# Patient Record
Sex: FEMALE | Race: WHITE | NOT HISPANIC OR LATINO | Employment: FULL TIME | URBAN - METROPOLITAN AREA
[De-identification: names, ages, dates, MRNs, and addresses within clinical notes are randomized per-mention and may not be internally consistent; named-entity substitution may affect disease eponyms.]

---

## 2017-01-10 ENCOUNTER — OFFICE VISIT (OUTPATIENT)
Dept: OPTOMETRY | Facility: CLINIC | Age: 52
End: 2017-01-10
Payer: COMMERCIAL

## 2017-01-10 ENCOUNTER — OFFICE VISIT (OUTPATIENT)
Dept: FAMILY MEDICINE | Facility: CLINIC | Age: 52
End: 2017-01-10
Payer: COMMERCIAL

## 2017-01-10 VITALS
HEART RATE: 83 BPM | DIASTOLIC BLOOD PRESSURE: 82 MMHG | HEIGHT: 63 IN | WEIGHT: 229.5 LBS | OXYGEN SATURATION: 98 % | BODY MASS INDEX: 40.66 KG/M2 | SYSTOLIC BLOOD PRESSURE: 122 MMHG

## 2017-01-10 DIAGNOSIS — I10 BENIGN ESSENTIAL HTN: ICD-10-CM

## 2017-01-10 DIAGNOSIS — M50.30 DEGENERATION OF CERVICAL INTERVERTEBRAL DISC: ICD-10-CM

## 2017-01-10 DIAGNOSIS — H52.209 MYOPIA WITH ASTIGMATISM AND PRESBYOPIA, UNSPECIFIED LATERALITY: Primary | ICD-10-CM

## 2017-01-10 DIAGNOSIS — H52.4 MYOPIA WITH ASTIGMATISM AND PRESBYOPIA, UNSPECIFIED LATERALITY: Primary | ICD-10-CM

## 2017-01-10 DIAGNOSIS — F41.9 ANXIETY: ICD-10-CM

## 2017-01-10 DIAGNOSIS — H52.10 MYOPIA WITH ASTIGMATISM AND PRESBYOPIA, UNSPECIFIED LATERALITY: Primary | ICD-10-CM

## 2017-01-10 DIAGNOSIS — R13.10 DYSPHAGIA, UNSPECIFIED TYPE: Primary | ICD-10-CM

## 2017-01-10 DIAGNOSIS — I25.10 CORONARY ARTERY DISEASE INVOLVING NATIVE CORONARY ARTERY OF NATIVE HEART WITHOUT ANGINA PECTORIS: ICD-10-CM

## 2017-01-10 PROCEDURE — 99214 OFFICE O/P EST MOD 30 MIN: CPT | Mod: S$GLB,,, | Performed by: FAMILY MEDICINE

## 2017-01-10 PROCEDURE — 99999 PR PBB SHADOW E&M-EST. PATIENT-LVL III: CPT | Mod: PBBFAC,,, | Performed by: OPTOMETRIST

## 2017-01-10 PROCEDURE — 1159F MED LIST DOCD IN RCRD: CPT | Mod: S$GLB,,, | Performed by: FAMILY MEDICINE

## 2017-01-10 PROCEDURE — 92015 DETERMINE REFRACTIVE STATE: CPT | Mod: S$GLB,,, | Performed by: OPTOMETRIST

## 2017-01-10 PROCEDURE — 99999 PR PBB SHADOW E&M-EST. PATIENT-LVL III: CPT | Mod: PBBFAC,,, | Performed by: FAMILY MEDICINE

## 2017-01-10 PROCEDURE — 3079F DIAST BP 80-89 MM HG: CPT | Mod: S$GLB,,, | Performed by: FAMILY MEDICINE

## 2017-01-10 PROCEDURE — 3074F SYST BP LT 130 MM HG: CPT | Mod: S$GLB,,, | Performed by: FAMILY MEDICINE

## 2017-01-10 RX ORDER — OMEPRAZOLE 20 MG/1
20 CAPSULE, DELAYED RELEASE ORAL DAILY
Qty: 30 CAPSULE | Refills: 11 | Status: SHIPPED | OUTPATIENT
Start: 2017-01-10 | End: 2018-05-28

## 2017-01-10 RX ORDER — VENLAFAXINE HYDROCHLORIDE 75 MG/1
75 CAPSULE, EXTENDED RELEASE ORAL DAILY
Qty: 30 CAPSULE | Refills: 11 | Status: SHIPPED | OUTPATIENT
Start: 2017-01-10 | End: 2017-12-13 | Stop reason: SDUPTHER

## 2017-01-10 NOTE — PROGRESS NOTES
HPI     Annual Exam    Additional comments: DLE 4-14 (felecia)    refraction only today ---   will schedule back for IOP / DFE           Blurred Vision    Additional comments: at both near & distance            Comments   Refraction only today due to 2nd appt   Will return for full exam         Last edited by LEENA Bass, OD on 1/10/2017  4:17 PM. (History)            Assessment /Plan     For exam results, see Encounter Report.    Myopia with astigmatism and presbyopia, unspecified laterality      Refraction only today per pt  RTC for IOP/ DFE, prn

## 2017-01-10 NOTE — PROGRESS NOTES
Subjective:       Patient ID: Josephine Bolton is a 51 y.o. female.    Chief Complaint: Hypertension    HPI     Since c spine surgery on 11/8/16, c/o dysphagia below retrosternal notch. Compliant with omeprazole    Cad stable.      htn stable.     Reports worsening anxiety.  Reports that parents are not well.  Reports she is helping to take care of them.       Review of Systems      Review of Systems   Constitutional: Negative for fever and chills.   HENT: Negative for hearing loss and neck stiffness.    Eyes: Negative for redness and itching.   Respiratory: Negative for cough and choking.    Cardiovascular: Negative for chest pain and leg swelling.  Abdomen: Negative for abdominal pain and blood in stool.   Genitourinary: Negative for dysuria and flank pain.   Musculoskeletal: Negative for back pain and gait problem.   Neurological: Negative for light-headedness and headaches.   Hematological: Negative for adenopathy.           Objective:      Physical Exam   Constitutional: She appears well-developed.   HENT:   Head: Normocephalic and atraumatic.   Eyes: Conjunctivae are normal. Pupils are equal, round, and reactive to light.   Neck: Normal range of motion.   Cardiovascular: Normal rate and regular rhythm.    No murmur heard.  Pulmonary/Chest: Effort normal and breath sounds normal. She has no wheezes.   Lymphadenopathy:     She has no cervical adenopathy.       Assessment:       1. Dysphagia, unspecified type    2. Degeneration of cervical intervertebral disc    3. Anxiety    4. Coronary artery disease involving native coronary artery of native heart without angina pectoris    5. Benign essential HTN        Plan:       Dysphagia, unspecified type  -     Case request GI: ESOPHAGOGASTRODUODENOSCOPY (EGD)    Degeneration of cervical intervertebral disc    Anxiety    Coronary artery disease involving native coronary artery of native heart without angina pectoris    Benign essential HTN    Other orders  -      omeprazole (PRILOSEC) 20 MG capsule; Take 1 capsule (20 mg total) by mouth once daily.  Dispense: 30 capsule; Refill: 11  -     venlafaxine (EFFEXOR-XR) 75 MG 24 hr capsule; Take 1 capsule (75 mg total) by mouth once daily.  Dispense: 30 capsule; Refill: 11            Plan:  Inc effexor xr to 75 mg daily to help her manage her anxiety  rf prilosec  Order egd  Cont all other meds

## 2017-01-10 NOTE — MR AVS SNAPSHOT
[facility]  [address]  [facility] [address]  Phone: [phone]  Fax: [phone]                  Josephine Bolton   1/10/2017 3:40 PM   Office Visit    Description:  Female : 1965   Provider:  Froylan Smart MD   Department:  [facility]           Reason for Visit     Hypertension           Diagnoses this Visit        Comments    Dysphagia, unspecified type    -  Primary            To Do List           Future Appointments        Provider Department Dept Phone    2017 10:35 AM LABORATORY, [place] [facility] [phone]    2017 1:30 PM LEENA Bass [facility] - Optometry [phone]    2017 9:20 AM Froylan Smart MD [facility] [phone]    2017 8:00 AM LAB, [place] [facility] [phone]    2017 2:30 PM Wilmer Aguilera DO [facility] Endocrinology [phone]      Goals (5 Years of Data)     None      Follow-Up and Disposition     Return in about 3 months (around 4/10/2017).       These Medications        Disp Refills Start End    omeprazole (PRILOSEC) 20 MG capsule 30 capsule 11 1/10/2017     Take 1 capsule (20 mg total) by mouth once daily. - Oral    Pharmacy: [address] Ph #: [phone]       venlafaxine (EFFEXOR-XR) 75 MG 24 hr capsule 30 capsule 11 1/10/2017 1/10/2018    Take 1 capsule (75 mg total) by mouth once daily. - Oral    Pharmacy: [facility] [address] Ph #: [phone]         [facility]     [facility] Nurse Care [facility] -  Assistance  Registered nurses in the [facility] provide clinical advisement, health education, appointment booking, and other advisory services.  Call for this free service at [phone].             Medications           Message regarding Medications     Verify the changes and/or additions to  your medication regime listed below are the same as discussed with your clinician today.  If any of these changes or additions are incorrect, please notify your healthcare provider.        START taking these NEW medications        Refills    venlafaxine (EFFEXOR-XR) 75 MG 24 hr capsule 11    Sig: Take 1 capsule (75 mg total) by mouth once daily.    Class: Normal    Route: Oral      CHANGE how you are taking these medications     Start Taking Instead of    omeprazole (PRILOSEC) 20 MG capsule omeprazole (PRILOSEC) 20 MG capsule    Dosage:  Take 1 capsule (20 mg total) by mouth once daily. Dosage:  Take 20 mg by mouth once daily.    Reason for Change:  Reorder       STOP taking these medications     levalbuterol (XOPENEX HFA) 45 mcg/actuation inhaler Inhale 1-2 puffs into the lungs every 4 (four) hours as needed for Wheezing.           Verify that the below list of medications is an accurate representation of the medications you are currently taking.  If none reported, the list may be blank. If incorrect, please contact your healthcare provider. Carry this list with you in case of emergency.           Current Medications     allopurinol (ZYLOPRIM) 100 MG tablet Take 1 tablet (100 mg total) by mouth once daily.    amlodipine (NORVASC) 5 MG tablet Take 5 mg by mouth once daily.    aspirin (ECOTRIN) 81 MG EC tablet Take 81 mg by mouth once daily.     atorvastatin (LIPITOR) 20 MG tablet TAKE ONE TABLET BY MOUTH ONE TIME DAILY     blood-glucose meter (GLUCOSE MONITORING KIT) kit Use as instructed    carvedilol (COREG) 25 MG tablet TAKE ONE TABLET BY MOUTH TWICE DAILY     EFFIENT 10 mg Tab TAKE ONE TABLET BY MOUTH EVERY MORNING     furosemide (LASIX) 20 MG tablet TAKE 1 TABLET BY MOUTH ONCE DAILY    gabapentin (NEURONTIN) 100 MG capsule Take 1 capsule (100 mg total) by mouth 2 (two) times daily.    lancets Misc 1 Units by Misc.(Non-Drug; Combo Route) route 2 (two) times daily as needed.    levothyroxine (SYNTHROID) 150 MCG  "tablet Take 1 tablet (150 mcg total) by mouth once daily.    metformin (GLUCOPHAGE) 1000 MG tablet TAKE ONE TABLET BY MOUTH TWICE DAILY WITH MEALS    nitroGLYCERIN (NITROSTAT) 0.4 MG SL tablet Place 1 tablet (0.4 mg total) under the tongue every 5 (five) minutes as needed for Chest pain.    omeprazole (PRILOSEC) 20 MG capsule Take 1 capsule (20 mg total) by mouth once daily.    ramipril (ALTACE) 10 MG capsule Take 1 capsule (10 mg total) by mouth once daily.    alprazolam (XANAX) 0.5 MG tablet Take 1 tablet (0.5 mg total) by mouth daily as needed for Anxiety.    venlafaxine (EFFEXOR-XR) 75 MG 24 hr capsule Take 1 capsule (75 mg total) by mouth once daily.           Clinical Reference Information           Vital Signs - Last Recorded  Most recent update: 1/10/2017  4:29 PM by Savannah Sadler LPN    BP Pulse Ht Wt SpO2 BMI    122/82 83 5' 3" (1.6 m) 104.1 kg (229 lb 8 oz) 98% 40.65 kg/m2      Blood Pressure          Most Recent Value    BP  122/82      Allergies as of 1/10/2017     Cephalexin    Codeine    Penicillins    Celexa [Citalopram]    Zoloft [Sertraline]      Immunizations Administered on Date of Encounter - 1/10/2017     None      Orders Placed During Today's Visit      Normal Orders This Visit    Case request GI: ESOPHAGOGASTRODUODENOSCOPY (EGD)       "

## 2017-01-13 RX ORDER — ATORVASTATIN CALCIUM 20 MG/1
TABLET, FILM COATED ORAL
Qty: 30 TABLET | Refills: 9 | Status: SHIPPED | OUTPATIENT
Start: 2017-01-13 | End: 2017-10-23 | Stop reason: SDUPTHER

## 2017-01-13 RX ORDER — VENLAFAXINE HYDROCHLORIDE 37.5 MG/1
CAPSULE, EXTENDED RELEASE ORAL
Qty: 30 CAPSULE | Refills: 3 | Status: SHIPPED | OUTPATIENT
Start: 2017-01-13 | End: 2017-04-13 | Stop reason: DRUGHIGH

## 2017-01-16 RX ORDER — CARVEDILOL 25 MG/1
TABLET ORAL
Qty: 60 TABLET | Refills: 7 | Status: SHIPPED | OUTPATIENT
Start: 2017-01-16 | End: 2017-08-23 | Stop reason: SDUPTHER

## 2017-01-23 ENCOUNTER — OFFICE VISIT (OUTPATIENT)
Dept: OPTOMETRY | Facility: CLINIC | Age: 52
End: 2017-01-23
Payer: COMMERCIAL

## 2017-01-23 DIAGNOSIS — H43.393 VITREOUS FLOATERS, BILATERAL: Primary | ICD-10-CM

## 2017-01-23 DIAGNOSIS — H35.033 HYPERTENSIVE RETINOPATHY OF BOTH EYES: ICD-10-CM

## 2017-01-23 PROCEDURE — 99499 UNLISTED E&M SERVICE: CPT | Mod: S$GLB,,, | Performed by: OPTOMETRIST

## 2017-01-23 PROCEDURE — 99999 PR PBB SHADOW E&M-EST. PATIENT-LVL III: CPT | Mod: PBBFAC,,, | Performed by: OPTOMETRIST

## 2017-01-23 NOTE — MR AVS SNAPSHOT
[facility]  [address]  [facility] [address]  Phone: [phone]  Fax: [phone]                  Josephine Bolton   2017 1:30 PM   Office Visit    Description:  Female : 1965   Provider:  LEENA Bass OD   Department:  [facility]           Reason for Visit     Concerns About Ocular Health           Diagnoses this Visit        Comments    Vitreous floaters, bilateral    -  Primary            To Do List           Future Appointments        Provider Department Dept Phone    2017 9:20 AM Froylan Smart MD [facility] Family Medicine [phone]    2017 8:00 AM LAB, [place] [facility] [phone]    2017 2:30 PM Wilmer Aguilera DO [facility] Endocrinology [phone]      Goals (5 Years of Data)     None      Follow-Up and Disposition     Return in about 1 year (around 2018) for Annual Eye Exam.      [facility]     [facility] Nurse Care Line - 24/7 Assistance  Registered nurses in the [facility] provide clinical advisement, health education, appointment booking, and other advisory services.  Call for this free service at [phone].             Medications           Message regarding Medications     Verify the changes and/or additions to your medication regime listed below are the same as discussed with your clinician today.  If any of these changes or additions are incorrect, please notify your healthcare provider.             Verify that the below list of medications is an accurate representation of the medications you are currently taking.  If none reported, the list may be blank. If incorrect, please contact your healthcare provider. Carry this list with you in case of emergency.           Current Medications     allopurinol (ZYLOPRIM) 100 MG tablet Take 1 tablet (100 mg total) by mouth once daily.    alprazolam (XANAX) 0.5 MG tablet Take 1 tablet (0.5 mg total) by mouth daily as  needed for Anxiety.    amlodipine (NORVASC) 5 MG tablet Take 5 mg by mouth once daily.    aspirin (ECOTRIN) 81 MG EC tablet Take 81 mg by mouth once daily.     atorvastatin (LIPITOR) 20 MG tablet TAKE 1 TABLET BY MOUTH EVERY DAY    blood-glucose meter (GLUCOSE MONITORING KIT) kit Use as instructed    carvedilol (COREG) 25 MG tablet TAKE 1 TABLET BY MOUTH TWICE DAILY    EFFIENT 10 mg Tab TAKE ONE TABLET BY MOUTH EVERY MORNING     furosemide (LASIX) 20 MG tablet TAKE 1 TABLET BY MOUTH ONCE DAILY    gabapentin (NEURONTIN) 100 MG capsule Take 1 capsule (100 mg total) by mouth 2 (two) times daily.    lancets Misc 1 Units by Misc.(Non-Drug; Combo Route) route 2 (two) times daily as needed.    levothyroxine (SYNTHROID) 150 MCG tablet Take 1 tablet (150 mcg total) by mouth once daily.    metformin (GLUCOPHAGE) 1000 MG tablet TAKE ONE TABLET BY MOUTH TWICE DAILY WITH MEALS    nitroGLYCERIN (NITROSTAT) 0.4 MG SL tablet Place 1 tablet (0.4 mg total) under the tongue every 5 (five) minutes as needed for Chest pain.    omeprazole (PRILOSEC) 20 MG capsule Take 1 capsule (20 mg total) by mouth once daily.    ramipril (ALTACE) 10 MG capsule Take 1 capsule (10 mg total) by mouth once daily.    venlafaxine (EFFEXOR-XR) 37.5 MG 24 hr capsule TAKE 1 CAPSULE BY MOUTH ONCE DAILY    venlafaxine (EFFEXOR-XR) 75 MG 24 hr capsule Take 1 capsule (75 mg total) by mouth once daily.           Clinical Reference Information           Allergies as of 1/23/2017     Cephalexin    Codeine    Penicillins    Celexa [Citalopram]    Zoloft [Sertraline]      Immunizations Administered on Date of Encounter - 1/23/2017     None      Instructions    FLASHES / FLOATERS / POSTERIOR VITREOUS DETACHMENT    Call the clinic if you have any further changes in symptoms.  Including:  Increased numbers of floaters or flashing lights, dimness or darkness that moves through or stays constant in your vision, or any pain in the eye (s).            DRY EYES:  Use Over The  "Counter artificial tears as needed for dry eye symptoms.  Some common brands include:  Systane, Optive, and Refresh.  These drops can be used as frequently as desired, but may be most helpful use during long periods of concentrated work.  For example, reading / working at the computer.  Avoid drops that "get redness out", as these contain medication that may further irritate the eyes.    ALLERGY EYES / SYMPTOMS:    Over the counter medications include--Zaditor and Alaway  Use as directed 1-2 drops daily for symptoms of itching / watering eyes.  These drops will not help for dry eye or exposure symptoms.           "

## 2017-01-23 NOTE — PROGRESS NOTES
HPI     Concerns About Ocular Health    Additional comments: pt here for IOP/DFE to finish annual exam       Last edited by Lauren Shultz on 1/23/2017  1:33 PM. (History)            Assessment /Plan     For exam results, see Encounter Report.    Vitreous floaters, bilateral      Finish internal exam from 1/10/17---NC visit

## 2017-01-23 NOTE — PROGRESS NOTES
HPI     Concerns About Ocular Health    Additional comments: pt here for IOP/DFE to finish annual exam       Last edited by Lauren Shultz on 1/23/2017  1:33 PM. (History)            Assessment /Plan     For exam results, see Encounter Report.    Vitreous floaters, bilateral    Hypertensive retinopathy of both eyes                 1. RD preautions given  2. No heme/ no ischemia---vasc changes--control BP    Discussed and educated patient on current findings /plan.  RTC 1 year, prn if any changes / issues

## 2017-01-23 NOTE — TELEPHONE ENCOUNTER
----- Message from Lucas Garcia sent at 1/23/2017  1:11 PM CST -----  Contact: [facility]  [facility] called in regarding the attached patient and her Rx for Amlodipine 5 mgs.  Patient is requesting refill.      Also, Allopurinol 100 mgs.  Patient is also requesting a refill on that.      [facility] - [facility] [address]  [address]  [address] [address]  Phone: [phone] Fax: [phone]

## 2017-01-25 ENCOUNTER — TELEPHONE (OUTPATIENT)
Dept: FAMILY MEDICINE | Facility: CLINIC | Age: 52
End: 2017-01-25

## 2017-01-25 RX ORDER — ALLOPURINOL 100 MG/1
100 TABLET ORAL DAILY
Qty: 90 TABLET | Refills: 2 | Status: SHIPPED | OUTPATIENT
Start: 2017-01-25 | End: 2017-09-21 | Stop reason: SDUPTHER

## 2017-01-25 RX ORDER — AMLODIPINE BESYLATE 5 MG/1
5 TABLET ORAL DAILY
Qty: 90 TABLET | Refills: 2 | Status: SHIPPED | OUTPATIENT
Start: 2017-01-25 | End: 2017-09-21 | Stop reason: SDUPTHER

## 2017-01-25 NOTE — TELEPHONE ENCOUNTER
----- Message from Elizabeth Mcmahon sent at 1/25/2017  8:48 AM CST -----  Contact: Josefa with [facility]  Josefa with [facility] - [phone] is calling to leave a second message/calling concerning the two refills that she has faxed over and has called about

## 2017-02-12 RX ORDER — FUROSEMIDE 20 MG/1
TABLET ORAL
Qty: 30 TABLET | Refills: 1 | Status: SHIPPED | OUTPATIENT
Start: 2017-02-12 | End: 2017-04-07 | Stop reason: SDUPTHER

## 2017-02-13 RX ORDER — LEVOTHYROXINE SODIUM 150 UG/1
TABLET ORAL
Qty: 30 TABLET | Refills: 0 | Status: SHIPPED | OUTPATIENT
Start: 2017-02-13 | End: 2017-03-10 | Stop reason: SDUPTHER

## 2017-03-14 ENCOUNTER — TELEPHONE (OUTPATIENT)
Dept: CARDIOLOGY | Facility: CLINIC | Age: 52
End: 2017-03-14

## 2017-03-14 RX ORDER — LEVOTHYROXINE SODIUM 150 UG/1
TABLET ORAL
Qty: 30 TABLET | Refills: 3 | Status: SHIPPED | OUTPATIENT
Start: 2017-03-14 | End: 2017-05-29 | Stop reason: SDUPTHER

## 2017-03-14 NOTE — TELEPHONE ENCOUNTER
Pt had LHC 3/2016  Pt saw you in clinic 12/2016    You cleared BUT......    HOW MANY DAYS TO HOLD ASA 7 EFFIENT prior to procedure? Please advise

## 2017-04-10 RX ORDER — GABAPENTIN 100 MG/1
CAPSULE ORAL
Qty: 60 CAPSULE | Refills: 5 | Status: SHIPPED | OUTPATIENT
Start: 2017-04-10 | End: 2017-09-21 | Stop reason: SDUPTHER

## 2017-04-10 RX ORDER — RAMIPRIL 10 MG/1
CAPSULE ORAL
Qty: 30 CAPSULE | Refills: 6 | Status: SHIPPED | OUTPATIENT
Start: 2017-04-10 | End: 2017-10-23 | Stop reason: SDUPTHER

## 2017-04-10 RX ORDER — PRASUGREL HYDROCHLORIDE 10 MG/1
TABLET, COATED ORAL
Qty: 30 TABLET | Refills: 5 | Status: SHIPPED | OUTPATIENT
Start: 2017-04-10 | End: 2017-09-21 | Stop reason: SDUPTHER

## 2017-04-10 RX ORDER — FUROSEMIDE 20 MG/1
TABLET ORAL
Qty: 30 TABLET | Refills: 1 | Status: SHIPPED | OUTPATIENT
Start: 2017-04-10 | End: 2017-06-09 | Stop reason: SDUPTHER

## 2017-04-17 ENCOUNTER — OFFICE VISIT (OUTPATIENT)
Dept: FAMILY MEDICINE | Facility: CLINIC | Age: 52
End: 2017-04-17
Payer: COMMERCIAL

## 2017-04-17 VITALS
HEART RATE: 80 BPM | WEIGHT: 230.38 LBS | HEIGHT: 63 IN | OXYGEN SATURATION: 98 % | BODY MASS INDEX: 40.82 KG/M2 | SYSTOLIC BLOOD PRESSURE: 122 MMHG | DIASTOLIC BLOOD PRESSURE: 72 MMHG

## 2017-04-17 DIAGNOSIS — I10 ESSENTIAL HYPERTENSION: ICD-10-CM

## 2017-04-17 DIAGNOSIS — N64.52 DISCHARGE FROM NIPPLE: ICD-10-CM

## 2017-04-17 DIAGNOSIS — I25.10 CORONARY ARTERY DISEASE INVOLVING NATIVE CORONARY ARTERY OF NATIVE HEART WITHOUT ANGINA PECTORIS: ICD-10-CM

## 2017-04-17 DIAGNOSIS — Z01.810 PREOP CARDIOVASCULAR EXAM: Primary | ICD-10-CM

## 2017-04-17 PROCEDURE — 99244 OFF/OP CNSLTJ NEW/EST MOD 40: CPT | Mod: S$GLB,,, | Performed by: FAMILY MEDICINE

## 2017-04-17 PROCEDURE — 99999 PR PBB SHADOW E&M-EST. PATIENT-LVL III: CPT | Mod: PBBFAC,,, | Performed by: FAMILY MEDICINE

## 2017-04-17 NOTE — Clinical Note
This patient is medically cleared for surgery.    Thank you for referring this patient to me and allowing me to share his care.

## 2017-04-17 NOTE — PROGRESS NOTES
Subjective:       Patient ID: Josephine Bolton is a 51 y.o. female.    Chief Complaint: Back Pain and Pre-op Exam (bleeding from nipple )    HPI     Referred by Dr. Shira Jimenez, breast surgeon, for a preop exam prior to left breast duct excision on 4/19/17    Cad stable. Had an angiogram on 3/2017 that showed patent grafts to the LAD and OM and a patent right coronary artery stent.     htn stable    Anxiety stable while on effexor    Review of Systems      Review of Systems   Constitutional: Negative for fever and chills.   HENT: Negative for hearing loss and neck stiffness.    Eyes: Negative for redness and itching.   Respiratory: Negative for cough and choking.    Cardiovascular: Negative for chest pain and leg swelling.  Abdomen: Negative for abdominal pain and blood in stool.   Genitourinary: Negative for dysuria and flank pain.   Musculoskeletal: Negative for gait problem.   Neurological: Negative for light-headedness and headaches.   Hematological: Negative for adenopathy.   Psychiatric/Behavioral: Negative for behavioral problems.     Objective:      Physical Exam   Constitutional: She appears well-developed.   HENT:   Head: Normocephalic and atraumatic.   Eyes: Conjunctivae are normal. Pupils are equal, round, and reactive to light.   Neck: Normal range of motion.   Cardiovascular: Normal rate and regular rhythm.    No murmur heard.  Pulmonary/Chest: Effort normal and breath sounds normal. She has no wheezes.   Lymphadenopathy:     She has no cervical adenopathy.       Assessment:       1. Preop cardiovascular exam    2. Discharge from nipple    3. Essential hypertension    4. Coronary artery disease involving native coronary artery of native heart without angina pectoris        Plan:       Preop cardiovascular exam    Discharge from nipple    Essential hypertension    Coronary artery disease involving native coronary artery of native heart without angina pectoris            Plan:    This patient is  medically cleared for surgery.      Thank you for referring this patient to me and allowing me to share his care.

## 2017-04-19 PROBLEM — N64.52 DISCHARGE FROM NIPPLE: Status: ACTIVE | Noted: 2017-04-19

## 2017-04-24 RX ORDER — METFORMIN HYDROCHLORIDE 1000 MG/1
TABLET ORAL
Qty: 60 TABLET | Refills: 6 | Status: ON HOLD | OUTPATIENT
Start: 2017-04-24 | End: 2017-10-16

## 2017-05-23 ENCOUNTER — LAB VISIT (OUTPATIENT)
Dept: LAB | Facility: HOSPITAL | Age: 52
End: 2017-05-23
Attending: INTERNAL MEDICINE
Payer: COMMERCIAL

## 2017-05-23 DIAGNOSIS — E03.9 HYPOTHYROIDISM, UNSPECIFIED TYPE: ICD-10-CM

## 2017-05-23 DIAGNOSIS — E88.819 INSULIN RESISTANCE: ICD-10-CM

## 2017-05-23 LAB
ALBUMIN SERPL BCP-MCNC: 3.4 G/DL
ALP SERPL-CCNC: 82 U/L
ALT SERPL W/O P-5'-P-CCNC: 26 U/L
ANION GAP SERPL CALC-SCNC: 8 MMOL/L
AST SERPL-CCNC: 44 U/L
BILIRUB SERPL-MCNC: 0.4 MG/DL
BUN SERPL-MCNC: 14 MG/DL
CALCIUM SERPL-MCNC: 9.2 MG/DL
CHLORIDE SERPL-SCNC: 108 MMOL/L
CO2 SERPL-SCNC: 28 MMOL/L
CREAT SERPL-MCNC: 0.8 MG/DL
EST. GFR  (AFRICAN AMERICAN): >60 ML/MIN/1.73 M^2
EST. GFR  (NON AFRICAN AMERICAN): >60 ML/MIN/1.73 M^2
GLUCOSE SERPL-MCNC: 89 MG/DL
INSULIN COLLECTION INTERVAL: ABNORMAL
INSULIN SERPL-ACNC: 51.6 UU/ML
POTASSIUM SERPL-SCNC: 3.9 MMOL/L
PROT SERPL-MCNC: 7 G/DL
SODIUM SERPL-SCNC: 144 MMOL/L
T4 FREE SERPL-MCNC: 0.92 NG/DL
TSH SERPL DL<=0.005 MIU/L-ACNC: 7.16 UIU/ML

## 2017-05-23 PROCEDURE — 36415 COLL VENOUS BLD VENIPUNCTURE: CPT | Mod: PO

## 2017-05-23 PROCEDURE — 84439 ASSAY OF FREE THYROXINE: CPT

## 2017-05-23 PROCEDURE — 83525 ASSAY OF INSULIN: CPT

## 2017-05-23 PROCEDURE — 84443 ASSAY THYROID STIM HORMONE: CPT

## 2017-05-23 PROCEDURE — 80053 COMPREHEN METABOLIC PANEL: CPT

## 2017-05-25 ENCOUNTER — TELEPHONE (OUTPATIENT)
Dept: ENDOCRINOLOGY | Facility: CLINIC | Age: 52
End: 2017-05-25

## 2017-05-25 NOTE — TELEPHONE ENCOUNTER
Pt showed up to  of lobby this PM to check in for an appt with Dr Aguilera for today @ 3:30pm; this is an appt that was originally sched on 12/9/16 & resched on 12/21/16 to 5/29/17 @ 2:30pm w/ Dr Aguilera b/c he had to book out of clinic this PM due to hospital call; I cannot tell if anyone officially s/w pt on 12/21 when they rescheduled her appt from today to 5/29 or if they mailed her new appt slip to her but there is nothing that we can do today as Dr Aguilera is on the [facility] hospital call; apologized to pt & she will RTC on Mon 5/29 @ 2:30pm to be seen by Dr Aguilera; pt verbalizes understanding.

## 2017-05-29 ENCOUNTER — LAB VISIT (OUTPATIENT)
Dept: LAB | Facility: HOSPITAL | Age: 52
End: 2017-05-29
Attending: INTERNAL MEDICINE
Payer: COMMERCIAL

## 2017-05-29 ENCOUNTER — OFFICE VISIT (OUTPATIENT)
Dept: ENDOCRINOLOGY | Facility: CLINIC | Age: 52
End: 2017-05-29
Payer: COMMERCIAL

## 2017-05-29 VITALS
HEIGHT: 63 IN | HEART RATE: 95 BPM | DIASTOLIC BLOOD PRESSURE: 78 MMHG | WEIGHT: 232.38 LBS | BODY MASS INDEX: 41.18 KG/M2 | SYSTOLIC BLOOD PRESSURE: 152 MMHG

## 2017-05-29 DIAGNOSIS — E88.819 INSULIN RESISTANCE: ICD-10-CM

## 2017-05-29 DIAGNOSIS — R20.2 PARESTHESIAS: ICD-10-CM

## 2017-05-29 DIAGNOSIS — E03.9 HYPOTHYROIDISM, UNSPECIFIED TYPE: Primary | ICD-10-CM

## 2017-05-29 DIAGNOSIS — E66.01 MORBID OBESITY, UNSPECIFIED OBESITY TYPE: ICD-10-CM

## 2017-05-29 DIAGNOSIS — E03.9 HYPOTHYROIDISM, UNSPECIFIED TYPE: ICD-10-CM

## 2017-05-29 LAB — VIT B12 SERPL-MCNC: 278 PG/ML

## 2017-05-29 PROCEDURE — 99999 PR PBB SHADOW E&M-EST. PATIENT-LVL III: CPT | Mod: PBBFAC,,, | Performed by: INTERNAL MEDICINE

## 2017-05-29 PROCEDURE — 36415 COLL VENOUS BLD VENIPUNCTURE: CPT | Mod: PO

## 2017-05-29 PROCEDURE — 99214 OFFICE O/P EST MOD 30 MIN: CPT | Mod: S$GLB,,, | Performed by: INTERNAL MEDICINE

## 2017-05-29 PROCEDURE — 82607 VITAMIN B-12: CPT

## 2017-05-29 RX ORDER — LEVOTHYROXINE SODIUM 175 UG/1
175 TABLET ORAL DAILY
Qty: 30 TABLET | Refills: 3 | Status: SHIPPED | OUTPATIENT
Start: 2017-05-29 | End: 2017-09-21 | Stop reason: SDUPTHER

## 2017-05-29 NOTE — PROGRESS NOTES
CHIEF COMPLAINT: Hypothyroidism   51 year old. She originally had a thyroidectomy 15 years ago. Appears she still had thyroid tissue and had a completion thyroidectomy 14 years ago. On synthroid 150 mcg daily. On generic. On metformin 1000 BID. Has not missed synthroid. Takes synthroid by itself. She is fatigued. Exercise limited. She does tend to stress eat. She tried to watch diet but then reverts back when stress increases.         PAST MEDICAL HISTORY: Hypothyroidism, anxiety, CAD with stents, degenerative disc disease. HTN     PAST SURGICAL HISTORY: Tonsillectomy, LILLIAN, laminectomy, carpal tunnel release, . Thyroidectomy with a completion thyroidectomy 14 years ago. CABG     SOCIAL HISTORY: No T/A     FAMILY HISTORY: No thyroid disease or thyroid cancer. + Heart disease.     MEDICATIONS/ALLERGIES: The patient's MedCard has been updated and reviewed.     ROS:   Constitutional: weight stable.  Eyes: No recent visual changes   ENT: No dysphagia   Cardiovascular: No Cp   Respiratory: No SOB  Gastrointestinal: No N/V   GenitoUrinary - No dysuria   Skin: No new skin rash   Neurologic: No focal neurologic complaints   Remainder ROS negative         PE:   GENERAL: Well developed, well nourished.   PSYCH: appropriate mood and affect   EYES: PERRL, EOM intact.   ENT: Nares patent, oropharynx clear, mucosa pink,   NECK: Supple, trachea midline. Neck brace in place  CHEST: Resp even and unlabored, CTA bilateral.   CARDIAC: RRR, S1, S2 heard, no murmurs, rubs, S3, or S4       Results for VLADIMIR FAUSTIN (MRN [id number]) as of 2017 14:35   Ref. Range 2017 07:15 2017 07:18   Sodium Latest Ref Range: 136 - 145 mmol/L 144    Potassium Latest Ref Range: 3.5 - 5.1 mmol/L 3.9    Chloride Latest Ref Range: 95 - 110 mmol/L 108    CO2 Latest Ref Range: 23 - 29 mmol/L 28    Anion Gap Latest Ref Range: 8 - 16 mmol/L 8    BUN, Bld Latest Ref Range: 6 - 20 mg/dL 14    Creatinine Latest Ref Range: 0.5 - 1.4 mg/dL  0.8    eGFR if non African American Latest Ref Range: >60 mL/min/1.73 m^2 >60.0    eGFR if African American Latest Ref Range: >60 mL/min/1.73 m^2 >60.0    Glucose Latest Ref Range: 70 - 110 mg/dL 89    Calcium Latest Ref Range: 8.7 - 10.5 mg/dL 9.2    Alkaline Phosphatase Latest Ref Range: 55 - 135 U/L 82    Total Protein Latest Ref Range: 6.0 - 8.4 g/dL 7.0    Albumin Latest Ref Range: 3.5 - 5.2 g/dL 3.4 (L)    Total Bilirubin Latest Ref Range: 0.1 - 1.0 mg/dL 0.4    AST Latest Ref Range: 10 - 40 U/L 44 (H)    ALT Latest Ref Range: 10 - 44 U/L 26    Insulin Latest Ref Range: <25.0 uU/mL  51.6 (H)   TSH Latest Ref Range: 0.400 - 4.000 uIU/mL 7.164 (H)    Free T4 Latest Ref Range: 0.71 - 1.51 ng/dL 0.92          ASSESSMENT/PLAN:   1. Hypothyroidism- Hx thyroidectomy. Increase synthroid to 175 mcg    2. HTN- controlled.     3. Insulin resistance- (NO DIABETES) Continue metformin 1000 BID. Reviewed diet. Exercise limited due to back.     4. Morbid obesity- see #3    5. Paresthesias- check B 12 since on metformin    FOLLOWUP  Today B 12  6 weeks TSH  F/U 1 year with CMP, Fasting insulin, TSH

## 2017-06-10 RX ORDER — FUROSEMIDE 20 MG/1
TABLET ORAL
Qty: 30 TABLET | Refills: 1 | Status: SHIPPED | OUTPATIENT
Start: 2017-06-10 | End: 2017-08-02 | Stop reason: SDUPTHER

## 2017-06-20 ENCOUNTER — TELEPHONE (OUTPATIENT)
Dept: FAMILY MEDICINE | Facility: CLINIC | Age: 52
End: 2017-06-20

## 2017-06-20 ENCOUNTER — LAB VISIT (OUTPATIENT)
Dept: LAB | Facility: HOSPITAL | Age: 52
End: 2017-06-20
Attending: FAMILY MEDICINE
Payer: COMMERCIAL

## 2017-06-20 ENCOUNTER — OFFICE VISIT (OUTPATIENT)
Dept: FAMILY MEDICINE | Facility: CLINIC | Age: 52
End: 2017-06-20
Payer: COMMERCIAL

## 2017-06-20 VITALS
TEMPERATURE: 98 F | OXYGEN SATURATION: 96 % | HEART RATE: 59 BPM | BODY MASS INDEX: 40.82 KG/M2 | HEIGHT: 63 IN | DIASTOLIC BLOOD PRESSURE: 88 MMHG | SYSTOLIC BLOOD PRESSURE: 136 MMHG | WEIGHT: 230.38 LBS

## 2017-06-20 DIAGNOSIS — K11.7 XEROSTOMIA: Primary | ICD-10-CM

## 2017-06-20 DIAGNOSIS — K11.7 XEROSTOMIA: ICD-10-CM

## 2017-06-20 LAB
ALBUMIN SERPL BCP-MCNC: 3.7 G/DL
ALP SERPL-CCNC: 91 U/L
ALT SERPL W/O P-5'-P-CCNC: 39 U/L
ANION GAP SERPL CALC-SCNC: 9 MMOL/L
AST SERPL-CCNC: 77 U/L
BASOPHILS # BLD AUTO: 0.11 K/UL
BASOPHILS NFR BLD: 1.4 %
BILIRUB SERPL-MCNC: 0.6 MG/DL
BUN SERPL-MCNC: 11 MG/DL
CALCIUM SERPL-MCNC: 9.3 MG/DL
CHLORIDE SERPL-SCNC: 103 MMOL/L
CO2 SERPL-SCNC: 30 MMOL/L
CREAT SERPL-MCNC: 0.8 MG/DL
DIFFERENTIAL METHOD: NORMAL
EOSINOPHIL # BLD AUTO: 0.2 K/UL
EOSINOPHIL NFR BLD: 2.8 %
ERYTHROCYTE [DISTWIDTH] IN BLOOD BY AUTOMATED COUNT: 13.7 %
ERYTHROCYTE [SEDIMENTATION RATE] IN BLOOD BY WESTERGREN METHOD: 26 MM/HR
EST. GFR  (AFRICAN AMERICAN): >60 ML/MIN/1.73 M^2
EST. GFR  (NON AFRICAN AMERICAN): >60 ML/MIN/1.73 M^2
FERRITIN SERPL-MCNC: 117 NG/ML
GLUCOSE SERPL-MCNC: 86 MG/DL
HCT VFR BLD AUTO: 39.5 %
HGB BLD-MCNC: 12.9 G/DL
IRON SERPL-MCNC: 56 UG/DL
LYMPHOCYTES # BLD AUTO: 2.8 K/UL
LYMPHOCYTES NFR BLD: 34.2 %
MCH RBC QN AUTO: 28.6 PG
MCHC RBC AUTO-ENTMCNC: 32.7 %
MCV RBC AUTO: 88 FL
MONOCYTES # BLD AUTO: 0.7 K/UL
MONOCYTES NFR BLD: 8.3 %
NEUTROPHILS # BLD AUTO: 4.3 K/UL
NEUTROPHILS NFR BLD: 53.3 %
PLATELET # BLD AUTO: 320 K/UL
PMV BLD AUTO: 9.3 FL
POTASSIUM SERPL-SCNC: 3.5 MMOL/L
PROT SERPL-MCNC: 7.8 G/DL
RBC # BLD AUTO: 4.51 M/UL
RHEUMATOID FACT SERPL-ACNC: <10 IU/ML
SATURATED IRON: 14 %
SODIUM SERPL-SCNC: 142 MMOL/L
TOTAL IRON BINDING CAPACITY: 404 UG/DL
TRANSFERRIN SERPL-MCNC: 273 MG/DL
WBC # BLD AUTO: 8.1 K/UL

## 2017-06-20 PROCEDURE — 80053 COMPREHEN METABOLIC PANEL: CPT

## 2017-06-20 PROCEDURE — 86235 NUCLEAR ANTIGEN ANTIBODY: CPT

## 2017-06-20 PROCEDURE — 82728 ASSAY OF FERRITIN: CPT

## 2017-06-20 PROCEDURE — 83540 ASSAY OF IRON: CPT

## 2017-06-20 PROCEDURE — 99999 PR PBB SHADOW E&M-EST. PATIENT-LVL III: CPT | Mod: PBBFAC,,, | Performed by: NURSE PRACTITIONER

## 2017-06-20 PROCEDURE — 86038 ANTINUCLEAR ANTIBODIES: CPT

## 2017-06-20 PROCEDURE — 86431 RHEUMATOID FACTOR QUANT: CPT

## 2017-06-20 PROCEDURE — 86235 NUCLEAR ANTIGEN ANTIBODY: CPT | Mod: 59

## 2017-06-20 PROCEDURE — 99214 OFFICE O/P EST MOD 30 MIN: CPT | Mod: S$GLB,,, | Performed by: NURSE PRACTITIONER

## 2017-06-20 PROCEDURE — 85025 COMPLETE CBC W/AUTO DIFF WBC: CPT

## 2017-06-20 PROCEDURE — 84466 ASSAY OF TRANSFERRIN: CPT

## 2017-06-20 PROCEDURE — 36415 COLL VENOUS BLD VENIPUNCTURE: CPT | Mod: PO

## 2017-06-20 PROCEDURE — 85651 RBC SED RATE NONAUTOMATED: CPT | Mod: PO

## 2017-06-20 NOTE — TELEPHONE ENCOUNTER
----- Message from Elizabeth Ayers sent at 6/20/2017  2:06 PM CDT -----  Contact: Josephine Valencia is returning call. Please call [phone]. Thanks!

## 2017-06-20 NOTE — TELEPHONE ENCOUNTER
Returned pt call. Instructed that she can come to today's appointment earlier if she would prefer. Pt states that she is on the way.

## 2017-06-21 LAB — ANA SER QL IF: NORMAL

## 2017-06-22 LAB
ANTI-SSA ANTIBODY: 1.5 EU
ANTI-SSA INTERPRETATION: NEGATIVE
ANTI-SSB ANTIBODY: 0.29 EU
ANTI-SSB INTERPRETATION: NEGATIVE

## 2017-06-23 ENCOUNTER — TELEPHONE (OUTPATIENT)
Dept: FAMILY MEDICINE | Facility: CLINIC | Age: 52
End: 2017-06-23

## 2017-06-23 NOTE — TELEPHONE ENCOUNTER
Call and tell her all lab work was normal---specifically no iron deficiency, no autoimmune conditions

## 2017-06-23 NOTE — TELEPHONE ENCOUNTER
Informed pt of result. Understanding verbalized. Pt would like to know what's her next step for dry mouth. Please advise.

## 2017-07-05 ENCOUNTER — PATIENT MESSAGE (OUTPATIENT)
Dept: CARDIOLOGY | Facility: CLINIC | Age: 52
End: 2017-07-05

## 2017-07-05 NOTE — TELEPHONE ENCOUNTER
Spoke with the patient she is having some chest pain she took 1 nitro and is now waiting the 15mins for thenext tablet. She will continue her dosage and if it doesn't go away she will proceed to the ER. She verbalized understanding of the instructions given and will follow up with us tomorrow.

## 2017-07-12 ENCOUNTER — OFFICE VISIT (OUTPATIENT)
Dept: CARDIOLOGY | Facility: CLINIC | Age: 52
End: 2017-07-12
Payer: COMMERCIAL

## 2017-07-12 VITALS
WEIGHT: 229.25 LBS | BODY MASS INDEX: 40.62 KG/M2 | DIASTOLIC BLOOD PRESSURE: 64 MMHG | SYSTOLIC BLOOD PRESSURE: 117 MMHG | HEART RATE: 62 BPM | HEIGHT: 63 IN

## 2017-07-12 DIAGNOSIS — Z95.1 S/P CABG X 3: Primary | ICD-10-CM

## 2017-07-12 DIAGNOSIS — E78.5 HYPERLIPIDEMIA, UNSPECIFIED HYPERLIPIDEMIA TYPE: ICD-10-CM

## 2017-07-12 DIAGNOSIS — I15.0 RENAL ARTERIAL HYPERTENSION: ICD-10-CM

## 2017-07-12 DIAGNOSIS — I25.10 CORONARY ARTERY DISEASE INVOLVING NATIVE CORONARY ARTERY OF NATIVE HEART WITHOUT ANGINA PECTORIS: Chronic | ICD-10-CM

## 2017-07-12 DIAGNOSIS — I15.0 RENOVASCULAR HYPERTENSION: ICD-10-CM

## 2017-07-12 PROCEDURE — 99999 PR PBB SHADOW E&M-EST. PATIENT-LVL II: CPT | Mod: PBBFAC,,, | Performed by: INTERNAL MEDICINE

## 2017-07-12 PROCEDURE — 99214 OFFICE O/P EST MOD 30 MIN: CPT | Mod: S$GLB,,, | Performed by: INTERNAL MEDICINE

## 2017-07-17 NOTE — PROGRESS NOTES
Subjective:    Patient ID:  Josephine Bolton is a 51 y.o. female who presents for follow-up of CAD    HPI  She comes with occasional chest pain, not on a regular basis, not always related to exercise.     Review of Systems   Constitution: Negative for decreased appetite, weakness, malaise/fatigue, weight gain and weight loss.   Cardiovascular: Negative for chest pain, dyspnea on exertion, leg swelling, palpitations and syncope.   Respiratory: Negative for cough and shortness of breath.    Gastrointestinal: Negative.    All other systems reviewed and are negative.       Objective:    Physical Exam   Constitutional: She is oriented to person, place, and time. She appears well-developed and well-nourished.   HENT:   Head: Normocephalic.   Eyes: Pupils are equal, round, and reactive to light.   Neck: Normal range of motion. Neck supple. No JVD present. Carotid bruit is not present. No thyromegaly present.   Cardiovascular: Normal rate, regular rhythm, normal heart sounds, intact distal pulses and normal pulses.  PMI is not displaced.  Exam reveals no gallop.    No murmur heard.  Pulmonary/Chest: Effort normal and breath sounds normal.   Abdominal: Soft. Normal appearance. She exhibits no mass. There is no hepatosplenomegaly. There is no tenderness.   Musculoskeletal: Normal range of motion. She exhibits no edema.   Neurological: She is alert and oriented to person, place, and time. She has normal strength and normal reflexes. No sensory deficit.   Skin: Skin is warm and intact.   Psychiatric: She has a normal mood and affect.   Nursing note and vitals reviewed.        Assessment:       1. S/P CABG x 3    2. Coronary artery disease involving native coronary artery of native heart without angina pectoris    3. Renovascular hypertension    4. Renal arterial hypertension    5. Hyperlipidemia, unspecified hyperlipidemia type         Plan:     Continue all cardiac medications  Regular exercise program  Weight loss  6 m  f/u or sooner if necessary

## 2017-08-04 RX ORDER — FUROSEMIDE 20 MG/1
TABLET ORAL
Qty: 30 TABLET | Refills: 1 | Status: SHIPPED | OUTPATIENT
Start: 2017-08-04 | End: 2017-09-21 | Stop reason: SDUPTHER

## 2017-08-23 RX ORDER — CARVEDILOL 25 MG/1
TABLET ORAL
Qty: 60 TABLET | Refills: 7 | Status: SHIPPED | OUTPATIENT
Start: 2017-08-23 | End: 2018-04-11 | Stop reason: SDUPTHER

## 2017-09-21 RX ORDER — LEVOTHYROXINE SODIUM 175 UG/1
TABLET ORAL
Qty: 30 TABLET | Refills: 3 | Status: SHIPPED | OUTPATIENT
Start: 2017-09-21 | End: 2018-01-16 | Stop reason: SDUPTHER

## 2017-09-21 RX ORDER — FUROSEMIDE 20 MG/1
TABLET ORAL
Qty: 30 TABLET | Refills: 1 | Status: SHIPPED | OUTPATIENT
Start: 2017-09-21 | End: 2017-11-06 | Stop reason: SDUPTHER

## 2017-09-22 RX ORDER — ALLOPURINOL 100 MG/1
TABLET ORAL
Qty: 90 TABLET | Refills: 0 | Status: SHIPPED | OUTPATIENT
Start: 2017-09-22 | End: 2017-12-13 | Stop reason: SDUPTHER

## 2017-09-22 RX ORDER — GABAPENTIN 100 MG/1
CAPSULE ORAL
Qty: 60 CAPSULE | Refills: 0 | Status: SHIPPED | OUTPATIENT
Start: 2017-09-22 | End: 2017-10-23 | Stop reason: SDUPTHER

## 2017-09-22 RX ORDER — AMLODIPINE BESYLATE 5 MG/1
TABLET ORAL
Qty: 90 TABLET | Refills: 0 | Status: SHIPPED | OUTPATIENT
Start: 2017-09-22 | End: 2017-12-13 | Stop reason: SDUPTHER

## 2017-09-22 RX ORDER — PRASUGREL HYDROCHLORIDE 10 MG/1
TABLET, COATED ORAL
Qty: 30 TABLET | Refills: 0 | Status: SHIPPED | OUTPATIENT
Start: 2017-09-22 | End: 2017-10-23 | Stop reason: SDUPTHER

## 2017-09-29 ENCOUNTER — OFFICE VISIT (OUTPATIENT)
Dept: FAMILY MEDICINE | Facility: CLINIC | Age: 52
End: 2017-09-29
Payer: COMMERCIAL

## 2017-09-29 VITALS
BODY MASS INDEX: 40.86 KG/M2 | HEIGHT: 63 IN | WEIGHT: 230.63 LBS | RESPIRATION RATE: 18 BRPM | OXYGEN SATURATION: 96 % | SYSTOLIC BLOOD PRESSURE: 134 MMHG | HEART RATE: 93 BPM | TEMPERATURE: 99 F | DIASTOLIC BLOOD PRESSURE: 70 MMHG

## 2017-09-29 DIAGNOSIS — R50.9 FEVER, UNSPECIFIED FEVER CAUSE: ICD-10-CM

## 2017-09-29 DIAGNOSIS — J01.01 ACUTE RECURRENT MAXILLARY SINUSITIS: Primary | ICD-10-CM

## 2017-09-29 DIAGNOSIS — I15.0 RENOVASCULAR HYPERTENSION: ICD-10-CM

## 2017-09-29 DIAGNOSIS — J02.9 SORE THROAT: ICD-10-CM

## 2017-09-29 DIAGNOSIS — N30.01 ACUTE CYSTITIS WITH HEMATURIA: ICD-10-CM

## 2017-09-29 DIAGNOSIS — R82.90 FOUL SMELLING URINE: ICD-10-CM

## 2017-09-29 DIAGNOSIS — E66.9 NON MORBID OBESITY, UNSPECIFIED OBESITY TYPE: ICD-10-CM

## 2017-09-29 DIAGNOSIS — Z91.09 ENVIRONMENTAL ALLERGIES: ICD-10-CM

## 2017-09-29 DIAGNOSIS — E03.9 HYPOTHYROIDISM, UNSPECIFIED TYPE: Chronic | ICD-10-CM

## 2017-09-29 LAB
BILIRUB SERPL-MCNC: ABNORMAL MG/DL
BLOOD, POC UA: 50
GLUCOSE UR QL STRIP: ABNORMAL
KETONES UR QL STRIP: ABNORMAL
LEUKOCYTE ESTERASE URINE, POC: ABNORMAL
NITRITE, POC UA: ABNORMAL
PH, POC UA: 6
PROTEIN, POC: ABNORMAL
SPECIFIC GRAVITY, POC UA: 1.02
UROBILINOGEN, POC UA: ABNORMAL

## 2017-09-29 PROCEDURE — 99999 PR PBB SHADOW E&M-EST. PATIENT-LVL IV: CPT | Mod: PBBFAC,,, | Performed by: NURSE PRACTITIONER

## 2017-09-29 PROCEDURE — 87088 URINE BACTERIA CULTURE: CPT

## 2017-09-29 PROCEDURE — 81000 URINALYSIS NONAUTO W/SCOPE: CPT | Mod: S$GLB,,, | Performed by: NURSE PRACTITIONER

## 2017-09-29 PROCEDURE — 87186 SC STD MICRODIL/AGAR DIL: CPT

## 2017-09-29 PROCEDURE — 99214 OFFICE O/P EST MOD 30 MIN: CPT | Mod: 25,S$GLB,, | Performed by: NURSE PRACTITIONER

## 2017-09-29 PROCEDURE — 87077 CULTURE AEROBIC IDENTIFY: CPT

## 2017-09-29 PROCEDURE — 3075F SYST BP GE 130 - 139MM HG: CPT | Mod: S$GLB,,, | Performed by: NURSE PRACTITIONER

## 2017-09-29 PROCEDURE — 87086 URINE CULTURE/COLONY COUNT: CPT

## 2017-09-29 PROCEDURE — 3078F DIAST BP <80 MM HG: CPT | Mod: S$GLB,,, | Performed by: NURSE PRACTITIONER

## 2017-09-29 PROCEDURE — 3008F BODY MASS INDEX DOCD: CPT | Mod: S$GLB,,, | Performed by: NURSE PRACTITIONER

## 2017-09-29 RX ORDER — CIPROFLOXACIN 500 MG/1
500 TABLET ORAL 2 TIMES DAILY
Qty: 14 TABLET | Refills: 0 | Status: SHIPPED | OUTPATIENT
Start: 2017-09-29 | End: 2017-10-09

## 2017-09-29 RX ORDER — DOXYCYCLINE 100 MG/1
100 CAPSULE ORAL 2 TIMES DAILY
Qty: 20 CAPSULE | Refills: 0 | Status: SHIPPED | OUTPATIENT
Start: 2017-09-29 | End: 2017-09-29 | Stop reason: CLARIF

## 2017-09-29 NOTE — PROGRESS NOTES
Subjective:       Patient ID: Josephine Bolton is a 52 y.o. female.    Chief Complaint: Sinus congestion and pressure (Symptoms started three days); Otalgia (Both ears); and Fever (Low fever)    Otalgia    There is pain in both ears. This is a new problem. The current episode started in the past 7 days. The problem has been gradually worsening. There has been no fever. Associated symptoms include headaches, rhinorrhea and a sore throat. Pertinent negatives include no abdominal pain, coughing, diarrhea, ear discharge, hearing loss, neck pain, rash or vomiting. The treatment provided no relief. There is no history of a chronic ear infection, hearing loss or a tympanostomy tube.   Fever    This is a new problem. The current episode started yesterday. The problem occurs constantly. The maximum temperature noted was 99 to 99.9 F. The temperature was taken using an oral thermometer. Associated symptoms include congestion, ear pain, headaches, a sore throat and urinary pain. Pertinent negatives include no abdominal pain, chest pain, coughing, diarrhea, muscle aches, nausea, rash, sleepiness, vomiting or wheezing.   Sinusitis   This is a new problem. The current episode started in the past 7 days. The problem has been gradually worsening since onset. Associated symptoms include congestion, ear pain, headaches, sinus pressure and a sore throat. Pertinent negatives include no chills, coughing, diaphoresis, hoarse voice, neck pain, shortness of breath, sneezing or swollen glands. Treatments tried: zycam.     Vitals:    09/29/17 0836   BP: 134/70   Pulse: 93   Resp: 18   Temp: 98.7 °F (37.1 °C)     Review of Systems   Constitutional: Negative for chills, diaphoresis, fatigue and fever.   HENT: Positive for congestion, ear pain, rhinorrhea, sinus pressure and sore throat. Negative for ear discharge, hearing loss, hoarse voice and sneezing.    Eyes: Negative.    Respiratory: Negative.  Negative for cough, shortness of breath  "and wheezing.    Cardiovascular: Negative.  Negative for chest pain.   Gastrointestinal: Negative.  Negative for abdominal pain, diarrhea, nausea and vomiting.   Endocrine: Negative.    Genitourinary: Positive for dysuria, hematuria and urgency. Negative for dyspareunia and flank pain.   Musculoskeletal: Negative.  Negative for neck pain.   Skin: Negative.  Negative for color change and rash.   Allergic/Immunologic: Negative.    Neurological: Positive for headaches. Negative for speech difficulty and numbness.   Hematological: Negative.    Psychiatric/Behavioral: Negative.        Past Medical History:   Diagnosis Date    Allergy     Anticoagulant long-term use     ASA 81mg, Effient    Anxiety     Arthritis     Asthma     CAD (coronary artery disease) 01/27/2012    s/p stents x4 , CABG, 3 vessel, (5/2013) newest stent prior to CABG    Chronic constipation     DDD (degenerative disc disease), cervical     DDD (degenerative disc disease), lumbar     DDD (degenerative disc disease), thoracic     Depression     Edema     Headache(784.0)     History of nephrolithiasis     Hyperlipidemia     Hypertension     Hypothyroid 7/5/2012    Insomnia     Insulin resistance     patient stated not diebetic    Joint stiffness     Joint swelling     Low back pain     Neck pain     PONV (postoperative nausea and vomiting)     Sleep apnea 01/27/12    Patient reports "severe" sleep apnea, uses C-pap    Thoracic back pain      Objective:      Physical Exam   Constitutional: She is oriented to person, place, and time. She appears well-developed and well-nourished.   HENT:   Head: Normocephalic and atraumatic.   Right Ear: Hearing, tympanic membrane and ear canal normal.   Left Ear: Hearing, tympanic membrane and ear canal normal.   Nose: Mucosal edema and rhinorrhea present. Right sinus exhibits maxillary sinus tenderness. Left sinus exhibits maxillary sinus tenderness.   Mouth/Throat: Uvula is midline, oropharynx " is clear and moist and mucous membranes are normal.   Eyes: Conjunctivae and EOM are normal. Pupils are equal, round, and reactive to light.   Neck: Neck supple.   Cardiovascular: Normal rate, regular rhythm, normal heart sounds and intact distal pulses.    Pulmonary/Chest: Effort normal and breath sounds normal.   Abdominal: Soft. Bowel sounds are normal. There is no rigidity, no rebound, no guarding and no CVA tenderness.   Musculoskeletal: Normal range of motion.   Lymphadenopathy:        Head (right side): Submandibular and tonsillar adenopathy present. No submental, no preauricular and no posterior auricular adenopathy present.        Head (left side): Submandibular and tonsillar adenopathy present. No submental, no preauricular and no posterior auricular adenopathy present.   Neurological: She is alert and oriented to person, place, and time.   Skin: Skin is warm and dry.   Psychiatric: She has a normal mood and affect. Her behavior is normal.   Nursing note and vitals reviewed.      Assessment:       1. Acute recurrent maxillary sinusitis    2. Sore throat    3. Environmental allergies    4. Foul smelling urine    5. Renovascular hypertension    6. Hypothyroidism, unspecified type    7. Non morbid obesity, unspecified obesity type    8. Fever, unspecified fever cause    9. Acute cystitis with hematuria        Plan:       Acute recurrent maxillary sinusitis  Sore throat  Environmental allergies-         ciprofloxacin HCl (CIPRO) 500 MG tablet; Take 1 tablet (500 mg total) by mouth 2 (two) times daily.  Dispense: 14 tablet; Refill: 0  Take mucinex OTC     -      Foul smelling urine  -     POCT Urinalysis  -     ciprofloxacin HCl (CIPRO) 500 MG tablet; Take 1 tablet (500 mg total) by mouth 2 (two) times daily.  Dispense: 14 tablet; Refill: 0  -     Urine culture    Renovascular hypertension  Stable - monitoring     Hypothyroidism, unspecified type  Stable on meds     Non morbid obesity, unspecified obesity  type    Fever, unspecified fever cause    Acute cystitis with hematuria  -     ciprofloxacin HCl (CIPRO) 500 MG tablet; Take 1 tablet (500 mg total) by mouth 2 (two) times daily.  Dispense: 14 tablet; Refill: 0  -     Urine culture            Discussed will treat - call if not better

## 2017-10-02 LAB — BACTERIA UR CULT: NORMAL

## 2017-10-14 PROBLEM — R07.9 CHEST PAIN WITH MODERATE RISK FOR CARDIAC ETIOLOGY: Status: ACTIVE | Noted: 2017-10-14

## 2017-10-14 PROBLEM — I20.0 UNSTABLE ANGINA: Status: ACTIVE | Noted: 2017-10-14

## 2017-10-15 PROBLEM — R07.9 CHEST PAIN WITH MODERATE RISK FOR CARDIAC ETIOLOGY: Status: ACTIVE | Noted: 2017-10-15

## 2017-10-18 ENCOUNTER — TELEPHONE (OUTPATIENT)
Dept: CARDIOLOGY | Facility: CLINIC | Age: 52
End: 2017-10-18

## 2017-10-18 NOTE — TELEPHONE ENCOUNTER
Spoke back with Rosanna [facility] utilization informed her that the cath lab is not open on sundays and is used for emergencies only. She verbalized understanding

## 2017-10-18 NOTE — TELEPHONE ENCOUNTER
Spoke with Rosanna she was informed that Dr Guevara is not in but we will call her back once she is here

## 2017-10-18 NOTE — TELEPHONE ENCOUNTER
----- Message from Leigh Walter sent at 10/17/2017  3:15 PM CDT -----  Contact: Rosanna with [facility] Utilization review  Rosanna with [facility] Utilization review regarding patient's denial of benefits for inpatient hospital stay on 10/14/17 til 10/16/17. Please call Rosanna at [phone]. Thanks!

## 2017-10-23 DIAGNOSIS — M51.36 DDD (DEGENERATIVE DISC DISEASE), LUMBAR: ICD-10-CM

## 2017-10-23 DIAGNOSIS — I25.10 CORONARY ARTERY DISEASE, ANGINA PRESENCE UNSPECIFIED, UNSPECIFIED VESSEL OR LESION TYPE, UNSPECIFIED WHETHER NATIVE OR TRANSPLANTED HEART: Primary | ICD-10-CM

## 2017-10-24 RX ORDER — ATORVASTATIN CALCIUM 20 MG/1
TABLET, FILM COATED ORAL
Qty: 30 TABLET | Refills: 9 | Status: SHIPPED | OUTPATIENT
Start: 2017-10-24 | End: 2018-07-30 | Stop reason: SDUPTHER

## 2017-10-24 RX ORDER — RAMIPRIL 10 MG/1
CAPSULE ORAL
Qty: 30 CAPSULE | Refills: 6 | Status: SHIPPED | OUTPATIENT
Start: 2017-10-24 | End: 2018-05-07 | Stop reason: SDUPTHER

## 2017-10-25 RX ORDER — PRASUGREL HYDROCHLORIDE 10 MG/1
TABLET, COATED ORAL
Qty: 90 TABLET | Refills: 0 | Status: SHIPPED | OUTPATIENT
Start: 2017-10-25 | End: 2018-01-16 | Stop reason: SDUPTHER

## 2017-10-25 RX ORDER — GABAPENTIN 100 MG/1
CAPSULE ORAL
Qty: 180 CAPSULE | Refills: 0 | Status: SHIPPED | OUTPATIENT
Start: 2017-10-25 | End: 2018-01-16 | Stop reason: SDUPTHER

## 2017-11-06 RX ORDER — METFORMIN HYDROCHLORIDE 1000 MG/1
TABLET ORAL
Qty: 60 TABLET | Refills: 6 | Status: SHIPPED | OUTPATIENT
Start: 2017-11-06 | End: 2018-06-06 | Stop reason: SDUPTHER

## 2017-11-07 ENCOUNTER — OFFICE VISIT (OUTPATIENT)
Dept: CARDIOLOGY | Facility: CLINIC | Age: 52
End: 2017-11-07
Payer: COMMERCIAL

## 2017-11-07 VITALS
HEIGHT: 63 IN | BODY MASS INDEX: 40.39 KG/M2 | HEART RATE: 62 BPM | DIASTOLIC BLOOD PRESSURE: 75 MMHG | WEIGHT: 227.94 LBS | SYSTOLIC BLOOD PRESSURE: 134 MMHG

## 2017-11-07 DIAGNOSIS — I15.0 RENOVASCULAR HYPERTENSION: ICD-10-CM

## 2017-11-07 DIAGNOSIS — E78.5 HYPERLIPIDEMIA, UNSPECIFIED HYPERLIPIDEMIA TYPE: ICD-10-CM

## 2017-11-07 DIAGNOSIS — E66.9 OBESITY, UNSPECIFIED CLASSIFICATION, UNSPECIFIED OBESITY TYPE, UNSPECIFIED WHETHER SERIOUS COMORBIDITY PRESENT: ICD-10-CM

## 2017-11-07 DIAGNOSIS — Z95.1 S/P CABG X 3: Primary | ICD-10-CM

## 2017-11-07 DIAGNOSIS — I25.10 CORONARY ARTERY DISEASE INVOLVING NATIVE CORONARY ARTERY OF NATIVE HEART WITHOUT ANGINA PECTORIS: Chronic | ICD-10-CM

## 2017-11-07 PROCEDURE — 99999 PR PBB SHADOW E&M-EST. PATIENT-LVL II: CPT | Mod: PBBFAC,,, | Performed by: INTERNAL MEDICINE

## 2017-11-07 PROCEDURE — 99214 OFFICE O/P EST MOD 30 MIN: CPT | Mod: S$GLB,,, | Performed by: INTERNAL MEDICINE

## 2017-11-07 RX ORDER — LORAZEPAM 2 MG/1
2 TABLET ORAL
Status: ON HOLD | COMMUNITY
Start: 2017-11-06 | End: 2019-06-18

## 2017-11-07 RX ORDER — FUROSEMIDE 20 MG/1
TABLET ORAL
Qty: 30 TABLET | Refills: 1 | Status: SHIPPED | OUTPATIENT
Start: 2017-11-07 | End: 2018-01-16 | Stop reason: SDUPTHER

## 2017-11-07 NOTE — PROGRESS NOTES
Subjective:    Patient ID:  Josephine Bolton is a 52 y.o. female who presents for follow-up of cad    HPI  Had angiogram last month showing patent grafts  She comes with no complaints, no chest pain, no shortness of breath  Main complaints appear to be from back issues    Review of Systems   Constitution: Negative for decreased appetite, weakness, malaise/fatigue, weight gain and weight loss.   Cardiovascular: Negative for chest pain, dyspnea on exertion, leg swelling, palpitations and syncope.   Respiratory: Negative for cough and shortness of breath.    Musculoskeletal: Positive for back pain and neck pain.   Gastrointestinal: Negative.    All other systems reviewed and are negative.       Objective:    Physical Exam   Constitutional: She is oriented to person, place, and time. She appears well-developed and well-nourished.   HENT:   Head: Normocephalic.   Eyes: Pupils are equal, round, and reactive to light.   Neck: Normal range of motion. Neck supple. No JVD present. Carotid bruit is not present. No thyromegaly present.   Cardiovascular: Normal rate, regular rhythm, normal heart sounds, intact distal pulses and normal pulses.  PMI is not displaced.  Exam reveals no gallop.    No murmur heard.  Pulmonary/Chest: Effort normal and breath sounds normal.   Abdominal: Soft. Normal appearance. She exhibits no mass. There is no hepatosplenomegaly. There is no tenderness.   Musculoskeletal: Normal range of motion. She exhibits no edema.   Neurological: She is alert and oriented to person, place, and time. She has normal strength and normal reflexes. No sensory deficit.   Skin: Skin is warm and intact.   Psychiatric: She has a normal mood and affect.   Nursing note and vitals reviewed.        Assessment:       1. S/P CABG x 3    2. Renovascular hypertension    3. Hyperlipidemia, unspecified hyperlipidemia type    4. Coronary artery disease involving native coronary artery of native heart without angina pectoris    5.  Obesity, unspecified classification, unspecified obesity type, unspecified whether serious comorbidity present         Plan:     Continue all cardiac medications  Regular exercise program  Weight loss  9 m f/u

## 2017-11-08 ENCOUNTER — TELEPHONE (OUTPATIENT)
Dept: CARDIOLOGY | Facility: CLINIC | Age: 52
End: 2017-11-08

## 2017-11-08 NOTE — TELEPHONE ENCOUNTER
----- Message from Lisa Ortega sent at 11/8/2017  3:22 PM CST -----  Contact: Sia with [facility]  Contact Sia to advise status of surgical clearance at [phone] she faxed on Monday 11/06/17.    Thank you

## 2017-11-09 ENCOUNTER — TELEPHONE (OUTPATIENT)
Dept: CARDIOLOGY | Facility: CLINIC | Age: 52
End: 2017-11-09

## 2017-12-13 DIAGNOSIS — N20.0 KIDNEY STONES: Primary | ICD-10-CM

## 2017-12-14 RX ORDER — ALLOPURINOL 100 MG/1
TABLET ORAL
Qty: 90 TABLET | Refills: 0 | Status: SHIPPED | OUTPATIENT
Start: 2017-12-14 | End: 2018-03-13 | Stop reason: SDUPTHER

## 2017-12-14 RX ORDER — AMLODIPINE BESYLATE 5 MG/1
TABLET ORAL
Qty: 90 TABLET | Refills: 0 | Status: SHIPPED | OUTPATIENT
Start: 2017-12-14 | End: 2018-03-13 | Stop reason: SDUPTHER

## 2017-12-14 RX ORDER — VENLAFAXINE HYDROCHLORIDE 75 MG/1
CAPSULE, EXTENDED RELEASE ORAL
Qty: 30 CAPSULE | Refills: 11 | Status: SHIPPED | OUTPATIENT
Start: 2017-12-14 | End: 2018-11-21 | Stop reason: SDUPTHER

## 2017-12-14 NOTE — PROGRESS NOTES
Refill Authorization Note     is requesting a refill authorization.    Brief assessment and rationale for refill: APPROVE: pt needs labs  Amount/Quantity of medication ordered: 90d         Refills Authorized: Yes  If authorized number of refills: 0        Medication-related problems identified: Requires labs  Medication Therapy Plan: BP controlled; due for updated uric acid.  Approve 3 more mo.  Will order uric; NTBS   Name and strength of medication: AMLODIPINE 5 MG@ / allopurinol 100 mg  How patient will take medication: t1t po daily   Medication reconciliation completed: No  Comments:   BP Readings from Last 3 Encounters:   11/07/17 134/75   10/16/17 (!) 141/75   09/29/17 134/70      Lab Results   Component Value Date    URICACID 4.2 04/04/2016      Lab Results   Component Value Date    CREATININE 0.83 10/16/2017    BUN 22 (H) 10/16/2017     10/16/2017    K 3.6 10/16/2017     10/16/2017    CO2 30 10/16/2017

## 2018-01-16 DIAGNOSIS — I25.10 CORONARY ARTERY DISEASE, ANGINA PRESENCE UNSPECIFIED, UNSPECIFIED VESSEL OR LESION TYPE, UNSPECIFIED WHETHER NATIVE OR TRANSPLANTED HEART: ICD-10-CM

## 2018-01-16 DIAGNOSIS — M51.36 DDD (DEGENERATIVE DISC DISEASE), LUMBAR: ICD-10-CM

## 2018-01-16 RX ORDER — LEVOTHYROXINE SODIUM 175 UG/1
TABLET ORAL
Qty: 30 TABLET | Refills: 3 | Status: SHIPPED | OUTPATIENT
Start: 2018-01-16 | End: 2018-04-30 | Stop reason: SDUPTHER

## 2018-01-18 RX ORDER — GABAPENTIN 100 MG/1
CAPSULE ORAL
Qty: 180 CAPSULE | Refills: 0 | Status: SHIPPED | OUTPATIENT
Start: 2018-01-18 | End: 2018-04-11 | Stop reason: SDUPTHER

## 2018-01-18 RX ORDER — PRASUGREL 10 MG/1
TABLET, FILM COATED ORAL
Qty: 90 TABLET | Refills: 0 | Status: SHIPPED | OUTPATIENT
Start: 2018-01-18 | End: 2018-04-11 | Stop reason: SDUPTHER

## 2018-01-18 RX ORDER — FUROSEMIDE 20 MG/1
TABLET ORAL
Qty: 30 TABLET | Refills: 1 | Status: SHIPPED | OUTPATIENT
Start: 2018-01-18 | End: 2018-03-13 | Stop reason: SDUPTHER

## 2018-03-13 DIAGNOSIS — N20.0 KIDNEY STONES: ICD-10-CM

## 2018-03-13 RX ORDER — FUROSEMIDE 20 MG/1
TABLET ORAL
Qty: 30 TABLET | Refills: 1 | Status: SHIPPED | OUTPATIENT
Start: 2018-03-13 | End: 2018-05-07 | Stop reason: SDUPTHER

## 2018-03-15 RX ORDER — AMLODIPINE BESYLATE 5 MG/1
TABLET ORAL
Qty: 90 TABLET | Refills: 0 | Status: SHIPPED | OUTPATIENT
Start: 2018-03-15 | End: 2018-06-06 | Stop reason: SDUPTHER

## 2018-03-15 RX ORDER — ALLOPURINOL 100 MG/1
TABLET ORAL
Qty: 90 TABLET | Refills: 0 | Status: SHIPPED | OUTPATIENT
Start: 2018-03-15 | End: 2018-06-06 | Stop reason: SDUPTHER

## 2018-03-26 ENCOUNTER — OFFICE VISIT (OUTPATIENT)
Dept: OPTOMETRY | Facility: CLINIC | Age: 53
End: 2018-03-26
Payer: COMMERCIAL

## 2018-03-26 DIAGNOSIS — H52.4 MYOPIA WITH ASTIGMATISM AND PRESBYOPIA, UNSPECIFIED LATERALITY: ICD-10-CM

## 2018-03-26 DIAGNOSIS — Z13.5 GLAUCOMA SCREENING: ICD-10-CM

## 2018-03-26 DIAGNOSIS — H43.393 VITREOUS FLOATERS, BILATERAL: Primary | ICD-10-CM

## 2018-03-26 DIAGNOSIS — H02.63 XANTHELASMA OF EYELID, BILATERAL: ICD-10-CM

## 2018-03-26 DIAGNOSIS — H52.10 MYOPIA WITH ASTIGMATISM AND PRESBYOPIA, UNSPECIFIED LATERALITY: ICD-10-CM

## 2018-03-26 DIAGNOSIS — H02.66 XANTHELASMA OF EYELID, BILATERAL: ICD-10-CM

## 2018-03-26 DIAGNOSIS — H35.033 HYPERTENSIVE RETINOPATHY OF BOTH EYES: ICD-10-CM

## 2018-03-26 DIAGNOSIS — H52.209 MYOPIA WITH ASTIGMATISM AND PRESBYOPIA, UNSPECIFIED LATERALITY: ICD-10-CM

## 2018-03-26 PROCEDURE — 92015 DETERMINE REFRACTIVE STATE: CPT | Mod: S$GLB,,, | Performed by: OPTOMETRIST

## 2018-03-26 PROCEDURE — 92014 COMPRE OPH EXAM EST PT 1/>: CPT | Mod: S$GLB,,, | Performed by: OPTOMETRIST

## 2018-03-26 PROCEDURE — 99999 PR PBB SHADOW E&M-EST. PATIENT-LVL III: CPT | Mod: PBBFAC,,, | Performed by: OPTOMETRIST

## 2018-03-26 NOTE — PROGRESS NOTES
"HPI     Agree above  Hemoglobin A1C       Date                     Value               Ref Range             Status                04/13/2017               5.1                 0.0 - 5.6 %           Final              Comment:    Reference Interval:  5.0 - 5.6 Normal   5.7 - 6.4 High Risk   > 6.5   Diabetic    Hgb A1c results are standardized based on the (NGSP) National     Glycohemoglobin Standardization Program.    Hemoglobin A1C levels are   related to mean serum/plasma glucose   during the preceding 2-3 months.              10/25/2016               5.4                 4.5 - 6.2 %           Final              Comment:    According to ADA guidelines, hemoglobin A1C <7.0% represents  optimal   control in non-pregnant diabetic patients.  Different  metrics may apply   to specific populations.   Standards of Medical Care in Diabetes -   2016.  For the purpose of screening for the presence of diabetes:  <5.7%       Consistent with the absence of diabetes  5.7-6.4%  Consistent with   increasing risk for diabetes   (prediabetes)  >or=6.5%  Consistent with   diabetes  Currently no consensus exists for use of hemoglobin A1C  for   diagnosis of diabetes for children.         07/17/2013               5.1                 4.0 - 6.2 %           Final            ----------    Notes VA reduced w/ glare, azucena at night  Last specs made "incorrect" with PAL in different positions on OU  Feels VA otherwise stable  Photophobia worsening w/ age        Last edited by LEENA Bass, OD on 3/26/2018  4:15 PM. (History)        ROS     Positive for: Eyes    Negative for: Constitutional, Gastrointestinal, Neurological, Skin,   Genitourinary, Musculoskeletal, HENT, Endocrine, Cardiovascular,   Respiratory, Psychiatric, Allergic/Imm, Heme/Lymph    Last edited by LEENA Bass, OD on 3/26/2018  4:05 PM. (History)        Assessment /Plan     For exam results, see Encounter Report.    Vitreous floaters, bilateral    Hypertensive retinopathy " of both eyes    Glaucoma screening    Xanthelasma of eyelid, bilateral    Myopia with astigmatism and presbyopia, unspecified laterality      1. RD precautions given, reviewed  2. No heme/ no ischemia, vasc caliber changes / moderate tortuosity OU  Continue good BP control  3. Not suspect, monitor angles, 2+ on VH  fhx of HALF sisters w/ glaucoma (patient and siblings to not share the same mother, who also has glaucoma)  4. Longstanding / stable, continue chol control  5. Updated specs rx, gave copy  Had issues with last pair of progressives, advised to call right away if any issues with new pair    Discussed and educated patient on current findings /plan.  RTC 1 year, prn if any changes / issues

## 2018-04-11 DIAGNOSIS — M51.36 DDD (DEGENERATIVE DISC DISEASE), LUMBAR: ICD-10-CM

## 2018-04-11 DIAGNOSIS — K21.9 GASTROESOPHAGEAL REFLUX DISEASE, ESOPHAGITIS PRESENCE NOT SPECIFIED: ICD-10-CM

## 2018-04-11 DIAGNOSIS — I10 ESSENTIAL HYPERTENSION: Primary | ICD-10-CM

## 2018-04-11 DIAGNOSIS — I25.10 CORONARY ARTERY DISEASE, ANGINA PRESENCE UNSPECIFIED, UNSPECIFIED VESSEL OR LESION TYPE, UNSPECIFIED WHETHER NATIVE OR TRANSPLANTED HEART: ICD-10-CM

## 2018-04-11 RX ORDER — CARVEDILOL 25 MG/1
TABLET ORAL
Qty: 60 TABLET | Refills: 7 | Status: SHIPPED | OUTPATIENT
Start: 2018-04-11 | End: 2018-11-21 | Stop reason: SDUPTHER

## 2018-04-16 RX ORDER — GABAPENTIN 100 MG/1
CAPSULE ORAL
Qty: 180 CAPSULE | Refills: 0 | Status: SHIPPED | OUTPATIENT
Start: 2018-04-16 | End: 2018-07-02 | Stop reason: SDUPTHER

## 2018-04-16 RX ORDER — PRASUGREL 10 MG/1
TABLET, FILM COATED ORAL
Qty: 90 TABLET | Refills: 0 | Status: SHIPPED | OUTPATIENT
Start: 2018-04-16 | End: 2018-07-02 | Stop reason: SDUPTHER

## 2018-04-16 NOTE — PROGRESS NOTES
Refill Authorization Note     is requesting a refill authorization.    Brief assessment and rationale for refill: APPROVE: needs annual and labs  Amount/Quantity of medication ordered: 90d         Refills Authorized: Yes  If authorized number of refills: 0        Medication-related problems identified: Requires appointment  Medication Therapy Plan: Approve 3 more months; needs annual; will order SCr/K/Na/Magnes/uric acid  Name and strength of medication: gabapentin / prasugrel  How patient will take medication: UTD   Medication reconciliation completed: No  Comments:   Lab Results   Component Value Date    CREATININE 0.83 10/16/2017    BUN 22 (H) 10/16/2017     10/16/2017    K 3.6 10/16/2017     10/16/2017    CO2 30 10/16/2017      Lab Results   Component Value Date    WBC 6.83 10/16/2017    HGB 12.1 10/16/2017    HCT 37.4 10/16/2017    MCV 89 10/16/2017     10/16/2017

## 2018-04-17 ENCOUNTER — TELEPHONE (OUTPATIENT)
Dept: ENDOCRINOLOGY | Facility: CLINIC | Age: 53
End: 2018-04-17

## 2018-04-17 NOTE — TELEPHONE ENCOUNTER
----- Message from Bhavna Thomason sent at 4/17/2018  3:16 PM CDT -----  Contact: Josephine  Calling to schedule for her recall. I was not able to schedule until July, but she will not be in town. Please call her back to schedule the end of May. [phone] (home)   Thanks!

## 2018-04-28 ENCOUNTER — LAB VISIT (OUTPATIENT)
Dept: LAB | Facility: HOSPITAL | Age: 53
End: 2018-04-28
Attending: FAMILY MEDICINE
Payer: COMMERCIAL

## 2018-04-28 DIAGNOSIS — K21.9 GASTROESOPHAGEAL REFLUX DISEASE, ESOPHAGITIS PRESENCE NOT SPECIFIED: ICD-10-CM

## 2018-04-28 DIAGNOSIS — I10 ESSENTIAL HYPERTENSION: ICD-10-CM

## 2018-04-28 DIAGNOSIS — R20.2 PARESTHESIAS: ICD-10-CM

## 2018-04-28 DIAGNOSIS — E03.9 HYPOTHYROIDISM, UNSPECIFIED TYPE: ICD-10-CM

## 2018-04-28 DIAGNOSIS — N20.0 KIDNEY STONES: ICD-10-CM

## 2018-04-28 DIAGNOSIS — E88.819 INSULIN RESISTANCE: ICD-10-CM

## 2018-04-28 LAB
ALBUMIN SERPL BCP-MCNC: 3.6 G/DL
ALP SERPL-CCNC: 84 U/L
ALT SERPL W/O P-5'-P-CCNC: 22 U/L
ANION GAP SERPL CALC-SCNC: 10 MMOL/L
AST SERPL-CCNC: 34 U/L
BILIRUB SERPL-MCNC: 0.5 MG/DL
BUN SERPL-MCNC: 12 MG/DL
CALCIUM SERPL-MCNC: 9.3 MG/DL
CHLORIDE SERPL-SCNC: 107 MMOL/L
CO2 SERPL-SCNC: 27 MMOL/L
CREAT SERPL-MCNC: 0.8 MG/DL
CREAT SERPL-MCNC: 0.8 MG/DL
EST. GFR  (AFRICAN AMERICAN): >60 ML/MIN/1.73 M^2
EST. GFR  (AFRICAN AMERICAN): >60 ML/MIN/1.73 M^2
EST. GFR  (NON AFRICAN AMERICAN): >60 ML/MIN/1.73 M^2
EST. GFR  (NON AFRICAN AMERICAN): >60 ML/MIN/1.73 M^2
GLUCOSE SERPL-MCNC: 116 MG/DL
MAGNESIUM SERPL-MCNC: 2.3 MG/DL
POTASSIUM SERPL-SCNC: 4.1 MMOL/L
POTASSIUM SERPL-SCNC: 4.1 MMOL/L
PROT SERPL-MCNC: 7.4 G/DL
SODIUM SERPL-SCNC: 144 MMOL/L
SODIUM SERPL-SCNC: 144 MMOL/L
TSH SERPL DL<=0.005 MIU/L-ACNC: 2.14 UIU/ML
URATE SERPL-MCNC: 3.6 MG/DL

## 2018-04-28 PROCEDURE — 84443 ASSAY THYROID STIM HORMONE: CPT

## 2018-04-28 PROCEDURE — 83735 ASSAY OF MAGNESIUM: CPT

## 2018-04-28 PROCEDURE — 80053 COMPREHEN METABOLIC PANEL: CPT

## 2018-04-28 PROCEDURE — 84550 ASSAY OF BLOOD/URIC ACID: CPT

## 2018-04-28 PROCEDURE — 83525 ASSAY OF INSULIN: CPT

## 2018-04-30 ENCOUNTER — OFFICE VISIT (OUTPATIENT)
Dept: ENDOCRINOLOGY | Facility: CLINIC | Age: 53
End: 2018-04-30
Payer: COMMERCIAL

## 2018-04-30 VITALS
WEIGHT: 230.69 LBS | HEART RATE: 63 BPM | SYSTOLIC BLOOD PRESSURE: 142 MMHG | HEIGHT: 63 IN | BODY MASS INDEX: 40.88 KG/M2 | DIASTOLIC BLOOD PRESSURE: 68 MMHG

## 2018-04-30 DIAGNOSIS — I10 BENIGN ESSENTIAL HTN: ICD-10-CM

## 2018-04-30 DIAGNOSIS — E88.819 INSULIN RESISTANCE: ICD-10-CM

## 2018-04-30 DIAGNOSIS — E03.9 HYPOTHYROIDISM, UNSPECIFIED TYPE: Primary | ICD-10-CM

## 2018-04-30 LAB
INSULIN COLLECTION INTERVAL: ABNORMAL
INSULIN SERPL-ACNC: 45.9 UU/ML

## 2018-04-30 PROCEDURE — 3077F SYST BP >= 140 MM HG: CPT | Mod: CPTII,S$GLB,, | Performed by: INTERNAL MEDICINE

## 2018-04-30 PROCEDURE — 3078F DIAST BP <80 MM HG: CPT | Mod: CPTII,S$GLB,, | Performed by: INTERNAL MEDICINE

## 2018-04-30 PROCEDURE — 99999 PR PBB SHADOW E&M-EST. PATIENT-LVL IV: CPT | Mod: PBBFAC,,, | Performed by: INTERNAL MEDICINE

## 2018-04-30 PROCEDURE — 99214 OFFICE O/P EST MOD 30 MIN: CPT | Mod: S$GLB,,, | Performed by: INTERNAL MEDICINE

## 2018-04-30 RX ORDER — DILTIAZEM HYDROCHLORIDE 120 MG/1
TABLET, FILM COATED ORAL
COMMUNITY
End: 2018-05-01

## 2018-04-30 RX ORDER — CYCLOBENZAPRINE HCL 10 MG
TABLET ORAL
COMMUNITY
End: 2018-05-01

## 2018-04-30 RX ORDER — LEVOTHYROXINE SODIUM 175 UG/1
175 TABLET ORAL DAILY
Qty: 30 TABLET | Refills: 11 | Status: SHIPPED | OUTPATIENT
Start: 2018-04-30 | End: 2019-04-05 | Stop reason: SDUPTHER

## 2018-04-30 NOTE — PROGRESS NOTES
CHIEF COMPLAINT: Hypothyroidism   52 year old. She originally had a thyroidectomy 15 years ago. Appears she still had thyroid tissue and had a completion thyroidectomy 14 years ago. On synthroid 175 mcg daily. On generic. On metformin 1000 BID.  NO palpitations. No tremors. States that BP low in AM.     PAST MEDICAL HISTORY: Hypothyroidism, anxiety, CAD with stents, degenerative disc disease. HTN     PAST SURGICAL HISTORY: Tonsillectomy, LILLIAN, laminectomy, carpal tunnel release, . Thyroidectomy with a completion thyroidectomy 14 years ago. CABG     SOCIAL HISTORY: No T/A     FAMILY HISTORY: No thyroid disease or thyroid cancer. + Heart disease.     MEDICATIONS/ALLERGIES: The patient's MedCard has been updated and reviewed.     ROS:   Constitutional: weight stable.  Eyes: No recent visual changes   ENT: No dysphagia   Cardiovascular: No Cp   Respiratory: No SOB  Gastrointestinal: No N/V   GenitoUrinary - No dysuria   Skin: No new skin rash   Neurologic: No focal neurologic complaints   Remainder ROS negative         PE:   GENERAL: Well developed, well nourished.   PSYCH: appropriate mood and affect   EYES: PERRL, EOM intact.   ENT: Nares patent, oropharynx clear, mucosa pink,   NECK: Supple, trachea midline. Neck brace in place  CHEST: Resp even and unlabored, CTA bilateral.   CARDIAC: RRR, S1, S2 heard, no murmurs, rubs, S3, or S4       Results for VLADIMIR FAUSTIN (MRN [id number]) as of 2018 14:33   Ref. Range 2018 09:06   Sodium Latest Ref Range: 136 - 145 mmol/L 144   Potassium Latest Ref Range: 3.5 - 5.1 mmol/L 4.1   Chloride Latest Ref Range: 95 - 110 mmol/L 107   CO2 Latest Ref Range: 23 - 29 mmol/L 27   Anion Gap Latest Ref Range: 8 - 16 mmol/L 10   BUN, Bld Latest Ref Range: 6 - 20 mg/dL 12   Creatinine Latest Ref Range: 0.5 - 1.4 mg/dL 0.8   eGFR if non African American Latest Ref Range: >60 mL/min/1.73 m^2 >60.0   eGFR if African American Latest Ref Range: >60 mL/min/1.73 m^2 >60.0    Glucose Latest Ref Range: 70 - 110 mg/dL 116 (H)   Calcium Latest Ref Range: 8.7 - 10.5 mg/dL 9.3   Magnesium Latest Ref Range: 1.6 - 2.6 mg/dL 2.3   Alkaline Phosphatase Latest Ref Range: 55 - 135 U/L 84   Total Protein Latest Ref Range: 6.0 - 8.4 g/dL 7.4   Albumin Latest Ref Range: 3.5 - 5.2 g/dL 3.6   Uric Acid Latest Ref Range: 2.4 - 5.7 mg/dL 3.6   Total Bilirubin Latest Ref Range: 0.1 - 1.0 mg/dL 0.5   AST Latest Ref Range: 10 - 40 U/L 34   ALT Latest Ref Range: 10 - 44 U/L 22   TSH Latest Ref Range: 0.400 - 4.000 uIU/mL 2.136         ASSESSMENT/PLAN:   1. Hypothyroidism- Hx thyroidectomy.continue current tx.     2. HTN- scheduled to see PCP tomorrow. States BP often low in AM.     3. Insulin resistance- (NO DIABETES) Continue metformin 1000 BID. Reviewed diet. Exercise limited due to back.     4. Morbid obesity- see #3    FOLLOWUP  F/U 1 year with CMP, Fasting insulin, TSH

## 2018-05-01 ENCOUNTER — OFFICE VISIT (OUTPATIENT)
Dept: FAMILY MEDICINE | Facility: CLINIC | Age: 53
End: 2018-05-01
Payer: COMMERCIAL

## 2018-05-01 VITALS
HEART RATE: 69 BPM | BODY MASS INDEX: 41.02 KG/M2 | HEIGHT: 63 IN | WEIGHT: 231.5 LBS | SYSTOLIC BLOOD PRESSURE: 111 MMHG | DIASTOLIC BLOOD PRESSURE: 82 MMHG | OXYGEN SATURATION: 98 %

## 2018-05-01 DIAGNOSIS — E03.9 HYPOTHYROIDISM, UNSPECIFIED TYPE: ICD-10-CM

## 2018-05-01 DIAGNOSIS — E66.01 MORBID OBESITY: ICD-10-CM

## 2018-05-01 DIAGNOSIS — I95.9 HYPOTENSION, UNSPECIFIED HYPOTENSION TYPE: Primary | ICD-10-CM

## 2018-05-01 DIAGNOSIS — F41.9 ANXIETY: ICD-10-CM

## 2018-05-01 DIAGNOSIS — I25.10 CORONARY ARTERY DISEASE INVOLVING NATIVE CORONARY ARTERY OF NATIVE HEART WITHOUT ANGINA PECTORIS: ICD-10-CM

## 2018-05-01 PROCEDURE — 99999 PR PBB SHADOW E&M-EST. PATIENT-LVL III: CPT | Mod: PBBFAC,,, | Performed by: FAMILY MEDICINE

## 2018-05-01 PROCEDURE — 3074F SYST BP LT 130 MM HG: CPT | Mod: CPTII,S$GLB,, | Performed by: FAMILY MEDICINE

## 2018-05-01 PROCEDURE — 99214 OFFICE O/P EST MOD 30 MIN: CPT | Mod: S$GLB,,, | Performed by: FAMILY MEDICINE

## 2018-05-01 PROCEDURE — 3079F DIAST BP 80-89 MM HG: CPT | Mod: CPTII,S$GLB,, | Performed by: FAMILY MEDICINE

## 2018-05-01 PROCEDURE — 3008F BODY MASS INDEX DOCD: CPT | Mod: CPTII,S$GLB,, | Performed by: FAMILY MEDICINE

## 2018-05-01 NOTE — PROGRESS NOTES
Subjective:       Patient ID: Josephine Bolton is a 52 y.o. female.    Chief Complaint: Insulin Resistance and Hypotension (98/49 )    HPI     bp low in am for the past 6 weeks.     Cad stable. Had an angiogram on 10/2017 that showed patent grafts to the LAD and OM and a patent right coronary artery stent.     Anxiety stable while on effexor    Review of Systems      Review of Systems   Constitutional: Negative for fever and chills.   HENT: Negative for hearing loss and neck stiffness.    Eyes: Negative for redness and itching.   Respiratory: Negative for cough and choking.    Cardiovascular: Negative for chest pain and leg swelling.  Abdomen: Negative for abdominal pain and blood in stool.   Genitourinary: Negative for dysuria and flank pain.   Musculoskeletal: Negative for back pain and gait problem.   Neurological: Negative for light-headedness and headaches.   Hematological: Negative for adenopathy.   Psychiatric/Behavioral: Negative for behavioral problems.     Objective:      Physical Exam   Constitutional: She appears well-developed.   HENT:   Head: Normocephalic and atraumatic.   Eyes: Conjunctivae are normal. Pupils are equal, round, and reactive to light.   Neck: Normal range of motion.   Cardiovascular: Normal rate and regular rhythm.    No murmur heard.  Pulmonary/Chest: Effort normal and breath sounds normal.   Lymphadenopathy:     She has no cervical adenopathy.       Assessment:       1. Hypotension, unspecified hypotension type    2. Hypothyroidism, unspecified type    3. Morbid obesity    4. Coronary artery disease involving native coronary artery of native heart without angina pectoris    5. Anxiety        Plan:       Hypotension, unspecified hypotension type    Hypothyroidism, unspecified type    Morbid obesity    Coronary artery disease involving native coronary artery of native heart without angina pectoris    Anxiety              Plan:  Hold norvasc for hypotension.  Serial bp checks  Cont  all other meds          Medication List with Changes/Refills   Current Medications    ALLOPURINOL (ZYLOPRIM) 100 MG TABLET    TAKE 1 TABLET BY MOUTH EVERY DAY    ALPRAZOLAM (XANAX) 0.5 MG TABLET    Take 1 tablet (0.5 mg total) by mouth daily as needed for Anxiety.    AMLODIPINE (NORVASC) 5 MG TABLET    TAKE 1 TABLET BY MOUTH EVERY DAY    ASPIRIN (ECOTRIN) 81 MG EC TABLET    Take 1 tablet (81 mg total) by mouth once daily.    ATORVASTATIN (LIPITOR) 20 MG TABLET    TAKE 1 TABLET BY MOUTH EVERY DAY    BLOOD-GLUCOSE METER (GLUCOSE MONITORING KIT) KIT    Use as instructed    CARVEDILOL (COREG) 25 MG TABLET    TAKE 1 TABLET BY MOUTH TWICE DAILY    FUROSEMIDE (LASIX) 20 MG TABLET    TAKE 1 TABLET BY MOUTH ONCE DAILY    GABAPENTIN (NEURONTIN) 100 MG CAPSULE    TAKE 1 CAPSULE BY MOUTH TWICE DAILY    LANCETS MISC    1 Units by Misc.(Non-Drug; Combo Route) route 2 (two) times daily as needed.    LEVOTHYROXINE (SYNTHROID, LEVOTHROID) 175 MCG TABLET    Take 1 tablet (175 mcg total) by mouth once daily.    LORAZEPAM (ATIVAN) 2 MG TAB    Take 2 mg by mouth.    METFORMIN (GLUCOPHAGE) 1000 MG TABLET    TAKE 1 TABLET BY MOUTH TWICE DAILY WITH MEALS    NITROGLYCERIN (NITROSTAT) 0.4 MG SL TABLET    Place 1 tablet (0.4 mg total) under the tongue every 5 (five) minutes as needed for Chest pain.    OMEPRAZOLE (PRILOSEC) 20 MG CAPSULE    Take 1 capsule (20 mg total) by mouth once daily.    PRASUGREL (EFFIENT) 10 MG TAB    TAKE 1 TABLET BY MOUTH EVERY MORNING    RAMIPRIL (ALTACE) 10 MG CAPSULE    TAKE 1 CAPSULE BY MOUTH EVERY DAY    VENLAFAXINE (EFFEXOR-XR) 75 MG 24 HR CAPSULE    TAKE 1 CAPSULE BY MOUTH EVERY DAY   Discontinued Medications    CYCLOBENZAPRINE (FLEXERIL) 10 MG TABLET    cyclobenzaprine 10 mg tablet   Take 1 tablet(s) every 8 hours by oral route as needed.    DILTIAZEM (CARDIZEM) 120 MG TABLET    diltiazem 120 mg tablet   Take 1 tablet twice a day by oral route.

## 2018-05-08 RX ORDER — FUROSEMIDE 20 MG/1
TABLET ORAL
Qty: 30 TABLET | Refills: 1 | Status: SHIPPED | OUTPATIENT
Start: 2018-05-08 | End: 2018-07-02 | Stop reason: SDUPTHER

## 2018-05-08 RX ORDER — RAMIPRIL 10 MG/1
CAPSULE ORAL
Qty: 30 CAPSULE | Refills: 6 | Status: SHIPPED | OUTPATIENT
Start: 2018-05-08 | End: 2018-11-21 | Stop reason: SDUPTHER

## 2018-05-28 ENCOUNTER — OFFICE VISIT (OUTPATIENT)
Dept: FAMILY MEDICINE | Facility: CLINIC | Age: 53
End: 2018-05-28
Payer: COMMERCIAL

## 2018-05-28 VITALS
OXYGEN SATURATION: 96 % | HEIGHT: 63 IN | SYSTOLIC BLOOD PRESSURE: 122 MMHG | HEART RATE: 74 BPM | DIASTOLIC BLOOD PRESSURE: 68 MMHG | WEIGHT: 228.63 LBS | BODY MASS INDEX: 40.51 KG/M2

## 2018-05-28 DIAGNOSIS — F41.9 ANXIETY: ICD-10-CM

## 2018-05-28 DIAGNOSIS — J32.9 SINUSITIS, UNSPECIFIED CHRONICITY, UNSPECIFIED LOCATION: ICD-10-CM

## 2018-05-28 DIAGNOSIS — I25.10 CORONARY ARTERY DISEASE, ANGINA PRESENCE UNSPECIFIED, UNSPECIFIED VESSEL OR LESION TYPE, UNSPECIFIED WHETHER NATIVE OR TRANSPLANTED HEART: ICD-10-CM

## 2018-05-28 DIAGNOSIS — I95.9 HYPOTENSION, UNSPECIFIED HYPOTENSION TYPE: Primary | ICD-10-CM

## 2018-05-28 PROCEDURE — 99999 PR PBB SHADOW E&M-EST. PATIENT-LVL III: CPT | Mod: PBBFAC,,, | Performed by: FAMILY MEDICINE

## 2018-05-28 PROCEDURE — 3008F BODY MASS INDEX DOCD: CPT | Mod: CPTII,S$GLB,, | Performed by: FAMILY MEDICINE

## 2018-05-28 PROCEDURE — 3078F DIAST BP <80 MM HG: CPT | Mod: CPTII,S$GLB,, | Performed by: FAMILY MEDICINE

## 2018-05-28 PROCEDURE — 99214 OFFICE O/P EST MOD 30 MIN: CPT | Mod: S$GLB,,, | Performed by: FAMILY MEDICINE

## 2018-05-28 PROCEDURE — 3074F SYST BP LT 130 MM HG: CPT | Mod: CPTII,S$GLB,, | Performed by: FAMILY MEDICINE

## 2018-05-28 RX ORDER — METHYLPREDNISOLONE 4 MG/1
TABLET ORAL
Qty: 1 PACKAGE | Refills: 0 | Status: SHIPPED | OUTPATIENT
Start: 2018-05-28 | End: 2018-07-03

## 2018-05-28 NOTE — PROGRESS NOTES
After Visit Summary   4/4/2018    Fawad Garcia    MRN: [id number]           Patient Information     Date Of Birth          1934        Visit Information        Provider Department      4/4/2018 11:20 AM Dave Jefferson MD [facility]        Today's Diagnoses     Type 2 diabetes mellitus without complication, with long-term current use of insulin (H)    -  1    Hypertension, goal below 140/90        BENIGN HYPERTENSION        Gastroesophageal reflux disease without esophagitis           Follow-ups after your visit        Your next 10 appointments already scheduled     Mar 18, 2019 11:00 AM CDT   Return Visit with [facility] Provider   [facility])    [address], [address]  [facility] [place] [address]   [phone]              Who to contact     If you have questions or need follow up information about today's clinic visit or your schedule please contact [facility] directly at [phone].  Normal or non-critical lab and imaging results will be communicated to you by MyChart, letter or phone within 4 business days after the clinic has received the results. If you do not hear from us within 7 days, please contact the clinic through [employer] or phone. If you have a critical or abnormal lab result, we will notify you by phone as soon as possible.  Submit refill requests through [employer] or call your pharmacy and they will forward the refill request to us. Please allow 3 business days for your refill to be completed.          Additional Information About Your Visit        MyChart Information     [employer] gives you secure access to your electronic health record. If you see a primary care provider, you can also send messages to your care team and make appointments. If you have questions, please call your primary care clinic.  If you do not have a primary care provider, please call [phone] and they will assist you.    Subjective:       Patient ID: Josephine Bolton is a 52 y.o. female.    Chief Complaint: Hypotension    HPI     Recall that bp was low 1 month ago. Was instructed by me to hold the norvasc. By doing so, syst bp in 160-180's/.  Then, restarted the amlodipine and now bp rangin-127/50-69.    Went to an urgent care approx 5 days ago.  Given zpak and tessalon perles. Reports sinus pressure in cheeks and forehead.      Cad stable. No c/o cp or sob at rest or exertion.    Anxiety stable while on effexor xr.        Review of Systems      Review of Systems   Constitutional: Negative for fever and chills.   HENT: Negative for hearing loss and neck stiffness.    Eyes: Negative for redness and itching.   Respiratory: Negative for cough and choking.    Cardiovascular: Negative for chest pain and leg swelling.  Abdomen: Negative for abdominal pain and blood in stool.   Genitourinary: Negative for dysuria and flank pain.   Musculoskeletal: Negative for back pain and gait problem.   Neurological: Negative for light-headedness and headaches.   Hematological: Negative for adenopathy.       Objective:      Physical Exam   Constitutional: She appears well-developed.   HENT:   Head: Normocephalic and atraumatic.   Eyes: Conjunctivae are normal. Pupils are equal, round, and reactive to light.   Neck: Normal range of motion.   Cardiovascular: Normal rate and regular rhythm.    No murmur heard.  Pulmonary/Chest: Effort normal and breath sounds normal.   Lymphadenopathy:     She has no cervical adenopathy.       Assessment:       1. Hypotension, unspecified hypotension type    2. Sinusitis, unspecified chronicity, unspecified location    3. Anxiety    4. Coronary artery disease, angina presence unspecified, unspecified vessel or lesion type, unspecified whether native or transplanted heart        Plan:       Hypotension, unspecified hypotension type    Sinusitis, unspecified chronicity, unspecified  "     Care EveryWhere ID     This is your Care EveryWhere ID. This could be used by other organizations to access your [facility] medical records  [id number]        Your Vitals Were     Pulse Temperature Respirations Height Pulse Oximetry BMI (Body Mass Index)    68 97.4  F (36.3  C) (Tympanic) 16 5' 10.75\" (1.797 m) 97% 33.15 kg/m2       Blood Pressure from Last 3 Encounters:   04/04/18 137/72   03/12/18 119/77   03/14/18 119/77    Weight from Last 3 Encounters:   04/04/18 236 lb (107 kg)   03/12/18 234 lb 6.4 oz (106.3 kg)   10/18/17 231 lb (104.8 kg)              We Performed the Following     FOOT EXAM          Today's Medication Changes          These changes are accurate as of 4/4/18 12:43 PM.  If you have any questions, ask your nurse or doctor.               These medicines have changed or have updated prescriptions.        Dose/Directions    metFORMIN 500 MG tablet   Commonly known as:  GLUCOPHAGE   This may have changed:  additional instructions   Used for:  Type 2 diabetes mellitus without complication, with long-term current use of insulin (H)   Changed by:  Dave Jefferson MD        2 tabs with lunch and 2 tabs with dinner.   Quantity:  360 tablet   Refills:  3            Where to get your medicines      These medications were sent to [address] [address]     Phone:  [phone]     amLODIPine 5 MG tablet    hydrochlorothiazide 25 MG tablet    insulin glargine 100 UNIT/ML injection    lisinopril 40 MG tablet    metFORMIN 500 MG tablet    omeprazole 20 MG CR capsule                Primary Care Provider Office Phone # Fax #    Dave Jefferson [phone] [address] [facility] [address]        Equal Access to Services     JACKY DELACRUZ AH: Pedrito Dye, [place], [place], bradford whipple. So [facility] [phone].    ATENCIÓN: Si habla " location    Anxiety    Coronary artery disease, angina presence unspecified, unspecified vessel or lesion type, unspecified whether native or transplanted heart    Other orders  -     methylPREDNISolone (MEDROL DOSEPACK) 4 mg tablet; use as directed  Dispense: 1 Package; Refill: 0              Plan:  Take 1/2 tab of norvasc in am.  Serial bp checks  Medrol dosepack for sinusitis  Cont all other meds          Medication List with Changes/Refills   New Medications    METHYLPREDNISOLONE (MEDROL DOSEPACK) 4 MG TABLET    use as directed   Current Medications    ALLOPURINOL (ZYLOPRIM) 100 MG TABLET    TAKE 1 TABLET BY MOUTH EVERY DAY    AMLODIPINE (NORVASC) 5 MG TABLET    TAKE 1 TABLET BY MOUTH EVERY DAY    ASPIRIN (ECOTRIN) 81 MG EC TABLET    Take 1 tablet (81 mg total) by mouth once daily.    ATORVASTATIN (LIPITOR) 20 MG TABLET    TAKE 1 TABLET BY MOUTH EVERY DAY    BLOOD-GLUCOSE METER (GLUCOSE MONITORING KIT) KIT    Use as instructed    CARVEDILOL (COREG) 25 MG TABLET    TAKE 1 TABLET BY MOUTH TWICE DAILY    FUROSEMIDE (LASIX) 20 MG TABLET    TAKE 1 TABLET BY MOUTH ONCE DAILY    GABAPENTIN (NEURONTIN) 100 MG CAPSULE    TAKE 1 CAPSULE BY MOUTH TWICE DAILY    LANCETS MISC    1 Units by Misc.(Non-Drug; Combo Route) route 2 (two) times daily as needed.    LEVOTHYROXINE (SYNTHROID, LEVOTHROID) 175 MCG TABLET    Take 1 tablet (175 mcg total) by mouth once daily.    LORAZEPAM (ATIVAN) 2 MG TAB    Take 2 mg by mouth.    METFORMIN (GLUCOPHAGE) 1000 MG TABLET    TAKE 1 TABLET BY MOUTH TWICE DAILY WITH MEALS    NITROGLYCERIN (NITROSTAT) 0.4 MG SL TABLET    Place 1 tablet (0.4 mg total) under the tongue every 5 (five) minutes as needed for Chest pain.    PRASUGREL (EFFIENT) 10 MG TAB    TAKE 1 TABLET BY MOUTH EVERY MORNING    RAMIPRIL (ALTACE) 10 MG CAPSULE    TAKE 1 CAPSULE BY MOUTH EVERY DAY    VENLAFAXINE (EFFEXOR-XR) 75 MG 24 HR CAPSULE    TAKE 1 CAPSULE BY MOUTH EVERY DAY   Discontinued Medications    ALPRAZOLAM (XANAX) 0.5  MG TABLET    Take 1 tablet (0.5 mg total) by mouth daily as needed for Anxiety.    OMEPRAZOLE (PRILOSEC) 20 MG CAPSULE    Take 1 capsule (20 mg total) by mouth once daily.        español, tiene a aaron disposición servicios gratuitos de asistencia lingüística. Bailey pisano [phone].    We comply with applicable federal civil rights laws and [place] laws. We do not discriminate on the basis of race, color, national origin, age, disability, sex, sexual orientation, or gender identity.            Thank you!     Thank you for choosing [facility]  for your care. Our goal is always to provide you with excellent care. Hearing back from our patients is one way we can continue to improve our services. Please take a few minutes to complete the written survey that you may receive in the mail after your visit with us. Thank you!             Your Updated Medication List - Protect others around you: Learn how to safely use, store and throw away your medicines at www.disposemymeds.org.          This list is accurate as of 4/4/18 12:43 PM.  Always use your most recent med list.                   Brand Name Dispense Instructions for use Diagnosis    amLODIPine 5 MG tablet    NORVASC    90 tablet    Take 1 tablet (5 mg) by mouth daily Takes prn for high blood pressure    Hypertension, goal below 140/90       * ASPIRIN NOT PRESCRIBED    INTENTIONAL     by Other route continuous prn Reported on 4/19/2017        blood glucose monitoring test strip    no brand specified    3 Box    1 strip by In Vitro route daily One touch Ultra.    Type 2 diabetes mellitus without complication, with long-term current use of insulin (H)       dorzolamide-timolol 2-0.5 % ophthalmic solution    COSOPT          hydrochlorothiazide 25 MG tablet    HYDRODIURIL    90 tablet    Take 1 tablet (25 mg) by mouth daily    Hypertension, goal below 140/90       insulin pen needle 31G X 8 MM    B-D U/F    90 each    1 Device daily Use once daily or as directed.    Type 2 diabetes, HbA1c goal < 7% (H)       * LANTUS SOLOSTAR 100 UNIT/ML injection   Generic drug:  insulin glargine           * insulin glargine 100 UNIT/ML  injection    LANTUS VIAL    3 Month    30 units at bedtime    Type 2 diabetes mellitus without complication, with long-term current use of insulin (H)       * insulin glargine 100 UNIT/ML injection    LANTUS SOLOSTAR    9 mL    Inject 30 Units Subcutaneous At Bedtime    Type 2 diabetes mellitus without complication, with long-term current use of insulin (H)       lisinopril 40 MG tablet    PRINIVIL/ZESTRIL    90 tablet    Take 1 tablet (40 mg) by mouth daily    Essential hypertension, benign       magic mouthwash suspension (diphenhydrAMINE, lidocaine, aluminum-magnesium & simethicone) Susp compounding kit     178 mL    Swish and swallow 5-10 mLs in mouth every 6 hours as needed        metFORMIN 500 MG tablet    GLUCOPHAGE    360 tablet    2 tabs with lunch and 2 tabs with dinner.    Type 2 diabetes mellitus without complication, with long-term current use of insulin (H)       omeprazole 20 MG CR capsule    priLOSEC    90 capsule    Take 1 capsule (20 mg) by mouth daily    Gastroesophageal reflux disease without esophagitis       ONETOUCH LANCETS Misc     100 each    1 Device daily One touch delica lancets, what ever is covered by insurance.    Type 2 diabetes mellitus with diabetic peripheral angiopathy without gangrene (H)       ONETOUCH ULTRA SYSTEM KIT W/DEVICE Kit           order for DME     1 Device    Equipment being ordered:  Glucometer    Type 2 diabetes, HbA1c goal < 7% (H)       * STATIN NOT PRESCRIBED (INTENTIONAL)      by Other route continuous prn Reported on 4/19/2017        triamcinolone 0.1 % cream    KENALOG          VITAMIN D PO      Take 4,000 Int'l Units/day by mouth daily        * Notice:  This list has 5 medication(s) that are the same as other medications prescribed for you. Read the directions carefully, and ask your doctor or other care provider to review them with you.

## 2018-06-06 DIAGNOSIS — N20.0 KIDNEY STONES: ICD-10-CM

## 2018-06-06 RX ORDER — METFORMIN HYDROCHLORIDE 1000 MG/1
TABLET ORAL
Qty: 60 TABLET | Refills: 6 | Status: SHIPPED | OUTPATIENT
Start: 2018-06-06 | End: 2018-12-17 | Stop reason: SDUPTHER

## 2018-06-07 RX ORDER — ALLOPURINOL 100 MG/1
TABLET ORAL
Qty: 90 TABLET | Refills: 0 | Status: SHIPPED | OUTPATIENT
Start: 2018-06-07 | End: 2018-08-22 | Stop reason: SDUPTHER

## 2018-06-07 RX ORDER — AMLODIPINE BESYLATE 5 MG/1
TABLET ORAL
Qty: 90 TABLET | Refills: 0 | Status: SHIPPED | OUTPATIENT
Start: 2018-06-07 | End: 2018-11-21 | Stop reason: SDUPTHER

## 2018-07-02 DIAGNOSIS — M51.36 DDD (DEGENERATIVE DISC DISEASE), LUMBAR: ICD-10-CM

## 2018-07-02 DIAGNOSIS — I25.10 CORONARY ARTERY DISEASE, ANGINA PRESENCE UNSPECIFIED, UNSPECIFIED VESSEL OR LESION TYPE, UNSPECIFIED WHETHER NATIVE OR TRANSPLANTED HEART: ICD-10-CM

## 2018-07-02 RX ORDER — PRASUGREL 10 MG/1
TABLET, FILM COATED ORAL
Qty: 90 TABLET | Refills: 3 | Status: SHIPPED | OUTPATIENT
Start: 2018-07-02 | End: 2019-06-04 | Stop reason: SDUPTHER

## 2018-07-02 RX ORDER — FUROSEMIDE 20 MG/1
TABLET ORAL
Qty: 30 TABLET | Refills: 1 | Status: SHIPPED | OUTPATIENT
Start: 2018-07-02 | End: 2018-08-22 | Stop reason: SDUPTHER

## 2018-07-02 RX ORDER — GABAPENTIN 100 MG/1
CAPSULE ORAL
Qty: 180 CAPSULE | Refills: 3 | Status: SHIPPED | OUTPATIENT
Start: 2018-07-02 | End: 2019-07-08 | Stop reason: SDUPTHER

## 2018-07-03 ENCOUNTER — OFFICE VISIT (OUTPATIENT)
Dept: PRIMARY CARE CLINIC | Facility: CLINIC | Age: 53
End: 2018-07-03
Payer: COMMERCIAL

## 2018-07-03 VITALS
TEMPERATURE: 99 F | HEART RATE: 72 BPM | OXYGEN SATURATION: 97 % | BODY MASS INDEX: 40.2 KG/M2 | HEIGHT: 63 IN | WEIGHT: 226.88 LBS | DIASTOLIC BLOOD PRESSURE: 64 MMHG | SYSTOLIC BLOOD PRESSURE: 120 MMHG

## 2018-07-03 DIAGNOSIS — J01.00 ACUTE NON-RECURRENT MAXILLARY SINUSITIS: Primary | ICD-10-CM

## 2018-07-03 DIAGNOSIS — Z87.09 H/O SINUSITIS: ICD-10-CM

## 2018-07-03 DIAGNOSIS — Z88.9 H/O SEASONAL ALLERGIES: ICD-10-CM

## 2018-07-03 PROCEDURE — 3008F BODY MASS INDEX DOCD: CPT | Mod: CPTII,S$GLB,, | Performed by: NURSE PRACTITIONER

## 2018-07-03 PROCEDURE — 99999 PR PBB SHADOW E&M-EST. PATIENT-LVL V: CPT | Mod: PBBFAC,,, | Performed by: NURSE PRACTITIONER

## 2018-07-03 PROCEDURE — 99214 OFFICE O/P EST MOD 30 MIN: CPT | Mod: S$GLB,,, | Performed by: NURSE PRACTITIONER

## 2018-07-03 PROCEDURE — 3078F DIAST BP <80 MM HG: CPT | Mod: CPTII,S$GLB,, | Performed by: NURSE PRACTITIONER

## 2018-07-03 PROCEDURE — 3074F SYST BP LT 130 MM HG: CPT | Mod: CPTII,S$GLB,, | Performed by: NURSE PRACTITIONER

## 2018-07-03 RX ORDER — AZITHROMYCIN 250 MG/1
TABLET, FILM COATED ORAL
Qty: 6 TABLET | Refills: 0 | Status: SHIPPED | OUTPATIENT
Start: 2018-07-03 | End: 2018-07-08

## 2018-07-03 RX ORDER — FLUTICASONE PROPIONATE 50 MCG
2 SPRAY, SUSPENSION (ML) NASAL DAILY
Qty: 16 G | Refills: 0 | Status: ON HOLD | OUTPATIENT
Start: 2018-07-03 | End: 2019-12-25 | Stop reason: HOSPADM

## 2018-07-03 RX ORDER — AZITHROMYCIN 250 MG/1
TABLET, FILM COATED ORAL
Qty: 6 TABLET | Refills: 0 | Status: SHIPPED | OUTPATIENT
Start: 2018-07-03 | End: 2018-07-03 | Stop reason: SDUPTHER

## 2018-07-03 NOTE — PROGRESS NOTES
"Josephine Bolton is a 52 y.o. female patient of Froylan Smart MD who presents to the clinic today for   Chief Complaint   Patient presents with    Otalgia     left ear mainly- sx for 4 days     Pressure Behind the Eyes     "feel dry"     Sore Throat   .    HPI    Pt, who is known to me, reports a new problem to me: runny nose, drip, ST, sneezing and then the right ear started to hurt.  Now both ears hurt.   Started coughing this morning.    These symptoms began 4 days ago and status is worsening.     Symptoms are + acute.      Pt denies the following symptoms:  Allergies to dust and pollen, high fever    Aggravating factors include lying down .    Relieving factors include sitting up helps .    OTC Medications tried are Tylenol for headache    Prescription medications taken for symptoms are none.    Pertinent medical history:  Has these same sxs a few months ago.  Had an AB from walk in clinic.  Followed up with PCP.  Got more AB and steroid pack..    Smoking status:  former    ROS    Constitutional:   Low grade fever, no fatigue, no change in appetite.    Head:   + headache  Ears:   bilat pain.  Eyes:  watery  Nose:   + sinus pain, + congestion, + runny nose, + sneezing, + post nasal drip.  Throat:  + ST pain.    Heart:  No palpitations, chest pain.    Lungs:  No difficulty breathing, + coughing, no sputum production, no wheezing.    GI/:  No sxs    MS:  No new bone, joint or muscle problems.    Skin:  No rashes, itching.      PAST MEDICAL HISTORY:  Past Medical History:   Diagnosis Date    Allergy     Anticoagulant long-term use     ASA 81mg, Effient    Anxiety     Arthritis     Asthma     CAD (coronary artery disease) 01/27/2012    s/p stents x4 , CABG, 3 vessel, (5/2013) newest stent prior to CABG    Chronic constipation     DDD (degenerative disc disease), cervical     DDD (degenerative disc disease), lumbar     DDD (degenerative disc disease), thoracic     Depression     Edema     " "Headache(784.0)     History of nephrolithiasis     Hyperlipidemia     Hypertension     Hypothyroid 2012    Insomnia     Insulin resistance     patient stated not diebetic    Joint stiffness     Joint swelling     Low back pain     Neck pain     PONV (postoperative nausea and vomiting)     Sleep apnea 12    Patient reports "severe" sleep apnea, uses C-pap    Thoracic back pain        PAST SURGICAL HISTORY:  Past Surgical History:   Procedure Laterality Date    ADENOIDECTOMY      BACK SURGERY      BREAST BIOPSY Left 2017    duct excision- Dr. Jimenez    CARPAL TUNNEL RELEASE      bilateral     SECTION, CLASSIC      x 2    COLONOSCOPY      CORONARY ANGIOPLASTY WITH STENT PLACEMENT      x 4    CORONARY ARTERY BYPASS GRAFT  2013    3 vessel    HYSTERECTOMY      KNEE ARTHROSCOPY W/ MENISCAL REPAIR Left     twice, last one 2014    LAMINECTOMY      L4/L5    OOPHORECTOMY      SINUS SURGERY      x3    TENDON REPAIR Left     ankle surgery    THYROIDECTOMY      2 separate surgeries    TONSILLECTOMY         SOCIAL HISTORY:  Social History     Social History    Marital status:      Spouse name: N/A    Number of children: N/A    Years of education: N/A     Occupational History    Not on file.     Social History Main Topics    Smoking status: Former Smoker     Packs/day: 0.50     Years: 14.00     Types: Cigarettes     Start date: 1984     Quit date: 1998    Smokeless tobacco: Never Used    Alcohol use No    Drug use: Yes     Types: Benzodiazepines    Sexual activity: Yes     Partners: Male     Other Topics Concern    Not on file     Social History Narrative    No narrative on file       FAMILY HISTORY:  Family History   Problem Relation Age of Onset    Heart disease Father     Diabetes Father     Hypertension Father     Stroke Father     Cataracts Father     Cancer Maternal Grandmother         Breast with Mets    Breast cancer Maternal " "Grandmother     Cancer Paternal Grandmother         Colon    Heart failure Mother     Asthma Mother     Macular degeneration Mother     Cancer Mother         Breast    Cataracts Mother     Heart disease Mother     COPD Mother     Breast cancer Mother     Glaucoma Sister     Thyroid disease Sister     Glaucoma Sister     Strabismus Other     Other Brother         stiff man syndrome    Amblyopia Neg Hx     Blindness Neg Hx     Retinal detachment Neg Hx        ALLERGIES AND MEDICATIONS: updated and reviewed.  Review of patient's allergies indicates:   Allergen Reactions    Celexa [citalopram] Other (See Comments)     GI upset  Stated "knocked me out"    Cephalexin Anaphylaxis    Zoloft [sertraline] Other (See Comments)     Stated "knocked me out"    Codeine Other (See Comments)     hallucinations  hallucinations    Penicillins Other (See Comments)     Was told per mother that as child was allergic to penicillin.  Childhood allergy/ unknown reaction     Current Outpatient Prescriptions   Medication Sig Dispense Refill    allopurinol (ZYLOPRIM) 100 MG tablet TAKE 1 TABLET BY MOUTH EVERY DAY 90 tablet 0    amLODIPine (NORVASC) 5 MG tablet TAKE 1 TABLET BY MOUTH EVERY DAY 90 tablet 0    aspirin (ECOTRIN) 81 MG EC tablet Take 1 tablet (81 mg total) by mouth once daily.  0    atorvastatin (LIPITOR) 20 MG tablet TAKE 1 TABLET BY MOUTH EVERY DAY 30 tablet 9    blood-glucose meter (GLUCOSE MONITORING KIT) kit Use as instructed 1 each 0    carvedilol (COREG) 25 MG tablet TAKE 1 TABLET BY MOUTH TWICE DAILY 60 tablet 7    furosemide (LASIX) 20 MG tablet TAKE 1 TABLET BY MOUTH ONCE DAILY 30 tablet 1    gabapentin (NEURONTIN) 100 MG capsule TAKE 1 CAPSULE BY MOUTH TWICE DAILY 180 capsule 3    lancets Misc 1 Units by Misc.(Non-Drug; Combo Route) route 2 (two) times daily as needed. 100 each 3    levothyroxine (SYNTHROID, LEVOTHROID) 175 MCG tablet Take 1 tablet (175 mcg total) by mouth once daily. 30 " tablet 11    metFORMIN (GLUCOPHAGE) 1000 MG tablet TAKE 1 TABLET BY MOUTH TWICE DAILY WITH MEALS 60 tablet 6    nitroGLYCERIN (NITROSTAT) 0.4 MG SL tablet Place 1 tablet (0.4 mg total) under the tongue every 5 (five) minutes as needed for Chest pain. 100 tablet 0    prasugrel (EFFIENT) 10 mg Tab TAKE 1 TABLET BY MOUTH EVERY MORNING 90 tablet 3    ramipril (ALTACE) 10 MG capsule TAKE 1 CAPSULE BY MOUTH EVERY DAY 30 capsule 6    venlafaxine (EFFEXOR-XR) 75 MG 24 hr capsule TAKE 1 CAPSULE BY MOUTH EVERY DAY 30 capsule 11    LORazepam (ATIVAN) 2 MG Tab Take 2 mg by mouth.       No current facility-administered medications for this visit.              PHYSICAL EXAM    Alert, coop 52 y.o. female patient in no acute distress.    Vitals:    07/03/18 1104   BP: 120/64   Pulse: 72   Temp: 99.3 °F (37.4 °C)     VS reviewed.  VS slight elevation of temp.  CC, nursing note, medications & PMH all reviewed today.    Head:  Normocephalic, atraumatic.    EENT:  EACs patent.  TMs right with mild erythema superiorly, bilat dull LR, no effusions, no TM perforation.                              Eye lids normal, no discharge present.       Sinus tenderness to palp--max and frontal.               Nares--+ edema, + d/c present.    Pharynx not injected.                Tonsils not erythematous , not enlarged, no exudate present.    Small bilat anterior, no posterior cervical lymph nodes palpable.    No submental, submandibular or supraclavicular lymph nodes palp.             Resp:  Respirations even, unlabored   Lungs CTA bilat.  No wheezing.  No crackles.  Moves air to bases bilat.    Heart:  RRR.    MS:  Ambulates normally.             NEURO:  Alert and oriented x 4.  Responds appropriately during interaction.    Skin:  Warm, dry, color good.    Acute non-recurrent maxillary sinusitis  -     Discontinue: azithromycin (Z-BRI) 250 MG tablet; Take 2 tablets by mouth on day 1; Take 1 tablet by mouth on days 2-5  Dispense: 6 tablet;  Refill: 0  -     fluticasone (FLONASE) 50 mcg/actuation nasal spray; 2 sprays (100 mcg total) by Each Nare route once daily.  Dispense: 16 g; Refill: 0  -     azithromycin (Z-BRI) 250 MG tablet; Take 2 tablets by mouth on day 1; Take 1 tablet by mouth on days 2-5  Dispense: 6 tablet; Refill: 0    H/O sinusitis  Comments:  with 3 surgeries  Orders:  -     Ambulatory referral to Allergy    H/O seasonal allergies  Comments:  as a child--possibly.  never had testing.  Orders:  -     Ambulatory referral to Allergy      Pt today presents with 4 days of worsening sinus sxs, ear pain and coughing.  Denies h/o allergies but states when she went to [place] her sinuses really cleared up and has a h/o asthma as a child.  On exam she has nasal congestion, sinus tenderness, small palp cervical lymph nodes and clear lungs.    This is a new problem to me.  No work up is planned.          Pt advised to perform comfort measures recommended on patient instruction sheet .    If not better in 5-7 days, the patient is advised to call us.  If worse or concerns, the patient is advised to call us.  Explained exam findings, diagnosis and treatment plan to patient.  Questions answered and patient states understanding.

## 2018-07-03 NOTE — PATIENT INSTRUCTIONS
Acute Sinusitis    Acute sinusitis is irritation and swelling of the sinuses. It is usually caused by a viral infection after a common cold. Your doctor can help you find relief.  What is acute sinusitis?  Sinuses are air-filled spaces in the skull behind the face. They are kept moist and clean by a lining of mucosa. Things such as pollen, smoke, and chemical fumes can irritate the mucosa. It can then swell up. As a response to irritation, the mucosa makes more mucus and other fluids. Tiny hairlike cilia cover the mucosa. Cilia help carry mucus toward the opening of the sinus. Too much mucus may cause the cilia to stop working. This blocks the sinus opening. A buildup of fluid in the sinuses then causes pain and pressure. It can also encourage bacteria to grow in the sinuses.  Common symptoms of acute sinusitis  You may have:  · Facial soreness pain  · Headache  · Fever  · Fluid draining in the back of the throat (postnasal drip)  · Congestion  · Drainage that is thick and colored, instead of clear  · Cough  Diagnosing acute sinusitis  Your doctor will ask about your symptoms and health history. He or she will look at your ear, nose, and throat. You usually won't need to have X-rays taken.    The doctor may take a sample of mucus to check for bacteria. If you have sinusitis that keeps coming back, you may need imaging tests such as X-rays or CAT scans. This will help your doctor check for a structural problem that may be causing the infection.  Treating acute sinusitis  Treatment is aimed at unblocking the sinus opening and helping the cilia work again. You may need to take antihistamine and decongestant medicine. These can reduce inflammation and decrease the amount of fluid your sinuses make. If you have a bacterial infection, you will need to take antibiotic medicine for 10 to 14 days. Take this medicine until it is gone, even if you feel better.  Date Last Reviewed: 10/1/2016  © 2795-3914 [employer] [address] [place] [address]. All rights reserved. This information is not intended as a substitute for professional medical care. Always follow your healthcare professional's instructions.    If you continue to worsen, start the antibiotic.    Use the flonase nasal spray daily.  Try a sinus saline rinses.  Zyrtec or Claritin can help.    If you are not better in 5-7 days, if worse or you have concerns or questions, please do not hesitate to call.  You can reach us at [phone] Monday through Thursday (except holidays) 10 a.m. to 6 p.m. or call Dr. Smart/NP Smitha Kate    Thank you for using the [facility]!    JIAN Flores, CNP, LISETHP-BC [facility]      To rate your experience with OLIVER Flores, click on the link below:      [email or web address]

## 2018-07-31 RX ORDER — ATORVASTATIN CALCIUM 20 MG/1
TABLET, FILM COATED ORAL
Qty: 30 TABLET | Refills: 9 | Status: SHIPPED | OUTPATIENT
Start: 2018-07-31 | End: 2019-04-29 | Stop reason: SDUPTHER

## 2018-08-02 ENCOUNTER — OFFICE VISIT (OUTPATIENT)
Dept: FAMILY MEDICINE | Facility: CLINIC | Age: 53
End: 2018-08-02
Payer: COMMERCIAL

## 2018-08-02 VITALS
WEIGHT: 226.19 LBS | SYSTOLIC BLOOD PRESSURE: 138 MMHG | OXYGEN SATURATION: 97 % | BODY MASS INDEX: 40.08 KG/M2 | HEIGHT: 63 IN | HEART RATE: 67 BPM | DIASTOLIC BLOOD PRESSURE: 78 MMHG

## 2018-08-02 DIAGNOSIS — E66.01 MORBID OBESITY: ICD-10-CM

## 2018-08-02 DIAGNOSIS — F41.9 ANXIETY: ICD-10-CM

## 2018-08-02 DIAGNOSIS — I25.10 CORONARY ARTERY DISEASE, ANGINA PRESENCE UNSPECIFIED, UNSPECIFIED VESSEL OR LESION TYPE, UNSPECIFIED WHETHER NATIVE OR TRANSPLANTED HEART: ICD-10-CM

## 2018-08-02 DIAGNOSIS — I95.9 HYPOTENSION, UNSPECIFIED HYPOTENSION TYPE: Primary | ICD-10-CM

## 2018-08-02 DIAGNOSIS — M17.12 ARTHRITIS OF LEFT KNEE: ICD-10-CM

## 2018-08-02 PROCEDURE — 99214 OFFICE O/P EST MOD 30 MIN: CPT | Mod: S$GLB,,, | Performed by: FAMILY MEDICINE

## 2018-08-02 PROCEDURE — 3078F DIAST BP <80 MM HG: CPT | Mod: CPTII,S$GLB,, | Performed by: FAMILY MEDICINE

## 2018-08-02 PROCEDURE — 99999 PR PBB SHADOW E&M-EST. PATIENT-LVL III: CPT | Mod: PBBFAC,,, | Performed by: FAMILY MEDICINE

## 2018-08-02 PROCEDURE — 3008F BODY MASS INDEX DOCD: CPT | Mod: CPTII,S$GLB,, | Performed by: FAMILY MEDICINE

## 2018-08-02 PROCEDURE — 3075F SYST BP GE 130 - 139MM HG: CPT | Mod: CPTII,S$GLB,, | Performed by: FAMILY MEDICINE

## 2018-08-02 NOTE — PROGRESS NOTES
Subjective:       Patient ID: Josephine Bolton is a 53 y.o. female.    Chief Complaint: Hypotension    HPI     Here for f/u.    Over the past 2 months, her bp is now stable. No more lows.  Syst bp in 130-140 range on average.      C/o left medial knee and popliteal pain x 6-8 weeks. Pain with walking and squatting.      Cad stable. No c/o cp or sob at rest or exertion.     Anxiety stable while on effexor xr.      Review of Systems      Review of Systems   Constitutional: Negative for fever and chills.   HENT: Negative for hearing loss and neck stiffness.    Eyes: Negative for redness and itching.   Respiratory: Negative for cough and choking.    Cardiovascular: Negative for chest pain and leg swelling.  Abdomen: Negative for abdominal pain and blood in stool.   Genitourinary: Negative for dysuria and flank pain.   Musculoskeletal: Negative for back pain and gait problem.   Neurological: Negative for light-headedness and headaches.   Hematological: Negative for adenopathy.         Objective:      Physical Exam   Constitutional: She appears well-developed.   HENT:   Head: Normocephalic and atraumatic.   Eyes: Conjunctivae are normal. Pupils are equal, round, and reactive to light.   Neck: Normal range of motion.   Cardiovascular: Normal rate and regular rhythm.    No murmur heard.  Pulmonary/Chest: Effort normal and breath sounds normal. She has no wheezes.   Musculoskeletal:   Right knee: no tenderness of joint lines. Good rom without pain    Left knee: tend of med joint line. Pain with flex > 90 deg.  Positive orestes along medial joint line   Lymphadenopathy:     She has no cervical adenopathy.       Assessment:       1. Hypotension, unspecified hypotension type    2. Arthritis of left knee    3. Anxiety    4. Coronary artery disease, angina presence unspecified, unspecified vessel or lesion type, unspecified whether native or transplanted heart    5. Morbid obesity        Plan:       Hypotension, unspecified  hypotension type    Arthritis of left knee    Anxiety    Coronary artery disease, angina presence unspecified, unspecified vessel or lesion type, unspecified whether native or transplanted heart    Morbid obesity      Plan:  Cont current meds  Knee brace.  ? Degenerative tear of medial meniscus vs arthritis.

## 2018-08-22 DIAGNOSIS — N20.0 KIDNEY STONES: ICD-10-CM

## 2018-08-22 RX ORDER — FUROSEMIDE 20 MG/1
TABLET ORAL
Qty: 30 TABLET | Refills: 1 | Status: SHIPPED | OUTPATIENT
Start: 2018-08-22 | End: 2018-10-26 | Stop reason: SDUPTHER

## 2018-08-22 RX ORDER — ALLOPURINOL 100 MG/1
TABLET ORAL
Qty: 90 TABLET | Refills: 0 | Status: SHIPPED | OUTPATIENT
Start: 2018-08-22 | End: 2018-11-21 | Stop reason: SDUPTHER

## 2018-10-15 ENCOUNTER — OFFICE VISIT (OUTPATIENT)
Dept: CARDIOLOGY | Facility: CLINIC | Age: 53
End: 2018-10-15
Payer: COMMERCIAL

## 2018-10-15 ENCOUNTER — TELEPHONE (OUTPATIENT)
Dept: CARDIOLOGY | Facility: CLINIC | Age: 53
End: 2018-10-15

## 2018-10-15 VITALS
DIASTOLIC BLOOD PRESSURE: 77 MMHG | BODY MASS INDEX: 37.74 KG/M2 | SYSTOLIC BLOOD PRESSURE: 135 MMHG | HEIGHT: 63 IN | WEIGHT: 213 LBS | HEART RATE: 65 BPM

## 2018-10-15 DIAGNOSIS — I15.0 RENOVASCULAR HYPERTENSION: ICD-10-CM

## 2018-10-15 DIAGNOSIS — E78.5 HYPERLIPIDEMIA, UNSPECIFIED HYPERLIPIDEMIA TYPE: ICD-10-CM

## 2018-10-15 DIAGNOSIS — I25.10 CORONARY ARTERY DISEASE INVOLVING NATIVE CORONARY ARTERY OF NATIVE HEART WITHOUT ANGINA PECTORIS: Chronic | ICD-10-CM

## 2018-10-15 DIAGNOSIS — Z95.1 S/P CABG X 3: Primary | ICD-10-CM

## 2018-10-15 PROCEDURE — 99999 PR PBB SHADOW E&M-EST. PATIENT-LVL III: CPT | Mod: PBBFAC,,, | Performed by: INTERNAL MEDICINE

## 2018-10-15 PROCEDURE — 99214 OFFICE O/P EST MOD 30 MIN: CPT | Mod: S$GLB,,, | Performed by: INTERNAL MEDICINE

## 2018-10-15 PROCEDURE — 3075F SYST BP GE 130 - 139MM HG: CPT | Mod: CPTII,S$GLB,, | Performed by: INTERNAL MEDICINE

## 2018-10-15 PROCEDURE — 3078F DIAST BP <80 MM HG: CPT | Mod: CPTII,S$GLB,, | Performed by: INTERNAL MEDICINE

## 2018-10-15 PROCEDURE — 3008F BODY MASS INDEX DOCD: CPT | Mod: CPTII,S$GLB,, | Performed by: INTERNAL MEDICINE

## 2018-10-15 NOTE — TELEPHONE ENCOUNTER
----- Message from Sharad Mayorga sent at 10/15/2018  8:51 AM CDT -----  Contact: pt  Type:  Patient Returning Call    Who Called:  pt  Who Left Message for Patient:  Janie  Does the patient know what this is regarding?:  Not sure / vm stated the call was for meri Bartlett Call Back Number:    Additional Information:

## 2018-10-15 NOTE — PROGRESS NOTES
Subjective:    Patient ID:  Josephine Bolton is a 53 y.o. female who presents for follow-up of CAD    HPI  She comes with no complaints, no chest pain, no shortness of breath  FC II    Review of Systems   Constitution: Positive for weight loss. Negative for decreased appetite, weakness, malaise/fatigue and weight gain.   Cardiovascular: Negative for chest pain, dyspnea on exertion, leg swelling, palpitations and syncope.   Respiratory: Negative for cough and shortness of breath.    Gastrointestinal: Negative.    All other systems reviewed and are negative.       Objective:      Physical Exam   Constitutional: She is oriented to person, place, and time. She appears well-developed and well-nourished.   HENT:   Head: Normocephalic.   Eyes: Pupils are equal, round, and reactive to light.   Neck: Normal range of motion. Neck supple. No JVD present. Carotid bruit is not present. No thyromegaly present.   Cardiovascular: Normal rate, regular rhythm, normal heart sounds, intact distal pulses and normal pulses. PMI is not displaced. Exam reveals no gallop.   No murmur heard.  Pulmonary/Chest: Effort normal and breath sounds normal.   Abdominal: Soft. Normal appearance. She exhibits no mass. There is no hepatosplenomegaly. There is no tenderness.   Musculoskeletal: Normal range of motion. She exhibits no edema.   Neurological: She is alert and oriented to person, place, and time. She has normal strength and normal reflexes. No sensory deficit.   Skin: Skin is warm and intact.   Psychiatric: She has a normal mood and affect.   Nursing note and vitals reviewed.        Assessment:       1. S/P CABG x 3    2. Renovascular hypertension    3. Coronary artery disease involving native coronary artery of native heart without angina pectoris    4. Hyperlipidemia, unspecified hyperlipidemia type         Plan:     Continue all cardiac medications  Regular exercise program  Weight loss  1 yr f/u

## 2018-10-27 RX ORDER — FUROSEMIDE 20 MG/1
TABLET ORAL
Qty: 30 TABLET | Refills: 1 | Status: SHIPPED | OUTPATIENT
Start: 2018-10-27 | End: 2018-12-04 | Stop reason: SDUPTHER

## 2018-10-31 ENCOUNTER — OFFICE VISIT (OUTPATIENT)
Dept: FAMILY MEDICINE | Facility: CLINIC | Age: 53
End: 2018-10-31
Payer: COMMERCIAL

## 2018-10-31 VITALS
OXYGEN SATURATION: 97 % | TEMPERATURE: 99 F | HEIGHT: 63 IN | HEART RATE: 82 BPM | WEIGHT: 214.31 LBS | DIASTOLIC BLOOD PRESSURE: 68 MMHG | BODY MASS INDEX: 37.97 KG/M2 | SYSTOLIC BLOOD PRESSURE: 130 MMHG

## 2018-10-31 DIAGNOSIS — M79.604 RIGHT LEG PAIN: Primary | ICD-10-CM

## 2018-10-31 PROCEDURE — 3008F BODY MASS INDEX DOCD: CPT | Mod: CPTII,S$GLB,, | Performed by: NURSE PRACTITIONER

## 2018-10-31 PROCEDURE — 3075F SYST BP GE 130 - 139MM HG: CPT | Mod: CPTII,S$GLB,, | Performed by: NURSE PRACTITIONER

## 2018-10-31 PROCEDURE — 99214 OFFICE O/P EST MOD 30 MIN: CPT | Mod: S$GLB,,, | Performed by: NURSE PRACTITIONER

## 2018-10-31 PROCEDURE — 3078F DIAST BP <80 MM HG: CPT | Mod: CPTII,S$GLB,, | Performed by: NURSE PRACTITIONER

## 2018-10-31 PROCEDURE — 99999 PR PBB SHADOW E&M-EST. PATIENT-LVL III: CPT | Mod: PBBFAC,,, | Performed by: NURSE PRACTITIONER

## 2018-10-31 RX ORDER — METHYLPREDNISOLONE 4 MG/1
TABLET ORAL
Qty: 21 TABLET | Refills: 0 | Status: SHIPPED | OUTPATIENT
Start: 2018-11-01 | End: 2019-04-22

## 2018-10-31 NOTE — PROGRESS NOTES
Subjective:       Patient ID: Josephine Bolton is a 53 y.o. female.    Chief Complaint: Leg Pain and Flu Vaccine    Ms. Bolton is a known patient to me. She presents today for leg pain    HPI  Right leg pain, behind right knee shooting down to toes. Pain gradually worsening over the last few weeks. Denies injury/trauma. Saw orthopedist who offered steroid injection in her knee--did not mention Baker's cyst. Mild LE edema--chronic.   Vitals:    10/31/18 1514   BP: 130/68   Pulse: 82   Temp: 98.6 °F (37 °C)     Review of Systems   Constitutional: Negative for diaphoresis and fever.   HENT: Negative for facial swelling and trouble swallowing.    Eyes: Negative for discharge and redness.   Respiratory: Negative for cough and shortness of breath.    Cardiovascular: Negative for chest pain and palpitations.   Gastrointestinal: Negative for vomiting.   Genitourinary: Negative for difficulty urinating.   Musculoskeletal: Negative for gait problem.        Right leg pain   Skin: Negative for color change, rash and wound.   Neurological: Negative for facial asymmetry and speech difficulty.   Psychiatric/Behavioral: Negative for confusion. The patient is not nervous/anxious.        Past Medical History:   Diagnosis Date    Allergy     Anticoagulant long-term use     ASA 81mg, Effient    Anxiety     Arthritis     Asthma     CAD (coronary artery disease) 01/27/2012    s/p stents x4 , CABG, 3 vessel, (5/2013) newest stent prior to CABG    Chronic constipation     DDD (degenerative disc disease), cervical     DDD (degenerative disc disease), lumbar     DDD (degenerative disc disease), thoracic     Depression     Edema     Headache(784.0)     History of nephrolithiasis     Hyperlipidemia     Hypertension     Hypothyroid 7/5/2012    Insomnia     Insulin resistance     patient stated not diebetic    Joint stiffness     Joint swelling     Low back pain     Neck pain     PONV (postoperative nausea and  "vomiting)     Sleep apnea 01/27/12    Patient reports "severe" sleep apnea, uses C-pap    Thoracic back pain      Objective:      Physical Exam   Constitutional: She is oriented to person, place, and time. She does not have a sickly appearance. No distress.   HENT:   Head: Normocephalic.   Right Ear: Hearing normal.   Left Ear: Hearing normal.   Nose: Nose normal.   Eyes: Conjunctivae and lids are normal.   Neck: No JVD present. No tracheal deviation present.   Cardiovascular: Normal rate.   Pulmonary/Chest: Effort normal.   Musculoskeletal: She exhibits no deformity.        Left lower leg: She exhibits tenderness. She exhibits no deformity and no laceration.        Legs:  Neurological: She is alert and oriented to person, place, and time.   Skin: She is not diaphoretic. No pallor.   Psychiatric: She has a normal mood and affect. Her speech is normal and behavior is normal. Judgment and thought content normal. Cognition and memory are normal.   Nursing note and vitals reviewed.      Assessment:       1. Right leg pain        Plan:       Right leg pain  -     US Lower Extremity Veins Right; Future; Expected date: 10/31/2018  -     methylPREDNISolone (MEDROL DOSEPACK) 4 mg tablet; use as directed  Dispense: 21 tablet; Refill: 0    Rule out dvt  Bakers cyst?        Follow-up for further evaluation if s/s worsen, fail to improve, or new symptoms arise.       Medication List           Accurate as of 10/31/18  3:39 PM. If you have any questions, ask your nurse or doctor.               START taking these medications    methylPREDNISolone 4 mg tablet  Commonly known as:  MEDROL DOSEPACK  use as directed  Start taking on:  11/1/2018  Started by:  Smitha Kate NP        CONTINUE taking these medications    allopurinol 100 MG tablet  Commonly known as:  ZYLOPRIM  TAKE 1 TABLET BY MOUTH EVERY DAY     amLODIPine 5 MG tablet  Commonly known as:  NORVASC  TAKE 1 TABLET BY MOUTH EVERY DAY     aspirin 81 MG EC " tablet  Commonly known as:  ECOTRIN  Take 1 tablet (81 mg total) by mouth once daily.     atorvastatin 20 MG tablet  Commonly known as:  LIPITOR  TAKE 1 TABLET BY MOUTH EVERY DAY     blood-glucose meter kit  Commonly known as:  FREESTYLE SYSTEM KIT  Use as instructed     carvedilol 25 MG tablet  Commonly known as:  COREG  TAKE 1 TABLET BY MOUTH TWICE DAILY     fluticasone 50 mcg/actuation nasal spray  Commonly known as:  FLONASE  Use 2 sprays in each nostril once daily.     furosemide 20 MG tablet  Commonly known as:  LASIX  TAKE 1 TABLET BY MOUTH ONCE DAILY     gabapentin 100 MG capsule  Commonly known as:  NEURONTIN  TAKE 1 CAPSULE BY MOUTH TWICE DAILY     lancets Misc  1 Units by Misc.(Non-Drug; Combo Route) route 2 (two) times daily as needed.     levothyroxine 175 MCG tablet  Commonly known as:  SYNTHROID, LEVOTHROID  Take 1 tablet (175 mcg total) by mouth once daily.     LORazepam 2 MG Tab  Commonly known as:  ATIVAN     metFORMIN 1000 MG tablet  Commonly known as:  GLUCOPHAGE  TAKE 1 TABLET BY MOUTH TWICE DAILY WITH MEALS     nitroGLYCERIN 0.4 MG SL tablet  Commonly known as:  NITROSTAT  Place 1 tablet (0.4 mg total) under the tongue every 5 (five) minutes as needed for Chest pain.     prasugrel 10 mg Tab  Commonly known as:  EFFIENT  TAKE 1 TABLET BY MOUTH EVERY MORNING     ramipril 10 MG capsule  Commonly known as:  ALTACE  TAKE 1 CAPSULE BY MOUTH EVERY DAY     venlafaxine 75 MG 24 hr capsule  Commonly known as:  EFFEXOR-XR  TAKE 1 CAPSULE BY MOUTH EVERY DAY           Where to Get Your Medications      These medications were sent to [facility] - [facility] [address]  [address] [address]    Phone:  [phone]   · methylPREDNISolone 4 mg tablet

## 2018-11-01 ENCOUNTER — HOSPITAL ENCOUNTER (OUTPATIENT)
Dept: RADIOLOGY | Facility: HOSPITAL | Age: 53
Discharge: HOME | End: 2018-11-01
Attending: NURSE PRACTITIONER
Payer: COMMERCIAL

## 2018-11-01 DIAGNOSIS — M79.604 RIGHT LEG PAIN: ICD-10-CM

## 2018-11-01 PROCEDURE — 93971 EXTREMITY STUDY: CPT | Mod: 26,,, | Performed by: RADIOLOGY

## 2018-11-01 PROCEDURE — 93971 EXTREMITY STUDY: CPT | Mod: TC,PO

## 2018-11-02 ENCOUNTER — TELEPHONE (OUTPATIENT)
Dept: FAMILY MEDICINE | Facility: CLINIC | Age: 53
End: 2018-11-02

## 2018-11-02 DIAGNOSIS — M79.604 RIGHT LEG PAIN: Primary | ICD-10-CM

## 2018-11-21 DIAGNOSIS — F41.9 ANXIETY: Primary | ICD-10-CM

## 2018-11-21 DIAGNOSIS — N20.0 KIDNEY STONES: ICD-10-CM

## 2018-11-22 RX ORDER — RAMIPRIL 10 MG/1
CAPSULE ORAL
Qty: 30 CAPSULE | Refills: 6 | Status: SHIPPED | OUTPATIENT
Start: 2018-11-22 | End: 2019-06-12 | Stop reason: SDUPTHER

## 2018-11-22 RX ORDER — AMLODIPINE BESYLATE 5 MG/1
TABLET ORAL
Qty: 90 TABLET | Refills: 0 | Status: SHIPPED | OUTPATIENT
Start: 2018-11-22 | End: 2019-02-08 | Stop reason: SDUPTHER

## 2018-11-22 RX ORDER — CARVEDILOL 25 MG/1
TABLET ORAL
Qty: 60 TABLET | Refills: 7 | Status: SHIPPED | OUTPATIENT
Start: 2018-11-22 | End: 2019-07-11 | Stop reason: SDUPTHER

## 2018-11-22 RX ORDER — ALLOPURINOL 100 MG/1
TABLET ORAL
Qty: 90 TABLET | Refills: 0 | Status: SHIPPED | OUTPATIENT
Start: 2018-11-22 | End: 2019-02-08 | Stop reason: SDUPTHER

## 2018-11-23 NOTE — PROGRESS NOTES
Refill Authorization Note     is requesting a refill authorization.    Brief assessment and rationale for refill: APPROVE: prr  Amount/Quantity of medication ordered: 90d        Refills Authorized: Yes  If authorized number of refills: 1           Medication Therapy Plan: Anxiety RCO stable; Labs WNL; approve 6 more  Name and strength of medication: VENLAFAXINE 75MG ER   How patient will take medication: t1t qd  Medication reconciliation completed: No        Comments:   BP Readings from Last 3 Encounters:   10/31/18 130/68   10/15/18 135/77   08/02/18 138/78     Lab Results   Component Value Date    CREATININE 0.8 04/28/2018    CREATININE 0.8 04/28/2018    BUN 12 04/28/2018     04/28/2018     04/28/2018    K 4.1 04/28/2018    K 4.1 04/28/2018     04/28/2018    CO2 27 04/28/2018

## 2018-11-26 RX ORDER — VENLAFAXINE HYDROCHLORIDE 75 MG/1
CAPSULE, EXTENDED RELEASE ORAL
Qty: 90 CAPSULE | Refills: 1 | Status: SHIPPED | OUTPATIENT
Start: 2018-11-26 | End: 2019-04-29 | Stop reason: SDUPTHER

## 2018-12-03 NOTE — PROGRESS NOTES
December 23, 2018      Rock Etienne MD  [address]  [facility] [address]           [facility]  [address] [place] [id number]  Phone: [phone]  Fax: [phone]          Patient: Aayush Calhoun   MR Number: [id number]   YOB: 1958   Date of Visit: 12/3/2018       Dear Dr. Rock Etienne:    Thank you for referring Aayush Calhoun to me for evaluation. Attached you will find relevant portions of my assessment and plan of care.    If you have questions, please do not hesitate to call me. I look forward to following Aayush Calhoun along with you.    Sincerely,    Rj Cifuentes III, MD    Enclosure  CC:  No Recipients    If you would like to receive this communication electronically, please contact [email or web address] or [phone] to request more information on [employer] access.    For providers and/or their staff who would like to refer a patient to [facility], please contact us through our one-stop-shop provider referral line, [facility], at [phone].    If you feel you have received this communication in error or would no longer like to receive these types of communications, please e-mail [email or web address]          Subjective:       Patient ID: Josephine Bolton is a 51 y.o. female.    Chief Complaint: Medication Problem (dry mouth)    HPI   This patient is new to me. She presents to clinic with complaints of dry mouth. She has a history of hypothyroidism and synthroid recently increased to 175mcg. She has been on the gabapentin for numerous years without dry mouth symptoms. She uses cpap at night--has been using for years without dry mouth symptoms. She does report sometimes having a metallic taste in her mouth and chewing ice a lot. She reports that she has found when she does have the dry mouth she has constipation. Otherwise no associated symptoms or triggers.   Vitals:    06/20/17 1449   BP: 136/88   Pulse: (!) 59   Temp: 98.2 °F (36.8 °C)     Review of Systems   Constitutional: Negative.  Negative for appetite change, fatigue and fever.   HENT: Negative for congestion, facial swelling and trouble swallowing.         +dry mouth   Eyes: Negative.  Negative for pain, discharge, redness and visual disturbance.   Respiratory: Negative.  Negative for cough, chest tightness, shortness of breath and wheezing.    Cardiovascular: Negative.  Negative for chest pain and palpitations.   Gastrointestinal: Negative.  Negative for abdominal pain, constipation and diarrhea.   Genitourinary: Negative.  Negative for difficulty urinating and flank pain.   Musculoskeletal: Negative.  Negative for back pain and neck pain.   Skin: Negative.  Negative for rash and wound.   Neurological: Negative.  Negative for dizziness, tremors, weakness and light-headedness.   Hematological: Negative.    Psychiatric/Behavioral: Negative.  Negative for confusion. The patient is not nervous/anxious.        Past Medical History:   Diagnosis Date    Allergy     Anticoagulant long-term use     ASA 81mg, Effient    Anxiety     Arthritis     Asthma     CAD (coronary artery disease) 01/27/2012    s/p stents x4 , CABG, 3 vessel, (5/2013) newest stent prior to  "CABG    Chronic constipation     DDD (degenerative disc disease), cervical     DDD (degenerative disc disease), lumbar     DDD (degenerative disc disease), thoracic     Depression     Edema     Headache     History of nephrolithiasis     Hyperlipidemia     Hypertension     Hypothyroid 7/5/2012    Insomnia     Insulin resistance     patient stated not diebetic    Joint stiffness     Joint swelling     Low back pain     Neck pain     PONV (postoperative nausea and vomiting)     Sleep apnea 01/27/12    Patient reports "severe" sleep apnea, uses C-pap    Thoracic back pain      Objective:      Physical Exam   Constitutional: She is oriented to person, place, and time. Vital signs are normal. She appears well-developed and well-nourished.  Non-toxic appearance. She does not have a sickly appearance. She does not appear ill. No distress.   HENT:   Head: Normocephalic and atraumatic.   Right Ear: Hearing and external ear normal.   Left Ear: Hearing and external ear normal.   Nose: Nose normal.   Mouth/Throat: Uvula is midline and oropharynx is clear and moist. Mucous membranes are dry.   Eyes: Conjunctivae, EOM and lids are normal. Pupils are equal, round, and reactive to light. Right eye exhibits no discharge. Left eye exhibits no discharge.   Neck: Trachea normal and normal range of motion. Neck supple. No JVD present. No tracheal deviation present.   Pulmonary/Chest: Effort normal. No accessory muscle usage. No respiratory distress. She exhibits no tenderness.   Abdominal: Normal appearance. She exhibits no distension.   Musculoskeletal: Normal range of motion. She exhibits no edema or deformity.   Neurological: She is alert and oriented to person, place, and time. She has normal strength. No sensory deficit. Coordination and gait normal.   Skin: Skin is warm, dry and intact. No lesion and no rash noted. She is not diaphoretic. No erythema. No pallor.   Psychiatric: She has a normal mood and affect. " Her speech is normal and behavior is normal. Judgment and thought content normal. Cognition and memory are normal.   Nursing note and vitals reviewed.      Assessment:       1. Xerostomia        Plan:       Xerostomia  -     ANTI -SSA ANTIBODY; Future; Expected date: 06/20/2017  -     RICARDO; Future; Expected date: 06/20/2017  -     ANTI-SSB ANTIBODY; Future; Expected date: 06/20/2017  -     Rheumatoid factor; Future; Expected date: 06/20/2017  -     CBC auto differential; Future; Expected date: 06/20/2017  -     Sedimentation rate, manual; Future; Expected date: 06/20/2017  -     Comprehensive metabolic panel; Future; Expected date: 06/20/2017  -     Iron and TIBC; Future; Expected date: 06/20/2017  -     Ferritin; Future; Expected date: 06/20/2017    discussed possible side effect of gabapentin but will rule out autoimmune    will call with lab results     Return if symptoms worsen or fail to improve.

## 2018-12-07 RX ORDER — FUROSEMIDE 20 MG/1
TABLET ORAL
Qty: 30 TABLET | Refills: 1 | Status: SHIPPED | OUTPATIENT
Start: 2018-12-07 | End: 2019-02-08 | Stop reason: SDUPTHER

## 2018-12-17 RX ORDER — METFORMIN HYDROCHLORIDE 1000 MG/1
TABLET ORAL
Qty: 60 TABLET | Refills: 6 | Status: SHIPPED | OUTPATIENT
Start: 2018-12-17 | End: 2019-07-08 | Stop reason: SDUPTHER

## 2019-02-08 DIAGNOSIS — I15.0 RENOVASCULAR HYPERTENSION: Primary | ICD-10-CM

## 2019-02-08 DIAGNOSIS — N20.0 KIDNEY STONES: ICD-10-CM

## 2019-02-08 RX ORDER — ALLOPURINOL 100 MG/1
100 TABLET ORAL DAILY
Qty: 90 TABLET | Refills: 1 | Status: SHIPPED | OUTPATIENT
Start: 2019-02-08 | End: 2019-07-30 | Stop reason: SDUPTHER

## 2019-02-08 RX ORDER — FUROSEMIDE 20 MG/1
20 TABLET ORAL DAILY
Qty: 90 TABLET | Refills: 1 | Status: SHIPPED | OUTPATIENT
Start: 2019-02-08 | End: 2019-07-30 | Stop reason: SDUPTHER

## 2019-02-08 RX ORDER — AMLODIPINE BESYLATE 5 MG/1
5 TABLET ORAL DAILY
Qty: 90 TABLET | Refills: 1 | Status: SHIPPED | OUTPATIENT
Start: 2019-02-08 | End: 2019-07-30 | Stop reason: SDUPTHER

## 2019-04-01 ENCOUNTER — TELEPHONE (OUTPATIENT)
Dept: CARDIOLOGY | Facility: CLINIC | Age: 54
End: 2019-04-01

## 2019-04-01 NOTE — TELEPHONE ENCOUNTER
----- Message from Brit Watt sent at 4/1/2019  4:39 PM CDT -----  Contact: Pt  Type: Needs Medical Advice    Who Called: Patient  Best Call Back Number: [phone] (home)   Additional Information: pt said she needs to come in earlier she is having some new issues would like to come in asap please

## 2019-04-02 ENCOUNTER — TELEPHONE (OUTPATIENT)
Dept: CARDIOLOGY | Facility: CLINIC | Age: 54
End: 2019-04-02

## 2019-04-02 DIAGNOSIS — R00.2 PALPITATION: Primary | ICD-10-CM

## 2019-04-04 ENCOUNTER — PATIENT OUTREACH (OUTPATIENT)
Dept: ADMINISTRATIVE | Facility: HOSPITAL | Age: 54
End: 2019-04-04

## 2019-04-05 RX ORDER — LEVOTHYROXINE SODIUM 175 UG/1
175 TABLET ORAL DAILY
Qty: 30 TABLET | Refills: 11 | Status: SHIPPED | OUTPATIENT
Start: 2019-04-05 | End: 2019-04-25

## 2019-04-22 ENCOUNTER — OFFICE VISIT (OUTPATIENT)
Dept: NEPHROLOGY | Facility: CLINIC | Age: 54
End: 2019-04-22
Payer: COMMERCIAL

## 2019-04-22 ENCOUNTER — LAB VISIT (OUTPATIENT)
Dept: LAB | Facility: HOSPITAL | Age: 54
End: 2019-04-22
Attending: INTERNAL MEDICINE
Payer: COMMERCIAL

## 2019-04-22 VITALS
DIASTOLIC BLOOD PRESSURE: 76 MMHG | SYSTOLIC BLOOD PRESSURE: 154 MMHG | HEIGHT: 63 IN | HEART RATE: 65 BPM | BODY MASS INDEX: 38.01 KG/M2 | OXYGEN SATURATION: 99 % | WEIGHT: 214.5 LBS

## 2019-04-22 DIAGNOSIS — E03.9 HYPOTHYROIDISM, UNSPECIFIED TYPE: ICD-10-CM

## 2019-04-22 DIAGNOSIS — G47.33 OBSTRUCTIVE SLEEP APNEA ON CPAP: Chronic | ICD-10-CM

## 2019-04-22 DIAGNOSIS — Z95.1 S/P CABG X 3: ICD-10-CM

## 2019-04-22 DIAGNOSIS — N20.0 KIDNEY STONES: Primary | ICD-10-CM

## 2019-04-22 DIAGNOSIS — E88.819 INSULIN RESISTANCE: ICD-10-CM

## 2019-04-22 DIAGNOSIS — I15.0 RENOVASCULAR HYPERTENSION: ICD-10-CM

## 2019-04-22 DIAGNOSIS — E03.9 HYPOTHYROIDISM, UNSPECIFIED TYPE: Chronic | ICD-10-CM

## 2019-04-22 DIAGNOSIS — E78.2 MIXED HYPERLIPIDEMIA: ICD-10-CM

## 2019-04-22 DIAGNOSIS — E66.9 OBESITY, UNSPECIFIED CLASSIFICATION, UNSPECIFIED OBESITY TYPE, UNSPECIFIED WHETHER SERIOUS COMORBIDITY PRESENT: ICD-10-CM

## 2019-04-22 DIAGNOSIS — H35.033 HYPERTENSIVE RETINOPATHY OF BOTH EYES: ICD-10-CM

## 2019-04-22 LAB
ALBUMIN SERPL BCP-MCNC: 3.9 G/DL (ref 3.5–5.2)
ALP SERPL-CCNC: 81 U/L (ref 55–135)
ALT SERPL W/O P-5'-P-CCNC: 16 U/L (ref 10–44)
ANION GAP SERPL CALC-SCNC: 11 MMOL/L (ref 8–16)
AST SERPL-CCNC: 25 U/L (ref 10–40)
BILIRUB SERPL-MCNC: 0.7 MG/DL (ref 0.1–1)
BUN SERPL-MCNC: 13 MG/DL (ref 6–20)
CALCIUM SERPL-MCNC: 10.1 MG/DL (ref 8.7–10.5)
CHLORIDE SERPL-SCNC: 103 MMOL/L (ref 95–110)
CO2 SERPL-SCNC: 29 MMOL/L (ref 23–29)
CREAT SERPL-MCNC: 0.7 MG/DL (ref 0.5–1.4)
EST. GFR  (AFRICAN AMERICAN): >60 ML/MIN/1.73 M^2
EST. GFR  (NON AFRICAN AMERICAN): >60 ML/MIN/1.73 M^2
GLUCOSE SERPL-MCNC: 100 MG/DL (ref 70–110)
INSULIN COLLECTION INTERVAL: ABNORMAL
INSULIN SERPL-ACNC: 29.8 UU/ML
POTASSIUM SERPL-SCNC: 3.7 MMOL/L (ref 3.5–5.1)
PROT SERPL-MCNC: 7.7 G/DL (ref 6–8.4)
SODIUM SERPL-SCNC: 143 MMOL/L (ref 136–145)
T4 FREE SERPL-MCNC: 1.6 NG/DL (ref 0.71–1.51)
TSH SERPL DL<=0.005 MIU/L-ACNC: 0.03 UIU/ML (ref 0.4–4)

## 2019-04-22 PROCEDURE — 99203 OFFICE O/P NEW LOW 30 MIN: CPT | Mod: S$GLB,,, | Performed by: INTERNAL MEDICINE

## 2019-04-22 PROCEDURE — 99999 PR PBB SHADOW E&M-EST. PATIENT-LVL IV: ICD-10-PCS | Mod: PBBFAC,,, | Performed by: INTERNAL MEDICINE

## 2019-04-22 PROCEDURE — 99999 PR PBB SHADOW E&M-EST. PATIENT-LVL IV: CPT | Mod: PBBFAC,,, | Performed by: INTERNAL MEDICINE

## 2019-04-22 PROCEDURE — 80053 COMPREHEN METABOLIC PANEL: CPT

## 2019-04-22 PROCEDURE — 99203 PR OFFICE/OUTPT VISIT, NEW, LEVL III, 30-44 MIN: ICD-10-PCS | Mod: S$GLB,,, | Performed by: INTERNAL MEDICINE

## 2019-04-22 PROCEDURE — 84439 ASSAY OF FREE THYROXINE: CPT

## 2019-04-22 PROCEDURE — 83525 ASSAY OF INSULIN: CPT

## 2019-04-22 PROCEDURE — 84443 ASSAY THYROID STIM HORMONE: CPT

## 2019-04-22 RX ORDER — HYDROCHLOROTHIAZIDE 12.5 MG/1
CAPSULE ORAL
COMMUNITY
End: 2019-06-11

## 2019-04-22 RX ORDER — LEVOCETIRIZINE DIHYDROCHLORIDE 5 MG/1
5 TABLET, FILM COATED ORAL
COMMUNITY
Start: 2018-11-27 | End: 2019-04-22

## 2019-04-22 RX ORDER — AZELASTINE 1 MG/ML
1 SPRAY, METERED NASAL DAILY PRN
COMMUNITY
Start: 2018-11-27 | End: 2019-04-22

## 2019-04-22 NOTE — PATIENT INSTRUCTIONS
"Kidney Stone (Urine)  Does this test have other names?  Urine stone risk profile, 24-hour collection or URO-RISK  What is this test?  This test checks your urine for chemicals that might cause your body to form kidney stones. The test also looks for blood in your urine, which can be a symptom of kidney stones.  Kidney stones are hard masses of minerals and salts that can form in your kidneys. They can be as small as a grain of sand or more than an inch in diameter. Usually theses stones or crystals pass through your body when you urinate. But sometimes they can get stuck in your urinary tract and cause a lot of pain.    *  The collection kit is in a white box with green writing and will say  "for kidney stone analysis".   It must be collected in the red bottle from this special kit.     *  At the start of the test, void and discard the urine.  Document the time, as this is the start of the test.  The bottle will need be kept refrigerated or in an ice bucket near the toilet for the duration of the test and until it is turned in.       *  Save all urine for the next 24 hours.  You can urinate directly into the bottle or we can provide a pan to place on the toilet  you can then pour immediately into the large red bottle.     *  At the end of the 24 hour collection, be sure the lid is on tight, your name and date of birth  are legible on the container,  and return the entire kit to the lab, with the large red bottle and all the contents of the box.       *The kit may have collection and transport instructions in the box.  Please DISREGARD these instructions.    The lab will perform the necessary tasks and prepare the test for transport.  You will need to return the bottle with your collected urine, and the entire contents of the box to the lab.                                  "

## 2019-04-25 ENCOUNTER — TELEPHONE (OUTPATIENT)
Dept: ENDOCRINOLOGY | Facility: CLINIC | Age: 54
End: 2019-04-25

## 2019-04-25 DIAGNOSIS — E03.9 HYPOTHYROIDISM, UNSPECIFIED TYPE: Primary | ICD-10-CM

## 2019-04-25 RX ORDER — LEVOTHYROXINE SODIUM 175 UG/1
TABLET ORAL
Qty: 30 TABLET | Refills: 11 | Status: ON HOLD | OUTPATIENT
Start: 2019-04-25 | End: 2019-06-18 | Stop reason: SDUPTHER

## 2019-04-25 NOTE — TELEPHONE ENCOUNTER
Spoke to pt and adv of Dr Aguilera's previous message, voiced understanding. Pt req to make annual appt, adv Dr Aguilera is booked through Oct 2019 and to call back May 1 to schedule appt for Nov 2019. Also added pt to wait list.

## 2019-04-25 NOTE — TELEPHONE ENCOUNTER
Let her know to decrease synthroid from 175 mcg once daily to synthroid 175 mcg 6 days a week and 1/2 tablet on day 7.   Check TSH in 6 weeks

## 2019-04-29 DIAGNOSIS — F41.9 ANXIETY: ICD-10-CM

## 2019-04-29 RX ORDER — VENLAFAXINE HYDROCHLORIDE 75 MG/1
CAPSULE, EXTENDED RELEASE ORAL
Qty: 90 CAPSULE | Refills: 1 | Status: SHIPPED | OUTPATIENT
Start: 2019-04-29 | End: 2019-07-30

## 2019-04-29 NOTE — PROGRESS NOTES
Subjective:       Patient ID: Josephine Bolton is a 53 y.o. White female who presents for new evaluation of Nephrolithiasis    HPI     March 2016: Initial Consult: She is referred for kidney stones. She was followed by Urology in [place] but has not had an appt in a few years.  She passes stones almost weekly, occasionally requiring ER visit but for the past 3 months she has passed zero. She complains of intermittent flank pain, no gross hematuria, for about a week. Stone analysis shows mostly uric acid (80%) and calcium oxalate. She follows a low sodium diet and drinks 64 oz of water per day with fresh lemon. No red meat. No vitamin C or calcium supplements. 24 hour urin shows 1.3L of urine    April 2019: Unfortunately she needs to return to clinic since she's passed some stones, one she needed to present to the ER. Additionally she has had a few UTIs in the last year. She takes showers, uses liquid soap, fresh washcloths and cotton underwear. 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 No cranberry capsules. Her stone analysis was predominantly uric acid       Review of Systems   Constitutional: Negative for unexpected weight change.   Cardiovascular: Negative for leg swelling.   Gastrointestinal: Negative for constipation.   Genitourinary: Positive for flank pain. Negative for decreased urine volume, dysuria and hematuria.   Neurological: Negative for light-headedness.   Hematological: Does not bruise/bleed easily.       Objective:      Physical Exam   Constitutional: She is oriented to person, place, and time. She is cooperative. No distress.   HENT:   Mouth/Throat: Mucous membranes are dry.   Eyes: Conjunctivae are normal.   Neck: No JVD present.   Cardiovascular: S1 normal, S2 normal and intact distal pulses.   No murmur heard.  Pulmonary/Chest: She has no wheezes. She has no rales.   Abdominal: She exhibits no distension.   Musculoskeletal: She exhibits no edema.   Neurological:  She is alert and oriented to person, place, and time.   Skin: Skin is warm and dry.   Psychiatric: She has a normal mood and affect.   Nursing note and vitals reviewed.      Assessment:       1. Kidney stones    2. Renovascular hypertension    3. Obesity, unspecified classification, unspecified obesity type, unspecified whether serious comorbidity present    4. Hypothyroidism, unspecified type    5. Obstructive sleep apnea on CPAP    6. Mixed hyperlipidemia    7. Hypertensive retinopathy of both eyes    8. 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 Advised to push fluids as much as possible to obtain UOP of at least 2L    UTIs  - start cranberry capsules    HTN  - discussed BP log for her PCP    RTC pending the above

## 2019-04-30 RX ORDER — ATORVASTATIN CALCIUM 20 MG/1
TABLET, FILM COATED ORAL
Qty: 30 TABLET | Refills: 9 | Status: SHIPPED | OUTPATIENT
Start: 2019-04-30 | End: 2019-07-30 | Stop reason: SDUPTHER

## 2019-05-01 DIAGNOSIS — M25.561 PAIN IN BOTH KNEES, UNSPECIFIED CHRONICITY: Primary | ICD-10-CM

## 2019-05-01 DIAGNOSIS — M25.562 PAIN IN BOTH KNEES, UNSPECIFIED CHRONICITY: Primary | ICD-10-CM

## 2019-05-02 ENCOUNTER — HOSPITAL ENCOUNTER (OUTPATIENT)
Dept: RADIOLOGY | Facility: HOSPITAL | Age: 54
Discharge: HOME | End: 2019-05-02
Attending: PODIATRIST
Payer: COMMERCIAL

## 2019-05-02 ENCOUNTER — OFFICE VISIT (OUTPATIENT)
Dept: PODIATRY | Facility: CLINIC | Age: 54
End: 2019-05-02
Payer: COMMERCIAL

## 2019-05-02 ENCOUNTER — OFFICE VISIT (OUTPATIENT)
Dept: ORTHOPEDICS | Facility: CLINIC | Age: 54
End: 2019-05-02
Payer: COMMERCIAL

## 2019-05-02 ENCOUNTER — HOSPITAL ENCOUNTER (OUTPATIENT)
Dept: RADIOLOGY | Facility: HOSPITAL | Age: 54
Discharge: HOME | End: 2019-05-02
Attending: ORTHOPAEDIC SURGERY
Payer: COMMERCIAL

## 2019-05-02 VITALS
BODY MASS INDEX: 37.39 KG/M2 | HEART RATE: 74 BPM | DIASTOLIC BLOOD PRESSURE: 73 MMHG | WEIGHT: 211 LBS | HEIGHT: 63 IN | SYSTOLIC BLOOD PRESSURE: 125 MMHG

## 2019-05-02 VITALS
WEIGHT: 211 LBS | SYSTOLIC BLOOD PRESSURE: 125 MMHG | HEART RATE: 74 BPM | HEIGHT: 62 IN | BODY MASS INDEX: 38.83 KG/M2 | DIASTOLIC BLOOD PRESSURE: 73 MMHG

## 2019-05-02 DIAGNOSIS — M79.671 PAIN IN BOTH FEET: ICD-10-CM

## 2019-05-02 DIAGNOSIS — M79.672 PAIN IN BOTH FEET: ICD-10-CM

## 2019-05-02 DIAGNOSIS — M17.11 PRIMARY OSTEOARTHRITIS OF RIGHT KNEE: ICD-10-CM

## 2019-05-02 DIAGNOSIS — M21.6X2 EQUINUS DEFORMITY OF BOTH FEET: ICD-10-CM

## 2019-05-02 DIAGNOSIS — M25.562 PAIN IN BOTH KNEES, UNSPECIFIED CHRONICITY: ICD-10-CM

## 2019-05-02 DIAGNOSIS — E66.9 OBESITY, UNSPECIFIED CLASSIFICATION, UNSPECIFIED OBESITY TYPE, UNSPECIFIED WHETHER SERIOUS COMORBIDITY PRESENT: ICD-10-CM

## 2019-05-02 DIAGNOSIS — M25.561 PAIN IN BOTH KNEES, UNSPECIFIED CHRONICITY: ICD-10-CM

## 2019-05-02 DIAGNOSIS — D36.10 NEUROMA: Primary | ICD-10-CM

## 2019-05-02 DIAGNOSIS — M51.36 DDD (DEGENERATIVE DISC DISEASE), LUMBAR: ICD-10-CM

## 2019-05-02 DIAGNOSIS — M21.6X1 EQUINUS DEFORMITY OF BOTH FEET: ICD-10-CM

## 2019-05-02 DIAGNOSIS — M17.12 PRIMARY OSTEOARTHRITIS OF LEFT KNEE: Primary | ICD-10-CM

## 2019-05-02 PROCEDURE — 99204 OFFICE O/P NEW MOD 45 MIN: CPT | Mod: 25,S$GLB,, | Performed by: PODIATRIST

## 2019-05-02 PROCEDURE — 3008F BODY MASS INDEX DOCD: CPT | Mod: CPTII,S$GLB,, | Performed by: ORTHOPAEDIC SURGERY

## 2019-05-02 PROCEDURE — 3008F BODY MASS INDEX DOCD: CPT | Mod: CPTII,S$GLB,, | Performed by: PODIATRIST

## 2019-05-02 PROCEDURE — 20610 DRAIN/INJ JOINT/BURSA W/O US: CPT | Mod: 50,S$GLB,, | Performed by: ORTHOPAEDIC SURGERY

## 2019-05-02 PROCEDURE — 73630 X-RAY EXAM OF FOOT: CPT | Mod: 26,,, | Performed by: RADIOLOGY

## 2019-05-02 PROCEDURE — 3008F PR BODY MASS INDEX (BMI) DOCUMENTED: ICD-10-PCS | Mod: CPTII,S$GLB,, | Performed by: PODIATRIST

## 2019-05-02 PROCEDURE — 99999 PR PBB SHADOW E&M-EST. PATIENT-LVL III: ICD-10-PCS | Mod: PBBFAC,,, | Performed by: PODIATRIST

## 2019-05-02 PROCEDURE — 3074F SYST BP LT 130 MM HG: CPT | Mod: CPTII,S$GLB,, | Performed by: PODIATRIST

## 2019-05-02 PROCEDURE — 73562 X-RAY EXAM OF KNEE 3: CPT | Mod: 50,TC,PO

## 2019-05-02 PROCEDURE — 3074F PR MOST RECENT SYSTOLIC BLOOD PRESSURE < 130 MM HG: ICD-10-PCS | Mod: CPTII,S$GLB,, | Performed by: PODIATRIST

## 2019-05-02 PROCEDURE — 3074F SYST BP LT 130 MM HG: CPT | Mod: CPTII,S$GLB,, | Performed by: ORTHOPAEDIC SURGERY

## 2019-05-02 PROCEDURE — 73630 XR FOOT COMPLETE 3 VIEW BILATERAL: ICD-10-PCS | Mod: 26,,, | Performed by: RADIOLOGY

## 2019-05-02 PROCEDURE — 99214 OFFICE O/P EST MOD 30 MIN: CPT | Mod: 25,S$GLB,, | Performed by: ORTHOPAEDIC SURGERY

## 2019-05-02 PROCEDURE — 99214 PR OFFICE/OUTPT VISIT, EST, LEVL IV, 30-39 MIN: ICD-10-PCS | Mod: 25,S$GLB,, | Performed by: ORTHOPAEDIC SURGERY

## 2019-05-02 PROCEDURE — 3078F DIAST BP <80 MM HG: CPT | Mod: CPTII,S$GLB,, | Performed by: ORTHOPAEDIC SURGERY

## 2019-05-02 PROCEDURE — 73562 X-RAY EXAM OF KNEE 3: CPT | Mod: 26,,, | Performed by: RADIOLOGY

## 2019-05-02 PROCEDURE — 64455 NJX AA&/STRD PLTR COM DG NRV: CPT | Mod: 50,S$GLB,, | Performed by: PODIATRIST

## 2019-05-02 PROCEDURE — 20610 LARGE JOINT ASPIRATION/INJECTION: R KNEE, L KNEE: ICD-10-PCS | Mod: 50,S$GLB,, | Performed by: ORTHOPAEDIC SURGERY

## 2019-05-02 PROCEDURE — 73562 XR KNEE 3 VIEW BILATERAL: ICD-10-PCS | Mod: 26,,, | Performed by: RADIOLOGY

## 2019-05-02 PROCEDURE — 3078F PR MOST RECENT DIASTOLIC BLOOD PRESSURE < 80 MM HG: ICD-10-PCS | Mod: CPTII,S$GLB,, | Performed by: PODIATRIST

## 2019-05-02 PROCEDURE — 99204 PR OFFICE/OUTPT VISIT, NEW, LEVL IV, 45-59 MIN: ICD-10-PCS | Mod: 25,S$GLB,, | Performed by: PODIATRIST

## 2019-05-02 PROCEDURE — 3074F PR MOST RECENT SYSTOLIC BLOOD PRESSURE < 130 MM HG: ICD-10-PCS | Mod: CPTII,S$GLB,, | Performed by: ORTHOPAEDIC SURGERY

## 2019-05-02 PROCEDURE — 99999 PR PBB SHADOW E&M-EST. PATIENT-LVL III: CPT | Mod: PBBFAC,,, | Performed by: PODIATRIST

## 2019-05-02 PROCEDURE — 64455 PR INJECT ANES/STEROID PLANTAR COMMON DIGITAL NERVE: ICD-10-PCS | Mod: 50,S$GLB,, | Performed by: PODIATRIST

## 2019-05-02 PROCEDURE — 73630 X-RAY EXAM OF FOOT: CPT | Mod: 50,TC,PO

## 2019-05-02 PROCEDURE — 3078F PR MOST RECENT DIASTOLIC BLOOD PRESSURE < 80 MM HG: ICD-10-PCS | Mod: CPTII,S$GLB,, | Performed by: ORTHOPAEDIC SURGERY

## 2019-05-02 PROCEDURE — 3078F DIAST BP <80 MM HG: CPT | Mod: CPTII,S$GLB,, | Performed by: PODIATRIST

## 2019-05-02 PROCEDURE — 3008F PR BODY MASS INDEX (BMI) DOCUMENTED: ICD-10-PCS | Mod: CPTII,S$GLB,, | Performed by: ORTHOPAEDIC SURGERY

## 2019-05-02 PROCEDURE — 99999 PR PBB SHADOW E&M-EST. PATIENT-LVL III: CPT | Mod: PBBFAC,,, | Performed by: ORTHOPAEDIC SURGERY

## 2019-05-02 PROCEDURE — 99999 PR PBB SHADOW E&M-EST. PATIENT-LVL III: ICD-10-PCS | Mod: PBBFAC,,, | Performed by: ORTHOPAEDIC SURGERY

## 2019-05-02 RX ORDER — TRIAMCINOLONE ACETONIDE 40 MG/ML
40 INJECTION, SUSPENSION INTRA-ARTICULAR; INTRAMUSCULAR
Status: COMPLETED | OUTPATIENT
Start: 2019-05-02 | End: 2019-05-02

## 2019-05-02 RX ADMIN — TRIAMCINOLONE ACETONIDE 40 MG: 40 INJECTION, SUSPENSION INTRA-ARTICULAR; INTRAMUSCULAR at 04:05

## 2019-05-02 RX ADMIN — TRIAMCINOLONE ACETONIDE 40 MG: 40 INJECTION, SUSPENSION INTRA-ARTICULAR; INTRAMUSCULAR at 05:05

## 2019-05-02 NOTE — PROCEDURES
Large Joint Aspiration/Injection: R knee, L knee  Date/Time: 5/2/2019 4:21 PM  Performed by: Juan Wilson MD  Authorized by: Juan Wilson MD     Consent Done?:  Yes (Verbal)  Indications:  Pain  Procedure site marked: Yes    Timeout: Prior to procedure the correct patient, procedure, and site was verified      Location:  Knee  Site:  R knee and L knee  Prep: Patient was prepped and draped in usual sterile fashion    Ultrasonic Guidance for needle placement: No  Needle size:  21 G  Approach:  Anterolateral  Medications:  40 mg triamcinolone acetonide 40 mg/mL; 40 mg triamcinolone acetonide 40 mg/mL  Patient tolerance:  Patient tolerated the procedure well with no immediate complications

## 2019-05-02 NOTE — PATIENT INSTRUCTIONS
- Wear supportive shoes such as sneakers with arch supports.    - Look for Superfeet or Powerstep arch supports.    - Rest/reduce ambulation to necessary activities of daily living.    - Ice foot for no more than 20 minutes/per hour.  Use warm compress for first 48 hours after injection.    - Elevate foot as much as possible throughout the day.    - Perform Achilles/plantar fascia stretches as much as possible throughout the day.    - Apply OTC topical arnica to affected area for pain relief and to reduce bruising/swelling.    - Notify clinic if pain worsens or fails to improve.    - Follow up in 2 weeks for neuroma.

## 2019-05-02 NOTE — PROGRESS NOTES
"HPI:   This is a 53 y.o. who presents to clinic today complaining of bilateral knee pain for 1 years after no known trauma. Pain is progressively worsening. No numbness or tingling. No associated signs or symptoms.    Past Medical History:   Diagnosis Date    Allergy     Anticoagulant long-term use     ASA 81mg, Effient    Anxiety     Arthritis     Asthma     CAD (coronary artery disease) 2012    s/p stents x4 , CABG, 3 vessel, (2013) newest stent prior to CABG    Chronic constipation     DDD (degenerative disc disease), cervical     DDD (degenerative disc disease), lumbar     DDD (degenerative disc disease), thoracic     Depression     Edema     Headache(784.0)     History of nephrolithiasis     Hyperlipidemia     Hypertension     Hypothyroid 2012    Insomnia     Insulin resistance     patient stated not diebetic    Joint stiffness     Joint swelling     Kidney disease         Low back pain     Neck pain     PONV (postoperative nausea and vomiting)     Sleep apnea 12    Patient reports "severe" sleep apnea, uses C-pap    Thoracic back pain      Past Surgical History:   Procedure Laterality Date    ADENOIDECTOMY      ANGIOGRAM-RENAL N/A 3/24/2016    [facility] C[facility]    BACK SURGERY      BREAST BIOPSY Left 2017    duct excision- Dr. Jimenez    Breast Ductal Excision with Ultrasound Guidance Left 2017    [facility]    CARPAL TUNNEL RELEASE      bilateral     SECTION, CLASSIC      x 2    COLONOSCOPY      COLONOSCOPY N/A 2012    [facility]    CORONARY ANGIOPLASTY WITH STENT PLACEMENT      x 4    CORONARY ARTERY BYPASS GRAFT  2013    3 vessel    EGD (ESOPHAGOGASTRODUODENOSCOPY) N/A 2012    [facility] END[facility]    HEART CATH-LEFT N/A 10/16/2017    Performed by [facility]io [facility]asi MD at " [facility] CATH    HYSTERECTOMY      INJECTION-STEROID-EPIDURAL-CERVICAL N/A 1/19/2015    [facility] OR    KNEE ARTHROSCOPY W/ MENISCAL REPAIR Left     twice, last one 5/2014    LAMINECTOMY      L4/L5    OOPHORECTOMY      SINUS SURGERY      x3    TENDON REPAIR Left     ankle surgery    THYROIDECTOMY      2 separate surgeries    TONSILLECTOMY       Current Outpatient Medications on File Prior to Visit   Medication Sig Dispense Refill    allopurinol (ZYLOPRIM) 100 MG tablet Take 1 tablet (100 mg total) by mouth once daily. 90 tablet 1    amLODIPine (NORVASC) 5 MG tablet Take 1 tablet (5 mg total) by mouth once daily. 90 tablet 1    aspirin (ECOTRIN) 81 MG EC tablet Take 1 tablet (81 mg total) by mouth once daily.  0    atorvastatin (LIPITOR) 20 MG tablet TAKE 1 TABLET BY MOUTH EVERY DAY 30 tablet 9    carvedilol (COREG) 25 MG tablet TAKE 1 TABLET BY MOUTH TWICE DAILY 60 tablet 7    fluticasone (FLONASE) 50 mcg/actuation nasal spray Use 2 sprays in each nostril once daily. (Patient taking differently: 2 sprays by Each Nare route daily as needed. ) 16 g 0    furosemide (LASIX) 20 MG tablet Take 1 tablet (20 mg total) by mouth once daily. 90 tablet 1    gabapentin (NEURONTIN) 100 MG capsule TAKE 1 CAPSULE BY MOUTH TWICE DAILY 180 capsule 3    hydroCHLOROthiazide (MICROZIDE) 12.5 mg capsule hydrochlorothiazide   12.5 mg      lancets Misc 1 Units by Misc.(Non-Drug; Combo Route) route 2 (two) times daily as needed. 100 each 3    levothyroxine (SYNTHROID, LEVOTHROID) 175 MCG tablet 1 tablet 6 days a week and 1/2 tablet on day 7 30 tablet 11    metFORMIN (GLUCOPHAGE) 1000 MG tablet TAKE 1 TABLET BY MOUTH TWICE DAILY WITH MEALS 60 tablet 6    prasugrel (EFFIENT) 10 mg Tab TAKE 1 TABLET BY MOUTH EVERY MORNING 90 tablet 3    ramipril (ALTACE) 10 MG capsule TAKE 1 CAPSULE BY MOUTH EVERY DAY 30 capsule 6    venlafaxine (EFFEXOR-XR) 75 MG 24 hr capsule TAKE 1 CAPSULE BY MOUTH EVERY  "DAY 90 capsule 1    blood-glucose meter (GLUCOSE MONITORING KIT) kit Use as instructed 1 each 0    LORazepam (ATIVAN) 2 MG Tab Take 2 mg by mouth.      nitroGLYCERIN (NITROSTAT) 0.4 MG SL tablet Place 1 tablet (0.4 mg total) under the tongue every 5 (five) minutes as needed for Chest pain. 100 tablet 0     No current facility-administered medications on file prior to visit.      Review of patient's allergies indicates:   Allergen Reactions    Celexa [citalopram] Other (See Comments)     GI upset  Stated "knocked me out"    Cephalexin Anaphylaxis    Zoloft [sertraline] Other (See Comments)     Stated "knocked me out"    Codeine Other (See Comments)     hallucinations  hallucinations    Penicillins Other (See Comments)     Was told per mother that as child was allergic to penicillin.  Childhood allergy/ unknown reaction     Family History   Problem Relation Age of Onset    Heart disease Father     Diabetes Father     Hypertension Father     Stroke Father     Cataracts Father     Cancer Maternal Grandmother         Breast with Mets    Breast cancer Maternal Grandmother     Cancer Paternal Grandmother         Colon    Heart failure Mother     Asthma Mother     Macular degeneration Mother     Cancer Mother         Breast    Cataracts Mother     Heart disease Mother     COPD Mother     Breast cancer Mother     Glaucoma Sister     Thyroid disease Sister     Glaucoma Sister     Strabismus Other     Other Brother         stiff man syndrome    Amblyopia Neg Hx     Blindness Neg Hx     Retinal detachment Neg Hx      Social History     Socioeconomic History    Marital status:      Spouse name: Not on file    Number of children: Not on file    Years of education: Not on file    Highest education level: Not on file   Occupational History    Occupation: teacher   Social Needs    Financial resource strain: Not on file    Food insecurity:     Worry: Not on file     Inability: Not on file "    Transportation needs:     Medical: Not on file     Non-medical: Not on file   Tobacco Use    Smoking status: Former Smoker     Packs/day: 0.50     Years: 14.00     Pack years: 7.00     Types: Cigarettes     Start date: 1984     Last attempt to quit: 1998     Years since quittin.4    Smokeless tobacco: Never Used   Substance and Sexual Activity    Alcohol use: No    Drug use: Yes     Types: Benzodiazepines    Sexual activity: Yes     Partners: Male   Lifestyle    Physical activity:     Days per week: Not on file     Minutes per session: Not on file    Stress: Not on file   Relationships    Social connections:     Talks on phone: Not on file     Gets together: Not on file     Attends Synagogue service: Not on file     Active member of club or organization: Not on file     Attends meetings of clubs or organizations: Not on file     Relationship status: Not on file   Other Topics Concern    Not on file   Social History Narrative    Not on file       Review of Systems:  Constitutional:  Denies fever or chills   Eyes:  Denies change in visual acuity   HENT:  Denies nasal congestion or sore throat   Respiratory:  Denies cough or shortness of breath   Cardiovascular:  Denies chest pain or edema   GI:  Denies abdominal pain, nausea, vomiting, bloody stools or diarrhea   :  Denies dysuria   Integument:  Denies rash   Neurologic:  Denies headache, focal weakness or sensory changes   Endocrine:  Denies polyuria or polydipsia   Lymphatic:  Denies swollen glands   Psychiatric:  Denies depression or anxiety     Physical Exam:   Constitutional:  Well developed, well nourished, no acute distress, non-toxic appearance   Integument:  Well hydrated, no rash   Lymphatic:  No lymphadenopathy noted   Neurologic:  Alert & oriented x 3, CN 2-12 normal, normal motor function, normal sensory function, no focal deficits noted   Psychiatric:  Speech and behavior appropriate     Bilateral Knee Exam    bilateral  Knee Exam     Tenderness   The patient is experiencing tenderness in the medial joint line.    Range of Motion   Extension: abnormal   Flexion: abnormal     Muscle Strength     The patient has normal knee strength.    Tests   Livia:  Medial - positive   Lachman:  Anterior - negative      Varus: negative  Valgus: negative  Patellar Apprehension: negative    Other   Erythema: absent  Sensation: normal  Pulse: present  Swelling: mild        X-rays were performed, personally reviewed by me and findings discussed with the patient.  3 views of the bilateral knee show tricompartmental degenerative change most pronounced in the medial compartment    Primary osteoarthritis of left knee  -     Large Joint Aspiration/Injection: R knee, L knee    Primary osteoarthritis of right knee  -     Large Joint Aspiration/Injection: R knee, L knee            Using an aseptic technique, I injected 5 cc of lidocaine 1% without and 1 cc of kenalog 40mg into the bilateral knee. The patient tolerated this well. I will have them return to clinic as needed.

## 2019-05-12 PROBLEM — M79.672 PAIN IN BOTH FEET: Status: ACTIVE | Noted: 2019-05-12

## 2019-05-12 PROBLEM — M21.6X1 EQUINUS DEFORMITY OF BOTH FEET: Status: ACTIVE | Noted: 2019-05-12

## 2019-05-12 PROBLEM — M79.671 PAIN IN BOTH FEET: Status: ACTIVE | Noted: 2019-05-12

## 2019-05-12 PROBLEM — D36.10 NEUROMA: Status: ACTIVE | Noted: 2019-05-12

## 2019-05-12 PROBLEM — M21.6X2 EQUINUS DEFORMITY OF BOTH FEET: Status: ACTIVE | Noted: 2019-05-12

## 2019-05-13 NOTE — PROGRESS NOTES
Subjective:      Patient ID: Josephine Bolton is a 53 y.o. female.    Chief Complaint: Foot Pain (Pain in middle left toe, on top of left foot)      HPI:  Josephine Bolton is a 53 y.o. female who presents to clinic with a chief complaint of burning pain in toes in both feet.  Patient reports burning, searing pain has been ongoing off and on for a couple of weeks and worsening in severity, which she now rates as 8/10 on the pain scale.  She states she wishes she could cut off her feet sometimes.  She has tried to rest and change shoes without much change in pain.  She denies any known history of trauma.  She asks if she could get an injection today for the pain.  Patient denies any other pedal complaints at this time.    PCP:  Elizabeth Delong MD  Date last seen:  11/18/18    Review of Systems   Constitutional: Negative for appetite change, fever, chills, fatigue and unexpected weight change.   Respiratory: Negative for cough, wheezing, and shortness of breath.   Cardiovascular: Negative for chest pain, claudication, cyanosis, and leg swelling.  Endocrine:  Negative for intolerance to cold, intolerance to heat, polydipsia, polyphagia, and polyuria.    Gastrointestinal: Negative for nausea, vomiting, diarrhea, and constipation.   Musculoskeletal: Negative for back pain, arthritis, joint pain, joint swelling, myalgias, and stiffness.   Skin: Negative for nail bed changes, discoloration, rash, itching, poor wound healing, suspicious lesion, and unusual hair distribution.   Neurological: Negative for loss of balance, sensory change, paresthesias, and numbness.   Hematological: Negative for adenopathy, bleeding, and bruising easily.   Psychiatric/Behavioral: The patient is not nervous/anxious.  Negative for altered mental status.    Hemoglobin A1C   Date Value Ref Range Status   04/13/2017 5.1 0.0 - 5.6 % Final     Comment:     Reference Interval:  5.0 - 5.6 Normal   5.7 - 6.4 High Risk   > 6.5 Diabetic    Hgb  "A1c results are standardized based on the (NGSP) National   Glycohemoglobin Standardization Program.    Hemoglobin A1C levels are related to mean serum/plasma glucose   during the preceding 2-3 months.        10/25/2016 5.4 4.5 - 6.2 % Final     Comment:     According to ADA guidelines, hemoglobin A1C <7.0% represents  optimal control in non-pregnant diabetic patients.  Different  metrics may apply to specific populations.   Standards of Medical Care in Diabetes - 2016.  For the purpose of screening for the presence of diabetes:  <5.7%     Consistent with the absence of diabetes  5.7-6.4%  Consistent with increasing risk for diabetes   (prediabetes)  >or=6.5%  Consistent with diabetes  Currently no consensus exists for use of hemoglobin A1C  for diagnosis of diabetes for children.     07/17/2013 5.1 4.0 - 6.2 % Final       Past Medical History:   Diagnosis Date    Allergy     Anticoagulant long-term use     ASA 81mg, Effient    Anxiety     Arthritis     Asthma     CAD (coronary artery disease) 01/27/2012    s/p stents x4 , CABG, 3 vessel, (5/2013) newest stent prior to CABG    Chronic constipation     DDD (degenerative disc disease), cervical     DDD (degenerative disc disease), lumbar     DDD (degenerative disc disease), thoracic     Depression     Edema     Headache(784.0)     History of nephrolithiasis     Hyperlipidemia     Hypertension     Hypothyroid 7/5/2012    Insomnia     Insulin resistance     patient stated not diebetic    Joint stiffness     Joint swelling     Kidney disease         Low back pain     Neck pain     PONV (postoperative nausea and vomiting)     Sleep apnea 01/27/12    Patient reports "severe" sleep apnea, uses C-pap    Thoracic back pain      Past Surgical History:   Procedure Laterality Date    ADENOIDECTOMY      ANGIOGRAM-RENAL N/A 3/24/2016    [facility] CATH    BACK SURGERY      BREAST BIOPSY Left 02/2017    " duct excision- Dr. Jimenez    Breast Ductal Excision with Ultrasound Guidance Left 2017    [facility] C[facility]    CARPAL TUNNEL RELEASE      bilateral     SECTION, CLASSIC      x 2    COLONOSCOPY      COLONOSCOPY N/A 2012    [facility] ENDO    CORONARY ANGIOPLASTY WITH STENT PLACEMENT      x 4    CORONARY ARTERY BYPASS GRAFT  2013    3 vessel    EGD (ESOPHAGOGASTRODUODENOSCOPY) N/A 2012    [facility]k [facility] at [facility] ENDO    HEART CATH-LEFT N/A 10/16/2017    [facility] CATH    HYSTERECTOMY      INJECTION-STEROID-EPIDURAL-CERVICAL N/A 2015    [facility] OR    KNEE ARTHROSCOPY W/ MENISCAL REPAIR Left     twice, last one 2014    LAMINECTOMY      L4/L5    OOPHORECTOMY      SINUS SURGERY      x3    TENDON REPAIR Left     ankle surgery    THYROIDECTOMY      2 separate surgeries    TONSILLECTOMY       Family History   Problem Relation Age of Onset    Heart disease Father     Diabetes Father     Hypertension Father     Stroke Father     Cataracts Father     Cancer Maternal Grandmother         Breast with Mets    Breast cancer Maternal Grandmother     Cancer Paternal Grandmother         Colon    Heart failure Mother     Asthma Mother     Macular degeneration Mother     Cancer Mother         Breast    Cataracts Mother     Heart disease Mother     COPD Mother     Breast cancer Mother     Glaucoma Sister     Thyroid disease Sister     Glaucoma Sister     Strabismus Other     Other Brother         stiff man syndrome    Amblyopia Neg Hx     Blindness Neg Hx     Retinal detachment Neg Hx      Social History     Socioeconomic History    Marital status:      Spouse name: Not on file    Number of children: Not on file    Years of education: Not on file    Highest education level: Not on file  "  Occupational History    Occupation: teacher   Social Needs    Financial resource strain: Not on file    Food insecurity:     Worry: Not on file     Inability: Not on file    Transportation needs:     Medical: Not on file     Non-medical: Not on file   Tobacco Use    Smoking status: Former Smoker     Packs/day: 0.50     Years: 14.00     Pack years: 7.00     Types: Cigarettes     Start date: 1984     Last attempt to quit: 1998     Years since quittin.4    Smokeless tobacco: Never Used   Substance and Sexual Activity    Alcohol use: No    Drug use: Yes     Types: Benzodiazepines    Sexual activity: Yes     Partners: Male   Lifestyle    Physical activity:     Days per week: Not on file     Minutes per session: Not on file    Stress: Not on file   Relationships    Social connections:     Talks on phone: Not on file     Gets together: Not on file     Attends Religion service: Not on file     Active member of club or organization: Not on file     Attends meetings of clubs or organizations: Not on file     Relationship status: Not on file   Other Topics Concern    Not on file   Social History Narrative    Not on file           Objective:        /73   Pulse 74   Ht 5' 2.5" (1.588 m)   Wt 95.7 kg (210 lb 15.7 oz)   LMP  (LMP Unknown)   BMI 37.97 kg/m²     Physical Exam   Constitutional: Patient is oriented to person, place, and time. Patient appears well-developed and well-nourished. No acute distress.     Psychiatric: Patient has a normal mood and affect. Patient's speech is normal and behavior is normal. Judgment is normal. Cognition and memory are normal.     Bilateral pedal exam was performed today.  Vascular: Pedal pulses palpable 2/4 DP & PT.  CFT is = 3 seconds to the hallux.  Skin temperature is cool to cool proximal tibia to distal toes without localized increase in calor noted.  No erythema, edema, or ecchymosis noted to the foot or ankle.  Hair growth present distally to " the LE.     Musculoskeletal: Ankle joint ROM is decreased. Subtalar joint ROM is decreased.  Midtarsal joint ROM is decreased.  1st ray ROM is decreased.  1st  MTPJ ROM is decreased.  Ankle joint dorsiflexion is restricted with the knee extended and flexed per Silfverskiold exam.    Muscle strength is 5/5 for all LE muscle groups tested.    Neurological: Protective sensation is intact to 10/10 sites on the bilateral foot via Vincent-Sheila monofilament.    Proprioception is Intact to the foot.  Vibratory sensation is Intact to the foot.   Light touch sensation is Intact to the foot.   Achilles DTR and Chaddock STR are Intact to bilateral lower extremities.   Negative Babinski sign bilateral lower extremities.  Negative clonus sign bilateral lower extremities.    Dermatological: Toenails 1-5 bilateral are WNL in length and thickness.  Webspaces 1-4 bilateral are clean, dry, and intact.  Skin turgor is supple.  No dry, flaky skin noted to the LE.  No open wounds or suspicious pigmented lesions appreciable to the foot or ankle.    Nursing note and vitals reviewed.        Assessment:       Encounter Diagnoses   Name Primary?    Neuroma Yes    Equinus deformity of both feet     Pain in both feet     DDD (degenerative disc disease), lumbar     Obesity, unspecified classification, unspecified obesity type, unspecified whether serious comorbidity present          Plan:       Josephine Sen was seen today for foot pain.    Diagnoses and all orders for this visit:    Neuroma  -     triamcinolone acetonide injection 40 mg    Equinus deformity of both feet    Pain in both feet  -     X-Ray Foot Complete Bilateral; Future  -     triamcinolone acetonide injection 40 mg    DDD (degenerative disc disease), lumbar    Obesity, unspecified classification, unspecified obesity type, unspecified whether serious comorbidity present    Other orders  -     Cancel: X-Ray Foot Complete Left; Future      I counseled the patient on her  conditions, their implications and medical management.    - Ordered and reviewed xrays with patient.    - Educated patient on proper diabetic foot care and supportive, accommodative shoe gear.    - Advised patient that excessive pressure on feet worsen forefoot conditions and encouraged her to diet and exercise to reduce complications from obesity.    - Educated patient on PRICE therapy and Achilles/plantar fascial stretches with demonstration of stretches and stretching technique handout given to patient.    - With the patient's permission, cleansed B/L 2nd IM space with alcohol swab, applied topical referred treatment, administered mix of 1 cc of Kenalog 40, 0.5 cc of 0.5% Marcaine plain, 1 cc of 2% lidocaine plain into each IM space, and covered with Band-Aid.  Patient tolerated injections well and was given instructions to apply warm compress to area if post-steroid injection flare occurs within the first 48 hours after injection.    Patient was given the following recommendations and instructions:  Patient Instructions   - Wear supportive shoes such as sneakers with arch supports.    - Look for Superfeet or Powerstep arch supports.    - Rest/reduce ambulation to necessary activities of daily living.    - Ice foot for no more than 20 minutes/per hour.  Use warm compress for first 48 hours after injection.    - Elevate foot as much as possible throughout the day.    - Perform Achilles/plantar fascia stretches as much as possible throughout the day.    - Apply OTC topical arnica to affected area for pain relief and to reduce bruising/swelling.    - Notify clinic if pain worsens or fails to improve.    - Follow up in 2 weeks for neuroma.          Shital Leal DPM        Dictation was performed using M*Modal Fluency.  Transcription errors may be present.

## 2019-05-14 RX ORDER — TRIAMCINOLONE ACETONIDE 40 MG/ML
40 INJECTION, SUSPENSION INTRA-ARTICULAR; INTRAMUSCULAR
Status: DISCONTINUED | OUTPATIENT
Start: 2019-05-02 | End: 2019-05-14 | Stop reason: HOSPADM

## 2019-05-16 ENCOUNTER — OFFICE VISIT (OUTPATIENT)
Dept: PODIATRY | Facility: CLINIC | Age: 54
End: 2019-05-16
Payer: COMMERCIAL

## 2019-05-16 VITALS
HEIGHT: 63 IN | HEART RATE: 80 BPM | DIASTOLIC BLOOD PRESSURE: 73 MMHG | WEIGHT: 210.75 LBS | SYSTOLIC BLOOD PRESSURE: 150 MMHG | BODY MASS INDEX: 37.34 KG/M2

## 2019-05-16 DIAGNOSIS — D36.10 NEUROMA: Primary | ICD-10-CM

## 2019-05-16 DIAGNOSIS — M51.36 DDD (DEGENERATIVE DISC DISEASE), LUMBAR: ICD-10-CM

## 2019-05-16 DIAGNOSIS — E66.9 OBESITY, UNSPECIFIED CLASSIFICATION, UNSPECIFIED OBESITY TYPE, UNSPECIFIED WHETHER SERIOUS COMORBIDITY PRESENT: ICD-10-CM

## 2019-05-16 DIAGNOSIS — M79.672 PAIN IN BOTH FEET: ICD-10-CM

## 2019-05-16 DIAGNOSIS — M21.6X1 EQUINUS DEFORMITY OF BOTH FEET: ICD-10-CM

## 2019-05-16 DIAGNOSIS — M79.671 PAIN IN BOTH FEET: ICD-10-CM

## 2019-05-16 DIAGNOSIS — M21.6X2 EQUINUS DEFORMITY OF BOTH FEET: ICD-10-CM

## 2019-05-16 PROCEDURE — 3078F PR MOST RECENT DIASTOLIC BLOOD PRESSURE < 80 MM HG: ICD-10-PCS | Mod: CPTII,S$GLB,, | Performed by: PODIATRIST

## 2019-05-16 PROCEDURE — 3008F BODY MASS INDEX DOCD: CPT | Mod: CPTII,S$GLB,, | Performed by: PODIATRIST

## 2019-05-16 PROCEDURE — 3077F SYST BP >= 140 MM HG: CPT | Mod: CPTII,S$GLB,, | Performed by: PODIATRIST

## 2019-05-16 PROCEDURE — 99999 PR PBB SHADOW E&M-EST. PATIENT-LVL III: CPT | Mod: PBBFAC,,, | Performed by: PODIATRIST

## 2019-05-16 PROCEDURE — 3077F PR MOST RECENT SYSTOLIC BLOOD PRESSURE >= 140 MM HG: ICD-10-PCS | Mod: CPTII,S$GLB,, | Performed by: PODIATRIST

## 2019-05-16 PROCEDURE — 3008F PR BODY MASS INDEX (BMI) DOCUMENTED: ICD-10-PCS | Mod: CPTII,S$GLB,, | Performed by: PODIATRIST

## 2019-05-16 PROCEDURE — 3078F DIAST BP <80 MM HG: CPT | Mod: CPTII,S$GLB,, | Performed by: PODIATRIST

## 2019-05-16 PROCEDURE — 99212 OFFICE O/P EST SF 10 MIN: CPT | Mod: S$GLB,,, | Performed by: PODIATRIST

## 2019-05-16 PROCEDURE — 99212 PR OFFICE/OUTPT VISIT, EST, LEVL II, 10-19 MIN: ICD-10-PCS | Mod: S$GLB,,, | Performed by: PODIATRIST

## 2019-05-16 PROCEDURE — 99999 PR PBB SHADOW E&M-EST. PATIENT-LVL III: ICD-10-PCS | Mod: PBBFAC,,, | Performed by: PODIATRIST

## 2019-05-16 NOTE — PATIENT INSTRUCTIONS
- Bring cushioned insoles or arch supports to next visit.    - Wear supportive shoes such as sneakers with arch supports.    - Look for Superfeet or Powerstep arch supports.    - Rest/reduce ambulation to necessary activities of daily living.    - Ice foot for no more than 20 minutes/per hour.     - Elevate foot as much as possible throughout the day.    - Perform Achilles/plantar fascia stretches as much as possible throughout the day.    - Apply OTC topical arnica to affected area for pain relief and to reduce bruising/swelling.    - Notify clinic if pain worsens or fails to improve.    - Follow up in 2 weeks for neuroma or prn.

## 2019-05-21 LAB
CHOLEST SERPL-MSCNC: 189 MG/DL (ref 0–200)
HDL/CHOLESTEROL RATIO: 2.7
HDLC SERPL-MCNC: 70 MG/DL
LDLC SERPL CALC-MCNC: 101 MG/DL
TRIGL SERPL-MCNC: 95 MG/DL
VLDLC SERPL-MCNC: 19 MG/DL

## 2019-05-27 ENCOUNTER — TELEPHONE (OUTPATIENT)
Dept: ADMINISTRATIVE | Facility: HOSPITAL | Age: 54
End: 2019-05-27

## 2019-05-27 NOTE — PROGRESS NOTES
Subjective:      Patient ID: Josephine Bolton is a 53 y.o. female.    Chief Complaint: Foot Pain (2wk re-check) and Other Misc (Bing 10/31/18)      HPI:  Josephine Bolton is a 53 y.o. female who presents to clinic with a chief complaint of burning pain in toes in both feet.  Patient reports burning, searing pain has been ongoing off and on for a couple of weeks and worsening in severity, which she now rates as 8/10 on the pain scale.  She states she wishes she could cut off her feet sometimes.  She has tried to rest and change shoes without much change in pain.  She denies any known history of trauma.  She asks if she could get an injection today for the pain.  Patient denies any other pedal complaints at this time.    PCP:  Elizabeth Delong MD  Date last seen:  11/18/18    Review of Systems   Constitutional: Negative for appetite change, fever, chills, fatigue and unexpected weight change.   Respiratory: Negative for cough, wheezing, and shortness of breath.   Cardiovascular: Negative for chest pain, claudication, cyanosis, and leg swelling.  Endocrine:  Negative for intolerance to cold, intolerance to heat, polydipsia, polyphagia, and polyuria.    Gastrointestinal: Negative for nausea, vomiting, diarrhea, and constipation.   Musculoskeletal: Negative for back pain, arthritis, joint pain, joint swelling, myalgias, and stiffness.   Skin: Negative for nail bed changes, discoloration, rash, itching, poor wound healing, suspicious lesion, and unusual hair distribution.   Neurological: Negative for loss of balance, sensory change, paresthesias, and numbness.   Hematological: Negative for adenopathy, bleeding, and bruising easily.   Psychiatric/Behavioral: The patient is not nervous/anxious.  Negative for altered mental status.    Hemoglobin A1C   Date Value Ref Range Status   04/13/2017 5.1 0.0 - 5.6 % Final     Comment:     Reference Interval:  5.0 - 5.6 Normal   5.7 - 6.4 High Risk   > 6.5 Diabetic   "  Hgb A1c results are standardized based on the (NGSP) National   Glycohemoglobin Standardization Program.    Hemoglobin A1C levels are related to mean serum/plasma glucose   during the preceding 2-3 months.        10/25/2016 5.4 4.5 - 6.2 % Final     Comment:     According to ADA guidelines, hemoglobin A1C <7.0% represents  optimal control in non-pregnant diabetic patients.  Different  metrics may apply to specific populations.   Standards of Medical Care in Diabetes - 2016.  For the purpose of screening for the presence of diabetes:  <5.7%     Consistent with the absence of diabetes  5.7-6.4%  Consistent with increasing risk for diabetes   (prediabetes)  >or=6.5%  Consistent with diabetes  Currently no consensus exists for use of hemoglobin A1C  for diagnosis of diabetes for children.     07/17/2013 5.1 4.0 - 6.2 % Final       Past Medical History:   Diagnosis Date    Allergy     Anticoagulant long-term use     ASA 81mg, Effient    Anxiety     Arthritis     Asthma     CAD (coronary artery disease) 01/27/2012    s/p stents x4 , CABG, 3 vessel, (5/2013) newest stent prior to CABG    Chronic constipation     DDD (degenerative disc disease), cervical     DDD (degenerative disc disease), lumbar     DDD (degenerative disc disease), thoracic     Depression     Edema     Headache(784.0)     History of nephrolithiasis     Hyperlipidemia     Hypertension     Hypothyroid 7/5/2012    Insomnia     Insulin resistance     patient stated not diebetic    Joint stiffness     Joint swelling     Kidney disease         Low back pain     Neck pain     PONV (postoperative nausea and vomiting)     Sleep apnea 01/27/12    Patient reports "severe" sleep apnea, uses C-pap    Thoracic back pain      Past Surgical History:   Procedure Laterality Date    ADENOIDECTOMY      ANGIOGRAM-RENAL N/A 3/24/2016    [facility] CATH    BACK SURGERY      BREAST BIOPSY Left " 2017    duct excision- Dr. Jimenez    Breast Ductal Excision with Ultrasound Guidance Left 2017    [facility] CSC    CARPAL TUNNEL RELEASE      bilateral     SECTION, CLASSIC      x 2    COLONOSCOPY      COLONOSCOPY N/A 2012    [facility]k [facility]O    CORONARY ANGIOPLASTY WITH STENT PLACEMENT      x 4    CORONARY ARTERY BYPASS GRAFT  2013    3 vessel    EGD (ESOPHAGOGASTRODUODENOSCOPY) N/A 2012    [facility] ENDO    HEART CATH-LEFT N/A 10/16/2017    [facility] CATH    HYSTERECTOMY      INJECTION-STEROID-EPIDURAL-CERVICAL N/A 2015    [facility] OR    KNEE ARTHROSCOPY W/ MENISCAL REPAIR Left     twice, last one 2014    LAMINECTOMY      L4/L5    OOPHORECTOMY      SINUS SURGERY      x3    TENDON REPAIR Left     ankle surgery    THYROIDECTOMY      2 separate surgeries    TONSILLECTOMY       Family History   Problem Relation Age of Onset    Heart disease Father     Diabetes Father     Hypertension Father     Stroke Father     Cataracts Father     Cancer Maternal Grandmother         Breast with Mets    Breast cancer Maternal Grandmother     Cancer Paternal Grandmother         Colon    Heart failure Mother     Asthma Mother     Macular degeneration Mother     Cancer Mother         Breast    Cataracts Mother     Heart disease Mother     COPD Mother     Breast cancer Mother     Glaucoma Sister     Thyroid disease Sister     Glaucoma Sister     Strabismus Other     Other Brother         stiff man syndrome    Amblyopia Neg Hx     Blindness Neg Hx     Retinal detachment Neg Hx      Social History     Socioeconomic History    Marital status:      Spouse name: Not on file    Number of children: Not on file    Years of education: Not on file    Highest education level: Not on file  "  Occupational History    Occupation: teacher   Social Needs    Financial resource strain: Not on file    Food insecurity:     Worry: Not on file     Inability: Not on file    Transportation needs:     Medical: Not on file     Non-medical: Not on file   Tobacco Use    Smoking status: Former Smoker     Packs/day: 0.50     Years: 14.00     Pack years: 7.00     Types: Cigarettes     Start date: 1984     Last attempt to quit: 1998     Years since quittin.4    Smokeless tobacco: Never Used   Substance and Sexual Activity    Alcohol use: No    Drug use: Yes     Types: Benzodiazepines    Sexual activity: Yes     Partners: Male   Lifestyle    Physical activity:     Days per week: Not on file     Minutes per session: Not on file    Stress: Not on file   Relationships    Social connections:     Talks on phone: Not on file     Gets together: Not on file     Attends Rastafari service: Not on file     Active member of club or organization: Not on file     Attends meetings of clubs or organizations: Not on file     Relationship status: Not on file   Other Topics Concern    Not on file   Social History Narrative    Not on file           Objective:        BP (!) 150/73   Pulse 80   Ht 5' 3" (1.6 m)   Wt 95.6 kg (210 lb 12.2 oz)   LMP  (LMP Unknown)   BMI 37.33 kg/m²     Physical Exam   Constitutional: Patient is oriented to person, place, and time. Patient appears well-developed and well-nourished. No acute distress.     Psychiatric: Patient has a normal mood and affect. Patient's speech is normal and behavior is normal. Judgment is normal. Cognition and memory are normal.     Bilateral pedal exam was performed today.  Vascular: Pedal pulses palpable 2/4 DP & PT.  CFT is = 3 seconds to the hallux.  Skin temperature is cool to cool proximal tibia to distal toes without localized increase in calor noted.  No erythema, edema, or ecchymosis noted to the foot or ankle.  Hair growth present distally to " the LE.     Musculoskeletal: Ankle joint ROM is decreased. Subtalar joint ROM is decreased.  Midtarsal joint ROM is decreased.  1st ray ROM is decreased.  1st  MTPJ ROM is decreased.  Ankle joint dorsiflexion is restricted with the knee extended and flexed per Silfverskiold exam.    Muscle strength is 5/5 for all LE muscle groups tested.    Neurological: Protective sensation is intact to 10/10 sites on the bilateral foot via Nye-Sheila monofilament.    Proprioception is Intact to the foot.  Vibratory sensation is Intact to the foot.   Light touch sensation is Intact to the foot.   Achilles DTR and Chaddock STR are Intact to bilateral lower extremities.   Negative Babinski sign bilateral lower extremities.  Negative clonus sign bilateral lower extremities.    Dermatological: Toenails 1-5 bilateral are WNL in length and thickness.  Webspaces 1-4 bilateral are clean, dry, and intact.  Skin turgor is supple.  No dry, flaky skin noted to the LE.  No open wounds or suspicious pigmented lesions appreciable to the foot or ankle.    Nursing note and vitals reviewed.        Assessment:       Encounter Diagnoses   Name Primary?    Neuroma Yes    Equinus deformity of both feet     Pain in both feet     DDD (degenerative disc disease), lumbar     Obesity, unspecified classification, unspecified obesity type, unspecified whether serious comorbidity present          Plan:       Josephine Sen was seen today for foot pain and other misc.    Diagnoses and all orders for this visit:    Neuroma    Equinus deformity of both feet    Pain in both feet    DDD (degenerative disc disease), lumbar    Obesity, unspecified classification, unspecified obesity type, unspecified whether serious comorbidity present      I counseled the patient on her conditions, their implications and medical management.    - Ordered and reviewed xrays with patient.    - Educated patient on proper diabetic foot care and supportive, accommodative shoe  gear.    - Advised patient that excessive pressure on feet worsen forefoot conditions and encouraged her to diet and exercise to reduce complications from obesity.    - Educated patient on PRICE therapy and Achilles/plantar fascial stretches with demonstration of stretches and stretching technique handout given to patient.    - With the patient's permission, cleansed B/L 2nd IM space with alcohol swab, applied topical referred treatment, administered mix of 1 cc of Kenalog 40, 0.5 cc of 0.5% Marcaine plain, 1 cc of 2% lidocaine plain into each IM space, and covered with Band-Aid.  Patient tolerated injections well and was given instructions to apply warm compress to area if post-steroid injection flare occurs within the first 48 hours after injection.    Patient was given the following recommendations and instructions:  Patient Instructions   - Bring cushioned insoles or arch supports to next visit.    - Wear supportive shoes such as sneakers with arch supports.    - Look for Superfeet or Powerstep arch supports.    - Rest/reduce ambulation to necessary activities of daily living.    - Ice foot for no more than 20 minutes/per hour.     - Elevate foot as much as possible throughout the day.    - Perform Achilles/plantar fascia stretches as much as possible throughout the day.    - Apply OTC topical arnica to affected area for pain relief and to reduce bruising/swelling.    - Notify clinic if pain worsens or fails to improve.    - Follow up in 2 weeks for neuroma or prn.            Shital Leal DPM        Dictation was performed using M*Modal Fluency.  Transcription errors may be present.

## 2019-06-04 DIAGNOSIS — I25.10 CORONARY ARTERY DISEASE, ANGINA PRESENCE UNSPECIFIED, UNSPECIFIED VESSEL OR LESION TYPE, UNSPECIFIED WHETHER NATIVE OR TRANSPLANTED HEART: ICD-10-CM

## 2019-06-05 RX ORDER — PRASUGREL 10 MG/1
TABLET, FILM COATED ORAL
Qty: 90 TABLET | Refills: 3 | Status: SHIPPED | OUTPATIENT
Start: 2019-06-05 | End: 2019-12-23

## 2019-06-06 ENCOUNTER — OFFICE VISIT (OUTPATIENT)
Dept: CARDIOLOGY | Facility: CLINIC | Age: 54
End: 2019-06-06
Payer: COMMERCIAL

## 2019-06-06 ENCOUNTER — OFFICE VISIT (OUTPATIENT)
Dept: PODIATRY | Facility: CLINIC | Age: 54
End: 2019-06-06
Payer: COMMERCIAL

## 2019-06-06 ENCOUNTER — OFFICE VISIT (OUTPATIENT)
Dept: ORTHOPEDICS | Facility: CLINIC | Age: 54
End: 2019-06-06
Payer: COMMERCIAL

## 2019-06-06 VITALS
BODY MASS INDEX: 37.93 KG/M2 | DIASTOLIC BLOOD PRESSURE: 84 MMHG | HEART RATE: 81 BPM | WEIGHT: 214.06 LBS | SYSTOLIC BLOOD PRESSURE: 153 MMHG | HEIGHT: 63 IN

## 2019-06-06 VITALS
SYSTOLIC BLOOD PRESSURE: 141 MMHG | DIASTOLIC BLOOD PRESSURE: 79 MMHG | BODY MASS INDEX: 37.34 KG/M2 | WEIGHT: 210.75 LBS | HEIGHT: 63 IN | HEART RATE: 67 BPM

## 2019-06-06 VITALS
DIASTOLIC BLOOD PRESSURE: 80 MMHG | HEIGHT: 66 IN | WEIGHT: 212 LBS | BODY MASS INDEX: 34.07 KG/M2 | SYSTOLIC BLOOD PRESSURE: 138 MMHG | HEART RATE: 77 BPM

## 2019-06-06 DIAGNOSIS — Z95.1 S/P CABG X 3: ICD-10-CM

## 2019-06-06 DIAGNOSIS — D36.10 NEUROMA: Primary | ICD-10-CM

## 2019-06-06 DIAGNOSIS — E78.2 MIXED HYPERLIPIDEMIA: ICD-10-CM

## 2019-06-06 DIAGNOSIS — I15.0 RENOVASCULAR HYPERTENSION: ICD-10-CM

## 2019-06-06 DIAGNOSIS — R07.9 CHEST PAIN, UNSPECIFIED TYPE: Primary | ICD-10-CM

## 2019-06-06 DIAGNOSIS — M17.12 PRIMARY OSTEOARTHRITIS OF LEFT KNEE: Primary | ICD-10-CM

## 2019-06-06 DIAGNOSIS — I25.10 CORONARY ARTERY DISEASE INVOLVING NATIVE CORONARY ARTERY OF NATIVE HEART WITHOUT ANGINA PECTORIS: Chronic | ICD-10-CM

## 2019-06-06 PROCEDURE — 3008F BODY MASS INDEX DOCD: CPT | Mod: CPTII,S$GLB,, | Performed by: ORTHOPAEDIC SURGERY

## 2019-06-06 PROCEDURE — 99999 PR PBB SHADOW E&M-EST. PATIENT-LVL III: CPT | Mod: PBBFAC,,, | Performed by: PODIATRIST

## 2019-06-06 PROCEDURE — 3074F PR MOST RECENT SYSTOLIC BLOOD PRESSURE < 130 MM HG: ICD-10-PCS | Mod: CPTII,S$GLB,, | Performed by: PODIATRIST

## 2019-06-06 PROCEDURE — 93000 ELECTROCARDIOGRAM COMPLETE: CPT | Mod: S$GLB,,, | Performed by: INTERNAL MEDICINE

## 2019-06-06 PROCEDURE — 99999 PR PBB SHADOW E&M-EST. PATIENT-LVL III: CPT | Mod: PBBFAC,,, | Performed by: INTERNAL MEDICINE

## 2019-06-06 PROCEDURE — 99214 OFFICE O/P EST MOD 30 MIN: CPT | Mod: S$GLB,,, | Performed by: ORTHOPAEDIC SURGERY

## 2019-06-06 PROCEDURE — 99214 PR OFFICE/OUTPT VISIT, EST, LEVL IV, 30-39 MIN: ICD-10-PCS | Mod: S$GLB,,, | Performed by: INTERNAL MEDICINE

## 2019-06-06 PROCEDURE — 3074F SYST BP LT 130 MM HG: CPT | Mod: CPTII,S$GLB,, | Performed by: PODIATRIST

## 2019-06-06 PROCEDURE — 3079F DIAST BP 80-89 MM HG: CPT | Mod: CPTII,S$GLB,, | Performed by: INTERNAL MEDICINE

## 2019-06-06 PROCEDURE — 3008F BODY MASS INDEX DOCD: CPT | Mod: CPTII,S$GLB,, | Performed by: INTERNAL MEDICINE

## 2019-06-06 PROCEDURE — 99214 PR OFFICE/OUTPT VISIT, EST, LEVL IV, 30-39 MIN: ICD-10-PCS | Mod: S$GLB,,, | Performed by: ORTHOPAEDIC SURGERY

## 2019-06-06 PROCEDURE — 3078F PR MOST RECENT DIASTOLIC BLOOD PRESSURE < 80 MM HG: ICD-10-PCS | Mod: CPTII,S$GLB,, | Performed by: PODIATRIST

## 2019-06-06 PROCEDURE — 99212 PR OFFICE/OUTPT VISIT, EST, LEVL II, 10-19 MIN: ICD-10-PCS | Mod: S$GLB,,, | Performed by: PODIATRIST

## 2019-06-06 PROCEDURE — 3078F DIAST BP <80 MM HG: CPT | Mod: CPTII,S$GLB,, | Performed by: PODIATRIST

## 2019-06-06 PROCEDURE — 3008F PR BODY MASS INDEX (BMI) DOCUMENTED: ICD-10-PCS | Mod: CPTII,S$GLB,, | Performed by: PODIATRIST

## 2019-06-06 PROCEDURE — 99999 PR PBB SHADOW E&M-EST. PATIENT-LVL IV: CPT | Mod: PBBFAC,,, | Performed by: ORTHOPAEDIC SURGERY

## 2019-06-06 PROCEDURE — 3008F PR BODY MASS INDEX (BMI) DOCUMENTED: ICD-10-PCS | Mod: CPTII,S$GLB,, | Performed by: ORTHOPAEDIC SURGERY

## 2019-06-06 PROCEDURE — 93000 EKG 12-LEAD: ICD-10-PCS | Mod: S$GLB,,, | Performed by: INTERNAL MEDICINE

## 2019-06-06 PROCEDURE — 99999 PR PBB SHADOW E&M-EST. PATIENT-LVL III: ICD-10-PCS | Mod: PBBFAC,,, | Performed by: INTERNAL MEDICINE

## 2019-06-06 PROCEDURE — 3079F DIAST BP 80-89 MM HG: CPT | Mod: CPTII,S$GLB,, | Performed by: ORTHOPAEDIC SURGERY

## 2019-06-06 PROCEDURE — 99212 OFFICE O/P EST SF 10 MIN: CPT | Mod: S$GLB,,, | Performed by: PODIATRIST

## 2019-06-06 PROCEDURE — 3075F PR MOST RECENT SYSTOLIC BLOOD PRESS GE 130-139MM HG: ICD-10-PCS | Mod: CPTII,S$GLB,, | Performed by: ORTHOPAEDIC SURGERY

## 2019-06-06 PROCEDURE — 3079F PR MOST RECENT DIASTOLIC BLOOD PRESSURE 80-89 MM HG: ICD-10-PCS | Mod: CPTII,S$GLB,, | Performed by: INTERNAL MEDICINE

## 2019-06-06 PROCEDURE — 3079F PR MOST RECENT DIASTOLIC BLOOD PRESSURE 80-89 MM HG: ICD-10-PCS | Mod: CPTII,S$GLB,, | Performed by: ORTHOPAEDIC SURGERY

## 2019-06-06 PROCEDURE — 99999 PR PBB SHADOW E&M-EST. PATIENT-LVL III: ICD-10-PCS | Mod: PBBFAC,,, | Performed by: PODIATRIST

## 2019-06-06 PROCEDURE — 3074F PR MOST RECENT SYSTOLIC BLOOD PRESSURE < 130 MM HG: ICD-10-PCS | Mod: CPTII,S$GLB,, | Performed by: INTERNAL MEDICINE

## 2019-06-06 PROCEDURE — 3008F PR BODY MASS INDEX (BMI) DOCUMENTED: ICD-10-PCS | Mod: CPTII,S$GLB,, | Performed by: INTERNAL MEDICINE

## 2019-06-06 PROCEDURE — 3074F SYST BP LT 130 MM HG: CPT | Mod: CPTII,S$GLB,, | Performed by: INTERNAL MEDICINE

## 2019-06-06 PROCEDURE — 3075F SYST BP GE 130 - 139MM HG: CPT | Mod: CPTII,S$GLB,, | Performed by: ORTHOPAEDIC SURGERY

## 2019-06-06 PROCEDURE — 99214 OFFICE O/P EST MOD 30 MIN: CPT | Mod: S$GLB,,, | Performed by: INTERNAL MEDICINE

## 2019-06-06 PROCEDURE — 99999 PR PBB SHADOW E&M-EST. PATIENT-LVL IV: ICD-10-PCS | Mod: PBBFAC,,, | Performed by: ORTHOPAEDIC SURGERY

## 2019-06-06 PROCEDURE — 3008F BODY MASS INDEX DOCD: CPT | Mod: CPTII,S$GLB,, | Performed by: PODIATRIST

## 2019-06-06 RX ORDER — SODIUM CHLORIDE 0.9 % (FLUSH) 0.9 %
10 SYRINGE (ML) INJECTION EVERY 6 HOURS
Status: DISCONTINUED | OUTPATIENT
Start: 2019-06-06 | End: 2019-06-18

## 2019-06-06 RX ORDER — NITROGLYCERIN 0.4 MG/1
0.4 TABLET SUBLINGUAL EVERY 5 MIN PRN
Qty: 25 TABLET | Refills: 3 | Status: SHIPPED | OUTPATIENT
Start: 2019-06-06 | End: 2021-10-11

## 2019-06-06 NOTE — PROGRESS NOTES
Subjective:    Patient ID:  Josephine Bolton is a 53 y.o. female who presents for follow-up of cad  HPI  She comes with occasional chest pain, on/off, sometimes requiring NTG  In the process of having knee surgery      Review of Systems   Constitution: Negative for decreased appetite, malaise/fatigue, weight gain and weight loss.   Cardiovascular: Negative for chest pain, dyspnea on exertion, leg swelling, palpitations and syncope.   Respiratory: Negative for cough and shortness of breath.    Gastrointestinal: Negative.    Neurological: Negative for weakness.   All other systems reviewed and are negative.       Objective:      Physical Exam   Constitutional: She is oriented to person, place, and time. She appears well-developed and well-nourished.   HENT:   Head: Normocephalic.   Eyes: Pupils are equal, round, and reactive to light.   Neck: Normal range of motion. Neck supple. No JVD present. Carotid bruit is not present. No thyromegaly present.   Cardiovascular: Normal rate, regular rhythm, normal heart sounds, intact distal pulses and normal pulses. PMI is not displaced. Exam reveals no gallop.   No murmur heard.  Pulmonary/Chest: Effort normal and breath sounds normal.   Abdominal: Soft. Normal appearance. She exhibits no mass. There is no hepatosplenomegaly. There is no tenderness.   Musculoskeletal: Normal range of motion. She exhibits no edema.   Neurological: She is alert and oriented to person, place, and time. She has normal strength and normal reflexes. No sensory deficit.   Skin: Skin is warm and intact.   Psychiatric: She has a normal mood and affect.   Nursing note and vitals reviewed.        Assessment:       1. Chest pain, unspecified type    2. Coronary artery disease involving native coronary artery of native heart without angina pectoris    3. Renovascular hypertension    4. Mixed hyperlipidemia    5. S/P CABG x 3         Plan:   Cody, holter  Call with results  1 yr f/u if tests ok

## 2019-06-06 NOTE — H&P
"Chief Complaint   Patient presents with    Knee Pain     bilateral knee pain         HPI:   This is a 53 y.o. who presents to clinic today complaining of left knee pain for 1 years after no known trauma. Pain is progressively worsening and limits ADLs.. No longer gaining relief from injections.  No numbness or tingling. No associated signs or symptoms.    Past Medical History:   Diagnosis Date    Allergy     Anticoagulant long-term use     ASA 81mg, Effient    Anxiety     Arthritis     Asthma     CAD (coronary artery disease) 2012    s/p stents x4 , CABG, 3 vessel, (2013) newest stent prior to CABG    Chronic constipation     DDD (degenerative disc disease), cervical     DDD (degenerative disc disease), lumbar     DDD (degenerative disc disease), thoracic     Depression     Edema     Headache(784.0)     History of nephrolithiasis     Hyperlipidemia     Hypertension     Hypothyroid 2012    Insomnia     Insulin resistance     patient stated not diebetic    Joint stiffness     Joint swelling     Kidney disease         Low back pain     Neck pain     PONV (postoperative nausea and vomiting)     Sleep apnea 12    Patient reports "severe" sleep apnea, uses C-pap    Thoracic back pain      Past Surgical History:   Procedure Laterality Date    ADENOIDECTOMY      ANGIOGRAM-RENAL N/A 3/24/2016    [facility] C[facility]    BACK SURGERY      BREAST BIOPSY Left 2017    duct excision- Dr. Jimenez    Breast Ductal Excision with Ultrasound Guidance Left 2017    [facility] CSC    CARPAL TUNNEL RELEASE      bilateral     SECTION, CLASSIC      x 2    COLONOSCOPY      COLONOSCOPY N/A 2012    [facility]    CORONARY ANGIOPLASTY WITH STENT PLACEMENT      x 4    CORONARY ARTERY BYPASS GRAFT  2013    3 vessel    EGD (ESOPHAGOGASTRODUODENOSCOPY) N/A " 4/18/2012    [facility] ENDO    HEART CATH-LEFT N/A 10/16/2017    [facility]    HYSTERECTOMY      INJECTION-STEROID-EPIDURAL-CERVICAL N/A 1/19/2015    [facility] OR    KNEE ARTHROSCOPY W/ MENISCAL REPAIR Left     twice, last one 5/2014    LAMINECTOMY      L4/L5    OOPHORECTOMY      SINUS SURGERY      x3    TENDON REPAIR Left     ankle surgery    THYROIDECTOMY      2 separate surgeries    TONSILLECTOMY       Current Outpatient Medications on File Prior to Visit   Medication Sig Dispense Refill    allopurinol (ZYLOPRIM) 100 MG tablet Take 1 tablet (100 mg total) by mouth once daily. 90 tablet 1    amLODIPine (NORVASC) 5 MG tablet Take 1 tablet (5 mg total) by mouth once daily. 90 tablet 1    aspirin (ECOTRIN) 81 MG EC tablet Take 1 tablet (81 mg total) by mouth once daily.  0    atorvastatin (LIPITOR) 20 MG tablet TAKE 1 TABLET BY MOUTH EVERY DAY 30 tablet 9    carvedilol (COREG) 25 MG tablet TAKE 1 TABLET BY MOUTH TWICE DAILY 60 tablet 7    fluticasone (FLONASE) 50 mcg/actuation nasal spray Use 2 sprays in each nostril once daily. (Patient taking differently: 2 sprays by Each Nare route daily as needed. ) 16 g 0    furosemide (LASIX) 20 MG tablet Take 1 tablet (20 mg total) by mouth once daily. 90 tablet 1    gabapentin (NEURONTIN) 100 MG capsule TAKE 1 CAPSULE BY MOUTH TWICE DAILY 180 capsule 3    hydroCHLOROthiazide (MICROZIDE) 12.5 mg capsule hydrochlorothiazide   12.5 mg      lancets Misc 1 Units by Misc.(Non-Drug; Combo Route) route 2 (two) times daily as needed. 100 each 3    levothyroxine (SYNTHROID, LEVOTHROID) 175 MCG tablet 1 tablet 6 days a week and 1/2 tablet on day 7 30 tablet 11    metFORMIN (GLUCOPHAGE) 1000 MG tablet TAKE 1 TABLET BY MOUTH TWICE DAILY WITH MEALS 60 tablet 6    prasugrel (EFFIENT) 10 mg Tab TAKE 1 TABLET BY MOUTH EVERY MORNING 90 tablet 3    ramipril  "(ALTACE) 10 MG capsule TAKE 1 CAPSULE BY MOUTH EVERY DAY 30 capsule 6    venlafaxine (EFFEXOR-XR) 75 MG 24 hr capsule TAKE 1 CAPSULE BY MOUTH EVERY DAY 90 capsule 1    blood-glucose meter (GLUCOSE MONITORING KIT) kit Use as instructed 1 each 0    LORazepam (ATIVAN) 2 MG Tab Take 2 mg by mouth.      nitroGLYCERIN (NITROSTAT) 0.4 MG SL tablet Place 1 tablet (0.4 mg total) under the tongue every 5 (five) minutes as needed for Chest pain. 100 tablet 0     No current facility-administered medications on file prior to visit.      Review of patient's allergies indicates:   Allergen Reactions    Celexa [citalopram] Other (See Comments)     GI upset  Stated "knocked me out"    Cephalexin Anaphylaxis    Zoloft [sertraline] Other (See Comments)     Stated "knocked me out"    Codeine Other (See Comments)     hallucinations  hallucinations    Penicillins Other (See Comments)     Was told per mother that as child was allergic to penicillin.  Childhood allergy/ unknown reaction     Family History   Problem Relation Age of Onset    Heart disease Father     Diabetes Father     Hypertension Father     Stroke Father     Cataracts Father     Cancer Maternal Grandmother         Breast with Mets    Breast cancer Maternal Grandmother     Cancer Paternal Grandmother         Colon    Heart failure Mother     Asthma Mother     Macular degeneration Mother     Cancer Mother         Breast    Cataracts Mother     Heart disease Mother     COPD Mother     Breast cancer Mother     Glaucoma Sister     Thyroid disease Sister     Glaucoma Sister     Strabismus Other     Other Brother         stiff man syndrome    Amblyopia Neg Hx     Blindness Neg Hx     Retinal detachment Neg Hx      Social History     Socioeconomic History    Marital status:      Spouse name: Not on file    Number of children: Not on file    Years of education: Not on file    Highest education level: Not on file   Occupational History    " Occupation: teacher   Social Needs    Financial resource strain: Not on file    Food insecurity:     Worry: Not on file     Inability: Not on file    Transportation needs:     Medical: Not on file     Non-medical: Not on file   Tobacco Use    Smoking status: Former Smoker     Packs/day: 0.50     Years: 14.00     Pack years: 7.00     Types: Cigarettes     Start date: 1984     Last attempt to quit: 1998     Years since quittin.4    Smokeless tobacco: Never Used   Substance and Sexual Activity    Alcohol use: No    Drug use: Yes     Types: Benzodiazepines    Sexual activity: Yes     Partners: Male   Lifestyle    Physical activity:     Days per week: Not on file     Minutes per session: Not on file    Stress: Not on file   Relationships    Social connections:     Talks on phone: Not on file     Gets together: Not on file     Attends Judaism service: Not on file     Active member of club or organization: Not on file     Attends meetings of clubs or organizations: Not on file     Relationship status: Not on file   Other Topics Concern    Not on file   Social History Narrative    Not on file       Review of Systems:  Constitutional:  Denies fever or chills   Eyes:  Denies change in visual acuity   HENT:  Denies nasal congestion or sore throat   Respiratory:  Denies cough or shortness of breath   Cardiovascular:  Denies chest pain or edema   GI:  Denies abdominal pain, nausea, vomiting, bloody stools or diarrhea   :  Denies dysuria   Integument:  Denies rash   Neurologic:  Denies headache, focal weakness or sensory changes   Endocrine:  Denies polyuria or polydipsia   Lymphatic:  Denies swollen glands   Psychiatric:  Denies depression or anxiety     Physical Exam:   Constitutional:  Well developed, well nourished, no acute distress, non-toxic appearance   Integument:  Well hydrated, no rash   Lymphatic:  No lymphadenopathy noted   Neurologic:  Alert & oriented x 3, CN 2-12 normal, normal  motor function, normal sensory function, no focal deficits noted   Psychiatric:  Speech and behavior appropriate     Bilateral Knee Exam    left Knee Exam     Tenderness   The patient is experiencing tenderness in the medial joint line.    Range of Motion   Extension: abnormal   Flexion: abnormal     Muscle Strength     The patient has normal knee strength.    Tests   Livia:  Medial - positive   Lachman:  Anterior - negative      Varus: negative  Valgus: negative  Patellar Apprehension: negative    Other   Erythema: absent  Sensation: normal  Pulse: present  Swelling: mild      right Knee Exam   right knee exam performed same as contralateral side and is normal.          Primary osteoarthritis of left knee  -     CBC auto differential; Future; Expected date: 06/06/2019  -     Comprehensive metabolic panel; Future; Expected date: 06/06/2019  -     X-Ray Chest 1 View; Future; Expected date: 06/06/2019  -     EKG 12-lead; Future  -     Case Request Operating Room: ARTHROPLASTY, KNEE    Other orders  -     sodium chloride 0.9% flush 10 mL            I had a long discussion with the patient regarding the benefits and risks of left total knee arthoplasty. They voiced understanding and wish to proceed with surgery June 17 at [facility]. She will require cardiac clearance. Consents signed in clinic.

## 2019-06-06 NOTE — PATIENT INSTRUCTIONS
- Bring cushioned insoles or arch supports to next visit.    - Wear supportive shoes such as sneakers with arch supports.    - Look for Superfeet or Powerstep arch supports and Jobst Sport compression socks 15-20 mmHg.    - Rest/reduce ambulation to necessary activities of daily living.    - Ice foot for no more than 20 minutes/per hour.     - Elevate foot as much as possible throughout the day.    - Perform Achilles/plantar fascia stretches as much as possible throughout the day.    - Apply OTC topical arnica to affected area for pain relief and to reduce bruising/swelling.    - Notify clinic if pain worsens or fails to improve.    - Follow up as needed.

## 2019-06-12 ENCOUNTER — HOSPITAL ENCOUNTER (OUTPATIENT)
Dept: RADIOLOGY | Facility: HOSPITAL | Age: 54
Discharge: HOME | End: 2019-06-12
Attending: INTERNAL MEDICINE
Payer: COMMERCIAL

## 2019-06-12 ENCOUNTER — CLINICAL SUPPORT (OUTPATIENT)
Dept: CARDIOLOGY | Facility: CLINIC | Age: 54
End: 2019-06-12
Attending: INTERNAL MEDICINE
Payer: COMMERCIAL

## 2019-06-12 VITALS — WEIGHT: 216 LBS | HEIGHT: 62 IN | BODY MASS INDEX: 39.75 KG/M2

## 2019-06-12 DIAGNOSIS — R07.9 CHEST PAIN, UNSPECIFIED TYPE: ICD-10-CM

## 2019-06-12 DIAGNOSIS — I15.0 RENOVASCULAR HYPERTENSION: ICD-10-CM

## 2019-06-12 DIAGNOSIS — I25.10 CORONARY ARTERY DISEASE INVOLVING NATIVE CORONARY ARTERY OF NATIVE HEART WITHOUT ANGINA PECTORIS: Chronic | ICD-10-CM

## 2019-06-12 DIAGNOSIS — I15.0 RENOVASCULAR HYPERTENSION: Primary | ICD-10-CM

## 2019-06-12 DIAGNOSIS — E78.2 MIXED HYPERLIPIDEMIA: ICD-10-CM

## 2019-06-12 DIAGNOSIS — Z95.1 S/P CABG X 3: ICD-10-CM

## 2019-06-12 PROCEDURE — 78452 STRESS TEST WITH MYOCARDIAL PERFUSION (CUPID ONLY): ICD-10-PCS | Mod: 26,,, | Performed by: INTERNAL MEDICINE

## 2019-06-12 PROCEDURE — 93016 CV STRESS TEST SUPVJ ONLY: CPT | Mod: ,,, | Performed by: INTERNAL MEDICINE

## 2019-06-12 PROCEDURE — 78452 HT MUSCLE IMAGE SPECT MULT: CPT | Mod: 26,,, | Performed by: INTERNAL MEDICINE

## 2019-06-12 PROCEDURE — 93018 CV STRESS TEST I&R ONLY: CPT | Mod: ,,, | Performed by: INTERNAL MEDICINE

## 2019-06-12 PROCEDURE — 93018 PR CARDIAC STRESS TST,INTERP/REPT ONLY: ICD-10-PCS | Mod: ,,, | Performed by: INTERNAL MEDICINE

## 2019-06-12 PROCEDURE — 93016 STRESS TEST WITH MYOCARDIAL PERFUSION (CUPID ONLY): ICD-10-PCS | Mod: ,,, | Performed by: INTERNAL MEDICINE

## 2019-06-12 PROCEDURE — 99999 PR PBB SHADOW E&M-EST. PATIENT-LVL I: ICD-10-PCS | Mod: PBBFAC,,,

## 2019-06-12 PROCEDURE — 99999 PR PBB SHADOW E&M-EST. PATIENT-LVL I: CPT | Mod: PBBFAC,,,

## 2019-06-13 ENCOUNTER — TELEPHONE (OUTPATIENT)
Dept: CARDIOLOGY | Facility: CLINIC | Age: 54
End: 2019-06-13

## 2019-06-13 RX ORDER — RAMIPRIL 10 MG/1
10 CAPSULE ORAL DAILY
Qty: 30 CAPSULE | Refills: 4 | Status: SHIPPED | OUTPATIENT
Start: 2019-06-13 | End: 2019-11-21 | Stop reason: SDUPTHER

## 2019-06-13 NOTE — TELEPHONE ENCOUNTER
----- Message from Ines Scott LPN sent at 6/13/2019  8:46 AM CDT -----  Patient is scheduled for surgery on Monday, 6/17/19. We are awaiting cardiac clearance from Dr. Abbasi. Please advise if patient is cleared for surgery, or if we need to reschedule. I can be reached at ext. [phone] if you need to speak to me over the phone. (We are not in the office tomorrow, Friday, 6/14/19). Thanks, Ines

## 2019-06-14 ENCOUNTER — TELEPHONE (OUTPATIENT)
Dept: CARDIOLOGY | Facility: CLINIC | Age: 54
End: 2019-06-14

## 2019-06-14 ENCOUNTER — TELEPHONE (OUTPATIENT)
Dept: ORTHOPEDICS | Facility: CLINIC | Age: 54
End: 2019-06-14

## 2019-06-14 LAB
CV STRESS BASE HR: 62 BPM
DIASTOLIC BLOOD PRESSURE: 70 MMHG
NUC REST DIASTOLIC VOLUME INDEX: 77
NUC REST EJECTION FRACTION: 74
NUC REST SYSTOLIC VOLUME INDEX: 23
OHS CV CPX 1 MINUTE RECOVERY HEART RATE: 94 BPM
OHS CV CPX 85 PERCENT MAX PREDICTED HEART RATE MALE: 137
OHS CV CPX MAX PREDICTED HEART RATE: 161
OHS CV CPX PATIENT IS FEMALE: 1
OHS CV CPX PATIENT IS MALE: 0
OHS CV CPX PEAK DIASTOLIC BLOOD PRESSURE: 70 MMHG
OHS CV CPX PEAK HEAR RATE: 112 BPM
OHS CV CPX PEAK RATE PRESSURE PRODUCT: NORMAL
OHS CV CPX PEAK SYSTOLIC BLOOD PRESSURE: 175 MMHG
OHS CV CPX PERCENT MAX PREDICTED HEART RATE ACHIEVED: 70
OHS CV CPX RATE PRESSURE PRODUCT PRESENTING: NORMAL
STRESS ECHO TARGET HR: 143.65 BPM
SYSTOLIC BLOOD PRESSURE: 175 MMHG

## 2019-06-14 NOTE — TELEPHONE ENCOUNTER
I called and spoke to pt.  I informed pt that after reviewing her chart that clearance is pending from Dr Guevara's office.  Pt asked what will happen if clearance is not received.  I informed her that someone will be in touch with her to let her know that the surgery cannot be performed on Monday, if that is the case then next week either Iens or Carolyn from Dr Wilson's clinic will be in touch with her to plan a new date for surgery.  Pt verbalized understanding and had no other questions.

## 2019-06-14 NOTE — TELEPHONE ENCOUNTER
----- Message from Jacinta Leone sent at 6/14/2019 10:01 AM CDT -----  Contact: pt  Pt states that she si needing to get a message over to nurse Janie concerning had chemical stress done on Wed for release of knee replacement.One to get the results of the test and Two if the Dr is going to okay her for the knee replacement and then if she has to get off the blood thinner and how many days...[phone]

## 2019-06-14 NOTE — TELEPHONE ENCOUNTER
Messages Carolyn De Anda. She has been informed that Dr Guevara is out of the office and the cardiac clearance has been forwarded to Dr Guevara. We are waiting on his response.

## 2019-06-14 NOTE — TELEPHONE ENCOUNTER
----- Message from Amina Wright sent at 6/14/2019  1:20 PM CDT -----  Contact: [phone]  Patient is requesting a call back from the nurse concerning up coming surgery, want to know was clearance received.    Please call the patient upon request at phone number [phone].

## 2019-06-14 NOTE — TELEPHONE ENCOUNTER
Left message for patient to return call pertaining to her surgery clearance. The clearance was sent to Dr. Guevara but he is out of town. Janie spoke with Radha at [facility] earlier today pertaining to this matter. Also the patient stress test is still being process    ----- Message from Brit Watt sent at 6/14/2019  4:13 PM CDT -----  Contact: Patient  Type: Needs Medical Advice    Who Called:  patient  Best Call Back Number: [phone] (home)   Additional Information: Patient needs a call back about getting a clearance she said she has to have surgery on Monday and she has been requesting a response at no avail and now her surgery might be canceled and she is not happy would like a call back because Dr Wilson needs to hear today

## 2019-06-17 ENCOUNTER — TELEPHONE (OUTPATIENT)
Dept: ORTHOPEDICS | Facility: CLINIC | Age: 54
End: 2019-06-17

## 2019-06-17 ENCOUNTER — TELEPHONE (OUTPATIENT)
Dept: CARDIOLOGY | Facility: CLINIC | Age: 54
End: 2019-06-17

## 2019-06-17 NOTE — TELEPHONE ENCOUNTER
----- Message from Jason Zamorano MD sent at 6/15/2019  7:27 AM CDT -----  This patient called upset over the weekend and said she was never called with her pre surgery clearance and that her surgeon was never called. She has surgery on Monday 6/17/19. Can we please call her and her surgeon first thing Monday morning to hopefully make sure her surgery does not get postponed. Looks like her stress is negative per the chart.    CC'd Ismael on this    -Jason

## 2019-06-17 NOTE — PROGRESS NOTES
Subjective:      Patient ID: Josephine Bolton is a 53 y.o. female.    Chief Complaint: Follow-up (bilateral foot pain)      HPI:  Josephine Bolton is a 53 y.o. female who presents to clinic with a chief complaint of neuromas in both feet.  Patient reports no pain since last injection and denies steroid flare.  She relates no pain today.  She informs of continually performing stretches and following other previous treatment recommendations.  Patient denies any other pedal complaints at this time.    PCP:  Elizabeth Delong MD  Date last seen:  11/18/18    Review of Systems   Constitutional: Negative for appetite change, fever, chills, fatigue and unexpected weight change.   Cardiovascular: Negative for chest pain, claudication, cyanosis.  Positive for leg swelling.  Musculoskeletal:  Positive for back pain, arthritis, joint pain, joint swelling, myalgias, and stiffness.   Skin: Negative for nail bed changes, discoloration, rash, itching, poor wound healing, suspicious lesion.  Positive for unusual hair distribution.   Neurological: Negative for loss of balance, sensory change, paresthesias, and numbness.   Hematological: Negative for adenopathy, bleeding, and bruising easily.   Psychiatric/Behavioral: The patient is not nervous/anxious.  Negative for altered mental status.    Hemoglobin A1C   Date Value Ref Range Status   04/13/2017 5.1 0.0 - 5.6 % Final     Comment:     Reference Interval:  5.0 - 5.6 Normal   5.7 - 6.4 High Risk   > 6.5 Diabetic    Hgb A1c results are standardized based on the (NGSP) National   Glycohemoglobin Standardization Program.    Hemoglobin A1C levels are related to mean serum/plasma glucose   during the preceding 2-3 months.        10/25/2016 5.4 4.5 - 6.2 % Final     Comment:     According to ADA guidelines, hemoglobin A1C <7.0% represents  optimal control in non-pregnant diabetic patients.  Different  metrics may apply to specific populations.   Standards of Medical Care in  "Diabetes - 2016.  For the purpose of screening for the presence of diabetes:  <5.7%     Consistent with the absence of diabetes  5.7-6.4%  Consistent with increasing risk for diabetes   (prediabetes)  >or=6.5%  Consistent with diabetes  Currently no consensus exists for use of hemoglobin A1C  for diagnosis of diabetes for children.     2013 5.1 4.0 - 6.2 % Final       Past Medical History:   Diagnosis Date    Allergy     Anticoagulant long-term use     ASA 81mg, Effient    Anxiety     Arthritis     Asthma     As child    CAD (coronary artery disease) 2012    s/p stents x4 , CABG, 3 vessel, (2013) newest stent prior to CABG    Chronic constipation     DDD (degenerative disc disease), cervical     DDD (degenerative disc disease), lumbar     DDD (degenerative disc disease), thoracic     Depression     Edema     Encounter for blood transfusion     Headache(784.0)     History of nephrolithiasis     Hyperlipidemia     Hypertension     Hypothyroid 2012    Insomnia     Insulin resistance     patient stated not diebetic    Joint stiffness     Joint swelling     Kidney disease     ; Frequent UTIs     Low back pain     Neck pain     PONV (postoperative nausea and vomiting)     Sleep apnea 12    Patient reports "severe" sleep apnea, uses C-pap    Thoracic back pain      Past Surgical History:   Procedure Laterality Date    ADENOIDECTOMY      ANGIOGRAM-RENAL N/A 3/24/2016    [facility] C[facility]    BACK SURGERY      BREAST BIOPSY Left 2017    duct excision- Dr. Jimenez    Breast Ductal Excision with Ultrasound Guidance Left 2017    [facility] CSC    CARPAL TUNNEL RELEASE      bilateral     SECTION, CLASSIC      x 2    COLONOSCOPY      COLONOSCOPY N/A 2012    [facility]    CORONARY ANGIOPLASTY WITH STENT PLACEMENT      x 4    " CORONARY ARTERY BYPASS GRAFT  5/2013    3 vessel    EGD (ESOPHAGOGASTRODUODENOSCOPY) N/A 4/18/2012    [facility]k [facility]O    HEART CATH-LEFT N/A 10/16/2017    Performed [facility]    HYSTERECTOMY      INJECTION-STEROID-EPIDURAL-CERVICAL N/A 1/19/2015    [facility] OR    KNEE ARTHROSCOPY W/ MENISCAL REPAIR Left     twice, last one 5/2014    LAMINECTOMY      L4/L5    OOPHORECTOMY      SINUS SURGERY      x3    TENDON REPAIR Left     ankle surgery    THYROIDECTOMY      2 separate surgeries    TONSILLECTOMY       Family History   Problem Relation Age of Onset    Heart disease Father     Diabetes Father     Hypertension Father     Stroke Father     Cataracts Father     Cancer Maternal Grandmother         Breast with Mets    Breast cancer Maternal Grandmother     Cancer Paternal Grandmother         Colon    Heart failure Mother     Asthma Mother     Macular degeneration Mother     Cancer Mother         Breast    Cataracts Mother     Heart disease Mother     COPD Mother     Breast cancer Mother     Glaucoma Sister     Thyroid disease Sister     Glaucoma Sister     Strabismus Other     Other Brother         stiff man syndrome    Amblyopia Neg Hx     Blindness Neg Hx     Retinal detachment Neg Hx      Social History     Socioeconomic History    Marital status:      Spouse name: Not on file    Number of children: Not on file    Years of education: Not on file    Highest education level: Not on file   Occupational History    Occupation: teacher   Social Needs    Financial resource strain: Not on file    Food insecurity:     Worry: Not on file     Inability: Not on file    Transportation needs:     Medical: Not on file     Non-medical: Not on file   Tobacco Use    Smoking status: Former Smoker     Packs/day: 0.50     Years: 14.00     Pack years: 7.00     Types: Cigarettes     Start date:  "1984     Last attempt to quit: 1998     Years since quittin.5    Smokeless tobacco: Never Used   Substance and Sexual Activity    Alcohol use: No    Drug use: Yes     Types: Benzodiazepines    Sexual activity: Yes     Partners: Male   Lifestyle    Physical activity:     Days per week: Not on file     Minutes per session: Not on file    Stress: Not on file   Relationships    Social connections:     Talks on phone: Not on file     Gets together: Not on file     Attends Religion service: Not on file     Active member of club or organization: Not on file     Attends meetings of clubs or organizations: Not on file     Relationship status: Not on file   Other Topics Concern    Not on file   Social History Narrative    Not on file           Objective:        BP (!) 141/79 (BP Location: Left arm, Patient Position: Sitting, BP Method: Medium (Automatic))   Pulse 67   Ht 5' 3" (1.6 m)   Wt 95.6 kg (210 lb 12.2 oz)   LMP  (LMP Unknown)   BMI 37.33 kg/m²     Physical Exam   Constitutional: Patient is oriented to person, place, and time. Patient appears well-developed and well-nourished. No acute distress.     Psychiatric: Patient has a normal mood and affect. Patient's speech is normal and behavior is normal. Judgment is normal. Cognition and memory are normal.     Bilateral pedal exam was performed today.  Vascular: Pedal pulses palpable 2/4 DP & PT.  CFT is = 3 seconds to the hallux.  Skin temperature is cool to cool proximal tibia to distal toes without localized increase in calor noted.  No erythema, edema, or ecchymosis noted to the foot or ankle.  Hair growth present distally to the LE.     Musculoskeletal: Ankle joint ROM is decreased. Subtalar joint ROM is decreased.  Midtarsal joint ROM is decreased.  1st ray ROM is decreased.  1st  MTPJ ROM is decreased.  Ankle joint dorsiflexion is restricted with the knee extended and flexed per Silfverskiold exam.    Muscle strength is 5/5 for all LE " muscle groups tested.    Neurological: Protective sensation is intact to 10/10 sites on the bilateral foot via [place]-Sheila monofilament.    Proprioception is Intact to the foot.  Vibratory sensation is Intact to the foot.   Light touch sensation is Intact to the foot.   Achilles DTR and Chaddock STR are Intact to bilateral lower extremities.   No tenderness to palpation of B/L foot or ankle.    Dermatological: Toenails 1-5 bilateral are WNL in length and thickness.  Webspaces 1-4 bilateral are clean, dry, and intact.  Skin turgor is supple.  No dry, flaky skin noted to the LE.  No open wounds or suspicious pigmented lesions appreciable to the foot or ankle.    Nursing note and vitals reviewed.        Assessment:       Encounter Diagnosis   Name Primary?    Neuroma Yes         Plan:       Josephine Sen was seen today for follow-up.    Diagnoses and all orders for this visit:    Neuroma      I counseled the patient on her conditions, their implications and medical management.    - Reviewed with patient proper diabetic foot care, supportive/accommodative shoe gear,  PRICE therapy, and Achilles/plantar fascial stretches.    - Advised patient that excessive pressure on feet worsen forefoot conditions and encouraged her to diet and exercise to reduce complications from obesity.    Patient was given the following recommendations and instructions:  Patient Instructions   - Bring cushioned insoles or arch supports to next visit.    - Wear supportive shoes such as sneakers with arch supports.    - Look for Superfeet or Powerstep arch supports and Jobst Sport compression socks 15-20 mmHg.    - Rest/reduce ambulation to necessary activities of daily living.    - Ice foot for no more than 20 minutes/per hour.     - Elevate foot as much as possible throughout the day.    - Perform Achilles/plantar fascia stretches as much as possible throughout the day.    - Apply OTC topical arnica to affected area for pain relief and to  reduce bruising/swelling.    - Notify clinic if pain worsens or fails to improve.    - Follow up as needed.            Shital Leal DPM        Dictation was performed using M*Modal Fluency.  Transcription errors may be present.

## 2019-06-17 NOTE — TELEPHONE ENCOUNTER
Called pt--I was checking for cardiac clearance this morning. Saw this message from Dr. Guevara:  June 14, 2019   Obdulio Abbasi MD   to Ayleen AGUILAR. Staff          6:23 PM    Stress test negative   No contraindication for surgery/anesthesia from cardiac standpoint     Advised pt that she is cleared. Also let Carolyn know so that she can relay message to Dr. Wilson.  Thanks, Ines

## 2019-06-24 ENCOUNTER — TELEPHONE (OUTPATIENT)
Dept: ORTHOPEDICS | Facility: CLINIC | Age: 54
End: 2019-06-24

## 2019-06-24 DIAGNOSIS — Z96.652 HISTORY OF TOTAL LEFT KNEE REPLACEMENT: ICD-10-CM

## 2019-06-24 DIAGNOSIS — M17.12 PRIMARY OSTEOARTHRITIS OF LEFT KNEE: Primary | ICD-10-CM

## 2019-06-24 NOTE — TELEPHONE ENCOUNTER
----- Message from Sergey Jin sent at 6/24/2019  9:29 AM CDT -----  Contact: pt  Patient wants to speak to staff regarding knee popping, [phone]

## 2019-06-24 NOTE — TELEPHONE ENCOUNTER
"Called pt. She had a L TKA on 6/17/19. She states that she has been noticing kind of a "catching" in her knee joint when she starts moving it from resting position. She states that it feels like it glides fine, and then has a "glitch" at about 45 degrees. Pt states that when she is resting her knee, it is extended. She states that she notices the "catch" only when starting to move from rest, it seems to go away once she is moving around. States that it does not hurt exactly, but is uncomfortable. Pt states that she is tender on the inner aspect of the knee. She states that the inner spot has some warmth and swelling to it. She is not running any fever and there is no drainage coming from the incision. She was told by Home Health to let Dr. Wilson know about the "catching" to see if there was anything she needed to do about it? Please advise. Thanks, Ines  "

## 2019-06-25 NOTE — TELEPHONE ENCOUNTER
Ice should help with the warmth and swelling.  Catching may be due to some quad weakness and that should improve with PT.

## 2019-07-02 ENCOUNTER — OFFICE VISIT (OUTPATIENT)
Dept: ORTHOPEDICS | Facility: CLINIC | Age: 54
End: 2019-07-02
Payer: COMMERCIAL

## 2019-07-02 ENCOUNTER — HOSPITAL ENCOUNTER (OUTPATIENT)
Dept: RADIOLOGY | Facility: HOSPITAL | Age: 54
Discharge: HOME | End: 2019-07-02
Attending: ORTHOPAEDIC SURGERY
Payer: COMMERCIAL

## 2019-07-02 VITALS — WEIGHT: 216 LBS | BODY MASS INDEX: 38.27 KG/M2 | HEIGHT: 63 IN

## 2019-07-02 DIAGNOSIS — M17.12 PRIMARY OSTEOARTHRITIS OF LEFT KNEE: Primary | ICD-10-CM

## 2019-07-02 DIAGNOSIS — M17.12 PRIMARY OSTEOARTHRITIS OF LEFT KNEE: ICD-10-CM

## 2019-07-02 DIAGNOSIS — Z96.652 HISTORY OF TOTAL LEFT KNEE REPLACEMENT: ICD-10-CM

## 2019-07-02 PROCEDURE — 73560 XR KNEE ORTHO LEFT: ICD-10-PCS | Mod: 26,59,RT, | Performed by: RADIOLOGY

## 2019-07-02 PROCEDURE — 73560 X-RAY EXAM OF KNEE 1 OR 2: CPT | Mod: 26,59,RT, | Performed by: RADIOLOGY

## 2019-07-02 PROCEDURE — 73562 X-RAY EXAM OF KNEE 3: CPT | Mod: 26,LT,, | Performed by: RADIOLOGY

## 2019-07-02 PROCEDURE — 99024 PR POST-OP FOLLOW-UP VISIT: ICD-10-PCS | Mod: S$GLB,,, | Performed by: ORTHOPAEDIC SURGERY

## 2019-07-02 PROCEDURE — 73562 X-RAY EXAM OF KNEE 3: CPT | Mod: TC,PO,LT

## 2019-07-02 PROCEDURE — 73562 XR KNEE ORTHO LEFT: ICD-10-PCS | Mod: 26,LT,, | Performed by: RADIOLOGY

## 2019-07-02 PROCEDURE — 99999 PR PBB SHADOW E&M-EST. PATIENT-LVL III: ICD-10-PCS | Mod: PBBFAC,,, | Performed by: ORTHOPAEDIC SURGERY

## 2019-07-02 PROCEDURE — 99024 POSTOP FOLLOW-UP VISIT: CPT | Mod: S$GLB,,, | Performed by: ORTHOPAEDIC SURGERY

## 2019-07-02 PROCEDURE — 99999 PR PBB SHADOW E&M-EST. PATIENT-LVL III: CPT | Mod: PBBFAC,,, | Performed by: ORTHOPAEDIC SURGERY

## 2019-07-02 PROCEDURE — 73560 X-RAY EXAM OF KNEE 1 OR 2: CPT | Mod: TC,PO,LT

## 2019-07-02 RX ORDER — HYDROMORPHONE HYDROCHLORIDE 4 MG/1
4 TABLET ORAL EVERY 4 HOURS PRN
Qty: 44 TABLET | Refills: 0 | Status: SHIPPED | OUTPATIENT
Start: 2019-07-02 | End: 2019-08-08 | Stop reason: SDUPTHER

## 2019-07-02 NOTE — PROGRESS NOTES
Chief Complaint   Patient presents with    Post-op Evaluation     left TKS 6/17/19       HPI:  53 y.o. female returns to clinic today status post left total knee arthroplasty 2 weeks ago. Pain is moderate. Patient is compliant most of the time with restrictions.     left knee: ROM 0-100. Overall normal alignment. Incision healed. No erythema or fluctuance. Stable to stress. Skin intact. Compartments soft. NVI distally.     X-rays were performed today, personally reviewed by me and findings discussed with the patient.  3 views of the left knee show implants intact in good position    Primary osteoarthritis of left knee  -     Ambulatory referral to Physical Therapy    History of total left knee replacement  -     Ambulatory referral to Physical Therapy    Other orders  -     HYDROmorphone (DILAUDID) 4 MG tablet; Take 1 tablet (4 mg total) by mouth every 4 (four) hours as needed for Pain.  Dispense: 44 tablet; Refill: 0              D/c staples,  Apply steri strips, Referral to [facility], Encouraged ROM.  RTC in 6 weeks with repeat xrays

## 2019-07-08 DIAGNOSIS — M51.36 DDD (DEGENERATIVE DISC DISEASE), LUMBAR: ICD-10-CM

## 2019-07-08 NOTE — TELEPHONE ENCOUNTER
Phoned pt's pharmacy in regards to RX amlodipine and allopurinol. Instructed Josefa that they had been sent this AM. Josefa voiced understanding. CLC   no

## 2019-07-08 NOTE — PROGRESS NOTES
Refill Authorization Note     is requesting a refill authorization.    Brief assessment and rationale for refill: ROUTE: OP-Needs annual  Name and strength of medication: GABAPENTIN 100MG  Medication-related problems identified: Requires appointment         Medication reconciliation completed: No              How patient will take medication: t1t bid          Comments: APPOINTMENTS (past 12m or future 3m authorizing provider)  LAST VISIT DATE  Froylan Smart MD 8/2/2018         NEXT VISIT DATE  Froylan Smart MD Visit date not found

## 2019-07-09 RX ORDER — METFORMIN HYDROCHLORIDE 1000 MG/1
TABLET ORAL
Qty: 60 TABLET | Refills: 6 | Status: ON HOLD | OUTPATIENT
Start: 2019-07-09 | End: 2019-12-14 | Stop reason: SDUPTHER

## 2019-07-10 RX ORDER — GABAPENTIN 100 MG/1
CAPSULE ORAL
Qty: 180 CAPSULE | Refills: 0 | Status: SHIPPED | OUTPATIENT
Start: 2019-07-10 | End: 2019-07-30 | Stop reason: SDUPTHER

## 2019-07-11 DIAGNOSIS — I25.10 CORONARY ARTERY DISEASE INVOLVING NATIVE CORONARY ARTERY OF NATIVE HEART WITHOUT ANGINA PECTORIS: ICD-10-CM

## 2019-07-11 DIAGNOSIS — R07.9 CHEST PAIN, UNSPECIFIED TYPE: ICD-10-CM

## 2019-07-11 DIAGNOSIS — I15.0 RENAL ARTERIAL HYPERTENSION: Primary | ICD-10-CM

## 2019-07-11 RX ORDER — CARVEDILOL 25 MG/1
25 TABLET ORAL 2 TIMES DAILY
Qty: 60 TABLET | Refills: 5 | Status: SHIPPED | OUTPATIENT
Start: 2019-07-11 | End: 2019-07-30 | Stop reason: SDUPTHER

## 2019-07-26 ENCOUNTER — PATIENT MESSAGE (OUTPATIENT)
Dept: CARDIOLOGY | Facility: CLINIC | Age: 54
End: 2019-07-26

## 2019-07-29 ENCOUNTER — PATIENT MESSAGE (OUTPATIENT)
Dept: FAMILY MEDICINE | Facility: CLINIC | Age: 54
End: 2019-07-29

## 2019-07-30 ENCOUNTER — TELEPHONE (OUTPATIENT)
Dept: FAMILY MEDICINE | Facility: CLINIC | Age: 54
End: 2019-07-30

## 2019-07-30 ENCOUNTER — OFFICE VISIT (OUTPATIENT)
Dept: FAMILY MEDICINE | Facility: CLINIC | Age: 54
End: 2019-07-30
Payer: COMMERCIAL

## 2019-07-30 VITALS
SYSTOLIC BLOOD PRESSURE: 132 MMHG | WEIGHT: 212.94 LBS | HEART RATE: 77 BPM | OXYGEN SATURATION: 97 % | DIASTOLIC BLOOD PRESSURE: 76 MMHG | BODY MASS INDEX: 39.19 KG/M2 | HEIGHT: 62 IN

## 2019-07-30 DIAGNOSIS — I25.10 CORONARY ARTERY DISEASE INVOLVING NATIVE CORONARY ARTERY OF NATIVE HEART WITHOUT ANGINA PECTORIS: ICD-10-CM

## 2019-07-30 DIAGNOSIS — M51.36 DDD (DEGENERATIVE DISC DISEASE), LUMBAR: ICD-10-CM

## 2019-07-30 DIAGNOSIS — I15.0 RENAL ARTERIAL HYPERTENSION: ICD-10-CM

## 2019-07-30 DIAGNOSIS — I10 HYPERTENSION, UNSPECIFIED TYPE: Primary | ICD-10-CM

## 2019-07-30 DIAGNOSIS — I15.0 RENOVASCULAR HYPERTENSION: ICD-10-CM

## 2019-07-30 DIAGNOSIS — N20.0 KIDNEY STONES: ICD-10-CM

## 2019-07-30 PROCEDURE — 99999 PR PBB SHADOW E&M-EST. PATIENT-LVL III: ICD-10-PCS | Mod: PBBFAC,,, | Performed by: FAMILY MEDICINE

## 2019-07-30 PROCEDURE — 3075F SYST BP GE 130 - 139MM HG: CPT | Mod: CPTII,S$GLB,, | Performed by: FAMILY MEDICINE

## 2019-07-30 PROCEDURE — 3078F DIAST BP <80 MM HG: CPT | Mod: CPTII,S$GLB,, | Performed by: FAMILY MEDICINE

## 2019-07-30 PROCEDURE — 99999 PR PBB SHADOW E&M-EST. PATIENT-LVL III: CPT | Mod: PBBFAC,,, | Performed by: FAMILY MEDICINE

## 2019-07-30 PROCEDURE — 3008F BODY MASS INDEX DOCD: CPT | Mod: CPTII,S$GLB,, | Performed by: FAMILY MEDICINE

## 2019-07-30 PROCEDURE — 3075F PR MOST RECENT SYSTOLIC BLOOD PRESS GE 130-139MM HG: ICD-10-PCS | Mod: CPTII,S$GLB,, | Performed by: FAMILY MEDICINE

## 2019-07-30 PROCEDURE — 3008F PR BODY MASS INDEX (BMI) DOCUMENTED: ICD-10-PCS | Mod: CPTII,S$GLB,, | Performed by: FAMILY MEDICINE

## 2019-07-30 PROCEDURE — 99214 PR OFFICE/OUTPT VISIT, EST, LEVL IV, 30-39 MIN: ICD-10-PCS | Mod: S$GLB,,, | Performed by: FAMILY MEDICINE

## 2019-07-30 PROCEDURE — 3078F PR MOST RECENT DIASTOLIC BLOOD PRESSURE < 80 MM HG: ICD-10-PCS | Mod: CPTII,S$GLB,, | Performed by: FAMILY MEDICINE

## 2019-07-30 PROCEDURE — 99214 OFFICE O/P EST MOD 30 MIN: CPT | Mod: S$GLB,,, | Performed by: FAMILY MEDICINE

## 2019-07-30 RX ORDER — VENLAFAXINE HYDROCHLORIDE 150 MG/1
150 CAPSULE, EXTENDED RELEASE ORAL DAILY
Qty: 90 CAPSULE | Refills: 3 | Status: SHIPPED | OUTPATIENT
Start: 2019-07-30 | End: 2020-06-24

## 2019-07-30 RX ORDER — ATORVASTATIN CALCIUM 20 MG/1
20 TABLET, FILM COATED ORAL DAILY
Qty: 90 TABLET | Refills: 3 | Status: ON HOLD | OUTPATIENT
Start: 2019-07-30 | End: 2019-12-25 | Stop reason: HOSPADM

## 2019-07-30 RX ORDER — AMLODIPINE BESYLATE 5 MG/1
5 TABLET ORAL DAILY
Qty: 90 TABLET | Refills: 1 | Status: SHIPPED | OUTPATIENT
Start: 2019-07-30 | End: 2020-01-20

## 2019-07-30 RX ORDER — FUROSEMIDE 20 MG/1
20 TABLET ORAL DAILY
Qty: 90 TABLET | Refills: 1 | Status: SHIPPED | OUTPATIENT
Start: 2019-07-30 | End: 2019-07-31 | Stop reason: SDUPTHER

## 2019-07-30 RX ORDER — GABAPENTIN 100 MG/1
100 CAPSULE ORAL 2 TIMES DAILY
Qty: 180 CAPSULE | Refills: 3 | Status: SHIPPED | OUTPATIENT
Start: 2019-07-30 | End: 2020-08-23

## 2019-07-30 RX ORDER — CARVEDILOL 25 MG/1
25 TABLET ORAL 2 TIMES DAILY
Qty: 180 TABLET | Refills: 3 | Status: SHIPPED | OUTPATIENT
Start: 2019-07-30 | End: 2020-01-20 | Stop reason: SDUPTHER

## 2019-07-30 RX ORDER — ALLOPURINOL 100 MG/1
100 TABLET ORAL DAILY
Qty: 90 TABLET | Refills: 3 | Status: SHIPPED | OUTPATIENT
Start: 2019-07-30 | End: 2020-06-24

## 2019-07-30 NOTE — PROGRESS NOTES
Subjective:       Patient ID: Josephine Bolton is a 54 y.o. female.    Chief Complaint: Hypertension    HPI     Here for f/u.     htn controlled.      Cad stable. No c/o cp or sob at rest or exertion.     Reports worsening anxiety and depressed mood. Taking effexor xr.      Hx of kidney stones. On allopurinol.     Recent left total knee arthroplasty. Followed by ortho      Review of Systems      Review of Systems   Constitutional: Negative for fever and chills.   HENT: Negative for hearing loss and neck stiffness.    Eyes: Negative for redness and itching.   Respiratory: Negative for cough and choking.    Cardiovascular: Negative for chest pain and leg swelling.  Abdomen: Negative for abdominal pain and blood in stool.   Genitourinary: Negative for dysuria and flank pain.   Musculoskeletal: Negative for back pain and gait problem.   Neurological: Negative for light-headedness and headaches.   Hematological: Negative for adenopathy.         Objective:      Physical Exam   Constitutional: She appears well-developed.   HENT:   Head: Normocephalic and atraumatic.   Eyes: Pupils are equal, round, and reactive to light. Conjunctivae are normal.   Neck: Normal range of motion.   Cardiovascular: Normal rate and regular rhythm.   No murmur heard.  Pulmonary/Chest: Effort normal and breath sounds normal.   Lymphadenopathy:     She has no cervical adenopathy.       Assessment:       1. Hypertension, unspecified type    2. DDD (degenerative disc disease), lumbar    3. Kidney stones    4. Renovascular hypertension    5. Coronary artery disease involving native coronary artery of native heart without angina pectoris    6. Renal arterial hypertension        Plan:       Hypertension, unspecified type  -     amLODIPine (NORVASC) 5 MG tablet; Take 1 tablet (5 mg total) by mouth once daily.  Dispense: 90 tablet; Refill: 1  -     furosemide (LASIX) 20 MG tablet; Take 1 tablet (20 mg total) by mouth once daily.  Dispense: 90 tablet;  Refill: 1    DDD (degenerative disc disease), lumbar  -     gabapentin (NEURONTIN) 100 MG capsule; Take 1 capsule (100 mg total) by mouth 2 (two) times daily.  Dispense: 180 capsule; Refill: 3    Kidney stones  -     allopurinol (ZYLOPRIM) 100 MG tablet; Take 1 tablet (100 mg total) by mouth once daily.  Dispense: 90 tablet; Refill: 3    Renovascular hypertension    Coronary artery disease involving native coronary artery of native heart without angina pectoris  -     carvedilol (COREG) 25 MG tablet; Take 1 tablet (25 mg total) by mouth 2 (two) times daily.  Dispense: 180 tablet; Refill: 3    Renal arterial hypertension  -     carvedilol (COREG) 25 MG tablet; Take 1 tablet (25 mg total) by mouth 2 (two) times daily.  Dispense: 180 tablet; Refill: 3    Other orders  -     venlafaxine (EFFEXOR-XR) 150 MG Cp24; Take 1 capsule (150 mg total) by mouth once daily.  Dispense: 90 capsule; Refill: 3  -     atorvastatin (LIPITOR) 20 MG tablet; Take 1 tablet (20 mg total) by mouth once daily.  Dispense: 90 tablet; Refill: 3              Plan:  Increase effexor xr to 150 mg daily  Cont all other meds    Medication List with Changes/Refills   New Medications    VENLAFAXINE (EFFEXOR-XR) 150 MG CP24    Take 1 capsule (150 mg total) by mouth once daily.   Current Medications    ACETAMINOPHEN (TYLENOL) 500 MG TABLET    Take 2 tablets (1,000 mg total) by mouth every 8 (eight) hours.    ASPIRIN (ECOTRIN) 81 MG EC TABLET    Take 1 tablet (81 mg total) by mouth once daily.    BLOOD-GLUCOSE METER (GLUCOSE MONITORING KIT) KIT    Use as instructed    CETIRIZINE (ZYRTEC) 10 MG TABLET    Take 10 mg by mouth daily as needed for Allergies.    CRANBERRY FRUIT EXTRACT (CRANBERRY EXTRACT ORAL)    Take 1 capsule by mouth once daily.    FLUTICASONE (FLONASE) 50 MCG/ACTUATION NASAL SPRAY    Use 2 sprays in each nostril once daily.    HYDROMORPHONE (DILAUDID) 4 MG TABLET    Take 1 tablet (4 mg total) by mouth every 4 (four) hours as needed for Pain.     IBUPROFEN (ADVIL,MOTRIN) 600 MG TABLET    Take 1 tablet (600 mg total) by mouth 3 (three) times daily.    LANCETS MISC    1 Units by Misc.(Non-Drug; Combo Route) route 2 (two) times daily as needed.    LEVOTHYROXINE (SYNTHROID, LEVOTHROID) 175 MCG TABLET    Take 175 mcg by mouth every Mon, Tues, Wed, Thurs, Fri.    LEVOTHYROXINE (SYNTHROID, LEVOTHROID) 175 MCG TABLET    Take 175 mcg by mouth every Saturday.    LEVOTHYROXINE (SYNTHROID, LEVOTHROID) 175 MCG TABLET    Take 87.5 mcg by mouth every Sunday.    METFORMIN (GLUCOPHAGE) 1000 MG TABLET    TAKE 1 TABLET BY MOUTH TWICE DAILY WITH MEALS    NITROGLYCERIN (NITROSTAT) 0.4 MG SL TABLET    Place 1 tablet (0.4 mg total) under the tongue every 5 (five) minutes as needed for Chest pain.    PRASUGREL (EFFIENT) 10 MG TAB    TAKE 1 TABLET BY MOUTH EVERY MORNING    RAMIPRIL (ALTACE) 10 MG CAPSULE    Take 1 capsule (10 mg total) by mouth once daily.   Changed and/or Refilled Medications    Modified Medication Previous Medication    ALLOPURINOL (ZYLOPRIM) 100 MG TABLET allopurinol (ZYLOPRIM) 100 MG tablet       Take 1 tablet (100 mg total) by mouth once daily.    Take 1 tablet (100 mg total) by mouth once daily.    AMLODIPINE (NORVASC) 5 MG TABLET amLODIPine (NORVASC) 5 MG tablet       Take 1 tablet (5 mg total) by mouth once daily.    Take 1 tablet (5 mg total) by mouth once daily.    ATORVASTATIN (LIPITOR) 20 MG TABLET atorvastatin (LIPITOR) 20 MG tablet       Take 1 tablet (20 mg total) by mouth once daily.    TAKE 1 TABLET BY MOUTH EVERY DAY    CARVEDILOL (COREG) 25 MG TABLET carvedilol (COREG) 25 MG tablet       Take 1 tablet (25 mg total) by mouth 2 (two) times daily.    Take 1 tablet (25 mg total) by mouth 2 (two) times daily.    FUROSEMIDE (LASIX) 20 MG TABLET furosemide (LASIX) 20 MG tablet       Take 1 tablet (20 mg total) by mouth once daily.    Take 1 tablet (20 mg total) by mouth once daily.    GABAPENTIN (NEURONTIN) 100 MG CAPSULE gabapentin (NEURONTIN) 100 MG  capsule       Take 1 capsule (100 mg total) by mouth 2 (two) times daily.    TAKE 1 CAPSULE BY MOUTH TWO TIMES A DAY   Discontinued Medications    VENLAFAXINE (EFFEXOR-XR) 75 MG 24 HR CAPSULE    TAKE 1 CAPSULE BY MOUTH EVERY DAY

## 2019-07-30 NOTE — TELEPHONE ENCOUNTER
Spoke with pt, states she is past due for her appt, advised 4/2019 she was asked to call back on may 1st for an appt in November, pt states she forgot, advised Dr Aguilera is booked through jan. Advised to call back August 1st to schedule for Feb with lab work prior, voiced understanding

## 2019-07-31 DIAGNOSIS — I10 HYPERTENSION, UNSPECIFIED TYPE: ICD-10-CM

## 2019-07-31 RX ORDER — FUROSEMIDE 20 MG/1
20 TABLET ORAL DAILY
Qty: 90 TABLET | Refills: 2 | Status: SHIPPED | OUTPATIENT
Start: 2019-07-31 | End: 2019-12-23 | Stop reason: CLARIF

## 2019-08-01 ENCOUNTER — TELEPHONE (OUTPATIENT)
Dept: CARDIOLOGY | Facility: CLINIC | Age: 54
End: 2019-08-01

## 2019-08-01 NOTE — TELEPHONE ENCOUNTER
----- Message from Lisa Guerrero sent at 8/1/2019  7:41 AM CDT -----  Type: Needs Medical Advice    Who Called:  Patient  Best Call Back Number: [phone]  Additional Information: Patient would like to speak with nurse concerning Holter Monitor/has question/please call patient back to advise.

## 2019-08-07 DIAGNOSIS — Z96.652 HISTORY OF TOTAL LEFT KNEE REPLACEMENT: Primary | ICD-10-CM

## 2019-08-07 DIAGNOSIS — Z96.652 S/P TOTAL KNEE ARTHROPLASTY, LEFT: ICD-10-CM

## 2019-08-08 ENCOUNTER — HOSPITAL ENCOUNTER (OUTPATIENT)
Dept: RADIOLOGY | Facility: HOSPITAL | Age: 54
Discharge: HOME | End: 2019-08-08
Attending: ORTHOPAEDIC SURGERY
Payer: COMMERCIAL

## 2019-08-08 ENCOUNTER — OFFICE VISIT (OUTPATIENT)
Dept: ORTHOPEDICS | Facility: CLINIC | Age: 54
End: 2019-08-08
Payer: COMMERCIAL

## 2019-08-08 VITALS
HEART RATE: 80 BPM | DIASTOLIC BLOOD PRESSURE: 86 MMHG | SYSTOLIC BLOOD PRESSURE: 169 MMHG | HEIGHT: 62 IN | WEIGHT: 212 LBS | BODY MASS INDEX: 39.01 KG/M2

## 2019-08-08 DIAGNOSIS — Z96.652 S/P TOTAL KNEE ARTHROPLASTY, LEFT: ICD-10-CM

## 2019-08-08 DIAGNOSIS — M17.12 PRIMARY OSTEOARTHRITIS OF LEFT KNEE: Primary | ICD-10-CM

## 2019-08-08 PROCEDURE — 73562 XR KNEE ORTHO LEFT: ICD-10-PCS | Mod: 26,LT,, | Performed by: RADIOLOGY

## 2019-08-08 PROCEDURE — 99999 PR PBB SHADOW E&M-EST. PATIENT-LVL III: ICD-10-PCS | Mod: PBBFAC,,, | Performed by: ORTHOPAEDIC SURGERY

## 2019-08-08 PROCEDURE — 73560 X-RAY EXAM OF KNEE 1 OR 2: CPT | Mod: 26,59,RT, | Performed by: RADIOLOGY

## 2019-08-08 PROCEDURE — 73562 X-RAY EXAM OF KNEE 3: CPT | Mod: TC,PO,LT

## 2019-08-08 PROCEDURE — 73560 XR KNEE ORTHO LEFT: ICD-10-PCS | Mod: 26,59,RT, | Performed by: RADIOLOGY

## 2019-08-08 PROCEDURE — 73562 X-RAY EXAM OF KNEE 3: CPT | Mod: 26,LT,, | Performed by: RADIOLOGY

## 2019-08-08 PROCEDURE — 99024 POSTOP FOLLOW-UP VISIT: CPT | Mod: S$GLB,,, | Performed by: ORTHOPAEDIC SURGERY

## 2019-08-08 PROCEDURE — 99999 PR PBB SHADOW E&M-EST. PATIENT-LVL III: CPT | Mod: PBBFAC,,, | Performed by: ORTHOPAEDIC SURGERY

## 2019-08-08 PROCEDURE — 99024 PR POST-OP FOLLOW-UP VISIT: ICD-10-PCS | Mod: S$GLB,,, | Performed by: ORTHOPAEDIC SURGERY

## 2019-08-08 PROCEDURE — 73560 X-RAY EXAM OF KNEE 1 OR 2: CPT | Mod: TC,PO,RT

## 2019-08-08 RX ORDER — HYDROMORPHONE HYDROCHLORIDE 4 MG/1
4 TABLET ORAL EVERY 4 HOURS PRN
Qty: 44 TABLET | Refills: 0 | Status: ON HOLD | OUTPATIENT
Start: 2019-08-08 | End: 2019-12-14 | Stop reason: HOSPADM

## 2019-08-08 NOTE — PROGRESS NOTES
Chief Complaint   Patient presents with    Post-op Evaluation     left TKA 6/17/19       HPI:  54 y.o. female returns to clinic today status post left total knee arthroplasty 8 weeks ago. Pain is moderate to severe. Patient is compliant most of the time with restrictions.     left knee: ROM 0-125. Overall normal alignment. Incision healed. No erythema or fluctuance. Stable to stress. Skin intact. Compartments soft. NVI distally.     X-rays were performed today, personally reviewed by me and findings discussed with the patient.  3 views of the left knee show implants intact in good position    There are no diagnoses linked to this encounter.     Continue with no restrictions. RTC yearly with repeat Xrays.

## 2019-08-20 ENCOUNTER — TELEPHONE (OUTPATIENT)
Dept: CARDIOLOGY | Facility: CLINIC | Age: 54
End: 2019-08-20

## 2019-08-20 NOTE — TELEPHONE ENCOUNTER
----- Message from Jacinta Leone sent at 8/20/2019  1:50 PM CDT -----  Contact: pt  Pt calling states that she had to reschedule her appointment cause she is at the hospital with her brother who is have emergency heart procedure..[phone] (home)

## 2019-09-05 ENCOUNTER — TELEPHONE (OUTPATIENT)
Dept: ORTHOPEDICS | Facility: CLINIC | Age: 54
End: 2019-09-05

## 2019-09-05 ENCOUNTER — TELEPHONE (OUTPATIENT)
Dept: FAMILY MEDICINE | Facility: CLINIC | Age: 54
End: 2019-09-05

## 2019-09-05 RX ORDER — PREDNISONE 10 MG/1
TABLET ORAL
Qty: 20 TABLET | Refills: 0 | Status: SHIPPED | OUTPATIENT
Start: 2019-09-05 | End: 2019-12-12 | Stop reason: CLARIF

## 2019-09-05 RX ORDER — PREDNISONE 10 MG/1
TABLET ORAL
Qty: 20 TABLET | Refills: 0 | Status: SHIPPED | OUTPATIENT
Start: 2019-09-05 | End: 2019-09-05 | Stop reason: SDUPTHER

## 2019-09-05 NOTE — TELEPHONE ENCOUNTER
Called pt back. Pt states that she went to an Urgent care last week and was given Levaquin for 7 days for possible pneumonia. Pt states that she was fine with it for the first 2 days. Then she started having swelling both legs and knees, states that her legs hurt a lot. Advised pt to contact her PCP to let them know she is having a possible reaction to the Levaquin. She also wanted an appt to see Dr. Wilson for her right knee pain. Appt made. Thanks, Ines

## 2019-09-05 NOTE — TELEPHONE ENCOUNTER
Spoke to pt. Advised to stop the Levaquin and  Prednisone taper at the pharmacy and take one tablet tonight and one tablet tomorrow morning. Per  . Appt. Scheduled for tomorrow to see Dr. Smart Pt. Verbalized understanding. Phone call ended.     Pt. Asking to have medication sent to [facility] in [place].

## 2019-09-05 NOTE — TELEPHONE ENCOUNTER
----- Message from Kieran Meléndez sent at 9/5/2019  3:54 PM CDT -----  Contact: Patient  Type:  Patient Returning Call    Who Called:  Patient  Who Left Message for Patient:  Krissy Sandifer  Does the patient know what this is regarding?:  See previous message  Best Call Back Number:  [phone]  Additional Information:  NA

## 2019-09-05 NOTE — TELEPHONE ENCOUNTER
----- Message from Mo Wilson sent at 9/5/2019 11:31 AM CDT -----  Contact: self  Patient want to speak with a nurse regarding both of her knees still giving her problems need some medical advice please call back at [phone]

## 2019-09-05 NOTE — TELEPHONE ENCOUNTER
"Spoke to pt. Stated "I went to a walk in clinic they put me on levaquin and I am having a reaction in my tendons and ligaments, Dr. Wilson's office said I am having a reaction and need something to counteract it to call . My whole leg hurts, on both sides, I have severe pain, and both legs are swollen." pt. Stated she recently had a L knee replacement. Pt. Denies redness, numbness, or tingling. Pt. Reports she stopped taking the medication yesterday. Advised pt. I would send the doctor a message and call her back. Pt. Verbalized understanding. Phone call ended.       "

## 2019-09-05 NOTE — TELEPHONE ENCOUNTER
----- Message from Kieran Meléndez sent at 9/5/2019  3:26 PM CDT -----  Contact: Patient  Type: Needs Medical Advice    Who Called:  Patient  Best Call Back Number: [phone]  Additional Information: Patient would like an alternative to Levaquin due to side effect. Please call to advise. Thanks!

## 2019-09-06 ENCOUNTER — OFFICE VISIT (OUTPATIENT)
Dept: FAMILY MEDICINE | Facility: CLINIC | Age: 54
End: 2019-09-06
Payer: COMMERCIAL

## 2019-09-06 VITALS
HEART RATE: 93 BPM | HEIGHT: 62 IN | DIASTOLIC BLOOD PRESSURE: 70 MMHG | OXYGEN SATURATION: 97 % | BODY MASS INDEX: 39.68 KG/M2 | WEIGHT: 215.63 LBS | SYSTOLIC BLOOD PRESSURE: 142 MMHG

## 2019-09-06 DIAGNOSIS — M76.899 QUADRICEPS TENDINITIS: ICD-10-CM

## 2019-09-06 DIAGNOSIS — F41.9 ANXIETY: ICD-10-CM

## 2019-09-06 DIAGNOSIS — I25.10 CORONARY ARTERY DISEASE, ANGINA PRESENCE UNSPECIFIED, UNSPECIFIED VESSEL OR LESION TYPE, UNSPECIFIED WHETHER NATIVE OR TRANSPLANTED HEART: ICD-10-CM

## 2019-09-06 DIAGNOSIS — F32.A DEPRESSION, UNSPECIFIED DEPRESSION TYPE: ICD-10-CM

## 2019-09-06 DIAGNOSIS — I10 UNCONTROLLED HYPERTENSION: Primary | ICD-10-CM

## 2019-09-06 PROCEDURE — 99999 PR PBB SHADOW E&M-EST. PATIENT-LVL III: CPT | Mod: PBBFAC,,, | Performed by: FAMILY MEDICINE

## 2019-09-06 PROCEDURE — 3008F PR BODY MASS INDEX (BMI) DOCUMENTED: ICD-10-PCS | Mod: CPTII,S$GLB,, | Performed by: FAMILY MEDICINE

## 2019-09-06 PROCEDURE — 3077F SYST BP >= 140 MM HG: CPT | Mod: CPTII,S$GLB,, | Performed by: FAMILY MEDICINE

## 2019-09-06 PROCEDURE — 99214 OFFICE O/P EST MOD 30 MIN: CPT | Mod: S$GLB,,, | Performed by: FAMILY MEDICINE

## 2019-09-06 PROCEDURE — 3008F BODY MASS INDEX DOCD: CPT | Mod: CPTII,S$GLB,, | Performed by: FAMILY MEDICINE

## 2019-09-06 PROCEDURE — 3078F PR MOST RECENT DIASTOLIC BLOOD PRESSURE < 80 MM HG: ICD-10-PCS | Mod: CPTII,S$GLB,, | Performed by: FAMILY MEDICINE

## 2019-09-06 PROCEDURE — 99999 PR PBB SHADOW E&M-EST. PATIENT-LVL III: ICD-10-PCS | Mod: PBBFAC,,, | Performed by: FAMILY MEDICINE

## 2019-09-06 PROCEDURE — 99214 PR OFFICE/OUTPT VISIT, EST, LEVL IV, 30-39 MIN: ICD-10-PCS | Mod: S$GLB,,, | Performed by: FAMILY MEDICINE

## 2019-09-06 PROCEDURE — 3078F DIAST BP <80 MM HG: CPT | Mod: CPTII,S$GLB,, | Performed by: FAMILY MEDICINE

## 2019-09-06 PROCEDURE — 3077F PR MOST RECENT SYSTOLIC BLOOD PRESSURE >= 140 MM HG: ICD-10-PCS | Mod: CPTII,S$GLB,, | Performed by: FAMILY MEDICINE

## 2019-09-06 RX ORDER — ALBUTEROL SULFATE 90 UG/1
2 AEROSOL, METERED RESPIRATORY (INHALATION) EVERY 6 HOURS PRN
COMMUNITY
Start: 2019-08-30

## 2019-09-06 RX ORDER — TRIAMCINOLONE ACETONIDE 55 UG/1
2 SPRAY, METERED NASAL
COMMUNITY
Start: 2019-08-30 | End: 2019-12-12 | Stop reason: CLARIF

## 2019-09-06 NOTE — MEDICAL/APP STUDENT
Subjective:       Patient ID: Josephine Bolton is a 54 y.o. female.    Chief Complaint: Knee Pain (bilateral )       Here for f/u.    Went to urgent care for URTI-  Was recently placed Levoquin. Finished 7/10 course. Some coughing remains.    Recent left total knee arthroplasty. Followed by ortho.  R. Knee is very painful, has fallen 3 times.  Has been going to work, is a teacher.  Does the PT exercises.  Has taken 1 dilaudid pill each evening. (had 3 left from surgery), sleeps with ice packs on both legs.  Quad is killing her two days after starting levoquin. Has appt with ortho on Tuesday. During the day uses ibuprofen q8 600mg, 2 500 mg tylenol. 4/10 while stationary, with weight or walking is 7/10 Ibuprofen and tylenol do not help.  Prednisone has been helping with the arthritis.    htn controlled.  BP has been really high 150s/80s, has had 190/100s.     Cad stable. Has had some SOB, no chest pain. She contributes it  due to anxiety.     Reports worsening anxiety and depressed mood. Taking effexor xr. Under a lot of pain and stress. Mother had a C-spine fracture.           Review of Systems   Constitutional: Negative.    HENT: Positive for congestion.    Respiratory: Positive for cough.    All other systems reviewed and are negative.      Objective:      Physical Exam   Constitutional: She appears well-developed and well-nourished.   Cardiovascular: Normal rate and regular rhythm.   Pulmonary/Chest: Effort normal and breath sounds normal.       Assessment:       No diagnosis found.    Plan:       1. HTN  -Increase to norvasc 10 mg    2. Knee Pain  - Pain Control with norco  -Seeing Orthopedic surgeon    3. CAD  -Stable    4. Anxiety/Depression  - Psychologist referall

## 2019-09-06 NOTE — PROGRESS NOTES
Subjective:       Patient ID: Josephine Bolton is a 54 y.o. female.    Chief Complaint: Knee Pain (bilateral )    HPI     Here for a f/u    Reports bilat distal quadricep tendinitis 2 days after taking levaquin about 1 week ago.  Stopped her levaquin about 2 days ago. I gave pt rx prednisone taper yesterday and reports less pain.      Planning to see ortho next week for worsening right knee pain.    bp slightly elevated.  bp checks at work are elevated syst in 180-200 range.      Reports stress in personal life. Looking after her sick parents. Mother had recent cervical spine fracture.     Cad controlled. No c/o cp or sob at rest or exertion.      Review of Systems      Review of Systems   Constitutional: Negative for fever and chills.   HENT: Negative for hearing loss and neck stiffness.    Eyes: Negative for redness and itching.   Respiratory: Negative for cough and choking.    Cardiovascular: Negative for chest pain and leg swelling.  Abdomen: Negative for abdominal pain and blood in stool.   Genitourinary: Negative for dysuria and flank pain.   Neurological: Negative for light-headedness and headaches.   Hematological: Negative for adenopathy.   Psychiatric/Behavioral: Negative for behavioral problems.     Objective:      Physical Exam   Constitutional: She appears well-developed.   HENT:   Head: Normocephalic and atraumatic.   Eyes: Pupils are equal, round, and reactive to light. Conjunctivae are normal.   Neck: Normal range of motion.   Cardiovascular: Normal rate and regular rhythm.   No murmur heard.  Pulmonary/Chest: Effort normal and breath sounds normal.   Musculoskeletal:   bilat dist quad tendons: tender. rom of bilat knees from 10-90 deg with pain   Lymphadenopathy:     She has no cervical adenopathy.       Assessment:       1. Uncontrolled hypertension    2. Quadriceps tendinitis    3. Depression, unspecified depression type    4. Anxiety    5. Coronary artery disease, angina presence unspecified,  unspecified vessel or lesion type, unspecified whether native or transplanted heart        Plan:       Uncontrolled hypertension    Quadriceps tendinitis    Depression, unspecified depression type    Anxiety    Coronary artery disease, angina presence unspecified, unspecified vessel or lesion type, unspecified whether native or transplanted heart              Plan:  Complete course of prednisone taper  F/u with ortho next week  Recommend counseling. Pt will talk to someone at her work place  Serial bp checks.     F/u with me in 4 weeks      Medication List with Changes/Refills   Current Medications    ACETAMINOPHEN (TYLENOL) 500 MG TABLET    Take 2 tablets (1,000 mg total) by mouth every 8 (eight) hours.    ALBUTEROL (PROVENTIL HFA) 90 MCG/ACTUATION INHALER    Inhale 2 puffs into the lungs.    ALLOPURINOL (ZYLOPRIM) 100 MG TABLET    Take 1 tablet (100 mg total) by mouth once daily.    AMLODIPINE (NORVASC) 5 MG TABLET    Take 1 tablet (5 mg total) by mouth once daily.    ASPIRIN (ECOTRIN) 81 MG EC TABLET    Take 1 tablet (81 mg total) by mouth once daily.    ATORVASTATIN (LIPITOR) 20 MG TABLET    Take 1 tablet (20 mg total) by mouth once daily.    BLOOD-GLUCOSE METER (GLUCOSE MONITORING KIT) KIT    Use as instructed    CARVEDILOL (COREG) 25 MG TABLET    Take 1 tablet (25 mg total) by mouth 2 (two) times daily.    CETIRIZINE (ZYRTEC) 10 MG TABLET    Take 10 mg by mouth daily as needed for Allergies.    CRANBERRY FRUIT EXTRACT (CRANBERRY EXTRACT ORAL)    Take 1 capsule by mouth once daily.    FLUTICASONE (FLONASE) 50 MCG/ACTUATION NASAL SPRAY    Use 2 sprays in each nostril once daily.    FUROSEMIDE (LASIX) 20 MG TABLET    Take 1 tablet (20 mg total) by mouth once daily.    GABAPENTIN (NEURONTIN) 100 MG CAPSULE    Take 1 capsule (100 mg total) by mouth 2 (two) times daily.    HYDROMORPHONE (DILAUDID) 4 MG TABLET    Take 1 tablet (4 mg total) by mouth every 4 (four) hours as needed for Pain.    IBUPROFEN (ADVIL,MOTRIN)  600 MG TABLET    Take 1 tablet (600 mg total) by mouth 3 (three) times daily.    LANCETS MISC    1 Units by Misc.(Non-Drug; Combo Route) route 2 (two) times daily as needed.    LEVOTHYROXINE (SYNTHROID, LEVOTHROID) 175 MCG TABLET    Take 175 mcg by mouth every Mon, Tues, Wed, Thurs, Fri.    LEVOTHYROXINE (SYNTHROID, LEVOTHROID) 175 MCG TABLET    Take 175 mcg by mouth every Saturday.    LEVOTHYROXINE (SYNTHROID, LEVOTHROID) 175 MCG TABLET    Take 87.5 mcg by mouth every Sunday.    METFORMIN (GLUCOPHAGE) 1000 MG TABLET    TAKE 1 TABLET BY MOUTH TWICE DAILY WITH MEALS    NITROGLYCERIN (NITROSTAT) 0.4 MG SL TABLET    Place 1 tablet (0.4 mg total) under the tongue every 5 (five) minutes as needed for Chest pain.    PRASUGREL (EFFIENT) 10 MG TAB    TAKE 1 TABLET BY MOUTH EVERY MORNING    PREDNISONE (DELTASONE) 10 MG TABLET    Take 4 tabs daily for 2 days then Take 3 tabs daily for 2 days thenTake 2 tabs daily for 2 days then Take 1 tab daily for 2 days then stop    RAMIPRIL (ALTACE) 10 MG CAPSULE    Take 1 capsule (10 mg total) by mouth once daily.    TRIAMCINOLONE (NASACORT) 55 MCG NASAL INHALER    2 sprays by Nasal route.    VENLAFAXINE (EFFEXOR-XR) 150 MG CP24    Take 1 capsule (150 mg total) by mouth once daily.

## 2019-09-10 ENCOUNTER — OFFICE VISIT (OUTPATIENT)
Dept: ORTHOPEDICS | Facility: CLINIC | Age: 54
End: 2019-09-10
Payer: COMMERCIAL

## 2019-09-10 VITALS
SYSTOLIC BLOOD PRESSURE: 113 MMHG | HEART RATE: 67 BPM | DIASTOLIC BLOOD PRESSURE: 57 MMHG | BODY MASS INDEX: 39.56 KG/M2 | HEIGHT: 62 IN | WEIGHT: 215 LBS

## 2019-09-10 DIAGNOSIS — Z96.652 STATUS POST TOTAL LEFT KNEE REPLACEMENT: ICD-10-CM

## 2019-09-10 DIAGNOSIS — M17.11 PRIMARY OSTEOARTHRITIS OF RIGHT KNEE: Primary | ICD-10-CM

## 2019-09-10 PROCEDURE — 3074F PR MOST RECENT SYSTOLIC BLOOD PRESSURE < 130 MM HG: ICD-10-PCS | Mod: CPTII,S$GLB,, | Performed by: ORTHOPAEDIC SURGERY

## 2019-09-10 PROCEDURE — 3078F DIAST BP <80 MM HG: CPT | Mod: CPTII,S$GLB,, | Performed by: ORTHOPAEDIC SURGERY

## 2019-09-10 PROCEDURE — 20610 LARGE JOINT ASPIRATION/INJECTION: R KNEE: ICD-10-PCS | Mod: 79,RT,S$GLB, | Performed by: ORTHOPAEDIC SURGERY

## 2019-09-10 PROCEDURE — 99999 PR PBB SHADOW E&M-EST. PATIENT-LVL III: ICD-10-PCS | Mod: PBBFAC,,, | Performed by: ORTHOPAEDIC SURGERY

## 2019-09-10 PROCEDURE — 99024 PR POST-OP FOLLOW-UP VISIT: ICD-10-PCS | Mod: S$GLB,,, | Performed by: ORTHOPAEDIC SURGERY

## 2019-09-10 PROCEDURE — 3008F BODY MASS INDEX DOCD: CPT | Mod: CPTII,S$GLB,, | Performed by: ORTHOPAEDIC SURGERY

## 2019-09-10 PROCEDURE — 20610 DRAIN/INJ JOINT/BURSA W/O US: CPT | Mod: 79,RT,S$GLB, | Performed by: ORTHOPAEDIC SURGERY

## 2019-09-10 PROCEDURE — 99214 OFFICE O/P EST MOD 30 MIN: CPT | Mod: 24,25,S$GLB, | Performed by: ORTHOPAEDIC SURGERY

## 2019-09-10 PROCEDURE — 99024 POSTOP FOLLOW-UP VISIT: CPT | Mod: S$GLB,,, | Performed by: ORTHOPAEDIC SURGERY

## 2019-09-10 PROCEDURE — 3008F PR BODY MASS INDEX (BMI) DOCUMENTED: ICD-10-PCS | Mod: CPTII,S$GLB,, | Performed by: ORTHOPAEDIC SURGERY

## 2019-09-10 PROCEDURE — 3078F PR MOST RECENT DIASTOLIC BLOOD PRESSURE < 80 MM HG: ICD-10-PCS | Mod: CPTII,S$GLB,, | Performed by: ORTHOPAEDIC SURGERY

## 2019-09-10 PROCEDURE — 3074F SYST BP LT 130 MM HG: CPT | Mod: CPTII,S$GLB,, | Performed by: ORTHOPAEDIC SURGERY

## 2019-09-10 PROCEDURE — 99214 PR OFFICE/OUTPT VISIT, EST, LEVL IV, 30-39 MIN: ICD-10-PCS | Mod: 24,25,S$GLB, | Performed by: ORTHOPAEDIC SURGERY

## 2019-09-10 PROCEDURE — 99999 PR PBB SHADOW E&M-EST. PATIENT-LVL III: CPT | Mod: PBBFAC,,, | Performed by: ORTHOPAEDIC SURGERY

## 2019-09-10 RX ORDER — OXYCODONE AND ACETAMINOPHEN 10; 325 MG/1; MG/1
1 TABLET ORAL EVERY 6 HOURS PRN
Qty: 44 TABLET | Refills: 0 | Status: ON HOLD | OUTPATIENT
Start: 2019-09-10 | End: 2019-12-14 | Stop reason: HOSPADM

## 2019-09-10 RX ORDER — METHOCARBAMOL 750 MG/1
750 TABLET, FILM COATED ORAL 4 TIMES DAILY PRN
Qty: 44 TABLET | Refills: 0 | Status: SHIPPED | OUTPATIENT
Start: 2019-09-10 | End: 2019-09-20

## 2019-09-10 RX ADMIN — TRIAMCINOLONE ACETONIDE 40 MG: 40 INJECTION, SUSPENSION INTRA-ARTICULAR; INTRAMUSCULAR at 02:09

## 2019-09-10 NOTE — PROGRESS NOTES
"Chief Complaint   Patient presents with    Knee Pain     bilateral knee pain         HPI:   This is a 54 y.o. who presents to clinic today complaining of right knee pain for years after no known trauma. Pain is progressively worsening. No numbness or tingling. No associated signs or symptoms. She is status post left total knee arthroplasty 10 weeks ago.  Also complained of generalize muscle aches after taking 2 doses of Levaquin prescribed by walk in clinic, states improvment after steroid pack.     Past Medical History:   Diagnosis Date    Allergy     Anticoagulant long-term use     ASA 81mg, Effient    Anxiety     Arthritis     Asthma     As child    CAD (coronary artery disease) 01/27/2012    s/p stents x4 , CABG, 3 vessel, (5/2013) newest stent prior to CABG    Chronic constipation     DDD (degenerative disc disease), cervical     DDD (degenerative disc disease), lumbar     DDD (degenerative disc disease), thoracic     Depression     Edema     Encounter for blood transfusion     Headache(784.0)     History of nephrolithiasis     Hyperlipidemia     Hypertension     Hypothyroid 7/5/2012    Insomnia     Insulin resistance     patient stated not diebetic    Joint stiffness     Joint swelling     Kidney disease     ; Frequent UTIs     Kidney stones     Low back pain     Neck pain     PONV (postoperative nausea and vomiting)     Sleep apnea 01/27/12    Patient reports "severe" sleep apnea, uses C-pap    Thoracic back pain      Past Surgical History:   Procedure Laterality Date    ADENOIDECTOMY      ANGIOGRAM-RENAL N/A 3/24/2016    Performed by [facility] at [facility] CATH    ARTHROPLASTY, KNEE Left 6/17/2019    Performed by Juan [facility] at [facility] OR    BACK SURGERY      BREAST BIOPSY Left 02/2017    duct excision- Dr. Jimenez    Breast Ductal Excision with Ultrasound Guidance Left 4/19/2017    [facility]    CARPAL " TUNNEL RELEASE      bilateral     SECTION, CLASSIC      x 2    COLONOSCOPY      COLONOSCOPY N/A 2012    [facility]k [facility] ENDO    CORONARY ANGIOPLASTY WITH STENT PLACEMENT      x 4    CORONARY ARTERY BYPASS GRAFT  2013    3 vessel    EGD (ESOPHAGOGASTRODUODENOSCOPY) N/A 2012    [facility]formed [facility]k [facility] at [facility] ENDO    HEART CATH-LEFT N/A 10/16/2017    Performed by Obdulio [facility] at [facility] CATH    HYSTERECTOMY      INJECTION-STEROID-EPIDURAL-CERVICAL N/A 2015    [facility] at [facility] OR    KNEE ARTHROSCOPY W/ MENISCAL REPAIR Left     twice, last one 2014    LAMINECTOMY      L4/L5    OOPHORECTOMY      SINUS SURGERY      x3    TENDON REPAIR Left     ankle surgery    THYROIDECTOMY      2 separate surgeries    TONSILLECTOMY      TOTAL KNEE ARTHROPLASTY Left 2019    Surgeon: Juan Wilson MD     Current Outpatient Medications on File Prior to Visit   Medication Sig Dispense Refill    acetaminophen (TYLENOL) 500 MG tablet Take 2 tablets (1,000 mg total) by mouth every 8 (eight) hours.  0    albuterol (PROVENTIL HFA) 90 mcg/actuation inhaler Inhale 2 puffs into the lungs.      allopurinol (ZYLOPRIM) 100 MG tablet Take 1 tablet (100 mg total) by mouth once daily. 90 tablet 3    amLODIPine (NORVASC) 5 MG tablet Take 1 tablet (5 mg total) by mouth once daily. 90 tablet 1    aspirin (ECOTRIN) 81 MG EC tablet Take 1 tablet (81 mg total) by mouth once daily.  0    atorvastatin (LIPITOR) 20 MG tablet Take 1 tablet (20 mg total) by mouth once daily. 90 tablet 3    carvedilol (COREG) 25 MG tablet Take 1 tablet (25 mg total) by mouth 2 (two) times daily. 180 tablet 3    cetirizine (ZYRTEC) 10 MG tablet Take 10 mg by mouth daily as needed for Allergies.      cranberry fruit extract (CRANBERRY EXTRACT ORAL) Take 1 capsule by mouth once daily.      fluticasone (FLONASE) 50 mcg/actuation nasal spray Use 2 sprays  in each nostril once daily. (Patient taking differently: 2 sprays by Each Nare route daily as needed. ) 16 g 0    furosemide (LASIX) 20 MG tablet Take 1 tablet (20 mg total) by mouth once daily. 90 tablet 2    gabapentin (NEURONTIN) 100 MG capsule Take 1 capsule (100 mg total) by mouth 2 (two) times daily. 180 capsule 3    HYDROmorphone (DILAUDID) 4 MG tablet Take 1 tablet (4 mg total) by mouth every 4 (four) hours as needed for Pain. 44 tablet 0    ibuprofen (ADVIL,MOTRIN) 600 MG tablet Take 1 tablet (600 mg total) by mouth 3 (three) times daily. 90 tablet 0    lancets Misc 1 Units by Misc.(Non-Drug; Combo Route) route 2 (two) times daily as needed. 100 each 3    levothyroxine (SYNTHROID, LEVOTHROID) 175 MCG tablet Take 175 mcg by mouth every Mon, Tues, Wed, Thurs, Fri.      levothyroxine (SYNTHROID, LEVOTHROID) 175 MCG tablet Take 175 mcg by mouth every Saturday.      levothyroxine (SYNTHROID, LEVOTHROID) 175 MCG tablet Take 87.5 mcg by mouth every Sunday.      metFORMIN (GLUCOPHAGE) 1000 MG tablet TAKE 1 TABLET BY MOUTH TWICE DAILY WITH MEALS 60 tablet 6    nitroGLYCERIN (NITROSTAT) 0.4 MG SL tablet Place 1 tablet (0.4 mg total) under the tongue every 5 (five) minutes as needed for Chest pain. 25 tablet 3    prasugrel (EFFIENT) 10 mg Tab TAKE 1 TABLET BY MOUTH EVERY MORNING 90 tablet 3    predniSONE (DELTASONE) 10 MG tablet Take 4 tabs daily for 2 days then Take 3 tabs daily for 2 days thenTake 2 tabs daily for 2 days then Take 1 tab daily for 2 days then stop 20 tablet 0    ramipril (ALTACE) 10 MG capsule Take 1 capsule (10 mg total) by mouth once daily. 30 capsule 4    triamcinolone (NASACORT) 55 mcg nasal inhaler 2 sprays by Nasal route.      venlafaxine (EFFEXOR-XR) 150 MG Cp24 Take 1 capsule (150 mg total) by mouth once daily. 90 capsule 3    blood-glucose meter (GLUCOSE MONITORING KIT) kit Use as instructed 1 each 0     No current facility-administered medications on file prior to visit.   "    Review of patient's allergies indicates:   Allergen Reactions    Celexa [citalopram] Other (See Comments)     GI upset  Stated "knocked me out"    Cephalexin Anaphylaxis    Zoloft [sertraline] Other (See Comments)     Stated "knocked me out"    Codeine Other (See Comments)     hallucinations  hallucinations    Levaquin [levofloxacin] Other (See Comments)     tendinitis    Penicillins Other (See Comments)     Was told per mother that as child was allergic to penicillin.  Childhood allergy/ unknown reaction     Family History   Problem Relation Age of Onset    Heart disease Father     Diabetes Father     Hypertension Father     Stroke Father     Cataracts Father     Cancer Maternal Grandmother         Breast with Mets    Breast cancer Maternal Grandmother     Cancer Paternal Grandmother         Colon    Heart failure Mother     Asthma Mother     Macular degeneration Mother     Cancer Mother         Breast    Cataracts Mother     Heart disease Mother     COPD Mother     Breast cancer Mother     Glaucoma Sister     Thyroid disease Sister     Glaucoma Sister     Strabismus Other     Other Brother         stiff man syndrome    Amblyopia Neg Hx     Blindness Neg Hx     Retinal detachment Neg Hx      Social History     Socioeconomic History    Marital status:      Spouse name: Not on file    Number of children: Not on file    Years of education: Not on file    Highest education level: Not on file   Occupational History    Occupation: teacher   Social Needs    Financial resource strain: Somewhat hard    Food insecurity:     Worry: Never true     Inability: Never true    Transportation needs:     Medical: No     Non-medical: No   Tobacco Use    Smoking status: Former Smoker     Packs/day: 0.50     Years: 14.00     Pack years: 7.00     Types: Cigarettes     Start date: 1984     Last attempt to quit: 1998     Years since quittin.7    Smokeless tobacco: Never " Used   Substance and Sexual Activity    Alcohol use: No     Frequency: Never     Drinks per session: Patient refused     Binge frequency: Patient refused    Drug use: Yes     Types: Benzodiazepines    Sexual activity: Yes     Partners: Male   Lifestyle    Physical activity:     Days per week: 3 days     Minutes per session: 30 min    Stress: Rather much   Relationships    Social connections:     Talks on phone: More than three times a week     Gets together: More than three times a week     Attends Congregation service: Not on file     Active member of club or organization: Yes     Attends meetings of clubs or organizations: More than 4 times per year     Relationship status:    Other Topics Concern    Not on file   Social History Narrative    Not on file       Review of Systems:  Constitutional:  Denies fever or chills   Eyes:  Denies change in visual acuity   HENT:  Denies nasal congestion or sore throat   Respiratory:  Denies cough or shortness of breath   Cardiovascular:  Denies chest pain or edema   GI:  Denies abdominal pain, nausea, vomiting, bloody stools or diarrhea   :  Denies dysuria   Integument:  Denies rash   Neurologic:  Denies headache, focal weakness or sensory changes   Endocrine:  Denies polyuria or polydipsia   Lymphatic:  Denies swollen glands   Psychiatric:  Denies depression or anxiety     Physical Exam:   Constitutional:  Well developed, well nourished, no acute distress, non-toxic appearance   Integument:  Well hydrated, no rash   Lymphatic:  No lymphadenopathy noted   Neurologic:  Alert & oriented x 3  Psychiatric:  Speech and behavior appropriate   Eyes: EOMI  Gi: abdomen soft    Bilateral Knee Exam    Right Knee Exam     Tenderness   The patient is experiencing tenderness in the medial joint line.    Range of Motion   Extension: abnormal   Flexion: abnormal     Muscle Strength     The patient has normal knee strength.    Tests   Livia:  Medial - positive   Lachman:   Anterior - negative      Varus: negative  Valgus: negative  Patellar Apprehension: negative    Other   Erythema: absent  Sensation: normal  Pulse: present  Swelling: mild    Left knee: ROM 0-125. Overall normal alignment. Incision healed. No erythema or fluctuance. Stable to stress. Skin intact. Compartments soft. NVI distally.          Primary osteoarthritis of right knee    Status post total left knee replacement  -     methocarbamol (ROBAXIN) 750 MG Tab; Take 1 tablet (750 mg total) by mouth 4 (four) times daily as needed.  Dispense: 44 tablet; Refill: 0  -     oxyCODONE-acetaminophen (PERCOCET)  mg per tablet; Take 1 tablet by mouth every 6 (six) hours as needed (severe post operative pain).  Dispense: 44 tablet; Refill: 0          Using an aseptic technique, I injected 5 cc of lidocaine 1% without and 1 cc of kenalog 40mg into the right knee. The patient tolerated this well. I will have them return to clinic in 2 weeks.

## 2019-09-16 ENCOUNTER — PATIENT OUTREACH (OUTPATIENT)
Dept: ADMINISTRATIVE | Facility: HOSPITAL | Age: 54
End: 2019-09-16

## 2019-09-16 RX ORDER — TRIAMCINOLONE ACETONIDE 40 MG/ML
40 INJECTION, SUSPENSION INTRA-ARTICULAR; INTRAMUSCULAR
Status: DISCONTINUED | OUTPATIENT
Start: 2019-09-10 | End: 2019-09-16 | Stop reason: HOSPADM

## 2019-09-16 NOTE — PROCEDURES
Large Joint Aspiration/Injection: R knee  Date/Time: 9/10/2019 2:56 PM  Performed by: Juan Wilson MD  Authorized by: Juan Wilson MD     Consent Done?:  Yes (Verbal)  Indications:  Pain  Procedure site marked: Yes    Timeout: Prior to procedure the correct patient, procedure, and site was verified      Location:  Knee  Site:  R knee  Prep: Patient was prepped and draped in usual sterile fashion    Needle size:  21 G  Approach:  Anterolateral  Medications:  40 mg triamcinolone acetonide 40 mg/mL; 40 mg triamcinolone acetonide 40 mg/mL  Patient tolerance:  Patient tolerated the procedure well with no immediate complications

## 2019-09-24 ENCOUNTER — TELEPHONE (OUTPATIENT)
Dept: ADMINISTRATIVE | Facility: HOSPITAL | Age: 54
End: 2019-09-24

## 2019-10-03 ENCOUNTER — OFFICE VISIT (OUTPATIENT)
Dept: ORTHOPEDICS | Facility: CLINIC | Age: 54
End: 2019-10-03
Payer: COMMERCIAL

## 2019-10-03 VITALS
SYSTOLIC BLOOD PRESSURE: 132 MMHG | BODY MASS INDEX: 39.56 KG/M2 | HEIGHT: 62 IN | DIASTOLIC BLOOD PRESSURE: 69 MMHG | HEART RATE: 67 BPM | WEIGHT: 215 LBS

## 2019-10-03 DIAGNOSIS — M54.16 LUMBAR RADICULOPATHY: Primary | ICD-10-CM

## 2019-10-03 PROCEDURE — 3078F PR MOST RECENT DIASTOLIC BLOOD PRESSURE < 80 MM HG: ICD-10-PCS | Mod: CPTII,S$GLB,, | Performed by: ORTHOPAEDIC SURGERY

## 2019-10-03 PROCEDURE — 3008F BODY MASS INDEX DOCD: CPT | Mod: CPTII,S$GLB,, | Performed by: ORTHOPAEDIC SURGERY

## 2019-10-03 PROCEDURE — 99214 OFFICE O/P EST MOD 30 MIN: CPT | Mod: S$GLB,,, | Performed by: ORTHOPAEDIC SURGERY

## 2019-10-03 PROCEDURE — 3075F PR MOST RECENT SYSTOLIC BLOOD PRESS GE 130-139MM HG: ICD-10-PCS | Mod: CPTII,S$GLB,, | Performed by: ORTHOPAEDIC SURGERY

## 2019-10-03 PROCEDURE — 99999 PR PBB SHADOW E&M-EST. PATIENT-LVL III: ICD-10-PCS | Mod: PBBFAC,,, | Performed by: ORTHOPAEDIC SURGERY

## 2019-10-03 PROCEDURE — 99214 PR OFFICE/OUTPT VISIT, EST, LEVL IV, 30-39 MIN: ICD-10-PCS | Mod: S$GLB,,, | Performed by: ORTHOPAEDIC SURGERY

## 2019-10-03 PROCEDURE — 99999 PR PBB SHADOW E&M-EST. PATIENT-LVL III: CPT | Mod: PBBFAC,,, | Performed by: ORTHOPAEDIC SURGERY

## 2019-10-03 PROCEDURE — 3075F SYST BP GE 130 - 139MM HG: CPT | Mod: CPTII,S$GLB,, | Performed by: ORTHOPAEDIC SURGERY

## 2019-10-03 PROCEDURE — 3008F PR BODY MASS INDEX (BMI) DOCUMENTED: ICD-10-PCS | Mod: CPTII,S$GLB,, | Performed by: ORTHOPAEDIC SURGERY

## 2019-10-03 PROCEDURE — 3078F DIAST BP <80 MM HG: CPT | Mod: CPTII,S$GLB,, | Performed by: ORTHOPAEDIC SURGERY

## 2019-10-03 NOTE — PROGRESS NOTES
"  Chief Complaint   Patient presents with    Left Knee - Pain    Knee Pain     S/P TKA 19       HPI:   This is a 54 y.o. who returns today status post left total knee arthroplasty  4 months ago. Patient has been WBAT. Reports radiating pain down left leg. No numbness or tingling.    Past Medical History:   Diagnosis Date    Allergy     Anticoagulant long-term use     ASA 81mg, Effient    Anxiety     Arthritis     Asthma     As child    CAD (coronary artery disease) 2012    s/p stents x4 , CABG, 3 vessel, (2013) newest stent prior to CABG    Chronic constipation     DDD (degenerative disc disease), cervical     DDD (degenerative disc disease), lumbar     DDD (degenerative disc disease), thoracic     Depression     Edema     Encounter for blood transfusion     Headache(784.0)     History of nephrolithiasis     Hyperlipidemia     Hypertension     Hypothyroid 2012    Insomnia     Insulin resistance     patient stated not diebetic    Joint stiffness     Joint swelling     Kidney disease     ; Frequent UTIs     Kidney stones     Low back pain     Neck pain     PONV (postoperative nausea and vomiting)     Sleep apnea 12    Patient reports "severe" sleep apnea, uses C-pap    Thoracic back pain      Past Surgical History:   Procedure Laterality Date    ADENOIDECTOMY      BACK SURGERY      BREAST BIOPSY Left 2017    duct excision- Dr. Jimenez    CARPAL TUNNEL RELEASE      bilateral     SECTION, CLASSIC      x 2    COLONOSCOPY      CORONARY ANGIOPLASTY WITH STENT PLACEMENT      x 4    CORONARY ARTERY BYPASS GRAFT  2013    3 vessel    HYSTERECTOMY      KNEE ARTHROPLASTY Left 2019    Procedure: ARTHROPLASTY, KNEE;  Surgeon: Juan Wilson MD;  Location: [facility];  Service: Orthopedics;  Laterality: Left;    KNEE ARTHROSCOPY W/ MENISCAL REPAIR Left     twice, last one 2014    LAMINECTOMY      L4/L5    OOPHORECTOMY      SINUS SURGERY "      x3    TENDON REPAIR Left     ankle surgery    THYROIDECTOMY      2 separate surgeries    TONSILLECTOMY      TOTAL KNEE ARTHROPLASTY Left 06/17/2019    Surgeon: Juan Wilson MD     Current Outpatient Medications on File Prior to Visit   Medication Sig Dispense Refill    acetaminophen (TYLENOL) 500 MG tablet Take 2 tablets (1,000 mg total) by mouth every 8 (eight) hours.  0    albuterol (PROVENTIL HFA) 90 mcg/actuation inhaler Inhale 2 puffs into the lungs.      allopurinol (ZYLOPRIM) 100 MG tablet Take 1 tablet (100 mg total) by mouth once daily. 90 tablet 3    amLODIPine (NORVASC) 5 MG tablet Take 1 tablet (5 mg total) by mouth once daily. 90 tablet 1    aspirin (ECOTRIN) 81 MG EC tablet Take 1 tablet (81 mg total) by mouth once daily.  0    atorvastatin (LIPITOR) 20 MG tablet Take 1 tablet (20 mg total) by mouth once daily. 90 tablet 3    carvedilol (COREG) 25 MG tablet Take 1 tablet (25 mg total) by mouth 2 (two) times daily. 180 tablet 3    cetirizine (ZYRTEC) 10 MG tablet Take 10 mg by mouth daily as needed for Allergies.      cranberry fruit extract (CRANBERRY EXTRACT ORAL) Take 1 capsule by mouth once daily.      fluticasone (FLONASE) 50 mcg/actuation nasal spray Use 2 sprays in each nostril once daily. (Patient taking differently: 2 sprays by Each Nare route daily as needed. ) 16 g 0    furosemide (LASIX) 20 MG tablet Take 1 tablet (20 mg total) by mouth once daily. 90 tablet 2    gabapentin (NEURONTIN) 100 MG capsule Take 1 capsule (100 mg total) by mouth 2 (two) times daily. 180 capsule 3    HYDROmorphone (DILAUDID) 4 MG tablet Take 1 tablet (4 mg total) by mouth every 4 (four) hours as needed for Pain. 44 tablet 0    ibuprofen (ADVIL,MOTRIN) 600 MG tablet Take 1 tablet (600 mg total) by mouth 3 (three) times daily. 90 tablet 0    lancets Misc 1 Units by Misc.(Non-Drug; Combo Route) route 2 (two) times daily as needed. 100 each 3    levothyroxine (SYNTHROID, LEVOTHROID) 175 MCG  "tablet Take 175 mcg by mouth every Mon, Tues, Wed, Thurs, Fri.      levothyroxine (SYNTHROID, LEVOTHROID) 175 MCG tablet Take 175 mcg by mouth every Saturday.      levothyroxine (SYNTHROID, LEVOTHROID) 175 MCG tablet Take 87.5 mcg by mouth every Sunday.      metFORMIN (GLUCOPHAGE) 1000 MG tablet TAKE 1 TABLET BY MOUTH TWICE DAILY WITH MEALS 60 tablet 6    nitroGLYCERIN (NITROSTAT) 0.4 MG SL tablet Place 1 tablet (0.4 mg total) under the tongue every 5 (five) minutes as needed for Chest pain. 25 tablet 3    oxyCODONE-acetaminophen (PERCOCET)  mg per tablet Take 1 tablet by mouth every 6 (six) hours as needed (severe post operative pain). 44 tablet 0    prasugrel (EFFIENT) 10 mg Tab TAKE 1 TABLET BY MOUTH EVERY MORNING 90 tablet 3    predniSONE (DELTASONE) 10 MG tablet Take 4 tabs daily for 2 days then Take 3 tabs daily for 2 days thenTake 2 tabs daily for 2 days then Take 1 tab daily for 2 days then stop 20 tablet 0    ramipril (ALTACE) 10 MG capsule Take 1 capsule (10 mg total) by mouth once daily. 30 capsule 4    triamcinolone (NASACORT) 55 mcg nasal inhaler 2 sprays by Nasal route.      venlafaxine (EFFEXOR-XR) 150 MG Cp24 Take 1 capsule (150 mg total) by mouth once daily. 90 capsule 3    blood-glucose meter (GLUCOSE MONITORING KIT) kit Use as instructed 1 each 0     No current facility-administered medications on file prior to visit.      Review of patient's allergies indicates:   Allergen Reactions    Celexa [citalopram] Other (See Comments)     GI upset  Stated "knocked me out"    Cephalexin Anaphylaxis    Zoloft [sertraline] Other (See Comments)     Stated "knocked me out"    Codeine Other (See Comments)     hallucinations  hallucinations    Levaquin [levofloxacin] Other (See Comments)     tendinitis    Penicillins Other (See Comments)     Was told per mother that as child was allergic to penicillin.  Childhood allergy/ unknown reaction     Family History   Problem Relation Age of Onset "    Heart disease Father     Diabetes Father     Hypertension Father     Stroke Father     Cataracts Father     Cancer Maternal Grandmother         Breast with Mets    Breast cancer Maternal Grandmother     Cancer Paternal Grandmother         Colon    Heart failure Mother     Asthma Mother     Macular degeneration Mother     Cancer Mother         Breast    Cataracts Mother     Heart disease Mother     COPD Mother     Breast cancer Mother     Glaucoma Sister     Thyroid disease Sister     Glaucoma Sister     Strabismus Other     Other Brother         stiff man syndrome    Amblyopia Neg Hx     Blindness Neg Hx     Retinal detachment Neg Hx      Social History     Socioeconomic History    Marital status:      Spouse name: Not on file    Number of children: Not on file    Years of education: Not on file    Highest education level: Not on file   Occupational History    Occupation: teacher   Social Needs    Financial resource strain: Somewhat hard    Food insecurity:     Worry: Never true     Inability: Never true    Transportation needs:     Medical: No     Non-medical: No   Tobacco Use    Smoking status: Former Smoker     Packs/day: 0.50     Years: 14.00     Pack years: 7.00     Types: Cigarettes     Start date: 1984     Last attempt to quit: 1998     Years since quittin.8    Smokeless tobacco: Never Used   Substance and Sexual Activity    Alcohol use: No     Frequency: Never     Drinks per session: Patient refused     Binge frequency: Patient refused    Drug use: Yes     Types: Benzodiazepines    Sexual activity: Yes     Partners: Male   Lifestyle    Physical activity:     Days per week: 3 days     Minutes per session: 30 min    Stress: Rather much   Relationships    Social connections:     Talks on phone: More than three times a week     Gets together: More than three times a week     Attends Anabaptism service: Not on file     Active member of club or  organization: Yes     Attends meetings of clubs or organizations: More than 4 times per year     Relationship status:    Other Topics Concern    Not on file   Social History Narrative    Not on file       Review of Systems:  Constitutional:  Denies fever or chills   Eyes:  Denies change in visual acuity   HENT:  Denies nasal congestion or sore throat   Respiratory:  Denies cough or shortness of breath   Cardiovascular:  Denies chest pain or edema   GI:  Denies abdominal pain, nausea, vomiting, bloody stools or diarrhea   :  Denies dysuria   Integument:  Denies rash   Neurologic:  Denies headache, focal weakness or sensory changes   Endocrine:  Denies polyuria or polydipsia   Lymphatic:  Denies swollen glands   Psychiatric:  Denies depression or anxiety     Physical Exam:   Constitutional:  Well developed, well nourished, no acute distress, non-toxic appearance   Integument:  Well hydrated, no rash   Lymphatic:  No lymphadenopathy noted   Neurologic:  Alert & oriented x 3  Psychiatric:  Speech and behavior appropriate   Gi: abdomen soft  Eyes: EOMI    Right  Exam performed same as contralateral side and is normal      Left knee: ROM 0-130. Overall normal alignment. Incision healed. No erythema or fluctuance. Stable to stress. Skin intact. Compartments soft. NVI distally.        Lumbar radiculopathy        Will follow up with Dr. Dennison for radicular complaints as scheduled. RTC as needed

## 2019-10-28 ENCOUNTER — OFFICE VISIT (OUTPATIENT)
Dept: FAMILY MEDICINE | Facility: CLINIC | Age: 54
End: 2019-10-28
Payer: COMMERCIAL

## 2019-10-28 VITALS
BODY MASS INDEX: 39.07 KG/M2 | OXYGEN SATURATION: 98 % | HEIGHT: 62 IN | SYSTOLIC BLOOD PRESSURE: 132 MMHG | HEART RATE: 71 BPM | WEIGHT: 212.31 LBS | DIASTOLIC BLOOD PRESSURE: 88 MMHG

## 2019-10-28 DIAGNOSIS — I10 HYPERTENSION, UNSPECIFIED TYPE: ICD-10-CM

## 2019-10-28 DIAGNOSIS — I25.10 CORONARY ARTERY DISEASE INVOLVING NATIVE CORONARY ARTERY OF NATIVE HEART WITHOUT ANGINA PECTORIS: ICD-10-CM

## 2019-10-28 DIAGNOSIS — F41.9 ANXIETY: ICD-10-CM

## 2019-10-28 DIAGNOSIS — M54.12 CERVICAL RADICULOPATHY: ICD-10-CM

## 2019-10-28 DIAGNOSIS — S20.00XA BRUISE OF BREAST: Primary | ICD-10-CM

## 2019-10-28 DIAGNOSIS — F32.A DEPRESSION, UNSPECIFIED DEPRESSION TYPE: ICD-10-CM

## 2019-10-28 PROCEDURE — 99214 OFFICE O/P EST MOD 30 MIN: CPT | Mod: S$GLB,,, | Performed by: FAMILY MEDICINE

## 2019-10-28 PROCEDURE — 99214 PR OFFICE/OUTPT VISIT, EST, LEVL IV, 30-39 MIN: ICD-10-PCS | Mod: S$GLB,,, | Performed by: FAMILY MEDICINE

## 2019-10-28 PROCEDURE — 3075F SYST BP GE 130 - 139MM HG: CPT | Mod: CPTII,S$GLB,, | Performed by: FAMILY MEDICINE

## 2019-10-28 PROCEDURE — 99999 PR PBB SHADOW E&M-EST. PATIENT-LVL III: CPT | Mod: PBBFAC,,, | Performed by: FAMILY MEDICINE

## 2019-10-28 PROCEDURE — 3079F PR MOST RECENT DIASTOLIC BLOOD PRESSURE 80-89 MM HG: ICD-10-PCS | Mod: CPTII,S$GLB,, | Performed by: FAMILY MEDICINE

## 2019-10-28 PROCEDURE — 3075F PR MOST RECENT SYSTOLIC BLOOD PRESS GE 130-139MM HG: ICD-10-PCS | Mod: CPTII,S$GLB,, | Performed by: FAMILY MEDICINE

## 2019-10-28 PROCEDURE — 99999 PR PBB SHADOW E&M-EST. PATIENT-LVL III: ICD-10-PCS | Mod: PBBFAC,,, | Performed by: FAMILY MEDICINE

## 2019-10-28 PROCEDURE — 3008F PR BODY MASS INDEX (BMI) DOCUMENTED: ICD-10-PCS | Mod: CPTII,S$GLB,, | Performed by: FAMILY MEDICINE

## 2019-10-28 PROCEDURE — 3079F DIAST BP 80-89 MM HG: CPT | Mod: CPTII,S$GLB,, | Performed by: FAMILY MEDICINE

## 2019-10-28 PROCEDURE — 3008F BODY MASS INDEX DOCD: CPT | Mod: CPTII,S$GLB,, | Performed by: FAMILY MEDICINE

## 2019-10-28 NOTE — PROGRESS NOTES
Subjective:       Patient ID: Josephine Bolton is a 54 y.o. female.    Chief Complaint: Hypertension    HPI     Here for a f/u     htn controlled.      Reports stress in personal life. Looking after her sick mum. Reports that her  lost his job last month.  On effexor xr for anxiety and depression.      Cad controlled. No c/o cp or sob at rest or exertion.    Reports intermittent right cervical radicular pain x 1 month. Achy right sided neck pain with some tingling in right hand.  Taking otc tylenol and motrin for pain control. Also, taking neurontin.     Seeing orthopedics for management of left knee pain. Needs dm handicap form.     Reports bruise on left lateral breast 3 weeks ago. No hx of trauma. On effient, blood thinner. Reports less bruising but some mild pain with touch.     Review of Systems      Review of Systems   Constitutional: Negative for fever and chills.   HENT: Negative for hearing loss and neck stiffness.    Eyes: Negative for redness and itching.   Respiratory: Negative for cough and choking.    Cardiovascular: Negative for chest pain and leg swelling.  Abdomen: Negative for abdominal pain and blood in stool.   Genitourinary: Negative for dysuria and flank pain.   Neurological: Negative for light-headedness and headaches.   Hematological: Negative for adenopathy.   Psychiatric/Behavioral: Negative for behavioral problems.     Objective:      Physical Exam   Constitutional: She appears well-developed.   HENT:   Head: Normocephalic and atraumatic.   Eyes: Pupils are equal, round, and reactive to light. Conjunctivae are normal.   Neck: Normal range of motion.   Cardiovascular: Normal rate and regular rhythm.   No murmur heard.  Pulmonary/Chest: Effort normal and breath sounds normal.   Nurse present during breast exam:    Left lateral breast at 9 pm posn: 2 x 2 inch bruising noted. Mildly tender. No masses noted.    Musculoskeletal:   Cervical spine: tend of right paravert area.  Dec rom  with flex/ext.     Lymphadenopathy:     She has no cervical adenopathy.       Assessment:       1. Bruise of breast    2. Hypertension, unspecified type    3. Coronary artery disease involving native coronary artery of native heart without angina pectoris    4. Depression, unspecified depression type    5. Anxiety    6. Cervical radiculopathy        Plan:       Bruise of breast  -     US Breast Left Complete; Future; Expected date: 10/28/2019    Hypertension, unspecified type    Coronary artery disease involving native coronary artery of native heart without angina pectoris    Depression, unspecified depression type    Anxiety    Cervical radiculopathy              Plan:  See order  Cont current meds      Medication List with Changes/Refills   Current Medications    ACETAMINOPHEN (TYLENOL) 500 MG TABLET    Take 2 tablets (1,000 mg total) by mouth every 8 (eight) hours.    ALBUTEROL (PROVENTIL HFA) 90 MCG/ACTUATION INHALER    Inhale 2 puffs into the lungs.    ALLOPURINOL (ZYLOPRIM) 100 MG TABLET    Take 1 tablet (100 mg total) by mouth once daily.    AMLODIPINE (NORVASC) 5 MG TABLET    Take 1 tablet (5 mg total) by mouth once daily.    ASPIRIN (ECOTRIN) 81 MG EC TABLET    Take 1 tablet (81 mg total) by mouth once daily.    ATORVASTATIN (LIPITOR) 20 MG TABLET    Take 1 tablet (20 mg total) by mouth once daily.    BLOOD-GLUCOSE METER (GLUCOSE MONITORING KIT) KIT    Use as instructed    CARVEDILOL (COREG) 25 MG TABLET    Take 1 tablet (25 mg total) by mouth 2 (two) times daily.    CETIRIZINE (ZYRTEC) 10 MG TABLET    Take 10 mg by mouth daily as needed for Allergies.    CRANBERRY FRUIT EXTRACT (CRANBERRY EXTRACT ORAL)    Take 1 capsule by mouth once daily.    FLUTICASONE (FLONASE) 50 MCG/ACTUATION NASAL SPRAY    Use 2 sprays in each nostril once daily.    FLUZONE QUAD 0040-1642, PF, 60 MCG (15 MCG X 4)/0.5 ML SYRG    Inject 0.5 ml's intramuscularly once as directed    FUROSEMIDE (LASIX) 20 MG TABLET    Take 1 tablet (20  mg total) by mouth once daily.    GABAPENTIN (NEURONTIN) 100 MG CAPSULE    Take 1 capsule (100 mg total) by mouth 2 (two) times daily.    HYDROMORPHONE (DILAUDID) 4 MG TABLET    Take 1 tablet (4 mg total) by mouth every 4 (four) hours as needed for Pain.    IBUPROFEN (ADVIL,MOTRIN) 600 MG TABLET    Take 1 tablet (600 mg total) by mouth 3 (three) times daily.    LANCETS MISC    1 Units by Misc.(Non-Drug; Combo Route) route 2 (two) times daily as needed.    LEVOTHYROXINE (SYNTHROID, LEVOTHROID) 175 MCG TABLET    Take 175 mcg by mouth every Mon, Tues, Wed, Thurs, Fri.    LEVOTHYROXINE (SYNTHROID, LEVOTHROID) 175 MCG TABLET    Take 175 mcg by mouth every Saturday.    LEVOTHYROXINE (SYNTHROID, LEVOTHROID) 175 MCG TABLET    Take 87.5 mcg by mouth every Sunday.    METFORMIN (GLUCOPHAGE) 1000 MG TABLET    TAKE 1 TABLET BY MOUTH TWICE DAILY WITH MEALS    NITROGLYCERIN (NITROSTAT) 0.4 MG SL TABLET    Place 1 tablet (0.4 mg total) under the tongue every 5 (five) minutes as needed for Chest pain.    OXYCODONE-ACETAMINOPHEN (PERCOCET)  MG PER TABLET    Take 1 tablet by mouth every 6 (six) hours as needed (severe post operative pain).    PRASUGREL (EFFIENT) 10 MG TAB    TAKE 1 TABLET BY MOUTH EVERY MORNING    PREDNISONE (DELTASONE) 10 MG TABLET    Take 4 tabs daily for 2 days then Take 3 tabs daily for 2 days thenTake 2 tabs daily for 2 days then Take 1 tab daily for 2 days then stop    RAMIPRIL (ALTACE) 10 MG CAPSULE    Take 1 capsule (10 mg total) by mouth once daily.    TRIAMCINOLONE (NASACORT) 55 MCG NASAL INHALER    2 sprays by Nasal route.    VENLAFAXINE (EFFEXOR-XR) 150 MG CP24    Take 1 capsule (150 mg total) by mouth once daily.

## 2019-11-21 DIAGNOSIS — I15.0 RENOVASCULAR HYPERTENSION: ICD-10-CM

## 2019-11-21 RX ORDER — RAMIPRIL 10 MG/1
10 CAPSULE ORAL DAILY
Qty: 30 CAPSULE | Refills: 4 | Status: SHIPPED | OUTPATIENT
Start: 2019-11-21 | End: 2020-04-01

## 2019-12-12 PROBLEM — R07.9 CHEST PAIN: Status: ACTIVE | Noted: 2019-12-12

## 2019-12-12 PROBLEM — I20.89 ANGINA AT REST: Status: ACTIVE | Noted: 2019-12-12

## 2019-12-19 ENCOUNTER — PATIENT MESSAGE (OUTPATIENT)
Dept: CARDIOLOGY | Facility: CLINIC | Age: 54
End: 2019-12-19

## 2019-12-19 ENCOUNTER — NURSE TRIAGE (OUTPATIENT)
Dept: ADMINISTRATIVE | Facility: CLINIC | Age: 54
End: 2019-12-19

## 2019-12-19 ENCOUNTER — TELEPHONE (OUTPATIENT)
Dept: CARDIOLOGY | Facility: CLINIC | Age: 54
End: 2019-12-19

## 2019-12-19 NOTE — TELEPHONE ENCOUNTER
Patient came to clinic today wanting to make sure she needs to go to the ER. Patient with dull ache in her chest. Advised per Triage RN protocol to go to the ER.  She was instructed and will proceed now to ER.

## 2019-12-19 NOTE — TELEPHONE ENCOUNTER
----- Message from Danny Schneider sent at 12/19/2019  1:37 PM CST -----  Contact: pt  Type: Needs Medical Advice    Who Called:  pt    Best Call Back Number: [phone]  Additional Information: pt states she sent messages to dr escobedo and dr ramirez yesterday and has not heard back yet and would like to speak with someone. Please call.    Sent to on call per proceedure

## 2019-12-19 NOTE — TELEPHONE ENCOUNTER
Had a stent placed last Friday. Started having sharp pain on yesterday from chest to back and ache stayed harsh for a while. Has had the same dull ache since yesterday. Since yesterday SOB with ambulation.  Advised per protocol call EMS now. Refusing states her  will pick her up and take her in to ED  Reason for Disposition   [1] Chest pain lasts > 5 minutes AND [2] history of heart disease  (i.e., heart attack, bypass surgery, angina, angioplasty, CHF; not just a heart murmur)    Protocols used: CHEST PAIN-A-AH

## 2019-12-20 ENCOUNTER — OFFICE VISIT (OUTPATIENT)
Dept: CARDIOLOGY | Facility: CLINIC | Age: 54
End: 2019-12-20
Payer: COMMERCIAL

## 2019-12-20 VITALS
WEIGHT: 207 LBS | HEART RATE: 77 BPM | HEIGHT: 62 IN | DIASTOLIC BLOOD PRESSURE: 69 MMHG | BODY MASS INDEX: 38.09 KG/M2 | SYSTOLIC BLOOD PRESSURE: 138 MMHG

## 2019-12-20 DIAGNOSIS — E78.2 MIXED HYPERLIPIDEMIA: ICD-10-CM

## 2019-12-20 DIAGNOSIS — Z95.1 S/P CABG (CORONARY ARTERY BYPASS GRAFT): ICD-10-CM

## 2019-12-20 DIAGNOSIS — I10 ESSENTIAL HYPERTENSION: ICD-10-CM

## 2019-12-20 DIAGNOSIS — I25.10 CORONARY ARTERY DISEASE INVOLVING NATIVE CORONARY ARTERY OF NATIVE HEART WITHOUT ANGINA PECTORIS: Primary | Chronic | ICD-10-CM

## 2019-12-20 DIAGNOSIS — Z95.5 STENTED CORONARY ARTERY: ICD-10-CM

## 2019-12-20 DIAGNOSIS — I15.0 RENOVASCULAR HYPERTENSION: ICD-10-CM

## 2019-12-20 PROCEDURE — 99213 OFFICE O/P EST LOW 20 MIN: CPT | Mod: S$GLB,,, | Performed by: INTERNAL MEDICINE

## 2019-12-20 PROCEDURE — 3008F PR BODY MASS INDEX (BMI) DOCUMENTED: ICD-10-PCS | Mod: CPTII,S$GLB,, | Performed by: INTERNAL MEDICINE

## 2019-12-20 PROCEDURE — 3075F PR MOST RECENT SYSTOLIC BLOOD PRESS GE 130-139MM HG: ICD-10-PCS | Mod: CPTII,S$GLB,, | Performed by: INTERNAL MEDICINE

## 2019-12-20 PROCEDURE — 99999 PR PBB SHADOW E&M-EST. PATIENT-LVL III: ICD-10-PCS | Mod: PBBFAC,,, | Performed by: INTERNAL MEDICINE

## 2019-12-20 PROCEDURE — 3008F BODY MASS INDEX DOCD: CPT | Mod: CPTII,S$GLB,, | Performed by: INTERNAL MEDICINE

## 2019-12-20 PROCEDURE — 99999 PR PBB SHADOW E&M-EST. PATIENT-LVL III: CPT | Mod: PBBFAC,,, | Performed by: INTERNAL MEDICINE

## 2019-12-20 PROCEDURE — 3075F SYST BP GE 130 - 139MM HG: CPT | Mod: CPTII,S$GLB,, | Performed by: INTERNAL MEDICINE

## 2019-12-20 PROCEDURE — 3078F PR MOST RECENT DIASTOLIC BLOOD PRESSURE < 80 MM HG: ICD-10-PCS | Mod: CPTII,S$GLB,, | Performed by: INTERNAL MEDICINE

## 2019-12-20 PROCEDURE — 99213 PR OFFICE/OUTPT VISIT, EST, LEVL III, 20-29 MIN: ICD-10-PCS | Mod: S$GLB,,, | Performed by: INTERNAL MEDICINE

## 2019-12-20 PROCEDURE — 3078F DIAST BP <80 MM HG: CPT | Mod: CPTII,S$GLB,, | Performed by: INTERNAL MEDICINE

## 2019-12-20 RX ORDER — PANTOPRAZOLE SODIUM 40 MG/1
40 TABLET, DELAYED RELEASE ORAL DAILY
Qty: 30 TABLET | Refills: 11 | Status: SHIPPED | OUTPATIENT
Start: 2019-12-20 | End: 2020-06-04 | Stop reason: SDUPTHER

## 2019-12-20 NOTE — PROGRESS NOTES
Subjective:    Patient ID:  Josephine Bolton is a 54 y.o. female who presents for follow-up of CABG (coronary artery bypass graft) f/u; Shortness of Breath; and Chest Pain      HPI  Here for follow up of CAD-PCI ( 12/19).  States was doing very well when she had abrupt onset of chest pressure pain somewhat similar to her previous angina waxing waning for several hr thus went to the emergency room yesterday had 2-troponin negative ECG discharge home.  Pain is better today but still occurs while walking only mild in severity.  No relieved with nitro paste while in emergency department.  Does admit to heartburn and coughing for the past 36 hr.    Review of Systems   Constitution: Negative for malaise/fatigue.   Eyes: Negative for blurred vision.   Cardiovascular: Positive for chest pain and dyspnea on exertion. Negative for claudication, cyanosis, irregular heartbeat, leg swelling, near-syncope, orthopnea, palpitations, paroxysmal nocturnal dyspnea and syncope.   Respiratory: Positive for cough. Negative for shortness of breath.    Hematologic/Lymphatic: Does not bruise/bleed easily.   Musculoskeletal: Negative for back pain, falls, joint pain, muscle cramps, muscle weakness and myalgias.   Gastrointestinal: Negative for abdominal pain, change in bowel habit, nausea and vomiting.   Genitourinary: Negative for urgency.   Neurological: Negative for dizziness, focal weakness and light-headedness.       Past Medical History:   Diagnosis Date    Allergy     Anticoagulant long-term use     ASA 81mg, Effient    Anxiety     Arthritis     Asthma     As child    CAD (coronary artery disease) 01/27/2012    s/p stents x4 , CABG, 3 vessel, (5/2013) newest stent prior to CABG    Chronic constipation     Coronary artery disease involving native coronary artery of native heart without angina pectoris 1/27/2012 12/19 Significant ostial RCA lesion as above treated with drug-eluting stenting as above. Occluded proximal LAD  "with patent lima lad Patent vein graft to 2nd obtuse marginal Known occluded vein graft to unknown vessel.  s/p stents x 3 (2010) s/p LAD stent (DSE) 3/2012    DDD (degenerative disc disease), cervical     DDD (degenerative disc disease), lumbar     DDD (degenerative disc disease), thoracic     Depression     Edema     Encounter for blood transfusion     Headache(784.0)     History of nephrolithiasis     Hyperlipidemia     Hypertension     Hypothyroid 7/5/2012    Insomnia     Insulin resistance     patient stated not diebetic    Joint stiffness     Joint swelling     Kidney disease     ; Frequent UTIs     Kidney stones     Low back pain     Neck pain     Obstructive sleep apnea on CPAP 7/17/2012    PONV (postoperative nausea and vomiting)     S/P CABG (coronary artery bypass graft) 6/25/2013 6/23/2013     Sleep apnea 01/27/12    Patient reports "severe" sleep apnea, uses C-pap    Stented coronary artery 12/20/2019 12/19  ostial RCA -Osirio2.5 x 18 post dilated 2.75     Thoracic back pain           Objective:     Vitals:    12/20/19 1518   BP: 138/69   Pulse: 77        Physical Exam   Constitutional: She is oriented to person, place, and time. She appears well-developed and well-nourished.   Neck: Normal range of motion. No JVD present.   Cardiovascular: Normal rate, regular rhythm, normal heart sounds and intact distal pulses.   Well healed midline sternal incision.     Pulmonary/Chest: Effort normal and breath sounds normal.   Neurological: She is alert and oriented to person, place, and time.   Skin: Skin is warm and dry.   Psychiatric: She has a normal mood and affect.   Nursing note and vitals reviewed.            ..    Chemistry        Component Value Date/Time     12/19/2019 1553    K 3.3 (L) 12/19/2019 1553     12/19/2019 1553    CO2 30 12/19/2019 1553    BUN 15 12/19/2019 1553    CREATININE 0.76 12/19/2019 1553     12/19/2019 1553        Component " Value Date/Time    CALCIUM 9.6 12/19/2019 1553    ALKPHOS 89 12/19/2019 1553    AST 41 (H) 12/19/2019 1553    AST 25 03/24/2016 1316    ALT 19 12/19/2019 1553    BILITOT 0.6 12/19/2019 1553    ESTGFRAFRICA >60 12/19/2019 1553    EGFRNONAA >60 12/19/2019 1553            ..  Lab Results   Component Value Date    CHOL 147 12/13/2019    CHOL 189 05/21/2019    CHOL 193 10/15/2017     Lab Results   Component Value Date    HDL 35 (L) 12/13/2019    HDL 70 05/21/2019    HDL 43 10/15/2017     Lab Results   Component Value Date    LDLCALC 87.2 12/13/2019    LDLCALC 101 05/21/2019    LDLCALC 124.4 10/15/2017     Lab Results   Component Value Date    TRIG 124 12/13/2019    TRIG 95 05/21/2019    TRIG 128 10/15/2017     Lab Results   Component Value Date    CHOLHDL 23.8 12/13/2019    CHOLHDL 2.7 05/21/2019    CHOLHDL 22.3 10/15/2017     ..  Lab Results   Component Value Date    WBC 7.15 12/19/2019    HGB 12.0 12/19/2019    HCT 36.5 (L) 12/19/2019    MCV 86 12/19/2019     12/19/2019       Test(s) Reviewed  I have reviewed the following in detail:  [] Stress test   [x] Angiography   [x] Echocardiogram   [x] Labs   [x] Other:       Assessment:         ICD-10-CM ICD-9-CM   1. Coronary artery disease involving native coronary artery of native heart without angina pectoris I25.10 414.01   2. Stented coronary artery Z95.5 V45.82   3. Essential hypertension I10 401.9   4. Mixed hyperlipidemia E78.2 272.2   5. Renovascular hypertension I15.0 405.91   6. S/P CABG (coronary artery bypass graft) Z95.1 V45.81     Problem List Items Addressed This Visit     Stented coronary artery    Overview     12/19   ostial RCA -Osirio2.5 x 18 post dilated 2.75           S/P CABG (coronary artery bypass graft)    Overview     6/23/2013         Renovascular hypertension    Hypertension    Hyperlipidemia    Coronary artery disease involving native coronary artery of native heart without angina pectoris - Primary (Chronic)    Overview      12/19  Significant ostial RCA lesion as above treated with drug-eluting stenting as above.  Occluded proximal LAD with patent lima lad  Patent vein graft to 2nd obtuse marginal  Known occluded vein graft to unknown vessel.    s/p stents x 3 (2010)  s/p LAD stent (DSE) 3/2012                Plan:           Return to clinic 2 months with Dr. ALFARO  Low level/low impact aerobic exercise 5x's/wk. Heart healthy diet and risk factor modification.    See labs and med orders.  Will change from Effient to Brilinta for the next 2 months then revert back to Effient due to cost considerations.  Will start Protonix and Mylanta for the next month.  If symptoms persist she is to call back and may redo angiography concern is for InStent thrombosis although negative troponin negative ECG.  Patient does admit to increasing heartburn, cough will treat with above.    Portions of this note may have been created with voice recognition software.  Grammatical, syntax and spelling errors may be inevitable.

## 2019-12-23 ENCOUNTER — OFFICE VISIT (OUTPATIENT)
Dept: FAMILY MEDICINE | Facility: CLINIC | Age: 54
End: 2019-12-23
Payer: COMMERCIAL

## 2019-12-23 VITALS
TEMPERATURE: 98 F | DIASTOLIC BLOOD PRESSURE: 78 MMHG | BODY MASS INDEX: 37.85 KG/M2 | WEIGHT: 205.69 LBS | HEART RATE: 72 BPM | OXYGEN SATURATION: 98 % | HEIGHT: 62 IN | SYSTOLIC BLOOD PRESSURE: 138 MMHG

## 2019-12-23 DIAGNOSIS — F41.9 ANXIETY: ICD-10-CM

## 2019-12-23 DIAGNOSIS — I10 HYPERTENSION, UNSPECIFIED TYPE: ICD-10-CM

## 2019-12-23 DIAGNOSIS — R07.9 CHEST PAIN, UNSPECIFIED TYPE: Primary | ICD-10-CM

## 2019-12-23 DIAGNOSIS — F32.A DEPRESSION, UNSPECIFIED DEPRESSION TYPE: ICD-10-CM

## 2019-12-23 DIAGNOSIS — I25.10 CORONARY ARTERY DISEASE INVOLVING NATIVE CORONARY ARTERY OF NATIVE HEART WITHOUT ANGINA PECTORIS: ICD-10-CM

## 2019-12-23 PROBLEM — R06.09 DYSPNEA ON EXERTION: Status: ACTIVE | Noted: 2019-12-23

## 2019-12-23 PROBLEM — R06.02 SHORTNESS OF BREATH: Status: ACTIVE | Noted: 2019-12-23

## 2019-12-23 PROCEDURE — 99214 PR OFFICE/OUTPT VISIT, EST, LEVL IV, 30-39 MIN: ICD-10-PCS | Mod: S$GLB,,, | Performed by: FAMILY MEDICINE

## 2019-12-23 PROCEDURE — 99999 PR PBB SHADOW E&M-EST. PATIENT-LVL III: CPT | Mod: PBBFAC,,, | Performed by: FAMILY MEDICINE

## 2019-12-23 PROCEDURE — 3075F PR MOST RECENT SYSTOLIC BLOOD PRESS GE 130-139MM HG: ICD-10-PCS | Mod: CPTII,S$GLB,, | Performed by: FAMILY MEDICINE

## 2019-12-23 PROCEDURE — 3008F BODY MASS INDEX DOCD: CPT | Mod: CPTII,S$GLB,, | Performed by: FAMILY MEDICINE

## 2019-12-23 PROCEDURE — 3078F DIAST BP <80 MM HG: CPT | Mod: CPTII,S$GLB,, | Performed by: FAMILY MEDICINE

## 2019-12-23 PROCEDURE — 99999 PR PBB SHADOW E&M-EST. PATIENT-LVL III: ICD-10-PCS | Mod: PBBFAC,,, | Performed by: FAMILY MEDICINE

## 2019-12-23 PROCEDURE — 3078F PR MOST RECENT DIASTOLIC BLOOD PRESSURE < 80 MM HG: ICD-10-PCS | Mod: CPTII,S$GLB,, | Performed by: FAMILY MEDICINE

## 2019-12-23 PROCEDURE — 3075F SYST BP GE 130 - 139MM HG: CPT | Mod: CPTII,S$GLB,, | Performed by: FAMILY MEDICINE

## 2019-12-23 PROCEDURE — 3008F PR BODY MASS INDEX (BMI) DOCUMENTED: ICD-10-PCS | Mod: CPTII,S$GLB,, | Performed by: FAMILY MEDICINE

## 2019-12-23 PROCEDURE — 99214 OFFICE O/P EST MOD 30 MIN: CPT | Mod: S$GLB,,, | Performed by: FAMILY MEDICINE

## 2019-12-23 NOTE — PROGRESS NOTES
Subjective:       Patient ID: Josephine Bolton is a 54 y.o. female.    Chief Complaint: Follow-up    HPI     Here for a f/u.     S/p drug eluting stent in ostial rca via heart cath on 12/13/19. Developed left sided chest pain on 12/18/19. Went to [facility] ER and given imdur. At ER, no ekg changes and neg troponins. F/ued with her cardiologist on 12/20/19. Was told to stop the effient. Started on bricinta.    Reports less severe chest pain and less frequent. Reports pain with inspiration.     Depression and anxiety stable while on effexor xr.       Review of Systems      Review of Systems   Constitutional: Negative for fever and chills.   HENT: Negative for hearing loss and neck stiffness.    Eyes: Negative for redness and itching.   Respiratory: Negative for cough and choking.    Cardiovascular: Negative for leg swelling.  Abdomen: Negative for abdominal pain and blood in stool.   Genitourinary: Negative for dysuria and flank pain.   Musculoskeletal: Negative for back pain and gait problem.   Neurological: Negative for light-headedness and headaches.   Hematological: Negative for adenopathy.   Psychiatric/Behavioral: Negative for behavioral problems.     Objective:      Physical Exam   Constitutional: She appears well-developed.   HENT:   Head: Normocephalic and atraumatic.   Eyes: Pupils are equal, round, and reactive to light. Conjunctivae are normal.   Neck: Normal range of motion.   Cardiovascular: Normal rate and regular rhythm.   No murmur heard.  Pulmonary/Chest: Effort normal and breath sounds normal.   Lymphadenopathy:     She has no cervical adenopathy.       Assessment:       1. Chest pain, unspecified type    2. Hypertension, unspecified type    3. Coronary artery disease involving native coronary artery of native heart without angina pectoris    4. Depression, unspecified depression type    5. Anxiety        Plan:       Chest pain, unspecified type    Hypertension, unspecified type    Coronary artery  disease involving native coronary artery of native heart without angina pectoris    Depression, unspecified depression type    Anxiety                Plan:  Chest pain slowly resolving  Cont current meds        Medication List with Changes/Refills   Current Medications    ACETAMINOPHEN (TYLENOL) 500 MG TABLET    Take 2 tablets (1,000 mg total) by mouth every 8 (eight) hours.    ALBUTEROL (PROVENTIL HFA) 90 MCG/ACTUATION INHALER    Inhale 2 puffs into the lungs.    ALLOPURINOL (ZYLOPRIM) 100 MG TABLET    Take 1 tablet (100 mg total) by mouth once daily.    AMLODIPINE (NORVASC) 5 MG TABLET    Take 1 tablet (5 mg total) by mouth once daily.    ASPIRIN (ECOTRIN) 81 MG EC TABLET    Take 1 tablet (81 mg total) by mouth once daily.    ATORVASTATIN (LIPITOR) 20 MG TABLET    Take 1 tablet (20 mg total) by mouth once daily.    BLOOD-GLUCOSE METER (GLUCOSE MONITORING KIT) KIT    Use as instructed    CARVEDILOL (COREG) 25 MG TABLET    Take 1 tablet (25 mg total) by mouth 2 (two) times daily.    CETIRIZINE (ZYRTEC) 10 MG TABLET    Take 10 mg by mouth daily as needed for Allergies.    FLUTICASONE (FLONASE) 50 MCG/ACTUATION NASAL SPRAY    Use 2 sprays in each nostril once daily.    FUROSEMIDE (LASIX) 20 MG TABLET    Take 1 tablet (20 mg total) by mouth once daily.    GABAPENTIN (NEURONTIN) 100 MG CAPSULE    Take 1 capsule (100 mg total) by mouth 2 (two) times daily.    ISOSORBIDE MONONITRATE (IMDUR) 30 MG 24 HR TABLET    Take 1 tablet (30 mg total) by mouth once daily. for 15 days    LANCETS MISC    1 Units by Misc.(Non-Drug; Combo Route) route 2 (two) times daily as needed.    LEVOTHYROXINE (SYNTHROID, LEVOTHROID) 175 MCG TABLET    Take 175 mcg by mouth every Mon, Tues, Wed, Thurs, Fri.    LEVOTHYROXINE (SYNTHROID, LEVOTHROID) 175 MCG TABLET    Take 175 mcg by mouth every Saturday.    LEVOTHYROXINE (SYNTHROID, LEVOTHROID) 175 MCG TABLET    Take 87.5 mcg by mouth every Sunday.    METFORMIN (GLUCOPHAGE) 1000 MG TABLET    Take 1  tablet (1,000 mg total) by mouth 2 (two) times daily with meals. Resume 12/15    NITROGLYCERIN (NITROSTAT) 0.4 MG SL TABLET    Place 1 tablet (0.4 mg total) under the tongue every 5 (five) minutes as needed for Chest pain.    PANTOPRAZOLE (PROTONIX) 40 MG TABLET    Take 1 tablet (40 mg total) by mouth once daily.    RAMIPRIL (ALTACE) 10 MG CAPSULE    Take 1 capsule (10 mg total) by mouth once daily.    TICAGRELOR (BRILINTA) 90 MG TABLET    Take 1 tablet (90 mg total) by mouth 2 (two) times daily.    VENLAFAXINE (EFFEXOR-XR) 150 MG CP24    Take 1 capsule (150 mg total) by mouth once daily.   Discontinued Medications    PRASUGREL (EFFIENT) 10 MG TAB    TAKE 1 TABLET BY MOUTH EVERY MORNING

## 2019-12-26 ENCOUNTER — TELEPHONE (OUTPATIENT)
Dept: ENDOCRINOLOGY | Facility: CLINIC | Age: 54
End: 2019-12-26

## 2019-12-26 DIAGNOSIS — E03.9 HYPOTHYROIDISM, UNSPECIFIED TYPE: Primary | ICD-10-CM

## 2019-12-26 NOTE — TELEPHONE ENCOUNTER
S/w pt. States she was admitted to the hospital about 2-3 days ago due to complications after stent placement on the 13th. Dr. Remy who discharged her at [facility] advised her to start taking 1/2 of dose of Synthroid and she was not comfortable w/ this, wanted to consult you before making any changes. She has orders from Dr. Remy to check TSH, T4 free and renal function panel tomorrow at the outpatient pavilion. Please advise.

## 2019-12-26 NOTE — TELEPHONE ENCOUNTER
----- Message from Ksenia Freeman sent at 12/26/2019 11:01 AM CST -----  Type: Needs Medical Advice    Who Called:  Self   Symptoms (please be specific):  How long has patient had these symptoms:  [facility] phone [facility]:  [facility]   Best Call Back Number: [phone]  Additional Information: Patient was discharged from [facility]. Pt was advised to take half of the prescribed thyroid medication. Patient asking for advise from the doctor. Patient have ordered labs from the hospital ist  in the system for tsh, t4 and renal function test. Patient asking if she should get labs done.

## 2019-12-27 ENCOUNTER — TELEPHONE (OUTPATIENT)
Dept: ENDOCRINOLOGY | Facility: CLINIC | Age: 54
End: 2019-12-27

## 2019-12-27 DIAGNOSIS — E03.9 HYPOTHYROIDISM, UNSPECIFIED TYPE: Primary | ICD-10-CM

## 2019-12-27 RX ORDER — LEVOTHYROXINE SODIUM 137 UG/1
137 TABLET ORAL
Qty: 30 TABLET | Refills: 11 | Status: SHIPPED | OUTPATIENT
Start: 2019-12-27 | End: 2020-02-09

## 2019-12-27 NOTE — TELEPHONE ENCOUNTER
Let her know to reduce synthroid dose from 175 mcg 6 days a week to 137 mcg once daily.     Check TSH, Ft4 in 4 weeks

## 2019-12-27 NOTE — TELEPHONE ENCOUNTER
Spoke to pt and adv of Dr Aguilera's previous message. Scheduled labs, adv date and location, voiced understanding.

## 2020-01-01 NOTE — TELEPHONE ENCOUNTER
----- Message from Lucas Garcia sent at 11/9/2017 11:20 AM CST -----  Contact: [facility]  Sia called and wanted to forward her fax number to office.  Fax number is [phone] and call back number is [phone].   Statement Selected

## 2020-01-02 ENCOUNTER — OFFICE VISIT (OUTPATIENT)
Dept: CARDIOLOGY | Facility: CLINIC | Age: 55
End: 2020-01-02
Payer: COMMERCIAL

## 2020-01-02 VITALS
HEIGHT: 62 IN | DIASTOLIC BLOOD PRESSURE: 70 MMHG | HEART RATE: 83 BPM | BODY MASS INDEX: 37.73 KG/M2 | SYSTOLIC BLOOD PRESSURE: 145 MMHG | WEIGHT: 205 LBS

## 2020-01-02 DIAGNOSIS — I25.10 CORONARY ARTERY DISEASE INVOLVING NATIVE CORONARY ARTERY OF NATIVE HEART WITHOUT ANGINA PECTORIS: Primary | Chronic | ICD-10-CM

## 2020-01-02 DIAGNOSIS — I10 ESSENTIAL HYPERTENSION: ICD-10-CM

## 2020-01-02 DIAGNOSIS — Z95.1 S/P CABG (CORONARY ARTERY BYPASS GRAFT): ICD-10-CM

## 2020-01-02 DIAGNOSIS — E78.2 MIXED HYPERLIPIDEMIA: ICD-10-CM

## 2020-01-02 PROCEDURE — 99999 PR PBB SHADOW E&M-EST. PATIENT-LVL III: CPT | Mod: PBBFAC,,, | Performed by: INTERNAL MEDICINE

## 2020-01-02 PROCEDURE — 99999 PR PBB SHADOW E&M-EST. PATIENT-LVL III: ICD-10-PCS | Mod: PBBFAC,,, | Performed by: INTERNAL MEDICINE

## 2020-01-02 PROCEDURE — 3078F DIAST BP <80 MM HG: CPT | Mod: CPTII,S$GLB,, | Performed by: INTERNAL MEDICINE

## 2020-01-02 PROCEDURE — 3008F PR BODY MASS INDEX (BMI) DOCUMENTED: ICD-10-PCS | Mod: CPTII,S$GLB,, | Performed by: INTERNAL MEDICINE

## 2020-01-02 PROCEDURE — 99214 PR OFFICE/OUTPT VISIT, EST, LEVL IV, 30-39 MIN: ICD-10-PCS | Mod: S$GLB,,, | Performed by: INTERNAL MEDICINE

## 2020-01-02 PROCEDURE — 3008F BODY MASS INDEX DOCD: CPT | Mod: CPTII,S$GLB,, | Performed by: INTERNAL MEDICINE

## 2020-01-02 PROCEDURE — 3077F SYST BP >= 140 MM HG: CPT | Mod: CPTII,S$GLB,, | Performed by: INTERNAL MEDICINE

## 2020-01-02 PROCEDURE — 99214 OFFICE O/P EST MOD 30 MIN: CPT | Mod: S$GLB,,, | Performed by: INTERNAL MEDICINE

## 2020-01-02 PROCEDURE — 3078F PR MOST RECENT DIASTOLIC BLOOD PRESSURE < 80 MM HG: ICD-10-PCS | Mod: CPTII,S$GLB,, | Performed by: INTERNAL MEDICINE

## 2020-01-02 PROCEDURE — 3077F PR MOST RECENT SYSTOLIC BLOOD PRESSURE >= 140 MM HG: ICD-10-PCS | Mod: CPTII,S$GLB,, | Performed by: INTERNAL MEDICINE

## 2020-01-03 NOTE — PROGRESS NOTES
Subjective:    Patient ID:  Josephine Bolton is a 54 y.o. female who presents for follow-up of cad    HPI  Admitted to [facility] last month with chest pain had pci of RCA.  Returned a week later with chest pain, medical rx  She comes with no complaints, no chest pain, no shortness of breath  Significant bruises from brilinta    Review of Systems   Constitution: Negative for decreased appetite, malaise/fatigue, weight gain and weight loss.   Cardiovascular: Negative for chest pain, dyspnea on exertion, leg swelling, palpitations and syncope.   Respiratory: Negative for cough and shortness of breath.    Gastrointestinal: Negative.    Neurological: Negative for weakness.   All other systems reviewed and are negative.       Objective:      Physical Exam   Constitutional: She is oriented to person, place, and time. She appears well-developed and well-nourished.   HENT:   Head: Normocephalic.   Eyes: Pupils are equal, round, and reactive to light.   Neck: Normal range of motion. Neck supple. No JVD present. Carotid bruit is not present. No thyromegaly present.   Cardiovascular: Normal rate, regular rhythm, normal heart sounds, intact distal pulses and normal pulses. PMI is not displaced. Exam reveals no gallop.   No murmur heard.  Pulmonary/Chest: Effort normal and breath sounds normal.   Abdominal: Soft. Normal appearance. She exhibits no mass. There is no hepatosplenomegaly. There is no tenderness.   Musculoskeletal: Normal range of motion. She exhibits no edema.   Neurological: She is alert and oriented to person, place, and time. She has normal strength and normal reflexes. No sensory deficit.   Skin: Skin is warm and intact.   Psychiatric: She has a normal mood and affect.   Nursing note and vitals reviewed.        Assessment:       1. Coronary artery disease involving native coronary artery of native heart without angina pectoris    2. S/P CABG (coronary artery bypass graft)    3. Essential hypertension    4. Mixed  hyperlipidemia         Plan:   ASA m-w-f  Continue all cardiac medications  Regular exercise program  Weight loss  3 m f/u

## 2020-01-10 ENCOUNTER — OFFICE VISIT (OUTPATIENT)
Dept: FAMILY MEDICINE | Facility: CLINIC | Age: 55
End: 2020-01-10
Payer: COMMERCIAL

## 2020-01-10 VITALS
TEMPERATURE: 98 F | SYSTOLIC BLOOD PRESSURE: 132 MMHG | OXYGEN SATURATION: 97 % | BODY MASS INDEX: 37.04 KG/M2 | WEIGHT: 201.25 LBS | HEART RATE: 88 BPM | DIASTOLIC BLOOD PRESSURE: 70 MMHG | HEIGHT: 62 IN

## 2020-01-10 DIAGNOSIS — R52 BODY ACHES: ICD-10-CM

## 2020-01-10 DIAGNOSIS — I10 ESSENTIAL HYPERTENSION: ICD-10-CM

## 2020-01-10 DIAGNOSIS — B96.89 BACTERIAL SINUSITIS: Primary | ICD-10-CM

## 2020-01-10 DIAGNOSIS — E66.01 MORBID OBESITY: ICD-10-CM

## 2020-01-10 DIAGNOSIS — J32.9 BACTERIAL SINUSITIS: Primary | ICD-10-CM

## 2020-01-10 LAB
INFLUENZA A, MOLECULAR: NEGATIVE
INFLUENZA B, MOLECULAR: NEGATIVE
SPECIMEN SOURCE: NORMAL

## 2020-01-10 PROCEDURE — 3078F PR MOST RECENT DIASTOLIC BLOOD PRESSURE < 80 MM HG: ICD-10-PCS | Mod: CPTII,S$GLB,, | Performed by: NURSE PRACTITIONER

## 2020-01-10 PROCEDURE — 87502 INFLUENZA DNA AMP PROBE: CPT | Mod: PO

## 2020-01-10 PROCEDURE — 3075F PR MOST RECENT SYSTOLIC BLOOD PRESS GE 130-139MM HG: ICD-10-PCS | Mod: CPTII,S$GLB,, | Performed by: NURSE PRACTITIONER

## 2020-01-10 PROCEDURE — 99214 OFFICE O/P EST MOD 30 MIN: CPT | Mod: 25,S$GLB,, | Performed by: NURSE PRACTITIONER

## 2020-01-10 PROCEDURE — 96372 PR INJECTION,THERAP/PROPH/DIAG2ST, IM OR SUBCUT: ICD-10-PCS | Mod: S$GLB,,, | Performed by: NURSE PRACTITIONER

## 2020-01-10 PROCEDURE — 3008F PR BODY MASS INDEX (BMI) DOCUMENTED: ICD-10-PCS | Mod: CPTII,S$GLB,, | Performed by: NURSE PRACTITIONER

## 2020-01-10 PROCEDURE — 3075F SYST BP GE 130 - 139MM HG: CPT | Mod: CPTII,S$GLB,, | Performed by: NURSE PRACTITIONER

## 2020-01-10 PROCEDURE — 96372 THER/PROPH/DIAG INJ SC/IM: CPT | Mod: S$GLB,,, | Performed by: NURSE PRACTITIONER

## 2020-01-10 PROCEDURE — 99999 PR PBB SHADOW E&M-EST. PATIENT-LVL III: CPT | Mod: PBBFAC,,, | Performed by: NURSE PRACTITIONER

## 2020-01-10 PROCEDURE — 3008F BODY MASS INDEX DOCD: CPT | Mod: CPTII,S$GLB,, | Performed by: NURSE PRACTITIONER

## 2020-01-10 PROCEDURE — 99999 PR PBB SHADOW E&M-EST. PATIENT-LVL III: ICD-10-PCS | Mod: PBBFAC,,, | Performed by: NURSE PRACTITIONER

## 2020-01-10 PROCEDURE — 99214 PR OFFICE/OUTPT VISIT, EST, LEVL IV, 30-39 MIN: ICD-10-PCS | Mod: 25,S$GLB,, | Performed by: NURSE PRACTITIONER

## 2020-01-10 PROCEDURE — 3078F DIAST BP <80 MM HG: CPT | Mod: CPTII,S$GLB,, | Performed by: NURSE PRACTITIONER

## 2020-01-10 RX ORDER — DOXYCYCLINE HYCLATE 100 MG
100 TABLET ORAL EVERY 12 HOURS
Qty: 14 TABLET | Refills: 0 | Status: SHIPPED | OUTPATIENT
Start: 2020-01-10 | End: 2020-01-17

## 2020-01-10 RX ORDER — BETAMETHASONE SODIUM PHOSPHATE AND BETAMETHASONE ACETATE 3; 3 MG/ML; MG/ML
6 INJECTION, SUSPENSION INTRA-ARTICULAR; INTRALESIONAL; INTRAMUSCULAR; SOFT TISSUE
Status: COMPLETED | OUTPATIENT
Start: 2020-01-10 | End: 2020-01-10

## 2020-01-10 RX ORDER — PROMETHAZINE HYDROCHLORIDE AND DEXTROMETHORPHAN HYDROBROMIDE 6.25; 15 MG/5ML; MG/5ML
5 SYRUP ORAL NIGHTLY PRN
Qty: 118 ML | Refills: 0 | Status: SHIPPED | OUTPATIENT
Start: 2020-01-10 | End: 2020-01-20

## 2020-01-10 RX ADMIN — BETAMETHASONE SODIUM PHOSPHATE AND BETAMETHASONE ACETATE 6 MG: 3; 3 INJECTION, SUSPENSION INTRA-ARTICULAR; INTRALESIONAL; INTRAMUSCULAR; SOFT TISSUE at 02:01

## 2020-01-10 NOTE — PROGRESS NOTES
Subjective:       Patient ID: Josephine Bolton is a 54 y.o. female.    Chief Complaint: Shortness of Breath; Cough; and Generalized Body Aches    Sinus Problem   This is a new problem. The current episode started 1 to 4 weeks ago. The problem has been gradually worsening since onset. Associated symptoms include chills, congestion, coughing, diaphoresis, headaches and sinus pressure. Pertinent negatives include no shortness of breath. (Green/yellow mucus) Treatments tried: mucinex. The treatment provided no relief.     Vitals:    01/10/20 1400   BP: 132/70   Pulse: 88   Temp: 98.1 °F (36.7 °C)     Review of Systems   Constitutional: Positive for chills, diaphoresis and fatigue. Negative for fever.   HENT: Positive for congestion, postnasal drip, sinus pressure and sinus pain. Negative for facial swelling and trouble swallowing.    Eyes: Negative for discharge and redness.   Respiratory: Positive for cough. Negative for shortness of breath.    Cardiovascular: Negative for chest pain and palpitations.   Gastrointestinal: Negative for abdominal pain and diarrhea.   Genitourinary: Negative for difficulty urinating.   Musculoskeletal: Positive for myalgias. Negative for gait problem.   Skin: Negative for pallor and rash.   Neurological: Positive for headaches. Negative for facial asymmetry and speech difficulty.   Psychiatric/Behavioral: Negative for confusion. The patient is not nervous/anxious.        Past Medical History:   Diagnosis Date    Allergy     Anticoagulant long-term use     ASA 81mg, Effient    Anxiety     Arthritis     Asthma     As child    CAD (coronary artery disease) 01/27/2012    s/p stents x4 , CABG, 3 vessel, (5/2013) newest stent prior to CABG    Chronic constipation     Coronary artery disease involving native coronary artery of native heart without angina pectoris 1/27/2012 12/19 Significant ostial RCA lesion as above treated with drug-eluting stenting as above. Occluded proximal  "LAD with patent lima lad Patent vein graft to 2nd obtuse marginal Known occluded vein graft to unknown vessel.  s/p stents x 3 (2010) s/p LAD stent (DSE) 3/2012    DDD (degenerative disc disease), cervical     DDD (degenerative disc disease), lumbar     DDD (degenerative disc disease), thoracic     Depression     Edema     Encounter for blood transfusion     Headache(784.0)     History of nephrolithiasis     Hyperlipidemia     Hypertension     Hypothyroid 7/5/2012    Insomnia     Insulin resistance     patient stated not diebetic    Joint stiffness     Joint swelling     Kidney disease     ; Frequent UTIs     Kidney stones     Low back pain     Neck pain     Obstructive sleep apnea on CPAP 7/17/2012    PONV (postoperative nausea and vomiting)     S/P CABG (coronary artery bypass graft) 6/25/2013 6/23/2013     Sleep apnea 01/27/12    Patient reports "severe" sleep apnea, uses C-pap    Stented coronary artery 12/20/2019 12/19  ostial RCA -Osirio2.5 x 18 post dilated 2.75     Thoracic back pain      Objective:      Physical Exam   Constitutional: She is oriented to person, place, and time. She does not have a sickly appearance. No distress.   HENT:   Head: Normocephalic.   Right Ear: Hearing normal. A middle ear effusion is present.   Left Ear: Hearing normal. A middle ear effusion is present.   Nose: Mucosal edema present. Right sinus exhibits maxillary sinus tenderness. Left sinus exhibits maxillary sinus tenderness.   Mouth/Throat: Uvula is midline, oropharynx is clear and moist and mucous membranes are normal.   Eyes: Conjunctivae and lids are normal.   Neck: No JVD present. No tracheal deviation present.   Cardiovascular: Normal rate and normal heart sounds.   Pulmonary/Chest: Effort normal and breath sounds normal.   Musculoskeletal: She exhibits no deformity.   Neurological: She is alert and oriented to person, place, and time.   Skin: She is not diaphoretic. No pallor. "   Psychiatric: She has a normal mood and affect. Her speech is normal and behavior is normal. Judgment and thought content normal. Cognition and memory are normal.   Nursing note and vitals reviewed.      Assessment:       1. Bacterial sinusitis    2. Body aches    3. Essential hypertension    4. Morbid obesity        Plan:       Bacterial sinusitis  -     doxycycline (VIBRA-TABS) 100 MG tablet; Take 1 tablet (100 mg total) by mouth every 12 (twelve) hours. for 7 days  Dispense: 14 tablet; Refill: 0  -     promethazine-dextromethorphan (PROMETHAZINE-DM) 6.25-15 mg/5 mL Syrp; Take 5 mLs by mouth nightly as needed.  Dispense: 118 mL; Refill: 0  -     betamethasone acetate-betamethasone sodium phosphate injection 6 mg    Body aches  -     Influenza A & B by Molecular    Essential hypertension    Morbid obesity    educated on supportive care, return precautions         Follow up for further evaluation if s/s worsen, fail to improve, or new symptoms arise.    Medication List with Changes/Refills   New Medications    DOXYCYCLINE (VIBRA-TABS) 100 MG TABLET    Take 1 tablet (100 mg total) by mouth every 12 (twelve) hours. for 7 days    PROMETHAZINE-DEXTROMETHORPHAN (PROMETHAZINE-DM) 6.25-15 MG/5 ML SYRP    Take 5 mLs by mouth nightly as needed.   Current Medications    ACETAMINOPHEN (TYLENOL) 500 MG TABLET    Take 2 tablets (1,000 mg total) by mouth every 8 (eight) hours.    ALBUTEROL (PROVENTIL HFA) 90 MCG/ACTUATION INHALER    Inhale 2 puffs into the lungs.    ALLOPURINOL (ZYLOPRIM) 100 MG TABLET    Take 1 tablet (100 mg total) by mouth once daily.    AMLODIPINE (NORVASC) 5 MG TABLET    Take 1 tablet (5 mg total) by mouth once daily.    ASPIRIN (ECOTRIN) 81 MG EC TABLET    Take 1 tablet (81 mg total) by mouth once daily.    ATORVASTATIN (LIPITOR) 40 MG TABLET    Take 1 tablet (40 mg total) by mouth every evening.    BLOOD-GLUCOSE METER (GLUCOSE MONITORING KIT) KIT    Use as instructed    CARVEDILOL (COREG) 25 MG TABLET     Take 1 tablet (25 mg total) by mouth 2 (two) times daily.    CETIRIZINE (ZYRTEC) 10 MG TABLET    Take 10 mg by mouth daily as needed for Allergies.    GABAPENTIN (NEURONTIN) 100 MG CAPSULE    Take 1 capsule (100 mg total) by mouth 2 (two) times daily.    ISOSORBIDE MONONITRATE (IMDUR) 30 MG 24 HR TABLET    Take 1 tablet (30 mg total) by mouth once daily. for 15 days    LANCETS MISC    1 Units by Misc.(Non-Drug; Combo Route) route 2 (two) times daily as needed.    LEVOTHYROXINE (SYNTHROID) 137 MCG TAB TABLET    Take 1 tablet (137 mcg total) by mouth before breakfast.    METFORMIN (GLUCOPHAGE) 1000 MG TABLET    Take 1 tablet (1,000 mg total) by mouth 2 (two) times daily with meals. Resume 12/15    NITROGLYCERIN (NITROSTAT) 0.4 MG SL TABLET    Place 1 tablet (0.4 mg total) under the tongue every 5 (five) minutes as needed for Chest pain.    PANTOPRAZOLE (PROTONIX) 40 MG TABLET    Take 1 tablet (40 mg total) by mouth once daily.    POLYETHYLENE GLYCOL (GLYCOLAX) 17 GRAM PWPK    Take 17 g by mouth once daily.    RAMIPRIL (ALTACE) 10 MG CAPSULE    Take 1 capsule (10 mg total) by mouth once daily.    RANOLAZINE (RANEXA) 500 MG TB12    Take 1 tablet (500 mg total) by mouth 2 (two) times daily.    TICAGRELOR (BRILINTA) 90 MG TABLET    Take 1 tablet (90 mg total) by mouth 2 (two) times daily.    VENLAFAXINE (EFFEXOR-XR) 150 MG CP24    Take 1 capsule (150 mg total) by mouth once daily.

## 2020-01-17 DIAGNOSIS — I10 HYPERTENSION, UNSPECIFIED TYPE: ICD-10-CM

## 2020-01-17 RX ORDER — METFORMIN HYDROCHLORIDE 1000 MG/1
TABLET ORAL
Qty: 60 TABLET | Refills: 6 | Status: SHIPPED | OUTPATIENT
Start: 2020-01-17 | End: 2020-07-28

## 2020-01-20 DIAGNOSIS — I15.0 RENAL ARTERIAL HYPERTENSION: ICD-10-CM

## 2020-01-20 DIAGNOSIS — I25.10 CORONARY ARTERY DISEASE INVOLVING NATIVE CORONARY ARTERY OF NATIVE HEART WITHOUT ANGINA PECTORIS: ICD-10-CM

## 2020-01-20 RX ORDER — AMLODIPINE BESYLATE 5 MG/1
TABLET ORAL
Qty: 90 TABLET | Refills: 3 | Status: SHIPPED | OUTPATIENT
Start: 2020-01-20 | End: 2020-06-11

## 2020-01-20 RX ORDER — CARVEDILOL 25 MG/1
25 TABLET ORAL 2 TIMES DAILY
Qty: 180 TABLET | Refills: 3 | Status: SHIPPED | OUTPATIENT
Start: 2020-01-20 | End: 2020-08-22

## 2020-01-20 NOTE — PROGRESS NOTES
Refill Authorization Note     is requesting a refill authorization.    Brief assessment and rationale for refill: APPROVE: prr                                         Comments:   Requested Prescriptions   Pending Prescriptions Disp Refills    amLODIPine (NORVASC) 5 MG tablet [Pharmacy Med Name: AMLODIPINE 5MG@@] 90 tablet 3     Sig: TAKE 1 TABLET BY MOUTH ONCE DAILY       Cardiovascular:  Calcium Channel Blockers Passed - 1/17/2020  1:56 PM        Passed - Patient is at least 18 years old        Passed - Last BP in normal range within 360 days     BP Readings from Last 3 Encounters:   01/10/20 132/70   01/02/20 (!) 145/70   12/25/19 (!) 151/60              Passed - Office visit in past 12 months or future 90 days     Recent Outpatient Visits            1 week ago Bacterial sinusitis    [facility] Smitha Kate NP    2 weeks ago Coronary artery disease involving native coronary artery of native heart without angina pectoris    [facility] Obdulio Abbasi MD    4 weeks ago Chest pain, unspecified type    [facility] Froylan Smart MD    1 month ago Coronary artery disease involving native coronary artery of native heart without angina pectoris    [facility] Timi Millan MD    2 months ago Bruise of breast    [facility] Froylan Smart MD          Future Appointments              In 1 week LABORATORY, [place] [facility]    In 2 weeks [facility]    In 1 month Froylan Smart MD [facility]    In 2 months [facility]dul[facility]asi [facility]

## 2020-02-05 ENCOUNTER — OFFICE VISIT (OUTPATIENT)
Dept: ENDOCRINOLOGY | Facility: CLINIC | Age: 55
End: 2020-02-05
Payer: COMMERCIAL

## 2020-02-05 ENCOUNTER — LAB VISIT (OUTPATIENT)
Dept: LAB | Facility: HOSPITAL | Age: 55
End: 2020-02-05
Attending: INTERNAL MEDICINE
Payer: COMMERCIAL

## 2020-02-05 VITALS
WEIGHT: 202 LBS | HEART RATE: 79 BPM | HEIGHT: 62 IN | BODY MASS INDEX: 37.17 KG/M2 | OXYGEN SATURATION: 96 % | DIASTOLIC BLOOD PRESSURE: 60 MMHG | SYSTOLIC BLOOD PRESSURE: 110 MMHG

## 2020-02-05 DIAGNOSIS — E66.01 MORBID OBESITY: ICD-10-CM

## 2020-02-05 DIAGNOSIS — E03.9 HYPOTHYROIDISM, UNSPECIFIED TYPE: Primary | ICD-10-CM

## 2020-02-05 DIAGNOSIS — E88.819 INSULIN RESISTANCE: ICD-10-CM

## 2020-02-05 DIAGNOSIS — I10 BENIGN ESSENTIAL HTN: ICD-10-CM

## 2020-02-05 DIAGNOSIS — E03.9 HYPOTHYROIDISM, UNSPECIFIED TYPE: ICD-10-CM

## 2020-02-05 PROCEDURE — 3074F SYST BP LT 130 MM HG: CPT | Mod: CPTII,S$GLB,, | Performed by: INTERNAL MEDICINE

## 2020-02-05 PROCEDURE — 84443 ASSAY THYROID STIM HORMONE: CPT

## 2020-02-05 PROCEDURE — 36415 COLL VENOUS BLD VENIPUNCTURE: CPT | Mod: PO

## 2020-02-05 PROCEDURE — 3008F PR BODY MASS INDEX (BMI) DOCUMENTED: ICD-10-PCS | Mod: CPTII,S$GLB,, | Performed by: INTERNAL MEDICINE

## 2020-02-05 PROCEDURE — 3078F PR MOST RECENT DIASTOLIC BLOOD PRESSURE < 80 MM HG: ICD-10-PCS | Mod: CPTII,S$GLB,, | Performed by: INTERNAL MEDICINE

## 2020-02-05 PROCEDURE — 84439 ASSAY OF FREE THYROXINE: CPT

## 2020-02-05 PROCEDURE — 3008F BODY MASS INDEX DOCD: CPT | Mod: CPTII,S$GLB,, | Performed by: INTERNAL MEDICINE

## 2020-02-05 PROCEDURE — 3074F PR MOST RECENT SYSTOLIC BLOOD PRESSURE < 130 MM HG: ICD-10-PCS | Mod: CPTII,S$GLB,, | Performed by: INTERNAL MEDICINE

## 2020-02-05 PROCEDURE — 99999 PR PBB SHADOW E&M-EST. PATIENT-LVL III: ICD-10-PCS | Mod: PBBFAC,,, | Performed by: INTERNAL MEDICINE

## 2020-02-05 PROCEDURE — 99214 PR OFFICE/OUTPT VISIT, EST, LEVL IV, 30-39 MIN: ICD-10-PCS | Mod: S$GLB,,, | Performed by: INTERNAL MEDICINE

## 2020-02-05 PROCEDURE — 99999 PR PBB SHADOW E&M-EST. PATIENT-LVL III: CPT | Mod: PBBFAC,,, | Performed by: INTERNAL MEDICINE

## 2020-02-05 PROCEDURE — 99214 OFFICE O/P EST MOD 30 MIN: CPT | Mod: S$GLB,,, | Performed by: INTERNAL MEDICINE

## 2020-02-05 PROCEDURE — 3078F DIAST BP <80 MM HG: CPT | Mod: CPTII,S$GLB,, | Performed by: INTERNAL MEDICINE

## 2020-02-05 NOTE — PROGRESS NOTES
CHIEF COMPLAINT: Hypothyroidism   54 year old. She originally had a thyroidectomy 15 years ago. Appears she still had thyroid tissue and had a completion thyroidectomy 14 years ago. On synthroid 137 mcg daily. On generic. On metformin 1000 BID.  Has been off LT4 x 4  Days. Had a cardiac cath 19. No palpitations. No tremors.         PAST MEDICAL HISTORY: Hypothyroidism, anxiety, CAD with stents, degenerative disc disease. HTN     PAST SURGICAL HISTORY: Tonsillectomy, LILLIAN, laminectomy, carpal tunnel release, . Thyroidectomy with a completion thyroidectomy 14 years ago. CABG     SOCIAL HISTORY: No T/A     FAMILY HISTORY: No thyroid disease or thyroid cancer. + Heart disease.     MEDICATIONS/ALLERGIES: The patient's MedCard has been updated and reviewed.     ROS:   Constitutional: weight decreased  Eyes: No recent visual changes   ENT: No dysphagia   Cardiovascular: In Dec had another cardiac stent placed. Occasional Angina. Seeing cardiology  Respiratory: No SOB  Gastrointestinal: No N/V   GenitoUrinary - No dysuria   Skin: No new skin rash   Neurologic: No focal neurologic complaints   Remainder ROS negative         PE:   GENERAL: Well developed, well nourished.   NECK: Supple, trachea midline. Neck brace in place  CHEST: Resp even and unlabored, CTA bilateral.   CARDIAC: RRR, S1, S2 heard, no murmurs, rubs, S3, or S4     Results for VLADIMIR FAUSTIN (MRN [id number]) as of 2020 14:04   Ref. Range 2019 11:52 2019 11:52   Sodium Latest Ref Range: 136 - 145 mmol/L 143    Potassium Latest Ref Range: 3.5 - 5.1 mmol/L 3.8    Chloride Latest Ref Range: 95 - 110 mmol/L 104    CO2 Latest Ref Range: 22 - 31 mmol/L 28    Anion Gap Latest Ref Range: 8 - 16 mmol/L 11    BUN, Bld Latest Ref Range: 7 - 18 mg/dL 17    Creatinine Latest Ref Range: 0.50 - 1.40 mg/dL 0.66    eGFR if non African American Latest Ref Range: >60 mL/min/1.73 m^2 >60    eGFR if  Latest Ref Range: >60 mL/min/1.73 m^2  >60    Glucose Latest Ref Range: 70 - 110 mg/dL 91    Calcium Latest Ref Range: 8.4 - 10.2 mg/dL 9.7    Phosphorus Latest Ref Range: 2.7 - 4.5 mg/dL 3.1    Albumin Latest Ref Range: 3.5 - 5.2 g/dL 4.2    TSH Latest Ref Range: 0.400 - 4.000 uIU/mL <0.015 (L) <0.015 (L)   Free T4 Latest Ref Range: 0.78 - 2.19 ng/dL 1.97            ASSESSMENT/PLAN:   1. Hypothyroidism- Hx thyroidectomy.continue current tx. Although has not taken it for the last 4 days, we can check today. If TSH suppressed, we can reduce dose. Otherwise can repeat in 4 weeks. She did get IV dye prior to checking levels in Dec.     2. HTN- BP controlled.     3. Insulin resistance- (NO DIABETES) Continue metformin 1000 BID. Last A1c normal.     4. Morbid obesity- see #3    FOLLOWUP  TSH- today  TSH- 4 weeks.   F/U 6 months with CMP, Fasting insulin, TSH

## 2020-02-06 LAB
T4 FREE SERPL-MCNC: 1.05 NG/DL (ref 0.71–1.51)
TSH SERPL DL<=0.005 MIU/L-ACNC: 0.38 UIU/ML (ref 0.4–4)

## 2020-02-09 ENCOUNTER — TELEPHONE (OUTPATIENT)
Dept: ENDOCRINOLOGY | Facility: CLINIC | Age: 55
End: 2020-02-09

## 2020-02-09 DIAGNOSIS — E03.9 HYPOTHYROIDISM, UNSPECIFIED TYPE: Primary | ICD-10-CM

## 2020-02-09 RX ORDER — LEVOTHYROXINE SODIUM 125 UG/1
125 TABLET ORAL
Qty: 30 TABLET | Refills: 11 | Status: ON HOLD | OUTPATIENT
Start: 2020-02-09 | End: 2021-01-28

## 2020-02-09 NOTE — TELEPHONE ENCOUNTER
Let her know that despite missing 4 days of synthroid, it appears she is on too much    Decrease to synthroid 125 mcg    Check TSH 4 weeks

## 2020-02-10 NOTE — TELEPHONE ENCOUNTER
Spoke to pt and adv of Dr Aguilera's previous message, voiced understanding. Pt already has labs scheduled.

## 2020-02-24 ENCOUNTER — OFFICE VISIT (OUTPATIENT)
Dept: FAMILY MEDICINE | Facility: CLINIC | Age: 55
End: 2020-02-24
Payer: COMMERCIAL

## 2020-02-24 VITALS
WEIGHT: 200.19 LBS | HEIGHT: 62 IN | SYSTOLIC BLOOD PRESSURE: 138 MMHG | HEART RATE: 70 BPM | BODY MASS INDEX: 36.84 KG/M2 | OXYGEN SATURATION: 98 % | DIASTOLIC BLOOD PRESSURE: 80 MMHG

## 2020-02-24 DIAGNOSIS — I10 ESSENTIAL HYPERTENSION: ICD-10-CM

## 2020-02-24 DIAGNOSIS — F32.A DEPRESSION, UNSPECIFIED DEPRESSION TYPE: ICD-10-CM

## 2020-02-24 DIAGNOSIS — R13.10 DYSPHAGIA, UNSPECIFIED TYPE: Primary | ICD-10-CM

## 2020-02-24 DIAGNOSIS — I25.10 CORONARY ARTERY DISEASE INVOLVING NATIVE CORONARY ARTERY OF NATIVE HEART WITHOUT ANGINA PECTORIS: ICD-10-CM

## 2020-02-24 DIAGNOSIS — F41.9 ANXIETY: ICD-10-CM

## 2020-02-24 DIAGNOSIS — M62.81 MUSCLE LEFT ARM WEAKNESS: ICD-10-CM

## 2020-02-24 DIAGNOSIS — E66.01 MORBID OBESITY: ICD-10-CM

## 2020-02-24 PROCEDURE — 99999 PR PBB SHADOW E&M-EST. PATIENT-LVL III: ICD-10-PCS | Mod: PBBFAC,,, | Performed by: FAMILY MEDICINE

## 2020-02-24 PROCEDURE — 3075F PR MOST RECENT SYSTOLIC BLOOD PRESS GE 130-139MM HG: ICD-10-PCS | Mod: CPTII,S$GLB,, | Performed by: FAMILY MEDICINE

## 2020-02-24 PROCEDURE — 3008F PR BODY MASS INDEX (BMI) DOCUMENTED: ICD-10-PCS | Mod: CPTII,S$GLB,, | Performed by: FAMILY MEDICINE

## 2020-02-24 PROCEDURE — 3075F SYST BP GE 130 - 139MM HG: CPT | Mod: CPTII,S$GLB,, | Performed by: FAMILY MEDICINE

## 2020-02-24 PROCEDURE — 99214 OFFICE O/P EST MOD 30 MIN: CPT | Mod: S$GLB,,, | Performed by: FAMILY MEDICINE

## 2020-02-24 PROCEDURE — 99999 PR PBB SHADOW E&M-EST. PATIENT-LVL III: CPT | Mod: PBBFAC,,, | Performed by: FAMILY MEDICINE

## 2020-02-24 PROCEDURE — 3079F DIAST BP 80-89 MM HG: CPT | Mod: CPTII,S$GLB,, | Performed by: FAMILY MEDICINE

## 2020-02-24 PROCEDURE — 3079F PR MOST RECENT DIASTOLIC BLOOD PRESSURE 80-89 MM HG: ICD-10-PCS | Mod: CPTII,S$GLB,, | Performed by: FAMILY MEDICINE

## 2020-02-24 PROCEDURE — 3008F BODY MASS INDEX DOCD: CPT | Mod: CPTII,S$GLB,, | Performed by: FAMILY MEDICINE

## 2020-02-24 PROCEDURE — 99214 PR OFFICE/OUTPT VISIT, EST, LEVL IV, 30-39 MIN: ICD-10-PCS | Mod: S$GLB,,, | Performed by: FAMILY MEDICINE

## 2020-02-26 ENCOUNTER — TELEPHONE (OUTPATIENT)
Dept: GASTROENTEROLOGY | Facility: CLINIC | Age: 55
End: 2020-02-26

## 2020-02-26 NOTE — TELEPHONE ENCOUNTER
Spoke with pt to schedule EGD and pt states that she had stents placed last month and was told that she will not be able to hold Brilinta or Aspirin for 6mos. She declined scheduling EGD at this time and states if she is still symptomatic she will call us back at the end of June. Order canceled, PCP notified.

## 2020-03-16 RX ORDER — TICAGRELOR 90 MG/1
TABLET ORAL
Qty: 60 TABLET | Refills: 4 | Status: SHIPPED | OUTPATIENT
Start: 2020-03-16 | End: 2020-07-28

## 2020-03-18 NOTE — TELEPHONE ENCOUNTER
----- Message from Princess MARTA Bolton sent at 3/18/2020  1:55 PM CDT -----  Contact: pt  Type: Needs Medical Advice    Who Called: patient    [facility] [id number] [facility]  [address] [facility] [address]  Phone: [phone] Fax: [phone]  Best Call Back Number:   Additional Information: Requesting a call in regards to refill request that was sent over by the pharmacy regarding medication (ranolazine (RANEXA) 500 MG Tb12).

## 2020-03-19 RX ORDER — RANOLAZINE 500 MG/1
500 TABLET, EXTENDED RELEASE ORAL 2 TIMES DAILY
Qty: 60 TABLET | Refills: 0 | Status: SHIPPED | OUTPATIENT
Start: 2020-03-19 | End: 2020-09-30

## 2020-03-31 DIAGNOSIS — I15.0 RENOVASCULAR HYPERTENSION: ICD-10-CM

## 2020-04-01 RX ORDER — RAMIPRIL 10 MG/1
10 CAPSULE ORAL NIGHTLY
Qty: 90 CAPSULE | Refills: 3 | Status: SHIPPED | OUTPATIENT
Start: 2020-04-01 | End: 2020-07-28

## 2020-04-07 ENCOUNTER — OFFICE VISIT (OUTPATIENT)
Dept: CARDIOLOGY | Facility: CLINIC | Age: 55
End: 2020-04-07
Payer: COMMERCIAL

## 2020-04-07 ENCOUNTER — TELEPHONE (OUTPATIENT)
Dept: CARDIOLOGY | Facility: CLINIC | Age: 55
End: 2020-04-07

## 2020-04-07 ENCOUNTER — PATIENT MESSAGE (OUTPATIENT)
Dept: CARDIOLOGY | Facility: CLINIC | Age: 55
End: 2020-04-07

## 2020-04-07 VITALS — DIASTOLIC BLOOD PRESSURE: 72 MMHG | SYSTOLIC BLOOD PRESSURE: 112 MMHG | HEART RATE: 70 BPM

## 2020-04-07 DIAGNOSIS — I10 ESSENTIAL HYPERTENSION: ICD-10-CM

## 2020-04-07 DIAGNOSIS — E78.2 MIXED HYPERLIPIDEMIA: ICD-10-CM

## 2020-04-07 DIAGNOSIS — I25.10 CORONARY ARTERY DISEASE INVOLVING NATIVE CORONARY ARTERY OF NATIVE HEART WITHOUT ANGINA PECTORIS: Primary | Chronic | ICD-10-CM

## 2020-04-07 PROCEDURE — 3078F PR MOST RECENT DIASTOLIC BLOOD PRESSURE < 80 MM HG: ICD-10-PCS | Mod: CPTII,,, | Performed by: INTERNAL MEDICINE

## 2020-04-07 PROCEDURE — 3074F PR MOST RECENT SYSTOLIC BLOOD PRESSURE < 130 MM HG: ICD-10-PCS | Mod: CPTII,,, | Performed by: INTERNAL MEDICINE

## 2020-04-07 PROCEDURE — 99213 OFFICE O/P EST LOW 20 MIN: CPT | Mod: 95,,, | Performed by: INTERNAL MEDICINE

## 2020-04-07 PROCEDURE — 3078F DIAST BP <80 MM HG: CPT | Mod: CPTII,,, | Performed by: INTERNAL MEDICINE

## 2020-04-07 PROCEDURE — 3074F SYST BP LT 130 MM HG: CPT | Mod: CPTII,,, | Performed by: INTERNAL MEDICINE

## 2020-04-07 PROCEDURE — 99213 PR OFFICE/OUTPT VISIT, EST, LEVL III, 20-29 MIN: ICD-10-PCS | Mod: 95,,, | Performed by: INTERNAL MEDICINE

## 2020-04-07 NOTE — PROGRESS NOTES
Subjective:    Patient ID:  Josephine Bolton is a 54 y.o. female who presents for follow-up of cad    The patient location is: home  The chief complaint leading to consultation is: cad  Visit type: Virtual visit with synchronous audio and video  Total time spent with patient: 15  min  Each patient to whom he or she provides medical services by telemedicine is:  (1) informed of the relationship between the physician and patient and the respective role of any other health care provider with respect to management of the patient; and (2) notified that he or she may decline to receive medical services by telemedicine and may withdraw from such care at any time.      HPI  She comes with no complaints, no chest pain, no shortness of breath  Lots of bruising  She reports chest pain when stopping asa or brillinta more than 3 days    Review of Systems   Constitution: Negative for decreased appetite, malaise/fatigue, weight gain and weight loss.   Cardiovascular: Negative for chest pain, dyspnea on exertion, leg swelling, palpitations and syncope.   Respiratory: Negative for cough and shortness of breath.    Gastrointestinal: Negative.    Neurological: Negative for weakness.   All other systems reviewed and are negative.         Assessment:       1. Coronary artery disease involving native coronary artery of native heart without angina pectoris    2. Essential hypertension    3. Mixed hyperlipidemia         Plan:   Try ASA qod  Continue all cardiac medications  Regular exercise program  Weight loss  3 m f/u

## 2020-04-07 NOTE — TELEPHONE ENCOUNTER
----- Message from Lisa Ortega sent at 4/7/2020 10:09 AM CDT -----  Type:  Patient Returning Call    Who Called:  Patient   Who Left Message for Patient:  Nellie  Does the patient know what this is regarding?:  appointment  Best Call Back Number:  [phone]  Additional Information:

## 2020-04-22 ENCOUNTER — TELEPHONE (OUTPATIENT)
Dept: ORTHOPEDICS | Facility: CLINIC | Age: 55
End: 2020-04-22

## 2020-04-22 NOTE — TELEPHONE ENCOUNTER
Left message on voicemail for pt to call back. Cannot schedule pt appt until May. Next available is 5/7/2020 in the morning. Thanks, Ines

## 2020-04-22 NOTE — TELEPHONE ENCOUNTER
Appointment Request From: Josephine Bolton      With Provider: Juan Wilson MD [[facility]]      Preferred Date Range: 4/22/2020 - 4/30/2020      Preferred Times: Any time      Reason for visit: Office Visit      Comments:   Right knee pain. Need an injection. Affecting ability to walk and causing pain in left knee which has been replaced.

## 2020-04-22 NOTE — TELEPHONE ENCOUNTER
----- Message from Ankita Haro sent at 4/22/2020 12:38 PM CDT -----  Contact: patient  Type:  Patient Returning Call    Who Called:  patient  Who Left Message for Patient:  Ines  Does the patient know what this is regarding?: yes  Best Call Back Number:    Additional Information:  Requesting a call back

## 2020-04-29 RX ORDER — FUROSEMIDE 20 MG/1
TABLET ORAL
Qty: 90 TABLET | Refills: 2 | Status: SHIPPED | OUTPATIENT
Start: 2020-04-29 | End: 2020-12-31

## 2020-05-01 ENCOUNTER — PATIENT MESSAGE (OUTPATIENT)
Dept: PODIATRY | Facility: CLINIC | Age: 55
End: 2020-05-01

## 2020-05-04 ENCOUNTER — TELEPHONE (OUTPATIENT)
Dept: ORTHOPEDICS | Facility: CLINIC | Age: 55
End: 2020-05-04

## 2020-05-04 ENCOUNTER — TELEPHONE (OUTPATIENT)
Dept: PODIATRY | Facility: CLINIC | Age: 55
End: 2020-05-04

## 2020-05-04 NOTE — TELEPHONE ENCOUNTER
Called pt to screen prior to upcoming appointment. Screening questions answered. Advised pt on building entrance procedure. Thanks, Ines

## 2020-05-04 NOTE — TELEPHONE ENCOUNTER
----- Message from Shital Leal DPM sent at 5/1/2020  8:04 PM CDT -----  Please schedule patient for morning appointment on 5/7/20.  Thank you!

## 2020-05-05 ENCOUNTER — PATIENT MESSAGE (OUTPATIENT)
Dept: ADMINISTRATIVE | Facility: HOSPITAL | Age: 55
End: 2020-05-05

## 2020-05-07 ENCOUNTER — OFFICE VISIT (OUTPATIENT)
Dept: ORTHOPEDICS | Facility: CLINIC | Age: 55
End: 2020-05-07
Payer: COMMERCIAL

## 2020-05-07 ENCOUNTER — TELEPHONE (OUTPATIENT)
Dept: NEUROLOGY | Facility: CLINIC | Age: 55
End: 2020-05-07

## 2020-05-07 ENCOUNTER — OFFICE VISIT (OUTPATIENT)
Dept: PODIATRY | Facility: CLINIC | Age: 55
End: 2020-05-07
Payer: COMMERCIAL

## 2020-05-07 VITALS — WEIGHT: 200 LBS | TEMPERATURE: 98 F | HEIGHT: 62 IN | BODY MASS INDEX: 36.8 KG/M2

## 2020-05-07 VITALS
WEIGHT: 199.94 LBS | DIASTOLIC BLOOD PRESSURE: 75 MMHG | HEART RATE: 69 BPM | SYSTOLIC BLOOD PRESSURE: 115 MMHG | BODY MASS INDEX: 36.57 KG/M2 | TEMPERATURE: 98 F

## 2020-05-07 DIAGNOSIS — M17.11 PRIMARY OSTEOARTHRITIS OF RIGHT KNEE: ICD-10-CM

## 2020-05-07 DIAGNOSIS — M79.672 PAIN IN BOTH FEET: ICD-10-CM

## 2020-05-07 DIAGNOSIS — M79.671 PAIN IN BOTH FEET: ICD-10-CM

## 2020-05-07 DIAGNOSIS — Z96.652 STATUS POST TOTAL LEFT KNEE REPLACEMENT: ICD-10-CM

## 2020-05-07 DIAGNOSIS — M17.12 PRIMARY OSTEOARTHRITIS OF LEFT KNEE: Primary | ICD-10-CM

## 2020-05-07 DIAGNOSIS — D36.10 NEUROMA: Primary | ICD-10-CM

## 2020-05-07 PROCEDURE — 20610 DRAIN/INJ JOINT/BURSA W/O US: CPT | Mod: RT,S$GLB,, | Performed by: ORTHOPAEDIC SURGERY

## 2020-05-07 PROCEDURE — 20610 LARGE JOINT ASPIRATION/INJECTION: R KNEE: ICD-10-PCS | Mod: RT,S$GLB,, | Performed by: ORTHOPAEDIC SURGERY

## 2020-05-07 PROCEDURE — 99214 OFFICE O/P EST MOD 30 MIN: CPT | Mod: 25,S$GLB,, | Performed by: PODIATRIST

## 2020-05-07 PROCEDURE — 3074F PR MOST RECENT SYSTOLIC BLOOD PRESSURE < 130 MM HG: ICD-10-PCS | Mod: CPTII,S$GLB,, | Performed by: ORTHOPAEDIC SURGERY

## 2020-05-07 PROCEDURE — 99214 PR OFFICE/OUTPT VISIT, EST, LEVL IV, 30-39 MIN: ICD-10-PCS | Mod: 25,S$GLB,, | Performed by: ORTHOPAEDIC SURGERY

## 2020-05-07 PROCEDURE — 3074F SYST BP LT 130 MM HG: CPT | Mod: CPTII,S$GLB,, | Performed by: PODIATRIST

## 2020-05-07 PROCEDURE — 99999 PR PBB SHADOW E&M-EST. PATIENT-LVL III: CPT | Mod: PBBFAC,,, | Performed by: PODIATRIST

## 2020-05-07 PROCEDURE — 3078F DIAST BP <80 MM HG: CPT | Mod: CPTII,S$GLB,, | Performed by: ORTHOPAEDIC SURGERY

## 2020-05-07 PROCEDURE — 3078F PR MOST RECENT DIASTOLIC BLOOD PRESSURE < 80 MM HG: ICD-10-PCS | Mod: CPTII,S$GLB,, | Performed by: PODIATRIST

## 2020-05-07 PROCEDURE — 64455 PR INJECT ANES/STEROID PLANTAR COMMON DIGITAL NERVE: ICD-10-PCS | Mod: 50,S$GLB,, | Performed by: PODIATRIST

## 2020-05-07 PROCEDURE — 99214 OFFICE O/P EST MOD 30 MIN: CPT | Mod: 25,S$GLB,, | Performed by: ORTHOPAEDIC SURGERY

## 2020-05-07 PROCEDURE — 3008F BODY MASS INDEX DOCD: CPT | Mod: CPTII,S$GLB,, | Performed by: PODIATRIST

## 2020-05-07 PROCEDURE — 3074F PR MOST RECENT SYSTOLIC BLOOD PRESSURE < 130 MM HG: ICD-10-PCS | Mod: CPTII,S$GLB,, | Performed by: PODIATRIST

## 2020-05-07 PROCEDURE — 3078F PR MOST RECENT DIASTOLIC BLOOD PRESSURE < 80 MM HG: ICD-10-PCS | Mod: CPTII,S$GLB,, | Performed by: ORTHOPAEDIC SURGERY

## 2020-05-07 PROCEDURE — 99999 PR PBB SHADOW E&M-EST. PATIENT-LVL III: CPT | Mod: PBBFAC,,, | Performed by: ORTHOPAEDIC SURGERY

## 2020-05-07 PROCEDURE — 99999 PR PBB SHADOW E&M-EST. PATIENT-LVL III: ICD-10-PCS | Mod: PBBFAC,,, | Performed by: PODIATRIST

## 2020-05-07 PROCEDURE — 3008F PR BODY MASS INDEX (BMI) DOCUMENTED: ICD-10-PCS | Mod: CPTII,S$GLB,, | Performed by: ORTHOPAEDIC SURGERY

## 2020-05-07 PROCEDURE — 3008F BODY MASS INDEX DOCD: CPT | Mod: CPTII,S$GLB,, | Performed by: ORTHOPAEDIC SURGERY

## 2020-05-07 PROCEDURE — 64455 NJX AA&/STRD PLTR COM DG NRV: CPT | Mod: 50,S$GLB,, | Performed by: PODIATRIST

## 2020-05-07 PROCEDURE — 3074F SYST BP LT 130 MM HG: CPT | Mod: CPTII,S$GLB,, | Performed by: ORTHOPAEDIC SURGERY

## 2020-05-07 PROCEDURE — 99214 PR OFFICE/OUTPT VISIT, EST, LEVL IV, 30-39 MIN: ICD-10-PCS | Mod: 25,S$GLB,, | Performed by: PODIATRIST

## 2020-05-07 PROCEDURE — 3008F PR BODY MASS INDEX (BMI) DOCUMENTED: ICD-10-PCS | Mod: CPTII,S$GLB,, | Performed by: PODIATRIST

## 2020-05-07 PROCEDURE — 99999 PR PBB SHADOW E&M-EST. PATIENT-LVL III: ICD-10-PCS | Mod: PBBFAC,,, | Performed by: ORTHOPAEDIC SURGERY

## 2020-05-07 PROCEDURE — 3078F DIAST BP <80 MM HG: CPT | Mod: CPTII,S$GLB,, | Performed by: PODIATRIST

## 2020-05-07 RX ORDER — TRIAMCINOLONE ACETONIDE 40 MG/ML
80 INJECTION, SUSPENSION INTRA-ARTICULAR; INTRAMUSCULAR
Status: COMPLETED | OUTPATIENT
Start: 2020-05-07 | End: 2020-05-07

## 2020-05-07 RX ORDER — LIDOCAINE HYDROCHLORIDE 20 MG/ML
2 INJECTION, SOLUTION EPIDURAL; INFILTRATION; INTRACAUDAL; PERINEURAL
Status: COMPLETED | OUTPATIENT
Start: 2020-05-07 | End: 2020-05-07

## 2020-05-07 RX ORDER — BUPIVACAINE HYDROCHLORIDE 5 MG/ML
2 INJECTION, SOLUTION EPIDURAL; INTRACAUDAL
Status: COMPLETED | OUTPATIENT
Start: 2020-05-07 | End: 2020-05-07

## 2020-05-07 RX ADMIN — LIDOCAINE HYDROCHLORIDE 40 MG: 20 INJECTION, SOLUTION EPIDURAL; INFILTRATION; INTRACAUDAL; PERINEURAL at 08:05

## 2020-05-07 RX ADMIN — TRIAMCINOLONE ACETONIDE 80 MG: 40 INJECTION, SUSPENSION INTRA-ARTICULAR; INTRAMUSCULAR at 08:05

## 2020-05-07 RX ADMIN — TRIAMCINOLONE ACETONIDE 40 MG: 40 INJECTION, SUSPENSION INTRA-ARTICULAR; INTRAMUSCULAR at 09:05

## 2020-05-07 RX ADMIN — BUPIVACAINE HYDROCHLORIDE 10 MG: 5 INJECTION, SOLUTION EPIDURAL; INTRACAUDAL at 08:05

## 2020-05-07 NOTE — PATIENT INSTRUCTIONS
- Bring cushioned insoles or arch supports to next visit.    - Wear supportive shoes such as sneakers with arch supports.    - Look for Superfeet or Powerstep arch supports.    - Wear over-the-counter compression socks at all times when ambulating.  Do not wear them while resting for prolonged periods of time such as when sleeping.  Look for [employer] brand compression socks 15-20 mmHg.  Prefer Sport style but travel and regular are okay.    - Rest/reduce ambulation to necessary activities of daily living.    - Do not apply ice within 1st 48 hours after injection.  Apply warm compress until steroid flare resolves, then resume applying ice for no more than 20 minutes/hour.    - Elevate foot with heel above the level of the knee as much as possible throughout the day.    - Perform Achilles/plantar fascia stretches as much as possible throughout the day.    - Apply lidocaine cream to affected area as needed for pain relief.    - Notify clinic if pain worsens or fails to improve.

## 2020-05-07 NOTE — PROCEDURES
Large Joint Aspiration/Injection: R knee  Date/Time: 5/7/2020 9:15 AM  Performed by: Juan Wilson MD  Authorized by: Juan Wilson MD     Consent Done?:  Yes (Verbal)  Indications:  Pain  Timeout: prior to procedure the correct patient, procedure, and site was verified    Prep: patient was prepped and draped in usual sterile fashion    Local anesthetic:  Lidocaine 1% without epinephrine  Anesthetic total (ml):  5      Details:  Needle Size:  21 G  Approach:  Anterolateral  Location:  Knee  Site:  R knee  Medications:  40 mg triamcinolone acetonide 40 mg/mL  Patient tolerance:  Patient tolerated the procedure well with no immediate complications

## 2020-05-07 NOTE — TELEPHONE ENCOUNTER
Please  contact pt to schedule new patient appt. Pt was incorrectly scheduled on Dr. Piña's scheduled. Pt notified dept to call to schedule.

## 2020-05-07 NOTE — TELEPHONE ENCOUNTER
Returned call to pt and notified that we had no referral on file. Instructed pt to obtain referral from Dr. Smart and we would be happy to see pt. States that she has other pressing health concerns at this time and will wait to contact us for an appt.

## 2020-05-07 NOTE — PROGRESS NOTES
"Chief Complaint   Patient presents with    Right Knee - Pain         HPI:   This is a 54 y.o. who presents to clinic today complaining of right knee pain for years after no known trauma. Pain is progressively worsening. No numbness or tingling. No associated signs or symptoms. She is status post left TKA 11 months ago.     Past Medical History:   Diagnosis Date    Allergy     Anticoagulant long-term use     ASA 81mg, Effient    Anxiety     Arthritis     Asthma     As child    CAD (coronary artery disease) 01/27/2012    s/p stents x4 , CABG, 3 vessel, (5/2013) newest stent prior to CABG    Chronic constipation     Coronary artery disease involving native coronary artery of native heart without angina pectoris 1/27/2012 12/19 Significant ostial RCA lesion as above treated with drug-eluting stenting as above. Occluded proximal LAD with patent lima lad Patent vein graft to 2nd obtuse marginal Known occluded vein graft to unknown vessel.  s/p stents x 3 (2010) s/p LAD stent (DSE) 3/2012    DDD (degenerative disc disease), cervical     DDD (degenerative disc disease), lumbar     DDD (degenerative disc disease), thoracic     Depression     Edema     Encounter for blood transfusion     Headache(784.0)     History of nephrolithiasis     Hyperlipidemia     Hypertension     Hypothyroid 7/5/2012    Insomnia     Insulin resistance     patient stated not diebetic    Joint stiffness     Joint swelling     Kidney disease     ; Frequent UTIs     Kidney stones     Low back pain     Neck pain     Obstructive sleep apnea on CPAP 7/17/2012    PONV (postoperative nausea and vomiting)     S/P CABG (coronary artery bypass graft) 6/25/2013 6/23/2013     Sleep apnea 01/27/12    Patient reports "severe" sleep apnea, uses C-pap    Stented coronary artery 12/20/2019 12/19  ostial RCA -Osirio2.5 x 18 post dilated 2.75     Thoracic back pain      Past Surgical History:   Procedure Laterality " Date    ADENOIDECTOMY      BACK SURGERY      BREAST BIOPSY Left 2017    duct excision- Dr. Jimenez    CARPAL TUNNEL RELEASE      bilateral    CERVICAL LAMINECTOMY WITH SPINAL FUSION      2016     SECTION, CLASSIC      x 2    COLONOSCOPY      CORONARY ANGIOGRAPHY  2019    Procedure: ANGIOGRAM, CORONARY ARTERY;  Surgeon: Timi Millan MD;  Location: [facility] CATH;  Service: Cardiology;;    CORONARY ANGIOPLASTY WITH STENT PLACEMENT      x 4    CORONARY ARTERY BYPASS GRAFT  2013    3 vessel    HYSTERECTOMY      KNEE ARTHROPLASTY Left 2019    Procedure: ARTHROPLASTY, KNEE;  Surgeon: Juan Wilson MD;  Location: [facility] OR;  Service: Orthopedics;  Laterality: Left;    KNEE ARTHROSCOPY W/ MENISCAL REPAIR Left     twice, last one 2014    LAMINECTOMY      L4/L5    LEFT HEART CATHETERIZATION  2019    Procedure: Left heart cath-  # 219 A;  Surgeon: Timi Millan MD;  Location: [facility] CATH;  Service: Cardiology;;    OOPHORECTOMY      SINUS SURGERY      x3    TENDON REPAIR Left     ankle surgery    THYROIDECTOMY      2 separate surgeries    TONSILLECTOMY      TOTAL KNEE ARTHROPLASTY Left 2019    Surgeon: Juan Wilson MD     Current Outpatient Medications on File Prior to Visit   Medication Sig Dispense Refill    acetaminophen (TYLENOL) 500 MG tablet Take 2 tablets (1,000 mg total) by mouth every 8 (eight) hours.  0    albuterol (PROVENTIL HFA) 90 mcg/actuation inhaler Inhale 2 puffs into the lungs.      allopurinol (ZYLOPRIM) 100 MG tablet Take 1 tablet (100 mg total) by mouth once daily. 90 tablet 3    amLODIPine (NORVASC) 5 MG tablet TAKE 1 TABLET BY MOUTH ONCE DAILY 90 tablet 3    aspirin (ECOTRIN) 81 MG EC tablet Take 1 tablet (81 mg total) by mouth once daily.  0    atorvastatin (LIPITOR) 40 MG tablet Take 1 tablet (40 mg total) by mouth every evening. 90 tablet 1    BRILINTA 90 mg tablet TAKE 1 TABLET (90 MG TOTAL) BY MOUTH 2 (TWO) TIMES DAILY. 60 tablet 4     carvedilol (COREG) 25 MG tablet Take 1 tablet (25 mg total) by mouth 2 (two) times daily. 180 tablet 3    cetirizine (ZYRTEC) 10 MG tablet Take 10 mg by mouth daily as needed for Allergies.      furosemide (LASIX) 20 MG tablet TAKE 1 TABLET (20 MG TOTAL) BY MOUTH ONCE DAILY. 90 tablet 2    gabapentin (NEURONTIN) 100 MG capsule Take 1 capsule (100 mg total) by mouth 2 (two) times daily. 180 capsule 3    lancets Misc 1 Units by Misc.(Non-Drug; Combo Route) route 2 (two) times daily as needed. 100 each 3    levothyroxine (SYNTHROID) 125 MCG tablet Take 1 tablet (125 mcg total) by mouth before breakfast. 30 tablet 11    metFORMIN (GLUCOPHAGE) 1000 MG tablet TAKE 1 TABLET BY MOUTH TWICE DAILY WITH MEALS 60 tablet 6    nitroGLYCERIN (NITROSTAT) 0.4 MG SL tablet Place 1 tablet (0.4 mg total) under the tongue every 5 (five) minutes as needed for Chest pain. 25 tablet 3    pantoprazole (PROTONIX) 40 MG tablet Take 1 tablet (40 mg total) by mouth once daily. 30 tablet 11    polyethylene glycol (GLYCOLAX) 17 gram PwPk Take 17 g by mouth once daily.  0    ramipriL (ALTACE) 10 MG capsule Take 1 capsule (10 mg total) by mouth every evening. 90 capsule 3    ranolazine (RANEXA) 500 MG Tb12 TAKE 1 TABLET BY MOUTH 2 TIMES A DAY 60 tablet 5    ranolazine (RANEXA) 500 MG Tb12 Take 1 tablet (500 mg total) by mouth 2 (two) times daily. 60 tablet 0    venlafaxine (EFFEXOR-XR) 150 MG Cp24 Take 1 capsule (150 mg total) by mouth once daily. (Patient taking differently: Take 150 mg by mouth every morning. ) 90 capsule 3    blood-glucose meter (GLUCOSE MONITORING KIT) kit Use as instructed 1 each 0    isosorbide mononitrate (IMDUR) 30 MG 24 hr tablet Take 1 tablet (30 mg total) by mouth once daily. for 15 days 15 tablet 0     No current facility-administered medications on file prior to visit.      Review of patient's allergies indicates:   Allergen Reactions    Celexa [citalopram] Other (See Comments)     GI upset  Stated  ""knocked me out"    Cephalexin Anaphylaxis    Zoloft [sertraline] Other (See Comments)     Stated "knocked me out"    Codeine Other (See Comments)     hallucinations  hallucinations    Ciprofloxacin Itching    Levaquin [levofloxacin] Other (See Comments)     tendinitis    Penicillins Other (See Comments)     Was told per mother that as child was allergic to penicillin.  Childhood allergy/ unknown reaction    Sulfa (sulfonamide antibiotics)      Family History   Problem Relation Age of Onset    Heart disease Father     Diabetes Father     Hypertension Father     Stroke Father     Cataracts Father     Cancer Maternal Grandmother         Breast with Mets    Breast cancer Maternal Grandmother     Cancer Paternal Grandmother         Colon    Heart failure Mother     Asthma Mother     Macular degeneration Mother     Cancer Mother         Breast    Cataracts Mother     Heart disease Mother     COPD Mother     Breast cancer Mother     Glaucoma Sister     Thyroid disease Sister     Glaucoma Sister     Strabismus Other     Other Brother         stiff man syndrome    Amblyopia Neg Hx     Blindness Neg Hx     Retinal detachment Neg Hx      Social History     Socioeconomic History    Marital status:      Spouse name: Not on file    Number of children: Not on file    Years of education: Not on file    Highest education level: Not on file   Occupational History    Occupation: teacher   Social Needs    Financial resource strain: Somewhat hard    Food insecurity:     Worry: Never true     Inability: Never true    Transportation needs:     Medical: No     Non-medical: No   Tobacco Use    Smoking status: Former Smoker     Packs/day: 0.50     Years: 14.00     Pack years: 7.00     Types: Cigarettes     Start date: 1984     Last attempt to quit: 1998     Years since quittin.3    Smokeless tobacco: Never Used   Substance and Sexual Activity    Alcohol use: No     Frequency: " Never     Drinks per session: Patient refused     Binge frequency: Patient refused    Drug use: Yes     Types: Benzodiazepines    Sexual activity: Yes     Partners: Male   Lifestyle    Physical activity:     Days per week: 3 days     Minutes per session: 30 min    Stress: Rather much   Relationships    Social connections:     Talks on phone: More than three times a week     Gets together: More than three times a week     Attends Yarsanism service: Not on file     Active member of club or organization: Yes     Attends meetings of clubs or organizations: More than 4 times per year     Relationship status:    Other Topics Concern    Not on file   Social History Narrative    Not on file       Review of Systems:  Constitutional:  Denies fever or chills   Eyes:  Denies change in visual acuity   HENT:  Denies nasal congestion or sore throat   Respiratory:  Denies cough or shortness of breath   Cardiovascular:  Denies chest pain or edema   GI:  Denies abdominal pain, nausea, vomiting, bloody stools or diarrhea   :  Denies dysuria   Integument:  Denies rash   Neurologic:  Denies headache, focal weakness or sensory changes   Endocrine:  Denies polyuria or polydipsia   Lymphatic:  Denies swollen glands   Psychiatric:  Denies depression or anxiety     Physical Exam:   Constitutional:  Well developed, well nourished, no acute distress, non-toxic appearance   Integument:  Well hydrated, no rash   Lymphatic:  No lymphadenopathy noted   Neurologic:  Alert & oriented x 3  Psychiatric:  Speech and behavior appropriate   Eyes: EOMI  Gi: abdomen soft    Bilateral Knee Exam    right Knee Exam     Tenderness   The patient is experiencing tenderness in the medial joint line.    Range of Motion   Extension: abnormal   Flexion: abnormal     Muscle Strength     The patient has normal knee strength.    Tests   Livia:  Medial - positive   Lachman:  Anterior - negative      Varus: negative  Valgus: negative  Patellar  Apprehension: negative    Other   Erythema: absent  Sensation: normal  Pulse: present  Swelling: mild      Left knee: ROM 0-130. Overall normal alignment. Incision healed. No erythema or fluctuance. Stable to stress. Skin intact. Compartments soft. NVI distally.          Primary osteoarthritis of left knee    Primary osteoarthritis of right knee  -     Large Joint Aspiration/Injection: R knee    Status post total left knee replacement            Using an aseptic technique, I injected 5 cc of lidocaine 1% without and 1 cc of kenalog 40mg into the right knee. The patient tolerated this well. I will have them return to clinic in 6 months.

## 2020-05-11 NOTE — PROGRESS NOTES
Subjective:      Patient ID: Josephine Bolton is a 54 y.o. female.    Chief Complaint: Foot Problem (wanting injections between her toes) and Foot Pain      HPI:  Josephine Bolton is a 54 y.o. female who presents to clinic with a chief complaint of burning pain in toes in both feet.  Patient reports burning, searing pain has been ongoing off and on for a couple of weeks and worsening in severity, which she now rates as 8/10 on the pain scale.  She states she wishes she could cut off her feet sometimes.  She has tried to rest and change shoes without much change in pain.  She denies any known history of trauma.  She asks if she could get an injection today for the pain.  Patient denies any other pedal complaints at this time.    PCP:  Froylan Smart MD  Date last seen:  2/24/20    Review of Systems   Constitutional: Negative for appetite change, fever, chills, fatigue and unexpected weight change.   Respiratory: Negative for cough, wheezing, and shortness of breath.   Cardiovascular: Negative for chest pain, claudication, cyanosis, and leg swelling.  Endocrine:  Negative for intolerance to cold, intolerance to heat, polydipsia, polyphagia, and polyuria.    Gastrointestinal: Negative for nausea, vomiting, diarrhea, and constipation.   Musculoskeletal: Negative for back pain, arthritis, joint pain, joint swelling, myalgias, and stiffness.   Skin: Negative for nail bed changes, discoloration, rash, itching, poor wound healing, suspicious lesion, and unusual hair distribution.   Neurological: Negative for loss of balance, sensory change, paresthesias, and numbness.  Positive for pain.  Hematological: Negative for adenopathy, bleeding, and bruising easily.   Psychiatric/Behavioral: The patient is not nervous/anxious.  Negative for altered mental status.    Hemoglobin A1C   Date Value Ref Range Status   12/24/2019 5.3 0.0 - 5.6 % Final     Comment:     Reference Interval:  5.0 - 5.6 Normal   5.7 - 6.4 High Risk    > 6.5 Diabetic    Hgb A1c results are standardized based on the (NGSP) National   Glycohemoglobin Standardization Program.    Hemoglobin A1C levels are related to mean serum/plasma glucose   during the preceding 2-3 months.        06/17/2019 5.3 0.0 - 5.6 % Final     Comment:     Reference Interval:  5.0 - 5.6 Normal   5.7 - 6.4 High Risk   > 6.5 Diabetic    Hgb A1c results are standardized based on the (NGSP) National   Glycohemoglobin Standardization Program.    Hemoglobin A1C levels are related to mean serum/plasma glucose   during the preceding 2-3 months.        04/13/2017 5.1 0.0 - 5.6 % Final     Comment:     Reference Interval:  5.0 - 5.6 Normal   5.7 - 6.4 High Risk   > 6.5 Diabetic    Hgb A1c results are standardized based on the (NGSP) National   Glycohemoglobin Standardization Program.    Hemoglobin A1C levels are related to mean serum/plasma glucose   during the preceding 2-3 months.            Past Medical History:   Diagnosis Date    Allergy     Anticoagulant long-term use     ASA 81mg, Effient    Anxiety     Arthritis     Asthma     As child    CAD (coronary artery disease) 01/27/2012    s/p stents x4 , CABG, 3 vessel, (5/2013) newest stent prior to CABG    Chronic constipation     Coronary artery disease involving native coronary artery of native heart without angina pectoris 1/27/2012 12/19 Significant ostial RCA lesion as above treated with drug-eluting stenting as above. Occluded proximal LAD with patent lima lad Patent vein graft to 2nd obtuse marginal Known occluded vein graft to unknown vessel.  s/p stents x 3 (2010) s/p LAD stent (DSE) 3/2012    DDD (degenerative disc disease), cervical     DDD (degenerative disc disease), lumbar     DDD (degenerative disc disease), thoracic     Depression     Edema     Encounter for blood transfusion     Headache(784.0)     History of nephrolithiasis     Hyperlipidemia     Hypertension     Hypothyroid 7/5/2012    Insomnia      "Insulin resistance     patient stated not diebetic    Joint stiffness     Joint swelling     Kidney disease     ; Frequent UTIs     Kidney stones     Low back pain     Neck pain     Obstructive sleep apnea on CPAP 2012    PONV (postoperative nausea and vomiting)     S/P CABG (coronary artery bypass graft) 2013     Sleep apnea 12    Patient reports "severe" sleep apnea, uses C-pap    Stented coronary artery 2019  ostial RCA -Osirio2.5 x 18 post dilated 2.75     Thoracic back pain      Past Surgical History:   Procedure Laterality Date    ADENOIDECTOMY      BACK SURGERY      BREAST BIOPSY Left 2017    duct excision- Dr. Jimenez    CARPAL TUNNEL RELEASE      bilateral    CERVICAL LAMINECTOMY WITH SPINAL FUSION      2016     SECTION, CLASSIC      x 2    COLONOSCOPY      CORONARY ANGIOGRAPHY  2019    Procedure: ANGIOGRAM, CORONARY ARTERY;  Surgeon: Timi Millan MD;  Location: [facility] CATH;  Service: Cardiology;;    CORONARY ANGIOPLASTY WITH STENT PLACEMENT      x 4    CORONARY ARTERY BYPASS GRAFT  2013    3 vessel    HYSTERECTOMY      KNEE ARTHROPLASTY Left 2019    Procedure: ARTHROPLASTY, KNEE;  Surgeon: Juan Wilsno MD;  Location: [facility] OR;  Service: Orthopedics;  Laterality: Left;    KNEE ARTHROSCOPY W/ MENISCAL REPAIR Left     twice, last one 2014    LAMINECTOMY      L4/L5    LEFT HEART CATHETERIZATION  2019    Procedure: Left heart cath-  # 219 A;  Surgeon: Timi Millan MD;  Location: [facility] CATH;  Service: Cardiology;;    OOPHORECTOMY      SINUS SURGERY      x3    TENDON REPAIR Left     ankle surgery    THYROIDECTOMY      2 separate surgeries    TONSILLECTOMY      TOTAL KNEE ARTHROPLASTY Left 2019    Surgeon: Juan Wilson MD     Family History   Problem Relation Age of Onset    Heart disease Father     Diabetes Father     Hypertension Father     Stroke Father     Cataracts Father "     Cancer Maternal Grandmother         Breast with Mets    Breast cancer Maternal Grandmother     Cancer Paternal Grandmother         Colon    Heart failure Mother     Asthma Mother     Macular degeneration Mother     Cancer Mother         Breast    Cataracts Mother     Heart disease Mother     COPD Mother     Breast cancer Mother     Glaucoma Sister     Thyroid disease Sister     Glaucoma Sister     Strabismus Other     Other Brother         stiff man syndrome    Amblyopia Neg Hx     Blindness Neg Hx     Retinal detachment Neg Hx      Social History     Socioeconomic History    Marital status:      Spouse name: Not on file    Number of children: Not on file    Years of education: Not on file    Highest education level: Not on file   Occupational History    Occupation: teacher   Social Needs    Financial resource strain: Somewhat hard    Food insecurity:     Worry: Never true     Inability: Never true    Transportation needs:     Medical: No     Non-medical: No   Tobacco Use    Smoking status: Former Smoker     Packs/day: 0.50     Years: 14.00     Pack years: 7.00     Types: Cigarettes     Start date: 1984     Last attempt to quit: 1998     Years since quittin.4    Smokeless tobacco: Never Used   Substance and Sexual Activity    Alcohol use: No     Frequency: Never     Drinks per session: Patient refused     Binge frequency: Patient refused    Drug use: Yes     Types: Benzodiazepines    Sexual activity: Yes     Partners: Male   Lifestyle    Physical activity:     Days per week: 3 days     Minutes per session: 30 min    Stress: Rather much   Relationships    Social connections:     Talks on phone: More than three times a week     Gets together: More than three times a week     Attends Islam service: Not on file     Active member of club or organization: Yes     Attends meetings of clubs or organizations: More than 4 times per year     Relationship status:  "   Other Topics Concern    Not on file   Social History Narrative    Not on file           Objective:        Temp 97.8 °F (36.6 °C)   Ht 5' 2" (1.575 m)   Wt 90.7 kg (200 lb)   LMP  (LMP Unknown)   BMI 36.58 kg/m²     Physical Exam   Constitutional: Patient is oriented to person, place, and time. Patient appears well-developed and well-nourished. No acute distress.     Psychiatric: Patient has a normal mood and affect. Patient's speech is normal and behavior is normal. Judgment is normal. Cognition and memory are normal.     Bilateral pedal exam was performed today.  Vascular: Pedal pulses palpable 2/4 DP & PT.  CFT is = 3 seconds to the hallux.  Skin temperature is cool to cool proximal tibia to distal toes without localized increase in calor noted.  No erythema, edema, or ecchymosis noted to the foot or ankle.  Hair growth present distally to the LE.     Musculoskeletal: Ankle joint ROM is decreased. Subtalar joint ROM is decreased.  Midtarsal joint ROM is decreased.  1st ray ROM is decreased.  1st  MTPJ ROM is decreased.  Ankle joint dorsiflexion is restricted with the knee extended and flexed per Silfverskiold exam.    Muscle strength is 5/5 for all LE muscle groups tested.    Neurological:  Epicritic touch sensation is Intact to the foot.   Chaddock STR is Intact to the LE.  Tenderness to palpation noted to the distal B/L 2nd IM space.    Dermatological: Toenails 1-5 bilateral are WNL in length and thickness.  Webspaces 1-4 bilateral are clean, dry, and intact.  Skin turgor is supple.  No dry, flaky skin noted to the LE.  No open wounds or suspicious pigmented lesions appreciable to the foot or ankle.    Nursing note and vitals reviewed.        Assessment:       Encounter Diagnoses   Name Primary?    Neuroma Yes    Pain in both feet          Plan:       Josephine Sen was seen today for foot problem and foot pain.    Diagnoses and all orders for this visit:    Neuroma  -     triamcinolone " acetonide injection 80 mg  -     lidocaine (PF) 20 mg/ml (2%) injection 40 mg  -     bupivacaine (PF) 0.5% (5 mg/mL) injection 10 mg    Pain in both feet  -     triamcinolone acetonide injection 80 mg  -     lidocaine (PF) 20 mg/ml (2%) injection 40 mg  -     bupivacaine (PF) 0.5% (5 mg/mL) injection 10 mg      I counseled the patient on her conditions, their implications and medical management.    - Reviewed with patient proper foot care and supportive, accommodative shoe gear.    - Advised patient that excessive pressure on feet worsen forefoot conditions and encouraged her to diet and exercise to reduce complications from obesity.    - Educated patient again on PRICE therapy and Achilles/plantar fascial stretches with demonstration of stretches and stretching technique handout given to patient.    - With the patient's permission, cleansed B/L 2nd IM space with alcohol swab, applied topical referred treatment, administered mix of 1 cc of Kenalog 40, 0.5 cc of 0.5% Marcaine plain, 1 cc of 2% lidocaine plain into each IM space, and covered with Band-Aid.  Patient tolerated injections well and was given instructions to apply warm compress to area if post-steroid injection flare occurs within the first 48 hours after injection.    Patient was given the following recommendations and instructions:  Patient Instructions   - Bring cushioned insoles or arch supports to next visit.    - Wear supportive shoes such as sneakers with arch supports.    - Look for Superfeet or Powerstep arch supports.    - Wear over-the-counter compression socks at all times when ambulating.  Do not wear them while resting for prolonged periods of time such as when sleeping.  Look for [employer]st brand compression socks 15-20 mmHg.  Prefer Sport style but travel and regular are okay.    - Rest/reduce ambulation to necessary activities of daily living.    - Do not apply ice within 1st 48 hours after injection.  Apply warm compress until steroid flare  resolves, then resume applying ice for no more than 20 minutes/hour.    - Elevate foot with heel above the level of the knee as much as possible throughout the day.    - Perform Achilles/plantar fascia stretches as much as possible throughout the day.    - Apply lidocaine cream to affected area as needed for pain relief.    - Notify clinic if pain worsens or fails to improve.        Shital Leal DPM        Dictation was performed using M*Modal Fluency.  Transcription errors may be present.

## 2020-05-21 RX ORDER — TRIAMCINOLONE ACETONIDE 40 MG/ML
40 INJECTION, SUSPENSION INTRA-ARTICULAR; INTRAMUSCULAR
Status: DISCONTINUED | OUTPATIENT
Start: 2020-05-07 | End: 2020-05-21 | Stop reason: HOSPADM

## 2020-06-04 ENCOUNTER — OFFICE VISIT (OUTPATIENT)
Dept: FAMILY MEDICINE | Facility: CLINIC | Age: 55
End: 2020-06-04
Payer: COMMERCIAL

## 2020-06-04 ENCOUNTER — LAB VISIT (OUTPATIENT)
Dept: LAB | Facility: HOSPITAL | Age: 55
End: 2020-06-04
Attending: FAMILY MEDICINE
Payer: COMMERCIAL

## 2020-06-04 VITALS
OXYGEN SATURATION: 97 % | WEIGHT: 181 LBS | DIASTOLIC BLOOD PRESSURE: 66 MMHG | SYSTOLIC BLOOD PRESSURE: 118 MMHG | HEIGHT: 62 IN | BODY MASS INDEX: 33.31 KG/M2 | HEART RATE: 80 BPM | TEMPERATURE: 98 F

## 2020-06-04 DIAGNOSIS — R10.13 EPIGASTRIC PAIN: ICD-10-CM

## 2020-06-04 DIAGNOSIS — R07.9 CHEST PAIN, UNSPECIFIED TYPE: Primary | ICD-10-CM

## 2020-06-04 LAB
ALBUMIN SERPL BCP-MCNC: 3.9 G/DL (ref 3.5–5.2)
ALP SERPL-CCNC: 67 U/L (ref 55–135)
ALT SERPL W/O P-5'-P-CCNC: 15 U/L (ref 10–44)
AMYLASE SERPL-CCNC: 36 U/L (ref 20–110)
ANION GAP SERPL CALC-SCNC: 11 MMOL/L (ref 8–16)
AST SERPL-CCNC: 18 U/L (ref 10–40)
BASOPHILS # BLD AUTO: 0.04 K/UL (ref 0–0.2)
BASOPHILS NFR BLD: 0.5 % (ref 0–1.9)
BILIRUB SERPL-MCNC: 0.5 MG/DL (ref 0.1–1)
BUN SERPL-MCNC: 13 MG/DL (ref 6–20)
CALCIUM SERPL-MCNC: 9.6 MG/DL (ref 8.7–10.5)
CHLORIDE SERPL-SCNC: 105 MMOL/L (ref 95–110)
CO2 SERPL-SCNC: 28 MMOL/L (ref 23–29)
CREAT SERPL-MCNC: 0.8 MG/DL (ref 0.5–1.4)
DIFFERENTIAL METHOD: ABNORMAL
EOSINOPHIL # BLD AUTO: 0.1 K/UL (ref 0–0.5)
EOSINOPHIL NFR BLD: 1.3 % (ref 0–8)
ERYTHROCYTE [DISTWIDTH] IN BLOOD BY AUTOMATED COUNT: 14.8 % (ref 11.5–14.5)
EST. GFR  (AFRICAN AMERICAN): >60 ML/MIN/1.73 M^2
EST. GFR  (NON AFRICAN AMERICAN): >60 ML/MIN/1.73 M^2
GLUCOSE SERPL-MCNC: 100 MG/DL (ref 70–110)
HCT VFR BLD AUTO: 39.7 % (ref 37–48.5)
HGB BLD-MCNC: 13 G/DL (ref 12–16)
LIPASE SERPL-CCNC: 19 U/L (ref 4–60)
LYMPHOCYTES # BLD AUTO: 2.5 K/UL (ref 1–4.8)
LYMPHOCYTES NFR BLD: 30.4 % (ref 18–48)
MCH RBC QN AUTO: 28.7 PG (ref 27–31)
MCHC RBC AUTO-ENTMCNC: 32.7 G/DL (ref 32–36)
MCV RBC AUTO: 88 FL (ref 82–98)
MONOCYTES # BLD AUTO: 0.7 K/UL (ref 0.3–1)
MONOCYTES NFR BLD: 8.7 % (ref 4–15)
NEUTROPHILS # BLD AUTO: 4.8 K/UL (ref 1.8–7.7)
NEUTROPHILS NFR BLD: 59.1 % (ref 38–73)
PLATELET # BLD AUTO: 350 K/UL (ref 150–350)
PMV BLD AUTO: 8.9 FL (ref 9.2–12.9)
POTASSIUM SERPL-SCNC: 4.4 MMOL/L (ref 3.5–5.1)
PROT SERPL-MCNC: 7.4 G/DL (ref 6–8.4)
RBC # BLD AUTO: 4.53 M/UL (ref 4–5.4)
SODIUM SERPL-SCNC: 144 MMOL/L (ref 136–145)
WBC # BLD AUTO: 8.16 K/UL (ref 3.9–12.7)

## 2020-06-04 PROCEDURE — 99214 PR OFFICE/OUTPT VISIT, EST, LEVL IV, 30-39 MIN: ICD-10-PCS | Mod: S$GLB,,, | Performed by: FAMILY MEDICINE

## 2020-06-04 PROCEDURE — 36415 COLL VENOUS BLD VENIPUNCTURE: CPT | Mod: PO

## 2020-06-04 PROCEDURE — 93010 EKG 12-LEAD: ICD-10-PCS | Mod: S$GLB,,, | Performed by: INTERNAL MEDICINE

## 2020-06-04 PROCEDURE — 3078F DIAST BP <80 MM HG: CPT | Mod: CPTII,S$GLB,, | Performed by: FAMILY MEDICINE

## 2020-06-04 PROCEDURE — 85025 COMPLETE CBC W/AUTO DIFF WBC: CPT | Mod: PO

## 2020-06-04 PROCEDURE — 82150 ASSAY OF AMYLASE: CPT | Mod: PO

## 2020-06-04 PROCEDURE — 3008F PR BODY MASS INDEX (BMI) DOCUMENTED: ICD-10-PCS | Mod: CPTII,S$GLB,, | Performed by: FAMILY MEDICINE

## 2020-06-04 PROCEDURE — 99999 PR PBB SHADOW E&M-EST. PATIENT-LVL III: ICD-10-PCS | Mod: PBBFAC,,, | Performed by: FAMILY MEDICINE

## 2020-06-04 PROCEDURE — 93010 ELECTROCARDIOGRAM REPORT: CPT | Mod: S$GLB,,, | Performed by: INTERNAL MEDICINE

## 2020-06-04 PROCEDURE — 99999 PR PBB SHADOW E&M-EST. PATIENT-LVL III: CPT | Mod: PBBFAC,,, | Performed by: FAMILY MEDICINE

## 2020-06-04 PROCEDURE — 93005 EKG 12-LEAD: ICD-10-PCS | Mod: S$GLB,,, | Performed by: FAMILY MEDICINE

## 2020-06-04 PROCEDURE — 99214 OFFICE O/P EST MOD 30 MIN: CPT | Mod: S$GLB,,, | Performed by: FAMILY MEDICINE

## 2020-06-04 PROCEDURE — 93005 ELECTROCARDIOGRAM TRACING: CPT | Mod: S$GLB,,, | Performed by: FAMILY MEDICINE

## 2020-06-04 PROCEDURE — 3008F BODY MASS INDEX DOCD: CPT | Mod: CPTII,S$GLB,, | Performed by: FAMILY MEDICINE

## 2020-06-04 PROCEDURE — 3074F PR MOST RECENT SYSTOLIC BLOOD PRESSURE < 130 MM HG: ICD-10-PCS | Mod: CPTII,S$GLB,, | Performed by: FAMILY MEDICINE

## 2020-06-04 PROCEDURE — 83690 ASSAY OF LIPASE: CPT

## 2020-06-04 PROCEDURE — 80053 COMPREHEN METABOLIC PANEL: CPT | Mod: PO

## 2020-06-04 PROCEDURE — 3078F PR MOST RECENT DIASTOLIC BLOOD PRESSURE < 80 MM HG: ICD-10-PCS | Mod: CPTII,S$GLB,, | Performed by: FAMILY MEDICINE

## 2020-06-04 PROCEDURE — 3074F SYST BP LT 130 MM HG: CPT | Mod: CPTII,S$GLB,, | Performed by: FAMILY MEDICINE

## 2020-06-04 RX ORDER — PANTOPRAZOLE SODIUM 40 MG/1
40 TABLET, DELAYED RELEASE ORAL DAILY
Qty: 30 TABLET | Refills: 11 | Status: SHIPPED | OUTPATIENT
Start: 2020-06-04 | End: 2021-05-05

## 2020-06-04 RX ORDER — SUCRALFATE 1 G/1
1 TABLET ORAL
Qty: 120 TABLET | Refills: 1 | Status: SHIPPED | OUTPATIENT
Start: 2020-06-04 | End: 2020-07-04

## 2020-06-04 RX ORDER — FAMOTIDINE 40 MG/1
40 TABLET, FILM COATED ORAL NIGHTLY
Qty: 30 TABLET | Refills: 11 | Status: SHIPPED | OUTPATIENT
Start: 2020-06-04 | End: 2020-07-28 | Stop reason: SDUPTHER

## 2020-06-04 NOTE — PROGRESS NOTES
Subjective:       Patient ID: Josephine Bolton is a 54 y.o. female.    Chief Complaint: No chief complaint on file.    HPI     S/p drug eluting stent in ostial rca via heart cath on 12/13/19. Developed left sided chest pain on 12/18/19. Went to [facility] ER and given imdur. At ER, no ekg changes and neg troponins. F/ued with her cardiologist on 12/20/19. Subsequently, was hospitalized on 12/23/19 for chf exacerbation. Nuclear stress test at that time showed no ischemia.     Reports left sided chest pressure squeezing sensation yesterday. Lasted for 4-5 hours. No relief with mylanta.  Reports that 2 tabs of ntg within 10 minutes apart relieved the pain.  Not interested in going to ER    Reports epigastric abdominal pain with nausea and bloating after eating x 2-3 months.  No melena or hematochezia. Has lost 20 lbs over the past 4-5 weeks due to eating less.       Review of Systems      Review of Systems   Constitutional: Negative for fever and chills.   HENT: Negative for hearing loss and neck stiffness.    Eyes: Negative for redness and itching.   Respiratory: Negative for cough and choking.    Cardiovascular: Negative for leg swelling.  Abdomen: Negative for blood in stool.   Genitourinary: Negative for dysuria and flank pain.   Musculoskeletal: Negative for back pain and gait problem.   Neurological: Negative for light-headedness and headaches.   Hematological: Negative for adenopathy.   Psychiatric/Behavioral: Negative for behavioral problems.     Objective:      Physical Exam   Constitutional: She appears well-developed.   HENT:   Head: Normocephalic and atraumatic.   Eyes: Pupils are equal, round, and reactive to light. Conjunctivae are normal.   Neck: Normal range of motion.   Cardiovascular: Normal rate and regular rhythm.   No murmur heard.  Pulmonary/Chest: Effort normal and breath sounds normal.   Abdominal: Soft. Bowel sounds are normal.   Epigastric tenderness   Lymphadenopathy:     She has no cervical  adenopathy.       Assessment:       1. Chest pain, unspecified type    2. Epigastric pain        Plan:       Chest pain, unspecified type  -     EKG 12-lead; Future; Expected date: 06/04/2020    Epigastric pain  -     Comprehensive metabolic panel; Future  -     CBC auto differential; Future  -     Amylase; Future; Expected date: 06/04/2020  -     Lipase; Future; Expected date: 06/04/2020    Other orders  -     pantoprazole (PROTONIX) 40 MG tablet; Take 1 tablet (40 mg total) by mouth once daily.  Dispense: 30 tablet; Refill: 11  -     sucralfate (CARAFATE) 1 gram tablet; Take 1 tablet (1 g total) by mouth 4 (four) times daily before meals and nightly.  Dispense: 120 tablet; Refill: 1  -     famotidine (PEPCID) 40 MG tablet; Take 1 tablet (40 mg total) by mouth every evening.  Dispense: 30 tablet; Refill: 11                Plan:  ekg reviewed: no change from previous ekg  Start pepcid and carafate. Cont taking protonix  Cont all other meds    Total time spent with patient was 25 minutes with more than half the time spent in direct consultation with the patient in regards to our treatment/plan.        Medication List with Changes/Refills   New Medications    FAMOTIDINE (PEPCID) 40 MG TABLET    Take 1 tablet (40 mg total) by mouth every evening.    SUCRALFATE (CARAFATE) 1 GRAM TABLET    Take 1 tablet (1 g total) by mouth 4 (four) times daily before meals and nightly.   Current Medications    ACETAMINOPHEN (TYLENOL) 500 MG TABLET    Take 2 tablets (1,000 mg total) by mouth every 8 (eight) hours.    ALBUTEROL (PROVENTIL HFA) 90 MCG/ACTUATION INHALER    Inhale 2 puffs into the lungs.    ALLOPURINOL (ZYLOPRIM) 100 MG TABLET    Take 1 tablet (100 mg total) by mouth once daily.    AMLODIPINE (NORVASC) 5 MG TABLET    TAKE 1 TABLET BY MOUTH ONCE DAILY    ASPIRIN (ECOTRIN) 81 MG EC TABLET    Take 1 tablet (81 mg total) by mouth once daily.    ATORVASTATIN (LIPITOR) 40 MG TABLET    Take 1 tablet (40 mg total) by mouth every  evening.    BLOOD-GLUCOSE METER (GLUCOSE MONITORING KIT) KIT    Use as instructed    BRILINTA 90 MG TABLET    TAKE 1 TABLET (90 MG TOTAL) BY MOUTH 2 (TWO) TIMES DAILY.    CARVEDILOL (COREG) 25 MG TABLET    Take 1 tablet (25 mg total) by mouth 2 (two) times daily.    CETIRIZINE (ZYRTEC) 10 MG TABLET    Take 10 mg by mouth daily as needed for Allergies.    FUROSEMIDE (LASIX) 20 MG TABLET    TAKE 1 TABLET (20 MG TOTAL) BY MOUTH ONCE DAILY.    GABAPENTIN (NEURONTIN) 100 MG CAPSULE    Take 1 capsule (100 mg total) by mouth 2 (two) times daily.    ISOSORBIDE MONONITRATE (IMDUR) 30 MG 24 HR TABLET    Take 1 tablet (30 mg total) by mouth once daily. for 15 days    LANCETS MISC    1 Units by Misc.(Non-Drug; Combo Route) route 2 (two) times daily as needed.    LEVOTHYROXINE (SYNTHROID) 125 MCG TABLET    Take 1 tablet (125 mcg total) by mouth before breakfast.    METFORMIN (GLUCOPHAGE) 1000 MG TABLET    TAKE 1 TABLET BY MOUTH TWICE DAILY WITH MEALS    NITROGLYCERIN (NITROSTAT) 0.4 MG SL TABLET    Place 1 tablet (0.4 mg total) under the tongue every 5 (five) minutes as needed for Chest pain.    POLYETHYLENE GLYCOL (GLYCOLAX) 17 GRAM PWPK    Take 17 g by mouth once daily.    RAMIPRIL (ALTACE) 10 MG CAPSULE    Take 1 capsule (10 mg total) by mouth every evening.    RANOLAZINE (RANEXA) 500 MG TB12    TAKE 1 TABLET BY MOUTH 2 TIMES A DAY    RANOLAZINE (RANEXA) 500 MG TB12    Take 1 tablet (500 mg total) by mouth 2 (two) times daily.    VENLAFAXINE (EFFEXOR-XR) 150 MG CP24    Take 1 capsule (150 mg total) by mouth once daily.   Changed and/or Refilled Medications    Modified Medication Previous Medication    PANTOPRAZOLE (PROTONIX) 40 MG TABLET pantoprazole (PROTONIX) 40 MG tablet       Take 1 tablet (40 mg total) by mouth once daily.    Take 1 tablet (40 mg total) by mouth once daily.

## 2020-06-11 ENCOUNTER — OFFICE VISIT (OUTPATIENT)
Dept: CARDIOLOGY | Facility: CLINIC | Age: 55
End: 2020-06-11
Payer: COMMERCIAL

## 2020-06-11 VITALS
WEIGHT: 178.81 LBS | DIASTOLIC BLOOD PRESSURE: 65 MMHG | SYSTOLIC BLOOD PRESSURE: 98 MMHG | BODY MASS INDEX: 32.91 KG/M2 | HEIGHT: 62 IN | HEART RATE: 72 BPM

## 2020-06-11 DIAGNOSIS — R10.84 GENERALIZED ABDOMINAL PAIN: ICD-10-CM

## 2020-06-11 DIAGNOSIS — E66.9 OBESITY, UNSPECIFIED CLASSIFICATION, UNSPECIFIED OBESITY TYPE, UNSPECIFIED WHETHER SERIOUS COMORBIDITY PRESENT: ICD-10-CM

## 2020-06-11 DIAGNOSIS — K27.9 PUD (PEPTIC ULCER DISEASE): Primary | ICD-10-CM

## 2020-06-11 DIAGNOSIS — I10 ESSENTIAL HYPERTENSION: ICD-10-CM

## 2020-06-11 DIAGNOSIS — I25.10 CORONARY ARTERY DISEASE INVOLVING NATIVE CORONARY ARTERY OF NATIVE HEART WITHOUT ANGINA PECTORIS: Chronic | ICD-10-CM

## 2020-06-11 DIAGNOSIS — G47.33 OBSTRUCTIVE SLEEP APNEA ON CPAP: Chronic | ICD-10-CM

## 2020-06-11 DIAGNOSIS — K55.1 MESENTERIC ISCHEMIA, CHRONIC: ICD-10-CM

## 2020-06-11 DIAGNOSIS — E78.2 MIXED HYPERLIPIDEMIA: ICD-10-CM

## 2020-06-11 PROCEDURE — 3078F PR MOST RECENT DIASTOLIC BLOOD PRESSURE < 80 MM HG: ICD-10-PCS | Mod: CPTII,S$GLB,, | Performed by: PHYSICIAN ASSISTANT

## 2020-06-11 PROCEDURE — 3008F PR BODY MASS INDEX (BMI) DOCUMENTED: ICD-10-PCS | Mod: CPTII,S$GLB,, | Performed by: PHYSICIAN ASSISTANT

## 2020-06-11 PROCEDURE — 99214 OFFICE O/P EST MOD 30 MIN: CPT | Mod: S$GLB,,, | Performed by: PHYSICIAN ASSISTANT

## 2020-06-11 PROCEDURE — 3074F SYST BP LT 130 MM HG: CPT | Mod: CPTII,S$GLB,, | Performed by: PHYSICIAN ASSISTANT

## 2020-06-11 PROCEDURE — 99214 PR OFFICE/OUTPT VISIT, EST, LEVL IV, 30-39 MIN: ICD-10-PCS | Mod: S$GLB,,, | Performed by: PHYSICIAN ASSISTANT

## 2020-06-11 PROCEDURE — 99999 PR PBB SHADOW E&M-EST. PATIENT-LVL IV: ICD-10-PCS | Mod: PBBFAC,,, | Performed by: PHYSICIAN ASSISTANT

## 2020-06-11 PROCEDURE — 3074F PR MOST RECENT SYSTOLIC BLOOD PRESSURE < 130 MM HG: ICD-10-PCS | Mod: CPTII,S$GLB,, | Performed by: PHYSICIAN ASSISTANT

## 2020-06-11 PROCEDURE — 99999 PR PBB SHADOW E&M-EST. PATIENT-LVL IV: CPT | Mod: PBBFAC,,, | Performed by: PHYSICIAN ASSISTANT

## 2020-06-11 PROCEDURE — 3078F DIAST BP <80 MM HG: CPT | Mod: CPTII,S$GLB,, | Performed by: PHYSICIAN ASSISTANT

## 2020-06-11 PROCEDURE — 3008F BODY MASS INDEX DOCD: CPT | Mod: CPTII,S$GLB,, | Performed by: PHYSICIAN ASSISTANT

## 2020-06-11 RX ORDER — ISOSORBIDE MONONITRATE 30 MG/1
60 TABLET, EXTENDED RELEASE ORAL DAILY
Qty: 60 TABLET | Refills: 11 | Status: SHIPPED | OUTPATIENT
Start: 2020-06-11 | End: 2020-07-28

## 2020-06-11 NOTE — ASSESSMENT & PLAN NOTE
Coronary angiographic findings (12/13/19):  Left main coronary-medium size vessel mild calcific disease less than 15%.  Left anterior descending-normal size vessel completely occluded in its proximal portion diffuse stenting seen is midportion.  Circumflex-medium size codominant vessel diffuse moderate calcific disease all less than 50%.  First obtuse marginal trivial sized vessel 2nd obtuse margin normal size vessel with diffuse moderate disease.  Third 4th obtuse margin small vessel diffuse moderate disease less than 40%.  Right coronary-small long codominant vessel diffuse calcifications throughout entire course patent stents from its proximal to midportion.  Ostial appears have a 50% stenosis.  Vein graft to the 2nd obtuse marginal is patent with mild disease in native of obtuse marginal.  Lima lad-patent small LIMA to a small LAD with less than 30% stenosis.  Other vein graft is occluded.     Left ventricular end-diastolic pressure is 14 mmHg.  Fractional flow reserve of the RCA was 0.78 with a 35 mm gradient on pullback from the ostium     A new intervention:  Successful angioplasty stenting the ostial RCA using Osirio2.5 x 18 post dilated proximally 2.750% residual stenosis and MARCO 3 flow.     Impression:  Significant ostial RCA lesion as above treated with drug-eluting stenting as above.  Occluded proximal LAD with patent lima lad  Patent vein graft to 2nd obtuse marginal  Known occluded vein graft to unknown vessel.     Plan:  Dual antiplatelet therapy minimum 6 months aggressive risk factor modification  If hemodynamically stable can be discharged home later tonight or early tomorrow morning follow-up as scheduled

## 2020-06-11 NOTE — ASSESSMENT & PLAN NOTE
Need to r/o mesenteric ischemia given post-prandial pain and weight loss.   Patient also has hx of PUD.   Needs GI follow up.

## 2020-06-11 NOTE — PROGRESS NOTES
Subjective:    Patient ID:  Josephine Bolton is a 54 y.o. female who presents for follow-up of CAD.     HPI  Ms. Bolton is a pleasant lady who follows with Dr. Abbasi. She has a hx of CAD and is s/p ostial RCA PCI in December 2019. She subsequently had a nuclear stress test on 12/24 that was negative for ischemia. She continues to have intermittent episodes of her typical angina, though it usually occurs at rest.     She complains of an almost 30 lb unintentional weight loss since January (was 205 lb at her visit on 1/2, now 178 lbs). She has diffuse abdominal pain after eating and thus has had a decreased appetite. She gets epigastric pain almost immediately after meals which then develops into diffusion abdominal pain.     Review of Systems   Constitution: Positive for decreased appetite and weight loss (Unintentional). Negative for chills, diaphoresis, fever and weight gain.   HENT: Negative for sore throat.    Eyes: Negative for blurred vision, vision loss in left eye, vision loss in right eye and visual disturbance.   Cardiovascular: Positive for chest pain. Negative for claudication, dyspnea on exertion, leg swelling, near-syncope, orthopnea, palpitations, paroxysmal nocturnal dyspnea and syncope.   Respiratory: Negative for cough, hemoptysis, shortness of breath, sputum production and wheezing.    Endocrine: Negative for cold intolerance and heat intolerance.   Hematologic/Lymphatic: Negative for adenopathy. Does not bruise/bleed easily.   Skin: Negative for rash.   Musculoskeletal: Negative for falls, muscle weakness and myalgias.   Gastrointestinal: Positive for abdominal pain (Epigastric and diffuse abdominal pain after eating). Negative for change in bowel habit, constipation, diarrhea, melena and nausea.   Genitourinary: Negative for bladder incontinence.   Neurological: Negative for dizziness, focal weakness, headaches, light-headedness, numbness and weakness.   Psychiatric/Behavioral: Negative  "for altered mental status.         Vitals:    06/11/20 0759   BP: 98/65   BP Location: Left arm   Patient Position: Sitting   BP Method: Large (Automatic)   Pulse: 72   Weight: 81.1 kg (178 lb 12.7 oz)   Height: 5' 2" (1.575 m)   Body mass index is 32.7 kg/m².      Objective:    Physical Exam   Constitutional: She is oriented to person, place, and time. She appears well-developed and well-nourished. No distress.   HENT:   Head: Normocephalic and atraumatic.   Mouth/Throat: Oropharynx is clear and moist.   Eyes: Pupils are equal, round, and reactive to light. Conjunctivae and EOM are normal. No scleral icterus.   Neck: Neck supple. No JVD present. No tracheal deviation present.   Cardiovascular: Normal rate and regular rhythm. Exam reveals no gallop and no friction rub.   No murmur heard.  Pulmonary/Chest: Effort normal and breath sounds normal. No respiratory distress. She has no wheezes. She has no rales. She exhibits no tenderness.   Abdominal: Soft. Bowel sounds are normal. She exhibits no distension. There is no hepatosplenomegaly. There is no tenderness.   Musculoskeletal: She exhibits no edema or tenderness.   Neurological: She is alert and oriented to person, place, and time.   Skin: Skin is warm and dry. No rash noted. No erythema.   Psychiatric: She has a normal mood and affect. Her behavior is normal.         Assessment:       Coronary artery disease involving native coronary artery of native heart without angina pectoris  Coronary angiographic findings (12/13/19):  Left main coronary-medium size vessel mild calcific disease less than 15%.  Left anterior descending-normal size vessel completely occluded in its proximal portion diffuse stenting seen is midportion.  Circumflex-medium size codominant vessel diffuse moderate calcific disease all less than 50%.  First obtuse marginal trivial sized vessel 2nd obtuse margin normal size vessel with diffuse moderate disease.  Third 4th obtuse margin small vessel " diffuse moderate disease less than 40%.  Right coronary-small long codominant vessel diffuse calcifications throughout entire course patent stents from its proximal to midportion.  Ostial appears have a 50% stenosis.  Vein graft to the 2nd obtuse marginal is patent with mild disease in native of obtuse marginal.  Lima lad-patent small LIMA to a small LAD with less than 30% stenosis.  Other vein graft is occluded.     Left ventricular end-diastolic pressure is 14 mmHg.  Fractional flow reserve of the RCA was 0.78 with a 35 mm gradient on pullback from the ostium     A new intervention:  Successful angioplasty stenting the ostial RCA using Osirio2.5 x 18 post dilated proximally 2.750% residual stenosis and MARCO 3 flow.     Impression:  Significant ostial RCA lesion as above treated with drug-eluting stenting as above.  Occluded proximal LAD with patent lima lad  Patent vein graft to 2nd obtuse marginal  Known occluded vein graft to unknown vessel.     Plan:  Dual antiplatelet therapy minimum 6 months aggressive risk factor modification  If hemodynamically stable can be discharged home later tonight or early tomorrow morning follow-up as scheduled    Hypertension  BP low today.   See plan below.     Hyperlipidemia  Goal LDL < 70.   Continue statin.     Obesity  Body mass index is 32.7 kg/m².  Almost 30 lb unintentional weight loss since January 2020.     Abdominal pain, generalized  Need to r/o mesenteric ischemia given post-prandial pain and weight loss.   Patient also has hx of PUD.   Needs GI follow up.     Obstructive sleep apnea on CPAP  Compliant with CPAP.        Plan:       D/C Norvasc.   Increase Imdur to 60mg daily.   CTA abdomen to evaluate for mesenteric ischemia.   She needs f/u with GI as she has a hx of PUD. Discussed with Dr. Abbasi -- OK to hold Brilinta for EGD if absolutely necessary, but would continue ASA.   F/U with Dr. Abbasi as scheduled.

## 2020-06-12 ENCOUNTER — TELEPHONE (OUTPATIENT)
Dept: CARDIOLOGY | Facility: CLINIC | Age: 55
End: 2020-06-12

## 2020-06-12 NOTE — TELEPHONE ENCOUNTER
Please advise: pt took increase dose of Imdur last night and this morning; she has a tremendous headache and has been vomiting since this morning

## 2020-06-12 NOTE — TELEPHONE ENCOUNTER
----- Message from Christi Rios sent at 6/12/2020 12:51 PM CDT -----  Type: Needs Medical Advice  Who Called: patient  Symptoms (please be specific):  vomiting  How long has patient had these symptoms: started today  Best Call Back Number: [phone]  Additional Information: pt had OV yesterday. Pt states that her medication isosorbide mononitrate (IMDUR) 30 MG 24 hr tablet was increased to 60 mg. States that she has been vomiting and unable to keep anything down. Please give call back

## 2020-06-17 ENCOUNTER — HOSPITAL ENCOUNTER (OUTPATIENT)
Dept: RADIOLOGY | Facility: HOSPITAL | Age: 55
Discharge: HOME | End: 2020-06-17
Attending: PHYSICIAN ASSISTANT
Payer: COMMERCIAL

## 2020-06-17 DIAGNOSIS — K55.1 MESENTERIC ISCHEMIA, CHRONIC: ICD-10-CM

## 2020-06-17 PROCEDURE — 25500020 PHARM REV CODE 255: Mod: PO | Performed by: PHYSICIAN ASSISTANT

## 2020-06-17 PROCEDURE — 74174 CTA ABDOMEN AND PELVIS: ICD-10-PCS | Mod: 26,,, | Performed by: RADIOLOGY

## 2020-06-17 PROCEDURE — 74174 CTA ABD&PLVS W/CONTRAST: CPT | Mod: 26,,, | Performed by: RADIOLOGY

## 2020-06-17 PROCEDURE — 74174 CTA ABD&PLVS W/CONTRAST: CPT | Mod: TC,PO

## 2020-06-17 RX ADMIN — IOHEXOL 100 ML: 350 INJECTION, SOLUTION INTRAVENOUS at 08:06

## 2020-06-19 ENCOUNTER — PATIENT MESSAGE (OUTPATIENT)
Dept: FAMILY MEDICINE | Facility: CLINIC | Age: 55
End: 2020-06-19

## 2020-06-19 ENCOUNTER — PATIENT MESSAGE (OUTPATIENT)
Dept: CARDIOLOGY | Facility: CLINIC | Age: 55
End: 2020-06-19

## 2020-06-19 DIAGNOSIS — R16.0 LIVER MASS: Primary | ICD-10-CM

## 2020-06-24 DIAGNOSIS — N20.0 KIDNEY STONES: ICD-10-CM

## 2020-06-24 DIAGNOSIS — Z79.899 ENCOUNTER FOR LONG-TERM (CURRENT) USE OF OTHER MEDICATIONS: Primary | ICD-10-CM

## 2020-06-24 RX ORDER — VENLAFAXINE HYDROCHLORIDE 150 MG/1
150 CAPSULE, EXTENDED RELEASE ORAL DAILY
Qty: 90 CAPSULE | Refills: 3 | Status: SHIPPED | OUTPATIENT
Start: 2020-06-24 | End: 2021-05-07

## 2020-06-24 RX ORDER — ALLOPURINOL 100 MG/1
100 TABLET ORAL DAILY
Qty: 90 TABLET | Refills: 0 | Status: SHIPPED | OUTPATIENT
Start: 2020-06-24 | End: 2020-09-14

## 2020-06-24 NOTE — TELEPHONE ENCOUNTER
Refill Authorization Note    is requesting a refill authorization.    Brief assessment and rationale for refill: APPROVE: needs labs     Medication-related problems identified: Requires labs    Medication Therapy Plan: NTBL(Uric acid)    Medication reconciliation completed: No                         Comments:      Requested Prescriptions   Signed Prescriptions Disp Refills    allopurinoL (ZYLOPRIM) 100 MG tablet 90 tablet 0     Sig: Take 1 tablet (100 mg total) by mouth once daily.       Endocrinology:  Gout Agents - allopurinol Failed - 6/24/2020 11:47 AM        Failed - Uric Acid is 5.9 or below and within 360 days     Uric Acid   Date Value Ref Range Status   04/28/2018 3.6 2.4 - 5.7 mg/dL Final   04/04/2016 4.2 2.4 - 5.7 mg/dL Final              Passed - Patient is at least 18 years old        Passed - Office visit in past 12 months or future 90 days.     Recent Outpatient Visits            1 week ago PUD (peptic ulcer disease)    [place] - Cardiology Tena Ruiz PA-C    2 weeks ago Chest pain, unspecified type    [place] - Family Medicine Froylan Smart MD    1 month ago Neuroma    [place] - Podiatry Shital Leal DPM    1 month ago Primary osteoarthritis of left knee    [facility]    2 months ago Coronary artery disease involving native coronary artery of native heart without angina pectoris    [place] - Cardiology Obdulio Abbasi MD          Future Appointments              In 2 weeks [facility] [place] [facility]    In 1 month [facility] [place] [facility]    [facility] 1 month [facility]    In 4 months Juan Wilson MD [facility] [place]                Passed - Cr is 1.3 or below and within 360 days     Creatinine   Date Value Ref Range Status   06/04/2020 0.8 0.5 - 1.4 mg/dL Final   12/27/2019 0.66 0.50 - 1.40  mg/dL Final   12/25/2019 0.64 0.50 - 1.40 mg/dL Final              Passed - WBC in normal range and within 360 days     WBC   Date Value Ref Range Status   06/04/2020 8.16 3.90 - 12.70 K/uL Final   12/25/2019 6.38 3.90 - 12.70 K/uL Final   12/24/2019 9.11 3.90 - 12.70 K/uL Final              Passed - RBC in normal range and within 360 days     RBC   Date Value Ref Range Status   06/04/2020 4.53 4.00 - 5.40 M/uL Final   12/25/2019 3.95 (L) 4.00 - 5.40 M/uL Final   12/24/2019 4.31 4.00 - 5.40 M/uL Final              Passed - HGB in normal range and within 360 days     Hemoglobin   Date Value Ref Range Status   06/04/2020 13.0 12.0 - 16.0 g/dL Final   12/25/2019 11.0 (L) 12.0 - 16.0 g/dL Final   12/24/2019 11.9 (L) 12.0 - 16.0 g/dL Final              Passed - HCT in normal range and within 360 days     Hematocrit   Date Value Ref Range Status   06/04/2020 39.7 37.0 - 48.5 % Final   12/25/2019 34.9 (L) 37.0 - 48.5 % Final   12/24/2019 36.8 (L) 37.0 - 48.5 % Final              Passed - PLT in normal range and within 360 days     Platelets   Date Value Ref Range Status   06/04/2020 350 150 - 350 K/uL Final   12/25/2019 254 150 - 350 K/uL Final   12/24/2019 340 150 - 350 K/uL Final              Passed - ALT is 94 or below and within 360 days     ALT   Date Value Ref Range Status   06/04/2020 15 10 - 44 U/L Final   12/25/2019 27 10 - 44 U/L Final   12/24/2019 15 10 - 44 U/L Final              Passed - AST is 54 or below and within 360 days     AST ([place])   Date Value Ref Range Status   03/24/2016 25 14 - 36 U/L Final   03/12/2016 30 14 - 36 U/L Final     AST   Date Value Ref Range Status   06/04/2020 18 10 - 40 U/L Final   12/25/2019 36 14 - 36 U/L Final   12/24/2019 40 (H) 14 - 36 U/L Final              Passed - eGFR is 60 or above and within 360 days     eGFR if non    Date Value Ref Range Status   06/04/2020 >60 >60 mL/min/1.73 m^2 Final     Comment:     Calculation used to obtain the estimated  glomerular filtration  rate (eGFR) is the CKD-EPI equation.      12/27/2019 >60 >60 mL/min/1.73 m^2 Final     Comment:     Calculation used to obtain the estimated glomerular filtration  rate (eGFR) is the CKD-EPI equation.      12/25/2019 >60 >60 mL/min/1.73 m^2 Final     Comment:     Calculation used to obtain the estimated glomerular filtration  rate (eGFR) is the CKD-EPI equation.        eGFR if    Date Value Ref Range Status   06/04/2020 >60 >60 mL/min/1.73 m^2 Final   12/27/2019 >60 >60 mL/min/1.73 m^2 Final   12/25/2019 >60 >60 mL/min/1.73 m^2 Final                venlafaxine (EFFEXOR-XR) 150 MG Cp24 90 capsule 3     Sig: Take 1 capsule (150 mg total) by mouth once daily.       Psychiatry: Antidepressants - SNRI - desvenlafaxine & venlafaxine Passed - 6/24/2020 11:47 AM        Passed - Patient is at least 18 years old        Passed - Last BP in normal range within 360 days.     BP Readings from Last 3 Encounters:   06/11/20 98/65   06/04/20 118/66   05/07/20 115/75              Passed - Office visit in past 6 months or future 90 days.     Recent Outpatient Visits            1 week ago PUD (peptic ulcer disease)    [place] - Cardiology Tena Ruiz PA-C    2 weeks ago Chest pain, unspecified type    [place] [facility] Froylan Smart MD    1 month ago Neuroma    [place] [facility] Shital Leal DPM    1 month ago Primary osteoarthritis of left knee    [facility]    2 months ago Coronary artery disease involving native coronary artery of native heart without angina pectoris    [place] - Cardiology Obdulio Abbasi MD          Future Appointments              In 2 weeks MD [facility] [address]  [facility]    In 1 month [facility], [place] [facility]    In 1 month DO [facility] Conde - Endocrinology, [facility]    In 4 months Juan Wilson MD [facility]                Passed - Cr is 1.3 or below and within 360 days     Creatinine   Date Value Ref Range Status   06/04/2020 0.8 0.5 - 1.4 mg/dL Final   12/27/2019 0.66 0.50 - 1.40 mg/dL Final   12/25/2019 0.64 0.50 - 1.40 mg/dL Final              Passed - eGFR within 360 days     eGFR if non    Date Value Ref Range Status   06/04/2020 >60 >60 mL/min/1.73 m^2 Final     Comment:     Calculation used to obtain the estimated glomerular filtration  rate (eGFR) is the CKD-EPI equation.      12/27/2019 >60 >60 mL/min/1.73 m^2 Final     Comment:     Calculation used to obtain the estimated glomerular filtration  rate (eGFR) is the CKD-EPI equation.      12/25/2019 >60 >60 mL/min/1.73 m^2 Final     Comment:     Calculation used to obtain the estimated glomerular filtration  rate (eGFR) is the CKD-EPI equation.        eGFR if    Date Value Ref Range Status   06/04/2020 >60 >60 mL/min/1.73 m^2 Final   12/27/2019 >60 >60 mL/min/1.73 m^2 Final   12/25/2019 >60 >60 mL/min/1.73 m^2 Final                  Appointments  past 12m or future 3m with PCP    Date Provider   Last Visit   6/4/2020 Froylan Smart MD   Next Visit   Visit date not found Froylan Smart MD   ED visits in past 90 days: [unfilled]     Note composed:1:18 PM 06/24/2020

## 2020-06-24 NOTE — TELEPHONE ENCOUNTER
Provider Staff:     Please link Labs (Uric Acid) with future lab appointment on 7/29/2020    Please also check with your provider if any further labs need to be added and scheduled together.    Thanks!  [facility]     Appointments  past 12m or future 3m with PCP    Date Provider   Last Visit   6/4/2020 Froylan Smart MD   Next Visit   Visit date not found Froylan Smart MD     Note composed:1:18 PM 06/24/2020

## 2020-07-09 ENCOUNTER — OFFICE VISIT (OUTPATIENT)
Dept: CARDIOLOGY | Facility: CLINIC | Age: 55
End: 2020-07-09
Payer: COMMERCIAL

## 2020-07-09 VITALS
HEIGHT: 62 IN | WEIGHT: 179.44 LBS | BODY MASS INDEX: 33.02 KG/M2 | HEART RATE: 86 BPM | DIASTOLIC BLOOD PRESSURE: 62 MMHG | SYSTOLIC BLOOD PRESSURE: 134 MMHG

## 2020-07-09 DIAGNOSIS — I25.10 CORONARY ARTERY DISEASE INVOLVING NATIVE CORONARY ARTERY OF NATIVE HEART WITHOUT ANGINA PECTORIS: Primary | Chronic | ICD-10-CM

## 2020-07-09 DIAGNOSIS — I15.0 RENOVASCULAR HYPERTENSION: ICD-10-CM

## 2020-07-09 DIAGNOSIS — E78.2 MIXED HYPERLIPIDEMIA: ICD-10-CM

## 2020-07-09 DIAGNOSIS — I10 ESSENTIAL HYPERTENSION: ICD-10-CM

## 2020-07-09 DIAGNOSIS — I70.1 RENAL ARTERY STENOSIS: ICD-10-CM

## 2020-07-09 DIAGNOSIS — Z95.1 S/P CABG (CORONARY ARTERY BYPASS GRAFT): ICD-10-CM

## 2020-07-09 DIAGNOSIS — I73.9 PAD (PERIPHERAL ARTERY DISEASE): ICD-10-CM

## 2020-07-09 PROCEDURE — 99999 PR PBB SHADOW E&M-EST. PATIENT-LVL III: CPT | Mod: PBBFAC,,, | Performed by: INTERNAL MEDICINE

## 2020-07-09 PROCEDURE — 3078F PR MOST RECENT DIASTOLIC BLOOD PRESSURE < 80 MM HG: ICD-10-PCS | Mod: CPTII,S$GLB,, | Performed by: INTERNAL MEDICINE

## 2020-07-09 PROCEDURE — 99214 PR OFFICE/OUTPT VISIT, EST, LEVL IV, 30-39 MIN: ICD-10-PCS | Mod: S$GLB,,, | Performed by: INTERNAL MEDICINE

## 2020-07-09 PROCEDURE — 99999 PR PBB SHADOW E&M-EST. PATIENT-LVL III: ICD-10-PCS | Mod: PBBFAC,,, | Performed by: INTERNAL MEDICINE

## 2020-07-09 PROCEDURE — 99214 OFFICE O/P EST MOD 30 MIN: CPT | Mod: S$GLB,,, | Performed by: INTERNAL MEDICINE

## 2020-07-09 PROCEDURE — 3075F PR MOST RECENT SYSTOLIC BLOOD PRESS GE 130-139MM HG: ICD-10-PCS | Mod: CPTII,S$GLB,, | Performed by: INTERNAL MEDICINE

## 2020-07-09 PROCEDURE — 3075F SYST BP GE 130 - 139MM HG: CPT | Mod: CPTII,S$GLB,, | Performed by: INTERNAL MEDICINE

## 2020-07-09 PROCEDURE — 3078F DIAST BP <80 MM HG: CPT | Mod: CPTII,S$GLB,, | Performed by: INTERNAL MEDICINE

## 2020-07-09 PROCEDURE — 3008F BODY MASS INDEX DOCD: CPT | Mod: CPTII,S$GLB,, | Performed by: INTERNAL MEDICINE

## 2020-07-09 PROCEDURE — 3008F PR BODY MASS INDEX (BMI) DOCUMENTED: ICD-10-PCS | Mod: CPTII,S$GLB,, | Performed by: INTERNAL MEDICINE

## 2020-07-09 RX ORDER — ATORVASTATIN CALCIUM 40 MG/1
40 TABLET, FILM COATED ORAL NIGHTLY
Qty: 90 TABLET | Refills: 3 | Status: SHIPPED | OUTPATIENT
Start: 2020-07-09 | End: 2021-05-03

## 2020-07-09 NOTE — PROGRESS NOTES
Subjective:    Patient ID:  Josephine Bolton is a 54 y.o. female who presents for follow-up of Coronary Artery Disease (follow up), Hyperlipidemia, and Hypertension      HPI  She comes with no complaints, no chest pain, no shortness of breath  Abdominal pain remains main problem  BP well controlled      Review of Systems   Constitution: Positive for weight loss. Negative for decreased appetite, malaise/fatigue and weight gain.   Cardiovascular: Negative for chest pain, dyspnea on exertion, leg swelling, palpitations and syncope.   Respiratory: Negative for cough and shortness of breath.    Gastrointestinal: Negative.    Neurological: Negative for weakness.   All other systems reviewed and are negative.       Objective:      Physical Exam   Constitutional: She is oriented to person, place, and time. She appears well-developed and well-nourished.   HENT:   Head: Normocephalic.   Eyes: Pupils are equal, round, and reactive to light.   Neck: Normal range of motion. Neck supple. No JVD present. Carotid bruit is not present. No thyromegaly present.   Cardiovascular: Normal rate, regular rhythm, normal heart sounds, intact distal pulses and normal pulses. PMI is not displaced. Exam reveals no gallop.   No murmur heard.  Pulmonary/Chest: Effort normal and breath sounds normal.   Abdominal: Soft. Normal appearance. She exhibits no mass. There is no hepatosplenomegaly. There is no abdominal tenderness.   Musculoskeletal: Normal range of motion.         General: No edema.   Neurological: She is alert and oriented to person, place, and time. She has normal strength and normal reflexes. No sensory deficit.   Skin: Skin is warm and intact.   Psychiatric: She has a normal mood and affect.   Nursing note and vitals reviewed.    CT abdomen reviewed      Assessment:       1. Coronary artery disease involving native coronary artery of native heart without angina pectoris    2. Essential hypertension    3. Mixed hyperlipidemia     4. S/P CABG (coronary artery bypass graft)         Plan:     Continue all cardiac medications  Regular exercise program  Weight loss  6 m f/u with renal doppler

## 2020-07-22 ENCOUNTER — HOSPITAL ENCOUNTER (OUTPATIENT)
Dept: RADIOLOGY | Facility: HOSPITAL | Age: 55
Discharge: HOME | End: 2020-07-22
Attending: FAMILY MEDICINE
Payer: COMMERCIAL

## 2020-07-22 DIAGNOSIS — R16.0 LIVER MASS: ICD-10-CM

## 2020-07-22 PROCEDURE — 74183 MRI ABD W/O CNTR FLWD CNTR: CPT | Mod: 26,,, | Performed by: RADIOLOGY

## 2020-07-22 PROCEDURE — 25500020 PHARM REV CODE 255: Mod: PO | Performed by: FAMILY MEDICINE

## 2020-07-22 PROCEDURE — A9585 GADOBUTROL INJECTION: HCPCS | Mod: PO | Performed by: FAMILY MEDICINE

## 2020-07-22 PROCEDURE — 74183 MRI ABDOMEN W WO CONTRAST: ICD-10-PCS | Mod: 26,,, | Performed by: RADIOLOGY

## 2020-07-22 PROCEDURE — 74183 MRI ABD W/O CNTR FLWD CNTR: CPT | Mod: TC,PO

## 2020-07-22 RX ORDER — GADOBUTROL 604.72 MG/ML
7 INJECTION INTRAVENOUS
Status: COMPLETED | OUTPATIENT
Start: 2020-07-22 | End: 2020-07-22

## 2020-07-22 RX ADMIN — GADOBUTROL 7 ML: 604.72 INJECTION INTRAVENOUS at 02:07

## 2020-07-28 ENCOUNTER — LAB VISIT (OUTPATIENT)
Dept: LAB | Facility: HOSPITAL | Age: 55
End: 2020-07-28
Attending: INTERNAL MEDICINE
Payer: COMMERCIAL

## 2020-07-28 ENCOUNTER — OFFICE VISIT (OUTPATIENT)
Dept: FAMILY MEDICINE | Facility: CLINIC | Age: 55
End: 2020-07-28
Payer: COMMERCIAL

## 2020-07-28 ENCOUNTER — TELEPHONE (OUTPATIENT)
Dept: ORTHOPEDICS | Facility: CLINIC | Age: 55
End: 2020-07-28

## 2020-07-28 VITALS
WEIGHT: 179.88 LBS | TEMPERATURE: 98 F | HEART RATE: 86 BPM | BODY MASS INDEX: 33.1 KG/M2 | DIASTOLIC BLOOD PRESSURE: 74 MMHG | OXYGEN SATURATION: 98 % | SYSTOLIC BLOOD PRESSURE: 116 MMHG | HEIGHT: 62 IN

## 2020-07-28 DIAGNOSIS — Z11.59 NEED FOR HEPATITIS C SCREENING TEST: ICD-10-CM

## 2020-07-28 DIAGNOSIS — R10.13 EPIGASTRIC ABDOMINAL PAIN: Primary | ICD-10-CM

## 2020-07-28 DIAGNOSIS — Z11.4 ENCOUNTER FOR SCREENING FOR HIV: ICD-10-CM

## 2020-07-28 DIAGNOSIS — R16.0 LIVER MASS: ICD-10-CM

## 2020-07-28 DIAGNOSIS — F41.8 DEPRESSION WITH ANXIETY: ICD-10-CM

## 2020-07-28 DIAGNOSIS — Z12.11 ENCOUNTER FOR SCREENING FOR COLORECTAL MALIGNANT NEOPLASM: ICD-10-CM

## 2020-07-28 DIAGNOSIS — E03.9 HYPOTHYROIDISM, UNSPECIFIED TYPE: ICD-10-CM

## 2020-07-28 DIAGNOSIS — K21.9 GASTROESOPHAGEAL REFLUX DISEASE, ESOPHAGITIS PRESENCE NOT SPECIFIED: ICD-10-CM

## 2020-07-28 DIAGNOSIS — R63.4 WEIGHT LOSS: ICD-10-CM

## 2020-07-28 DIAGNOSIS — R11.2 NAUSEA AND VOMITING, INTRACTABILITY OF VOMITING NOT SPECIFIED, UNSPECIFIED VOMITING TYPE: ICD-10-CM

## 2020-07-28 DIAGNOSIS — Z12.12 ENCOUNTER FOR SCREENING FOR COLORECTAL MALIGNANT NEOPLASM: ICD-10-CM

## 2020-07-28 DIAGNOSIS — Z86.010 HISTORY OF COLON POLYPS: ICD-10-CM

## 2020-07-28 DIAGNOSIS — Z79.899 ENCOUNTER FOR LONG-TERM (CURRENT) USE OF OTHER MEDICATIONS: ICD-10-CM

## 2020-07-28 DIAGNOSIS — I10 ESSENTIAL HYPERTENSION: ICD-10-CM

## 2020-07-28 LAB
ALBUMIN SERPL BCP-MCNC: 4.1 G/DL (ref 3.5–5.2)
ALP SERPL-CCNC: 74 U/L (ref 55–135)
ALT SERPL W/O P-5'-P-CCNC: 12 U/L (ref 10–44)
ANION GAP SERPL CALC-SCNC: 14 MMOL/L (ref 8–16)
AST SERPL-CCNC: 25 U/L (ref 10–40)
BASOPHILS # BLD AUTO: 0.1 K/UL (ref 0–0.2)
BASOPHILS NFR BLD: 1.1 % (ref 0–1.9)
BILIRUB SERPL-MCNC: 0.5 MG/DL (ref 0.1–1)
BUN SERPL-MCNC: 11 MG/DL (ref 6–20)
CALCIUM SERPL-MCNC: 9.3 MG/DL (ref 8.7–10.5)
CHLORIDE SERPL-SCNC: 106 MMOL/L (ref 95–110)
CO2 SERPL-SCNC: 24 MMOL/L (ref 23–29)
CREAT SERPL-MCNC: 0.8 MG/DL (ref 0.5–1.4)
DIFFERENTIAL METHOD: ABNORMAL
EOSINOPHIL # BLD AUTO: 0.1 K/UL (ref 0–0.5)
EOSINOPHIL NFR BLD: 1.5 % (ref 0–8)
ERYTHROCYTE [DISTWIDTH] IN BLOOD BY AUTOMATED COUNT: 14.6 % (ref 11.5–14.5)
EST. GFR  (AFRICAN AMERICAN): >60 ML/MIN/1.73 M^2
EST. GFR  (NON AFRICAN AMERICAN): >60 ML/MIN/1.73 M^2
GLUCOSE SERPL-MCNC: 95 MG/DL (ref 70–110)
HCT VFR BLD AUTO: 41.1 % (ref 37–48.5)
HGB BLD-MCNC: 12.7 G/DL (ref 12–16)
IMM GRANULOCYTES # BLD AUTO: 0.02 K/UL (ref 0–0.04)
IMM GRANULOCYTES NFR BLD AUTO: 0.2 % (ref 0–0.5)
INSULIN COLLECTION INTERVAL: ABNORMAL
INSULIN SERPL-ACNC: 27.3 UU/ML
LYMPHOCYTES # BLD AUTO: 2.7 K/UL (ref 1–4.8)
LYMPHOCYTES NFR BLD: 29.8 % (ref 18–48)
MCH RBC QN AUTO: 29 PG (ref 27–31)
MCHC RBC AUTO-ENTMCNC: 30.9 G/DL (ref 32–36)
MCV RBC AUTO: 94 FL (ref 82–98)
MONOCYTES # BLD AUTO: 0.7 K/UL (ref 0.3–1)
MONOCYTES NFR BLD: 8.1 % (ref 4–15)
NEUTROPHILS # BLD AUTO: 5.4 K/UL (ref 1.8–7.7)
NEUTROPHILS NFR BLD: 59.3 % (ref 38–73)
NRBC BLD-RTO: 0 /100 WBC
PLATELET # BLD AUTO: 411 K/UL (ref 150–350)
PMV BLD AUTO: 9 FL (ref 9.2–12.9)
POTASSIUM SERPL-SCNC: 3.7 MMOL/L (ref 3.5–5.1)
PROT SERPL-MCNC: 8.1 G/DL (ref 6–8.4)
RBC # BLD AUTO: 4.38 M/UL (ref 4–5.4)
SODIUM SERPL-SCNC: 144 MMOL/L (ref 136–145)
TSH SERPL DL<=0.005 MIU/L-ACNC: 1.48 UIU/ML (ref 0.4–4)
TSH SERPL DL<=0.005 MIU/L-ACNC: 1.48 UIU/ML (ref 0.4–4)
URATE SERPL-MCNC: 3.9 MG/DL (ref 2.4–5.7)
WBC # BLD AUTO: 9.16 K/UL (ref 3.9–12.7)

## 2020-07-28 PROCEDURE — S0119 ONDANSETRON 4 MG: HCPCS | Mod: S$GLB,,, | Performed by: NURSE PRACTITIONER

## 2020-07-28 PROCEDURE — 86038 ANTINUCLEAR ANTIBODIES: CPT

## 2020-07-28 PROCEDURE — 3074F SYST BP LT 130 MM HG: CPT | Mod: CPTII,S$GLB,, | Performed by: NURSE PRACTITIONER

## 2020-07-28 PROCEDURE — 84443 ASSAY THYROID STIM HORMONE: CPT

## 2020-07-28 PROCEDURE — S0119 PR ONDANSETRON, ORAL, 4MG: ICD-10-PCS | Mod: S$GLB,,, | Performed by: NURSE PRACTITIONER

## 2020-07-28 PROCEDURE — 3008F PR BODY MASS INDEX (BMI) DOCUMENTED: ICD-10-PCS | Mod: CPTII,S$GLB,, | Performed by: NURSE PRACTITIONER

## 2020-07-28 PROCEDURE — 3078F PR MOST RECENT DIASTOLIC BLOOD PRESSURE < 80 MM HG: ICD-10-PCS | Mod: CPTII,S$GLB,, | Performed by: NURSE PRACTITIONER

## 2020-07-28 PROCEDURE — 85025 COMPLETE CBC W/AUTO DIFF WBC: CPT

## 2020-07-28 PROCEDURE — 99215 PR OFFICE/OUTPT VISIT, EST, LEVL V, 40-54 MIN: ICD-10-PCS | Mod: S$GLB,,, | Performed by: NURSE PRACTITIONER

## 2020-07-28 PROCEDURE — 99999 PR PBB SHADOW E&M-EST. PATIENT-LVL V: CPT | Mod: PBBFAC,,, | Performed by: NURSE PRACTITIONER

## 2020-07-28 PROCEDURE — 86803 HEPATITIS C AB TEST: CPT

## 2020-07-28 PROCEDURE — 86703 HIV-1/HIV-2 1 RESULT ANTBDY: CPT

## 2020-07-28 PROCEDURE — 84550 ASSAY OF BLOOD/URIC ACID: CPT

## 2020-07-28 PROCEDURE — 99215 OFFICE O/P EST HI 40 MIN: CPT | Mod: S$GLB,,, | Performed by: NURSE PRACTITIONER

## 2020-07-28 PROCEDURE — 3008F BODY MASS INDEX DOCD: CPT | Mod: CPTII,S$GLB,, | Performed by: NURSE PRACTITIONER

## 2020-07-28 PROCEDURE — 83525 ASSAY OF INSULIN: CPT

## 2020-07-28 PROCEDURE — 3078F DIAST BP <80 MM HG: CPT | Mod: CPTII,S$GLB,, | Performed by: NURSE PRACTITIONER

## 2020-07-28 PROCEDURE — 80053 COMPREHEN METABOLIC PANEL: CPT

## 2020-07-28 PROCEDURE — 99999 PR PBB SHADOW E&M-EST. PATIENT-LVL V: ICD-10-PCS | Mod: PBBFAC,,, | Performed by: NURSE PRACTITIONER

## 2020-07-28 PROCEDURE — 3074F PR MOST RECENT SYSTOLIC BLOOD PRESSURE < 130 MM HG: ICD-10-PCS | Mod: CPTII,S$GLB,, | Performed by: NURSE PRACTITIONER

## 2020-07-28 RX ORDER — RAMIPRIL 5 MG/1
5 CAPSULE ORAL NIGHTLY
Qty: 30 CAPSULE | Refills: 1 | Status: SHIPPED | OUTPATIENT
Start: 2020-07-28 | End: 2020-09-11

## 2020-07-28 RX ORDER — FAMOTIDINE 40 MG/1
40 TABLET, FILM COATED ORAL NIGHTLY
Qty: 30 TABLET | Refills: 1 | Status: SHIPPED | OUTPATIENT
Start: 2020-07-28 | End: 2023-01-18

## 2020-07-28 RX ORDER — ONDANSETRON 4 MG/1
4 TABLET, ORALLY DISINTEGRATING ORAL
Status: COMPLETED | OUTPATIENT
Start: 2020-07-28 | End: 2020-07-28

## 2020-07-28 RX ORDER — TICAGRELOR 90 MG/1
TABLET ORAL
Qty: 60 TABLET | Refills: 4 | Status: SHIPPED | OUTPATIENT
Start: 2020-07-28 | End: 2020-12-15

## 2020-07-28 RX ORDER — ONDANSETRON 4 MG/1
4 TABLET, FILM COATED ORAL EVERY 6 HOURS PRN
Qty: 20 TABLET | Refills: 1 | Status: SHIPPED | OUTPATIENT
Start: 2020-07-28 | End: 2022-12-14

## 2020-07-28 RX ADMIN — ONDANSETRON 4 MG: 4 TABLET, ORALLY DISINTEGRATING ORAL at 10:07

## 2020-07-28 NOTE — TELEPHONE ENCOUNTER
Called pt back. Advised not due for another knee injection until 8/7/2020. Scheduled appt for 8/11/2020 at 330 pm. Thanks, Ines

## 2020-07-28 NOTE — PATIENT INSTRUCTIONS
RAMIPRIL: new dose: decreased to 5mg nightly    Start taking effexor at night    Take zofran after eating breakfast       LEVOTHYROXINE should be taken FIRST in the morning, alone without other medications, on an empty stomach, with WATER ONLY and waiting at least 30 minutes before eating or drinking

## 2020-07-28 NOTE — TELEPHONE ENCOUNTER
----- Message from July Adams sent at 7/28/2020 11:16 AM CDT -----  Contact: patient  Knee pain. Would like to get in soon for an injection. Please call patient at [phone]

## 2020-07-28 NOTE — PROGRESS NOTES
"Subjective:       Patient ID: Josephine Bolton is a 55 y.o. female.    Chief Complaint: Weight Loss and cant keep medicine down    HPI   Patient presents for weight loss and "trouble keeping medication down"  According to chart patient has lost 20lbs in the last 5 months. She feels she has lost at least 30lbs    Abdominal pain/nausea/vomoiting onset 10 days: Wakes up feeling fine. Takes 1/2 of her medications first upon waking. She then showers and eats breakfast. She then takes second half of meds (including levothyroxine--doesn't take alone). She is able to swallow without problems. Around 30-45 minutes later she becomes severely nauseous and vomits. She is able to take medication at night without problem.   Her mood is bad--depressed/anxious, she isnt getting benefits of effexor and coming off of it since she isnt keeping meds down     Colonoscopy 2012 revealed two polyps--recommended repeat in 5 years--has not completed  Has had EGD in the past--bleeding ulcer in college   On pantoprazole 40mg daily. Feels epigastric burning sensation. Denies dark/bloody stools or hematemesis     HTN: feels dizzy/lightheaded overmedicated since losing weight     Hypothyroidism: followed by Dr Aguilera. TSH suppressed in February. Synthroid decreased to 125mcg--never completed repeat labs recommended 4 weeks post adjustment. She takes levothyroxine with other medications, juice and after eating     Liver mass noted incidentally on CTA abd pelvis ordered by cardiology  MRI abdomen completed 7/22:   Small enhancing lesion of the right hepatic lobe which is of uncertain etiology but appears to have been present dating back to 2011.  This may represent a small atypical hemangioma.  This does not have characteristics consistent with focal nodular hyperplasia or adenoma.  Differential diagnosis includes hypervascular metastatic disease as well as hepatocellular carcinoma, however, this is considered far less likely as the lesion " appears to have been present since 2011.  Follow-up evaluation is recommended to establish stability.  Liver enzymes last month WNL     Mammogram: followed by Dr. Jimenez. Diagnostic mammo and US completed yesterday: Right breast 6 mm calcifications at the lower inner middle position. Assessment: 3 - Probably benign. Short Interval Follow-Up in 6 Months is recommended.    There is a 5 mm x 4 mm x 4 mm oval complicated cyst with circumscribed margins seen in the right breast at 2 o'clock, 6 cm from the nipple. The cyst correlates with the prior mammogram finding. This appearance is most consistent with fat necrosis/oil cyst and is benign. There is a similar appearing cyst at 2:00 4 cm from the nipple measuring 3 x 3 x 4 mm correspoding to the more anterior mammogram finding. Upon clinical examination, the patient was found to have bruising on the right breast at 1:00 11 cm from the nipple which corresponds to several foci of hyperechoic parenchyma consistent with fat necrosis and is documented on cine images. She does note that she bruises frequently due to current anticoagulant regimen.     Stent placement 12/2019 on Brilinta and ASA 81  Vitals:    07/28/20 1001   BP: 116/74   Pulse: 86   Temp: 97.8 °F (36.6 °C)     Review of Systems   Constitutional: Negative for diaphoresis and fever.   HENT: Negative for facial swelling and trouble swallowing.    Eyes: Negative for discharge and redness.   Respiratory: Negative for cough and shortness of breath.    Cardiovascular: Negative for chest pain and palpitations.   Gastrointestinal: Positive for abdominal pain, nausea and vomiting. Negative for anal bleeding and blood in stool.   Genitourinary: Negative for difficulty urinating.   Musculoskeletal: Negative for gait problem and joint swelling.   Skin: Negative for pallor and rash.   Neurological: Positive for headaches. Negative for facial asymmetry and speech difficulty.   Psychiatric/Behavioral: Positive for dysphoric  "mood. Negative for confusion. The patient is nervous/anxious.        Past Medical History:   Diagnosis Date    Allergy     Anticoagulant long-term use     ASA 81mg, Effient    Anxiety     Arthritis     Asthma     As child    CAD (coronary artery disease) 01/27/2012    s/p stents x4 , CABG, 3 vessel, (5/2013) newest stent prior to CABG    Chronic constipation     Coronary artery disease involving native coronary artery of native heart without angina pectoris 1/27/2012 12/19 Significant ostial RCA lesion as above treated with drug-eluting stenting as above. Occluded proximal LAD with patent lima lad Patent vein graft to 2nd obtuse marginal Known occluded vein graft to unknown vessel.  s/p stents x 3 (2010) s/p LAD stent (DSE) 3/2012    DDD (degenerative disc disease), cervical     DDD (degenerative disc disease), lumbar     DDD (degenerative disc disease), thoracic     Depression     Edema     Encounter for blood transfusion     Headache(784.0)     History of nephrolithiasis     Hyperlipidemia     Hypertension     Hypothyroid 7/5/2012    Insomnia     Insulin resistance     patient stated not diebetic    Joint stiffness     Joint swelling     Kidney disease     ; Frequent UTIs     Kidney stones     Low back pain     Neck pain     Obstructive sleep apnea on CPAP 7/17/2012    PONV (postoperative nausea and vomiting)     S/P CABG (coronary artery bypass graft) 6/25/2013 6/23/2013     Sleep apnea 01/27/12    Patient reports "severe" sleep apnea, uses C-pap    Stented coronary artery 12/20/2019 12/19  ostial RCA -Osirio2.5 x 18 post dilated 2.75     Thoracic back pain      Objective:      Physical Exam  Vitals signs and nursing note reviewed.   Constitutional:       General: She is not in acute distress.     Appearance: She is not diaphoretic.   HENT:      Head: Normocephalic.      Right Ear: Hearing normal.      Left Ear: Hearing normal.      Nose: Nose normal.   Eyes: "      General: Lids are normal.      Conjunctiva/sclera: Conjunctivae normal.   Neck:      Vascular: No JVD.      Trachea: No tracheal deviation.   Cardiovascular:      Rate and Rhythm: Normal rate.      Heart sounds: Normal heart sounds.   Pulmonary:      Effort: Pulmonary effort is normal.      Breath sounds: Normal breath sounds.   Abdominal:      General: There is no distension.      Comments: Deferred abdominal assessment due to severe nausea in office   Musculoskeletal:         General: No deformity.   Skin:     Coloration: Skin is not pale.   Neurological:      Mental Status: She is alert and oriented to person, place, and time.   Psychiatric:         Mood and Affect: Mood is anxious and depressed.         Speech: Speech normal.         Behavior: Behavior normal.         Thought Content: Thought content normal.         Judgment: Judgment normal.         Assessment:       1. Epigastric abdominal pain    2. Nausea and vomiting, intractability of vomiting not specified, unspecified vomiting type    3. Gastroesophageal reflux disease, esophagitis presence not specified    4. Weight loss    5. History of colon polyps    6. Depression with anxiety    7. Essential hypertension    8. Encounter for screening for colorectal malignant neoplasm    9. Hypothyroidism, unspecified type    10. Liver mass    11. Encounter for screening for HIV    12. Need for hepatitis C screening test        Plan:       Epigastric abdominal pain  -     Case request GI: COLONOSCOPY, EGD (ESOPHAGOGASTRODUODENOSCOPY)  -     famotidine (PEPCID) 40 MG tablet; Take 1 tablet (40 mg total) by mouth every evening.  Dispense: 30 tablet; Refill: 1  -     ondansetron disintegrating tablet 4 mg    Nausea and vomiting, intractability of vomiting not specified, unspecified vomiting type  -     Case request GI: COLONOSCOPY, EGD (ESOPHAGOGASTRODUODENOSCOPY)  -     ondansetron (ZOFRAN) 4 MG tablet; Take 1 tablet (4 mg total) by mouth every 6 (six) hours as  needed for Nausea.  Dispense: 20 tablet; Refill: 1  -     ondansetron disintegrating tablet 4 mg    Gastroesophageal reflux disease, esophagitis presence not specified  -     Case request GI: COLONOSCOPY, EGD (ESOPHAGOGASTRODUODENOSCOPY)  -     famotidine (PEPCID) 40 MG tablet; Take 1 tablet (40 mg total) by mouth every evening.  Dispense: 30 tablet; Refill: 1    Weight loss  -     CBC auto differential; Future; Expected date: 07/28/2020  -     RICARDO Screen w/Reflex; Future; Expected date: 07/28/2020  -     Case request GI: COLONOSCOPY, EGD (ESOPHAGOGASTRODUODENOSCOPY)    Depression with anxiety   Start taking effexor at night    Essential hypertension  DECREASE-     ramipriL (ALTACE) 5 MG capsule; Take 1 capsule (5 mg total) by mouth every evening.  Dispense: 30 capsule; Refill: 1    Encounter for screening for colorectal malignant neoplasm  History of colon polyps  -     Case request GI: COLONOSCOPY, EGD (ESOPHAGOGASTRODUODENOSCOPY)    Hypothyroidism, unspecified type  -     TSH; Future; Expected date: 07/28/2020    Liver mass   Discuss surveillance imaging with PCP next week     Encounter for screening for HIV  -     HIV 1/2 Ag/Ab (4th Gen); Future; Expected date: 07/28/2020    Need for hepatitis C screening test  -     Hepatitis C Antibody; Future; Expected date: 07/28/2020      Decrease ramipril from 10 to 5mg  Start taking effexor at night  Take zofran after breakfast      to ER for worsening abdominal pain/symptoms  Keep FU c PCP next week     Greater than 40 minutes spent with patient      Medication List with Changes/Refills   New Medications    ONDANSETRON (ZOFRAN) 4 MG TABLET    Take 1 tablet (4 mg total) by mouth every 6 (six) hours as needed for Nausea.   Current Medications    ACETAMINOPHEN (TYLENOL) 500 MG TABLET    Take 2 tablets (1,000 mg total) by mouth every 8 (eight) hours.    ALBUTEROL (PROVENTIL HFA) 90 MCG/ACTUATION INHALER    Inhale 2 puffs into the lungs.    ALLOPURINOL (ZYLOPRIM) 100 MG  TABLET    Take 1 tablet (100 mg total) by mouth once daily.    ASPIRIN (ECOTRIN) 81 MG EC TABLET    Take 1 tablet (81 mg total) by mouth once daily.    ATORVASTATIN (LIPITOR) 40 MG TABLET    Take 1 tablet (40 mg total) by mouth every evening.    BLOOD-GLUCOSE METER (GLUCOSE MONITORING KIT) KIT    Use as instructed    BRILINTA 90 MG TABLET    TAKE 1 TABLET (90 MG TOTAL) BY MOUTH 2 (TWO) TIMES DAILY.    CARVEDILOL (COREG) 25 MG TABLET    Take 1 tablet (25 mg total) by mouth 2 (two) times daily.    CETIRIZINE (ZYRTEC) 10 MG TABLET    Take 10 mg by mouth daily as needed for Allergies.    FUROSEMIDE (LASIX) 20 MG TABLET    TAKE 1 TABLET (20 MG TOTAL) BY MOUTH ONCE DAILY.    GABAPENTIN (NEURONTIN) 100 MG CAPSULE    Take 1 capsule (100 mg total) by mouth 2 (two) times daily.    LANCETS MISC    1 Units by Misc.(Non-Drug; Combo Route) route 2 (two) times daily as needed.    LEVOTHYROXINE (SYNTHROID) 125 MCG TABLET    Take 1 tablet (125 mcg total) by mouth before breakfast.    METFORMIN (GLUCOPHAGE) 1000 MG TABLET    TAKE 1 TABLET BY MOUTH TWICE DAILY WITH MEALS    NITROGLYCERIN (NITROSTAT) 0.4 MG SL TABLET    Place 1 tablet (0.4 mg total) under the tongue every 5 (five) minutes as needed for Chest pain.    PANTOPRAZOLE (PROTONIX) 40 MG TABLET    Take 1 tablet (40 mg total) by mouth once daily.    POLYETHYLENE GLYCOL (GLYCOLAX) 17 GRAM PWPK    Take 17 g by mouth once daily.    RANOLAZINE (RANEXA) 500 MG TB12    Take 1 tablet (500 mg total) by mouth 2 (two) times daily.    VENLAFAXINE (EFFEXOR-XR) 150 MG CP24    Take 1 capsule (150 mg total) by mouth once daily.   Changed and/or Refilled Medications    Modified Medication Previous Medication    FAMOTIDINE (PEPCID) 40 MG TABLET famotidine (PEPCID) 40 MG tablet       Take 1 tablet (40 mg total) by mouth every evening.    Take 1 tablet (40 mg total) by mouth every evening.    RAMIPRIL (ALTACE) 5 MG CAPSULE ramipriL (ALTACE) 10 MG capsule       Take 1 capsule (5 mg total) by mouth  every evening.    Take 1 capsule (10 mg total) by mouth every evening.   Discontinued Medications    ISOSORBIDE MONONITRATE (IMDUR) 30 MG 24 HR TABLET    Take 2 tablets (60 mg total) by mouth once daily.    RANOLAZINE (RANEXA) 500 MG TB12    TAKE 1 TABLET BY MOUTH 2 TIMES A DAY

## 2020-07-29 LAB
ANA SER QL IF: NORMAL
HCV AB SERPL QL IA: NEGATIVE
HIV 1+2 AB+HIV1 P24 AG SERPL QL IA: NEGATIVE

## 2020-07-30 ENCOUNTER — TELEPHONE (OUTPATIENT)
Dept: GASTROENTEROLOGY | Facility: CLINIC | Age: 55
End: 2020-07-30

## 2020-08-03 ENCOUNTER — OFFICE VISIT (OUTPATIENT)
Dept: FAMILY MEDICINE | Facility: CLINIC | Age: 55
End: 2020-08-03
Payer: COMMERCIAL

## 2020-08-03 ENCOUNTER — OFFICE VISIT (OUTPATIENT)
Dept: ENDOCRINOLOGY | Facility: CLINIC | Age: 55
End: 2020-08-03
Payer: COMMERCIAL

## 2020-08-03 VITALS
SYSTOLIC BLOOD PRESSURE: 102 MMHG | BODY MASS INDEX: 32.99 KG/M2 | TEMPERATURE: 99 F | HEIGHT: 62 IN | OXYGEN SATURATION: 98 % | WEIGHT: 179.25 LBS | HEART RATE: 71 BPM | DIASTOLIC BLOOD PRESSURE: 62 MMHG

## 2020-08-03 VITALS
HEART RATE: 72 BPM | SYSTOLIC BLOOD PRESSURE: 104 MMHG | RESPIRATION RATE: 18 BRPM | DIASTOLIC BLOOD PRESSURE: 84 MMHG | BODY MASS INDEX: 32.96 KG/M2 | WEIGHT: 179.13 LBS | HEIGHT: 62 IN

## 2020-08-03 DIAGNOSIS — I10 ESSENTIAL HYPERTENSION: ICD-10-CM

## 2020-08-03 DIAGNOSIS — F41.8 DEPRESSION WITH ANXIETY: ICD-10-CM

## 2020-08-03 DIAGNOSIS — I10 BENIGN ESSENTIAL HTN: ICD-10-CM

## 2020-08-03 DIAGNOSIS — I25.10 CORONARY ARTERY DISEASE, ANGINA PRESENCE UNSPECIFIED, UNSPECIFIED VESSEL OR LESION TYPE, UNSPECIFIED WHETHER NATIVE OR TRANSPLANTED HEART: ICD-10-CM

## 2020-08-03 DIAGNOSIS — R10.13 EPIGASTRIC ABDOMINAL PAIN: Primary | ICD-10-CM

## 2020-08-03 DIAGNOSIS — E88.819 INSULIN RESISTANCE: ICD-10-CM

## 2020-08-03 DIAGNOSIS — E66.01 MORBID OBESITY: ICD-10-CM

## 2020-08-03 DIAGNOSIS — E03.9 HYPOTHYROIDISM, UNSPECIFIED TYPE: Primary | ICD-10-CM

## 2020-08-03 PROCEDURE — 99999 PR PBB SHADOW E&M-EST. PATIENT-LVL V: CPT | Mod: PBBFAC,,, | Performed by: FAMILY MEDICINE

## 2020-08-03 PROCEDURE — 3074F PR MOST RECENT SYSTOLIC BLOOD PRESSURE < 130 MM HG: ICD-10-PCS | Mod: CPTII,S$GLB,, | Performed by: INTERNAL MEDICINE

## 2020-08-03 PROCEDURE — 99214 OFFICE O/P EST MOD 30 MIN: CPT | Mod: S$GLB,,, | Performed by: INTERNAL MEDICINE

## 2020-08-03 PROCEDURE — 99999 PR PBB SHADOW E&M-EST. PATIENT-LVL IV: CPT | Mod: PBBFAC,,, | Performed by: INTERNAL MEDICINE

## 2020-08-03 PROCEDURE — 3008F PR BODY MASS INDEX (BMI) DOCUMENTED: ICD-10-PCS | Mod: CPTII,S$GLB,, | Performed by: INTERNAL MEDICINE

## 2020-08-03 PROCEDURE — 99999 PR PBB SHADOW E&M-EST. PATIENT-LVL IV: ICD-10-PCS | Mod: PBBFAC,,, | Performed by: INTERNAL MEDICINE

## 2020-08-03 PROCEDURE — 3078F DIAST BP <80 MM HG: CPT | Mod: CPTII,S$GLB,, | Performed by: FAMILY MEDICINE

## 2020-08-03 PROCEDURE — 3074F SYST BP LT 130 MM HG: CPT | Mod: CPTII,S$GLB,, | Performed by: INTERNAL MEDICINE

## 2020-08-03 PROCEDURE — 3008F PR BODY MASS INDEX (BMI) DOCUMENTED: ICD-10-PCS | Mod: CPTII,S$GLB,, | Performed by: FAMILY MEDICINE

## 2020-08-03 PROCEDURE — 3079F DIAST BP 80-89 MM HG: CPT | Mod: CPTII,S$GLB,, | Performed by: INTERNAL MEDICINE

## 2020-08-03 PROCEDURE — 99214 OFFICE O/P EST MOD 30 MIN: CPT | Mod: S$GLB,,, | Performed by: FAMILY MEDICINE

## 2020-08-03 PROCEDURE — 99999 PR PBB SHADOW E&M-EST. PATIENT-LVL V: ICD-10-PCS | Mod: PBBFAC,,, | Performed by: FAMILY MEDICINE

## 2020-08-03 PROCEDURE — 3078F PR MOST RECENT DIASTOLIC BLOOD PRESSURE < 80 MM HG: ICD-10-PCS | Mod: CPTII,S$GLB,, | Performed by: FAMILY MEDICINE

## 2020-08-03 PROCEDURE — 3008F BODY MASS INDEX DOCD: CPT | Mod: CPTII,S$GLB,, | Performed by: INTERNAL MEDICINE

## 2020-08-03 PROCEDURE — 99214 PR OFFICE/OUTPT VISIT, EST, LEVL IV, 30-39 MIN: ICD-10-PCS | Mod: S$GLB,,, | Performed by: INTERNAL MEDICINE

## 2020-08-03 PROCEDURE — 3079F PR MOST RECENT DIASTOLIC BLOOD PRESSURE 80-89 MM HG: ICD-10-PCS | Mod: CPTII,S$GLB,, | Performed by: INTERNAL MEDICINE

## 2020-08-03 PROCEDURE — 3008F BODY MASS INDEX DOCD: CPT | Mod: CPTII,S$GLB,, | Performed by: FAMILY MEDICINE

## 2020-08-03 PROCEDURE — 99214 PR OFFICE/OUTPT VISIT, EST, LEVL IV, 30-39 MIN: ICD-10-PCS | Mod: S$GLB,,, | Performed by: FAMILY MEDICINE

## 2020-08-03 PROCEDURE — 3074F PR MOST RECENT SYSTOLIC BLOOD PRESSURE < 130 MM HG: ICD-10-PCS | Mod: CPTII,S$GLB,, | Performed by: FAMILY MEDICINE

## 2020-08-03 PROCEDURE — 3074F SYST BP LT 130 MM HG: CPT | Mod: CPTII,S$GLB,, | Performed by: FAMILY MEDICINE

## 2020-08-03 NOTE — PROGRESS NOTES
Subjective:       Patient ID: Josephine Bolton is a 55 y.o. female.    Chief Complaint: mri results    HPI     Here for a f/u    Worsening nausea and epigastric pain since I last saw this patient. Planning to get egd.    Cad stable.     Anxiety/depression stable.    Hypothyroidism stable.       Review of Systems      Review of Systems   Constitutional: Negative for fever and chills.   HENT: Negative for hearing loss and neck stiffness.    Eyes: Negative for redness and itching.   Respiratory: Negative for cough and choking.    Cardiovascular: Negative for chest pain and leg swelling.  Abdomen: Negative for abdominal pain and blood in stool.   Genitourinary: Negative for dysuria and flank pain.   Musculoskeletal: Negative for back pain and gait problem.   Neurological: Negative for light-headedness and headaches.   Hematological: Negative for adenopathy.   Psychiatric/Behavioral: Negative for behavioral problems.     Objective:      Physical Exam  Constitutional:       Appearance: She is well-developed.   HENT:      Head: Normocephalic and atraumatic.   Eyes:      Conjunctiva/sclera: Conjunctivae normal.      Pupils: Pupils are equal, round, and reactive to light.   Neck:      Musculoskeletal: Normal range of motion.   Cardiovascular:      Rate and Rhythm: Normal rate and regular rhythm.      Heart sounds: No murmur.   Pulmonary:      Effort: Pulmonary effort is normal.      Breath sounds: Normal breath sounds.   Abdominal:      General: Bowel sounds are normal.      Palpations: Abdomen is soft.      Tenderness: There is abdominal tenderness.      Comments: Epigastric tenderness noted   Lymphadenopathy:      Cervical: No cervical adenopathy.         Assessment:       1. Epigastric abdominal pain    2. Depression with anxiety    3. Essential hypertension    4. Coronary artery disease, angina presence unspecified, unspecified vessel or lesion type, unspecified whether native or transplanted heart        Plan:        Epigastric abdominal pain    Depression with anxiety    Essential hypertension    Coronary artery disease, angina presence unspecified, unspecified vessel or lesion type, unspecified whether native or transplanted heart                Plan:  Await egd  Cont current meds      Medication List with Changes/Refills   Current Medications    ACETAMINOPHEN (TYLENOL) 500 MG TABLET    Take 2 tablets (1,000 mg total) by mouth every 8 (eight) hours.    ALBUTEROL (PROVENTIL HFA) 90 MCG/ACTUATION INHALER    Inhale 2 puffs into the lungs.    ALLOPURINOL (ZYLOPRIM) 100 MG TABLET    Take 1 tablet (100 mg total) by mouth once daily.    ASPIRIN (ECOTRIN) 81 MG EC TABLET    Take 1 tablet (81 mg total) by mouth once daily.    ATORVASTATIN (LIPITOR) 40 MG TABLET    Take 1 tablet (40 mg total) by mouth every evening.    BLOOD-GLUCOSE METER (GLUCOSE MONITORING KIT) KIT    Use as instructed    BRILINTA 90 MG TABLET    TAKE 1 TABLET (90 MG TOTAL) BY MOUTH 2 (TWO) TIMES DAILY.     CARVEDILOL (COREG) 25 MG TABLET    Take 1 tablet (25 mg total) by mouth 2 (two) times daily.    CETIRIZINE (ZYRTEC) 10 MG TABLET    Take 10 mg by mouth daily as needed for Allergies.    FAMOTIDINE (PEPCID) 40 MG TABLET    Take 1 tablet (40 mg total) by mouth every evening.    FUROSEMIDE (LASIX) 20 MG TABLET    TAKE 1 TABLET (20 MG TOTAL) BY MOUTH ONCE DAILY.    GABAPENTIN (NEURONTIN) 100 MG CAPSULE    Take 1 capsule (100 mg total) by mouth 2 (two) times daily.    LANCETS MISC    1 Units by Misc.(Non-Drug; Combo Route) route 2 (two) times daily as needed.    LEVOTHYROXINE (SYNTHROID) 125 MCG TABLET    Take 1 tablet (125 mcg total) by mouth before breakfast.    NITROGLYCERIN (NITROSTAT) 0.4 MG SL TABLET    Place 1 tablet (0.4 mg total) under the tongue every 5 (five) minutes as needed for Chest pain.    ONDANSETRON (ZOFRAN) 4 MG TABLET    Take 1 tablet (4 mg total) by mouth every 6 (six) hours as needed for Nausea.    PANTOPRAZOLE (PROTONIX) 40 MG TABLET    Take 1  tablet (40 mg total) by mouth once daily.    POLYETHYLENE GLYCOL (GLYCOLAX) 17 GRAM PWPK    Take 17 g by mouth once daily.    RAMIPRIL (ALTACE) 5 MG CAPSULE    Take 1 capsule (5 mg total) by mouth every evening.    RANOLAZINE (RANEXA) 500 MG TB12    Take 1 tablet (500 mg total) by mouth 2 (two) times daily.    VENLAFAXINE (EFFEXOR-XR) 150 MG CP24    Take 1 capsule (150 mg total) by mouth once daily.

## 2020-08-03 NOTE — PROGRESS NOTES
CHIEF COMPLAINT: Hypothyroidism   55 year old. She originally had a thyroidectomy 15 years ago. Appears she still had thyroid tissue and had a completion thyroidectomy 14 years ago. On synthroid 125 mcg daily. On generic. On metformin 1000 BID.  Scheduled for EGD due to nausea and GERD.           PAST MEDICAL HISTORY: Hypothyroidism, anxiety, CAD with stents, degenerative disc disease. HTN     PAST SURGICAL HISTORY: Tonsillectomy, LILLIAN, laminectomy, carpal tunnel release, . Thyroidectomy with a completion thyroidectomy 14 years ago. CABG     SOCIAL HISTORY: No T/A     FAMILY HISTORY: No thyroid disease or thyroid cancer. + Heart disease.     MEDICATIONS/ALLERGIES: The patient's MedCard has been updated and reviewed.     ROS:   Constitutional: weight decreased  Eyes: No recent visual changes   ENT: No dysphagia   Cardiovascular: In Dec had another cardiac stent placed. Occasional Angina. Seeing cardiology  Respiratory: No SOB  Gastrointestinal: has had some nausea. Has decreased appetite. Started last 2-3 weeks.   GenitoUrinary - No dysuria   Skin: No new skin rash   Neurologic: No focal neurologic complaints   Remainder ROS negative         PE:   GENERAL: Well developed, well nourished.   NECK: Supple, trachea midline. Neck brace in place  CHEST: Resp even and unlabored, CTA bilateral.   CARDIAC: RRR, S1, S2 heard, no murmurs, rubs, S3, or S4         Results for VLADIMIR FAUSTIN (MRN [id number]) as of 8/3/2020 14:36   Ref. Range 2020 11:07   Sodium Latest Ref Range: 136 - 145 mmol/L 144   Potassium Latest Ref Range: 3.5 - 5.1 mmol/L 3.7   Chloride Latest Ref Range: 95 - 110 mmol/L 106   CO2 Latest Ref Range: 23 - 29 mmol/L 24   Anion Gap Latest Ref Range: 8 - 16 mmol/L 14   BUN, Bld Latest Ref Range: 6 - 20 mg/dL 11   Creatinine Latest Ref Range: 0.5 - 1.4 mg/dL 0.8   eGFR if non African American Latest Ref Range: >60 mL/min/1.73 m^2 >60.0   eGFR if African American Latest Ref Range: >60 mL/min/1.73 m^2 >60.0    Glucose Latest Ref Range: 70 - 110 mg/dL 95   Calcium Latest Ref Range: 8.7 - 10.5 mg/dL 9.3   Alkaline Phosphatase Latest Ref Range: 55 - 135 U/L 74   PROTEIN TOTAL Latest Ref Range: 6.0 - 8.4 g/dL 8.1   Albumin Latest Ref Range: 3.5 - 5.2 g/dL 4.1   Uric Acid Latest Ref Range: 2.4 - 5.7 mg/dL 3.9   BILIRUBIN TOTAL Latest Ref Range: 0.1 - 1.0 mg/dL 0.5   AST Latest Ref Range: 10 - 40 U/L 25   ALT Latest Ref Range: 10 - 44 U/L 12   Insulin Latest Ref Range: <25.0 uU/mL 27.3 (H)   Insulin Collection Interval Unknown random   TSH Latest Ref Range: 0.400 - 4.000 uIU/mL 1.480           ASSESSMENT/PLAN:   1. Hypothyroidism- Hx thyroidectomy. TSH WNL. Discussed unlikely source of weight loss since TSH normal.     2. HTN- BP controlled.     3. Insulin resistance- (NO DIABETES) . Having decreased appetite and nausea. Will hold metformin for now. Will let us know how she is doing after EGD.     4. Morbid obesity- see #3    FOLLOWUP  F/U 6 months with CMP, Fasting insulin, TSH

## 2020-08-04 ENCOUNTER — TELEPHONE (OUTPATIENT)
Dept: GASTROENTEROLOGY | Facility: CLINIC | Age: 55
End: 2020-08-04

## 2020-08-04 DIAGNOSIS — Z01.812 PRE-PROCEDURE LAB EXAM: ICD-10-CM

## 2020-08-04 NOTE — TELEPHONE ENCOUNTER
Spoke with pt. Scheduled EGD & covid test. Pt informed c-scope & EGD could not be done together. Prep instructions sent to pt's mychart. Pt verbalized understanding to all.         Pt having EGD with Dr. Warren on 8/25.  Can pt safely hold brilinta 3 days prior?  Please advise

## 2020-08-04 NOTE — TELEPHONE ENCOUNTER
----- Message from Rosenda Hurley sent at 8/4/2020  2:47 PM CDT -----  Regarding: pt needs to be schedule for EGD and Colonoscopy  Contact: Josephine pt  Type: Needs Medical Advice  Who Called:  Josephine pt  Best Call Back Number: [phone]  Additional Information: Pls call pt regarding getting an appt set up for colonoscopy and EGD. Orders are supposed to be in the system from Dr Rodriguez

## 2020-08-21 DIAGNOSIS — I25.10 CORONARY ARTERY DISEASE INVOLVING NATIVE CORONARY ARTERY OF NATIVE HEART WITHOUT ANGINA PECTORIS: ICD-10-CM

## 2020-08-21 DIAGNOSIS — M51.36 DDD (DEGENERATIVE DISC DISEASE), LUMBAR: ICD-10-CM

## 2020-08-21 DIAGNOSIS — I15.0 RENAL ARTERIAL HYPERTENSION: ICD-10-CM

## 2020-08-21 NOTE — TELEPHONE ENCOUNTER
No new care gaps identified.  Powered by [employer]. Reference number: [id number]. 8/21/2020 3:07:42 PM CDT

## 2020-08-22 ENCOUNTER — LAB VISIT (OUTPATIENT)
Dept: FAMILY MEDICINE | Facility: CLINIC | Age: 55
End: 2020-08-22
Payer: COMMERCIAL

## 2020-08-22 DIAGNOSIS — Z01.812 PRE-PROCEDURE LAB EXAM: ICD-10-CM

## 2020-08-22 PROCEDURE — U0003 INFECTIOUS AGENT DETECTION BY NUCLEIC ACID (DNA OR RNA); SEVERE ACUTE RESPIRATORY SYNDROME CORONAVIRUS 2 (SARS-COV-2) (CORONAVIRUS DISEASE [COVID-19]), AMPLIFIED PROBE TECHNIQUE, MAKING USE OF HIGH THROUGHPUT TECHNOLOGIES AS DESCRIBED BY CMS-2020-01-R: HCPCS

## 2020-08-22 NOTE — PROGRESS NOTES
Refill Routing Note   Medication(s) are not appropriate for processing by [facility]:       - Outside of protocol  - Patient displays non-adherence to medications (has run out of medication supply over 6 months ago)  - Drug-Disease Interaction (LIVER MASS)     Medication-related problems identified: Non-adherence - intentional  Medication Therapy Plan: CDMR; FOVS; OUTSIDE OF PROTOCOL(GABAPENTIN); DDzi(LIVER MASS); PER [facility] DATA 0% ADHERENCE; PT. HAS RUN OUT MEDICATION SUPPLY OVER 6 MONTHS AGO, DEFER TO YOU  Medication reconciliation completed: No      Automatic Epic Generated Protocol Data:        Requested Prescriptions   Pending Prescriptions Disp Refills    gabapentin (NEURONTIN) 100 MG capsule [Pharmacy Med Name: GABAPENTIN 100MG CAPSULE] 180 capsule 3     Sig: TAKE 1 CAPSULE BY MOUTH TWICE DAILY       Anticonvulsants Protocol Passed - 8/21/2020  3:06 PM        Passed - Visit with Authorizing provider in past 9 months or upcoming 90 days        Passed - No active pregnancy on record          carvediloL (COREG) 25 MG tablet [Pharmacy Med Name: CARVEDILOL 25MG TABLET] 180 tablet 0     Sig: TAKE 1 TABLET BY MOUTH TWICE DAILY       Cardiovascular:  Beta Blockers Passed - 8/21/2020  3:06 PM        Passed - Patient is at least 18 years old        Passed - Last BP in normal range within 360 days.     BP Readings from Last 3 Encounters:   08/03/20 102/62   08/03/20 104/84   07/28/20 116/74              Passed - Last Heart Rate in normal range within 360 days.     Pulse Readings from Last 3 Encounters:   08/03/20 71   08/03/20 72   07/28/20 86             Passed - Office visit in past 12 months or future 90 days.     Recent Outpatient Visits            2 weeks ago Epigastric abdominal pain    [facility] Family Medicine Froylan Smart MD    2 weeks ago Hypothyroidism, unspecified type    [facility] Endocrinology Wilmer Aguilera DO    3 weeks ago Epigastric abdominal pain    [facility]  [place] Kate, ZAYRA    1 month ago Coronary artery disease involving native coronary artery of native heart without angina pectoris    [facility] Obdulio Abbasi MD    2 months ago PUD (peptic ulcer disease)    [facility] Cardiology Tena Ruiz PA-C          Future Appointments              In 1 month Froylan Smart MD [facility]    In 2 months Juan Wilson MD [facility]    In 4 months VASCULAR, [facility], [facility]    In 4 months [facility]    In 5 months [facility] [place] [facility]    In 5 months Wilmer Aguilera DO [facility]                      Appointments  past 12m or future 3m with PCP    Date Provider   Last Visit   8/3/2020 Froylan Smart MD   Next Visit   9/22/2020 Froylan Smart MD   ED visits in past 90 days: 0     Note composed:2:22 PM 08/22/2020

## 2020-08-23 LAB — SARS-COV-2 RNA RESP QL NAA+PROBE: NOT DETECTED

## 2020-08-23 RX ORDER — GABAPENTIN 100 MG/1
CAPSULE ORAL
Qty: 180 CAPSULE | Refills: 3 | Status: SHIPPED | OUTPATIENT
Start: 2020-08-23 | End: 2021-02-11

## 2020-08-23 RX ORDER — CARVEDILOL 25 MG/1
TABLET ORAL
Qty: 180 TABLET | Refills: 0 | Status: SHIPPED | OUTPATIENT
Start: 2020-08-23 | End: 2020-11-05

## 2020-08-25 ENCOUNTER — ANESTHESIA EVENT (OUTPATIENT)
Dept: ENDOSCOPY | Facility: HOSPITAL | Age: 55
End: 2020-08-25
Payer: COMMERCIAL

## 2020-08-25 ENCOUNTER — HOSPITAL ENCOUNTER (OUTPATIENT)
Facility: HOSPITAL | Age: 55
Discharge: HOME | End: 2020-08-25
Attending: INTERNAL MEDICINE | Admitting: INTERNAL MEDICINE
Payer: COMMERCIAL

## 2020-08-25 ENCOUNTER — ANESTHESIA (OUTPATIENT)
Dept: ENDOSCOPY | Facility: HOSPITAL | Age: 55
End: 2020-08-25
Payer: COMMERCIAL

## 2020-08-25 VITALS
HEART RATE: 59 BPM | TEMPERATURE: 98 F | OXYGEN SATURATION: 96 % | SYSTOLIC BLOOD PRESSURE: 156 MMHG | BODY MASS INDEX: 32.57 KG/M2 | RESPIRATION RATE: 13 BRPM | DIASTOLIC BLOOD PRESSURE: 72 MMHG | HEIGHT: 62 IN | WEIGHT: 177 LBS

## 2020-08-25 DIAGNOSIS — R10.13 EPIGASTRIC PAIN: ICD-10-CM

## 2020-08-25 PROCEDURE — 37000009 HC ANESTHESIA EA ADD 15 MINS: Mod: PO | Performed by: INTERNAL MEDICINE

## 2020-08-25 PROCEDURE — 43239 EGD BIOPSY SINGLE/MULTIPLE: CPT | Mod: PO | Performed by: INTERNAL MEDICINE

## 2020-08-25 PROCEDURE — D9220A PRA ANESTHESIA: ICD-10-PCS | Mod: CRNA,,, | Performed by: NURSE ANESTHETIST, CERTIFIED REGISTERED

## 2020-08-25 PROCEDURE — 63600175 PHARM REV CODE 636 W HCPCS: Mod: PO | Performed by: INTERNAL MEDICINE

## 2020-08-25 PROCEDURE — D9220A PRA ANESTHESIA: ICD-10-PCS | Mod: ANES,,, | Performed by: ANESTHESIOLOGY

## 2020-08-25 PROCEDURE — D9220A PRA ANESTHESIA: Mod: ANES,,, | Performed by: ANESTHESIOLOGY

## 2020-08-25 PROCEDURE — 88305 TISSUE EXAM BY PATHOLOGIST: CPT | Mod: 59 | Performed by: PATHOLOGY

## 2020-08-25 PROCEDURE — 43239 EGD BIOPSY SINGLE/MULTIPLE: CPT | Mod: ,,, | Performed by: INTERNAL MEDICINE

## 2020-08-25 PROCEDURE — 88305 TISSUE EXAM BY PATHOLOGIST: ICD-10-PCS | Mod: 26,,, | Performed by: PATHOLOGY

## 2020-08-25 PROCEDURE — 25000003 PHARM REV CODE 250: Mod: PO | Performed by: NURSE ANESTHETIST, CERTIFIED REGISTERED

## 2020-08-25 PROCEDURE — 43239 PR EGD, FLEX, W/BIOPSY, SGL/MULTI: ICD-10-PCS | Mod: ,,, | Performed by: INTERNAL MEDICINE

## 2020-08-25 PROCEDURE — 63600175 PHARM REV CODE 636 W HCPCS: Mod: PO | Performed by: NURSE ANESTHETIST, CERTIFIED REGISTERED

## 2020-08-25 PROCEDURE — 27201012 HC FORCEPS, HOT/COLD, DISP: Mod: PO | Performed by: INTERNAL MEDICINE

## 2020-08-25 PROCEDURE — 88305 TISSUE EXAM BY PATHOLOGIST: CPT | Mod: 26,,, | Performed by: PATHOLOGY

## 2020-08-25 PROCEDURE — 37000008 HC ANESTHESIA 1ST 15 MINUTES: Mod: PO | Performed by: INTERNAL MEDICINE

## 2020-08-25 PROCEDURE — D9220A PRA ANESTHESIA: Mod: CRNA,,, | Performed by: NURSE ANESTHETIST, CERTIFIED REGISTERED

## 2020-08-25 RX ORDER — SUCRALFATE 1 G/1
1 TABLET ORAL 3 TIMES DAILY
Qty: 100 TABLET | Refills: 2 | Status: SHIPPED | OUTPATIENT
Start: 2020-08-25 | End: 2020-09-24

## 2020-08-25 RX ORDER — PROPOFOL 10 MG/ML
VIAL (ML) INTRAVENOUS
Status: DISCONTINUED | OUTPATIENT
Start: 2020-08-25 | End: 2020-08-25

## 2020-08-25 RX ORDER — SODIUM CHLORIDE 0.9 % (FLUSH) 0.9 %
10 SYRINGE (ML) INJECTION
Status: DISCONTINUED | OUTPATIENT
Start: 2020-08-25 | End: 2020-08-25 | Stop reason: HOSPADM

## 2020-08-25 RX ORDER — SODIUM CHLORIDE, SODIUM LACTATE, POTASSIUM CHLORIDE, CALCIUM CHLORIDE 600; 310; 30; 20 MG/100ML; MG/100ML; MG/100ML; MG/100ML
INJECTION, SOLUTION INTRAVENOUS CONTINUOUS
Status: DISCONTINUED | OUTPATIENT
Start: 2020-08-25 | End: 2020-08-25 | Stop reason: HOSPADM

## 2020-08-25 RX ORDER — LIDOCAINE HCL/PF 100 MG/5ML
SYRINGE (ML) INTRAVENOUS
Status: DISCONTINUED | OUTPATIENT
Start: 2020-08-25 | End: 2020-08-25

## 2020-08-25 RX ADMIN — PROPOFOL 100 MG: 10 INJECTION, EMULSION INTRAVENOUS at 11:08

## 2020-08-25 RX ADMIN — PROPOFOL 50 MG: 10 INJECTION, EMULSION INTRAVENOUS at 11:08

## 2020-08-25 RX ADMIN — LIDOCAINE HYDROCHLORIDE 100 MG: 20 INJECTION, SOLUTION INTRAVENOUS at 11:08

## 2020-08-25 RX ADMIN — PROPOFOL 25 MG: 10 INJECTION, EMULSION INTRAVENOUS at 11:08

## 2020-08-25 RX ADMIN — SODIUM CHLORIDE, SODIUM LACTATE, POTASSIUM CHLORIDE, AND CALCIUM CHLORIDE: .6; .31; .03; .02 INJECTION, SOLUTION INTRAVENOUS at 10:08

## 2020-08-25 NOTE — ANESTHESIA POSTPROCEDURE EVALUATION
Anesthesia Post Evaluation    Patient: Josephine Bolton    Procedure(s) Performed: Procedure(s) (LRB):  EGD (ESOPHAGOGASTRODUODENOSCOPY) (N/A)    Final Anesthesia Type: general    Patient location during evaluation: PACU  Patient participation: Yes- Able to Participate  Level of consciousness: sedated and awake  Post-procedure vital signs: reviewed and stable  Pain management: adequate  Airway patency: patent    PONV status at discharge: No PONV  Anesthetic complications: no      Cardiovascular status: hypertensive and blood pressure returned to baseline  Respiratory status: spontaneous ventilation  Hydration status: euvolemic  Follow-up not needed.          Vitals Value Taken Time   /72 08/25/20 1140   Temp 36.4 °C (97.5 °F) 08/25/20 1120   Pulse 59 08/25/20 1140   Resp 13 08/25/20 1140   SpO2 96 % 08/25/20 1140         No case tracking events are documented in the log.      Pain/Chantal Score: Chantal Score: 6 (8/25/2020 11:20 AM)

## 2020-08-25 NOTE — [employer] PATIENT INSTRUCTIONS
Discharge Summary/Instructions after an Endoscopic Procedure  Patient Name: Josephine Bolton  Patient MRN: [id number]  Patient YOB: 1965 Tuesday, August 25, 2020  Mk Warren MD  RESTRICTIONS:  During your procedure today, you received medications for sedation.  These   medications may affect your judgment, balance and coordination.  Therefore,   for 24 hours, you have the following restrictions:   - DO NOT drive a car, operate machinery, make legal/financial decisions,   sign important papers or drink alcohol.    ACTIVITY:  Today: no heavy lifting, straining or running due to procedural   sedation/anesthesia.  The following day: return to full activity including work.  DIET:  Eat and drink normally unless instructed otherwise.     TREATMENT FOR COMMON SIDE EFFECTS:  - Mild abdominal pain, nausea, belching, bloating or excessive gas:  rest,   eat lightly and use a heating pad.  - Sore Throat: treat with throat lozenges and/or gargle with warm salt   water.  - Because air was used during the procedure, expelling large amounts of air   from your rectum or belching is normal.  - If a bowel prep was taken, you may not have a bowel movement for 1-3 days.    This is normal.  SYMPTOMS TO WATCH FOR AND REPORT TO YOUR PHYSICIAN:  1. Abdominal pain or bloating, other than gas cramps.  2. Chest pain.  3. Back pain.  4. Signs of infection such as: chills or fever occurring within 24 hours   after the procedure.  5. Rectal bleeding, which would show as bright red, maroon, or black stools.   (A tablespoon of blood from the rectum is not serious, especially if   hemorrhoids are present.)  6. Vomiting.  7. Weakness or dizziness.  GO DIRECTLY TO THE NEAREST EMERGENCY ROOM IF YOU HAVE ANY OF THE FOLLOWING:      Difficulty breathing              Chills and/or fever over 101 F   Persistent vomiting and/or vomiting blood   Severe abdominal pain   Severe chest pain   Black, tarry stools   Bleeding- more than one  tablespoon   Any other symptom or condition that you feel may need urgent attention  Your doctor recommends these additional instructions:  If any biopsies were taken, your doctors clinic will contact you in 1 to 2   weeks with any results.  We are waiting for your pathology results.   Continue your present medications.   You are being discharged to home.  For questions, problems or results please call your physician - Mk Warren MD at Work:  [phone].  EMERGENCY PHONE NUMBER: [phone], LAB RESULTS: [phone]  IF A COMPLICATION OR EMERGENCY SITUATION ARISES AND YOU ARE UNABLE TO REACH   YOUR PHYSICIAN - GO DIRECTLY TO THE EMERGENCY ROOM.  ___________________________________________  Nurse Signature  ___________________________________________  Patient/Designated Responsible Party Signature  Mk Warren MD  8/25/2020 11:19:16 AM  This report has been verified and signed electronically.  [employer]

## 2020-08-25 NOTE — TRANSFER OF CARE
"Anesthesia Transfer of Care Note    Patient: Josephine Bolton    Procedure(s) Performed: Procedure(s) (LRB):  EGD (ESOPHAGOGASTRODUODENOSCOPY) (N/A)    Patient location: PACU    Anesthesia Type: general    Transport from OR: Transported from OR on room air with adequate spontaneous ventilation    Post pain: adequate analgesia    Post assessment: no apparent anesthetic complications and tolerated procedure well    Post vital signs: stable    Level of consciousness: awake    Nausea/Vomiting: no nausea/vomiting    Complications: none    Transfer of care protocol was followed      Last vitals:   Visit Vitals  BP (!) 161/74 (BP Location: Right arm, Patient Position: Lying)   Pulse 62   Temp 36.5 °C (97.7 °F) (Skin)   Resp 18   Ht 5' 2" (1.575 m)   Wt 80.3 kg (177 lb)   LMP  (LMP Unknown)   SpO2 99%   Breastfeeding No   BMI 32.37 kg/m²     "

## 2020-08-25 NOTE — DISCHARGE SUMMARY
Discharge Note  Short Stay      SUMMARY     Admit Date: 8/25/2020    Attending Physician: Mk Warren MD     Discharge Physician: Mk Warren MD    Discharge Date: 8/25/2020 11:20 AM    Final Diagnosis: Encounter for screening for colorectal malignant neoplasm [Z12.11, Z12.12]  Weight loss [R63.4]  Epigastric abdominal pain [R10.13]  Nausea and vomiting, intractability of vomiting not specified, unspecified vomiting type [R11.2]  Gastroesophageal reflux disease, esophagitis presence not specified [K21.9]    Disposition: HOME OR SELF CARE    Patient Instructions:   Current Discharge Medication List      START taking these medications    Details   sucralfate (CARAFATE) 1 gram tablet Take 1 tablet (1 g total) by mouth 3 (three) times daily.  Qty: 100 tablet, Refills: 2         CONTINUE these medications which have NOT CHANGED    Details   acetaminophen (TYLENOL) 500 MG tablet Take 2 tablets (1,000 mg total) by mouth every 8 (eight) hours.  Refills: 0      albuterol (PROVENTIL HFA) 90 mcg/actuation inhaler Inhale 2 puffs into the lungs.      allopurinoL (ZYLOPRIM) 100 MG tablet Take 1 tablet (100 mg total) by mouth once daily.  Qty: 90 tablet, Refills: 0    Associated Diagnoses: Kidney stones      aspirin (ECOTRIN) 81 MG EC tablet Take 1 tablet (81 mg total) by mouth once daily.  Refills: 0      atorvastatin (LIPITOR) 40 MG tablet Take 1 tablet (40 mg total) by mouth every evening.  Qty: 90 tablet, Refills: 3      BRILINTA 90 mg tablet TAKE 1 TABLET (90 MG TOTAL) BY MOUTH 2 (TWO) TIMES DAILY.   Qty: 60 tablet, Refills: 4      carvediloL (COREG) 25 MG tablet TAKE 1 TABLET BY MOUTH TWICE DAILY      Qty: 180 tablet, Refills: 0    Comments: Please delete all prior scripts with same name and strength including on holds.  Associated Diagnoses: Coronary artery disease involving native coronary artery of native heart without angina pectoris; Renal arterial hypertension      cetirizine (ZYRTEC) 10 MG tablet Take  10 mg by mouth daily as needed for Allergies.      famotidine (PEPCID) 40 MG tablet Take 1 tablet (40 mg total) by mouth every evening.  Qty: 30 tablet, Refills: 1    Associated Diagnoses: Epigastric abdominal pain; Gastroesophageal reflux disease, esophagitis presence not specified      furosemide (LASIX) 20 MG tablet TAKE 1 TABLET (20 MG TOTAL) BY MOUTH ONCE DAILY.  Qty: 90 tablet, Refills: 2      gabapentin (NEURONTIN) 100 MG capsule TAKE 1 CAPSULE BY MOUTH TWICE DAILY      Qty: 180 capsule, Refills: 3    Associated Diagnoses: DDD (degenerative disc disease), lumbar      lancets Misc 1 Units by Misc.(Non-Drug; Combo Route) route 2 (two) times daily as needed.  Qty: 100 each, Refills: 3      levothyroxine (SYNTHROID) 125 MCG tablet Take 1 tablet (125 mcg total) by mouth before breakfast.  Qty: 30 tablet, Refills: 11      ondansetron (ZOFRAN) 4 MG tablet Take 1 tablet (4 mg total) by mouth every 6 (six) hours as needed for Nausea.  Qty: 20 tablet, Refills: 1    Associated Diagnoses: Nausea and vomiting, intractability of vomiting not specified, unspecified vomiting type      pantoprazole (PROTONIX) 40 MG tablet Take 1 tablet (40 mg total) by mouth once daily.  Qty: 30 tablet, Refills: 11      ramipriL (ALTACE) 5 MG capsule Take 1 capsule (5 mg total) by mouth every evening.  Qty: 30 capsule, Refills: 1    Comments: .  Associated Diagnoses: Essential hypertension      ranolazine (RANEXA) 500 MG Tb12 Take 1 tablet (500 mg total) by mouth 2 (two) times daily.  Qty: 60 tablet, Refills: 0      venlafaxine (EFFEXOR-XR) 150 MG Cp24 Take 1 capsule (150 mg total) by mouth once daily.  Qty: 90 capsule, Refills: 3      blood-glucose meter (GLUCOSE MONITORING KIT) kit Use as instructed  Qty: 1 each, Refills: 0      nitroGLYCERIN (NITROSTAT) 0.4 MG SL tablet Place 1 tablet (0.4 mg total) under the tongue every 5 (five) minutes as needed for Chest pain.  Qty: 25 tablet, Refills: 3      polyethylene glycol (GLYCOLAX) 17 gram PwPk  Take 17 g by mouth once daily.  Refills: 0             Discharge Procedure Orders (must include Diet, Follow-up, Activity)    Follow Up:  Follow up with PCP as previously scheduled  Resume routine diet.  Activity as tolerated.    No driving day of procedure.

## 2020-08-25 NOTE — ANESTHESIA PREPROCEDURE EVALUATION
08/25/2020  Josephine Bolton is a 55 y.o., female.    Anesthesia Evaluation    I have reviewed the Patient Summary Reports.    I have reviewed the Nursing Notes. I have reviewed the NPO Status.   I have reviewed the Medications.     Review of Systems  Anesthesia Hx:  Hx of Anesthetic complications    Cardiovascular:   Hypertension CAD  CABG/stent  Angina    Pulmonary:   Asthma Shortness of breath Sleep Apnea, CPAP    Education provided regarding risk of obstructive sleep apnea     Renal/:   Chronic Renal Disease    Musculoskeletal:   Arthritis     Neurological:   Neuromuscular Disease, Headaches    Endocrine:   Hypothyroidism    Psych:   Psychiatric History anxiety depression          Physical Exam  General:  Obesity    Airway/Jaw/Neck:  Airway Findings: Mouth Opening: Normal Tongue: Normal  General Airway Assessment: Adult  Mallampati: IV  Improves to III with phonation.      Dental:  Dental Findings: In tact   Chest/Lungs:  Chest/Lungs Findings: Clear to auscultation, Normal Respiratory Rate     Heart/Vascular:  Heart Findings: Rate: Normal  Rhythm: Regular Rhythm  Sounds: Normal        Mental Status:  Mental Status Findings:  Cooperative, Alert and Oriented         Anesthesia Plan  Type of Anesthesia, risks & benefits discussed:  Anesthesia Type:  general  Patient's Preference:   Intra-op Monitoring Plan: standard ASA monitors  Intra-op Monitoring Plan Comments:   Post Op Pain Control Plan:   Post Op Pain Control Plan Comments:   Induction:   IV  Beta Blocker:  Patient is on a Beta-Blocker and has received one dose within the past 24 hours (No further documentation required).       Informed Consent: Patient understands risks and agrees with Anesthesia plan.  Questions answered. Anesthesia consent signed with patient.  ASA Score: 4     Day of Surgery Review of History & Physical:            Ready For  Surgery From Anesthesia Perspective.

## 2020-08-25 NOTE — H&P
History & Physical - Short Stay  Gastroenterology      SUBJECTIVE:     Procedure: EGD    Chief Complaint/Indication for Procedure: Epigastric Pain, Reflux and Weight Loss    PTA Medications   Medication Sig    acetaminophen (TYLENOL) 500 MG tablet Take 2 tablets (1,000 mg total) by mouth every 8 (eight) hours.    albuterol (PROVENTIL HFA) 90 mcg/actuation inhaler Inhale 2 puffs into the lungs.    allopurinoL (ZYLOPRIM) 100 MG tablet Take 1 tablet (100 mg total) by mouth once daily.    aspirin (ECOTRIN) 81 MG EC tablet Take 1 tablet (81 mg total) by mouth once daily.    atorvastatin (LIPITOR) 40 MG tablet Take 1 tablet (40 mg total) by mouth every evening.    BRILINTA 90 mg tablet TAKE 1 TABLET (90 MG TOTAL) BY MOUTH 2 (TWO) TIMES DAILY.     carvediloL (COREG) 25 MG tablet TAKE 1 TABLET BY MOUTH TWICE DAILY        cetirizine (ZYRTEC) 10 MG tablet Take 10 mg by mouth daily as needed for Allergies.    famotidine (PEPCID) 40 MG tablet Take 1 tablet (40 mg total) by mouth every evening.    furosemide (LASIX) 20 MG tablet TAKE 1 TABLET (20 MG TOTAL) BY MOUTH ONCE DAILY.    gabapentin (NEURONTIN) 100 MG capsule TAKE 1 CAPSULE BY MOUTH TWICE DAILY        lancets Misc 1 Units by Misc.(Non-Drug; Combo Route) route 2 (two) times daily as needed.    levothyroxine (SYNTHROID) 125 MCG tablet Take 1 tablet (125 mcg total) by mouth before breakfast.    ondansetron (ZOFRAN) 4 MG tablet Take 1 tablet (4 mg total) by mouth every 6 (six) hours as needed for Nausea.    pantoprazole (PROTONIX) 40 MG tablet Take 1 tablet (40 mg total) by mouth once daily.    ramipriL (ALTACE) 5 MG capsule Take 1 capsule (5 mg total) by mouth every evening.    ranolazine (RANEXA) 500 MG Tb12 Take 1 tablet (500 mg total) by mouth 2 (two) times daily.    venlafaxine (EFFEXOR-XR) 150 MG Cp24 Take 1 capsule (150 mg total) by mouth once daily.    blood-glucose meter (GLUCOSE MONITORING KIT) kit Use as instructed    nitroGLYCERIN (NITROSTAT)  "0.4 MG SL tablet Place 1 tablet (0.4 mg total) under the tongue every 5 (five) minutes as needed for Chest pain. (Patient not taking: Reported on 8/3/2020)    polyethylene glycol (GLYCOLAX) 17 gram PwPk Take 17 g by mouth once daily. (Patient not taking: Reported on 8/3/2020)       Review of patient's allergies indicates:   Allergen Reactions    Celexa [citalopram] Other (See Comments)     GI upset  Stated "knocked me out"    Cephalexin Anaphylaxis    Zoloft [sertraline] Other (See Comments)     Stated "knocked me out"    Codeine Other (See Comments)     hallucinations  hallucinations    Isosorbide Nausea And Vomiting    Ciprofloxacin Itching    Levaquin [levofloxacin] Other (See Comments)     tendinitis    Penicillins Other (See Comments)     Was told per mother that as child was allergic to penicillin.  Childhood allergy/ unknown reaction    Sulfa (sulfonamide antibiotics)         Past Medical History:   Diagnosis Date    Allergy     Anticoagulant long-term use     ASA 81mg, Effient    Anxiety     Arthritis     Asthma     As child    CAD (coronary artery disease) 01/27/2012    s/p stents x4 , CABG, 3 vessel, (5/2013) newest stent prior to CABG    Chronic constipation     Coronary artery disease involving native coronary artery of native heart without angina pectoris 1/27/2012 12/19 Significant ostial RCA lesion as above treated with drug-eluting stenting as above. Occluded proximal LAD with patent lima lad Patent vein graft to 2nd obtuse marginal Known occluded vein graft to unknown vessel.  s/p stents x 3 (2010) s/p LAD stent (DSE) 3/2012    DDD (degenerative disc disease), cervical     DDD (degenerative disc disease), lumbar     DDD (degenerative disc disease), thoracic     Depression     Edema     Encounter for blood transfusion     Headache(784.0)     History of nephrolithiasis     Hyperlipidemia     Hypertension     Hypothyroid 7/5/2012    Insomnia     Insulin resistance     " "patient stated not diebetic    Joint stiffness     Joint swelling     Kidney disease     ; Frequent UTIs     Kidney stones     Low back pain     Neck pain     Obstructive sleep apnea on CPAP 2012    PONV (postoperative nausea and vomiting)     S/P CABG (coronary artery bypass graft) 2013     Sleep apnea 12    Patient reports "severe" sleep apnea, uses C-pap    Stented coronary artery 2019  ostial RCA -Osirio2.5 x 18 post dilated 2.75     Thoracic back pain      Past Surgical History:   Procedure Laterality Date    ADENOIDECTOMY      BACK SURGERY      BREAST BIOPSY Left 2017    duct excision- Dr. Jimenez    BREAST CYST ASPIRATION Left 2020    @ 's office- old hematoma drained (per office)    CARPAL TUNNEL RELEASE      bilateral    CERVICAL LAMINECTOMY WITH SPINAL FUSION      2016     SECTION, CLASSIC      x 2    COLONOSCOPY      CORONARY ANGIOGRAPHY  2019    Procedure: ANGIOGRAM, CORONARY ARTERY;  Surgeon: Timi Millan MD;  Location: [facility] CATH;  Service: Cardiology;;    CORONARY ANGIOPLASTY WITH STENT PLACEMENT      x 4    CORONARY ARTERY BYPASS GRAFT  2013    3 vessel    HYSTERECTOMY      Complete    KNEE ARTHROPLASTY Left 2019    Procedure: ARTHROPLASTY, KNEE;  Surgeon: Juan Wilson MD;  Location: [facility] OR;  Service: Orthopedics;  Laterality: Left;    KNEE ARTHROSCOPY W/ MENISCAL REPAIR Left     twice, last one 2014    LAMINECTOMY      L4/L5    LEFT HEART CATHETERIZATION  2019    Procedure: Left heart cath-  # 219 A;  Surgeon: Timi Millan MD;  Location: [facility] CATH;  Service: Cardiology;;    OOPHORECTOMY Bilateral     SINUS SURGERY      x3    TENDON REPAIR Left     ankle surgery    THYROIDECTOMY      2 separate surgeries    TONSILLECTOMY      TOTAL KNEE ARTHROPLASTY Left 2019    Surgeon: Juan Wilson MD     Family History   Problem Relation Age of Onset    " Heart disease Father     Diabetes Father     Hypertension Father     Stroke Father     Cataracts Father     Cancer Maternal Grandmother         Breast with Mets    Breast cancer Maternal Grandmother     Cancer Paternal Grandmother         Colon    Heart failure Mother     Asthma Mother     Macular degeneration Mother     Cancer Mother         Breast    Cataracts Mother     Heart disease Mother     COPD Mother     Breast cancer Mother     Glaucoma Sister     Thyroid disease Sister     Glaucoma Sister     Strabismus Other     Other Brother         stiff man syndrome    Amblyopia Neg Hx     Blindness Neg Hx     Retinal detachment Neg Hx      Social History     Tobacco Use    Smoking status: Former Smoker     Packs/day: 0.50     Years: 14.00     Pack years: 7.00     Types: Cigarettes     Start date: 1984     Quit date: 1998     Years since quittin.6    Smokeless tobacco: Never Used   Substance Use Topics    Alcohol use: No     Frequency: Never     Drinks per session: Patient refused     Binge frequency: Patient refused    Drug use: Yes     Types: Benzodiazepines         OBJECTIVE:     Vital Signs (Most Recent)  Temp: 97.7 °F (36.5 °C) (20 1044)  Pulse: 62 (20 1044)  Resp: 18 (20 1044)  BP: (!) 161/74 (20 1044)  SpO2: 99 % (20 1044)    Physical Exam:                                                       GENERAL:  Comfortable, in no acute distress.                                 HEENT EXAM:  Nonicteric.  No adenopathy.  Oropharynx is clear.               NECK:  Supple.                                                               LUNGS:  Clear.                                                               CARDIAC:  Regular rate and rhythm.  S1, S2.  No murmur.                      ABDOMEN:  Soft, positive bowel sounds, nontender.  No hepatosplenomegaly or masses.  No rebound or guarding.                                             EXTREMITIES:  No  edema.     MENTAL STATUS:  Normal, alert and oriented.      ASSESSMENT/PLAN:     Assessment: Epigastric Pain, Reflux and Weight Loss    Plan: EGD    Anesthesia Plan: General    ASA Grade: ASA 2 - Patient with mild systemic disease with no functional limitations    MALLAMPATI SCORE:  I (soft palate, uvula, fauces, and tonsillar pillars visible)     In my medical opinion and judgment, this medical or surgical procedure was not able to be safely postponed in accordance with [place] [facility], Healthcare Facility Notice #[id number]-007.

## 2020-08-31 LAB
FINAL PATHOLOGIC DIAGNOSIS: NORMAL
GROSS: NORMAL

## 2020-09-10 DIAGNOSIS — N20.0 KIDNEY STONES: ICD-10-CM

## 2020-09-10 DIAGNOSIS — I10 ESSENTIAL HYPERTENSION: ICD-10-CM

## 2020-09-10 NOTE — TELEPHONE ENCOUNTER
No new care gaps identified.  Powered by [employer]. Reference number: [id number]. 9/10/2020 3:44:39 PM CDT

## 2020-09-11 RX ORDER — RAMIPRIL 5 MG/1
5 CAPSULE ORAL NIGHTLY
Qty: 90 CAPSULE | Refills: 3 | Status: SHIPPED | OUTPATIENT
Start: 2020-09-11 | End: 2021-08-27

## 2020-09-14 RX ORDER — ALLOPURINOL 100 MG/1
TABLET ORAL
Qty: 90 TABLET | Refills: 3 | Status: SHIPPED | OUTPATIENT
Start: 2020-09-14 | End: 2021-08-29

## 2020-09-14 NOTE — PROGRESS NOTES
Refill Authorization Note    is requesting a refill authorization.    Brief assessment and rationale for refill: APPROVE: prr          Medication Therapy Plan: [facility]    Medication reconciliation completed: No                    Comments:      Orders Placed This Encounter    allopurinoL (ZYLOPRIM) 100 MG tablet      Requested Prescriptions   Signed Prescriptions Disp Refills    allopurinoL (ZYLOPRIM) 100 MG tablet 90 tablet 3     Sig: TAKE 1 TABLET BY MOUTH ONCE DAILY       Endocrinology:  Gout Agents - allopurinol Failed - 9/10/2020  3:43 PM        Failed - PLT in normal range and within 360 days     Platelets   Date Value Ref Range Status   07/28/2020 411 (H) 150 - 350 K/uL Final   06/04/2020 350 150 - 350 K/uL Final   12/25/2019 254 150 - 350 K/uL Final              Passed - Patient is at least 18 years old        Passed - Office visit in past 12 months or future 90 days.     Recent Outpatient Visits            1 month ago Epigastric abdominal pain    [facility] Froylan Smart MD    1 month ago Hypothyroidism, unspecified type    [facility] Wilmer Aguilera DO    1 month ago Epigastric abdominal pain    [facility] Smitha Kate, ZAYRA    2 months ago Coronary artery disease involving native coronary artery of native heart without angina pectoris    [facility] Obdulio Abbasi MD    3 months ago PUD (peptic ulcer disease)    [facility] Tena Ruiz PA-C          Future Appointments              In 1 week Froylan Smart MD [facility]    In 1 month Juan Wilson MD [facility]    In 4 months [facility]    In 4 months [facility]dulio Abbasi [facility]    In 4 months LAB, [place] [facility]    In 5 months Wilmer Aguilera DO [facility]                 Passed - Uric Acid is 5.9 or below and within 360 days     Uric Acid   Date Value Ref Range Status   07/28/2020 3.9 2.4 - 5.7 mg/dL Final   04/28/2018 3.6 2.4 - 5.7 mg/dL Final   04/04/2016 4.2 2.4 - 5.7 mg/dL Final              Passed - Cr is 1.3 or below and within 360 days     Creatinine   Date Value Ref Range Status   07/28/2020 0.8 0.5 - 1.4 mg/dL Final   07/22/2020 0.7 0.5 - 1.4 mg/dL Final   06/04/2020 0.8 0.5 - 1.4 mg/dL Final              Passed - WBC in normal range and within 360 days     WBC   Date Value Ref Range Status   07/28/2020 9.16 3.90 - 12.70 K/uL Final   06/04/2020 8.16 3.90 - 12.70 K/uL Final   12/25/2019 6.38 3.90 - 12.70 K/uL Final              Passed - RBC in normal range and within 360 days     RBC   Date Value Ref Range Status   07/28/2020 4.38 4.00 - 5.40 M/uL Final   06/04/2020 4.53 4.00 - 5.40 M/uL Final   12/25/2019 3.95 (L) 4.00 - 5.40 M/uL Final              Passed - HGB in normal range and within 360 days     Hemoglobin   Date Value Ref Range Status   07/28/2020 12.7 12.0 - 16.0 g/dL Final   06/04/2020 13.0 12.0 - 16.0 g/dL Final   12/25/2019 11.0 (L) 12.0 - 16.0 g/dL Final              Passed - HCT in normal range and within 360 days     Hematocrit   Date Value Ref Range Status   07/28/2020 41.1 37.0 - 48.5 % Final   06/04/2020 39.7 37.0 - 48.5 % Final   12/25/2019 34.9 (L) 37.0 - 48.5 % Final              Passed - ALT is 94 or below and within 360 days     ALT   Date Value Ref Range Status   07/28/2020 12 10 - 44 U/L Final   06/04/2020 15 10 - 44 U/L Final   12/25/2019 27 10 - 44 U/L Final                Passed - AST is 54 or below and within 360 days     [facility]   Date Value Ref Range Status   03/24/2016 25 14 - 36 U/L Final   03/12/2016 30 14 - 36 U/L Final     AST   Date Value Ref Range Status   07/28/2020 25 10 - 40 U/L Final     Comment:     *Result may be interfered by visible hemolysis   06/04/2020 18 10 - 40 U/L Final   12/25/2019 36 14 - 36  U/L Final              Passed - eGFR is 60 or above and within 360 days     eGFR if non    Date Value Ref Range Status   07/28/2020 >60.0 >60 mL/min/1.73 m^2 Final     Comment:     Calculation used to obtain the estimated glomerular filtration  rate (eGFR) is the CKD-EPI equation.      07/22/2020 >60 >60 mL/min/1.73 m^2 Final     Comment:     Calculation used to obtain the estimated glomerular filtration  rate (eGFR) is the CKD-EPI equation.      06/04/2020 >60 >60 mL/min/1.73 m^2 Final     Comment:     Calculation used to obtain the estimated glomerular filtration  rate (eGFR) is the CKD-EPI equation.        eGFR if    Date Value Ref Range Status   07/28/2020 >60.0 >60 mL/min/1.73 m^2 Final   07/22/2020 >60 >60 mL/min/1.73 m^2 Final   06/04/2020 >60 >60 mL/min/1.73 m^2 Final                  Appointments  past 12m or future 3m with PCP    Date Provider   Last Visit   8/3/2020 Froylan Smart MD   Next Visit   9/22/2020 Froylan Smart MD   ED visits in past 90 days: 0     Note composed:11:16 AM 09/14/2020

## 2020-09-28 NOTE — TELEPHONE ENCOUNTER
----- Message from Maribel Floyd sent at 9/28/2020  3:26 PM CDT -----  Regarding: pharmacy  Type:  Pharmacy Calling to Clarify an RX    Name of Caller: Rachel  Pharmacy Name: [facility]  Prescription Name: ranolazine (RANEXA) 500 MG Tb12  What do they need to clarify?:refill authorization  Best Call Back Number:   Additional Information: n/a

## 2020-09-28 NOTE — PROGRESS NOTES
Refill Routing Note   Medication(s) are not appropriate for processing by [facility]:       - Outside of protocol           Medication reconciliation completed: No      Automatic Epic Generated Protocol Data:        Requested Prescriptions   Pending Prescriptions Disp Refills    ranolazine (RANEXA) 500 MG Tb12 60 tablet 0     Sig: Take 1 tablet (500 mg total) by mouth 2 (two) times daily.       Off-Protocol Failed - 9/28/2020  3:32 PM        Failed - Medication not assigned to a protocol, review manually.        Passed - Valid encounter within last 12 months     Recent Outpatient Visits            1 month ago Epigastric abdominal pain    [facility] Froylan Smart MD    1 month ago Hypothyroidism, unspecified type    [facility] End[facility]ology Wilmer Aguilera DO    2 months ago Epigastric abdominal pain    [facility] Smitha Kate NP    2 months ago Coronary artery disease involving native coronary artery of native heart without angina pectoris    [facility] Obdulio Abbasi MD    3 months ago PUD (peptic ulcer disease)    [facility] Tena Ruiz PA-C          Future Appointments              In 1 month Juan Wilson MD [facility]    In 3 months [facility]    In 3 months Obdulio Abbasi MD [facility]    In 4 months LAB, [place] [facility]    In 4 months Wilmer Aguilera DO [facility]                      Appointments  past 12m or future 3m with PCP    Date Provider   Last Visit   8/3/2020 Froylan Smart MD   Next Visit   Visit date not found Froylan Smart MD   ED visits in past 90 days: 0     Note composed:4:05 PM 09/28/2020

## 2020-09-30 RX ORDER — RANOLAZINE 500 MG/1
500 TABLET, EXTENDED RELEASE ORAL 2 TIMES DAILY
Qty: 60 TABLET | Refills: 5 | Status: ON HOLD | OUTPATIENT
Start: 2020-09-30 | End: 2021-01-29 | Stop reason: HOSPADM

## 2020-11-04 DIAGNOSIS — I25.10 CORONARY ARTERY DISEASE INVOLVING NATIVE CORONARY ARTERY OF NATIVE HEART WITHOUT ANGINA PECTORIS: ICD-10-CM

## 2020-11-04 DIAGNOSIS — I15.0 RENAL ARTERIAL HYPERTENSION: ICD-10-CM

## 2020-11-04 NOTE — TELEPHONE ENCOUNTER
No new care gaps identified.  Powered by [employer]. Reference number: [id number]. 11/04/2020 5:00:36 PM   CST

## 2020-11-05 NOTE — PROGRESS NOTES
Refill Routing Note   Medication(s) are not appropriate for processing by [facility] for the following reason(s):     - Required vitals are abnormal    [facility] actions completed for this encounter: Defer       Medication Therapy Plan: CDMR. Vitals > orc standards; defer to pcp  Medication reconciliation completed: No   Automatic Epic Generated Protocol Data:        Requested Prescriptions   Pending Prescriptions Disp Refills    carvediloL (COREG) 25 MG tablet [Pharmacy Med Name: CARVEDILOL 25MG TABLET] 180 tablet 3     Sig: TAKE 1 TABLET BY MOUTH TWICE DAILY       Cardiovascular:  Beta Blockers Failed - 11/5/2020 11:11 AM        Failed - Last BP in normal range within 360 days.     BP Readings from Last 3 Encounters:   08/25/20 (!) 156/72   08/03/20 102/62   08/03/20 104/84              Passed - Patient is at least 18 years old        Passed - Last Heart Rate in normal range within 360 days.     Pulse Readings from Last 3 Encounters:   08/25/20 59   08/03/20 71   08/03/20 72             Passed - Office visit in past 12 months or future 90 days     Recent Outpatient Visits            3 months ago Epigastric abdominal pain    [facility] Froylan Smart MD    3 months ago Hypothyroidism, unspecified type    [place] - Endocrinology Wilmer Aguilera DO    3 months ago Epigastric abdominal pain    [facility] Smitha Kate NP    3 months ago Coronary artery disease involving native coronary artery of native heart without angina pectoris    [place] - Cardiology Obdulio Abbasi MD    4 months ago PUD (peptic ulcer disease)    [facility] Tena Ruiz PA-C          Future Appointments              In 5 days Brian J Ladner, MD [facility]    In 2 months [facility]ASCULAR, [facility]    In 2 months MD [address]  [facility]    In 3 months LAB, [place] [facility]    In 3 months DO [facility]                      Appointments  past 12m or future 3m with PCP    Date Provider   Last Visit   8/3/2020 Froylan Smart MD   Next Visit   Visit date not found Froylan Smart MD   ED visits in past 90 days: 0        Note composed:11:11 AM 11/05/2020

## 2020-11-06 DIAGNOSIS — M17.11 PRIMARY OSTEOARTHRITIS OF RIGHT KNEE: Primary | ICD-10-CM

## 2020-11-06 RX ORDER — CARVEDILOL 25 MG/1
TABLET ORAL
Qty: 180 TABLET | Refills: 3 | Status: SHIPPED | OUTPATIENT
Start: 2020-11-06 | End: 2021-10-08

## 2020-11-08 ENCOUNTER — PATIENT OUTREACH (OUTPATIENT)
Dept: ADMINISTRATIVE | Facility: OTHER | Age: 55
End: 2020-11-08

## 2020-11-08 NOTE — PROGRESS NOTES
LINKS immunization registry updated  Care Everywhere updated  Health Maintenance updated  Chart reviewed for overdue [facility] (CINTHYA) health maintenance testing (CRS, Breast Ca, Diabetic Eye Exam)   Orders entered:N/A

## 2020-11-10 ENCOUNTER — HOSPITAL ENCOUNTER (OUTPATIENT)
Dept: RADIOLOGY | Facility: HOSPITAL | Age: 55
Discharge: HOME | End: 2020-11-10
Attending: ORTHOPAEDIC SURGERY
Payer: COMMERCIAL

## 2020-11-10 ENCOUNTER — OFFICE VISIT (OUTPATIENT)
Dept: ORTHOPEDICS | Facility: CLINIC | Age: 55
End: 2020-11-10
Payer: COMMERCIAL

## 2020-11-10 VITALS
HEIGHT: 62 IN | WEIGHT: 177 LBS | BODY MASS INDEX: 32.57 KG/M2 | HEART RATE: 72 BPM | DIASTOLIC BLOOD PRESSURE: 80 MMHG | SYSTOLIC BLOOD PRESSURE: 129 MMHG

## 2020-11-10 DIAGNOSIS — M17.11 PRIMARY OSTEOARTHRITIS OF RIGHT KNEE: Primary | ICD-10-CM

## 2020-11-10 DIAGNOSIS — M17.11 PRIMARY OSTEOARTHRITIS OF RIGHT KNEE: ICD-10-CM

## 2020-11-10 PROCEDURE — 3079F DIAST BP 80-89 MM HG: CPT | Mod: CPTII,S$GLB,, | Performed by: ORTHOPAEDIC SURGERY

## 2020-11-10 PROCEDURE — 3074F SYST BP LT 130 MM HG: CPT | Mod: CPTII,S$GLB,, | Performed by: ORTHOPAEDIC SURGERY

## 2020-11-10 PROCEDURE — 73562 X-RAY EXAM OF KNEE 3: CPT | Mod: TC,PO,RT

## 2020-11-10 PROCEDURE — 99213 OFFICE O/P EST LOW 20 MIN: CPT | Mod: 25,S$GLB,, | Performed by: ORTHOPAEDIC SURGERY

## 2020-11-10 PROCEDURE — 99213 PR OFFICE/OUTPT VISIT, EST, LEVL III, 20-29 MIN: ICD-10-PCS | Mod: 25,S$GLB,, | Performed by: ORTHOPAEDIC SURGERY

## 2020-11-10 PROCEDURE — 3079F PR MOST RECENT DIASTOLIC BLOOD PRESSURE 80-89 MM HG: ICD-10-PCS | Mod: CPTII,S$GLB,, | Performed by: ORTHOPAEDIC SURGERY

## 2020-11-10 PROCEDURE — 73562 X-RAY EXAM OF KNEE 3: CPT | Mod: 26,RT,, | Performed by: RADIOLOGY

## 2020-11-10 PROCEDURE — 1125F PR PAIN SEVERITY QUANTIFIED, PAIN PRESENT: ICD-10-PCS | Mod: S$GLB,,, | Performed by: ORTHOPAEDIC SURGERY

## 2020-11-10 PROCEDURE — 20610 DRAIN/INJ JOINT/BURSA W/O US: CPT | Mod: RT,S$GLB,, | Performed by: ORTHOPAEDIC SURGERY

## 2020-11-10 PROCEDURE — 3008F BODY MASS INDEX DOCD: CPT | Mod: CPTII,S$GLB,, | Performed by: ORTHOPAEDIC SURGERY

## 2020-11-10 PROCEDURE — 20610 LARGE JOINT ASPIRATION/INJECTION: R KNEE: ICD-10-PCS | Mod: RT,S$GLB,, | Performed by: ORTHOPAEDIC SURGERY

## 2020-11-10 PROCEDURE — 73560 XR KNEE ORTHO RIGHT: ICD-10-PCS | Mod: 26,LT,, | Performed by: RADIOLOGY

## 2020-11-10 PROCEDURE — 99999 PR PBB SHADOW E&M-EST. PATIENT-LVL IV: CPT | Mod: PBBFAC,,, | Performed by: ORTHOPAEDIC SURGERY

## 2020-11-10 PROCEDURE — 73560 X-RAY EXAM OF KNEE 1 OR 2: CPT | Mod: 26,LT,, | Performed by: RADIOLOGY

## 2020-11-10 PROCEDURE — 99999 PR PBB SHADOW E&M-EST. PATIENT-LVL IV: ICD-10-PCS | Mod: PBBFAC,,, | Performed by: ORTHOPAEDIC SURGERY

## 2020-11-10 PROCEDURE — 73562 XR KNEE ORTHO RIGHT: ICD-10-PCS | Mod: 26,RT,, | Performed by: RADIOLOGY

## 2020-11-10 PROCEDURE — 3074F PR MOST RECENT SYSTOLIC BLOOD PRESSURE < 130 MM HG: ICD-10-PCS | Mod: CPTII,S$GLB,, | Performed by: ORTHOPAEDIC SURGERY

## 2020-11-10 PROCEDURE — 1125F AMNT PAIN NOTED PAIN PRSNT: CPT | Mod: S$GLB,,, | Performed by: ORTHOPAEDIC SURGERY

## 2020-11-10 PROCEDURE — 73560 X-RAY EXAM OF KNEE 1 OR 2: CPT | Mod: TC,PO,LT

## 2020-11-10 PROCEDURE — 3008F PR BODY MASS INDEX (BMI) DOCUMENTED: ICD-10-PCS | Mod: CPTII,S$GLB,, | Performed by: ORTHOPAEDIC SURGERY

## 2020-11-10 RX ORDER — METHOCARBAMOL 750 MG/1
750 TABLET, FILM COATED ORAL 4 TIMES DAILY PRN
Qty: 44 TABLET | Refills: 0 | Status: SHIPPED | OUTPATIENT
Start: 2020-11-10 | End: 2021-02-25

## 2020-11-10 RX ADMIN — TRIAMCINOLONE ACETONIDE 40 MG: 40 INJECTION, SUSPENSION INTRA-ARTICULAR; INTRAMUSCULAR at 09:11

## 2020-11-10 NOTE — PROGRESS NOTES
"  Chief Complaint   Patient presents with    Right Knee - Pain         HPI:   This is a 55 y.o. who presents to clinic today complaining of right knee pain for years after no known trauma. Pain is progressively worsening. No numbness or tingling. No associated signs or symptoms.    Past Medical History:   Diagnosis Date    Allergy     Anticoagulant long-term use     ASA 81mg, Effient    Anxiety     Arthritis     Asthma     As child    CAD (coronary artery disease) 01/27/2012    s/p stents x4 , CABG, 3 vessel, (5/2013) newest stent prior to CABG    Chronic constipation     Coronary artery disease involving native coronary artery of native heart without angina pectoris 1/27/2012 12/19 Significant ostial RCA lesion as above treated with drug-eluting stenting as above. Occluded proximal LAD with patent lima lad Patent vein graft to 2nd obtuse marginal Known occluded vein graft to unknown vessel.  s/p stents x 3 (2010) s/p LAD stent (DSE) 3/2012    DDD (degenerative disc disease), cervical     DDD (degenerative disc disease), lumbar     DDD (degenerative disc disease), thoracic     Depression     Edema     Encounter for blood transfusion     Headache(784.0)     History of nephrolithiasis     Hyperlipidemia     Hypertension     Hypothyroid 7/5/2012    Insomnia     Insulin resistance     patient stated not diebetic    Joint stiffness     Joint swelling     Kidney disease     ; Frequent UTIs     Kidney stones     Low back pain     Neck pain     Obstructive sleep apnea on CPAP 7/17/2012    PONV (postoperative nausea and vomiting)     S/P CABG (coronary artery bypass graft) 6/25/2013 6/23/2013     Sleep apnea 01/27/12    Patient reports "severe" sleep apnea, uses C-pap    Stented coronary artery 12/20/2019 12/19  ostial RCA -Osirio2.5 x 18 post dilated 2.75     Thoracic back pain      Past Surgical History:   Procedure Laterality Date    ADENOIDECTOMY      BACK SURGERY   "    BREAST BIOPSY Left 2017    duct excision- Dr. Jimenez    BREAST CYST ASPIRATION Left 2020    @ 's office- old hematoma drained (per office)    CARPAL TUNNEL RELEASE      bilateral    CERVICAL LAMINECTOMY WITH SPINAL FUSION      2016     SECTION, CLASSIC      x 2    COLONOSCOPY      CORONARY ANGIOGRAPHY  2019    Procedure: ANGIOGRAM, CORONARY ARTERY;  Surgeon: Timi Millan MD;  Location: [facility] CATH;  Service: Cardiology;;    CORONARY ANGIOPLASTY WITH STENT PLACEMENT      x 4    CORONARY ARTERY BYPASS GRAFT  2013    3 vessel    ESOPHAGOGASTRODUODENOSCOPY N/A 2020    Procedure: EGD (ESOPHAGOGASTRODUODENOSCOPY);  Surgeon: Mk Warren MD;  Location: [facility] ENDO;  Service: Endoscopy;  Laterality: N/A;    HYSTERECTOMY      Complete    KNEE ARTHROPLASTY Left 2019    Procedure: ARTHROPLASTY, KNEE;  Surgeon: Juan Wilson MD;  Location: [facility] OR;  Service: Orthopedics;  Laterality: Left;    KNEE ARTHROSCOPY W/ MENISCAL REPAIR Left     twice, last one 2014    LAMINECTOMY      L4/L5    LEFT HEART CATHETERIZATION  2019    Procedure: Left heart cath-  # 219 A;  Surgeon: Timi Millan MD;  Location: [facility] CATH;  Service: Cardiology;;    OOPHORECTOMY Bilateral     SINUS SURGERY      x3    TENDON REPAIR Left     ankle surgery    THYROIDECTOMY      2 separate surgeries    TONSILLECTOMY      TOTAL KNEE ARTHROPLASTY Left 2019    Surgeon: Juan Wilson MD     Current Outpatient Medications on File Prior to Visit   Medication Sig Dispense Refill    albuterol (PROVENTIL HFA) 90 mcg/actuation inhaler Inhale 2 puffs into the lungs.      allopurinoL (ZYLOPRIM) 100 MG tablet TAKE 1 TABLET BY MOUTH ONCE DAILY  90 tablet 3    aspirin (ECOTRIN) 81 MG EC tablet Take 1 tablet (81 mg total) by mouth once daily.  0    atorvastatin (LIPITOR) 40 MG tablet Take 1 tablet (40 mg total) by mouth every evening. 90 tablet 3    BRILINTA 90 mg tablet TAKE  1 TABLET (90 MG TOTAL) BY MOUTH 2 (TWO) TIMES DAILY.  60 tablet 4    carvediloL (COREG) 25 MG tablet TAKE 1 TABLET BY MOUTH TWICE DAILY  180 tablet 3    cetirizine (ZYRTEC) 10 MG tablet Take 10 mg by mouth daily as needed for Allergies.      famotidine (PEPCID) 40 MG tablet Take 1 tablet (40 mg total) by mouth every evening. 30 tablet 1    furosemide (LASIX) 20 MG tablet TAKE 1 TABLET (20 MG TOTAL) BY MOUTH ONCE DAILY. 90 tablet 2    gabapentin (NEURONTIN) 100 MG capsule TAKE 1 CAPSULE BY MOUTH TWICE DAILY     180 capsule 3    lancets Misc 1 Units by Misc.(Non-Drug; Combo Route) route 2 (two) times daily as needed. 100 each 3    levothyroxine (SYNTHROID) 125 MCG tablet Take 1 tablet (125 mcg total) by mouth before breakfast. 30 tablet 11    nitroGLYCERIN (NITROSTAT) 0.4 MG SL tablet Place 1 tablet (0.4 mg total) under the tongue every 5 (five) minutes as needed for Chest pain. 25 tablet 3    ondansetron (ZOFRAN) 4 MG tablet Take 1 tablet (4 mg total) by mouth every 6 (six) hours as needed for Nausea. 20 tablet 1    pantoprazole (PROTONIX) 40 MG tablet Take 1 tablet (40 mg total) by mouth once daily. 30 tablet 11    ramipriL (ALTACE) 5 MG capsule TAKE 1 CAPSULE (5 MG TOTAL) BY MOUTH EVERY EVENING.  90 capsule 3    ranolazine (RANEXA) 500 MG Tb12 TAKE 1 TABLET BY MOUTH TWICE DAILY  60 tablet 5    ranolazine (RANEXA) 500 MG Tb12 Take 1 tablet (500 mg total) by mouth 2 (two) times daily. 60 tablet 5    venlafaxine (EFFEXOR-XR) 150 MG Cp24 Take 1 capsule (150 mg total) by mouth once daily. 90 capsule 3    acetaminophen (TYLENOL) 500 MG tablet Take 2 tablets (1,000 mg total) by mouth every 8 (eight) hours. (Patient not taking: Reported on 11/10/2020)  0    blood-glucose meter (GLUCOSE MONITORING KIT) kit Use as instructed 1 each 0    polyethylene glycol (GLYCOLAX) 17 gram PwPk Take 17 g by mouth once daily. (Patient not taking: Reported on 11/10/2020)  0     No current facility-administered medications on  "file prior to visit.      Review of patient's allergies indicates:   Allergen Reactions    Celexa [citalopram] Other (See Comments)     GI upset  Stated "knocked me out"    Cephalexin Anaphylaxis    Zoloft [sertraline] Other (See Comments)     Stated "knocked me out"    Codeine Other (See Comments)     hallucinations  hallucinations    Isosorbide Nausea And Vomiting    Ciprofloxacin Itching    Levaquin [levofloxacin] Other (See Comments)     tendinitis    Penicillins Other (See Comments)     Was told per mother that as child was allergic to penicillin.  Childhood allergy/ unknown reaction    Sulfa (sulfonamide antibiotics)      Family History   Problem Relation Age of Onset    Heart disease Father     Diabetes Father     Hypertension Father     Stroke Father     Cataracts Father     Cancer Maternal Grandmother         Breast with Mets    Breast cancer Maternal Grandmother     Cancer Paternal Grandmother         Colon    Heart failure Mother     Asthma Mother     Macular degeneration Mother     Cancer Mother         Breast    Cataracts Mother     Heart disease Mother     COPD Mother     Breast cancer Mother     Glaucoma Sister     Thyroid disease Sister     Glaucoma Sister     Strabismus Other     Other Brother         stiff man syndrome    Amblyopia Neg Hx     Blindness Neg Hx     Retinal detachment Neg Hx      Social History     Socioeconomic History    Marital status:      Spouse name: Not on file    Number of children: Not on file    Years of education: Not on file    Highest education level: Not on file   Occupational History    Occupation: teacher   Social Needs    Financial resource strain: Somewhat hard    Food insecurity     Worry: Never true     Inability: Never true    Transportation needs     Medical: No     Non-medical: No   Tobacco Use    Smoking status: Former Smoker     Packs/day: 0.50     Years: 14.00     Pack years: 7.00     Types: Cigarettes     " Start date: 1984     Quit date: 1998     Years since quittin.9    Smokeless tobacco: Never Used   Substance and Sexual Activity    Alcohol use: No     Frequency: Never     Drinks per session: Patient refused     Binge frequency: Patient refused    Drug use: Yes     Types: Benzodiazepines    Sexual activity: Yes     Partners: Male   Lifestyle    Physical activity     Days per week: 3 days     Minutes per session: 30 min    Stress: Rather much   Relationships    Social connections     Talks on phone: More than three times a week     Gets together: More than three times a week     Attends Hoahaoism service: Not on file     Active member of club or organization: Yes     Attends meetings of clubs or organizations: More than 4 times per year     Relationship status:    Other Topics Concern    Not on file   Social History Narrative    Not on file       Review of Systems:  Constitutional:  Denies fever or chills   Eyes:  Denies change in visual acuity   HENT:  Denies nasal congestion or sore throat   Respiratory:  Denies cough or shortness of breath   Cardiovascular:  Denies chest pain or edema   GI:  Denies abdominal pain, nausea, vomiting, bloody stools or diarrhea   :  Denies dysuria   Integument:  Denies rash   Neurologic:  Denies headache, focal weakness or sensory changes   Endocrine:  Denies polyuria or polydipsia   Lymphatic:  Denies swollen glands   Psychiatric:  Denies depression or anxiety     Physical Exam:   Constitutional:  Well developed, well nourished, no acute distress, non-toxic appearance   Integument:  Well hydrated, no rash   Lymphatic:  No lymphadenopathy noted   Neurologic:  Alert & oriented x 3  Psychiatric:  Speech and behavior appropriate   Eyes: EOMI  Gi: abdomen soft    Bilateral Knee Exam    right Knee Exam     Tenderness   The patient is experiencing tenderness in the medial joint line.    Range of Motion   Extension: abnormal   Flexion: abnormal     Muscle  Strength     The patient has normal knee strength.    Tests   Livia:  Medial - positive   Lachman:  Anterior - negative      Varus: negative  Valgus: negative  Patellar Apprehension: negative    Other   Erythema: absent  Sensation: normal  Pulse: present  Swelling: mild      left Knee Exam   left knee exam performed same as contralateral side and is normal.        X-rays were performed, personally reviewed by me and findings discussed with the patient.  3 views of the right knee show tricompartmental degenerative change most pronounced in the medial compartment    Primary osteoarthritis of right knee  -     Large Joint Aspiration/Injection: R knee            Using an aseptic technique, I injected 5 cc of lidocaine 1% without and 1 cc of kenalog 40mg into the right knee. The patient tolerated this well. I will have them return to clinic in 3 months.

## 2020-11-10 NOTE — PROCEDURES
Large Joint Aspiration/Injection: R knee    Date/Time: 11/10/2020 9:00 AM  Performed by: Juan Wilson MD  Authorized by: Juan Wilson MD     Consent Done?:  Yes (Verbal)  Indications:  Pain  Timeout: prior to procedure the correct patient, procedure, and site was verified    Prep: patient was prepped and draped in usual sterile fashion    Local anesthetic:  Lidocaine 1% without epinephrine  Anesthetic total (ml):  5      Details:  Needle Size:  21 G  Approach:  Anterolateral  Location:  Knee  Site:  R knee  Medications:  40 mg triamcinolone acetonide 40 mg/mL  Patient tolerance:  Patient tolerated the procedure well with no immediate complications

## 2020-11-20 RX ORDER — TRIAMCINOLONE ACETONIDE 40 MG/ML
40 INJECTION, SUSPENSION INTRA-ARTICULAR; INTRAMUSCULAR
Status: DISCONTINUED | OUTPATIENT
Start: 2020-11-10 | End: 2020-11-20 | Stop reason: HOSPADM

## 2020-11-24 ENCOUNTER — TELEPHONE (OUTPATIENT)
Dept: ORTHOPEDICS | Facility: CLINIC | Age: 55
End: 2020-11-24

## 2020-11-24 NOTE — TELEPHONE ENCOUNTER
----- Message from Sergey Jin sent at 11/24/2020  3:30 PM CST -----  Regarding: R knee in bad pain  Contact: pt   call

## 2020-12-11 NOTE — TELEPHONE ENCOUNTER
No new care gaps identified.  Powered by "[employer]. Reference number: [id number]. 12/11/2020 10:20:00 AM   CST

## 2020-12-12 NOTE — PROGRESS NOTES
Refill Routing Note   Medication(s) are not appropriate for processing by [facility] for the following reason(s):     - Medication not active on medication list  [facility] action(s):  Defer     Medication Therapy Plan: CDMR; Norvasc discontinued by RAHEEM Sara 6/11/20; Defer to you  Medication reconciliation completed: No   Automatic Epic Generated Protocol Data:        Requested Prescriptions   Pending Prescriptions Disp Refills    amLODIPine (NORVASC) 5 MG tablet [Pharmacy Med Name: AMLODIPINE BESYLATE 5MG TABLET] 90 tablet 3     Sig: TAKE 1 TABLET BY MOUTH ONCE DAILY       Cardiovascular:  Calcium Channel Blockers Passed - 12/11/2020 10:19 AM        Passed - Patient is at least 18 years old        Passed - Last BP in normal range within 360 days.     BP Readings from Last 3 Encounters:   11/10/20 129/80   08/25/20 (!) 156/72   08/03/20 102/62              Passed - Office visit in past 12 months or future 90 days     Recent Outpatient Visits            1 month ago Primary osteoarthritis of right knee    [facility] Juan Wilson MD    4 months ago Epigastric abdominal pain    [facility] Froylan Smart MD    4 months ago Hypothyroidism, unspecified type    [facility] Wilmer Aguilera DO    4 months ago Epigastric abdominal pain    [facility] Smitha Kate NP    5 months ago Coronary artery disease involving native coronary artery of native heart without angina pectoris    [facility] Obdulio Abbasi MD          Future Appointments              In 2 weeks VASCULAR, [facility]    In 1 month [facility]dul[facility] Abbasi [facility]    In 1 month [facility], [place] [facility]    In 2 months Juan Wilson MD [facility]    In 2 months Wilmer Aguilera DO [facility]                       Appointments  past 12m or future 3m with PCP    Date Provider   Last Visit   8/3/2020 Froylan Smart MD   Next Visit   Visit date not found Froylan Smart MD   ED visits in past 90 days: 0        Note composed:8:39 PM 12/11/2020

## 2020-12-15 RX ORDER — TICAGRELOR 90 MG/1
TABLET ORAL
Qty: 60 TABLET | Refills: 4 | Status: SHIPPED | OUTPATIENT
Start: 2020-12-15 | End: 2021-05-03

## 2020-12-15 RX ORDER — AMLODIPINE BESYLATE 5 MG/1
TABLET ORAL
Qty: 90 TABLET | Refills: 0 | Status: SHIPPED | OUTPATIENT
Start: 2020-12-15 | End: 2021-02-22

## 2020-12-28 ENCOUNTER — CLINICAL SUPPORT (OUTPATIENT)
Dept: CARDIOLOGY | Facility: CLINIC | Age: 55
End: 2020-12-28
Attending: INTERNAL MEDICINE
Payer: COMMERCIAL

## 2020-12-28 DIAGNOSIS — I73.9 PAD (PERIPHERAL ARTERY DISEASE): ICD-10-CM

## 2020-12-28 PROCEDURE — 93975 CV US RENAL ARTERY STENOSIS HYPERTENSION COMPLETE (CUPID ONLY): ICD-10-PCS | Mod: S$GLB,,, | Performed by: INTERNAL MEDICINE

## 2020-12-28 PROCEDURE — 93975 VASCULAR STUDY: CPT | Mod: S$GLB,,, | Performed by: INTERNAL MEDICINE

## 2020-12-29 LAB
ABDOMINAL AORTA PROX EDV: 16 CM/S
ABDOMINAL AORTA PROX PSV: 110 CM/S
LEFT RENAL DIST DIAS: 18 CM/S
LEFT RENAL DIST SYS: 81 CM/S
LEFT RENAL MID DIAS: 21 CM/S
LEFT RENAL MID SYS: 99 CM/S
LEFT RENAL ORIGIN DIAS: 20 CM/S
LEFT RENAL ORIGIN SYS: 110 CM/S
LEFT RENAL PROX DIAS: 23 CM/S
LEFT RENAL PROX RAR: 0.93
LEFT RENAL PROX SYS: 102 CM/S
LEFT RENAL ULTRASOUND ACCELERATION TIME MEASUREMENT 1: 43 MS
LEFT RENAL ULTRASOUND ACCELERATION TIME MEASUREMENT 2: 54 MS
LEFT RENAL ULTRASOUND ACCELERATION TIME MEASUREMENT 3: 40 MS
LEFT RENAL ULTRASOUND ACCELERATION TIME MEASUREMENT AVERAGE: 54 MS
LEFT RENAL ULTRASOUND KIDNEY SIZE MEASUREMENT 1: 11.45 CM
LEFT RENAL ULTRASOUND KIDNEY SIZE MEASUREMENT 2: 11.46 CM
LEFT RENAL ULTRASOUND KIDNEY SIZE MEASUREMENT 3: 11.5 CM
LEFT RENAL ULTRASOUND KIDNEY SIZE MEASUREMENT AVERAGE: 11.5 CM
LEFT RENAL ULTRASOUND RESISTIVE INDEX MEASUREMENT 1: 0.72
LEFT RENAL ULTRASOUND RESISTIVE INDEX MEASUREMENT 2: 0.78
LEFT RENAL ULTRASOUND RESISTIVE INDEX MEASUREMENT 3: 0.76
LEFT RENAL ULTRASOUND RESISTIVE INDEX MEASUREMENT AVERAGE: 0.78
OHS CV LEFT RENAL RAR: 1
OHS CV RIGHT RENAL RAR: 1.33
OHS CV US LEFT RENAL HIGHEST EDV: 23
OHS CV US LEFT RENAL HIGHEST PSV: 110
OHS CV US RIGHT RENAL HIGHEST EDV: 35
OHS CV US RIGHT RENAL HIGHEST PSV: 146
RIGHT RENAL DIST DIAS: 27 CM/S
RIGHT RENAL DIST SYS: 109 CM/S
RIGHT RENAL MID DIAS: 35 CM/S
RIGHT RENAL MID SYS: 119 CM/S
RIGHT RENAL ORIGIN DIAS: 31 CM/S
RIGHT RENAL ORIGIN SYS: 146 CM/S
RIGHT RENAL PROX DIAS: 31 CM/S
RIGHT RENAL PROX RAR: 1.23
RIGHT RENAL PROX SYS: 135 CM/S
RIGHT RENAL ULTRASOUND ACCELERATION TIME MEASUREMENT 1: 54 MS
RIGHT RENAL ULTRASOUND ACCELERATION TIME MEASUREMENT 2: 40 MS
RIGHT RENAL ULTRASOUND ACCELERATION TIME MEASUREMENT 3: 57 MS
RIGHT RENAL ULTRASOUND ACCELERATION TIME MEASUREMENT AVERAGE: 57 MS
RIGHT RENAL ULTRASOUND KIDNEY SIZE MEASUREMENT 1: 11.71 CM
RIGHT RENAL ULTRASOUND KIDNEY SIZE MEASUREMENT 2: 11.75 CM
RIGHT RENAL ULTRASOUND KIDNEY SIZE MEASUREMENT 3: 11.81 CM
RIGHT RENAL ULTRASOUND KIDNEY SIZE MEASUREMENT AVERAGE: 11.81 CM
RIGHT RENAL ULTRASOUND RESISTIVE INDEX MEASUREMENT 1: 0.7
RIGHT RENAL ULTRASOUND RESISTIVE INDEX MEASUREMENT 2: 0.69
RIGHT RENAL ULTRASOUND RESISTIVE INDEX MEASUREMENT 3: 0.73
RIGHT RENAL ULTRASOUND RESISTIVE INDEX MEASUREMENT AVERAGE: 0.73

## 2021-01-14 ENCOUNTER — OFFICE VISIT (OUTPATIENT)
Dept: CARDIOLOGY | Facility: CLINIC | Age: 56
End: 2021-01-14
Payer: COMMERCIAL

## 2021-01-14 VITALS
HEART RATE: 73 BPM | DIASTOLIC BLOOD PRESSURE: 83 MMHG | HEIGHT: 62 IN | BODY MASS INDEX: 37.53 KG/M2 | WEIGHT: 203.94 LBS | SYSTOLIC BLOOD PRESSURE: 131 MMHG

## 2021-01-14 DIAGNOSIS — I73.9 PAD (PERIPHERAL ARTERY DISEASE): ICD-10-CM

## 2021-01-14 DIAGNOSIS — Z95.1 S/P CABG (CORONARY ARTERY BYPASS GRAFT): ICD-10-CM

## 2021-01-14 DIAGNOSIS — I10 ESSENTIAL HYPERTENSION: ICD-10-CM

## 2021-01-14 DIAGNOSIS — I25.10 CORONARY ARTERY DISEASE INVOLVING NATIVE CORONARY ARTERY OF NATIVE HEART WITHOUT ANGINA PECTORIS: Primary | Chronic | ICD-10-CM

## 2021-01-14 DIAGNOSIS — E66.9 OBESITY, UNSPECIFIED CLASSIFICATION, UNSPECIFIED OBESITY TYPE, UNSPECIFIED WHETHER SERIOUS COMORBIDITY PRESENT: ICD-10-CM

## 2021-01-14 PROCEDURE — 99214 PR OFFICE/OUTPT VISIT, EST, LEVL IV, 30-39 MIN: ICD-10-PCS | Mod: S$GLB,,, | Performed by: INTERNAL MEDICINE

## 2021-01-14 PROCEDURE — 3075F PR MOST RECENT SYSTOLIC BLOOD PRESS GE 130-139MM HG: ICD-10-PCS | Mod: CPTII,S$GLB,, | Performed by: INTERNAL MEDICINE

## 2021-01-14 PROCEDURE — 3008F PR BODY MASS INDEX (BMI) DOCUMENTED: ICD-10-PCS | Mod: CPTII,S$GLB,, | Performed by: INTERNAL MEDICINE

## 2021-01-14 PROCEDURE — 3079F PR MOST RECENT DIASTOLIC BLOOD PRESSURE 80-89 MM HG: ICD-10-PCS | Mod: CPTII,S$GLB,, | Performed by: INTERNAL MEDICINE

## 2021-01-14 PROCEDURE — 1125F PR PAIN SEVERITY QUANTIFIED, PAIN PRESENT: ICD-10-PCS | Mod: S$GLB,,, | Performed by: INTERNAL MEDICINE

## 2021-01-14 PROCEDURE — 3075F SYST BP GE 130 - 139MM HG: CPT | Mod: CPTII,S$GLB,, | Performed by: INTERNAL MEDICINE

## 2021-01-14 PROCEDURE — 1125F AMNT PAIN NOTED PAIN PRSNT: CPT | Mod: S$GLB,,, | Performed by: INTERNAL MEDICINE

## 2021-01-14 PROCEDURE — 3079F DIAST BP 80-89 MM HG: CPT | Mod: CPTII,S$GLB,, | Performed by: INTERNAL MEDICINE

## 2021-01-14 PROCEDURE — 99214 OFFICE O/P EST MOD 30 MIN: CPT | Mod: S$GLB,,, | Performed by: INTERNAL MEDICINE

## 2021-01-14 PROCEDURE — 99999 PR PBB SHADOW E&M-EST. PATIENT-LVL IV: ICD-10-PCS | Mod: PBBFAC,,, | Performed by: INTERNAL MEDICINE

## 2021-01-14 PROCEDURE — 3008F BODY MASS INDEX DOCD: CPT | Mod: CPTII,S$GLB,, | Performed by: INTERNAL MEDICINE

## 2021-01-14 PROCEDURE — 99999 PR PBB SHADOW E&M-EST. PATIENT-LVL IV: CPT | Mod: PBBFAC,,, | Performed by: INTERNAL MEDICINE

## 2021-01-24 PROBLEM — J12.82 PNEUMONIA DUE TO COVID-19 VIRUS: Status: ACTIVE | Noted: 2021-01-24

## 2021-01-24 PROBLEM — U07.1 PNEUMONIA DUE TO COVID-19 VIRUS: Status: ACTIVE | Noted: 2021-01-24

## 2021-02-06 ENCOUNTER — LAB VISIT (OUTPATIENT)
Dept: LAB | Facility: HOSPITAL | Age: 56
End: 2021-02-06
Attending: INTERNAL MEDICINE
Payer: COMMERCIAL

## 2021-02-06 DIAGNOSIS — I10 BENIGN ESSENTIAL HTN: ICD-10-CM

## 2021-02-06 DIAGNOSIS — E03.9 HYPOTHYROIDISM, UNSPECIFIED TYPE: ICD-10-CM

## 2021-02-06 LAB
ALBUMIN SERPL BCP-MCNC: 3.1 G/DL (ref 3.5–5.2)
ALP SERPL-CCNC: 72 U/L (ref 55–135)
ALT SERPL W/O P-5'-P-CCNC: 15 U/L (ref 10–44)
ANION GAP SERPL CALC-SCNC: 8 MMOL/L (ref 8–16)
AST SERPL-CCNC: 17 U/L (ref 10–40)
BILIRUB SERPL-MCNC: 0.8 MG/DL (ref 0.1–1)
BUN SERPL-MCNC: 19 MG/DL (ref 6–20)
CALCIUM SERPL-MCNC: 8.5 MG/DL (ref 8.7–10.5)
CHLORIDE SERPL-SCNC: 106 MMOL/L (ref 95–110)
CO2 SERPL-SCNC: 29 MMOL/L (ref 23–29)
CREAT SERPL-MCNC: 0.8 MG/DL (ref 0.5–1.4)
EST. GFR  (AFRICAN AMERICAN): >60 ML/MIN/1.73 M^2
EST. GFR  (NON AFRICAN AMERICAN): >60 ML/MIN/1.73 M^2
GLUCOSE SERPL-MCNC: 85 MG/DL (ref 70–110)
POTASSIUM SERPL-SCNC: 3.8 MMOL/L (ref 3.5–5.1)
PROT SERPL-MCNC: 6.2 G/DL (ref 6–8.4)
SODIUM SERPL-SCNC: 143 MMOL/L (ref 136–145)
TSH SERPL DL<=0.005 MIU/L-ACNC: 0.41 UIU/ML (ref 0.4–4)

## 2021-02-06 PROCEDURE — 84443 ASSAY THYROID STIM HORMONE: CPT

## 2021-02-06 PROCEDURE — 80053 COMPREHEN METABOLIC PANEL: CPT

## 2021-02-06 PROCEDURE — 36415 COLL VENOUS BLD VENIPUNCTURE: CPT | Mod: PO

## 2021-02-06 PROCEDURE — 83525 ASSAY OF INSULIN: CPT

## 2021-02-08 ENCOUNTER — TELEPHONE (OUTPATIENT)
Dept: FAMILY MEDICINE | Facility: CLINIC | Age: 56
End: 2021-02-08

## 2021-02-08 LAB
INSULIN COLLECTION INTERVAL: ABNORMAL
INSULIN SERPL-ACNC: 34.4 UU/ML

## 2021-02-09 ENCOUNTER — PATIENT OUTREACH (OUTPATIENT)
Dept: ADMINISTRATIVE | Facility: OTHER | Age: 56
End: 2021-02-09

## 2021-02-09 DIAGNOSIS — M17.12 PRIMARY OSTEOARTHRITIS OF LEFT KNEE: Primary | ICD-10-CM

## 2021-02-11 ENCOUNTER — OFFICE VISIT (OUTPATIENT)
Dept: FAMILY MEDICINE | Facility: CLINIC | Age: 56
End: 2021-02-11
Payer: COMMERCIAL

## 2021-02-11 ENCOUNTER — HOSPITAL ENCOUNTER (OUTPATIENT)
Dept: RADIOLOGY | Facility: HOSPITAL | Age: 56
Discharge: HOME | End: 2021-02-11
Attending: ORTHOPAEDIC SURGERY
Payer: COMMERCIAL

## 2021-02-11 ENCOUNTER — OFFICE VISIT (OUTPATIENT)
Dept: ORTHOPEDICS | Facility: CLINIC | Age: 56
End: 2021-02-11
Payer: COMMERCIAL

## 2021-02-11 VITALS
HEIGHT: 62 IN | DIASTOLIC BLOOD PRESSURE: 77 MMHG | RESPIRATION RATE: 17 BRPM | WEIGHT: 190.06 LBS | HEART RATE: 75 BPM | BODY MASS INDEX: 34.98 KG/M2 | SYSTOLIC BLOOD PRESSURE: 126 MMHG

## 2021-02-11 VITALS
OXYGEN SATURATION: 96 % | SYSTOLIC BLOOD PRESSURE: 118 MMHG | BODY MASS INDEX: 34.96 KG/M2 | DIASTOLIC BLOOD PRESSURE: 62 MMHG | HEART RATE: 80 BPM | HEIGHT: 62 IN | WEIGHT: 190 LBS

## 2021-02-11 DIAGNOSIS — U07.1 PNEUMONIA DUE TO COVID-19 VIRUS: ICD-10-CM

## 2021-02-11 DIAGNOSIS — J12.82 PNEUMONIA DUE TO COVID-19 VIRUS: ICD-10-CM

## 2021-02-11 DIAGNOSIS — F41.8 DEPRESSION WITH ANXIETY: ICD-10-CM

## 2021-02-11 DIAGNOSIS — J45.909 ASTHMA, UNSPECIFIED ASTHMA SEVERITY, UNSPECIFIED WHETHER COMPLICATED, UNSPECIFIED WHETHER PERSISTENT: ICD-10-CM

## 2021-02-11 DIAGNOSIS — G89.29 CHRONIC PAIN OF RIGHT KNEE: Primary | ICD-10-CM

## 2021-02-11 DIAGNOSIS — M25.561 CHRONIC PAIN OF RIGHT KNEE: Primary | ICD-10-CM

## 2021-02-11 DIAGNOSIS — G89.29 CHRONIC PAIN OF RIGHT KNEE: ICD-10-CM

## 2021-02-11 DIAGNOSIS — M25.561 CHRONIC PAIN OF RIGHT KNEE: ICD-10-CM

## 2021-02-11 DIAGNOSIS — E03.9 HYPOTHYROIDISM, UNSPECIFIED TYPE: ICD-10-CM

## 2021-02-11 DIAGNOSIS — R10.32 LEFT LOWER QUADRANT PAIN: Primary | ICD-10-CM

## 2021-02-11 DIAGNOSIS — M51.36 DDD (DEGENERATIVE DISC DISEASE), LUMBAR: ICD-10-CM

## 2021-02-11 DIAGNOSIS — I25.10 CORONARY ARTERY DISEASE, ANGINA PRESENCE UNSPECIFIED, UNSPECIFIED VESSEL OR LESION TYPE, UNSPECIFIED WHETHER NATIVE OR TRANSPLANTED HEART: ICD-10-CM

## 2021-02-11 DIAGNOSIS — M17.11 PRIMARY OSTEOARTHRITIS OF RIGHT KNEE: Primary | ICD-10-CM

## 2021-02-11 DIAGNOSIS — I10 ESSENTIAL HYPERTENSION: ICD-10-CM

## 2021-02-11 PROCEDURE — 1125F PR PAIN SEVERITY QUANTIFIED, PAIN PRESENT: ICD-10-PCS | Mod: S$GLB,,, | Performed by: ORTHOPAEDIC SURGERY

## 2021-02-11 PROCEDURE — 99999 PR PBB SHADOW E&M-EST. PATIENT-LVL III: ICD-10-PCS | Mod: PBBFAC,,, | Performed by: ORTHOPAEDIC SURGERY

## 2021-02-11 PROCEDURE — 1125F AMNT PAIN NOTED PAIN PRSNT: CPT | Mod: S$GLB,,, | Performed by: ORTHOPAEDIC SURGERY

## 2021-02-11 PROCEDURE — 73562 XR KNEE ORTHO RIGHT: ICD-10-PCS | Mod: 26,RT,, | Performed by: RADIOLOGY

## 2021-02-11 PROCEDURE — 99999 PR PBB SHADOW E&M-EST. PATIENT-LVL III: CPT | Mod: PBBFAC,,, | Performed by: ORTHOPAEDIC SURGERY

## 2021-02-11 PROCEDURE — 99999 PR PBB SHADOW E&M-EST. PATIENT-LVL V: CPT | Mod: PBBFAC,,, | Performed by: FAMILY MEDICINE

## 2021-02-11 PROCEDURE — 1126F AMNT PAIN NOTED NONE PRSNT: CPT | Mod: S$GLB,,, | Performed by: FAMILY MEDICINE

## 2021-02-11 PROCEDURE — 3074F SYST BP LT 130 MM HG: CPT | Mod: CPTII,S$GLB,, | Performed by: ORTHOPAEDIC SURGERY

## 2021-02-11 PROCEDURE — 99214 OFFICE O/P EST MOD 30 MIN: CPT | Mod: 25,S$GLB,, | Performed by: ORTHOPAEDIC SURGERY

## 2021-02-11 PROCEDURE — 3074F PR MOST RECENT SYSTOLIC BLOOD PRESSURE < 130 MM HG: ICD-10-PCS | Mod: CPTII,S$GLB,, | Performed by: ORTHOPAEDIC SURGERY

## 2021-02-11 PROCEDURE — 1126F PR PAIN SEVERITY QUANTIFIED, NO PAIN PRESENT: ICD-10-PCS | Mod: S$GLB,,, | Performed by: FAMILY MEDICINE

## 2021-02-11 PROCEDURE — 3078F DIAST BP <80 MM HG: CPT | Mod: CPTII,S$GLB,, | Performed by: FAMILY MEDICINE

## 2021-02-11 PROCEDURE — 3074F PR MOST RECENT SYSTOLIC BLOOD PRESSURE < 130 MM HG: ICD-10-PCS | Mod: CPTII,S$GLB,, | Performed by: FAMILY MEDICINE

## 2021-02-11 PROCEDURE — 3008F PR BODY MASS INDEX (BMI) DOCUMENTED: ICD-10-PCS | Mod: CPTII,S$GLB,, | Performed by: FAMILY MEDICINE

## 2021-02-11 PROCEDURE — 3078F DIAST BP <80 MM HG: CPT | Mod: CPTII,S$GLB,, | Performed by: ORTHOPAEDIC SURGERY

## 2021-02-11 PROCEDURE — 3008F BODY MASS INDEX DOCD: CPT | Mod: CPTII,S$GLB,, | Performed by: ORTHOPAEDIC SURGERY

## 2021-02-11 PROCEDURE — 3008F BODY MASS INDEX DOCD: CPT | Mod: CPTII,S$GLB,, | Performed by: FAMILY MEDICINE

## 2021-02-11 PROCEDURE — 99214 OFFICE O/P EST MOD 30 MIN: CPT | Mod: S$GLB,,, | Performed by: FAMILY MEDICINE

## 2021-02-11 PROCEDURE — 99214 PR OFFICE/OUTPT VISIT, EST, LEVL IV, 30-39 MIN: ICD-10-PCS | Mod: S$GLB,,, | Performed by: FAMILY MEDICINE

## 2021-02-11 PROCEDURE — 73560 X-RAY EXAM OF KNEE 1 OR 2: CPT | Mod: 26,LT,, | Performed by: RADIOLOGY

## 2021-02-11 PROCEDURE — 20610 LARGE JOINT ASPIRATION/INJECTION: R KNEE: ICD-10-PCS | Mod: RT,S$GLB,, | Performed by: ORTHOPAEDIC SURGERY

## 2021-02-11 PROCEDURE — 20610 DRAIN/INJ JOINT/BURSA W/O US: CPT | Mod: RT,S$GLB,, | Performed by: ORTHOPAEDIC SURGERY

## 2021-02-11 PROCEDURE — 73562 X-RAY EXAM OF KNEE 3: CPT | Mod: 26,RT,, | Performed by: RADIOLOGY

## 2021-02-11 PROCEDURE — 99214 PR OFFICE/OUTPT VISIT, EST, LEVL IV, 30-39 MIN: ICD-10-PCS | Mod: 25,S$GLB,, | Performed by: ORTHOPAEDIC SURGERY

## 2021-02-11 PROCEDURE — 3078F PR MOST RECENT DIASTOLIC BLOOD PRESSURE < 80 MM HG: ICD-10-PCS | Mod: CPTII,S$GLB,, | Performed by: FAMILY MEDICINE

## 2021-02-11 PROCEDURE — 3074F SYST BP LT 130 MM HG: CPT | Mod: CPTII,S$GLB,, | Performed by: FAMILY MEDICINE

## 2021-02-11 PROCEDURE — 99999 PR PBB SHADOW E&M-EST. PATIENT-LVL V: ICD-10-PCS | Mod: PBBFAC,,, | Performed by: FAMILY MEDICINE

## 2021-02-11 PROCEDURE — 73560 X-RAY EXAM OF KNEE 1 OR 2: CPT | Mod: 59,TC,PO,LT

## 2021-02-11 PROCEDURE — 73560 XR KNEE ORTHO RIGHT: ICD-10-PCS | Mod: 26,LT,, | Performed by: RADIOLOGY

## 2021-02-11 PROCEDURE — 3008F PR BODY MASS INDEX (BMI) DOCUMENTED: ICD-10-PCS | Mod: CPTII,S$GLB,, | Performed by: ORTHOPAEDIC SURGERY

## 2021-02-11 PROCEDURE — 3078F PR MOST RECENT DIASTOLIC BLOOD PRESSURE < 80 MM HG: ICD-10-PCS | Mod: CPTII,S$GLB,, | Performed by: ORTHOPAEDIC SURGERY

## 2021-02-11 RX ORDER — AMOXICILLIN AND CLAVULANATE POTASSIUM 875; 125 MG/1; MG/1
1 TABLET, FILM COATED ORAL 2 TIMES DAILY
Qty: 20 TABLET | Refills: 0 | Status: SHIPPED | OUTPATIENT
Start: 2021-02-11 | End: 2021-02-21

## 2021-02-11 RX ORDER — FLUTICASONE PROPIONATE AND SALMETEROL 250; 50 UG/1; UG/1
1 POWDER RESPIRATORY (INHALATION) 2 TIMES DAILY
Qty: 60 EACH | Refills: 2 | Status: SHIPPED | OUTPATIENT
Start: 2021-02-11 | End: 2021-04-09

## 2021-02-11 RX ORDER — GABAPENTIN 100 MG/1
100 CAPSULE ORAL 3 TIMES DAILY
Qty: 270 CAPSULE | Refills: 3 | Status: SHIPPED | OUTPATIENT
Start: 2021-02-11 | End: 2022-01-13

## 2021-02-11 RX ADMIN — TRIAMCINOLONE ACETONIDE 40 MG: 40 INJECTION, SUSPENSION INTRA-ARTICULAR; INTRAMUSCULAR at 03:02

## 2021-02-12 ENCOUNTER — TELEPHONE (OUTPATIENT)
Dept: FAMILY MEDICINE | Facility: CLINIC | Age: 56
End: 2021-02-12

## 2021-02-12 ENCOUNTER — HOSPITAL ENCOUNTER (OUTPATIENT)
Dept: RADIOLOGY | Facility: HOSPITAL | Age: 56
Discharge: HOME | End: 2021-02-12
Attending: FAMILY MEDICINE
Payer: COMMERCIAL

## 2021-02-12 DIAGNOSIS — R10.32 LEFT LOWER QUADRANT PAIN: ICD-10-CM

## 2021-02-12 PROCEDURE — 74177 CT ABD & PELVIS W/CONTRAST: CPT | Mod: 26,,, | Performed by: RADIOLOGY

## 2021-02-12 PROCEDURE — 74177 CT ABD & PELVIS W/CONTRAST: CPT | Mod: TC,PO

## 2021-02-12 PROCEDURE — A9698 NON-RAD CONTRAST MATERIALNOC: HCPCS | Mod: PO | Performed by: FAMILY MEDICINE

## 2021-02-12 PROCEDURE — 25500020 PHARM REV CODE 255: Mod: PO | Performed by: FAMILY MEDICINE

## 2021-02-12 PROCEDURE — 74177 CT ABDOMEN PELVIS WITH CONTRAST: ICD-10-PCS | Mod: 26,,, | Performed by: RADIOLOGY

## 2021-02-12 RX ADMIN — IOHEXOL 1000 ML: 9 SOLUTION ORAL at 04:02

## 2021-02-12 RX ADMIN — IOHEXOL 100 ML: 350 INJECTION, SOLUTION INTRAVENOUS at 04:02

## 2021-02-15 ENCOUNTER — TELEPHONE (OUTPATIENT)
Dept: ENDOCRINOLOGY | Facility: CLINIC | Age: 56
End: 2021-02-15

## 2021-02-15 DIAGNOSIS — E88.819 INSULIN RESISTANCE: ICD-10-CM

## 2021-02-15 DIAGNOSIS — E03.9 HYPOTHYROIDISM, UNSPECIFIED TYPE: Primary | ICD-10-CM

## 2021-02-15 NOTE — TELEPHONE ENCOUNTER
Unable to connect for virtual visit. So called patient to discuss labs.     Had COVID in Jan.   Corrected Ca 9.2  TSH on the low end. No palpitations. No tremors. Continue current synthroid dose.   Insulin level high. Unable to tolerate metformin. Reviewed exercise as well as diet. Discussed consider WW.     F/U 6 months with CMP, Fasting insulin, TSH

## 2021-02-22 RX ORDER — AMLODIPINE BESYLATE 5 MG/1
TABLET ORAL
Qty: 90 TABLET | Refills: 3 | Status: SHIPPED | OUTPATIENT
Start: 2021-02-22 | End: 2022-02-09

## 2021-02-25 ENCOUNTER — OFFICE VISIT (OUTPATIENT)
Dept: ORTHOPEDICS | Facility: CLINIC | Age: 56
End: 2021-02-25
Payer: OTHER MISCELLANEOUS

## 2021-02-25 ENCOUNTER — TELEPHONE (OUTPATIENT)
Dept: ORTHOPEDICS | Facility: CLINIC | Age: 56
End: 2021-02-25

## 2021-02-25 ENCOUNTER — HOSPITAL ENCOUNTER (OUTPATIENT)
Dept: RADIOLOGY | Facility: HOSPITAL | Age: 56
Discharge: HOME | End: 2021-02-25
Attending: ORTHOPAEDIC SURGERY
Payer: OTHER MISCELLANEOUS

## 2021-02-25 VITALS — SYSTOLIC BLOOD PRESSURE: 144 MMHG | HEART RATE: 76 BPM | DIASTOLIC BLOOD PRESSURE: 77 MMHG

## 2021-02-25 DIAGNOSIS — G89.29 CHRONIC PAIN OF RIGHT KNEE: ICD-10-CM

## 2021-02-25 DIAGNOSIS — M17.11 PRIMARY OSTEOARTHRITIS OF RIGHT KNEE: Primary | ICD-10-CM

## 2021-02-25 DIAGNOSIS — M25.561 CHRONIC PAIN OF RIGHT KNEE: ICD-10-CM

## 2021-02-25 DIAGNOSIS — Z96.652 STATUS POST TOTAL LEFT KNEE REPLACEMENT: ICD-10-CM

## 2021-02-25 DIAGNOSIS — M25.562 ACUTE PAIN OF LEFT KNEE: ICD-10-CM

## 2021-02-25 DIAGNOSIS — M25.561 CHRONIC PAIN OF RIGHT KNEE: Primary | ICD-10-CM

## 2021-02-25 DIAGNOSIS — G89.29 CHRONIC PAIN OF RIGHT KNEE: Primary | ICD-10-CM

## 2021-02-25 PROCEDURE — 99214 PR OFFICE/OUTPT VISIT, EST, LEVL IV, 30-39 MIN: ICD-10-PCS | Mod: 25,,, | Performed by: ORTHOPAEDIC SURGERY

## 2021-02-25 PROCEDURE — 20610 LARGE JOINT ASPIRATION/INJECTION: R KNEE: ICD-10-PCS | Mod: RT,,, | Performed by: ORTHOPAEDIC SURGERY

## 2021-02-25 PROCEDURE — 99999 PR PBB SHADOW E&M-EST. PATIENT-LVL IV: CPT | Mod: PBBFAC,,, | Performed by: ORTHOPAEDIC SURGERY

## 2021-02-25 PROCEDURE — 73562 X-RAY EXAM OF KNEE 3: CPT | Mod: 26,50,, | Performed by: RADIOLOGY

## 2021-02-25 PROCEDURE — 73562 X-RAY EXAM OF KNEE 3: CPT | Mod: TC,50,PO

## 2021-02-25 PROCEDURE — 99999 PR PBB SHADOW E&M-EST. PATIENT-LVL IV: ICD-10-PCS | Mod: PBBFAC,,, | Performed by: ORTHOPAEDIC SURGERY

## 2021-02-25 PROCEDURE — 20610 DRAIN/INJ JOINT/BURSA W/O US: CPT | Mod: RT,,, | Performed by: ORTHOPAEDIC SURGERY

## 2021-02-25 PROCEDURE — 99214 OFFICE O/P EST MOD 30 MIN: CPT | Mod: 25,,, | Performed by: ORTHOPAEDIC SURGERY

## 2021-02-25 PROCEDURE — 73562 XR KNEE ORTHO BILAT: ICD-10-PCS | Mod: 26,50,, | Performed by: RADIOLOGY

## 2021-02-25 RX ORDER — CYCLOBENZAPRINE HCL 10 MG
10 TABLET ORAL 3 TIMES DAILY PRN
Qty: 33 TABLET | Refills: 0 | Status: SHIPPED | OUTPATIENT
Start: 2021-02-25 | End: 2021-03-07

## 2021-02-25 RX ADMIN — TRIAMCINOLONE ACETONIDE 40 MG: 40 INJECTION, SUSPENSION INTRA-ARTICULAR; INTRAMUSCULAR at 02:02

## 2021-02-26 RX ORDER — TRIAMCINOLONE ACETONIDE 40 MG/ML
40 INJECTION, SUSPENSION INTRA-ARTICULAR; INTRAMUSCULAR
Status: DISCONTINUED | OUTPATIENT
Start: 2021-02-11 | End: 2021-02-26 | Stop reason: HOSPADM

## 2021-03-01 RX ORDER — AMLODIPINE BESYLATE 5 MG/1
TABLET ORAL
Qty: 90 TABLET | Refills: 2 | OUTPATIENT
Start: 2021-03-01

## 2021-03-16 ENCOUNTER — OFFICE VISIT (OUTPATIENT)
Dept: ORTHOPEDICS | Facility: CLINIC | Age: 56
End: 2021-03-16
Payer: COMMERCIAL

## 2021-03-16 VITALS — HEART RATE: 71 BPM | SYSTOLIC BLOOD PRESSURE: 112 MMHG | DIASTOLIC BLOOD PRESSURE: 75 MMHG

## 2021-03-16 DIAGNOSIS — M75.22 BICEPS TENDINITIS OF LEFT UPPER EXTREMITY: ICD-10-CM

## 2021-03-16 DIAGNOSIS — M75.42 IMPINGEMENT SYNDROME OF LEFT SHOULDER: Primary | ICD-10-CM

## 2021-03-16 PROCEDURE — 20550 NJX 1 TENDON SHEATH/LIGAMENT: CPT | Mod: 59,51,LT, | Performed by: ORTHOPAEDIC SURGERY

## 2021-03-16 PROCEDURE — 99999 PR PBB SHADOW E&M-EST. PATIENT-LVL IV: ICD-10-PCS | Mod: PBBFAC,,, | Performed by: ORTHOPAEDIC SURGERY

## 2021-03-16 PROCEDURE — 20610 DRAIN/INJ JOINT/BURSA W/O US: CPT | Mod: LT,,, | Performed by: ORTHOPAEDIC SURGERY

## 2021-03-16 PROCEDURE — 20610 LARGE JOINT ASPIRATION/INJECTION: L SUBACROMIAL BURSA: ICD-10-PCS | Mod: LT,,, | Performed by: ORTHOPAEDIC SURGERY

## 2021-03-16 PROCEDURE — 99214 PR OFFICE/OUTPT VISIT, EST, LEVL IV, 30-39 MIN: ICD-10-PCS | Mod: 25,,, | Performed by: ORTHOPAEDIC SURGERY

## 2021-03-16 PROCEDURE — 20550 TENDON SHEATH: ICD-10-PCS | Mod: 59,51,LT, | Performed by: ORTHOPAEDIC SURGERY

## 2021-03-16 PROCEDURE — 99999 PR PBB SHADOW E&M-EST. PATIENT-LVL IV: CPT | Mod: PBBFAC,,, | Performed by: ORTHOPAEDIC SURGERY

## 2021-03-16 PROCEDURE — 99214 OFFICE O/P EST MOD 30 MIN: CPT | Mod: 25,,, | Performed by: ORTHOPAEDIC SURGERY

## 2021-03-16 RX ORDER — TRIAMCINOLONE ACETONIDE 40 MG/ML
40 INJECTION, SUSPENSION INTRA-ARTICULAR; INTRAMUSCULAR
Status: DISCONTINUED | OUTPATIENT
Start: 2021-02-25 | End: 2021-03-16 | Stop reason: HOSPADM

## 2021-03-16 RX ADMIN — TRIAMCINOLONE ACETONIDE 40 MG: 40 INJECTION, SUSPENSION INTRA-ARTICULAR; INTRAMUSCULAR at 11:03

## 2021-03-20 ENCOUNTER — PATIENT OUTREACH (OUTPATIENT)
Dept: ADMINISTRATIVE | Facility: OTHER | Age: 56
End: 2021-03-20

## 2021-03-24 ENCOUNTER — TELEPHONE (OUTPATIENT)
Dept: OPTOMETRY | Facility: CLINIC | Age: 56
End: 2021-03-24

## 2021-03-25 RX ORDER — TRIAMCINOLONE ACETONIDE 40 MG/ML
40 INJECTION, SUSPENSION INTRA-ARTICULAR; INTRAMUSCULAR
Status: DISCONTINUED | OUTPATIENT
Start: 2021-03-16 | End: 2021-03-25 | Stop reason: HOSPADM

## 2021-04-05 ENCOUNTER — OFFICE VISIT (OUTPATIENT)
Dept: OPTOMETRY | Facility: CLINIC | Age: 56
End: 2021-04-05
Payer: COMMERCIAL

## 2021-04-05 DIAGNOSIS — H02.63 XANTHELASMA OF EYELID, BILATERAL: ICD-10-CM

## 2021-04-05 DIAGNOSIS — H35.033 HYPERTENSIVE RETINOPATHY OF BOTH EYES: ICD-10-CM

## 2021-04-05 DIAGNOSIS — H52.4 MYOPIA WITH ASTIGMATISM AND PRESBYOPIA, BILATERAL: ICD-10-CM

## 2021-04-05 DIAGNOSIS — Z13.5 GLAUCOMA SCREENING: ICD-10-CM

## 2021-04-05 DIAGNOSIS — H43.393 VITREOUS FLOATERS, BILATERAL: Primary | ICD-10-CM

## 2021-04-05 DIAGNOSIS — H02.413 MECHANICAL PTOSIS, BILATERAL: ICD-10-CM

## 2021-04-05 DIAGNOSIS — H52.13 MYOPIA WITH ASTIGMATISM AND PRESBYOPIA, BILATERAL: ICD-10-CM

## 2021-04-05 DIAGNOSIS — H52.203 MYOPIA WITH ASTIGMATISM AND PRESBYOPIA, BILATERAL: ICD-10-CM

## 2021-04-05 DIAGNOSIS — H02.66 XANTHELASMA OF EYELID, BILATERAL: ICD-10-CM

## 2021-04-05 PROCEDURE — 99999 PR PBB SHADOW E&M-EST. PATIENT-LVL III: CPT | Mod: PBBFAC,,, | Performed by: OPTOMETRIST

## 2021-04-05 PROCEDURE — 92015 PR REFRACTION: ICD-10-PCS | Mod: S$GLB,,, | Performed by: OPTOMETRIST

## 2021-04-05 PROCEDURE — 92015 DETERMINE REFRACTIVE STATE: CPT | Mod: S$GLB,,, | Performed by: OPTOMETRIST

## 2021-04-05 PROCEDURE — 92004 COMPRE OPH EXAM NEW PT 1/>: CPT | Mod: S$GLB,,, | Performed by: OPTOMETRIST

## 2021-04-05 PROCEDURE — 99999 PR PBB SHADOW E&M-EST. PATIENT-LVL III: ICD-10-PCS | Mod: PBBFAC,,, | Performed by: OPTOMETRIST

## 2021-04-05 PROCEDURE — 92004 PR EYE EXAM, NEW PATIENT,COMPREHESV: ICD-10-PCS | Mod: S$GLB,,, | Performed by: OPTOMETRIST

## 2021-04-05 PROCEDURE — 1126F PR PAIN SEVERITY QUANTIFIED, NO PAIN PRESENT: ICD-10-PCS | Mod: S$GLB,,, | Performed by: OPTOMETRIST

## 2021-04-05 PROCEDURE — 1126F AMNT PAIN NOTED NONE PRSNT: CPT | Mod: S$GLB,,, | Performed by: OPTOMETRIST

## 2021-04-09 RX ORDER — FLUTICASONE PROPIONATE AND SALMETEROL 50; 250 UG/1; UG/1
POWDER RESPIRATORY (INHALATION)
Qty: 180 EACH | Refills: 3 | Status: ON HOLD | OUTPATIENT
Start: 2021-04-09 | End: 2021-10-05 | Stop reason: HOSPADM

## 2021-04-13 ENCOUNTER — OFFICE VISIT (OUTPATIENT)
Dept: ORTHOPEDICS | Facility: CLINIC | Age: 56
End: 2021-04-13
Payer: OTHER MISCELLANEOUS

## 2021-04-13 VITALS
DIASTOLIC BLOOD PRESSURE: 74 MMHG | HEART RATE: 76 BPM | HEIGHT: 62 IN | BODY MASS INDEX: 34.96 KG/M2 | SYSTOLIC BLOOD PRESSURE: 163 MMHG | WEIGHT: 190 LBS

## 2021-04-13 DIAGNOSIS — S83.412D COMPLETE TEAR OF MEDIAL COLLATERAL LIGAMENT OF LEFT KNEE, SUBSEQUENT ENCOUNTER: ICD-10-CM

## 2021-04-13 DIAGNOSIS — M17.11 PRIMARY OSTEOARTHRITIS OF RIGHT KNEE: Primary | ICD-10-CM

## 2021-04-13 PROBLEM — S83.412A COMPLETE TEAR OF MEDIAL COLLATERAL LIGAMENT OF LEFT KNEE: Status: ACTIVE | Noted: 2021-04-13

## 2021-04-13 PROCEDURE — 99214 PR OFFICE/OUTPT VISIT, EST, LEVL IV, 30-39 MIN: ICD-10-PCS | Mod: S$GLB,,, | Performed by: ORTHOPAEDIC SURGERY

## 2021-04-13 PROCEDURE — 99999 PR PBB SHADOW E&M-EST. PATIENT-LVL V: CPT | Mod: PBBFAC,,, | Performed by: ORTHOPAEDIC SURGERY

## 2021-04-13 PROCEDURE — 99999 PR PBB SHADOW E&M-EST. PATIENT-LVL V: ICD-10-PCS | Mod: PBBFAC,,, | Performed by: ORTHOPAEDIC SURGERY

## 2021-04-13 PROCEDURE — 99214 OFFICE O/P EST MOD 30 MIN: CPT | Mod: S$GLB,,, | Performed by: ORTHOPAEDIC SURGERY

## 2021-04-28 LAB
CHOLEST SERPL-MSCNC: 170 MG/DL (ref 0–200)
HDLC SERPL-MCNC: 68 MG/DL (ref 35–70)
LDLC SERPL CALC-MCNC: 70 MG/DL (ref 0–160)
TRIGL SERPL-MCNC: 159 MG/DL (ref 40–160)

## 2021-05-03 DIAGNOSIS — E03.9 HYPOTHYROIDISM, UNSPECIFIED TYPE: Primary | ICD-10-CM

## 2021-05-03 RX ORDER — TICAGRELOR 90 MG/1
TABLET ORAL
Qty: 60 TABLET | Refills: 4 | Status: SHIPPED | OUTPATIENT
Start: 2021-05-03 | End: 2021-09-21

## 2021-05-07 RX ORDER — PANTOPRAZOLE SODIUM 40 MG/1
TABLET, DELAYED RELEASE ORAL
Qty: 90 TABLET | Refills: 3 | Status: SHIPPED | OUTPATIENT
Start: 2021-05-07 | End: 2022-03-29

## 2021-05-07 RX ORDER — VENLAFAXINE HYDROCHLORIDE 150 MG/1
CAPSULE, EXTENDED RELEASE ORAL
Qty: 90 CAPSULE | Refills: 3 | Status: SHIPPED | OUTPATIENT
Start: 2021-05-07 | End: 2022-12-14

## 2021-05-13 ENCOUNTER — OFFICE VISIT (OUTPATIENT)
Dept: ORTHOPEDICS | Facility: CLINIC | Age: 56
End: 2021-05-13
Payer: COMMERCIAL

## 2021-05-13 VITALS — HEIGHT: 62 IN | BODY MASS INDEX: 34.96 KG/M2 | WEIGHT: 190 LBS

## 2021-05-13 DIAGNOSIS — G89.29 CHRONIC PAIN OF RIGHT KNEE: ICD-10-CM

## 2021-05-13 DIAGNOSIS — M25.561 CHRONIC PAIN OF RIGHT KNEE: ICD-10-CM

## 2021-05-13 DIAGNOSIS — Z01.818 PREOP TESTING: ICD-10-CM

## 2021-05-13 DIAGNOSIS — M17.11 PRIMARY OSTEOARTHRITIS OF RIGHT KNEE: Primary | ICD-10-CM

## 2021-05-13 PROCEDURE — 99214 OFFICE O/P EST MOD 30 MIN: CPT | Mod: S$GLB,,, | Performed by: ORTHOPAEDIC SURGERY

## 2021-05-13 PROCEDURE — 1125F AMNT PAIN NOTED PAIN PRSNT: CPT | Mod: S$GLB,,, | Performed by: ORTHOPAEDIC SURGERY

## 2021-05-13 PROCEDURE — 99999 PR PBB SHADOW E&M-EST. PATIENT-LVL IV: CPT | Mod: PBBFAC,,, | Performed by: ORTHOPAEDIC SURGERY

## 2021-05-13 PROCEDURE — 3008F PR BODY MASS INDEX (BMI) DOCUMENTED: ICD-10-PCS | Mod: CPTII,S$GLB,, | Performed by: ORTHOPAEDIC SURGERY

## 2021-05-13 PROCEDURE — 3008F BODY MASS INDEX DOCD: CPT | Mod: CPTII,S$GLB,, | Performed by: ORTHOPAEDIC SURGERY

## 2021-05-13 PROCEDURE — 99214 PR OFFICE/OUTPT VISIT, EST, LEVL IV, 30-39 MIN: ICD-10-PCS | Mod: S$GLB,,, | Performed by: ORTHOPAEDIC SURGERY

## 2021-05-13 PROCEDURE — 99999 PR PBB SHADOW E&M-EST. PATIENT-LVL IV: ICD-10-PCS | Mod: PBBFAC,,, | Performed by: ORTHOPAEDIC SURGERY

## 2021-05-13 PROCEDURE — 1125F PR PAIN SEVERITY QUANTIFIED, PAIN PRESENT: ICD-10-PCS | Mod: S$GLB,,, | Performed by: ORTHOPAEDIC SURGERY

## 2021-05-13 RX ORDER — SODIUM CHLORIDE 0.9 % (FLUSH) 0.9 %
10 SYRINGE (ML) INJECTION
Status: DISCONTINUED | OUTPATIENT
Start: 2021-05-13 | End: 2023-02-15 | Stop reason: ALTCHOICE

## 2021-05-16 ENCOUNTER — PATIENT OUTREACH (OUTPATIENT)
Dept: ADMINISTRATIVE | Facility: HOSPITAL | Age: 56
End: 2021-05-16

## 2021-05-17 ENCOUNTER — PATIENT MESSAGE (OUTPATIENT)
Dept: CARDIOLOGY | Facility: CLINIC | Age: 56
End: 2021-05-17

## 2021-05-18 ENCOUNTER — PATIENT MESSAGE (OUTPATIENT)
Dept: CARDIOLOGY | Facility: CLINIC | Age: 56
End: 2021-05-18

## 2021-05-24 ENCOUNTER — TELEPHONE (OUTPATIENT)
Dept: FAMILY MEDICINE | Facility: CLINIC | Age: 56
End: 2021-05-24

## 2021-05-24 DIAGNOSIS — Z01.818 PREOP EXAMINATION: Primary | ICD-10-CM

## 2021-05-26 ENCOUNTER — TELEPHONE (OUTPATIENT)
Dept: FAMILY MEDICINE | Facility: CLINIC | Age: 56
End: 2021-05-26

## 2021-05-26 DIAGNOSIS — M10.9 GOUT, UNSPECIFIED CAUSE, UNSPECIFIED CHRONICITY, UNSPECIFIED SITE: ICD-10-CM

## 2021-05-26 DIAGNOSIS — Z01.818 PREOP EXAMINATION: Primary | ICD-10-CM

## 2021-05-27 ENCOUNTER — LAB VISIT (OUTPATIENT)
Dept: LAB | Facility: HOSPITAL | Age: 56
End: 2021-05-27
Attending: FAMILY MEDICINE
Payer: COMMERCIAL

## 2021-05-27 DIAGNOSIS — Z01.818 PREOP EXAMINATION: ICD-10-CM

## 2021-05-27 DIAGNOSIS — M10.9 GOUT, UNSPECIFIED CAUSE, UNSPECIFIED CHRONICITY, UNSPECIFIED SITE: ICD-10-CM

## 2021-05-27 LAB
BASOPHILS # BLD AUTO: 0.1 K/UL (ref 0–0.2)
BASOPHILS NFR BLD: 0.9 % (ref 0–1.9)
DIFFERENTIAL METHOD: ABNORMAL
EOSINOPHIL # BLD AUTO: 0.1 K/UL (ref 0–0.5)
EOSINOPHIL NFR BLD: 1.2 % (ref 0–8)
ERYTHROCYTE [DISTWIDTH] IN BLOOD BY AUTOMATED COUNT: 12.8 % (ref 11.5–14.5)
HCT VFR BLD AUTO: 35.8 % (ref 37–48.5)
HGB BLD-MCNC: 11.1 G/DL (ref 12–16)
IMM GRANULOCYTES # BLD AUTO: 0.04 K/UL (ref 0–0.04)
IMM GRANULOCYTES NFR BLD AUTO: 0.3 % (ref 0–0.5)
LYMPHOCYTES # BLD AUTO: 2.7 K/UL (ref 1–4.8)
LYMPHOCYTES NFR BLD: 23.1 % (ref 18–48)
MCH RBC QN AUTO: 27.7 PG (ref 27–31)
MCHC RBC AUTO-ENTMCNC: 31 G/DL (ref 32–36)
MCV RBC AUTO: 89 FL (ref 82–98)
MONOCYTES # BLD AUTO: 1 K/UL (ref 0.3–1)
MONOCYTES NFR BLD: 8.9 % (ref 4–15)
NEUTROPHILS # BLD AUTO: 7.6 K/UL (ref 1.8–7.7)
NEUTROPHILS NFR BLD: 65.6 % (ref 38–73)
NRBC BLD-RTO: 0 /100 WBC
PLATELET # BLD AUTO: 391 K/UL (ref 150–450)
PMV BLD AUTO: 9.1 FL (ref 9.2–12.9)
RBC # BLD AUTO: 4.01 M/UL (ref 4–5.4)
WBC # BLD AUTO: 11.54 K/UL (ref 3.9–12.7)

## 2021-05-27 PROCEDURE — 36415 COLL VENOUS BLD VENIPUNCTURE: CPT | Mod: PO | Performed by: FAMILY MEDICINE

## 2021-05-27 PROCEDURE — 85025 COMPLETE CBC W/AUTO DIFF WBC: CPT | Performed by: FAMILY MEDICINE

## 2021-05-27 PROCEDURE — 80053 COMPREHEN METABOLIC PANEL: CPT | Performed by: FAMILY MEDICINE

## 2021-05-27 PROCEDURE — 84550 ASSAY OF BLOOD/URIC ACID: CPT | Performed by: FAMILY MEDICINE

## 2021-05-28 ENCOUNTER — TELEPHONE (OUTPATIENT)
Dept: ORTHOPEDICS | Facility: CLINIC | Age: 56
End: 2021-05-28

## 2021-05-28 ENCOUNTER — OFFICE VISIT (OUTPATIENT)
Dept: FAMILY MEDICINE | Facility: CLINIC | Age: 56
End: 2021-05-28
Payer: COMMERCIAL

## 2021-05-28 VITALS
WEIGHT: 216.69 LBS | HEART RATE: 75 BPM | DIASTOLIC BLOOD PRESSURE: 82 MMHG | SYSTOLIC BLOOD PRESSURE: 130 MMHG | OXYGEN SATURATION: 98 % | BODY MASS INDEX: 39.64 KG/M2

## 2021-05-28 DIAGNOSIS — I10 ESSENTIAL HYPERTENSION: ICD-10-CM

## 2021-05-28 DIAGNOSIS — Z01.818 PREOP EXAMINATION: Primary | ICD-10-CM

## 2021-05-28 DIAGNOSIS — E03.9 HYPOTHYROIDISM, UNSPECIFIED TYPE: ICD-10-CM

## 2021-05-28 DIAGNOSIS — F41.8 DEPRESSION WITH ANXIETY: ICD-10-CM

## 2021-05-28 DIAGNOSIS — I25.10 CORONARY ARTERY DISEASE, ANGINA PRESENCE UNSPECIFIED, UNSPECIFIED VESSEL OR LESION TYPE, UNSPECIFIED WHETHER NATIVE OR TRANSPLANTED HEART: ICD-10-CM

## 2021-05-28 DIAGNOSIS — M25.569 KNEE PAIN, UNSPECIFIED CHRONICITY, UNSPECIFIED LATERALITY: ICD-10-CM

## 2021-05-28 LAB
ALBUMIN SERPL BCP-MCNC: 3.4 G/DL (ref 3.5–5.2)
ALP SERPL-CCNC: 73 U/L (ref 55–135)
ALT SERPL W/O P-5'-P-CCNC: 9 U/L (ref 10–44)
ANION GAP SERPL CALC-SCNC: 12 MMOL/L (ref 8–16)
AST SERPL-CCNC: 12 U/L (ref 10–40)
BILIRUB SERPL-MCNC: 0.5 MG/DL (ref 0.1–1)
BUN SERPL-MCNC: 13 MG/DL (ref 6–20)
CALCIUM SERPL-MCNC: 9.2 MG/DL (ref 8.7–10.5)
CHLORIDE SERPL-SCNC: 105 MMOL/L (ref 95–110)
CO2 SERPL-SCNC: 26 MMOL/L (ref 23–29)
CREAT SERPL-MCNC: 0.9 MG/DL (ref 0.5–1.4)
EST. GFR  (AFRICAN AMERICAN): >60 ML/MIN/1.73 M^2
EST. GFR  (NON AFRICAN AMERICAN): >60 ML/MIN/1.73 M^2
GLUCOSE SERPL-MCNC: 90 MG/DL (ref 70–110)
POTASSIUM SERPL-SCNC: 3.5 MMOL/L (ref 3.5–5.1)
PROT SERPL-MCNC: 7 G/DL (ref 6–8.4)
SODIUM SERPL-SCNC: 143 MMOL/L (ref 136–145)
URATE SERPL-MCNC: 2.7 MG/DL (ref 2.4–5.7)

## 2021-05-28 PROCEDURE — 3008F BODY MASS INDEX DOCD: CPT | Mod: CPTII,S$GLB,, | Performed by: FAMILY MEDICINE

## 2021-05-28 PROCEDURE — 99999 PR PBB SHADOW E&M-EST. PATIENT-LVL V: CPT | Mod: PBBFAC,,, | Performed by: FAMILY MEDICINE

## 2021-05-28 PROCEDURE — 99999 PR PBB SHADOW E&M-EST. PATIENT-LVL V: ICD-10-PCS | Mod: PBBFAC,,, | Performed by: FAMILY MEDICINE

## 2021-05-28 PROCEDURE — 99214 OFFICE O/P EST MOD 30 MIN: CPT | Mod: S$GLB,,, | Performed by: FAMILY MEDICINE

## 2021-05-28 PROCEDURE — 99214 PR OFFICE/OUTPT VISIT, EST, LEVL IV, 30-39 MIN: ICD-10-PCS | Mod: S$GLB,,, | Performed by: FAMILY MEDICINE

## 2021-05-28 PROCEDURE — 1125F AMNT PAIN NOTED PAIN PRSNT: CPT | Mod: S$GLB,,, | Performed by: FAMILY MEDICINE

## 2021-05-28 PROCEDURE — 1125F PR PAIN SEVERITY QUANTIFIED, PAIN PRESENT: ICD-10-PCS | Mod: S$GLB,,, | Performed by: FAMILY MEDICINE

## 2021-05-28 PROCEDURE — 3008F PR BODY MASS INDEX (BMI) DOCUMENTED: ICD-10-PCS | Mod: CPTII,S$GLB,, | Performed by: FAMILY MEDICINE

## 2021-06-10 ENCOUNTER — TELEPHONE (OUTPATIENT)
Dept: ORTHOPEDICS | Facility: CLINIC | Age: 56
End: 2021-06-10

## 2021-06-15 ENCOUNTER — TELEPHONE (OUTPATIENT)
Dept: ORTHOPEDICS | Facility: CLINIC | Age: 56
End: 2021-06-15

## 2021-06-15 PROCEDURE — G0180 MD CERTIFICATION HHA PATIENT: HCPCS | Mod: ,,, | Performed by: ORTHOPAEDIC SURGERY

## 2021-06-15 PROCEDURE — G0180 PR HOME HEALTH MD CERTIFICATION: ICD-10-PCS | Mod: ,,, | Performed by: ORTHOPAEDIC SURGERY

## 2021-06-15 RX ORDER — OXYCODONE HYDROCHLORIDE 15 MG/1
15 TABLET ORAL EVERY 4 HOURS PRN
Qty: 33 TABLET | Refills: 0 | Status: SHIPPED | OUTPATIENT
Start: 2021-06-15 | End: 2021-12-22

## 2021-06-23 DIAGNOSIS — M17.11 PRIMARY OSTEOARTHRITIS OF RIGHT KNEE: Primary | ICD-10-CM

## 2021-06-29 ENCOUNTER — HOSPITAL ENCOUNTER (OUTPATIENT)
Dept: RADIOLOGY | Facility: HOSPITAL | Age: 56
Discharge: HOME | End: 2021-06-29
Attending: ORTHOPAEDIC SURGERY
Payer: COMMERCIAL

## 2021-06-29 ENCOUNTER — OFFICE VISIT (OUTPATIENT)
Dept: ORTHOPEDICS | Facility: CLINIC | Age: 56
End: 2021-06-29
Payer: COMMERCIAL

## 2021-06-29 VITALS — BODY MASS INDEX: 39.75 KG/M2 | WEIGHT: 216 LBS | HEIGHT: 62 IN

## 2021-06-29 DIAGNOSIS — M17.11 PRIMARY OSTEOARTHRITIS OF RIGHT KNEE: ICD-10-CM

## 2021-06-29 DIAGNOSIS — Z96.651 S/P TOTAL KNEE ARTHROPLASTY, RIGHT: Primary | ICD-10-CM

## 2021-06-29 PROCEDURE — 99024 POSTOP FOLLOW-UP VISIT: CPT | Mod: S$GLB,,, | Performed by: ORTHOPAEDIC SURGERY

## 2021-06-29 PROCEDURE — 99024 PR POST-OP FOLLOW-UP VISIT: ICD-10-PCS | Mod: S$GLB,,, | Performed by: ORTHOPAEDIC SURGERY

## 2021-06-29 PROCEDURE — 73560 X-RAY EXAM OF KNEE 1 OR 2: CPT | Mod: TC,PO,LT

## 2021-06-29 PROCEDURE — 73562 XR KNEE ORTHO RIGHT: ICD-10-PCS | Mod: 26,RT,, | Performed by: RADIOLOGY

## 2021-06-29 PROCEDURE — 99999 PR PBB SHADOW E&M-EST. PATIENT-LVL IV: CPT | Mod: PBBFAC,,, | Performed by: ORTHOPAEDIC SURGERY

## 2021-06-29 PROCEDURE — 3008F PR BODY MASS INDEX (BMI) DOCUMENTED: ICD-10-PCS | Mod: CPTII,S$GLB,, | Performed by: ORTHOPAEDIC SURGERY

## 2021-06-29 PROCEDURE — 73560 XR KNEE ORTHO RIGHT: ICD-10-PCS | Mod: 26,LT,, | Performed by: RADIOLOGY

## 2021-06-29 PROCEDURE — 1125F PR PAIN SEVERITY QUANTIFIED, PAIN PRESENT: ICD-10-PCS | Mod: S$GLB,,, | Performed by: ORTHOPAEDIC SURGERY

## 2021-06-29 PROCEDURE — 99999 PR PBB SHADOW E&M-EST. PATIENT-LVL IV: ICD-10-PCS | Mod: PBBFAC,,, | Performed by: ORTHOPAEDIC SURGERY

## 2021-06-29 PROCEDURE — 3008F BODY MASS INDEX DOCD: CPT | Mod: CPTII,S$GLB,, | Performed by: ORTHOPAEDIC SURGERY

## 2021-06-29 PROCEDURE — 73562 X-RAY EXAM OF KNEE 3: CPT | Mod: 26,RT,, | Performed by: RADIOLOGY

## 2021-06-29 PROCEDURE — 1125F AMNT PAIN NOTED PAIN PRSNT: CPT | Mod: S$GLB,,, | Performed by: ORTHOPAEDIC SURGERY

## 2021-06-29 PROCEDURE — 73560 X-RAY EXAM OF KNEE 1 OR 2: CPT | Mod: 26,LT,, | Performed by: RADIOLOGY

## 2021-06-29 RX ORDER — METHOCARBAMOL 750 MG/1
750 TABLET, FILM COATED ORAL 4 TIMES DAILY PRN
Qty: 44 TABLET | Refills: 0 | Status: SHIPPED | OUTPATIENT
Start: 2021-06-29 | End: 2022-01-04

## 2021-06-29 RX ORDER — TRAMADOL HYDROCHLORIDE 50 MG/1
50 TABLET ORAL EVERY 4 HOURS PRN
Qty: 44 TABLET | Refills: 0 | Status: SHIPPED | OUTPATIENT
Start: 2021-06-29 | End: 2021-12-22

## 2021-06-29 RX ORDER — OXYCODONE AND ACETAMINOPHEN 10; 325 MG/1; MG/1
1 TABLET ORAL EVERY 6 HOURS PRN
Qty: 44 TABLET | Refills: 0 | Status: SHIPPED | OUTPATIENT
Start: 2021-06-29 | End: 2021-09-16 | Stop reason: SDUPTHER

## 2021-06-30 ENCOUNTER — CLINICAL SUPPORT (OUTPATIENT)
Dept: REHABILITATION | Facility: HOSPITAL | Age: 56
End: 2021-06-30
Payer: COMMERCIAL

## 2021-06-30 DIAGNOSIS — M25.661 DECREASED ROM OF RIGHT KNEE: ICD-10-CM

## 2021-06-30 DIAGNOSIS — R29.898 DECREASED STRENGTH OF LOWER EXTREMITY: ICD-10-CM

## 2021-06-30 DIAGNOSIS — M17.11 PRIMARY OSTEOARTHRITIS OF RIGHT KNEE: ICD-10-CM

## 2021-06-30 DIAGNOSIS — R68.89 DECREASED FUNCTIONAL ACTIVITY TOLERANCE: ICD-10-CM

## 2021-06-30 PROCEDURE — 97110 THERAPEUTIC EXERCISES: CPT | Mod: PN

## 2021-06-30 PROCEDURE — 97161 PT EVAL LOW COMPLEX 20 MIN: CPT | Mod: PN

## 2021-07-02 ENCOUNTER — CLINICAL SUPPORT (OUTPATIENT)
Dept: REHABILITATION | Facility: HOSPITAL | Age: 56
End: 2021-07-02
Payer: COMMERCIAL

## 2021-07-02 DIAGNOSIS — R29.898 DECREASED STRENGTH OF LOWER EXTREMITY: ICD-10-CM

## 2021-07-02 DIAGNOSIS — M25.661 DECREASED ROM OF RIGHT KNEE: ICD-10-CM

## 2021-07-02 DIAGNOSIS — R68.89 DECREASED FUNCTIONAL ACTIVITY TOLERANCE: ICD-10-CM

## 2021-07-02 PROCEDURE — 97014 ELECTRIC STIMULATION THERAPY: CPT | Mod: PN,CQ

## 2021-07-02 PROCEDURE — 97110 THERAPEUTIC EXERCISES: CPT | Mod: PN,CQ

## 2021-07-06 ENCOUNTER — EXTERNAL HOME HEALTH (OUTPATIENT)
Dept: HOME HEALTH SERVICES | Facility: HOSPITAL | Age: 56
End: 2021-07-06
Payer: COMMERCIAL

## 2021-07-07 ENCOUNTER — CLINICAL SUPPORT (OUTPATIENT)
Dept: REHABILITATION | Facility: HOSPITAL | Age: 56
End: 2021-07-07
Payer: COMMERCIAL

## 2021-07-07 DIAGNOSIS — M25.661 DECREASED ROM OF RIGHT KNEE: ICD-10-CM

## 2021-07-07 DIAGNOSIS — R29.898 DECREASED STRENGTH OF LOWER EXTREMITY: ICD-10-CM

## 2021-07-07 DIAGNOSIS — R68.89 DECREASED FUNCTIONAL ACTIVITY TOLERANCE: ICD-10-CM

## 2021-07-07 PROCEDURE — 97014 ELECTRIC STIMULATION THERAPY: CPT | Mod: PN,CQ

## 2021-07-07 PROCEDURE — 97110 THERAPEUTIC EXERCISES: CPT | Mod: PN,CQ

## 2021-07-08 ENCOUNTER — OFFICE VISIT (OUTPATIENT)
Dept: CARDIOLOGY | Facility: CLINIC | Age: 56
End: 2021-07-08
Payer: COMMERCIAL

## 2021-07-08 VITALS
SYSTOLIC BLOOD PRESSURE: 131 MMHG | WEIGHT: 216.69 LBS | DIASTOLIC BLOOD PRESSURE: 73 MMHG | HEART RATE: 69 BPM | HEIGHT: 62 IN | BODY MASS INDEX: 39.88 KG/M2

## 2021-07-08 DIAGNOSIS — E66.01 CLASS 2 SEVERE OBESITY DUE TO EXCESS CALORIES WITH SERIOUS COMORBIDITY IN ADULT, UNSPECIFIED BMI: ICD-10-CM

## 2021-07-08 DIAGNOSIS — I15.0 RENOVASCULAR HYPERTENSION: ICD-10-CM

## 2021-07-08 DIAGNOSIS — I73.9 PAD (PERIPHERAL ARTERY DISEASE): ICD-10-CM

## 2021-07-08 DIAGNOSIS — I25.10 CORONARY ARTERY DISEASE INVOLVING NATIVE CORONARY ARTERY OF NATIVE HEART WITHOUT ANGINA PECTORIS: Primary | Chronic | ICD-10-CM

## 2021-07-08 DIAGNOSIS — E78.2 MIXED HYPERLIPIDEMIA: ICD-10-CM

## 2021-07-08 DIAGNOSIS — Z95.1 S/P CABG (CORONARY ARTERY BYPASS GRAFT): ICD-10-CM

## 2021-07-08 PROCEDURE — 1125F AMNT PAIN NOTED PAIN PRSNT: CPT | Mod: S$GLB,,, | Performed by: INTERNAL MEDICINE

## 2021-07-08 PROCEDURE — 3008F BODY MASS INDEX DOCD: CPT | Mod: CPTII,S$GLB,, | Performed by: INTERNAL MEDICINE

## 2021-07-08 PROCEDURE — 1125F PR PAIN SEVERITY QUANTIFIED, PAIN PRESENT: ICD-10-PCS | Mod: S$GLB,,, | Performed by: INTERNAL MEDICINE

## 2021-07-08 PROCEDURE — 3008F PR BODY MASS INDEX (BMI) DOCUMENTED: ICD-10-PCS | Mod: CPTII,S$GLB,, | Performed by: INTERNAL MEDICINE

## 2021-07-08 PROCEDURE — 99999 PR PBB SHADOW E&M-EST. PATIENT-LVL III: ICD-10-PCS | Mod: PBBFAC,,, | Performed by: INTERNAL MEDICINE

## 2021-07-08 PROCEDURE — 99214 PR OFFICE/OUTPT VISIT, EST, LEVL IV, 30-39 MIN: ICD-10-PCS | Mod: S$GLB,,, | Performed by: INTERNAL MEDICINE

## 2021-07-08 PROCEDURE — 99214 OFFICE O/P EST MOD 30 MIN: CPT | Mod: S$GLB,,, | Performed by: INTERNAL MEDICINE

## 2021-07-08 PROCEDURE — 99999 PR PBB SHADOW E&M-EST. PATIENT-LVL III: CPT | Mod: PBBFAC,,, | Performed by: INTERNAL MEDICINE

## 2021-07-09 ENCOUNTER — DOCUMENTATION ONLY (OUTPATIENT)
Dept: REHABILITATION | Facility: HOSPITAL | Age: 56
End: 2021-07-09

## 2021-07-12 ENCOUNTER — CLINICAL SUPPORT (OUTPATIENT)
Dept: REHABILITATION | Facility: HOSPITAL | Age: 56
End: 2021-07-12
Payer: COMMERCIAL

## 2021-07-12 DIAGNOSIS — M25.661 DECREASED ROM OF RIGHT KNEE: ICD-10-CM

## 2021-07-12 DIAGNOSIS — R68.89 DECREASED FUNCTIONAL ACTIVITY TOLERANCE: ICD-10-CM

## 2021-07-12 DIAGNOSIS — R29.898 DECREASED STRENGTH OF LOWER EXTREMITY: ICD-10-CM

## 2021-07-12 PROCEDURE — 97110 THERAPEUTIC EXERCISES: CPT | Mod: PN,CQ

## 2021-07-12 PROCEDURE — 97014 ELECTRIC STIMULATION THERAPY: CPT | Mod: PN,CQ

## 2021-07-12 PROCEDURE — 97140 MANUAL THERAPY 1/> REGIONS: CPT | Mod: PN,CQ

## 2021-07-14 ENCOUNTER — CLINICAL SUPPORT (OUTPATIENT)
Dept: REHABILITATION | Facility: HOSPITAL | Age: 56
End: 2021-07-14
Payer: COMMERCIAL

## 2021-07-14 DIAGNOSIS — R68.89 DECREASED FUNCTIONAL ACTIVITY TOLERANCE: ICD-10-CM

## 2021-07-14 DIAGNOSIS — R29.898 DECREASED STRENGTH OF LOWER EXTREMITY: ICD-10-CM

## 2021-07-14 DIAGNOSIS — M25.661 DECREASED ROM OF RIGHT KNEE: ICD-10-CM

## 2021-07-14 PROCEDURE — 97140 MANUAL THERAPY 1/> REGIONS: CPT | Mod: PN

## 2021-07-14 PROCEDURE — 97110 THERAPEUTIC EXERCISES: CPT | Mod: PN

## 2021-07-16 ENCOUNTER — CLINICAL SUPPORT (OUTPATIENT)
Dept: REHABILITATION | Facility: HOSPITAL | Age: 56
End: 2021-07-16
Payer: COMMERCIAL

## 2021-07-16 DIAGNOSIS — R29.898 DECREASED STRENGTH OF LOWER EXTREMITY: ICD-10-CM

## 2021-07-16 DIAGNOSIS — M25.661 DECREASED ROM OF RIGHT KNEE: ICD-10-CM

## 2021-07-16 DIAGNOSIS — R68.89 DECREASED FUNCTIONAL ACTIVITY TOLERANCE: ICD-10-CM

## 2021-07-16 PROCEDURE — 97110 THERAPEUTIC EXERCISES: CPT | Mod: PN

## 2021-07-16 PROCEDURE — 97140 MANUAL THERAPY 1/> REGIONS: CPT | Mod: PN

## 2021-07-21 ENCOUNTER — CLINICAL SUPPORT (OUTPATIENT)
Dept: REHABILITATION | Facility: HOSPITAL | Age: 56
End: 2021-07-21
Payer: COMMERCIAL

## 2021-07-21 DIAGNOSIS — R68.89 DECREASED FUNCTIONAL ACTIVITY TOLERANCE: ICD-10-CM

## 2021-07-21 DIAGNOSIS — M25.661 DECREASED ROM OF RIGHT KNEE: ICD-10-CM

## 2021-07-21 DIAGNOSIS — R29.898 DECREASED STRENGTH OF LOWER EXTREMITY: ICD-10-CM

## 2021-07-21 PROCEDURE — 97110 THERAPEUTIC EXERCISES: CPT | Mod: PN,CQ

## 2021-07-21 PROCEDURE — 97140 MANUAL THERAPY 1/> REGIONS: CPT | Mod: PN

## 2021-07-28 ENCOUNTER — DOCUMENTATION ONLY (OUTPATIENT)
Dept: REHABILITATION | Facility: HOSPITAL | Age: 56
End: 2021-07-28

## 2021-07-30 ENCOUNTER — CLINICAL SUPPORT (OUTPATIENT)
Dept: REHABILITATION | Facility: HOSPITAL | Age: 56
End: 2021-07-30
Payer: COMMERCIAL

## 2021-07-30 DIAGNOSIS — R68.89 DECREASED FUNCTIONAL ACTIVITY TOLERANCE: ICD-10-CM

## 2021-07-30 DIAGNOSIS — M25.661 DECREASED ROM OF RIGHT KNEE: ICD-10-CM

## 2021-07-30 DIAGNOSIS — R29.898 DECREASED STRENGTH OF LOWER EXTREMITY: ICD-10-CM

## 2021-07-30 DIAGNOSIS — Z96.651 S/P TOTAL KNEE ARTHROPLASTY, RIGHT: Primary | ICD-10-CM

## 2021-07-30 PROCEDURE — 97110 THERAPEUTIC EXERCISES: CPT | Mod: PN

## 2021-07-30 PROCEDURE — 97140 MANUAL THERAPY 1/> REGIONS: CPT | Mod: PN

## 2021-08-04 ENCOUNTER — CLINICAL SUPPORT (OUTPATIENT)
Dept: REHABILITATION | Facility: HOSPITAL | Age: 56
End: 2021-08-04
Payer: COMMERCIAL

## 2021-08-04 DIAGNOSIS — R29.898 DECREASED STRENGTH OF LOWER EXTREMITY: ICD-10-CM

## 2021-08-04 DIAGNOSIS — M25.661 DECREASED ROM OF RIGHT KNEE: ICD-10-CM

## 2021-08-04 DIAGNOSIS — R68.89 DECREASED FUNCTIONAL ACTIVITY TOLERANCE: ICD-10-CM

## 2021-08-04 PROCEDURE — 97110 THERAPEUTIC EXERCISES: CPT | Mod: PN

## 2021-08-11 ENCOUNTER — DOCUMENTATION ONLY (OUTPATIENT)
Dept: REHABILITATION | Facility: HOSPITAL | Age: 56
End: 2021-08-11

## 2021-08-14 ENCOUNTER — LAB VISIT (OUTPATIENT)
Dept: LAB | Facility: HOSPITAL | Age: 56
End: 2021-08-14
Attending: INTERNAL MEDICINE
Payer: COMMERCIAL

## 2021-08-14 DIAGNOSIS — E03.9 HYPOTHYROIDISM, UNSPECIFIED TYPE: ICD-10-CM

## 2021-08-14 DIAGNOSIS — E88.819 INSULIN RESISTANCE: ICD-10-CM

## 2021-08-14 LAB
ALBUMIN SERPL BCP-MCNC: 3.5 G/DL (ref 3.5–5.2)
ALP SERPL-CCNC: 79 U/L (ref 55–135)
ALT SERPL W/O P-5'-P-CCNC: 8 U/L (ref 10–44)
ANION GAP SERPL CALC-SCNC: 15 MMOL/L (ref 8–16)
AST SERPL-CCNC: 14 U/L (ref 10–40)
BILIRUB SERPL-MCNC: 0.5 MG/DL (ref 0.1–1)
BUN SERPL-MCNC: 11 MG/DL (ref 6–20)
CALCIUM SERPL-MCNC: 9.2 MG/DL (ref 8.7–10.5)
CHLORIDE SERPL-SCNC: 103 MMOL/L (ref 95–110)
CO2 SERPL-SCNC: 25 MMOL/L (ref 23–29)
CREAT SERPL-MCNC: 0.7 MG/DL (ref 0.5–1.4)
EST. GFR  (AFRICAN AMERICAN): >60 ML/MIN/1.73 M^2
EST. GFR  (NON AFRICAN AMERICAN): >60 ML/MIN/1.73 M^2
GLUCOSE SERPL-MCNC: 98 MG/DL (ref 70–110)
POTASSIUM SERPL-SCNC: 3.6 MMOL/L (ref 3.5–5.1)
PROT SERPL-MCNC: 6.9 G/DL (ref 6–8.4)
SODIUM SERPL-SCNC: 143 MMOL/L (ref 136–145)
TSH SERPL DL<=0.005 MIU/L-ACNC: 0.57 UIU/ML (ref 0.4–4)

## 2021-08-14 PROCEDURE — 83525 ASSAY OF INSULIN: CPT | Performed by: INTERNAL MEDICINE

## 2021-08-14 PROCEDURE — 84443 ASSAY THYROID STIM HORMONE: CPT | Performed by: INTERNAL MEDICINE

## 2021-08-14 PROCEDURE — 80053 COMPREHEN METABOLIC PANEL: CPT | Performed by: INTERNAL MEDICINE

## 2021-08-14 PROCEDURE — 36415 COLL VENOUS BLD VENIPUNCTURE: CPT | Mod: PO | Performed by: INTERNAL MEDICINE

## 2021-08-16 LAB
INSULIN COLLECTION INTERVAL: ABNORMAL
INSULIN SERPL-ACNC: 37.2 UU/ML

## 2021-08-18 ENCOUNTER — OFFICE VISIT (OUTPATIENT)
Dept: ENDOCRINOLOGY | Facility: CLINIC | Age: 56
End: 2021-08-18
Payer: COMMERCIAL

## 2021-08-18 VITALS
SYSTOLIC BLOOD PRESSURE: 122 MMHG | HEIGHT: 62 IN | OXYGEN SATURATION: 95 % | HEART RATE: 68 BPM | WEIGHT: 218 LBS | DIASTOLIC BLOOD PRESSURE: 74 MMHG | BODY MASS INDEX: 40.12 KG/M2

## 2021-08-18 DIAGNOSIS — E03.9 HYPOTHYROIDISM, UNSPECIFIED TYPE: Primary | ICD-10-CM

## 2021-08-18 DIAGNOSIS — E66.01 MORBID OBESITY: ICD-10-CM

## 2021-08-18 DIAGNOSIS — E88.819 INSULIN RESISTANCE: ICD-10-CM

## 2021-08-18 PROCEDURE — 3044F HG A1C LEVEL LT 7.0%: CPT | Mod: CPTII,S$GLB,, | Performed by: INTERNAL MEDICINE

## 2021-08-18 PROCEDURE — 1159F MED LIST DOCD IN RCRD: CPT | Mod: CPTII,S$GLB,, | Performed by: INTERNAL MEDICINE

## 2021-08-18 PROCEDURE — 99999 PR PBB SHADOW E&M-EST. PATIENT-LVL III: CPT | Mod: PBBFAC,,, | Performed by: INTERNAL MEDICINE

## 2021-08-18 PROCEDURE — 99999 PR PBB SHADOW E&M-EST. PATIENT-LVL III: ICD-10-PCS | Mod: PBBFAC,,, | Performed by: INTERNAL MEDICINE

## 2021-08-18 PROCEDURE — 99214 OFFICE O/P EST MOD 30 MIN: CPT | Mod: S$GLB,,, | Performed by: INTERNAL MEDICINE

## 2021-08-18 PROCEDURE — 1160F RVW MEDS BY RX/DR IN RCRD: CPT | Mod: CPTII,S$GLB,, | Performed by: INTERNAL MEDICINE

## 2021-08-18 PROCEDURE — 3008F BODY MASS INDEX DOCD: CPT | Mod: CPTII,S$GLB,, | Performed by: INTERNAL MEDICINE

## 2021-08-18 PROCEDURE — 3074F PR MOST RECENT SYSTOLIC BLOOD PRESSURE < 130 MM HG: ICD-10-PCS | Mod: CPTII,S$GLB,, | Performed by: INTERNAL MEDICINE

## 2021-08-18 PROCEDURE — 4010F ACE/ARB THERAPY RXD/TAKEN: CPT | Mod: CPTII,S$GLB,, | Performed by: INTERNAL MEDICINE

## 2021-08-18 PROCEDURE — 1159F PR MEDICATION LIST DOCUMENTED IN MEDICAL RECORD: ICD-10-PCS | Mod: CPTII,S$GLB,, | Performed by: INTERNAL MEDICINE

## 2021-08-18 PROCEDURE — 3074F SYST BP LT 130 MM HG: CPT | Mod: CPTII,S$GLB,, | Performed by: INTERNAL MEDICINE

## 2021-08-18 PROCEDURE — 3078F DIAST BP <80 MM HG: CPT | Mod: CPTII,S$GLB,, | Performed by: INTERNAL MEDICINE

## 2021-08-18 PROCEDURE — 3008F PR BODY MASS INDEX (BMI) DOCUMENTED: ICD-10-PCS | Mod: CPTII,S$GLB,, | Performed by: INTERNAL MEDICINE

## 2021-08-18 PROCEDURE — 4010F PR ACE/ARB THEARPY RXD/TAKEN: ICD-10-PCS | Mod: CPTII,S$GLB,, | Performed by: INTERNAL MEDICINE

## 2021-08-18 PROCEDURE — 1160F PR REVIEW ALL MEDS BY PRESCRIBER/CLIN PHARMACIST DOCUMENTED: ICD-10-PCS | Mod: CPTII,S$GLB,, | Performed by: INTERNAL MEDICINE

## 2021-08-18 PROCEDURE — 3078F PR MOST RECENT DIASTOLIC BLOOD PRESSURE < 80 MM HG: ICD-10-PCS | Mod: CPTII,S$GLB,, | Performed by: INTERNAL MEDICINE

## 2021-08-18 PROCEDURE — 99214 PR OFFICE/OUTPT VISIT, EST, LEVL IV, 30-39 MIN: ICD-10-PCS | Mod: S$GLB,,, | Performed by: INTERNAL MEDICINE

## 2021-08-18 PROCEDURE — 3044F PR MOST RECENT HEMOGLOBIN A1C LEVEL <7.0%: ICD-10-PCS | Mod: CPTII,S$GLB,, | Performed by: INTERNAL MEDICINE

## 2021-08-19 ENCOUNTER — PATIENT MESSAGE (OUTPATIENT)
Dept: BARIATRICS | Facility: CLINIC | Age: 56
End: 2021-08-19

## 2021-08-23 ENCOUNTER — LAB VISIT (OUTPATIENT)
Dept: LAB | Facility: HOSPITAL | Age: 56
End: 2021-08-23
Attending: INTERNAL MEDICINE
Payer: COMMERCIAL

## 2021-08-23 DIAGNOSIS — E88.819 INSULIN RESISTANCE: ICD-10-CM

## 2021-08-23 DIAGNOSIS — E03.9 HYPOTHYROIDISM, UNSPECIFIED TYPE: ICD-10-CM

## 2021-08-23 DIAGNOSIS — E66.01 MORBID OBESITY: ICD-10-CM

## 2021-08-23 PROCEDURE — 82530 CORTISOL FREE: CPT | Performed by: INTERNAL MEDICINE

## 2021-08-26 LAB
COLLECT DURATION TIME UR: 24 H
CORTIS 24H UR-MRATE: 7.4 MCG/24 H (ref 3.5–45)
SPECIMEN VOL ?TM UR: 1500 ML

## 2021-08-27 DIAGNOSIS — N20.0 KIDNEY STONES: ICD-10-CM

## 2021-08-29 RX ORDER — ALLOPURINOL 100 MG/1
TABLET ORAL
Qty: 90 TABLET | Refills: 3 | Status: SHIPPED | OUTPATIENT
Start: 2021-08-29 | End: 2022-12-14

## 2021-08-31 RX ORDER — RANOLAZINE 500 MG/1
TABLET, EXTENDED RELEASE ORAL
Qty: 60 TABLET | Refills: 5 | Status: ON HOLD | OUTPATIENT
Start: 2021-08-31 | End: 2021-10-05 | Stop reason: SDUPTHER

## 2021-09-16 ENCOUNTER — PATIENT MESSAGE (OUTPATIENT)
Dept: ORTHOPEDICS | Facility: CLINIC | Age: 56
End: 2021-09-16

## 2021-09-16 RX ORDER — OXYCODONE AND ACETAMINOPHEN 10; 325 MG/1; MG/1
1 TABLET ORAL EVERY 6 HOURS PRN
Qty: 44 TABLET | Refills: 0 | Status: SHIPPED | OUTPATIENT
Start: 2021-09-16 | End: 2021-12-22

## 2021-09-17 ENCOUNTER — TELEPHONE (OUTPATIENT)
Dept: ORTHOPEDICS | Facility: CLINIC | Age: 56
End: 2021-09-17

## 2021-09-17 DIAGNOSIS — Z96.651 S/P TOTAL KNEE ARTHROPLASTY, RIGHT: Primary | ICD-10-CM

## 2021-09-21 ENCOUNTER — HOSPITAL ENCOUNTER (OUTPATIENT)
Dept: RADIOLOGY | Facility: HOSPITAL | Age: 56
Discharge: HOME | End: 2021-09-21
Attending: ORTHOPAEDIC SURGERY
Payer: COMMERCIAL

## 2021-09-21 ENCOUNTER — PATIENT MESSAGE (OUTPATIENT)
Dept: CARDIOLOGY | Facility: CLINIC | Age: 56
End: 2021-09-21

## 2021-09-21 ENCOUNTER — OFFICE VISIT (OUTPATIENT)
Dept: ORTHOPEDICS | Facility: CLINIC | Age: 56
End: 2021-09-21
Payer: COMMERCIAL

## 2021-09-21 VITALS — BODY MASS INDEX: 40.12 KG/M2 | HEIGHT: 62 IN | WEIGHT: 218 LBS

## 2021-09-21 DIAGNOSIS — M54.16 LUMBAR RADICULOPATHY: ICD-10-CM

## 2021-09-21 DIAGNOSIS — Z96.651 S/P TOTAL KNEE ARTHROPLASTY, RIGHT: Primary | ICD-10-CM

## 2021-09-21 DIAGNOSIS — Z96.651 S/P TOTAL KNEE ARTHROPLASTY, RIGHT: ICD-10-CM

## 2021-09-21 DIAGNOSIS — M54.9 DORSALGIA, UNSPECIFIED: ICD-10-CM

## 2021-09-21 PROCEDURE — 99999 PR PBB SHADOW E&M-EST. PATIENT-LVL III: CPT | Mod: PBBFAC,,, | Performed by: ORTHOPAEDIC SURGERY

## 2021-09-21 PROCEDURE — 99214 PR OFFICE/OUTPT VISIT, EST, LEVL IV, 30-39 MIN: ICD-10-PCS | Mod: S$GLB,,, | Performed by: ORTHOPAEDIC SURGERY

## 2021-09-21 PROCEDURE — 1159F PR MEDICATION LIST DOCUMENTED IN MEDICAL RECORD: ICD-10-PCS | Mod: CPTII,S$GLB,, | Performed by: ORTHOPAEDIC SURGERY

## 2021-09-21 PROCEDURE — 1160F RVW MEDS BY RX/DR IN RCRD: CPT | Mod: CPTII,S$GLB,, | Performed by: ORTHOPAEDIC SURGERY

## 2021-09-21 PROCEDURE — 4010F PR ACE/ARB THEARPY RXD/TAKEN: ICD-10-PCS | Mod: CPTII,S$GLB,, | Performed by: ORTHOPAEDIC SURGERY

## 2021-09-21 PROCEDURE — 73562 XR KNEE ORTHO RIGHT: ICD-10-PCS | Mod: 26,RT,, | Performed by: RADIOLOGY

## 2021-09-21 PROCEDURE — 3008F BODY MASS INDEX DOCD: CPT | Mod: CPTII,S$GLB,, | Performed by: ORTHOPAEDIC SURGERY

## 2021-09-21 PROCEDURE — 4010F ACE/ARB THERAPY RXD/TAKEN: CPT | Mod: CPTII,S$GLB,, | Performed by: ORTHOPAEDIC SURGERY

## 2021-09-21 PROCEDURE — 1160F PR REVIEW ALL MEDS BY PRESCRIBER/CLIN PHARMACIST DOCUMENTED: ICD-10-PCS | Mod: CPTII,S$GLB,, | Performed by: ORTHOPAEDIC SURGERY

## 2021-09-21 PROCEDURE — 99214 OFFICE O/P EST MOD 30 MIN: CPT | Mod: S$GLB,,, | Performed by: ORTHOPAEDIC SURGERY

## 2021-09-21 PROCEDURE — 73562 X-RAY EXAM OF KNEE 3: CPT | Mod: 26,RT,, | Performed by: RADIOLOGY

## 2021-09-21 PROCEDURE — 1159F MED LIST DOCD IN RCRD: CPT | Mod: CPTII,S$GLB,, | Performed by: ORTHOPAEDIC SURGERY

## 2021-09-21 PROCEDURE — 3044F PR MOST RECENT HEMOGLOBIN A1C LEVEL <7.0%: ICD-10-PCS | Mod: CPTII,S$GLB,, | Performed by: ORTHOPAEDIC SURGERY

## 2021-09-21 PROCEDURE — 73560 X-RAY EXAM OF KNEE 1 OR 2: CPT | Mod: 26,LT,, | Performed by: RADIOLOGY

## 2021-09-21 PROCEDURE — 3044F HG A1C LEVEL LT 7.0%: CPT | Mod: CPTII,S$GLB,, | Performed by: ORTHOPAEDIC SURGERY

## 2021-09-21 PROCEDURE — 99999 PR PBB SHADOW E&M-EST. PATIENT-LVL III: ICD-10-PCS | Mod: PBBFAC,,, | Performed by: ORTHOPAEDIC SURGERY

## 2021-09-21 PROCEDURE — 3008F PR BODY MASS INDEX (BMI) DOCUMENTED: ICD-10-PCS | Mod: CPTII,S$GLB,, | Performed by: ORTHOPAEDIC SURGERY

## 2021-09-21 PROCEDURE — 73560 XR KNEE ORTHO RIGHT: ICD-10-PCS | Mod: 26,LT,, | Performed by: RADIOLOGY

## 2021-09-21 PROCEDURE — 73560 X-RAY EXAM OF KNEE 1 OR 2: CPT | Mod: TC,PO,LT

## 2021-09-21 RX ORDER — TICAGRELOR 90 MG/1
TABLET ORAL
Qty: 60 TABLET | Refills: 4 | Status: SHIPPED | OUTPATIENT
Start: 2021-09-21 | End: 2022-02-08

## 2021-09-25 ENCOUNTER — TELEPHONE (OUTPATIENT)
Dept: ADMINISTRATIVE | Facility: HOSPITAL | Age: 56
End: 2021-09-25

## 2021-10-04 PROBLEM — I20.89 ANGINA AT REST: Status: RESOLVED | Noted: 2019-12-12 | Resolved: 2021-10-04

## 2021-10-04 PROBLEM — R07.9 CHEST PAIN: Status: RESOLVED | Noted: 2017-10-15 | Resolved: 2021-10-04

## 2021-10-04 PROBLEM — I24.9 ACS (ACUTE CORONARY SYNDROME): Status: ACTIVE | Noted: 2021-10-04

## 2021-10-04 PROBLEM — G47.33 OSA ON CPAP: Status: ACTIVE | Noted: 2021-10-04

## 2021-10-04 PROBLEM — I24.9 ACS (ACUTE CORONARY SYNDROME): Status: RESOLVED | Noted: 2021-10-04 | Resolved: 2021-10-04

## 2021-10-04 PROBLEM — R07.2 PRECORDIAL PAIN: Status: ACTIVE | Noted: 2021-10-04

## 2021-10-05 ENCOUNTER — DOCUMENTATION ONLY (OUTPATIENT)
Dept: REHABILITATION | Facility: HOSPITAL | Age: 56
End: 2021-10-05

## 2021-10-05 PROBLEM — M25.661 DECREASED ROM OF RIGHT KNEE: Status: RESOLVED | Noted: 2021-06-30 | Resolved: 2021-10-05

## 2021-10-05 PROBLEM — I20.0 UNSTABLE ANGINA: Status: ACTIVE | Noted: 2021-10-04

## 2021-10-05 PROBLEM — R29.898 DECREASED STRENGTH OF LOWER EXTREMITY: Status: RESOLVED | Noted: 2021-06-30 | Resolved: 2021-10-05

## 2021-10-05 PROBLEM — E66.01 MORBID OBESITY: Status: ACTIVE | Noted: 2021-10-05

## 2021-10-05 PROBLEM — R68.89 DECREASED FUNCTIONAL ACTIVITY TOLERANCE: Status: RESOLVED | Noted: 2021-06-30 | Resolved: 2021-10-05

## 2021-10-12 ENCOUNTER — TELEPHONE (OUTPATIENT)
Dept: FAMILY MEDICINE | Facility: CLINIC | Age: 56
End: 2021-10-12

## 2021-10-14 ENCOUNTER — OFFICE VISIT (OUTPATIENT)
Dept: ORTHOPEDICS | Facility: CLINIC | Age: 56
End: 2021-10-14
Payer: COMMERCIAL

## 2021-10-14 VITALS — WEIGHT: 212 LBS | HEIGHT: 62 IN | BODY MASS INDEX: 39.01 KG/M2

## 2021-10-14 DIAGNOSIS — Z96.651 S/P TOTAL KNEE ARTHROPLASTY, RIGHT: Primary | ICD-10-CM

## 2021-10-14 PROCEDURE — 3008F BODY MASS INDEX DOCD: CPT | Mod: CPTII,S$GLB,, | Performed by: ORTHOPAEDIC SURGERY

## 2021-10-14 PROCEDURE — 99999 PR PBB SHADOW E&M-EST. PATIENT-LVL II: CPT | Mod: PBBFAC,,, | Performed by: ORTHOPAEDIC SURGERY

## 2021-10-14 PROCEDURE — 1159F PR MEDICATION LIST DOCUMENTED IN MEDICAL RECORD: ICD-10-PCS | Mod: CPTII,S$GLB,, | Performed by: ORTHOPAEDIC SURGERY

## 2021-10-14 PROCEDURE — 1160F RVW MEDS BY RX/DR IN RCRD: CPT | Mod: CPTII,S$GLB,, | Performed by: ORTHOPAEDIC SURGERY

## 2021-10-14 PROCEDURE — 99999 PR PBB SHADOW E&M-EST. PATIENT-LVL II: ICD-10-PCS | Mod: PBBFAC,,, | Performed by: ORTHOPAEDIC SURGERY

## 2021-10-14 PROCEDURE — 3044F HG A1C LEVEL LT 7.0%: CPT | Mod: CPTII,S$GLB,, | Performed by: ORTHOPAEDIC SURGERY

## 2021-10-14 PROCEDURE — 4010F ACE/ARB THERAPY RXD/TAKEN: CPT | Mod: CPTII,S$GLB,, | Performed by: ORTHOPAEDIC SURGERY

## 2021-10-14 PROCEDURE — 99214 OFFICE O/P EST MOD 30 MIN: CPT | Mod: S$GLB,,, | Performed by: ORTHOPAEDIC SURGERY

## 2021-10-14 PROCEDURE — 4010F PR ACE/ARB THEARPY RXD/TAKEN: ICD-10-PCS | Mod: CPTII,S$GLB,, | Performed by: ORTHOPAEDIC SURGERY

## 2021-10-14 PROCEDURE — 3044F PR MOST RECENT HEMOGLOBIN A1C LEVEL <7.0%: ICD-10-PCS | Mod: CPTII,S$GLB,, | Performed by: ORTHOPAEDIC SURGERY

## 2021-10-14 PROCEDURE — 1160F PR REVIEW ALL MEDS BY PRESCRIBER/CLIN PHARMACIST DOCUMENTED: ICD-10-PCS | Mod: CPTII,S$GLB,, | Performed by: ORTHOPAEDIC SURGERY

## 2021-10-14 PROCEDURE — 3008F PR BODY MASS INDEX (BMI) DOCUMENTED: ICD-10-PCS | Mod: CPTII,S$GLB,, | Performed by: ORTHOPAEDIC SURGERY

## 2021-10-14 PROCEDURE — 99214 PR OFFICE/OUTPT VISIT, EST, LEVL IV, 30-39 MIN: ICD-10-PCS | Mod: S$GLB,,, | Performed by: ORTHOPAEDIC SURGERY

## 2021-10-14 PROCEDURE — 1159F MED LIST DOCD IN RCRD: CPT | Mod: CPTII,S$GLB,, | Performed by: ORTHOPAEDIC SURGERY

## 2021-10-18 ENCOUNTER — OFFICE VISIT (OUTPATIENT)
Dept: FAMILY MEDICINE | Facility: CLINIC | Age: 56
End: 2021-10-18
Payer: COMMERCIAL

## 2021-10-18 VITALS
WEIGHT: 217.13 LBS | BODY MASS INDEX: 39.96 KG/M2 | HEIGHT: 62 IN | DIASTOLIC BLOOD PRESSURE: 78 MMHG | HEART RATE: 88 BPM | OXYGEN SATURATION: 98 % | SYSTOLIC BLOOD PRESSURE: 138 MMHG

## 2021-10-18 DIAGNOSIS — R51.9 NONINTRACTABLE HEADACHE, UNSPECIFIED CHRONICITY PATTERN, UNSPECIFIED HEADACHE TYPE: Primary | ICD-10-CM

## 2021-10-18 DIAGNOSIS — F43.21 GRIEVING: ICD-10-CM

## 2021-10-18 DIAGNOSIS — I25.10 CORONARY ARTERY DISEASE, UNSPECIFIED VESSEL OR LESION TYPE, UNSPECIFIED WHETHER ANGINA PRESENT, UNSPECIFIED WHETHER NATIVE OR TRANSPLANTED HEART: ICD-10-CM

## 2021-10-18 DIAGNOSIS — R47.1 DYSARTHRIA: ICD-10-CM

## 2021-10-18 PROCEDURE — 3008F BODY MASS INDEX DOCD: CPT | Mod: CPTII,S$GLB,, | Performed by: FAMILY MEDICINE

## 2021-10-18 PROCEDURE — 99999 PR PBB SHADOW E&M-EST. PATIENT-LVL III: ICD-10-PCS | Mod: PBBFAC,,, | Performed by: FAMILY MEDICINE

## 2021-10-18 PROCEDURE — 3078F PR MOST RECENT DIASTOLIC BLOOD PRESSURE < 80 MM HG: ICD-10-PCS | Mod: CPTII,S$GLB,, | Performed by: FAMILY MEDICINE

## 2021-10-18 PROCEDURE — 3075F SYST BP GE 130 - 139MM HG: CPT | Mod: CPTII,S$GLB,, | Performed by: FAMILY MEDICINE

## 2021-10-18 PROCEDURE — 3078F DIAST BP <80 MM HG: CPT | Mod: CPTII,S$GLB,, | Performed by: FAMILY MEDICINE

## 2021-10-18 PROCEDURE — 4010F PR ACE/ARB THEARPY RXD/TAKEN: ICD-10-PCS | Mod: CPTII,S$GLB,, | Performed by: FAMILY MEDICINE

## 2021-10-18 PROCEDURE — 1160F RVW MEDS BY RX/DR IN RCRD: CPT | Mod: CPTII,S$GLB,, | Performed by: FAMILY MEDICINE

## 2021-10-18 PROCEDURE — 99215 OFFICE O/P EST HI 40 MIN: CPT | Mod: S$GLB,,, | Performed by: FAMILY MEDICINE

## 2021-10-18 PROCEDURE — 3044F PR MOST RECENT HEMOGLOBIN A1C LEVEL <7.0%: ICD-10-PCS | Mod: CPTII,S$GLB,, | Performed by: FAMILY MEDICINE

## 2021-10-18 PROCEDURE — 1160F PR REVIEW ALL MEDS BY PRESCRIBER/CLIN PHARMACIST DOCUMENTED: ICD-10-PCS | Mod: CPTII,S$GLB,, | Performed by: FAMILY MEDICINE

## 2021-10-18 PROCEDURE — 3008F PR BODY MASS INDEX (BMI) DOCUMENTED: ICD-10-PCS | Mod: CPTII,S$GLB,, | Performed by: FAMILY MEDICINE

## 2021-10-18 PROCEDURE — 1159F PR MEDICATION LIST DOCUMENTED IN MEDICAL RECORD: ICD-10-PCS | Mod: CPTII,S$GLB,, | Performed by: FAMILY MEDICINE

## 2021-10-18 PROCEDURE — 99999 PR PBB SHADOW E&M-EST. PATIENT-LVL III: CPT | Mod: PBBFAC,,, | Performed by: FAMILY MEDICINE

## 2021-10-18 PROCEDURE — 3044F HG A1C LEVEL LT 7.0%: CPT | Mod: CPTII,S$GLB,, | Performed by: FAMILY MEDICINE

## 2021-10-18 PROCEDURE — 1159F MED LIST DOCD IN RCRD: CPT | Mod: CPTII,S$GLB,, | Performed by: FAMILY MEDICINE

## 2021-10-18 PROCEDURE — 99215 PR OFFICE/OUTPT VISIT, EST, LEVL V, 40-54 MIN: ICD-10-PCS | Mod: S$GLB,,, | Performed by: FAMILY MEDICINE

## 2021-10-18 PROCEDURE — 3075F PR MOST RECENT SYSTOLIC BLOOD PRESS GE 130-139MM HG: ICD-10-PCS | Mod: CPTII,S$GLB,, | Performed by: FAMILY MEDICINE

## 2021-10-18 PROCEDURE — 4010F ACE/ARB THERAPY RXD/TAKEN: CPT | Mod: CPTII,S$GLB,, | Performed by: FAMILY MEDICINE

## 2021-11-01 ENCOUNTER — OFFICE VISIT (OUTPATIENT)
Dept: CARDIOLOGY | Facility: CLINIC | Age: 56
End: 2021-11-01
Payer: COMMERCIAL

## 2021-11-01 VITALS
BODY MASS INDEX: 39.68 KG/M2 | DIASTOLIC BLOOD PRESSURE: 72 MMHG | SYSTOLIC BLOOD PRESSURE: 138 MMHG | HEIGHT: 62 IN | WEIGHT: 215.63 LBS | HEART RATE: 70 BPM

## 2021-11-01 DIAGNOSIS — I10 PRIMARY HYPERTENSION: ICD-10-CM

## 2021-11-01 DIAGNOSIS — E66.01 MORBID OBESITY: ICD-10-CM

## 2021-11-01 DIAGNOSIS — Z95.1 S/P CABG (CORONARY ARTERY BYPASS GRAFT): ICD-10-CM

## 2021-11-01 DIAGNOSIS — G47.33 OSA ON CPAP: ICD-10-CM

## 2021-11-01 DIAGNOSIS — E03.9 HYPOTHYROIDISM, UNSPECIFIED TYPE: Chronic | ICD-10-CM

## 2021-11-01 DIAGNOSIS — I70.1 RENAL ARTERY STENOSIS: ICD-10-CM

## 2021-11-01 DIAGNOSIS — E78.2 MIXED HYPERLIPIDEMIA: ICD-10-CM

## 2021-11-01 DIAGNOSIS — I25.10 CORONARY ARTERY DISEASE INVOLVING NATIVE CORONARY ARTERY OF NATIVE HEART WITHOUT ANGINA PECTORIS: Primary | Chronic | ICD-10-CM

## 2021-11-01 DIAGNOSIS — I73.9 PAD (PERIPHERAL ARTERY DISEASE): ICD-10-CM

## 2021-11-01 PROCEDURE — 4010F ACE/ARB THERAPY RXD/TAKEN: CPT | Mod: CPTII,S$GLB,, | Performed by: PHYSICIAN ASSISTANT

## 2021-11-01 PROCEDURE — 99999 PR PBB SHADOW E&M-EST. PATIENT-LVL III: CPT | Mod: PBBFAC,,, | Performed by: PHYSICIAN ASSISTANT

## 2021-11-01 PROCEDURE — 3044F HG A1C LEVEL LT 7.0%: CPT | Mod: CPTII,S$GLB,, | Performed by: PHYSICIAN ASSISTANT

## 2021-11-01 PROCEDURE — 3008F BODY MASS INDEX DOCD: CPT | Mod: CPTII,S$GLB,, | Performed by: PHYSICIAN ASSISTANT

## 2021-11-01 PROCEDURE — 3075F PR MOST RECENT SYSTOLIC BLOOD PRESS GE 130-139MM HG: ICD-10-PCS | Mod: CPTII,S$GLB,, | Performed by: PHYSICIAN ASSISTANT

## 2021-11-01 PROCEDURE — 99999 PR PBB SHADOW E&M-EST. PATIENT-LVL III: ICD-10-PCS | Mod: PBBFAC,,, | Performed by: PHYSICIAN ASSISTANT

## 2021-11-01 PROCEDURE — 1160F RVW MEDS BY RX/DR IN RCRD: CPT | Mod: CPTII,S$GLB,, | Performed by: PHYSICIAN ASSISTANT

## 2021-11-01 PROCEDURE — 4010F PR ACE/ARB THEARPY RXD/TAKEN: ICD-10-PCS | Mod: CPTII,S$GLB,, | Performed by: PHYSICIAN ASSISTANT

## 2021-11-01 PROCEDURE — 3078F PR MOST RECENT DIASTOLIC BLOOD PRESSURE < 80 MM HG: ICD-10-PCS | Mod: CPTII,S$GLB,, | Performed by: PHYSICIAN ASSISTANT

## 2021-11-01 PROCEDURE — 3075F SYST BP GE 130 - 139MM HG: CPT | Mod: CPTII,S$GLB,, | Performed by: PHYSICIAN ASSISTANT

## 2021-11-01 PROCEDURE — 1159F MED LIST DOCD IN RCRD: CPT | Mod: CPTII,S$GLB,, | Performed by: PHYSICIAN ASSISTANT

## 2021-11-01 PROCEDURE — 1159F PR MEDICATION LIST DOCUMENTED IN MEDICAL RECORD: ICD-10-PCS | Mod: CPTII,S$GLB,, | Performed by: PHYSICIAN ASSISTANT

## 2021-11-01 PROCEDURE — 3078F DIAST BP <80 MM HG: CPT | Mod: CPTII,S$GLB,, | Performed by: PHYSICIAN ASSISTANT

## 2021-11-01 PROCEDURE — 1160F PR REVIEW ALL MEDS BY PRESCRIBER/CLIN PHARMACIST DOCUMENTED: ICD-10-PCS | Mod: CPTII,S$GLB,, | Performed by: PHYSICIAN ASSISTANT

## 2021-11-01 PROCEDURE — 3044F PR MOST RECENT HEMOGLOBIN A1C LEVEL <7.0%: ICD-10-PCS | Mod: CPTII,S$GLB,, | Performed by: PHYSICIAN ASSISTANT

## 2021-11-01 PROCEDURE — 99214 OFFICE O/P EST MOD 30 MIN: CPT | Mod: S$GLB,,, | Performed by: PHYSICIAN ASSISTANT

## 2021-11-01 PROCEDURE — 3008F PR BODY MASS INDEX (BMI) DOCUMENTED: ICD-10-PCS | Mod: CPTII,S$GLB,, | Performed by: PHYSICIAN ASSISTANT

## 2021-11-01 PROCEDURE — 99214 PR OFFICE/OUTPT VISIT, EST, LEVL IV, 30-39 MIN: ICD-10-PCS | Mod: S$GLB,,, | Performed by: PHYSICIAN ASSISTANT

## 2021-11-01 RX ORDER — NITROGLYCERIN 20 MG/1
1 PATCH TRANSDERMAL DAILY
Qty: 30 PATCH | Refills: 11 | Status: SHIPPED | OUTPATIENT
Start: 2021-11-01 | End: 2022-11-01

## 2021-11-02 ENCOUNTER — TELEPHONE (OUTPATIENT)
Dept: CARDIOLOGY | Facility: CLINIC | Age: 56
End: 2021-11-02
Payer: COMMERCIAL

## 2021-11-03 ENCOUNTER — TELEPHONE (OUTPATIENT)
Dept: CARDIOLOGY | Facility: CLINIC | Age: 56
End: 2021-11-03
Payer: COMMERCIAL

## 2021-11-22 ENCOUNTER — TELEPHONE (OUTPATIENT)
Dept: CARDIOLOGY | Facility: HOSPITAL | Age: 56
End: 2021-11-22
Payer: COMMERCIAL

## 2021-11-24 ENCOUNTER — HOSPITAL ENCOUNTER (OUTPATIENT)
Dept: CARDIOLOGY | Facility: HOSPITAL | Age: 56
Discharge: HOME | End: 2021-11-24
Attending: PHYSICIAN ASSISTANT
Payer: COMMERCIAL

## 2021-11-24 VITALS
WEIGHT: 215 LBS | DIASTOLIC BLOOD PRESSURE: 60 MMHG | HEART RATE: 67 BPM | BODY MASS INDEX: 39.56 KG/M2 | HEIGHT: 62 IN | SYSTOLIC BLOOD PRESSURE: 105 MMHG

## 2021-11-24 DIAGNOSIS — I25.10 CORONARY ARTERY DISEASE INVOLVING NATIVE CORONARY ARTERY OF NATIVE HEART WITHOUT ANGINA PECTORIS: Chronic | ICD-10-CM

## 2021-11-24 LAB
CFR FLOW - ANTERIOR: 1.89
CFR FLOW - INFERIOR: 1.86
CFR FLOW - LATERAL: 1.9
CFR FLOW - MAX: 3.01
CFR FLOW - MIN: 1.28
CFR FLOW - SEPTAL: 2
CFR FLOW - WHOLE HEART: 1.91
CV PHARM DOSE: 54.7 MG
CV STRESS BASE HR: 65 BPM
DIASTOLIC BLOOD PRESSURE: 61 MMHG
EJECTION FRACTION- HIGH: 65 %
END DIASTOLIC INDEX-HIGH: 153 ML/M2
END DIASTOLIC INDEX-LOW: 93 ML/M2
END SYSTOLIC INDEX-HIGH: 71 ML/M2
END SYSTOLIC INDEX-LOW: 31 ML/M2
NUC REST DIASTOLIC VOLUME INDEX: 121
NUC REST EJECTION FRACTION: 69
NUC REST SYSTOLIC VOLUME INDEX: 38
NUC STRESS DIASTOLIC VOLUME INDEX: 127
NUC STRESS EJECTION FRACTION: 68 %
NUC STRESS SYSTOLIC VOLUME INDEX: 41
OHS CV CPX 85 PERCENT MAX PREDICTED HEART RATE MALE: 135
OHS CV CPX MAX PREDICTED HEART RATE: 158
OHS CV CPX PATIENT IS FEMALE: 1
OHS CV CPX PATIENT IS MALE: 0
OHS CV CPX PEAK DIASTOLIC BLOOD PRESSURE: 55 MMHG
OHS CV CPX PEAK HEAR RATE: 70 BPM
OHS CV CPX PEAK RATE PRESSURE PRODUCT: 6860
OHS CV CPX PEAK SYSTOLIC BLOOD PRESSURE: 98 MMHG
OHS CV CPX PERCENT MAX PREDICTED HEART RATE ACHIEVED: 44
OHS CV CPX RATE PRESSURE PRODUCT PRESENTING: 6435
REST FLOW - ANTERIOR: 0.54 CC/MIN/G
REST FLOW - INFERIOR: 0.55 CC/MIN/G
REST FLOW - LATERAL: 0.56 CC/MIN/G
REST FLOW - MAX: 0.78 CC/MIN/G
REST FLOW - MIN: 0.39 CC/MIN/G
REST FLOW - SEPTAL: 0.62 CC/MIN/G
REST FLOW - WHOLE HEART: 0.57 CC/MIN/G
RETIRED EF AND QEF - SEE NOTES: 53 %
STRESS FLOW - ANTERIOR: 1.02 CC/MIN/G
STRESS FLOW - INFERIOR: 1.04 CC/MIN/G
STRESS FLOW - LATERAL: 1.06 CC/MIN/G
STRESS FLOW - MAX: 1.68 CC/MIN/G
STRESS FLOW - MIN: 0.62 CC/MIN/G
STRESS FLOW - SEPTAL: 1.23 CC/MIN/G
STRESS FLOW - WHOLE HEART: 1.09 CC/MIN/G
SYSTOLIC BLOOD PRESSURE: 99 MMHG

## 2021-11-24 PROCEDURE — 93016 CARDIAC PET SCAN STRESS (CUPID ONLY): ICD-10-PCS | Mod: ,,, | Performed by: INTERNAL MEDICINE

## 2021-11-24 PROCEDURE — 78434 AQMBF PET REST & RX STRESS: CPT | Mod: 26,,, | Performed by: INTERNAL MEDICINE

## 2021-11-24 PROCEDURE — 93018 CARDIAC PET SCAN STRESS (CUPID ONLY): ICD-10-PCS | Mod: ,,, | Performed by: INTERNAL MEDICINE

## 2021-11-24 PROCEDURE — 78431 MYOCRD IMG PET RST&STRS CT: CPT | Mod: 26,,, | Performed by: INTERNAL MEDICINE

## 2021-11-24 PROCEDURE — 63600175 PHARM REV CODE 636 W HCPCS: Performed by: PHYSICIAN ASSISTANT

## 2021-11-24 PROCEDURE — 93018 CV STRESS TEST I&R ONLY: CPT | Mod: ,,, | Performed by: INTERNAL MEDICINE

## 2021-11-24 PROCEDURE — 78434 CARDIAC PET SCAN STRESS (CUPID ONLY): ICD-10-PCS | Mod: 26,,, | Performed by: INTERNAL MEDICINE

## 2021-11-24 PROCEDURE — 78431 CARDIAC PET SCAN STRESS (CUPID ONLY): ICD-10-PCS | Mod: 26,,, | Performed by: INTERNAL MEDICINE

## 2021-11-24 PROCEDURE — 78434 AQMBF PET REST & RX STRESS: CPT

## 2021-11-24 PROCEDURE — 93016 CV STRESS TEST SUPVJ ONLY: CPT | Mod: ,,, | Performed by: INTERNAL MEDICINE

## 2021-11-24 RX ORDER — AMINOPHYLLINE 25 MG/ML
75 INJECTION, SOLUTION INTRAVENOUS ONCE
Status: COMPLETED | OUTPATIENT
Start: 2021-11-24 | End: 2021-11-24

## 2021-11-24 RX ORDER — DIPYRIDAMOLE 5 MG/ML
54.73 INJECTION INTRAVENOUS ONCE
Status: COMPLETED | OUTPATIENT
Start: 2021-11-24 | End: 2021-11-24

## 2021-11-24 RX ADMIN — DIPYRIDAMOLE 54.75 MG: 5 INJECTION INTRAVENOUS at 08:11

## 2021-11-24 RX ADMIN — AMINOPHYLLINE 75 MG: 25 INJECTION, SOLUTION INTRAVENOUS at 08:11

## 2021-12-07 ENCOUNTER — PATIENT MESSAGE (OUTPATIENT)
Dept: CARDIOLOGY | Facility: CLINIC | Age: 56
End: 2021-12-07
Payer: COMMERCIAL

## 2021-12-07 ENCOUNTER — PATIENT MESSAGE (OUTPATIENT)
Dept: FAMILY MEDICINE | Facility: CLINIC | Age: 56
End: 2021-12-07
Payer: COMMERCIAL

## 2021-12-20 ENCOUNTER — TELEPHONE (OUTPATIENT)
Dept: FAMILY MEDICINE | Facility: CLINIC | Age: 56
End: 2021-12-20
Payer: COMMERCIAL

## 2021-12-22 ENCOUNTER — OFFICE VISIT (OUTPATIENT)
Dept: FAMILY MEDICINE | Facility: CLINIC | Age: 56
End: 2021-12-22
Payer: COMMERCIAL

## 2021-12-22 ENCOUNTER — LAB VISIT (OUTPATIENT)
Dept: LAB | Facility: HOSPITAL | Age: 56
End: 2021-12-22
Attending: NURSE PRACTITIONER
Payer: COMMERCIAL

## 2021-12-22 VITALS
BODY MASS INDEX: 39.63 KG/M2 | WEIGHT: 215.38 LBS | DIASTOLIC BLOOD PRESSURE: 80 MMHG | HEART RATE: 97 BPM | SYSTOLIC BLOOD PRESSURE: 132 MMHG | OXYGEN SATURATION: 97 % | HEIGHT: 62 IN

## 2021-12-22 DIAGNOSIS — R41.89 BRAIN FOG: ICD-10-CM

## 2021-12-22 DIAGNOSIS — R29.898 WEAKNESS OF EXTREMITY: ICD-10-CM

## 2021-12-22 DIAGNOSIS — H53.9 VISION CHANGES: ICD-10-CM

## 2021-12-22 DIAGNOSIS — M62.89 MUSCLE FATIGUE: Primary | ICD-10-CM

## 2021-12-22 DIAGNOSIS — R32 URINARY INCONTINENCE, UNSPECIFIED TYPE: ICD-10-CM

## 2021-12-22 DIAGNOSIS — G89.29 CHRONIC RIGHT SHOULDER PAIN: ICD-10-CM

## 2021-12-22 DIAGNOSIS — R06.09 DOE (DYSPNEA ON EXERTION): ICD-10-CM

## 2021-12-22 DIAGNOSIS — R20.0 LOWER EXTREMITY NUMBNESS: ICD-10-CM

## 2021-12-22 DIAGNOSIS — M25.511 CHRONIC RIGHT SHOULDER PAIN: ICD-10-CM

## 2021-12-22 DIAGNOSIS — M62.89 MUSCLE FATIGUE: ICD-10-CM

## 2021-12-22 LAB
ALBUMIN SERPL BCP-MCNC: 3.9 G/DL (ref 3.5–5.2)
ALP SERPL-CCNC: 89 U/L (ref 55–135)
ALT SERPL W/O P-5'-P-CCNC: 13 U/L (ref 10–44)
ANION GAP SERPL CALC-SCNC: 10 MMOL/L (ref 8–16)
AST SERPL-CCNC: 35 U/L (ref 10–40)
BASOPHILS # BLD AUTO: 0.12 K/UL (ref 0–0.2)
BASOPHILS NFR BLD: 1.3 % (ref 0–1.9)
BILIRUB SERPL-MCNC: 0.4 MG/DL (ref 0.1–1)
BUN SERPL-MCNC: 15 MG/DL (ref 6–20)
CALCIUM SERPL-MCNC: 9.9 MG/DL (ref 8.7–10.5)
CHLORIDE SERPL-SCNC: 102 MMOL/L (ref 95–110)
CO2 SERPL-SCNC: 29 MMOL/L (ref 23–29)
CREAT SERPL-MCNC: 0.7 MG/DL (ref 0.5–1.4)
CRP SERPL-MCNC: 4.6 MG/L (ref 0–8.2)
DIFFERENTIAL METHOD: ABNORMAL
EOSINOPHIL # BLD AUTO: 0.2 K/UL (ref 0–0.5)
EOSINOPHIL NFR BLD: 1.8 % (ref 0–8)
ERYTHROCYTE [DISTWIDTH] IN BLOOD BY AUTOMATED COUNT: 15.5 % (ref 11.5–14.5)
ERYTHROCYTE [SEDIMENTATION RATE] IN BLOOD BY WESTERGREN METHOD: 35 MM/HR (ref 0–20)
EST. GFR  (AFRICAN AMERICAN): >60 ML/MIN/1.73 M^2
EST. GFR  (NON AFRICAN AMERICAN): >60 ML/MIN/1.73 M^2
GLUCOSE SERPL-MCNC: 93 MG/DL (ref 70–110)
HCT VFR BLD AUTO: 34.9 % (ref 37–48.5)
HGB BLD-MCNC: 10.3 G/DL (ref 12–16)
IMM GRANULOCYTES # BLD AUTO: 0.06 K/UL (ref 0–0.04)
IMM GRANULOCYTES NFR BLD AUTO: 0.7 % (ref 0–0.5)
LYMPHOCYTES # BLD AUTO: 2.9 K/UL (ref 1–4.8)
LYMPHOCYTES NFR BLD: 32.4 % (ref 18–48)
MAGNESIUM SERPL-MCNC: 2.1 MG/DL (ref 1.6–2.6)
MCH RBC QN AUTO: 24.3 PG (ref 27–31)
MCHC RBC AUTO-ENTMCNC: 29.5 G/DL (ref 32–36)
MCV RBC AUTO: 82 FL (ref 82–98)
MONOCYTES # BLD AUTO: 0.8 K/UL (ref 0.3–1)
MONOCYTES NFR BLD: 8.6 % (ref 4–15)
NEUTROPHILS # BLD AUTO: 4.9 K/UL (ref 1.8–7.7)
NEUTROPHILS NFR BLD: 55.2 % (ref 38–73)
NRBC BLD-RTO: 0 /100 WBC
PLATELET # BLD AUTO: 441 K/UL (ref 150–450)
PMV BLD AUTO: 9.2 FL (ref 9.2–12.9)
POTASSIUM SERPL-SCNC: 3.3 MMOL/L (ref 3.5–5.1)
PROT SERPL-MCNC: 7.8 G/DL (ref 6–8.4)
RBC # BLD AUTO: 4.24 M/UL (ref 4–5.4)
SODIUM SERPL-SCNC: 141 MMOL/L (ref 136–145)
T4 FREE SERPL-MCNC: 1.11 NG/DL (ref 0.71–1.51)
TSH SERPL DL<=0.005 MIU/L-ACNC: 0.2 UIU/ML (ref 0.4–4)
VIT B12 SERPL-MCNC: 183 PG/ML (ref 210–950)
WBC # BLD AUTO: 8.93 K/UL (ref 3.9–12.7)

## 2021-12-22 PROCEDURE — 82607 VITAMIN B-12: CPT | Performed by: NURSE PRACTITIONER

## 2021-12-22 PROCEDURE — 83519 RIA NONANTIBODY: CPT | Performed by: NURSE PRACTITIONER

## 2021-12-22 PROCEDURE — 3079F PR MOST RECENT DIASTOLIC BLOOD PRESSURE 80-89 MM HG: ICD-10-PCS | Mod: CPTII,S$GLB,, | Performed by: NURSE PRACTITIONER

## 2021-12-22 PROCEDURE — 3044F PR MOST RECENT HEMOGLOBIN A1C LEVEL <7.0%: ICD-10-PCS | Mod: CPTII,S$GLB,, | Performed by: NURSE PRACTITIONER

## 2021-12-22 PROCEDURE — 99999 PR PBB SHADOW E&M-EST. PATIENT-LVL V: CPT | Mod: PBBFAC,,, | Performed by: NURSE PRACTITIONER

## 2021-12-22 PROCEDURE — 4010F PR ACE/ARB THEARPY RXD/TAKEN: ICD-10-PCS | Mod: CPTII,S$GLB,, | Performed by: NURSE PRACTITIONER

## 2021-12-22 PROCEDURE — 80053 COMPREHEN METABOLIC PANEL: CPT | Performed by: NURSE PRACTITIONER

## 2021-12-22 PROCEDURE — 1160F RVW MEDS BY RX/DR IN RCRD: CPT | Mod: CPTII,S$GLB,, | Performed by: NURSE PRACTITIONER

## 2021-12-22 PROCEDURE — 84425 ASSAY OF VITAMIN B-1: CPT | Performed by: NURSE PRACTITIONER

## 2021-12-22 PROCEDURE — 86038 ANTINUCLEAR ANTIBODIES: CPT | Performed by: NURSE PRACTITIONER

## 2021-12-22 PROCEDURE — 3075F SYST BP GE 130 - 139MM HG: CPT | Mod: CPTII,S$GLB,, | Performed by: NURSE PRACTITIONER

## 2021-12-22 PROCEDURE — 85651 RBC SED RATE NONAUTOMATED: CPT | Mod: PO | Performed by: NURSE PRACTITIONER

## 2021-12-22 PROCEDURE — 4010F ACE/ARB THERAPY RXD/TAKEN: CPT | Mod: CPTII,S$GLB,, | Performed by: NURSE PRACTITIONER

## 2021-12-22 PROCEDURE — 3008F PR BODY MASS INDEX (BMI) DOCUMENTED: ICD-10-PCS | Mod: CPTII,S$GLB,, | Performed by: NURSE PRACTITIONER

## 2021-12-22 PROCEDURE — 1160F PR REVIEW ALL MEDS BY PRESCRIBER/CLIN PHARMACIST DOCUMENTED: ICD-10-PCS | Mod: CPTII,S$GLB,, | Performed by: NURSE PRACTITIONER

## 2021-12-22 PROCEDURE — 86140 C-REACTIVE PROTEIN: CPT | Performed by: NURSE PRACTITIONER

## 2021-12-22 PROCEDURE — 3075F PR MOST RECENT SYSTOLIC BLOOD PRESS GE 130-139MM HG: ICD-10-PCS | Mod: CPTII,S$GLB,, | Performed by: NURSE PRACTITIONER

## 2021-12-22 PROCEDURE — 99999 PR PBB SHADOW E&M-EST. PATIENT-LVL V: ICD-10-PCS | Mod: PBBFAC,,, | Performed by: NURSE PRACTITIONER

## 2021-12-22 PROCEDURE — 84439 ASSAY OF FREE THYROXINE: CPT | Performed by: NURSE PRACTITIONER

## 2021-12-22 PROCEDURE — 84443 ASSAY THYROID STIM HORMONE: CPT | Performed by: NURSE PRACTITIONER

## 2021-12-22 PROCEDURE — 85025 COMPLETE CBC W/AUTO DIFF WBC: CPT | Performed by: NURSE PRACTITIONER

## 2021-12-22 PROCEDURE — 99215 OFFICE O/P EST HI 40 MIN: CPT | Mod: S$GLB,,, | Performed by: NURSE PRACTITIONER

## 2021-12-22 PROCEDURE — 3079F DIAST BP 80-89 MM HG: CPT | Mod: CPTII,S$GLB,, | Performed by: NURSE PRACTITIONER

## 2021-12-22 PROCEDURE — 3044F HG A1C LEVEL LT 7.0%: CPT | Mod: CPTII,S$GLB,, | Performed by: NURSE PRACTITIONER

## 2021-12-22 PROCEDURE — 3008F BODY MASS INDEX DOCD: CPT | Mod: CPTII,S$GLB,, | Performed by: NURSE PRACTITIONER

## 2021-12-22 PROCEDURE — 99215 PR OFFICE/OUTPT VISIT, EST, LEVL V, 40-54 MIN: ICD-10-PCS | Mod: S$GLB,,, | Performed by: NURSE PRACTITIONER

## 2021-12-22 PROCEDURE — 1159F PR MEDICATION LIST DOCUMENTED IN MEDICAL RECORD: ICD-10-PCS | Mod: CPTII,S$GLB,, | Performed by: NURSE PRACTITIONER

## 2021-12-22 PROCEDURE — 1159F MED LIST DOCD IN RCRD: CPT | Mod: CPTII,S$GLB,, | Performed by: NURSE PRACTITIONER

## 2021-12-22 PROCEDURE — 82746 ASSAY OF FOLIC ACID SERUM: CPT | Performed by: NURSE PRACTITIONER

## 2021-12-22 PROCEDURE — 83735 ASSAY OF MAGNESIUM: CPT | Performed by: NURSE PRACTITIONER

## 2021-12-22 RX ORDER — IBUPROFEN 800 MG/1
800 TABLET ORAL 3 TIMES DAILY PRN
Qty: 90 TABLET | Refills: 0 | Status: SHIPPED | OUTPATIENT
Start: 2021-12-22 | End: 2022-10-24

## 2021-12-22 RX ORDER — IBUPROFEN 800 MG/1
800 TABLET ORAL 3 TIMES DAILY
Qty: 90 TABLET | Refills: 0 | Status: SHIPPED | OUTPATIENT
Start: 2021-12-22 | End: 2021-12-22

## 2021-12-23 ENCOUNTER — TELEPHONE (OUTPATIENT)
Dept: FAMILY MEDICINE | Facility: CLINIC | Age: 56
End: 2021-12-23
Payer: COMMERCIAL

## 2021-12-23 ENCOUNTER — CLINICAL SUPPORT (OUTPATIENT)
Dept: FAMILY MEDICINE | Facility: CLINIC | Age: 56
End: 2021-12-23
Payer: COMMERCIAL

## 2021-12-23 ENCOUNTER — TELEPHONE (OUTPATIENT)
Dept: FAMILY MEDICINE | Facility: CLINIC | Age: 56
End: 2021-12-23

## 2021-12-23 VITALS — OXYGEN SATURATION: 95 %

## 2021-12-23 DIAGNOSIS — M62.89 MUSCLE FATIGUE: Primary | ICD-10-CM

## 2021-12-23 DIAGNOSIS — E53.8 B12 DEFICIENCY: ICD-10-CM

## 2021-12-23 DIAGNOSIS — E87.6 HYPOKALEMIA: Primary | ICD-10-CM

## 2021-12-23 DIAGNOSIS — E03.9 HYPOTHYROIDISM, UNSPECIFIED TYPE: ICD-10-CM

## 2021-12-23 DIAGNOSIS — R29.818 NEUROLOGICAL ABNORMALITY: ICD-10-CM

## 2021-12-23 LAB
ANA SER QL IF: NORMAL
FOLATE SERPL-MCNC: 9.5 NG/ML (ref 4–24)

## 2021-12-23 PROCEDURE — 96372 THER/PROPH/DIAG INJ SC/IM: CPT | Mod: S$GLB,,, | Performed by: NURSE PRACTITIONER

## 2021-12-23 PROCEDURE — 99499 NO LOS: ICD-10-PCS | Mod: S$GLB,,, | Performed by: NURSE PRACTITIONER

## 2021-12-23 PROCEDURE — 96372 PR INJECTION,THERAP/PROPH/DIAG2ST, IM OR SUBCUT: ICD-10-PCS | Mod: S$GLB,,, | Performed by: NURSE PRACTITIONER

## 2021-12-23 PROCEDURE — 99499 UNLISTED E&M SERVICE: CPT | Mod: S$GLB,,, | Performed by: NURSE PRACTITIONER

## 2021-12-23 RX ORDER — CYANOCOBALAMIN 1000 UG/ML
INJECTION, SOLUTION INTRAMUSCULAR; SUBCUTANEOUS
Qty: 30 ML | Refills: 1 | Status: CANCELLED | OUTPATIENT
Start: 2021-12-23

## 2021-12-23 RX ORDER — CYANOCOBALAMIN 1000 UG/ML
1000 INJECTION, SOLUTION INTRAMUSCULAR; SUBCUTANEOUS
Status: COMPLETED | OUTPATIENT
Start: 2021-12-27 | End: 2021-12-27

## 2021-12-23 RX ORDER — CYANOCOBALAMIN 1000 UG/ML
1000 INJECTION, SOLUTION INTRAMUSCULAR; SUBCUTANEOUS WEEKLY
Status: SHIPPED | OUTPATIENT
Start: 2021-12-23 | End: 2022-01-20

## 2021-12-23 RX ORDER — POTASSIUM CHLORIDE 20 MEQ/1
20 TABLET, EXTENDED RELEASE ORAL DAILY
Qty: 90 TABLET | Refills: 0 | Status: SHIPPED | OUTPATIENT
Start: 2021-12-23 | End: 2022-12-14

## 2021-12-23 RX ADMIN — CYANOCOBALAMIN 1000 MCG: 1000 INJECTION, SOLUTION INTRAMUSCULAR; SUBCUTANEOUS at 02:12

## 2021-12-27 ENCOUNTER — CLINICAL SUPPORT (OUTPATIENT)
Dept: FAMILY MEDICINE | Facility: CLINIC | Age: 56
End: 2021-12-27
Payer: COMMERCIAL

## 2021-12-27 VITALS — TEMPERATURE: 99 F

## 2021-12-27 DIAGNOSIS — E53.8 B12 DEFICIENCY: Primary | ICD-10-CM

## 2021-12-27 PROCEDURE — 96372 THER/PROPH/DIAG INJ SC/IM: CPT | Mod: S$GLB,,, | Performed by: NURSE PRACTITIONER

## 2021-12-27 PROCEDURE — 99499 NO LOS: ICD-10-PCS | Mod: S$GLB,,, | Performed by: NURSE PRACTITIONER

## 2021-12-27 PROCEDURE — 96372 PR INJECTION,THERAP/PROPH/DIAG2ST, IM OR SUBCUT: ICD-10-PCS | Mod: S$GLB,,, | Performed by: NURSE PRACTITIONER

## 2021-12-27 PROCEDURE — 99499 UNLISTED E&M SERVICE: CPT | Mod: S$GLB,,, | Performed by: NURSE PRACTITIONER

## 2021-12-27 RX ADMIN — CYANOCOBALAMIN 1000 MCG: 1000 INJECTION, SOLUTION INTRAMUSCULAR; SUBCUTANEOUS at 09:12

## 2021-12-28 LAB — VIT B1 BLD-MCNC: 49 UG/L (ref 38–122)

## 2021-12-29 DIAGNOSIS — M25.511 RIGHT SHOULDER PAIN, UNSPECIFIED CHRONICITY: ICD-10-CM

## 2021-12-29 DIAGNOSIS — Z96.651 S/P TOTAL KNEE ARTHROPLASTY, RIGHT: Primary | ICD-10-CM

## 2021-12-30 ENCOUNTER — CLINICAL SUPPORT (OUTPATIENT)
Dept: FAMILY MEDICINE | Facility: CLINIC | Age: 56
End: 2021-12-30
Payer: COMMERCIAL

## 2021-12-30 LAB — ACHR BIND AB SER-SCNC: 0 NMOL/L

## 2021-12-30 PROCEDURE — 96372 PR INJECTION,THERAP/PROPH/DIAG2ST, IM OR SUBCUT: ICD-10-PCS | Mod: S$GLB,,, | Performed by: NURSE PRACTITIONER

## 2021-12-30 PROCEDURE — 96372 THER/PROPH/DIAG INJ SC/IM: CPT | Mod: S$GLB,,, | Performed by: NURSE PRACTITIONER

## 2021-12-30 RX ADMIN — CYANOCOBALAMIN 1000 MCG: 1000 INJECTION, SOLUTION INTRAMUSCULAR; SUBCUTANEOUS at 11:12

## 2022-01-04 ENCOUNTER — OFFICE VISIT (OUTPATIENT)
Dept: FAMILY MEDICINE | Facility: CLINIC | Age: 57
End: 2022-01-04
Payer: COMMERCIAL

## 2022-01-04 ENCOUNTER — OFFICE VISIT (OUTPATIENT)
Dept: CARDIOLOGY | Facility: CLINIC | Age: 57
End: 2022-01-04
Payer: COMMERCIAL

## 2022-01-04 VITALS
BODY MASS INDEX: 39.79 KG/M2 | WEIGHT: 216.69 LBS | DIASTOLIC BLOOD PRESSURE: 83 MMHG | WEIGHT: 216.25 LBS | HEIGHT: 62 IN | BODY MASS INDEX: 39.88 KG/M2 | OXYGEN SATURATION: 97 % | SYSTOLIC BLOOD PRESSURE: 130 MMHG | DIASTOLIC BLOOD PRESSURE: 70 MMHG | HEART RATE: 94 BPM | SYSTOLIC BLOOD PRESSURE: 154 MMHG | HEART RATE: 70 BPM | HEIGHT: 62 IN

## 2022-01-04 DIAGNOSIS — I10 PRIMARY HYPERTENSION: ICD-10-CM

## 2022-01-04 DIAGNOSIS — E78.2 MIXED HYPERLIPIDEMIA: ICD-10-CM

## 2022-01-04 DIAGNOSIS — I25.10 CORONARY ARTERY DISEASE INVOLVING NATIVE CORONARY ARTERY OF NATIVE HEART WITHOUT ANGINA PECTORIS: Chronic | ICD-10-CM

## 2022-01-04 DIAGNOSIS — Z95.1 S/P CABG (CORONARY ARTERY BYPASS GRAFT): Primary | ICD-10-CM

## 2022-01-04 DIAGNOSIS — R29.818 NEUROGENIC CLAUDICATION: Primary | ICD-10-CM

## 2022-01-04 DIAGNOSIS — I10 ESSENTIAL HYPERTENSION: ICD-10-CM

## 2022-01-04 DIAGNOSIS — E66.01 CLASS 2 SEVERE OBESITY DUE TO EXCESS CALORIES WITH SERIOUS COMORBIDITY IN ADULT, UNSPECIFIED BMI: ICD-10-CM

## 2022-01-04 DIAGNOSIS — E66.01 MORBID OBESITY: ICD-10-CM

## 2022-01-04 PROBLEM — I70.1 RENAL ARTERY STENOSIS: Status: RESOLVED | Noted: 2020-07-09 | Resolved: 2022-01-04

## 2022-01-04 PROBLEM — I73.9 PAD (PERIPHERAL ARTERY DISEASE): Status: RESOLVED | Noted: 2020-07-09 | Resolved: 2022-01-04

## 2022-01-04 PROCEDURE — 3008F BODY MASS INDEX DOCD: CPT | Mod: CPTII,S$GLB,, | Performed by: INTERNAL MEDICINE

## 2022-01-04 PROCEDURE — 99999 PR PBB SHADOW E&M-EST. PATIENT-LVL V: CPT | Mod: PBBFAC,,, | Performed by: FAMILY MEDICINE

## 2022-01-04 PROCEDURE — 3008F BODY MASS INDEX DOCD: CPT | Mod: CPTII,S$GLB,, | Performed by: FAMILY MEDICINE

## 2022-01-04 PROCEDURE — 3008F PR BODY MASS INDEX (BMI) DOCUMENTED: ICD-10-PCS | Mod: CPTII,S$GLB,, | Performed by: INTERNAL MEDICINE

## 2022-01-04 PROCEDURE — 3078F DIAST BP <80 MM HG: CPT | Mod: CPTII,S$GLB,, | Performed by: FAMILY MEDICINE

## 2022-01-04 PROCEDURE — 99214 OFFICE O/P EST MOD 30 MIN: CPT | Mod: S$GLB,,, | Performed by: FAMILY MEDICINE

## 2022-01-04 PROCEDURE — 3078F PR MOST RECENT DIASTOLIC BLOOD PRESSURE < 80 MM HG: ICD-10-PCS | Mod: CPTII,S$GLB,, | Performed by: FAMILY MEDICINE

## 2022-01-04 PROCEDURE — 99999 PR PBB SHADOW E&M-EST. PATIENT-LVL V: ICD-10-PCS | Mod: PBBFAC,,, | Performed by: FAMILY MEDICINE

## 2022-01-04 PROCEDURE — 99999 PR PBB SHADOW E&M-EST. PATIENT-LVL III: CPT | Mod: PBBFAC,,, | Performed by: INTERNAL MEDICINE

## 2022-01-04 PROCEDURE — 3008F PR BODY MASS INDEX (BMI) DOCUMENTED: ICD-10-PCS | Mod: CPTII,S$GLB,, | Performed by: FAMILY MEDICINE

## 2022-01-04 PROCEDURE — 99214 PR OFFICE/OUTPT VISIT, EST, LEVL IV, 30-39 MIN: ICD-10-PCS | Mod: S$GLB,,, | Performed by: INTERNAL MEDICINE

## 2022-01-04 PROCEDURE — 99999 PR PBB SHADOW E&M-EST. PATIENT-LVL III: ICD-10-PCS | Mod: PBBFAC,,, | Performed by: INTERNAL MEDICINE

## 2022-01-04 PROCEDURE — 3079F DIAST BP 80-89 MM HG: CPT | Mod: CPTII,S$GLB,, | Performed by: INTERNAL MEDICINE

## 2022-01-04 PROCEDURE — 3075F PR MOST RECENT SYSTOLIC BLOOD PRESS GE 130-139MM HG: ICD-10-PCS | Mod: CPTII,S$GLB,, | Performed by: FAMILY MEDICINE

## 2022-01-04 PROCEDURE — 3077F PR MOST RECENT SYSTOLIC BLOOD PRESSURE >= 140 MM HG: ICD-10-PCS | Mod: CPTII,S$GLB,, | Performed by: INTERNAL MEDICINE

## 2022-01-04 PROCEDURE — 3075F SYST BP GE 130 - 139MM HG: CPT | Mod: CPTII,S$GLB,, | Performed by: FAMILY MEDICINE

## 2022-01-04 PROCEDURE — 1159F PR MEDICATION LIST DOCUMENTED IN MEDICAL RECORD: ICD-10-PCS | Mod: CPTII,S$GLB,, | Performed by: INTERNAL MEDICINE

## 2022-01-04 PROCEDURE — 3077F SYST BP >= 140 MM HG: CPT | Mod: CPTII,S$GLB,, | Performed by: INTERNAL MEDICINE

## 2022-01-04 PROCEDURE — 1160F RVW MEDS BY RX/DR IN RCRD: CPT | Mod: CPTII,S$GLB,, | Performed by: INTERNAL MEDICINE

## 2022-01-04 PROCEDURE — 99214 OFFICE O/P EST MOD 30 MIN: CPT | Mod: S$GLB,,, | Performed by: INTERNAL MEDICINE

## 2022-01-04 PROCEDURE — 99214 PR OFFICE/OUTPT VISIT, EST, LEVL IV, 30-39 MIN: ICD-10-PCS | Mod: S$GLB,,, | Performed by: FAMILY MEDICINE

## 2022-01-04 PROCEDURE — 1159F MED LIST DOCD IN RCRD: CPT | Mod: CPTII,S$GLB,, | Performed by: INTERNAL MEDICINE

## 2022-01-04 PROCEDURE — 1160F PR REVIEW ALL MEDS BY PRESCRIBER/CLIN PHARMACIST DOCUMENTED: ICD-10-PCS | Mod: CPTII,S$GLB,, | Performed by: INTERNAL MEDICINE

## 2022-01-04 PROCEDURE — 3079F PR MOST RECENT DIASTOLIC BLOOD PRESSURE 80-89 MM HG: ICD-10-PCS | Mod: CPTII,S$GLB,, | Performed by: INTERNAL MEDICINE

## 2022-01-04 NOTE — PROGRESS NOTES
Subjective:       Patient ID: Josephine Bolton is a 56 y.o. female.    Chief Complaint: lab reults    HPI     Here for a f/u    C/o worsening bilateral lower leg weakness and pain x 2-3 months. Gets worse with movement and standing from sitting position. Also, reports chronic low back pain    Cad-followed by cardiology    htn stable.       Review of Systems      Review of Systems   Constitutional: Negative for fever and chills.   HENT: Negative for hearing loss and neck stiffness.    Eyes: Negative for redness and itching.   Respiratory: Negative for cough and choking.    Cardiovascular: Negative for chest pain and leg swelling.  Abdomen: Negative for abdominal pain and blood in stool.   Genitourinary: Negative for dysuria and flank pain.   Neurological: Negative for light-headedness and headaches.   Hematological: Negative for adenopathy.   Psychiatric/Behavioral: Negative for behavioral problems.     Objective:      Physical Exam  Constitutional:       Appearance: She is well-developed.   HENT:      Head: Normocephalic and atraumatic.   Eyes:      Conjunctiva/sclera: Conjunctivae normal.      Pupils: Pupils are equal, round, and reactive to light.   Cardiovascular:      Rate and Rhythm: Normal rate and regular rhythm.      Heart sounds: No murmur heard.      Pulmonary:      Effort: Pulmonary effort is normal.      Breath sounds: Normal breath sounds.   Musculoskeletal:      Cervical back: Normal range of motion.      Comments: lumbar spine: pos tenderness of bilat lower paravert area.  Right slr to 70 degrees reproduces right lower back pain.  Left slr to 70 degrees reproduces left lower back pain.      Lymphadenopathy:      Cervical: No cervical adenopathy.         Assessment:       1. Neurogenic claudication    2. Morbid obesity    3. Essential hypertension        Plan:       Neurogenic claudication  -     Ambulatory referral/consult to Pain Clinic; Future; Expected date: 01/11/2022    Morbid  obesity    Essential hypertension              Plan:  Refer to pain management  Cont current meds    Medication List with Changes/Refills   Current Medications    ACETAMINOPHEN (TYLENOL) 650 MG TBSR    Take 1,300 mg by mouth every 8 (eight) hours as needed (pain).     ALBUTEROL (PROVENTIL/VENTOLIN HFA) 90 MCG/ACTUATION INHALER    Inhale 2 puffs into the lungs every 6 (six) hours as needed for Shortness of Breath.     ALLOPURINOL (ZYLOPRIM) 100 MG TABLET    TAKE 1 TABLET BY MOUTH ONCE DAILY    AMLODIPINE (NORVASC) 5 MG TABLET    TAKE 1 TABLET BY MOUTH ONCE DAILY     ASPIRIN (ECOTRIN) 81 MG EC TABLET    Take 81 mg by mouth once daily.    ATORVASTATIN (LIPITOR) 40 MG TABLET    TAKE 1 TABLET (40 MG TOTAL) BY MOUTH EVERY EVENING.     BLOOD-GLUCOSE METER (GLUCOSE MONITORING KIT) KIT    Use as instructed    BRILINTA 90 MG TABLET    TAKE 1 TABLET BY MOUTH 2 TIMES A DAY    CARVEDILOL (COREG) 25 MG TABLET    Take 1 tablet (25 mg total) by mouth 2 (two) times daily.    CETIRIZINE (ZYRTEC) 10 MG TABLET    Take 10 mg by mouth daily as needed for Allergies.    FAMOTIDINE (PEPCID) 40 MG TABLET    Take 1 tablet (40 mg total) by mouth every evening.    FUROSEMIDE (LASIX) 20 MG TABLET    TAKE 1 TABLET BY MOUTH EVERY DAY    GABAPENTIN (NEURONTIN) 100 MG CAPSULE    Take 1 capsule (100 mg total) by mouth 3 (three) times daily.    IBUPROFEN (ADVIL,MOTRIN) 800 MG TABLET    Take 1 tablet (800 mg total) by mouth 3 (three) times daily as needed for Pain.    LANCETS MISC    1 Units by Misc.(Non-Drug; Combo Route) route 2 (two) times daily as needed.    LEVOTHYROXINE (SYNTHROID) 125 MCG TABLET    TAKE 1 TABLET (125 MCG TOTAL) BY MOUTH BEFORE BREAKFAST.        NITROGLYCERIN (NITROSTAT) 0.4 MG SL TABLET    Place 1 tablet (0.4 mg total) under the tongue every 5 (five) minutes as needed for Chest pain.    NITROGLYCERIN 0.1 MG/HR TD PT24 (NITRODUR) 0.1 MG/HR PT24    Place 1 patch onto the skin once daily.    ONDANSETRON (ZOFRAN) 4 MG TABLET    Take  1 tablet (4 mg total) by mouth every 6 (six) hours as needed for Nausea.    PANTOPRAZOLE (PROTONIX) 40 MG TABLET    TAKE 1 TABLET BY MOUTH EVERY DAY     POTASSIUM CHLORIDE SA (K-DUR,KLOR-CON) 20 MEQ TABLET    Take 1 tablet (20 mEq total) by mouth once daily.    RAMIPRIL (ALTACE) 5 MG CAPSULE    TAKE 1 CAPSULE (5 MG TOTAL) BY MOUTH EVERY EVENING.    RANOLAZINE (RANEXA) 500 MG TB12    TAKE 2 TABLETS (1 000 MG TOTAL) BY MOUTH 2 (TWO) TIMES DAILY.    VENLAFAXINE (EFFEXOR-XR) 150 MG CP24    TAKE 1 CAPSULE BY MOUTH ONCE DAILY    Discontinued Medications    METHOCARBAMOL (ROBAXIN) 750 MG TAB    Take 1 tablet (750 mg total) by mouth 4 (four) times daily as needed.    ZINC SULFATE (ZINCATE) 220 (50) MG CAPSULE    Take 1 capsule (220 mg total) by mouth once daily.

## 2022-01-04 NOTE — PROGRESS NOTES
Subjective:    Patient ID:  Josephine Bolton is a 56 y.o. female who presents for follow-up of Coronary Artery Disease (6 month f/u )      HPI  She comes with no complaints, no chest pain, no shortness of breath  Having back surgery later this year  PET stress (-) 2 months ago    Review of Systems   Constitutional: Negative for decreased appetite, malaise/fatigue, weight gain and weight loss.   Cardiovascular: Negative for chest pain, dyspnea on exertion, leg swelling, palpitations and syncope.   Respiratory: Negative for cough and shortness of breath.    Gastrointestinal: Negative.    Neurological: Negative for weakness.   All other systems reviewed and are negative.       Objective:      Physical Exam  Vitals and nursing note reviewed.   Constitutional:       Appearance: Normal appearance. She is well-developed and well-nourished.   HENT:      Head: Normocephalic.   Eyes:      Pupils: Pupils are equal, round, and reactive to light.   Neck:      Thyroid: No thyromegaly.      Vascular: No carotid bruit or JVD.   Cardiovascular:      Rate and Rhythm: Normal rate and regular rhythm.      Chest Wall: PMI is not displaced.      Pulses: Normal pulses and intact distal pulses.      Heart sounds: Normal heart sounds. No murmur heard.  No gallop.    Pulmonary:      Effort: Pulmonary effort is normal.      Breath sounds: Normal breath sounds.   Abdominal:      Palpations: Abdomen is soft. There is no hepatosplenomegaly or mass.      Tenderness: There is no abdominal tenderness.   Musculoskeletal:         General: No edema. Normal range of motion.      Cervical back: Normal range of motion and neck supple.   Skin:     General: Skin is warm and intact.   Neurological:      Mental Status: She is alert and oriented to person, place, and time.      Sensory: No sensory deficit.      Deep Tendon Reflexes: Strength normal and reflexes are normal and symmetric.   Psychiatric:         Mood and Affect: Mood and affect normal.            Assessment:       1. S/P CABG (coronary artery bypass graft)    2. Coronary artery disease involving native coronary artery of native heart without angina pectoris    3. Primary hypertension    4. Mixed hyperlipidemia    5. Class 2 severe obesity due to excess calories with serious comorbidity in adult, unspecified BMI         Plan:   Decrease intake of NSAIDs  Continue all cardiac medications  Regular exercise program  Weight loss  6 m

## 2022-01-05 ENCOUNTER — HOSPITAL ENCOUNTER (OUTPATIENT)
Dept: RADIOLOGY | Facility: HOSPITAL | Age: 57
Discharge: HOME | End: 2022-01-05
Attending: ORTHOPAEDIC SURGERY
Payer: COMMERCIAL

## 2022-01-05 ENCOUNTER — PATIENT OUTREACH (OUTPATIENT)
Dept: ADMINISTRATIVE | Facility: OTHER | Age: 57
End: 2022-01-05
Payer: COMMERCIAL

## 2022-01-05 ENCOUNTER — OFFICE VISIT (OUTPATIENT)
Dept: ENDOCRINOLOGY | Facility: CLINIC | Age: 57
End: 2022-01-05
Payer: COMMERCIAL

## 2022-01-05 ENCOUNTER — TELEPHONE (OUTPATIENT)
Dept: FAMILY MEDICINE | Facility: CLINIC | Age: 57
End: 2022-01-05
Payer: COMMERCIAL

## 2022-01-05 VITALS
RESPIRATION RATE: 18 BRPM | BODY MASS INDEX: 39.79 KG/M2 | WEIGHT: 216.25 LBS | HEIGHT: 62 IN | DIASTOLIC BLOOD PRESSURE: 78 MMHG | SYSTOLIC BLOOD PRESSURE: 124 MMHG | HEART RATE: 88 BPM

## 2022-01-05 DIAGNOSIS — M25.511 RIGHT SHOULDER PAIN, UNSPECIFIED CHRONICITY: ICD-10-CM

## 2022-01-05 DIAGNOSIS — E03.9 HYPOTHYROIDISM, UNSPECIFIED TYPE: ICD-10-CM

## 2022-01-05 DIAGNOSIS — I10 BENIGN ESSENTIAL HTN: ICD-10-CM

## 2022-01-05 PROCEDURE — 3074F PR MOST RECENT SYSTOLIC BLOOD PRESSURE < 130 MM HG: ICD-10-PCS | Mod: CPTII,S$GLB,, | Performed by: INTERNAL MEDICINE

## 2022-01-05 PROCEDURE — 3078F PR MOST RECENT DIASTOLIC BLOOD PRESSURE < 80 MM HG: ICD-10-PCS | Mod: CPTII,S$GLB,, | Performed by: INTERNAL MEDICINE

## 2022-01-05 PROCEDURE — 3074F SYST BP LT 130 MM HG: CPT | Mod: CPTII,S$GLB,, | Performed by: INTERNAL MEDICINE

## 2022-01-05 PROCEDURE — 3008F BODY MASS INDEX DOCD: CPT | Mod: CPTII,S$GLB,, | Performed by: INTERNAL MEDICINE

## 2022-01-05 PROCEDURE — 99214 OFFICE O/P EST MOD 30 MIN: CPT | Mod: S$GLB,,, | Performed by: INTERNAL MEDICINE

## 2022-01-05 PROCEDURE — 73030 X-RAY EXAM OF SHOULDER: CPT | Mod: TC,FY,PO,RT

## 2022-01-05 PROCEDURE — 1160F RVW MEDS BY RX/DR IN RCRD: CPT | Mod: CPTII,S$GLB,, | Performed by: INTERNAL MEDICINE

## 2022-01-05 PROCEDURE — 73030 X-RAY EXAM OF SHOULDER: CPT | Mod: 26,RT,, | Performed by: RADIOLOGY

## 2022-01-05 PROCEDURE — 1159F PR MEDICATION LIST DOCUMENTED IN MEDICAL RECORD: ICD-10-PCS | Mod: CPTII,S$GLB,, | Performed by: INTERNAL MEDICINE

## 2022-01-05 PROCEDURE — 3078F DIAST BP <80 MM HG: CPT | Mod: CPTII,S$GLB,, | Performed by: INTERNAL MEDICINE

## 2022-01-05 PROCEDURE — 99999 PR PBB SHADOW E&M-EST. PATIENT-LVL V: CPT | Mod: PBBFAC,,, | Performed by: INTERNAL MEDICINE

## 2022-01-05 PROCEDURE — 99999 PR PBB SHADOW E&M-EST. PATIENT-LVL V: ICD-10-PCS | Mod: PBBFAC,,, | Performed by: INTERNAL MEDICINE

## 2022-01-05 PROCEDURE — 99214 PR OFFICE/OUTPT VISIT, EST, LEVL IV, 30-39 MIN: ICD-10-PCS | Mod: S$GLB,,, | Performed by: INTERNAL MEDICINE

## 2022-01-05 PROCEDURE — 1160F PR REVIEW ALL MEDS BY PRESCRIBER/CLIN PHARMACIST DOCUMENTED: ICD-10-PCS | Mod: CPTII,S$GLB,, | Performed by: INTERNAL MEDICINE

## 2022-01-05 PROCEDURE — 73030 XR SHOULDER TRAUMA 3 VIEW RIGHT: ICD-10-PCS | Mod: 26,RT,, | Performed by: RADIOLOGY

## 2022-01-05 PROCEDURE — 1159F MED LIST DOCD IN RCRD: CPT | Mod: CPTII,S$GLB,, | Performed by: INTERNAL MEDICINE

## 2022-01-05 PROCEDURE — 3008F PR BODY MASS INDEX (BMI) DOCUMENTED: ICD-10-PCS | Mod: CPTII,S$GLB,, | Performed by: INTERNAL MEDICINE

## 2022-01-05 RX ORDER — LEVOTHYROXINE SODIUM 112 UG/1
112 TABLET ORAL
Qty: 30 TABLET | Refills: 11 | Status: SHIPPED | OUTPATIENT
Start: 2022-01-05 | End: 2022-07-18

## 2022-01-05 NOTE — TELEPHONE ENCOUNTER
----- Message from Graciela Lozada sent at 1/5/2022 11:34 AM CST -----  Regarding: advice  Contact: Patient/[phone] (home)  Type: Needs Medical Advice  Who Called:  Patient/[phone] (home)       Additional Information: Has a nurse appointment tomorrow for a B-12 shot. She is coming to the clinic today and would like to get it rescheduled for today. Please call with availability.

## 2022-01-05 NOTE — PROGRESS NOTES
CHIEF COMPLAINT: Hypothyroidism   56 y.o. . She originally had a thyroidectomy 15 years ago. Appears she still had thyroid tissue and had a completion thyroidectomy 14 years ago. On synthroid 125 mcg daily. On generic. No Palpitations. No tremors. Having fatigue. Not sleeping well at night. She does have RAHUL and wears CPAP. States recently had settings checked. Exercise limited due to knee and back pain.           PAST MEDICAL HISTORY: Hypothyroidism, anxiety, CAD with stents, degenerative disc disease. HTN     PAST SURGICAL HISTORY: Tonsillectomy, LILLIAN, laminectomy, carpal tunnel release, . Thyroidectomy with a completion thyroidectomy 14 years ago. CABG     SOCIAL HISTORY: No T/A     FAMILY HISTORY: No thyroid disease or thyroid cancer. + Heart disease.     MEDICATIONS/ALLERGIES: The patient's MedCard has been updated and reviewed.     ROS:   Constitutional: weight stable.   Cardiovascular:No CP. Recently saw cardiology.   Respiratory: No SOB  Remainder ROS negative         PE:   GENERAL: Well developed, well nourished.   NECK: Supple, trachea midline. Neck brace in place  CHEST: Resp even and unlabored, CTA bilateral.   CARDIAC: RRR, S1, S2 heard, no murmurs, rubs, S3, or S4     Results for VLADIMIR FAUSTIN (MRN [id number]) as of 2022 14:38   Ref. Range 2021 15:16   Sodium Latest Ref Range: 136 - 145 mmol/L 141   Potassium Latest Ref Range: 3.5 - 5.1 mmol/L 3.3 (L)   Chloride Latest Ref Range: 95 - 110 mmol/L 102   CO2 Latest Ref Range: 23 - 29 mmol/L 29   Anion Gap Latest Ref Range: 8 - 16 mmol/L 10   BUN Latest Ref Range: 6 - 20 mg/dL 15   Creatinine Latest Ref Range: 0.5 - 1.4 mg/dL 0.7   eGFR if non African American Latest Ref Range: >60 mL/min/1.73 m^2 >60.0   eGFR if African American Latest Ref Range: >60 mL/min/1.73 m^2 >60.0   Glucose Latest Ref Range: 70 - 110 mg/dL 93   Calcium Latest Ref Range: 8.7 - 10.5 mg/dL 9.9   Magnesium Latest Ref Range: 1.6 - 2.6 mg/dL 2.1   Alkaline Phosphatase  Latest Ref Range: 55 - 135 U/L 89   PROTEIN TOTAL Latest Ref Range: 6.0 - 8.4 g/dL 7.8   Albumin Latest Ref Range: 3.5 - 5.2 g/dL 3.9   BILIRUBIN TOTAL Latest Ref Range: 0.1 - 1.0 mg/dL 0.4   AST Latest Ref Range: 10 - 40 U/L 35   ALT Latest Ref Range: 10 - 44 U/L 13   CRP Latest Ref Range: 0.0 - 8.2 mg/L 4.6   Thiamine Latest Ref Range: 38 - 122 ug/L 49   Vitamin B-12 Latest Ref Range: 210 - 950 pg/mL 183 (L)   TSH Latest Ref Range: 0.400 - 4.000 uIU/mL 0.201 (L)   Free T4 Latest Ref Range: 0.71 - 1.51 ng/dL 1.11         ASSESSMENT/PLAN:   1. Hypothyroidism- Hx thyroidectomy. Decrease synthroid 112 mcg daily. Check TSH in 6 weeks.     2. HTN- BP controlled. Continue current Tx,.     3. Insulin resistance- (NO DIABETES) . Will refer to bariatric medicine to assist with weight loss.     4. BMI 30-- see #3. Urine cortisol WNL.     FOLLOWUP  Bariatric medicine  6 weeks- TSH  F/U 6 months with CMP, TSH

## 2022-01-06 ENCOUNTER — OFFICE VISIT (OUTPATIENT)
Dept: ORTHOPEDICS | Facility: CLINIC | Age: 57
End: 2022-01-06
Payer: COMMERCIAL

## 2022-01-06 VITALS — HEIGHT: 62 IN | BODY MASS INDEX: 39.75 KG/M2 | WEIGHT: 216 LBS

## 2022-01-06 DIAGNOSIS — M25.511 CHRONIC RIGHT SHOULDER PAIN: Primary | ICD-10-CM

## 2022-01-06 DIAGNOSIS — M25.512 BILATERAL SHOULDER PAIN, UNSPECIFIED CHRONICITY: ICD-10-CM

## 2022-01-06 DIAGNOSIS — M25.511 BILATERAL SHOULDER PAIN, UNSPECIFIED CHRONICITY: ICD-10-CM

## 2022-01-06 DIAGNOSIS — E66.01 CLASS 2 SEVERE OBESITY DUE TO EXCESS CALORIES WITH SERIOUS COMORBIDITY IN ADULT, UNSPECIFIED BMI: ICD-10-CM

## 2022-01-06 DIAGNOSIS — M75.22 BICEPS TENDINITIS OF LEFT UPPER EXTREMITY: ICD-10-CM

## 2022-01-06 DIAGNOSIS — M77.8 SUBACROMIAL TENDINITIS OF RIGHT SHOULDER: ICD-10-CM

## 2022-01-06 DIAGNOSIS — G89.29 CHRONIC RIGHT SHOULDER PAIN: Primary | ICD-10-CM

## 2022-01-06 PROCEDURE — 20610 LARGE JOINT ASPIRATION/INJECTION: L SUBACROMIAL BURSA: ICD-10-PCS | Mod: 50,S$GLB,, | Performed by: ORTHOPAEDIC SURGERY

## 2022-01-06 PROCEDURE — 1159F PR MEDICATION LIST DOCUMENTED IN MEDICAL RECORD: ICD-10-PCS | Mod: CPTII,S$GLB,, | Performed by: ORTHOPAEDIC SURGERY

## 2022-01-06 PROCEDURE — 3008F BODY MASS INDEX DOCD: CPT | Mod: CPTII,S$GLB,, | Performed by: ORTHOPAEDIC SURGERY

## 2022-01-06 PROCEDURE — 99203 OFFICE O/P NEW LOW 30 MIN: CPT | Mod: 25,S$GLB,, | Performed by: ORTHOPAEDIC SURGERY

## 2022-01-06 PROCEDURE — 3008F PR BODY MASS INDEX (BMI) DOCUMENTED: ICD-10-PCS | Mod: CPTII,S$GLB,, | Performed by: ORTHOPAEDIC SURGERY

## 2022-01-06 PROCEDURE — 20610 DRAIN/INJ JOINT/BURSA W/O US: CPT | Mod: 50,S$GLB,, | Performed by: ORTHOPAEDIC SURGERY

## 2022-01-06 PROCEDURE — 99999 PR PBB SHADOW E&M-EST. PATIENT-LVL IV: ICD-10-PCS | Mod: PBBFAC,,, | Performed by: ORTHOPAEDIC SURGERY

## 2022-01-06 PROCEDURE — 99999 PR PBB SHADOW E&M-EST. PATIENT-LVL IV: CPT | Mod: PBBFAC,,, | Performed by: ORTHOPAEDIC SURGERY

## 2022-01-06 PROCEDURE — 99203 PR OFFICE/OUTPT VISIT, NEW, LEVL III, 30-44 MIN: ICD-10-PCS | Mod: 25,S$GLB,, | Performed by: ORTHOPAEDIC SURGERY

## 2022-01-06 PROCEDURE — 1159F MED LIST DOCD IN RCRD: CPT | Mod: CPTII,S$GLB,, | Performed by: ORTHOPAEDIC SURGERY

## 2022-01-06 RX ORDER — TRIAMCINOLONE ACETONIDE 40 MG/ML
80 INJECTION, SUSPENSION INTRA-ARTICULAR; INTRAMUSCULAR
Status: DISCONTINUED | OUTPATIENT
Start: 2022-01-06 | End: 2022-01-06 | Stop reason: HOSPADM

## 2022-01-06 RX ADMIN — TRIAMCINOLONE ACETONIDE 80 MG: 40 INJECTION, SUSPENSION INTRA-ARTICULAR; INTRAMUSCULAR at 03:01

## 2022-01-06 NOTE — PROCEDURES
Large Joint Aspiration/Injection: R subacromial bursa    Date/Time: 1/6/2022 3:45 PM  Performed by: Rian Mcpherson MD  Authorized by: Rian Mcpherson MD     Consent Done?:  Yes (Verbal)  Site marked: the procedure site was marked    Prep: patient was prepped and draped in usual sterile fashion      Details:  Needle Size:  21 G  Approach:  Posterior  Location:  Shoulder  Site:  R subacromial bursa  Medications:  80 mg triamcinolone acetonide 40 mg/mL  Patient tolerance:  Patient tolerated the procedure well with no immediate complications

## 2022-01-06 NOTE — PROGRESS NOTES
56 years old complaining of bilateral shoulder pain for the past several months pain is 5 on good days 10 on bad days.  Patient cannot sleep at nighttime she is very uncomfortable tried oral medications without relief patient reports have had injections in the past with temporary relief *.  No trauma to the shoulder        Patient is sharp cervical spine fusion also history of bilateral knee replacement surgery    Exam shows painful motion about the shoulders with positive Neer Altamirano impingement sign, weak and painful cuff strength    MRI from outlying facility [place] shows right shoulder tendinosis as well as partial subscap tear biceps tendinosis degenerative changes within the labrum subacromial bursitis AC arthrosis    Assessment:  Bilateral shoulder pain degenerative disease of the shoulder possible cervical radicular symptoms    Plan:  Kenalog injections into bilateral shoulder subacromial spaces therapeutic/diagnostic injections    Patient seen as a consult from Smitha Kate, communication via [facility]

## 2022-01-06 NOTE — PROCEDURES
Large Joint Aspiration/Injection: L subacromial bursa    Date/Time: 1/6/2022 3:45 PM  Performed by: Rian Mcpherson MD  Authorized by: Rian Mcpherson MD     Consent Done?:  Yes (Verbal)  Site marked: the procedure site was marked    Prep: patient was prepped and draped in usual sterile fashion      Details:  Needle Size:  21 G  Approach:  Posterior  Location:  Shoulder  Site:  L subacromial bursa  Medications:  80 mg triamcinolone acetonide 40 mg/mL  Patient tolerance:  Patient tolerated the procedure well with no immediate complications

## 2022-01-07 ENCOUNTER — TELEPHONE (OUTPATIENT)
Dept: ENDOCRINOLOGY | Facility: CLINIC | Age: 57
End: 2022-01-07
Payer: COMMERCIAL

## 2022-01-07 ENCOUNTER — PATIENT MESSAGE (OUTPATIENT)
Dept: BARIATRICS | Facility: CLINIC | Age: 57
End: 2022-01-07
Payer: COMMERCIAL

## 2022-01-07 NOTE — TELEPHONE ENCOUNTER
Attempted to contact the patient. Patient did not answer. Left detailed voicemail and requested a callback.

## 2022-01-07 NOTE — TELEPHONE ENCOUNTER
Dr. Aguilera placed referral for Bariatric Medicine on the [place]  Please contact pt to schedule  Thank you

## 2022-01-10 DIAGNOSIS — M51.36 DDD (DEGENERATIVE DISC DISEASE), LUMBAR: ICD-10-CM

## 2022-01-10 NOTE — TELEPHONE ENCOUNTER
No new care gaps identified.  Powered by [employer]. Reference number: [id number].   1/10/2022 10:51:59 AM CST

## 2022-01-13 RX ORDER — GABAPENTIN 100 MG/1
100 CAPSULE ORAL 3 TIMES DAILY
Qty: 270 CAPSULE | Refills: 3 | Status: SHIPPED | OUTPATIENT
Start: 2022-01-13 | End: 2022-07-21

## 2022-01-18 ENCOUNTER — OFFICE VISIT (OUTPATIENT)
Dept: ORTHOPEDICS | Facility: CLINIC | Age: 57
End: 2022-01-18
Payer: COMMERCIAL

## 2022-01-18 VITALS — WEIGHT: 216.06 LBS | HEIGHT: 62 IN | BODY MASS INDEX: 39.76 KG/M2

## 2022-01-18 DIAGNOSIS — M54.16 LUMBAR RADICULOPATHY: Primary | ICD-10-CM

## 2022-01-18 PROCEDURE — 1159F PR MEDICATION LIST DOCUMENTED IN MEDICAL RECORD: ICD-10-PCS | Mod: CPTII,S$GLB,, | Performed by: ORTHOPAEDIC SURGERY

## 2022-01-18 PROCEDURE — 99999 PR PBB SHADOW E&M-EST. PATIENT-LVL III: CPT | Mod: PBBFAC,,, | Performed by: ORTHOPAEDIC SURGERY

## 2022-01-18 PROCEDURE — 4010F PR ACE/ARB THEARPY RXD/TAKEN: ICD-10-PCS | Mod: CPTII,S$GLB,, | Performed by: ORTHOPAEDIC SURGERY

## 2022-01-18 PROCEDURE — 4010F ACE/ARB THERAPY RXD/TAKEN: CPT | Mod: CPTII,S$GLB,, | Performed by: ORTHOPAEDIC SURGERY

## 2022-01-18 PROCEDURE — 3008F PR BODY MASS INDEX (BMI) DOCUMENTED: ICD-10-PCS | Mod: CPTII,S$GLB,, | Performed by: ORTHOPAEDIC SURGERY

## 2022-01-18 PROCEDURE — 99213 PR OFFICE/OUTPT VISIT, EST, LEVL III, 20-29 MIN: ICD-10-PCS | Mod: S$GLB,,, | Performed by: ORTHOPAEDIC SURGERY

## 2022-01-18 PROCEDURE — 1160F RVW MEDS BY RX/DR IN RCRD: CPT | Mod: CPTII,S$GLB,, | Performed by: ORTHOPAEDIC SURGERY

## 2022-01-18 PROCEDURE — 99213 OFFICE O/P EST LOW 20 MIN: CPT | Mod: S$GLB,,, | Performed by: ORTHOPAEDIC SURGERY

## 2022-01-18 PROCEDURE — 1160F PR REVIEW ALL MEDS BY PRESCRIBER/CLIN PHARMACIST DOCUMENTED: ICD-10-PCS | Mod: CPTII,S$GLB,, | Performed by: ORTHOPAEDIC SURGERY

## 2022-01-18 PROCEDURE — 3008F BODY MASS INDEX DOCD: CPT | Mod: CPTII,S$GLB,, | Performed by: ORTHOPAEDIC SURGERY

## 2022-01-18 PROCEDURE — 99999 PR PBB SHADOW E&M-EST. PATIENT-LVL III: ICD-10-PCS | Mod: PBBFAC,,, | Performed by: ORTHOPAEDIC SURGERY

## 2022-01-18 PROCEDURE — 1159F MED LIST DOCD IN RCRD: CPT | Mod: CPTII,S$GLB,, | Performed by: ORTHOPAEDIC SURGERY

## 2022-01-18 NOTE — PROGRESS NOTES
"Chief Complaint   Patient presents with    Right Knee - Pain       HPI:   This is a 56 y.o. who returns to clinic today in follow-up for right knee for the past 2 months after no known trauma. She is scheduled for surgery with Dr. Dennison for her spinal stenosis. Pain is throbbing.  No associated signs or symptoms.    Past Medical History:   Diagnosis Date    Allergy     Anticoagulant long-term use     ASA 81mg, Effient    Anxiety     Arthritis     Asthma     As child    CAD (coronary artery disease) 01/27/2012    s/p stents x4 , CABG, 3 vessel, (5/2013) newest stent prior to CABG    Chronic constipation     Colon polyp     Coronary artery disease involving native coronary artery of native heart without angina pectoris 1/27/2012 12/19 Significant ostial RCA lesion as above treated with drug-eluting stenting as above. Occluded proximal LAD with patent lima lad Patent vein graft to 2nd obtuse marginal Known occluded vein graft to unknown vessel.  s/p stents x 3 (2010) s/p LAD stent (DSE) 3/2012    DDD (degenerative disc disease), cervical     DDD (degenerative disc disease), lumbar     DDD (degenerative disc disease), thoracic     Depression     Edema     Encounter for blood transfusion     Headache(784.0)     History of nephrolithiasis     Hyperlipidemia     Hypertension     Hypothyroid 7/5/2012    Insomnia     Insulin resistance     patient stated not diebetic    Joint stiffness     Joint swelling     Kidney disease     ; Frequent UTIs     Kidney stones     Low back pain     Neck pain     Obstructive sleep apnea on CPAP 7/17/2012    PONV (postoperative nausea and vomiting)     S/P CABG (coronary artery bypass graft) 6/25/2013 6/23/2013     Sleep apnea 01/27/12    Patient reports "severe" sleep apnea, uses C-pap    Stented coronary artery 12/20/2019 12/19  ostial RCA -Osirio2.5 x 18 post dilated 2.75     Thoracic back pain     Usual hyperplasia of lactiferous " duct 2017    left breast     Past Surgical History:   Procedure Laterality Date    ADENOIDECTOMY      BACK SURGERY      BREAST BIOPSY Left 2017    duct excision- Dr. Jimenez    BREAST CYST ASPIRATION Left 2020    @ 's office- old hematoma drained (per office)    CARDIAC SURGERY      CARPAL TUNNEL RELEASE      bilateral    CERVICAL LAMINECTOMY WITH SPINAL FUSION      2016     SECTION, CLASSIC      x 2    COLONOSCOPY      CORONARY ANGIOGRAPHY  2019    Procedure: ANGIOGRAM, CORONARY ARTERY;  Surgeon: Tmii Millan MD;  Location: [facility] CATH;  Service: Cardiology;;    CORONARY ANGIOPLASTY WITH STENT PLACEMENT      x 4    CORONARY ARTERY BYPASS GRAFT  2013    3 vessel    ESOPHAGOGASTRODUODENOSCOPY N/A 2020    Procedure: EGD (ESOPHAGOGASTRODUODENOSCOPY);  Surgeon: Mk Warren MD;  Location: [facility];  Service: Endoscopy;  Laterality: N/A;    HYSTERECTOMY      Complete    JOINT REPLACEMENT      KNEE ARTHROPLASTY Left 2019    Procedure: ARTHROPLASTY, KNEE;  Surgeon: Juan Wilson MD;  Location: [facility] OR;  Service: Orthopedics;  Laterality: Left;    KNEE ARTHROSCOPY W/ MENISCAL REPAIR Left     twice, last one 2014    LAMINECTOMY      L4/L5    LEFT HEART CATHETERIZATION  2019    Procedure: Left heart cath- RM # [id number];  Surgeon: Timi Millan MD;  Location: [facility] CATH;  Service: Cardiology;;    OOPHORECTOMY Bilateral     ROBOTIC ARTHROPLASTY, KNEE Right 2021    Procedure: ROBOTIC ARTHROPLASTY, KNEE, TOTAL;  Surgeon: Juan Wilson MD;  Location: [facility] OR;  Service: Orthopedics;  Laterality: Right;    SINUS SURGERY      x3    TENDON REPAIR Left     ankle surgery    THYROIDECTOMY      2 separate surgeries    TONSILLECTOMY      TOTAL KNEE ARTHROPLASTY Left 2019    Surgeon: Juan Wilson MD     Current Outpatient Medications on File Prior to Visit   Medication Sig Dispense Refill    acetaminophen (TYLENOL) 650 MG TbSR  Take 1,300 mg by mouth every 8 (eight) hours as needed (pain).       albuterol (PROVENTIL/VENTOLIN HFA) 90 mcg/actuation inhaler Inhale 2 puffs into the lungs every 6 (six) hours as needed for Shortness of Breath.       allopurinoL (ZYLOPRIM) 100 MG tablet TAKE 1 TABLET BY MOUTH ONCE DAILY (Patient taking differently: Take 100 mg by mouth once daily.) 90 tablet 3    amLODIPine (NORVASC) 5 MG tablet TAKE 1 TABLET BY MOUTH ONCE DAILY  (Patient taking differently: Take 5 mg by mouth once daily.) 90 tablet 3    aspirin (ECOTRIN) 81 MG EC tablet Take 81 mg by mouth once daily.      atorvastatin (LIPITOR) 40 MG tablet TAKE 1 TABLET (40 MG TOTAL) BY MOUTH EVERY EVENING.  90 tablet 3    BRILINTA 90 mg tablet TAKE 1 TABLET BY MOUTH 2 TIMES A DAY (Patient taking differently: Take 90 mg by mouth 2 (two) times daily.) 60 tablet 4    carvediloL (COREG) 25 MG tablet Take 1 tablet (25 mg total) by mouth 2 (two) times daily. 180 tablet 2    cetirizine (ZYRTEC) 10 MG tablet Take 10 mg by mouth daily as needed for Allergies.      furosemide (LASIX) 20 MG tablet TAKE 1 TABLET BY MOUTH EVERY DAY (Patient taking differently: Take 20 mg by mouth once daily.) 90 tablet 2    gabapentin (NEURONTIN) 100 MG capsule TAKE 1 CAPSULE (100 MG TOTAL) BY MOUTH 3 (THREE) TIMES DAILY. 270 capsule 3    ibuprofen (ADVIL,MOTRIN) 800 MG tablet Take 1 tablet (800 mg total) by mouth 3 (three) times daily as needed for Pain. 90 tablet 0    lancets Misc 1 Units by Misc.(Non-Drug; Combo Route) route 2 (two) times daily as needed. 100 each 3    levothyroxine (SYNTHROID) 112 MCG tablet Take 1 tablet (112 mcg total) by mouth before breakfast. 30 tablet 11    nitroGLYCERIN (NITROSTAT) 0.4 MG SL tablet Place 1 tablet (0.4 mg total) under the tongue every 5 (five) minutes as needed for Chest pain. 25 tablet 3    nitroGLYCERIN 0.1 mg/hr TD PT24 (NITRODUR) 0.1 mg/hr PT24 Place 1 patch onto the skin once daily. 30 patch 11    ondansetron (ZOFRAN) 4 MG  "tablet Take 1 tablet (4 mg total) by mouth every 6 (six) hours as needed for Nausea. 20 tablet 1    pantoprazole (PROTONIX) 40 MG tablet TAKE 1 TABLET BY MOUTH EVERY DAY  90 tablet 3    potassium chloride SA (K-DUR,KLOR-CON) 20 MEQ tablet Take 1 tablet (20 mEq total) by mouth once daily. 90 tablet 0    ramipriL (ALTACE) 5 MG capsule TAKE 1 CAPSULE (5 MG TOTAL) BY MOUTH EVERY EVENING. 90 capsule 3    ranolazine (RANEXA) 500 MG Tb12 TAKE 2 TABLETS (1 000 MG TOTAL) BY MOUTH 2 (TWO) TIMES DAILY. 120 tablet 1    venlafaxine (EFFEXOR-XR) 150 MG Cp24 TAKE 1 CAPSULE BY MOUTH ONCE DAILY  (Patient taking differently: Take 150 mg by mouth every evening.) 90 capsule 3    blood-glucose meter (GLUCOSE MONITORING KIT) kit Use as instructed 1 each 0    famotidine (PEPCID) 40 MG tablet Take 1 tablet (40 mg total) by mouth every evening. 30 tablet 1    [DISCONTINUED] levothyroxine (SYNTHROID) 125 MCG tablet TAKE 1 TABLET (125 MCG TOTAL) BY MOUTH BEFORE BREAKFAST. (Patient not taking: Reported on 1/18/2022) 30 tablet 11     Current Facility-Administered Medications on File Prior to Visit   Medication Dose Route Frequency Provider Last Rate Last Admin    cyanocobalamin injection 1,000 mcg  1,000 mcg Intramuscular Weekly Smitha Kate, NP   1,000 mcg at 12/30/21 1157    sodium chloride 0.9% flush 10 mL  10 mL Intravenous PRN Juan Wilson MD         Review of patient's allergies indicates:   Allergen Reactions    Celexa [citalopram] Other (See Comments)     GI upset  Stated "knocked me out"    Cephalexin Anaphylaxis    Zoloft [sertraline] Other (See Comments)     Stated "knocked me out"    Codeine Other (See Comments)     hallucinations  hallucinations    Isosorbide Nausea And Vomiting    Ciprofloxacin Itching    Levaquin [levofloxacin] Other (See Comments)     tendinitis    Metformin      Nausea     Penicillins Other (See Comments)     Was told per mother that as child was allergic to penicillin.  Childhood " allergy/ unknown reaction    Sulfa (sulfonamide antibiotics)      Family History   Problem Relation Age of Onset    Heart disease Father     Diabetes Father     Hypertension Father     Stroke Father     Cataracts Father     Cancer Maternal Grandmother         Breast with Mets    Breast cancer Maternal Grandmother     Cancer Paternal Grandmother         Colon    Heart failure Mother     Asthma Mother     Macular degeneration Mother     Cancer Mother         Breast    Cataracts Mother     Heart disease Mother     COPD Mother     Breast cancer Mother     Glaucoma Sister     Thyroid disease Sister     Glaucoma Sister     Strabismus Other     Other Brother         stiff man syndrome    Amblyopia Neg Hx     Blindness Neg Hx     Retinal detachment Neg Hx      Social History     Socioeconomic History    Marital status:    Occupational History    Occupation: teacher   Tobacco Use    Smoking status: Former Smoker     Packs/day: 0.50     Years: 14.00     Pack years: 7.00     Types: Cigarettes     Start date: 1984     Quit date: 1998     Years since quittin.0    Smokeless tobacco: Never Used   Substance and Sexual Activity    Alcohol use: No    Drug use: Not Currently     Types: Benzodiazepines    Sexual activity: Yes     Partners: Male       Review of Systems:  Constitutional:  Denies fever or chills   Eyes:  Denies change in visual acuity   HENT:  Denies nasal congestion or sore throat   Respiratory:  Denies cough or shortness of breath   Cardiovascular:  Denies chest pain or edema   GI:  Denies abdominal pain, nausea, vomiting, bloody stools or diarrhea   :  Denies dysuria   Integument:  Denies rash   Neurologic:  Denies headache, focal weakness or sensory changes   Endocrine:  Denies polyuria or polydipsia   Lymphatic:  Denies swollen glands   Psychiatric:  Denies depression or anxiety     Physical Exam:   Constitutional:  Well developed, well nourished, no acute  distress, non-toxic appearance   Integument:  Well hydrated, no rash   Lymphatic:  No lymphadenopathy noted   Neurologic:  Alert & oriented x 3,    Psychiatric:  Speech and behavior appropriate   Gi: abdomen soft  Eyes: EOMI    LLE  Exam performed same as contralateral side and is normal    RLE  3/5 quads and gastrocs. FROM knee. Stable to stress. Skin intact. Compartments soft. NVI distally.       Lumbar radiculopathy         Continue ROM exercises. RTC 5 months recheck.

## 2022-01-19 ENCOUNTER — HOSPITAL ENCOUNTER (OUTPATIENT)
Dept: RADIOLOGY | Facility: HOSPITAL | Age: 57
Discharge: HOME | End: 2022-01-19
Attending: NURSE PRACTITIONER
Payer: COMMERCIAL

## 2022-01-19 DIAGNOSIS — M62.89 MUSCLE FATIGUE: ICD-10-CM

## 2022-01-19 DIAGNOSIS — R41.89 BRAIN FOG: ICD-10-CM

## 2022-01-19 DIAGNOSIS — R29.898 WEAKNESS OF EXTREMITY: ICD-10-CM

## 2022-01-19 PROCEDURE — 72141 MRI NECK SPINE W/O DYE: CPT | Mod: TC,PO

## 2022-01-19 PROCEDURE — 72141 MRI CERVICAL SPINE WITHOUT CONTRAST: ICD-10-PCS | Mod: 26,,, | Performed by: RADIOLOGY

## 2022-01-19 PROCEDURE — 72141 MRI NECK SPINE W/O DYE: CPT | Mod: 26,,, | Performed by: RADIOLOGY

## 2022-01-31 ENCOUNTER — TELEPHONE (OUTPATIENT)
Dept: FAMILY MEDICINE | Facility: CLINIC | Age: 57
End: 2022-01-31
Payer: COMMERCIAL

## 2022-01-31 NOTE — TELEPHONE ENCOUNTER
Tried to schedule virtual. Having COVID symptoms. Wants someone to listen to chest and is SOB, going to urgent care.         ----- Message from Joyce Hicks sent at 1/31/2022  9:25 AM CST -----  Type:  Same Day Appointment Request    Caller is requesting a same day appointment.  Caller declined first available appointment listed below.      Name of Caller:  Josephine  When is the first available appointment?  2/1  Symptoms:  sinus congestion that is moving into her chest  Best Call Back Number:  [phone]  Additional Information:   She will see whomever is available.  Thank you

## 2022-02-08 RX ORDER — TICAGRELOR 90 MG/1
TABLET ORAL
Qty: 60 TABLET | Refills: 4 | Status: SHIPPED | OUTPATIENT
Start: 2022-02-08 | End: 2023-04-03

## 2022-02-21 LAB
CHOLEST SERPL-MSCNC: 176 MG/DL (ref 0–200)
HDLC SERPL-MCNC: 68 MG/DL (ref 35–70)
LDLC SERPL CALC-MCNC: 89 MG/DL (ref 0–160)
TRIGL SERPL-MCNC: 93 MG/DL (ref 40–160)

## 2022-02-23 ENCOUNTER — TELEPHONE (OUTPATIENT)
Dept: FAMILY MEDICINE | Facility: CLINIC | Age: 57
End: 2022-02-23
Payer: COMMERCIAL

## 2022-02-23 NOTE — TELEPHONE ENCOUNTER
Got a paper faxed, per colleen's request I set her up an appointment. Surgical clearance paper is in folder

## 2022-02-26 ENCOUNTER — LAB VISIT (OUTPATIENT)
Dept: LAB | Facility: HOSPITAL | Age: 57
End: 2022-02-26
Attending: FAMILY MEDICINE
Payer: COMMERCIAL

## 2022-02-26 DIAGNOSIS — E03.9 HYPOTHYROIDISM, UNSPECIFIED TYPE: ICD-10-CM

## 2022-02-26 LAB — TSH SERPL DL<=0.005 MIU/L-ACNC: 1.64 UIU/ML (ref 0.4–4)

## 2022-02-26 PROCEDURE — 36415 COLL VENOUS BLD VENIPUNCTURE: CPT | Mod: PO | Performed by: INTERNAL MEDICINE

## 2022-02-26 PROCEDURE — 84443 ASSAY THYROID STIM HORMONE: CPT | Performed by: INTERNAL MEDICINE

## 2022-03-07 ENCOUNTER — PATIENT OUTREACH (OUTPATIENT)
Dept: ADMINISTRATIVE | Facility: HOSPITAL | Age: 57
End: 2022-03-07
Payer: COMMERCIAL

## 2022-03-15 ENCOUNTER — LAB VISIT (OUTPATIENT)
Dept: LAB | Facility: HOSPITAL | Age: 57
End: 2022-03-15
Attending: FAMILY MEDICINE
Payer: COMMERCIAL

## 2022-03-15 ENCOUNTER — OFFICE VISIT (OUTPATIENT)
Dept: FAMILY MEDICINE | Facility: CLINIC | Age: 57
End: 2022-03-15
Payer: COMMERCIAL

## 2022-03-15 VITALS
HEART RATE: 84 BPM | BODY MASS INDEX: 40.12 KG/M2 | WEIGHT: 219.38 LBS | TEMPERATURE: 99 F | OXYGEN SATURATION: 97 % | SYSTOLIC BLOOD PRESSURE: 138 MMHG | DIASTOLIC BLOOD PRESSURE: 80 MMHG

## 2022-03-15 DIAGNOSIS — Z12.11 COLON CANCER SCREENING: ICD-10-CM

## 2022-03-15 DIAGNOSIS — M54.12 CERVICAL RADICULOPATHY: ICD-10-CM

## 2022-03-15 DIAGNOSIS — I25.10 CORONARY ARTERY DISEASE, UNSPECIFIED VESSEL OR LESION TYPE, UNSPECIFIED WHETHER ANGINA PRESENT, UNSPECIFIED WHETHER NATIVE OR TRANSPLANTED HEART: ICD-10-CM

## 2022-03-15 DIAGNOSIS — Z01.818 PREOP EXAMINATION: Primary | ICD-10-CM

## 2022-03-15 DIAGNOSIS — I10 ESSENTIAL HYPERTENSION: ICD-10-CM

## 2022-03-15 DIAGNOSIS — Z01.818 PREOP EXAMINATION: ICD-10-CM

## 2022-03-15 PROCEDURE — 99999 PR PBB SHADOW E&M-EST. PATIENT-LVL V: CPT | Mod: PBBFAC,,, | Performed by: FAMILY MEDICINE

## 2022-03-15 PROCEDURE — 99214 PR OFFICE/OUTPT VISIT, EST, LEVL IV, 30-39 MIN: ICD-10-PCS | Mod: S$GLB,,, | Performed by: FAMILY MEDICINE

## 2022-03-15 PROCEDURE — 80053 COMPREHEN METABOLIC PANEL: CPT | Performed by: FAMILY MEDICINE

## 2022-03-15 PROCEDURE — 36415 COLL VENOUS BLD VENIPUNCTURE: CPT | Mod: PO | Performed by: FAMILY MEDICINE

## 2022-03-15 PROCEDURE — 93005 EKG 12-LEAD: ICD-10-PCS | Mod: S$GLB,,, | Performed by: FAMILY MEDICINE

## 2022-03-15 PROCEDURE — 99999 PR PBB SHADOW E&M-EST. PATIENT-LVL V: ICD-10-PCS | Mod: PBBFAC,,, | Performed by: FAMILY MEDICINE

## 2022-03-15 PROCEDURE — 93010 EKG 12-LEAD: ICD-10-PCS | Mod: S$GLB,,, | Performed by: INTERNAL MEDICINE

## 2022-03-15 PROCEDURE — 99214 OFFICE O/P EST MOD 30 MIN: CPT | Mod: S$GLB,,, | Performed by: FAMILY MEDICINE

## 2022-03-15 PROCEDURE — 93005 ELECTROCARDIOGRAM TRACING: CPT | Mod: S$GLB,,, | Performed by: FAMILY MEDICINE

## 2022-03-15 PROCEDURE — 93010 ELECTROCARDIOGRAM REPORT: CPT | Mod: S$GLB,,, | Performed by: INTERNAL MEDICINE

## 2022-03-15 PROCEDURE — 85025 COMPLETE CBC W/AUTO DIFF WBC: CPT | Performed by: FAMILY MEDICINE

## 2022-03-15 NOTE — PROGRESS NOTES
Subjective:       Patient ID: Josephine Bolton is a 56 y.o. female.    Chief Complaint: Procedure (clearance)    HPI     Referred by Dr. Tez Dennison, Orthopedic surgeon, for a preop exam prior to anterior and posterior cervical fusion in April 2022.     Cad-followed by cardiology     htn stable.       Review of Systems      Review of Systems   Constitutional: Negative for fever and chills.   HENT: Negative for hearing loss and neck stiffness.    Eyes: Negative for redness and itching.   Respiratory: Negative for cough and choking.    Cardiovascular: Negative for chest pain and leg swelling.  Abdomen: Negative for abdominal pain and blood in stool.   Genitourinary: Negative for dysuria and flank pain.   Musculoskeletal: Negative for back pain and gait problem.   Neurological: Negative for light-headedness and headaches.   Hematological: Negative for adenopathy.   Psychiatric/Behavioral: Negative for behavioral problems.     Objective:      Physical Exam  Constitutional:       Appearance: She is well-developed.   HENT:      Head: Normocephalic and atraumatic.   Eyes:      Conjunctiva/sclera: Conjunctivae normal.      Pupils: Pupils are equal, round, and reactive to light.   Cardiovascular:      Rate and Rhythm: Normal rate and regular rhythm.      Heart sounds: No murmur heard.  Pulmonary:      Effort: Pulmonary effort is normal.      Breath sounds: Normal breath sounds.   Musculoskeletal:      Cervical back: Normal range of motion.   Lymphadenopathy:      Cervical: No cervical adenopathy.         Assessment:       1. Preop examination    2. Cervical radiculopathy    3. Colon cancer screening    4. Essential hypertension    5. Coronary artery disease, unspecified vessel or lesion type, unspecified whether angina present, unspecified whether native or transplanted heart        Plan:       Preop examination  -     CBC Auto Differential; Future  -     Comprehensive Metabolic Panel; Future  -     EKG 12-lead;  Future    Cervical radiculopathy    Colon cancer screening  -     Fecal Immunochemical Test (iFOBT); Future    Essential hypertension    Coronary artery disease, unspecified vessel or lesion type, unspecified whether angina present, unspecified whether native or transplanted heart          Plan:  Pending results, will clear patient  Cont current meds    ADDENDUM:    This patient is medically cleared for surgery.      Thank you for referring this patient to me and allowing me to share his care.           Medication List with Changes/Refills   Current Medications    ACETAMINOPHEN (TYLENOL) 650 MG TBSR    Take 1,300 mg by mouth every 8 (eight) hours as needed (pain).     ALBUTEROL (PROVENTIL/VENTOLIN HFA) 90 MCG/ACTUATION INHALER    Inhale 2 puffs into the lungs every 6 (six) hours as needed for Shortness of Breath.     ALLOPURINOL (ZYLOPRIM) 100 MG TABLET    TAKE 1 TABLET BY MOUTH ONCE DAILY    AMLODIPINE (NORVASC) 5 MG TABLET    TAKE 1 TABLET BY MOUTH ONCE DAILY    ASPIRIN (ECOTRIN) 81 MG EC TABLET    Take 81 mg by mouth once daily.    ATORVASTATIN (LIPITOR) 40 MG TABLET    TAKE 1 TABLET (40 MG TOTAL) BY MOUTH EVERY EVENING.     BLOOD-GLUCOSE METER (GLUCOSE MONITORING KIT) KIT    Use as instructed    BRILINTA 90 MG TABLET    TAKE 1 TABLET BY MOUTH 2 TIMES A DAY    CARVEDILOL (COREG) 25 MG TABLET    Take 1 tablet (25 mg total) by mouth 2 (two) times daily.    CETIRIZINE (ZYRTEC) 10 MG TABLET    Take 10 mg by mouth daily as needed for Allergies.    FAMOTIDINE (PEPCID) 40 MG TABLET    Take 1 tablet (40 mg total) by mouth every evening.    FUROSEMIDE (LASIX) 20 MG TABLET    TAKE 1 TABLET BY MOUTH EVERY DAY    GABAPENTIN (NEURONTIN) 100 MG CAPSULE    TAKE 1 CAPSULE (100 MG TOTAL) BY MOUTH 3 (THREE) TIMES DAILY.    IBUPROFEN (ADVIL,MOTRIN) 800 MG TABLET    Take 1 tablet (800 mg total) by mouth 3 (three) times daily as needed for Pain.    LANCETS MISC    1 Units by Misc.(Non-Drug; Combo Route) route 2 (two) times daily as  needed.    LEVOTHYROXINE (SYNTHROID) 112 MCG TABLET    Take 1 tablet (112 mcg total) by mouth before breakfast.    NITROGLYCERIN (NITROSTAT) 0.4 MG SL TABLET    Place 1 tablet (0.4 mg total) under the tongue every 5 (five) minutes as needed for Chest pain.    NITROGLYCERIN 0.1 MG/HR TD PT24 (NITRODUR) 0.1 MG/HR PT24    Place 1 patch onto the skin once daily.    ONDANSETRON (ZOFRAN) 4 MG TABLET    Take 1 tablet (4 mg total) by mouth every 6 (six) hours as needed for Nausea.    PANTOPRAZOLE (PROTONIX) 40 MG TABLET    TAKE 1 TABLET BY MOUTH EVERY DAY     POTASSIUM CHLORIDE SA (K-DUR,KLOR-CON) 20 MEQ TABLET    Take 1 tablet (20 mEq total) by mouth once daily.    RAMIPRIL (ALTACE) 5 MG CAPSULE    TAKE 1 CAPSULE (5 MG TOTAL) BY MOUTH EVERY EVENING.    RANOLAZINE (RANEXA) 500 MG TB12    TAKE 2 TABLETS (1 000 MG TOTAL) BY MOUTH 2 (TWO) TIMES DAILY.    VENLAFAXINE (EFFEXOR-XR) 150 MG CP24    TAKE 1 CAPSULE BY MOUTH ONCE DAILY

## 2022-03-16 LAB
ALBUMIN SERPL BCP-MCNC: 3.5 G/DL (ref 3.5–5.2)
ALP SERPL-CCNC: 71 U/L (ref 55–135)
ALT SERPL W/O P-5'-P-CCNC: 10 U/L (ref 10–44)
ANION GAP SERPL CALC-SCNC: 13 MMOL/L (ref 8–16)
AST SERPL-CCNC: 14 U/L (ref 10–40)
BASOPHILS # BLD AUTO: 0.08 K/UL (ref 0–0.2)
BASOPHILS NFR BLD: 0.8 % (ref 0–1.9)
BILIRUB SERPL-MCNC: 0.4 MG/DL (ref 0.1–1)
BUN SERPL-MCNC: 16 MG/DL (ref 6–20)
CALCIUM SERPL-MCNC: 9.5 MG/DL (ref 8.7–10.5)
CHLORIDE SERPL-SCNC: 106 MMOL/L (ref 95–110)
CO2 SERPL-SCNC: 28 MMOL/L (ref 23–29)
CREAT SERPL-MCNC: 0.9 MG/DL (ref 0.5–1.4)
DIFFERENTIAL METHOD: ABNORMAL
EOSINOPHIL # BLD AUTO: 0.1 K/UL (ref 0–0.5)
EOSINOPHIL NFR BLD: 1.1 % (ref 0–8)
ERYTHROCYTE [DISTWIDTH] IN BLOOD BY AUTOMATED COUNT: 16.4 % (ref 11.5–14.5)
EST. GFR  (AFRICAN AMERICAN): >60 ML/MIN/1.73 M^2
EST. GFR  (NON AFRICAN AMERICAN): >60 ML/MIN/1.73 M^2
GLUCOSE SERPL-MCNC: 99 MG/DL (ref 70–110)
HCT VFR BLD AUTO: 34.1 % (ref 37–48.5)
HGB BLD-MCNC: 10.6 G/DL (ref 12–16)
IMM GRANULOCYTES # BLD AUTO: 0.03 K/UL (ref 0–0.04)
IMM GRANULOCYTES NFR BLD AUTO: 0.3 % (ref 0–0.5)
LYMPHOCYTES # BLD AUTO: 2.6 K/UL (ref 1–4.8)
LYMPHOCYTES NFR BLD: 26 % (ref 18–48)
MCH RBC QN AUTO: 26.2 PG (ref 27–31)
MCHC RBC AUTO-ENTMCNC: 31.1 G/DL (ref 32–36)
MCV RBC AUTO: 84 FL (ref 82–98)
MONOCYTES # BLD AUTO: 0.8 K/UL (ref 0.3–1)
MONOCYTES NFR BLD: 7.8 % (ref 4–15)
NEUTROPHILS # BLD AUTO: 6.3 K/UL (ref 1.8–7.7)
NEUTROPHILS NFR BLD: 64 % (ref 38–73)
NRBC BLD-RTO: 0 /100 WBC
PLATELET # BLD AUTO: 387 K/UL (ref 150–450)
PMV BLD AUTO: 8.8 FL (ref 9.2–12.9)
POTASSIUM SERPL-SCNC: 3.6 MMOL/L (ref 3.5–5.1)
PROT SERPL-MCNC: 7 G/DL (ref 6–8.4)
RBC # BLD AUTO: 4.05 M/UL (ref 4–5.4)
SODIUM SERPL-SCNC: 147 MMOL/L (ref 136–145)
WBC # BLD AUTO: 9.84 K/UL (ref 3.9–12.7)

## 2022-03-23 ENCOUNTER — TELEPHONE (OUTPATIENT)
Dept: CARDIOLOGY | Facility: CLINIC | Age: 57
End: 2022-03-23
Payer: COMMERCIAL

## 2022-03-23 NOTE — TELEPHONE ENCOUNTER
REC FAX FROM DR. DIMAS Tomlin REQUESTING CLEARANCE FOR 2 LEVEL ANTERIOR CERVICAL FUSION AND 4 LEVEL POSTERIOR CERVICAL FUSION AND TO HOLD ASA X 7 DAYS PRIOR    THX

## 2022-03-25 ENCOUNTER — PATIENT MESSAGE (OUTPATIENT)
Dept: CARDIOLOGY | Facility: CLINIC | Age: 57
End: 2022-03-25
Payer: COMMERCIAL

## 2022-03-25 NOTE — TELEPHONE ENCOUNTER
Care Due:                  Date            Visit Type   Department     Provider  --------------------------------------------------------------------------------                                             [facility]  Last Visit: 03-      PRE-OP       MEDICINE       Froylan BALDERRAMA  Next Visit: None Scheduled  None         None Found                                                            Last  Test          Frequency    Reason                     Performed    Due Date  --------------------------------------------------------------------------------    Uric Acid...  12 months..  allopurinoL..............  05- 05-    Powered by [employer]. Reference number: [id number].   3/25/2022 4:53:08 PM CDT   Unable to document triage call in E-Advise message. See other encounter.

## 2022-03-25 NOTE — TELEPHONE ENCOUNTER
This Rx Request does not qualify for assessment with the OR   Please review protocol details and the Care Due Message for extra clinical information    Reasons Rx Request may be deferred:  Required indication for medication is not active on problem list    Note composed:4:55 PM 03/25/2022

## 2022-03-29 RX ORDER — PANTOPRAZOLE SODIUM 40 MG/1
TABLET, DELAYED RELEASE ORAL
Qty: 90 TABLET | Refills: 3 | Status: SHIPPED | OUTPATIENT
Start: 2022-03-29 | End: 2023-02-27 | Stop reason: SDUPTHER

## 2022-05-04 ENCOUNTER — PATIENT MESSAGE (OUTPATIENT)
Dept: FAMILY MEDICINE | Facility: CLINIC | Age: 57
End: 2022-05-04
Payer: COMMERCIAL

## 2022-05-06 ENCOUNTER — OFFICE VISIT (OUTPATIENT)
Dept: FAMILY MEDICINE | Facility: CLINIC | Age: 57
End: 2022-05-06
Payer: COMMERCIAL

## 2022-05-06 ENCOUNTER — LAB VISIT (OUTPATIENT)
Dept: LAB | Facility: HOSPITAL | Age: 57
End: 2022-05-06
Attending: NURSE PRACTITIONER
Payer: COMMERCIAL

## 2022-05-06 VITALS
WEIGHT: 211 LBS | HEART RATE: 79 BPM | OXYGEN SATURATION: 98 % | DIASTOLIC BLOOD PRESSURE: 82 MMHG | HEIGHT: 62 IN | BODY MASS INDEX: 38.83 KG/M2 | TEMPERATURE: 98 F | SYSTOLIC BLOOD PRESSURE: 126 MMHG

## 2022-05-06 DIAGNOSIS — Z09 HOSPITAL DISCHARGE FOLLOW-UP: Primary | ICD-10-CM

## 2022-05-06 DIAGNOSIS — T81.30XA WOUND DEHISCENCE: ICD-10-CM

## 2022-05-06 DIAGNOSIS — T81.49XA INCISIONAL INFECTION: ICD-10-CM

## 2022-05-06 DIAGNOSIS — R35.0 URINARY FREQUENCY: ICD-10-CM

## 2022-05-06 DIAGNOSIS — L30.4 INTERTRIGO: ICD-10-CM

## 2022-05-06 DIAGNOSIS — Z98.1 S/P CERVICAL SPINAL FUSION: ICD-10-CM

## 2022-05-06 DIAGNOSIS — Z09 HOSPITAL DISCHARGE FOLLOW-UP: ICD-10-CM

## 2022-05-06 LAB
ALBUMIN SERPL BCP-MCNC: 4 G/DL (ref 3.5–5.2)
ALP SERPL-CCNC: 87 U/L (ref 55–135)
ALT SERPL W/O P-5'-P-CCNC: 14 U/L (ref 10–44)
ANION GAP SERPL CALC-SCNC: 14 MMOL/L (ref 8–16)
AST SERPL-CCNC: 20 U/L (ref 10–40)
BASOPHILS # BLD AUTO: 0.11 K/UL (ref 0–0.2)
BASOPHILS NFR BLD: 1.1 % (ref 0–1.9)
BILIRUB SERPL-MCNC: 0.5 MG/DL (ref 0.1–1)
BILIRUB UR QL STRIP: NEGATIVE
BUN SERPL-MCNC: 10 MG/DL (ref 6–20)
CALCIUM SERPL-MCNC: 9.6 MG/DL (ref 8.7–10.5)
CHLORIDE SERPL-SCNC: 103 MMOL/L (ref 95–110)
CLARITY UR: ABNORMAL
CO2 SERPL-SCNC: 28 MMOL/L (ref 23–29)
COLOR UR: YELLOW
CREAT SERPL-MCNC: 0.9 MG/DL (ref 0.5–1.4)
DIFFERENTIAL METHOD: ABNORMAL
EOSINOPHIL # BLD AUTO: 0.7 K/UL (ref 0–0.5)
EOSINOPHIL NFR BLD: 6.8 % (ref 0–8)
ERYTHROCYTE [DISTWIDTH] IN BLOOD BY AUTOMATED COUNT: 13.9 % (ref 11.5–14.5)
EST. GFR  (AFRICAN AMERICAN): >60 ML/MIN/1.73 M^2
EST. GFR  (NON AFRICAN AMERICAN): >60 ML/MIN/1.73 M^2
GLUCOSE SERPL-MCNC: 113 MG/DL (ref 70–110)
GLUCOSE UR QL STRIP: NEGATIVE
HCT VFR BLD AUTO: 33.5 % (ref 37–48.5)
HGB BLD-MCNC: 10 G/DL (ref 12–16)
HGB UR QL STRIP: NEGATIVE
IMM GRANULOCYTES # BLD AUTO: 0.03 K/UL (ref 0–0.04)
IMM GRANULOCYTES NFR BLD AUTO: 0.3 % (ref 0–0.5)
KETONES UR QL STRIP: NEGATIVE
LEUKOCYTE ESTERASE UR QL STRIP: NEGATIVE
LYMPHOCYTES # BLD AUTO: 2.9 K/UL (ref 1–4.8)
LYMPHOCYTES NFR BLD: 29.3 % (ref 18–48)
MCH RBC QN AUTO: 24.7 PG (ref 27–31)
MCHC RBC AUTO-ENTMCNC: 29.9 G/DL (ref 32–36)
MCV RBC AUTO: 83 FL (ref 82–98)
MONOCYTES # BLD AUTO: 1 K/UL (ref 0.3–1)
MONOCYTES NFR BLD: 9.7 % (ref 4–15)
NEUTROPHILS # BLD AUTO: 5.3 K/UL (ref 1.8–7.7)
NEUTROPHILS NFR BLD: 52.8 % (ref 38–73)
NITRITE UR QL STRIP: NEGATIVE
NRBC BLD-RTO: 0 /100 WBC
PH UR STRIP: 7 [PH] (ref 5–8)
PLATELET # BLD AUTO: 400 K/UL (ref 150–450)
PMV BLD AUTO: 9.4 FL (ref 9.2–12.9)
POTASSIUM SERPL-SCNC: 3.8 MMOL/L (ref 3.5–5.1)
PROT SERPL-MCNC: 8 G/DL (ref 6–8.4)
PROT UR QL STRIP: NEGATIVE
RBC # BLD AUTO: 4.05 M/UL (ref 4–5.4)
SODIUM SERPL-SCNC: 145 MMOL/L (ref 136–145)
SP GR UR STRIP: 1.01 (ref 1–1.03)
URN SPEC COLLECT METH UR: ABNORMAL
WBC # BLD AUTO: 10.05 K/UL (ref 3.9–12.7)

## 2022-05-06 PROCEDURE — 99214 PR OFFICE/OUTPT VISIT, EST, LEVL IV, 30-39 MIN: ICD-10-PCS | Mod: S$GLB,,, | Performed by: NURSE PRACTITIONER

## 2022-05-06 PROCEDURE — 3074F SYST BP LT 130 MM HG: CPT | Mod: CPTII,S$GLB,, | Performed by: NURSE PRACTITIONER

## 2022-05-06 PROCEDURE — 3079F DIAST BP 80-89 MM HG: CPT | Mod: CPTII,S$GLB,, | Performed by: NURSE PRACTITIONER

## 2022-05-06 PROCEDURE — 4010F ACE/ARB THERAPY RXD/TAKEN: CPT | Mod: CPTII,S$GLB,, | Performed by: NURSE PRACTITIONER

## 2022-05-06 PROCEDURE — 3008F PR BODY MASS INDEX (BMI) DOCUMENTED: ICD-10-PCS | Mod: CPTII,S$GLB,, | Performed by: NURSE PRACTITIONER

## 2022-05-06 PROCEDURE — 3079F PR MOST RECENT DIASTOLIC BLOOD PRESSURE 80-89 MM HG: ICD-10-PCS | Mod: CPTII,S$GLB,, | Performed by: NURSE PRACTITIONER

## 2022-05-06 PROCEDURE — 99999 PR PBB SHADOW E&M-EST. PATIENT-LVL V: CPT | Mod: PBBFAC,,, | Performed by: NURSE PRACTITIONER

## 2022-05-06 PROCEDURE — 85025 COMPLETE CBC W/AUTO DIFF WBC: CPT | Mod: PO | Performed by: NURSE PRACTITIONER

## 2022-05-06 PROCEDURE — 80053 COMPREHEN METABOLIC PANEL: CPT | Mod: PO | Performed by: NURSE PRACTITIONER

## 2022-05-06 PROCEDURE — 4010F PR ACE/ARB THEARPY RXD/TAKEN: ICD-10-PCS | Mod: CPTII,S$GLB,, | Performed by: NURSE PRACTITIONER

## 2022-05-06 PROCEDURE — 3074F PR MOST RECENT SYSTOLIC BLOOD PRESSURE < 130 MM HG: ICD-10-PCS | Mod: CPTII,S$GLB,, | Performed by: NURSE PRACTITIONER

## 2022-05-06 PROCEDURE — 1159F MED LIST DOCD IN RCRD: CPT | Mod: CPTII,S$GLB,, | Performed by: NURSE PRACTITIONER

## 2022-05-06 PROCEDURE — 3008F BODY MASS INDEX DOCD: CPT | Mod: CPTII,S$GLB,, | Performed by: NURSE PRACTITIONER

## 2022-05-06 PROCEDURE — 99999 PR PBB SHADOW E&M-EST. PATIENT-LVL V: ICD-10-PCS | Mod: PBBFAC,,, | Performed by: NURSE PRACTITIONER

## 2022-05-06 PROCEDURE — 99214 OFFICE O/P EST MOD 30 MIN: CPT | Mod: S$GLB,,, | Performed by: NURSE PRACTITIONER

## 2022-05-06 PROCEDURE — 1159F PR MEDICATION LIST DOCUMENTED IN MEDICAL RECORD: ICD-10-PCS | Mod: CPTII,S$GLB,, | Performed by: NURSE PRACTITIONER

## 2022-05-06 PROCEDURE — 36415 COLL VENOUS BLD VENIPUNCTURE: CPT | Mod: PO | Performed by: NURSE PRACTITIONER

## 2022-05-06 PROCEDURE — 81003 URINALYSIS AUTO W/O SCOPE: CPT | Mod: PO | Performed by: NURSE PRACTITIONER

## 2022-05-06 RX ORDER — FLUCONAZOLE 150 MG/1
150 TABLET ORAL
Qty: 2 TABLET | Refills: 0 | Status: SHIPPED | OUTPATIENT
Start: 2022-05-06 | End: 2022-05-10

## 2022-05-06 RX ORDER — ACETAMINOPHEN 500 MG
TABLET ORAL
Qty: 10 CAPSULE | Refills: 0 | Status: SHIPPED | OUTPATIENT
Start: 2022-05-06 | End: 2023-06-19

## 2022-05-06 RX ORDER — KETOCONAZOLE 20 MG/G
CREAM TOPICAL DAILY
Qty: 60 G | Refills: 1 | Status: SHIPPED | OUTPATIENT
Start: 2022-05-06 | End: 2022-09-28

## 2022-05-06 RX ORDER — CLINDAMYCIN HYDROCHLORIDE 300 MG/1
300 CAPSULE ORAL EVERY 8 HOURS
Qty: 30 CAPSULE | Refills: 0 | Status: SHIPPED | OUTPATIENT
Start: 2022-05-06 | End: 2022-05-16

## 2022-05-06 RX ORDER — FLUCONAZOLE 150 MG/1
150 TABLET ORAL DAILY
Qty: 1 TABLET | Refills: 0 | Status: CANCELLED | OUTPATIENT
Start: 2022-05-06 | End: 2022-05-07

## 2022-05-06 NOTE — PROGRESS NOTES
Subjective:       Patient ID: Josephine Bolton is a 56 y.o. female.    Chief Complaint: Hospital Follow Up (Compression on cervical spinal cord above her fusion. ) and Urinary Tract Infection (Pt reports she is having a UTI)    HPI  Patient underwent cervical fusion (Dr. Dennison) on 4/7/22--stayed inpatient 7 days, then LTAC for 8 days. On day 2 or 3 post op she reports falling off of toilet--no significant complications but then stayed in bed due to fall risk. Purewick (noninvasive catheter) placed which caused rash to groin folds which has progressively worsened. Also has red moist rash under right breast. Burning at end of urination but reports this is external from urine touching skin--not dysuria     Saw Dr. Dennison 2 weeks ago. No complications   Reports draining from surgical site within the last two weeks--progressively worsening in amount. Describes serous fluid. Denies fever, chills or sweats       Vitals:    05/06/22 1023   BP: 126/82   Pulse: 79   Temp: 98.4 °F (36.9 °C)     Review of Systems   Constitutional: Negative for chills, diaphoresis and fever.   Genitourinary: Positive for frequency. Negative for dysuria.   Skin: Positive for color change, rash and wound.       Past Medical History:   Diagnosis Date    Allergy     Anticoagulant long-term use     ASA 81mg, Effient    Anxiety     Arthritis     Asthma     As child    CAD (coronary artery disease) 01/27/2012    s/p stents x4 , CABG, 3 vessel, (5/2013) newest stent prior to CABG    Chronic constipation     Colon polyp     Coronary artery disease involving native coronary artery of native heart without angina pectoris 1/27/2012 12/19 Significant ostial RCA lesion as above treated with drug-eluting stenting as above. Occluded proximal LAD with patent lima lad Patent vein graft to 2nd obtuse marginal Known occluded vein graft to unknown vessel.  s/p stents x 3 (2010) s/p LAD stent (DSE) 3/2012    DDD (degenerative disc disease),  "cervical     DDD (degenerative disc disease), lumbar     DDD (degenerative disc disease), thoracic     Depression     Edema     Encounter for blood transfusion     Headache(784.0)     History of nephrolithiasis     Hyperlipidemia     Hypertension     Hypothyroid 7/5/2012    Insomnia     Insulin resistance     patient stated not diebetic    Joint stiffness     Joint swelling     Kidney disease     ; Frequent UTIs     Kidney stones     Low back pain     Neck pain     Obstructive sleep apnea on CPAP 7/17/2012    PONV (postoperative nausea and vomiting)     S/P CABG (coronary artery bypass graft) 6/25/2013 6/23/2013     Sleep apnea 01/27/12    Patient reports "severe" sleep apnea, uses C-pap    Stented coronary artery 12/20/2019 12/19  ostial RCA -Osirio2.5 x 18 post dilated 2.75     Thoracic back pain     Usual hyperplasia of lactiferous duct 02/2017    left breast     Objective:      Physical Exam  Constitutional:       General: She is not in acute distress.     Appearance: She is well-developed. She is not ill-appearing, toxic-appearing or diaphoretic.   HENT:      Right Ear: Hearing normal.      Left Ear: Hearing normal.   Pulmonary:      Effort: No tachypnea or respiratory distress.   Skin:     Coloration: Skin is not pale.      Findings: Erythema present.   Neurological:      Mental Status: She is alert and oriented to person, place, and time.   Psychiatric:         Speech: Speech normal.         Behavior: Behavior normal.         Thought Content: Thought content normal.         Judgment: Judgment normal.             Assessment:       1. Hospital discharge follow-up    2. Urinary frequency    3. S/P cervical spinal fusion    4. Wound dehiscence    5. Incisional infection    6. Intertrigo        Plan:       Hospital discharge follow-up    Urinary frequency  -     Urinalysis, Reflex to Urine Culture Urine, Clean Catch    S/P cervical spinal fusion    Wound dehiscence  -     " CBC Auto Differential; Future; Expected date: 05/06/2022  -     Comprehensive Metabolic Panel; Future; Expected date: 05/06/2022    Incisional infection  -     clindamycin (CLEOCIN) 300 MG capsule; Take 1 capsule (300 mg total) by mouth every 8 (eight) hours. for 10 days  Dispense: 30 capsule; Refill: 0    Intertrigo  -     ketoconazole (NIZORAL) 2 % cream; Apply topically once daily.  Dispense: 60 g; Refill: 1  -     fluconazole (DIFLUCAN) 150 MG Tab; Take 1 tablet (150 mg total) by mouth every 72 hours. for 2 doses  Dispense: 2 tablet; Refill: 0    Other orders  -     Lactobacillus acidophilus (PROBIOTIC) 10 billion cell Cap; 1 capsule by mouth daily  Dispense: 10 capsule; Refill: 0      Patient will schedule with surgeon for further wound eval   education provided on supportive care, symptom monitoring and return precautions               Medication List with Changes/Refills   New Medications    CLINDAMYCIN (CLEOCIN) 300 MG CAPSULE    Take 1 capsule (300 mg total) by mouth every 8 (eight) hours. for 10 days    FLUCONAZOLE (DIFLUCAN) 150 MG TAB    Take 1 tablet (150 mg total) by mouth every 72 hours. for 2 doses    KETOCONAZOLE (NIZORAL) 2 % CREAM    Apply topically once daily.    LACTOBACILLUS ACIDOPHILUS (PROBIOTIC) 10 BILLION CELL CAP    1 capsule by mouth daily   Current Medications    ACETAMINOPHEN (TYLENOL) 650 MG TBSR    Take 1,300 mg by mouth every 8 (eight) hours as needed (pain).     ALBUTEROL (PROVENTIL/VENTOLIN HFA) 90 MCG/ACTUATION INHALER    Inhale 2 puffs into the lungs every 6 (six) hours as needed for Shortness of Breath.     ALLOPURINOL (ZYLOPRIM) 100 MG TABLET    TAKE 1 TABLET BY MOUTH ONCE DAILY    AMLODIPINE (NORVASC) 5 MG TABLET    TAKE 1 TABLET BY MOUTH ONCE DAILY    ASPIRIN (ECOTRIN) 81 MG EC TABLET    Take 81 mg by mouth once daily.    ATORVASTATIN (LIPITOR) 40 MG TABLET    TAKE 1 TABLET (40 MG TOTAL) BY MOUTH EVERY EVENING.     BLOOD-GLUCOSE METER (GLUCOSE MONITORING KIT) KIT    Use as  instructed    BRILINTA 90 MG TABLET    TAKE 1 TABLET BY MOUTH 2 TIMES A DAY    CARVEDILOL (COREG) 25 MG TABLET    Take 1 tablet (25 mg total) by mouth 2 (two) times daily.    CETIRIZINE (ZYRTEC) 10 MG TABLET    Take 10 mg by mouth daily as needed for Allergies.    FAMOTIDINE (PEPCID) 40 MG TABLET    Take 1 tablet (40 mg total) by mouth every evening.    FUROSEMIDE (LASIX) 20 MG TABLET    TAKE 1 TABLET BY MOUTH EVERY DAY    GABAPENTIN (NEURONTIN) 100 MG CAPSULE    TAKE 1 CAPSULE (100 MG TOTAL) BY MOUTH 3 (THREE) TIMES DAILY.    IBUPROFEN (ADVIL,MOTRIN) 800 MG TABLET    Take 1 tablet (800 mg total) by mouth 3 (three) times daily as needed for Pain.    LANCETS MISC    1 Units by Misc.(Non-Drug; Combo Route) route 2 (two) times daily as needed.    LEVOTHYROXINE (SYNTHROID) 112 MCG TABLET    Take 1 tablet (112 mcg total) by mouth before breakfast.    NITROGLYCERIN (NITROSTAT) 0.4 MG SL TABLET    Place 1 tablet (0.4 mg total) under the tongue every 5 (five) minutes as needed for Chest pain.    NITROGLYCERIN 0.1 MG/HR TD PT24 (NITRODUR) 0.1 MG/HR PT24    Place 1 patch onto the skin once daily.    ONDANSETRON (ZOFRAN) 4 MG TABLET    Take 1 tablet (4 mg total) by mouth every 6 (six) hours as needed for Nausea.    PANTOPRAZOLE (PROTONIX) 40 MG TABLET    TAKE 1 TABLET BY MOUTH EVERY DAY    POTASSIUM CHLORIDE SA (K-DUR,KLOR-CON) 20 MEQ TABLET    Take 1 tablet (20 mEq total) by mouth once daily.    RAMIPRIL (ALTACE) 5 MG CAPSULE    TAKE 1 CAPSULE (5 MG TOTAL) BY MOUTH EVERY EVENING.    RANOLAZINE (RANEXA) 500 MG TB12    TAKE 2 TABLETS (1 000 MG TOTAL) BY MOUTH 2 (TWO) TIMES DAILY.    VENLAFAXINE (EFFEXOR-XR) 150 MG CP24    TAKE 1 CAPSULE BY MOUTH ONCE DAILY

## 2022-05-09 ENCOUNTER — PATIENT MESSAGE (OUTPATIENT)
Dept: SMOKING CESSATION | Facility: CLINIC | Age: 57
End: 2022-05-09
Payer: COMMERCIAL

## 2022-05-09 ENCOUNTER — TELEPHONE (OUTPATIENT)
Dept: FAMILY MEDICINE | Facility: CLINIC | Age: 57
End: 2022-05-09
Payer: COMMERCIAL

## 2022-05-09 DIAGNOSIS — D64.9 ANEMIA, UNSPECIFIED TYPE: ICD-10-CM

## 2022-05-09 DIAGNOSIS — Z86.010 PERSONAL HISTORY OF COLONIC POLYPS: ICD-10-CM

## 2022-05-09 DIAGNOSIS — Z12.11 ENCOUNTER FOR SCREENING FOR MALIGNANT NEOPLASM OF COLON: Primary | ICD-10-CM

## 2022-05-10 ENCOUNTER — TELEPHONE (OUTPATIENT)
Dept: GASTROENTEROLOGY | Facility: CLINIC | Age: 57
End: 2022-05-10
Payer: COMMERCIAL

## 2022-05-11 ENCOUNTER — LAB VISIT (OUTPATIENT)
Dept: LAB | Facility: HOSPITAL | Age: 57
End: 2022-05-11
Attending: NURSE PRACTITIONER
Payer: COMMERCIAL

## 2022-05-11 ENCOUNTER — TELEPHONE (OUTPATIENT)
Dept: GASTROENTEROLOGY | Facility: CLINIC | Age: 57
End: 2022-05-11
Payer: COMMERCIAL

## 2022-05-11 DIAGNOSIS — D64.9 ANEMIA, UNSPECIFIED TYPE: ICD-10-CM

## 2022-05-11 LAB
FERRITIN SERPL-MCNC: 24 NG/ML (ref 20–300)
IRON SERPL-MCNC: 15 UG/DL (ref 30–160)
SATURATED IRON: 3 % (ref 20–50)
TOTAL IRON BINDING CAPACITY: 440 UG/DL (ref 250–450)
TRANSFERRIN SERPL-MCNC: 297 MG/DL (ref 200–375)

## 2022-05-11 PROCEDURE — 84466 ASSAY OF TRANSFERRIN: CPT | Performed by: NURSE PRACTITIONER

## 2022-05-11 PROCEDURE — 36415 COLL VENOUS BLD VENIPUNCTURE: CPT | Mod: PO | Performed by: NURSE PRACTITIONER

## 2022-05-11 PROCEDURE — 82728 ASSAY OF FERRITIN: CPT | Performed by: NURSE PRACTITIONER

## 2022-05-11 NOTE — TELEPHONE ENCOUNTER
----- Message from Serene Rico LPN sent at 5/10/2022  3:44 PM CDT -----  Regarding: FW: Holding Brilinta    ----- Message -----  From: Obdulio Abbasi MD  Sent: 5/10/2022   3:32 PM CDT  To: [facility] Clinical Staff  Subject: FW: Holding Brilinta                             Okay to hold Brilinta for 5 days    ----- Message -----  From: Serene Rico LPN  Sent: 5/10/2022   2:11 PM CDT  To: Obdulio Abbasi MD  Subject: FW: Holding Brilinta                               ----- Message -----  From: Cheryl Arceo LPN  Sent: 5/10/2022  10:04 AM CDT  To: Ayleen Fregoso  Subject: Holding Brilinta                                 Pt is scheduled for cscope on June 28, 2022 and will need instructions on holding Brilinta prior to this procedure. Please advise.

## 2022-05-17 ENCOUNTER — PATIENT OUTREACH (OUTPATIENT)
Dept: ADMINISTRATIVE | Facility: OTHER | Age: 57
End: 2022-05-17
Payer: COMMERCIAL

## 2022-05-17 NOTE — PROGRESS NOTES
Health Maintenance Due   Topic Date Due    High Dose Statin  Never done    Shingles Vaccine (1 of 2) Never done    COVID-19 Vaccine (3 - Booster for Pfizer series) 10/12/2021    Mammogram  02/08/2022     Updates were requested from care everywhere.  DIS/Chart was reviewed for overdue Proactive [facility] Encounters (CINTHYA) topics (CRS, Breast Cancer Screening, Eye exam)  Health Maintenance has been updated.  LINKS immunization registry triggered.  Immunizations were reconciled.

## 2022-05-19 ENCOUNTER — OFFICE VISIT (OUTPATIENT)
Dept: OPTOMETRY | Facility: CLINIC | Age: 57
End: 2022-05-19
Payer: COMMERCIAL

## 2022-05-19 DIAGNOSIS — H02.66 XANTHELASMA OF EYELID, BILATERAL: ICD-10-CM

## 2022-05-19 DIAGNOSIS — H43.393 VITREOUS FLOATERS, BILATERAL: Primary | ICD-10-CM

## 2022-05-19 DIAGNOSIS — H02.63 XANTHELASMA OF EYELID, BILATERAL: ICD-10-CM

## 2022-05-19 DIAGNOSIS — H02.413 MECHANICAL PTOSIS, BILATERAL: ICD-10-CM

## 2022-05-19 DIAGNOSIS — Z13.5 GLAUCOMA SCREENING: ICD-10-CM

## 2022-05-19 DIAGNOSIS — H35.033 HYPERTENSIVE RETINOPATHY OF BOTH EYES: ICD-10-CM

## 2022-05-19 DIAGNOSIS — H52.4 MYOPIA WITH ASTIGMATISM AND PRESBYOPIA, BILATERAL: ICD-10-CM

## 2022-05-19 DIAGNOSIS — H52.203 MYOPIA WITH ASTIGMATISM AND PRESBYOPIA, BILATERAL: ICD-10-CM

## 2022-05-19 DIAGNOSIS — H52.13 MYOPIA WITH ASTIGMATISM AND PRESBYOPIA, BILATERAL: ICD-10-CM

## 2022-05-19 PROCEDURE — 92015 DETERMINE REFRACTIVE STATE: CPT | Mod: S$GLB,,, | Performed by: OPTOMETRIST

## 2022-05-19 PROCEDURE — 92014 COMPRE OPH EXAM EST PT 1/>: CPT | Mod: S$GLB,,, | Performed by: OPTOMETRIST

## 2022-05-19 PROCEDURE — 4010F ACE/ARB THERAPY RXD/TAKEN: CPT | Mod: CPTII,S$GLB,, | Performed by: OPTOMETRIST

## 2022-05-19 PROCEDURE — 92014 PR EYE EXAM, EST PATIENT,COMPREHESV: ICD-10-PCS | Mod: S$GLB,,, | Performed by: OPTOMETRIST

## 2022-05-19 PROCEDURE — 4010F PR ACE/ARB THEARPY RXD/TAKEN: ICD-10-PCS | Mod: CPTII,S$GLB,, | Performed by: OPTOMETRIST

## 2022-05-19 PROCEDURE — 1160F PR REVIEW ALL MEDS BY PRESCRIBER/CLIN PHARMACIST DOCUMENTED: ICD-10-PCS | Mod: CPTII,S$GLB,, | Performed by: OPTOMETRIST

## 2022-05-19 PROCEDURE — 1159F MED LIST DOCD IN RCRD: CPT | Mod: CPTII,S$GLB,, | Performed by: OPTOMETRIST

## 2022-05-19 PROCEDURE — 1160F RVW MEDS BY RX/DR IN RCRD: CPT | Mod: CPTII,S$GLB,, | Performed by: OPTOMETRIST

## 2022-05-19 PROCEDURE — 92015 PR REFRACTION: ICD-10-PCS | Mod: S$GLB,,, | Performed by: OPTOMETRIST

## 2022-05-19 PROCEDURE — 1159F PR MEDICATION LIST DOCUMENTED IN MEDICAL RECORD: ICD-10-PCS | Mod: CPTII,S$GLB,, | Performed by: OPTOMETRIST

## 2022-05-19 PROCEDURE — 99999 PR PBB SHADOW E&M-EST. PATIENT-LVL V: ICD-10-PCS | Mod: PBBFAC,,, | Performed by: OPTOMETRIST

## 2022-05-19 PROCEDURE — 99999 PR PBB SHADOW E&M-EST. PATIENT-LVL V: CPT | Mod: PBBFAC,,, | Performed by: OPTOMETRIST

## 2022-05-19 NOTE — PROGRESS NOTES
HPI     Pt here for annual eye exam. LDE- 04/05/2021    Pt sts she left glasses at school x 2 weeks and has been without. Pt using   OTC readers as of now. Pt sts she has a hard time keeping eyes open, pt   sts eyes always feel heavy. Pt has a difficult time with transitioning   from reading to distance. Pt has floaters OU, no change. Pt denies   flashes/pain. Pt denies use of gtt.     Last edited by Arlette Hairston on 5/19/2022  8:38 AM. (History)        ROS     Positive for: Eyes    Negative for: Constitutional, Gastrointestinal, Neurological, Skin,   Genitourinary, Musculoskeletal, HENT, Endocrine, Cardiovascular,   Respiratory, Psychiatric, Allergic/Imm, Heme/Lymph    Last edited by LEENA Bass, OD on 5/19/2022  9:01 AM. (History)        Assessment /Plan     For exam results, see Encounter Report.    Vitreous floaters, bilateral    Hypertensive retinopathy of both eyes    Mechanical ptosis, bilateral  -     Ambulatory referral/consult to Ophthalmology; Future; Expected date: 05/26/2022    Glaucoma screening    Xanthelasma of eyelid, bilateral    Myopia with astigmatism and presbyopia, bilateral      1. RD precautions given and reviewed. Patient knows to call/ message if any further changes in symptoms occur.  2. No gross ischemia / no valerie  Tortuosity and vasc caliber changes OU  Tight control BP  3. Longstanding // pt concern w/ worsening symptoms over time   Moderate d-chalasis / brow ptosis     H/o myasthenia suspect in past     Advise consult with Dr Graham for eval     4. Low suspect w/ moderate c/d, angles open  High normal / borderline IOP   + fhx glaucoma sister  Monitor all     5. Longstanding xanthelasma lesions superior / nasal upper lids  6. Updated specs rx, gave copy, fill prn    Discussed and educated patient on current findings /plan.  RTC 1 year, prn if any changes / issues

## 2022-05-19 NOTE — PATIENT INSTRUCTIONS
"DRY EYES -- BURNING OR JACKSON SYMPTOMS:  Use Over The Counter artificial tears as needed for dry eye symptoms.   Some common brands include:  Systane, Optive, Refresh, and Thera-Tears.  These drops can be used as frequently as desired, but may be most helpful use during long periods of concentrated work.  For example, reading / working at the computer. Start with 3-4x per day.     Nighttime Ophthalmic gel or ointments are available: Refresh PM, Genteal, and Lacrilube.    Avoid drops that "get redness out" (Visine, Murine, Clear Eyes), as these may contain medication that could further irritate the eyes, especially with chronic use.    ALLERGY EYES -- ITCHING SYMPTOMS:  Over the counter medications include--Pataday, Zaditor, and Alaway.  Use as directed 1-2 drops daily for symptoms of itching / watering eyes.  These drops will not help for dry eye or exposure symptoms.    REDNESS RELIEF:  Lumify---is a good redness reliever that will not cause irritation if used chronically.        FLASHES / FLOATERS / POSTERIOR VITREOUS DETACHMENT    Call the clinic if you have any further changes in symptoms.  Including:  Increased numbers of floaters or flashing lights, dimness or darkness that moves through or stays constant in your vision, or any pain in the eye (s).    You may sometimes see small specks or clouds moving in your field of vision.  They are called FLOATERS.  You can often see them when looking at a plain background, like a blank wall or blue kai.  Floaters are actually tiny clumps of gel or cells inside the VITREOUS, the clear jelly-like fluid that fills the inside of your eye.    While these objects look like they are in front of your eye, they are actually floating inside.  What you see are the shadows they cast on the RETINA, the nerve layer at the back of the eye that senses light and allows you to see.      POSTERIOR VITREOUS DETACHMENT    The appearance of new floaters may be alarming.  If you suddenly " develop new floaters, you should contact your eye care professional  right away.    The retina can tear if the shrinking vitreous pulls away from the wall of the eye.  This sometimes causes a small amount of bleeding in the eye that may appear as new floaters.    A torn retina is always a serious problem, since it can lead to a retinal detachment.  You should see your eye care professional as soon as possible if:    even one new floater appears suddenly;  you see sudden flashes of light;  you notice other symptoms, like the loss of side vision, or a curtain closes down in your vision        POSTERIOR VITREOUS DETACHMENT is more common for people who:    are nearsighted;  have had cataract surgery;  have had YAG laser surgery of the eye;  have had inflammation inside the eye;  are over age 60.      While some floaters may remain visible, many of them will fade over time and become less noticeable.  Even if you've had some floaters for years, you should have your eyes checked as soon as possible if you notice new ones.    FLASHING LIGHTS    When the vitreous gel rubs or pulls on the retina, you may see what look like flashing lights or lightning streaks.  These flashes can appear off and on for several weeks or months.      Some people experience flashes of light that appear as jagged lines or heat waves in both eyes, lasting 10-20 minutes.  These flashes are caused by a spasm of blood vessels in the brain, which is called a migraine.    If a headache follows these flashes, it's called a migraine headache.  If   no headache occurs, these flashes are called Ophthalmic or Ocular Migraine.           Using the Amsler Grid  If you are at risk for vision loss, you may be told to check your eyesight regularly using the Amsler grid. Below is the grid and instructions for using it.         The Amsler grid helps you track changes in your vision.    How to Use the Amsler Grid  Use the grid in a well-lighted area.  Wear  glasses or contact lenses if you usually wear them.  Hold the grid at your normal reading distance (about 16 inches).  Cover your left eye.  With your right eye, look at the dot in the center of the grid.  While looking at the dot, notice if any of the lines look wavy, if any lines disappear, or if the boxes change shape.  Write down on a piece of paper any vision changes from the last time you used the grid.  Now repeat the exercise, this time covering your right eye.  Call your doctor right away if you notice any vision changes.  How Often Should I Check My Vision?  Use the Amsler grid as often as your eye doctor suggests. Keep the grid where youll remember to use it. Call your eye doctor right away if you notice any changes with your eyesight. This includes if your vision improves.  © 1080-3282 [employer], [address] [place] [address], [place] [address]. All rights reserved. This information is not intended as a substitute for professional medical care. Always follow your healthcare professional's instructions.

## 2022-05-31 ENCOUNTER — PATIENT MESSAGE (OUTPATIENT)
Dept: ADMINISTRATIVE | Facility: HOSPITAL | Age: 57
End: 2022-05-31
Payer: COMMERCIAL

## 2022-06-08 ENCOUNTER — OFFICE VISIT (OUTPATIENT)
Dept: OPHTHALMOLOGY | Facility: CLINIC | Age: 57
End: 2022-06-08
Payer: COMMERCIAL

## 2022-06-08 DIAGNOSIS — H02.831 DERMATOCHALASIS OF BOTH UPPER EYELIDS: ICD-10-CM

## 2022-06-08 DIAGNOSIS — H02.413 MECHANICAL PTOSIS, ACQUIRED, BILATERAL: Primary | ICD-10-CM

## 2022-06-08 DIAGNOSIS — H02.834 DERMATOCHALASIS OF BOTH UPPER EYELIDS: ICD-10-CM

## 2022-06-08 PROCEDURE — 99203 PR OFFICE/OUTPT VISIT, NEW, LEVL III, 30-44 MIN: ICD-10-PCS | Mod: S$GLB,,, | Performed by: OPHTHALMOLOGY

## 2022-06-08 PROCEDURE — 99999 PR PBB SHADOW E&M-EST. PATIENT-LVL IV: ICD-10-PCS | Mod: PBBFAC,,, | Performed by: OPHTHALMOLOGY

## 2022-06-08 PROCEDURE — 4010F PR ACE/ARB THEARPY RXD/TAKEN: ICD-10-PCS | Mod: CPTII,S$GLB,, | Performed by: OPHTHALMOLOGY

## 2022-06-08 PROCEDURE — 1160F PR REVIEW ALL MEDS BY PRESCRIBER/CLIN PHARMACIST DOCUMENTED: ICD-10-PCS | Mod: CPTII,S$GLB,, | Performed by: OPHTHALMOLOGY

## 2022-06-08 PROCEDURE — 99999 PR PBB SHADOW E&M-EST. PATIENT-LVL IV: CPT | Mod: PBBFAC,,, | Performed by: OPHTHALMOLOGY

## 2022-06-08 PROCEDURE — 1159F MED LIST DOCD IN RCRD: CPT | Mod: CPTII,S$GLB,, | Performed by: OPHTHALMOLOGY

## 2022-06-08 PROCEDURE — 99203 OFFICE O/P NEW LOW 30 MIN: CPT | Mod: S$GLB,,, | Performed by: OPHTHALMOLOGY

## 2022-06-08 PROCEDURE — 1159F PR MEDICATION LIST DOCUMENTED IN MEDICAL RECORD: ICD-10-PCS | Mod: CPTII,S$GLB,, | Performed by: OPHTHALMOLOGY

## 2022-06-08 PROCEDURE — 1160F RVW MEDS BY RX/DR IN RCRD: CPT | Mod: CPTII,S$GLB,, | Performed by: OPHTHALMOLOGY

## 2022-06-08 PROCEDURE — 4010F ACE/ARB THERAPY RXD/TAKEN: CPT | Mod: CPTII,S$GLB,, | Performed by: OPHTHALMOLOGY

## 2022-06-08 NOTE — PROGRESS NOTES
HPI     Referred by   Here for H/o myasthenia suspect.  Patient states OU vision stable at times, but notice vision gets blurry.  OS headaches lately but that is side that was affected after neck surgery.  Hx of floaters.  Hx of neck surgery-    I have personally interviewed the patient, reviewed the history and   examined the patient and agree with the technician's exam.     Last edited by Soren Graham MD on 6/8/2022 11:35 AM. (History)            Assessment /Plan     For exam results, see Encounter Report.    Mechanical ptosis, acquired, bilateral  -     Ambulatory referral/consult to Ophthalmology    Dermatochalasis of both upper eyelids      The  Negative icee test militates against myasthenia. I will refer Ms. Bolton to Dr. Reagan for possible eyelid surgery.

## 2022-06-20 ENCOUNTER — TELEPHONE (OUTPATIENT)
Dept: GASTROENTEROLOGY | Facility: CLINIC | Age: 57
End: 2022-06-20
Payer: COMMERCIAL

## 2022-06-20 NOTE — TELEPHONE ENCOUNTER
Spoke with pt. Pt canceled upcoming c-scope due to having emergency back surgery tomorrow. Pt will call back later in the year when she is able to reschedule. Number provided.

## 2022-06-20 NOTE — TELEPHONE ENCOUNTER
----- Message from Mo Wilson sent at 6/20/2022  9:29 AM CDT -----  Regarding: cx procedure  Contact: patient  Patient want to can upcoming procedure, any questions please call back at [phone] (home)     Case number [id number]

## 2022-07-15 ENCOUNTER — LAB VISIT (OUTPATIENT)
Dept: LAB | Facility: HOSPITAL | Age: 57
End: 2022-07-15
Attending: INTERNAL MEDICINE
Payer: COMMERCIAL

## 2022-07-15 DIAGNOSIS — E88.819 INSULIN RESISTANCE: ICD-10-CM

## 2022-07-15 DIAGNOSIS — E03.9 HYPOTHYROIDISM, UNSPECIFIED TYPE: ICD-10-CM

## 2022-07-15 DIAGNOSIS — E66.01 MORBID OBESITY: ICD-10-CM

## 2022-07-15 LAB
ALBUMIN SERPL BCP-MCNC: 3.5 G/DL (ref 3.5–5.2)
ALP SERPL-CCNC: 102 U/L (ref 55–135)
ALT SERPL W/O P-5'-P-CCNC: 12 U/L (ref 10–44)
ANION GAP SERPL CALC-SCNC: 9 MMOL/L (ref 8–16)
AST SERPL-CCNC: 24 U/L (ref 10–40)
BILIRUB SERPL-MCNC: 0.5 MG/DL (ref 0.1–1)
BUN SERPL-MCNC: 14 MG/DL (ref 6–20)
CALCIUM SERPL-MCNC: 9.8 MG/DL (ref 8.7–10.5)
CHLORIDE SERPL-SCNC: 109 MMOL/L (ref 95–110)
CO2 SERPL-SCNC: 26 MMOL/L (ref 23–29)
CREAT SERPL-MCNC: 0.9 MG/DL (ref 0.5–1.4)
EST. GFR  (AFRICAN AMERICAN): >60 ML/MIN/1.73 M^2
EST. GFR  (NON AFRICAN AMERICAN): >60 ML/MIN/1.73 M^2
GLUCOSE SERPL-MCNC: 107 MG/DL (ref 70–110)
POTASSIUM SERPL-SCNC: 4.1 MMOL/L (ref 3.5–5.1)
PROT SERPL-MCNC: 7.9 G/DL (ref 6–8.4)
SODIUM SERPL-SCNC: 144 MMOL/L (ref 136–145)
TSH SERPL DL<=0.005 MIU/L-ACNC: 0.21 UIU/ML (ref 0.4–4)

## 2022-07-15 PROCEDURE — 83525 ASSAY OF INSULIN: CPT | Performed by: INTERNAL MEDICINE

## 2022-07-15 PROCEDURE — 84439 ASSAY OF FREE THYROXINE: CPT | Performed by: INTERNAL MEDICINE

## 2022-07-15 PROCEDURE — 80053 COMPREHEN METABOLIC PANEL: CPT | Performed by: INTERNAL MEDICINE

## 2022-07-15 PROCEDURE — 84443 ASSAY THYROID STIM HORMONE: CPT | Performed by: INTERNAL MEDICINE

## 2022-07-15 PROCEDURE — 36415 COLL VENOUS BLD VENIPUNCTURE: CPT | Mod: PO | Performed by: INTERNAL MEDICINE

## 2022-07-16 LAB — T4 FREE SERPL-MCNC: 1.3 NG/DL (ref 0.71–1.51)

## 2022-07-18 ENCOUNTER — PATIENT MESSAGE (OUTPATIENT)
Dept: ENDOCRINOLOGY | Facility: CLINIC | Age: 57
End: 2022-07-18

## 2022-07-18 ENCOUNTER — OFFICE VISIT (OUTPATIENT)
Dept: ENDOCRINOLOGY | Facility: CLINIC | Age: 57
End: 2022-07-18
Payer: COMMERCIAL

## 2022-07-18 ENCOUNTER — IMMUNIZATION (OUTPATIENT)
Dept: FAMILY MEDICINE | Facility: CLINIC | Age: 57
End: 2022-07-18
Payer: COMMERCIAL

## 2022-07-18 VITALS
HEART RATE: 76 BPM | SYSTOLIC BLOOD PRESSURE: 104 MMHG | DIASTOLIC BLOOD PRESSURE: 60 MMHG | BODY MASS INDEX: 38.64 KG/M2 | WEIGHT: 210 LBS | OXYGEN SATURATION: 97 % | HEIGHT: 62 IN

## 2022-07-18 DIAGNOSIS — E88.819 INSULIN RESISTANCE: ICD-10-CM

## 2022-07-18 DIAGNOSIS — Z23 NEED FOR VACCINATION: Primary | ICD-10-CM

## 2022-07-18 DIAGNOSIS — E03.9 HYPOTHYROIDISM, UNSPECIFIED TYPE: Primary | ICD-10-CM

## 2022-07-18 LAB
INSULIN COLLECTION INTERVAL: ABNORMAL
INSULIN SERPL-ACNC: 162.7 UU/ML

## 2022-07-18 PROCEDURE — 1160F PR REVIEW ALL MEDS BY PRESCRIBER/CLIN PHARMACIST DOCUMENTED: ICD-10-PCS | Mod: CPTII,S$GLB,, | Performed by: INTERNAL MEDICINE

## 2022-07-18 PROCEDURE — 0054A COVID-19, MRNA, LNP-S, PF, 30 MCG/0.3 ML DOSE VACCINE (PFIZER): CPT | Mod: PBBFAC | Performed by: RADIOLOGY

## 2022-07-18 PROCEDURE — 3078F PR MOST RECENT DIASTOLIC BLOOD PRESSURE < 80 MM HG: ICD-10-PCS | Mod: CPTII,S$GLB,, | Performed by: INTERNAL MEDICINE

## 2022-07-18 PROCEDURE — 1160F RVW MEDS BY RX/DR IN RCRD: CPT | Mod: CPTII,S$GLB,, | Performed by: INTERNAL MEDICINE

## 2022-07-18 PROCEDURE — 3074F PR MOST RECENT SYSTOLIC BLOOD PRESSURE < 130 MM HG: ICD-10-PCS | Mod: CPTII,S$GLB,, | Performed by: INTERNAL MEDICINE

## 2022-07-18 PROCEDURE — 99999 PR PBB SHADOW E&M-EST. PATIENT-LVL V: ICD-10-PCS | Mod: PBBFAC,,, | Performed by: INTERNAL MEDICINE

## 2022-07-18 PROCEDURE — 3008F PR BODY MASS INDEX (BMI) DOCUMENTED: ICD-10-PCS | Mod: CPTII,S$GLB,, | Performed by: INTERNAL MEDICINE

## 2022-07-18 PROCEDURE — 99214 OFFICE O/P EST MOD 30 MIN: CPT | Mod: S$GLB,,, | Performed by: INTERNAL MEDICINE

## 2022-07-18 PROCEDURE — 4010F PR ACE/ARB THEARPY RXD/TAKEN: ICD-10-PCS | Mod: CPTII,S$GLB,, | Performed by: INTERNAL MEDICINE

## 2022-07-18 PROCEDURE — 3074F SYST BP LT 130 MM HG: CPT | Mod: CPTII,S$GLB,, | Performed by: INTERNAL MEDICINE

## 2022-07-18 PROCEDURE — 4010F ACE/ARB THERAPY RXD/TAKEN: CPT | Mod: CPTII,S$GLB,, | Performed by: INTERNAL MEDICINE

## 2022-07-18 PROCEDURE — 99999 PR PBB SHADOW E&M-EST. PATIENT-LVL V: CPT | Mod: PBBFAC,,, | Performed by: INTERNAL MEDICINE

## 2022-07-18 PROCEDURE — 3008F BODY MASS INDEX DOCD: CPT | Mod: CPTII,S$GLB,, | Performed by: INTERNAL MEDICINE

## 2022-07-18 PROCEDURE — 3078F DIAST BP <80 MM HG: CPT | Mod: CPTII,S$GLB,, | Performed by: INTERNAL MEDICINE

## 2022-07-18 PROCEDURE — 99214 PR OFFICE/OUTPT VISIT, EST, LEVL IV, 30-39 MIN: ICD-10-PCS | Mod: S$GLB,,, | Performed by: INTERNAL MEDICINE

## 2022-07-18 PROCEDURE — 1159F MED LIST DOCD IN RCRD: CPT | Mod: CPTII,S$GLB,, | Performed by: INTERNAL MEDICINE

## 2022-07-18 PROCEDURE — 1159F PR MEDICATION LIST DOCUMENTED IN MEDICAL RECORD: ICD-10-PCS | Mod: CPTII,S$GLB,, | Performed by: INTERNAL MEDICINE

## 2022-07-18 RX ORDER — BACLOFEN 10 MG/1
10 TABLET ORAL 3 TIMES DAILY
COMMUNITY
End: 2022-11-13 | Stop reason: SDUPTHER

## 2022-07-18 RX ORDER — LEVOTHYROXINE SODIUM 100 UG/1
100 TABLET ORAL
Qty: 30 TABLET | Refills: 11 | Status: SHIPPED | OUTPATIENT
Start: 2022-07-18 | End: 2023-06-22

## 2022-07-18 RX ORDER — PREGABALIN 100 MG/1
200 CAPSULE ORAL 2 TIMES DAILY
COMMUNITY
Start: 2022-07-08 | End: 2022-11-13 | Stop reason: SDUPTHER
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
## 2022-07-19 ENCOUNTER — PATIENT MESSAGE (OUTPATIENT)
Dept: ENDOCRINOLOGY | Facility: CLINIC | Age: 57
End: 2022-07-19
Payer: COMMERCIAL

## 2022-07-19 DIAGNOSIS — E88.819 INSULIN RESISTANCE: Primary | ICD-10-CM

## 2022-07-19 NOTE — TELEPHONE ENCOUNTER
Pt is asking to have it rechecked with the 6 week labs?  Do you want fasting insulin?  Please advise.

## 2022-07-21 ENCOUNTER — OFFICE VISIT (OUTPATIENT)
Dept: FAMILY MEDICINE | Facility: CLINIC | Age: 57
End: 2022-07-21
Payer: COMMERCIAL

## 2022-07-21 VITALS
WEIGHT: 216.94 LBS | HEIGHT: 62 IN | DIASTOLIC BLOOD PRESSURE: 60 MMHG | BODY MASS INDEX: 39.92 KG/M2 | OXYGEN SATURATION: 95 % | HEART RATE: 87 BPM | SYSTOLIC BLOOD PRESSURE: 116 MMHG

## 2022-07-21 DIAGNOSIS — Z98.1 S/P LUMBAR FUSION: ICD-10-CM

## 2022-07-21 DIAGNOSIS — G47.33 OSA (OBSTRUCTIVE SLEEP APNEA): Primary | ICD-10-CM

## 2022-07-21 DIAGNOSIS — Z98.1 S/P CERVICAL SPINAL FUSION: ICD-10-CM

## 2022-07-21 PROCEDURE — 3008F PR BODY MASS INDEX (BMI) DOCUMENTED: ICD-10-PCS | Mod: CPTII,S$GLB,, | Performed by: NURSE PRACTITIONER

## 2022-07-21 PROCEDURE — 1159F PR MEDICATION LIST DOCUMENTED IN MEDICAL RECORD: ICD-10-PCS | Mod: CPTII,S$GLB,, | Performed by: NURSE PRACTITIONER

## 2022-07-21 PROCEDURE — 1159F MED LIST DOCD IN RCRD: CPT | Mod: CPTII,S$GLB,, | Performed by: NURSE PRACTITIONER

## 2022-07-21 PROCEDURE — 4010F PR ACE/ARB THEARPY RXD/TAKEN: ICD-10-PCS | Mod: CPTII,S$GLB,, | Performed by: NURSE PRACTITIONER

## 2022-07-21 PROCEDURE — 99213 PR OFFICE/OUTPT VISIT, EST, LEVL III, 20-29 MIN: ICD-10-PCS | Mod: S$GLB,,, | Performed by: NURSE PRACTITIONER

## 2022-07-21 PROCEDURE — 4010F ACE/ARB THERAPY RXD/TAKEN: CPT | Mod: CPTII,S$GLB,, | Performed by: NURSE PRACTITIONER

## 2022-07-21 PROCEDURE — 99213 OFFICE O/P EST LOW 20 MIN: CPT | Mod: S$GLB,,, | Performed by: NURSE PRACTITIONER

## 2022-07-21 PROCEDURE — 99999 PR PBB SHADOW E&M-EST. PATIENT-LVL IV: CPT | Mod: PBBFAC,,, | Performed by: NURSE PRACTITIONER

## 2022-07-21 PROCEDURE — 3074F PR MOST RECENT SYSTOLIC BLOOD PRESSURE < 130 MM HG: ICD-10-PCS | Mod: CPTII,S$GLB,, | Performed by: NURSE PRACTITIONER

## 2022-07-21 PROCEDURE — 99999 PR PBB SHADOW E&M-EST. PATIENT-LVL IV: ICD-10-PCS | Mod: PBBFAC,,, | Performed by: NURSE PRACTITIONER

## 2022-07-21 PROCEDURE — 3078F PR MOST RECENT DIASTOLIC BLOOD PRESSURE < 80 MM HG: ICD-10-PCS | Mod: CPTII,S$GLB,, | Performed by: NURSE PRACTITIONER

## 2022-07-21 PROCEDURE — 3074F SYST BP LT 130 MM HG: CPT | Mod: CPTII,S$GLB,, | Performed by: NURSE PRACTITIONER

## 2022-07-21 PROCEDURE — 3078F DIAST BP <80 MM HG: CPT | Mod: CPTII,S$GLB,, | Performed by: NURSE PRACTITIONER

## 2022-07-21 PROCEDURE — 3008F BODY MASS INDEX DOCD: CPT | Mod: CPTII,S$GLB,, | Performed by: NURSE PRACTITIONER

## 2022-07-21 RX ORDER — AMOXICILLIN 250 MG
1 CAPSULE ORAL 2 TIMES DAILY
COMMUNITY
Start: 2022-07-08 | End: 2022-08-07
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
## 2022-07-28 ENCOUNTER — PATIENT MESSAGE (OUTPATIENT)
Dept: ORTHOPEDICS | Facility: CLINIC | Age: 57
End: 2022-07-28
Payer: COMMERCIAL

## 2022-07-29 ENCOUNTER — OFFICE VISIT (OUTPATIENT)
Dept: ORTHOPEDICS | Facility: CLINIC | Age: 57
End: 2022-07-29
Payer: COMMERCIAL

## 2022-07-29 VITALS — WEIGHT: 214.06 LBS | BODY MASS INDEX: 39.39 KG/M2 | HEIGHT: 62 IN

## 2022-07-29 DIAGNOSIS — M25.472 PAIN AND SWELLING OF LEFT ANKLE: Primary | ICD-10-CM

## 2022-07-29 DIAGNOSIS — M25.572 PAIN AND SWELLING OF LEFT ANKLE: Primary | ICD-10-CM

## 2022-07-29 PROCEDURE — 99999 PR PBB SHADOW E&M-EST. PATIENT-LVL III: ICD-10-PCS | Mod: PBBFAC,,, | Performed by: NURSE PRACTITIONER

## 2022-07-29 PROCEDURE — 1160F RVW MEDS BY RX/DR IN RCRD: CPT | Mod: CPTII,S$GLB,, | Performed by: NURSE PRACTITIONER

## 2022-07-29 PROCEDURE — 99213 PR OFFICE/OUTPT VISIT, EST, LEVL III, 20-29 MIN: ICD-10-PCS | Mod: S$GLB,,, | Performed by: NURSE PRACTITIONER

## 2022-07-29 PROCEDURE — 1160F PR REVIEW ALL MEDS BY PRESCRIBER/CLIN PHARMACIST DOCUMENTED: ICD-10-PCS | Mod: CPTII,S$GLB,, | Performed by: NURSE PRACTITIONER

## 2022-07-29 PROCEDURE — 4010F ACE/ARB THERAPY RXD/TAKEN: CPT | Mod: CPTII,S$GLB,, | Performed by: NURSE PRACTITIONER

## 2022-07-29 PROCEDURE — 99999 PR PBB SHADOW E&M-EST. PATIENT-LVL III: CPT | Mod: PBBFAC,,, | Performed by: NURSE PRACTITIONER

## 2022-07-29 PROCEDURE — 3008F PR BODY MASS INDEX (BMI) DOCUMENTED: ICD-10-PCS | Mod: CPTII,S$GLB,, | Performed by: NURSE PRACTITIONER

## 2022-07-29 PROCEDURE — 99213 OFFICE O/P EST LOW 20 MIN: CPT | Mod: S$GLB,,, | Performed by: NURSE PRACTITIONER

## 2022-07-29 PROCEDURE — 1159F PR MEDICATION LIST DOCUMENTED IN MEDICAL RECORD: ICD-10-PCS | Mod: CPTII,S$GLB,, | Performed by: NURSE PRACTITIONER

## 2022-07-29 PROCEDURE — 1159F MED LIST DOCD IN RCRD: CPT | Mod: CPTII,S$GLB,, | Performed by: NURSE PRACTITIONER

## 2022-07-29 PROCEDURE — 3008F BODY MASS INDEX DOCD: CPT | Mod: CPTII,S$GLB,, | Performed by: NURSE PRACTITIONER

## 2022-07-29 PROCEDURE — 4010F PR ACE/ARB THEARPY RXD/TAKEN: ICD-10-PCS | Mod: CPTII,S$GLB,, | Performed by: NURSE PRACTITIONER

## 2022-07-29 RX ORDER — KETOROLAC TROMETHAMINE 10 MG/1
10 TABLET, FILM COATED ORAL EVERY 6 HOURS
Qty: 20 TABLET | Refills: 0 | Status: SHIPPED | OUTPATIENT
Start: 2022-07-29 | End: 2022-08-03

## 2022-07-29 NOTE — PROGRESS NOTES
"Chief Complaint   Patient presents with    Left Ankle - Pain       HPI:    This is a 57 y.o. who presents today complaining of left ankle pain for 8 days after fall in the her 's hospital room when she went to pick him up from [facility]. Pain is aching and throbbing. No numbness or tingling. No associated signs or symptoms.      Past Medical History:   Diagnosis Date    Allergy     Anticoagulant long-term use     ASA 81mg, Effient    Anxiety     Arthritis     Asthma     As child    CAD (coronary artery disease) 01/27/2012    s/p stents x4 , CABG, 3 vessel, (5/2013) newest stent prior to CABG    Chronic constipation     Colon polyp     Coronary artery disease involving native coronary artery of native heart without angina pectoris 1/27/2012 12/19 Significant ostial RCA lesion as above treated with drug-eluting stenting as above. Occluded proximal LAD with patent lima lad Patent vein graft to 2nd obtuse marginal Known occluded vein graft to unknown vessel.  s/p stents x 3 (2010) s/p LAD stent (DSE) 3/2012    DDD (degenerative disc disease), cervical     DDD (degenerative disc disease), lumbar     DDD (degenerative disc disease), thoracic     Depression     Edema     Encounter for blood transfusion     Headache(784.0)     History of nephrolithiasis     Hyperlipidemia     Hypertension     Hypothyroid 7/5/2012    Insomnia     Insulin resistance     patient stated not diebetic    Joint stiffness     Joint swelling     Kidney disease     ; Frequent UTIs     Kidney stones     Low back pain     Neck pain     Obstructive sleep apnea on CPAP 7/17/2012    PONV (postoperative nausea and vomiting)     S/P CABG (coronary artery bypass graft) 6/25/2013 6/23/2013     Sleep apnea 01/27/12    Patient reports "severe" sleep apnea, uses C-pap    Stented coronary artery 12/20/2019 12/19  ostial RCA -Osirio2.5 x 18 post dilated 2.75     Thoracic back pain     Usual " hyperplasia of lactiferous duct 2017    left breast      Past Surgical History:   Procedure Laterality Date    ADENOIDECTOMY      BACK SURGERY      BREAST BIOPSY Left 2017    duct excision- Dr. Jimenez    BREAST CYST ASPIRATION Left 2020    @ 's office- old hematoma drained (per office)    CARDIAC SURGERY      CARPAL TUNNEL RELEASE      bilateral    CERVICAL LAMINECTOMY WITH SPINAL FUSION      2016     SECTION, CLASSIC      x 2    COLONOSCOPY      CORONARY ANGIOGRAPHY  2019    Procedure: ANGIOGRAM, CORONARY ARTERY;  Surgeon: Timi Millan MD;  Location: [facility] CATH;  Service: Cardiology;;    CORONARY ANGIOPLASTY WITH STENT PLACEMENT      x 4    CORONARY ARTERY BYPASS GRAFT  2013    3 vessel    ESOPHAGOGASTRODUODENOSCOPY N/A 2020    Procedure: EGD (ESOPHAGOGASTRODUODENOSCOPY);  Surgeon: Mk Warren MD;  [facility];  Service: Endoscopy;  Laterality: N/A;    HYSTERECTOMY      Complete    JOINT REPLACEMENT      KNEE ARTHROPLASTY Left 2019    Procedure: ARTHROPLASTY, KNEE;  Surgeon: Juan Wilson MD;  Location: [facility] OR;  Service: Orthopedics;  Laterality: Left;    KNEE ARTHROSCOPY W/ MENISCAL REPAIR Left     twice, last one 2014    LAMINECTOMY      L4/L5    LEFT HEART CATHETERIZATION  2019    Procedure: Left heart cath-  # 219 A;  Surgeon: Timi Millan MD;  Location: [facility] CATH;  Service: Cardiology;;    OOPHORECTOMY Bilateral     ROBOTIC ARTHROPLASTY, KNEE Right 2021    Procedure: ROBOTIC ARTHROPLASTY, KNEE, TOTAL;  Surgeon: Juan Wilson MD;  Location: [facility] OR;  Service: Orthopedics;  Laterality: Right;    SINUS SURGERY      x3    TENDON REPAIR Left     ankle surgery    THYROIDECTOMY      2 separate surgeries    TONSILLECTOMY      TOTAL KNEE ARTHROPLASTY Left 2019    Surgeon: Juan Wilson MD      Current Outpatient Medications on File Prior to Visit   Medication Sig Dispense Refill     acetaminophen (TYLENOL) 650 MG TbSR Take 1,300 mg by mouth every 8 (eight) hours as needed (pain).       albuterol (PROVENTIL/VENTOLIN HFA) 90 mcg/actuation inhaler Inhale 2 puffs into the lungs every 6 (six) hours as needed for Shortness of Breath.       allopurinoL (ZYLOPRIM) 100 MG tablet TAKE 1 TABLET BY MOUTH ONCE DAILY (Patient taking differently: Take 100 mg by mouth once daily.) 90 tablet 3    aspirin (ECOTRIN) 81 MG EC tablet Take 81 mg by mouth once daily.      atorvastatin (LIPITOR) 40 MG tablet TAKE 1 TABLET (40 MG TOTAL) BY MOUTH EVERY EVENING.  90 tablet 3    baclofen (LIORESAL) 10 MG tablet Take 10 mg by mouth 3 (three) times daily.      blood-glucose meter (GLUCOSE MONITORING KIT) kit Use as instructed 1 each 0    BRILINTA 90 mg tablet TAKE 1 TABLET BY MOUTH 2 TIMES A DAY 60 tablet 4    carvediloL (COREG) 25 MG tablet Take 1 tablet (25 mg total) by mouth 2 (two) times daily. 180 tablet 2    cetirizine (ZYRTEC) 10 MG tablet Take 10 mg by mouth daily as needed for Allergies.      famotidine (PEPCID) 40 MG tablet Take 1 tablet (40 mg total) by mouth every evening. (Patient not taking: Reported on 7/21/2022) 30 tablet 1    furosemide (LASIX) 20 MG tablet TAKE 1 TABLET BY MOUTH EVERY DAY (Patient taking differently: Take 20 mg by mouth once daily.) 90 tablet 2    ibuprofen (ADVIL,MOTRIN) 800 MG tablet Take 1 tablet (800 mg total) by mouth 3 (three) times daily as needed for Pain. 90 tablet 0    ketoconazole (NIZORAL) 2 % cream Apply topically once daily. (Patient not taking: Reported on 7/21/2022) 60 g 1    Lactobacillus acidophilus (PROBIOTIC) 10 billion cell Cap 1 capsule by mouth daily 10 capsule 0    lancets Misc 1 Units by Misc.(Non-Drug; Combo Route) route 2 (two) times daily as needed. 100 each 3    levothyroxine (SYNTHROID) 100 MCG tablet Take 1 tablet (100 mcg total) by mouth before breakfast. 30 tablet 11    nitroGLYCERIN (NITROSTAT) 0.4 MG SL tablet Place 1 tablet (0.4 mg  "total) under the tongue every 5 (five) minutes as needed for Chest pain. 25 tablet 3    nitroGLYCERIN 0.1 mg/hr TD PT24 (NITRODUR) 0.1 mg/hr PT24 Place 1 patch onto the skin once daily. (Patient not taking: Reported on 7/21/2022) 30 patch 11    ondansetron (ZOFRAN) 4 MG tablet Take 1 tablet (4 mg total) by mouth every 6 (six) hours as needed for Nausea. (Patient not taking: Reported on 7/21/2022) 20 tablet 1    pantoprazole (PROTONIX) 40 MG tablet TAKE 1 TABLET BY MOUTH EVERY DAY 90 tablet 3    potassium chloride SA (K-DUR,KLOR-CON) 20 MEQ tablet Take 1 tablet (20 mEq total) by mouth once daily. 90 tablet 0    pregabalin (LYRICA) 100 MG capsule Take 200 mg by mouth 2 (two) times a day.      ramipriL (ALTACE) 5 MG capsule TAKE 1 CAPSULE (5 MG TOTAL) BY MOUTH EVERY EVENING. 90 capsule 3    ranolazine (RANEXA) 500 MG Tb12 TAKE 2 TABLETS (1 000 MG TOTAL) BY MOUTH 2 (TWO) TIMES DAILY. 120 tablet 1    senna-docusate 8.6-50 mg (PERICOLACE) 8.6-50 mg per tablet Take 1 tablet by mouth 2 (two) times daily.      venlafaxine (EFFEXOR-XR) 150 MG Cp24 TAKE 1 CAPSULE BY MOUTH ONCE DAILY  (Patient taking differently: Take 150 mg by mouth every evening.) 90 capsule 3     Current Facility-Administered Medications on File Prior to Visit   Medication Dose Route Frequency Provider Last Rate Last Admin    sodium chloride 0.9% flush 10 mL  10 mL Intravenous PRN Juan Wilson MD          Review of patient's allergies indicates:   Allergen Reactions    Celexa [citalopram] Other (See Comments)     GI upset  Stated "knocked me out"    Cephalexin Anaphylaxis    Zoloft [sertraline] Other (See Comments)     Stated "knocked me out"    Codeine Other (See Comments)     hallucinations  hallucinations    Isosorbide Nausea And Vomiting    Ciprofloxacin Itching    Levaquin [levofloxacin] Other (See Comments)     tendinitis    Metformin      Nausea     Penicillins Other (See Comments)     Was told per mother that as child was " allergic to penicillin.  Childhood allergy/ unknown reaction    Sulfa (sulfonamide antibiotics)       Family History not pertinent   Social History     Socioeconomic History    Marital status:    Occupational History    Occupation: teacher   Tobacco Use    Smoking status: Former Smoker     Packs/day: 0.50     Years: 14.00     Pack years: 7.00     Types: Cigarettes     Start date: 1984     Quit date: 1998     Years since quittin.6    Smokeless tobacco: Never Used   Substance and Sexual Activity    Alcohol use: No    Drug use: Not Currently     Types: Benzodiazepines    Sexual activity: Yes     Partners: Male         Review of Systems:   Constitutional:  Denies fever or chills    Eyes:  Denies change in visual acuity    HENT:  Denies nasal congestion or sore throat    Respiratory:  Denies cough or shortness of breath    Cardiovascular:  Denies chest pain or edema    GI:  Denies abdominal pain, nausea, vomiting, bloody stools or diarrhea    :  Denies dysuria    Integument:  Denies rash    Neurologic:  Denies headache, focal weakness or sensory changes    Endocrine:  Denies polyuria or polydipsia    Lymphatic:  Denies swollen glands    Psychiatric:  Denies depression or anxiety       Physical Exam:    Constitutional:  Well developed, well nourished, no acute distress, non-toxic appearance    Integument:  Well hydrated, warm and dry.   Neurologic:  Alert & oriented x 3  Psychiatric:  Speech and behavior appropriate      Left ankle  Decreased ROM  +Point TTP to left outer fibula, and top of the left foot.          X-rays were performed today, personally reviewed by me and findings discussed with the patient.   4 views of the left ankle show soft tissue swelling and some spurring to fibula.          Pain and swelling of left ankle  -     ketorolac (TORADOL) 10 mg tablet; Take 1 tablet (10 mg total) by mouth every 6 (six) hours. for 5 days  Dispense: 20 tablet; Refill: 0      Take  medications as prescribed.   Wear walking boot for 6 weeks. May weight bear as tolerated.   RTC in 6 weeks or as needed.

## 2022-08-24 ENCOUNTER — PATIENT MESSAGE (OUTPATIENT)
Dept: ADMINISTRATIVE | Facility: HOSPITAL | Age: 57
End: 2022-08-24
Payer: COMMERCIAL

## 2022-09-13 ENCOUNTER — OFFICE VISIT (OUTPATIENT)
Dept: CARDIOLOGY | Facility: CLINIC | Age: 57
End: 2022-09-13
Payer: COMMERCIAL

## 2022-09-13 ENCOUNTER — LAB VISIT (OUTPATIENT)
Dept: LAB | Facility: HOSPITAL | Age: 57
End: 2022-09-13
Attending: INTERNAL MEDICINE
Payer: COMMERCIAL

## 2022-09-13 ENCOUNTER — PATIENT MESSAGE (OUTPATIENT)
Dept: BARIATRICS | Facility: CLINIC | Age: 57
End: 2022-09-13
Payer: COMMERCIAL

## 2022-09-13 VITALS
BODY MASS INDEX: 39.07 KG/M2 | SYSTOLIC BLOOD PRESSURE: 220 MMHG | HEIGHT: 62 IN | HEART RATE: 64 BPM | DIASTOLIC BLOOD PRESSURE: 93 MMHG | WEIGHT: 212.31 LBS

## 2022-09-13 DIAGNOSIS — I10 PRIMARY HYPERTENSION: ICD-10-CM

## 2022-09-13 DIAGNOSIS — I25.10 CORONARY ARTERY DISEASE INVOLVING NATIVE CORONARY ARTERY OF NATIVE HEART WITHOUT ANGINA PECTORIS: Primary | Chronic | ICD-10-CM

## 2022-09-13 DIAGNOSIS — Z95.1 S/P CABG (CORONARY ARTERY BYPASS GRAFT): ICD-10-CM

## 2022-09-13 DIAGNOSIS — E03.9 HYPOTHYROIDISM, UNSPECIFIED TYPE: ICD-10-CM

## 2022-09-13 DIAGNOSIS — E78.2 MIXED HYPERLIPIDEMIA: ICD-10-CM

## 2022-09-13 LAB
ALBUMIN SERPL BCP-MCNC: 3.8 G/DL (ref 3.5–5.2)
ALP SERPL-CCNC: 78 U/L (ref 55–135)
ALT SERPL W/O P-5'-P-CCNC: 6 U/L (ref 10–44)
ANION GAP SERPL CALC-SCNC: 13 MMOL/L (ref 8–16)
AST SERPL-CCNC: 13 U/L (ref 10–40)
BILIRUB SERPL-MCNC: 0.5 MG/DL (ref 0.1–1)
BUN SERPL-MCNC: 15 MG/DL (ref 6–20)
CALCIUM SERPL-MCNC: 9.3 MG/DL (ref 8.7–10.5)
CHLORIDE SERPL-SCNC: 104 MMOL/L (ref 95–110)
CO2 SERPL-SCNC: 27 MMOL/L (ref 23–29)
CREAT SERPL-MCNC: 0.8 MG/DL (ref 0.5–1.4)
EST. GFR  (NO RACE VARIABLE): >60 ML/MIN/1.73 M^2
GLUCOSE SERPL-MCNC: 87 MG/DL (ref 70–110)
POTASSIUM SERPL-SCNC: 3.2 MMOL/L (ref 3.5–5.1)
PROT SERPL-MCNC: 7.2 G/DL (ref 6–8.4)
SODIUM SERPL-SCNC: 144 MMOL/L (ref 136–145)
TSH SERPL DL<=0.005 MIU/L-ACNC: 2.19 UIU/ML (ref 0.4–4)

## 2022-09-13 PROCEDURE — 99999 PR PBB SHADOW E&M-EST. PATIENT-LVL III: ICD-10-PCS | Mod: PBBFAC,,, | Performed by: INTERNAL MEDICINE

## 2022-09-13 PROCEDURE — 3080F DIAST BP >= 90 MM HG: CPT | Mod: CPTII,S$GLB,, | Performed by: INTERNAL MEDICINE

## 2022-09-13 PROCEDURE — 3077F PR MOST RECENT SYSTOLIC BLOOD PRESSURE >= 140 MM HG: ICD-10-PCS | Mod: CPTII,S$GLB,, | Performed by: INTERNAL MEDICINE

## 2022-09-13 PROCEDURE — 99214 PR OFFICE/OUTPT VISIT, EST, LEVL IV, 30-39 MIN: ICD-10-PCS | Mod: S$GLB,,, | Performed by: INTERNAL MEDICINE

## 2022-09-13 PROCEDURE — 3080F PR MOST RECENT DIASTOLIC BLOOD PRESSURE >= 90 MM HG: ICD-10-PCS | Mod: CPTII,S$GLB,, | Performed by: INTERNAL MEDICINE

## 2022-09-13 PROCEDURE — 1159F PR MEDICATION LIST DOCUMENTED IN MEDICAL RECORD: ICD-10-PCS | Mod: CPTII,S$GLB,, | Performed by: INTERNAL MEDICINE

## 2022-09-13 PROCEDURE — 1159F MED LIST DOCD IN RCRD: CPT | Mod: CPTII,S$GLB,, | Performed by: INTERNAL MEDICINE

## 2022-09-13 PROCEDURE — 4010F PR ACE/ARB THEARPY RXD/TAKEN: ICD-10-PCS | Mod: CPTII,S$GLB,, | Performed by: INTERNAL MEDICINE

## 2022-09-13 PROCEDURE — 1160F RVW MEDS BY RX/DR IN RCRD: CPT | Mod: CPTII,S$GLB,, | Performed by: INTERNAL MEDICINE

## 2022-09-13 PROCEDURE — 3077F SYST BP >= 140 MM HG: CPT | Mod: CPTII,S$GLB,, | Performed by: INTERNAL MEDICINE

## 2022-09-13 PROCEDURE — 84443 ASSAY THYROID STIM HORMONE: CPT | Performed by: INTERNAL MEDICINE

## 2022-09-13 PROCEDURE — 99999 PR PBB SHADOW E&M-EST. PATIENT-LVL III: CPT | Mod: PBBFAC,,, | Performed by: INTERNAL MEDICINE

## 2022-09-13 PROCEDURE — 36415 COLL VENOUS BLD VENIPUNCTURE: CPT | Mod: PO | Performed by: INTERNAL MEDICINE

## 2022-09-13 PROCEDURE — 1160F PR REVIEW ALL MEDS BY PRESCRIBER/CLIN PHARMACIST DOCUMENTED: ICD-10-PCS | Mod: CPTII,S$GLB,, | Performed by: INTERNAL MEDICINE

## 2022-09-13 PROCEDURE — 3008F PR BODY MASS INDEX (BMI) DOCUMENTED: ICD-10-PCS | Mod: CPTII,S$GLB,, | Performed by: INTERNAL MEDICINE

## 2022-09-13 PROCEDURE — 80053 COMPREHEN METABOLIC PANEL: CPT | Performed by: INTERNAL MEDICINE

## 2022-09-13 PROCEDURE — 3008F BODY MASS INDEX DOCD: CPT | Mod: CPTII,S$GLB,, | Performed by: INTERNAL MEDICINE

## 2022-09-13 PROCEDURE — 4010F ACE/ARB THERAPY RXD/TAKEN: CPT | Mod: CPTII,S$GLB,, | Performed by: INTERNAL MEDICINE

## 2022-09-13 PROCEDURE — 99214 OFFICE O/P EST MOD 30 MIN: CPT | Mod: S$GLB,,, | Performed by: INTERNAL MEDICINE

## 2022-09-13 NOTE — PROGRESS NOTES
Subjective:    Patient ID:  Josephine Bolton is a 57 y.o. female who presents for follow-up of Coronary Artery Disease (6 month f/u )      HPI  She comes with no complaints, no chest pain, no shortness of breath  Lots of stress at work      Review of Systems   Constitutional: Negative for decreased appetite, malaise/fatigue, weight gain and weight loss.   Cardiovascular:  Negative for chest pain, dyspnea on exertion, leg swelling, palpitations and syncope.   Respiratory:  Negative for cough and shortness of breath.    Gastrointestinal: Negative.    Neurological:  Negative for weakness.   All other systems reviewed and are negative.     Objective:      Physical Exam  Vitals and nursing note reviewed.   Constitutional:       Appearance: Normal appearance. She is well-developed.   HENT:      Head: Normocephalic.   Eyes:      Pupils: Pupils are equal, round, and reactive to light.   Neck:      Thyroid: No thyromegaly.      Vascular: No carotid bruit or JVD.   Cardiovascular:      Rate and Rhythm: Normal rate and regular rhythm.      Chest Wall: PMI is not displaced.      Pulses: Normal pulses and intact distal pulses.      Heart sounds: Normal heart sounds. No murmur heard.    No gallop.   Pulmonary:      Effort: Pulmonary effort is normal.      Breath sounds: Normal breath sounds.   Abdominal:      Palpations: Abdomen is soft. There is no mass.      Tenderness: There is no abdominal tenderness.   Musculoskeletal:         General: Normal range of motion.      Cervical back: Normal range of motion and neck supple.   Skin:     General: Skin is warm.   Neurological:      Mental Status: She is alert and oriented to person, place, and time.      Sensory: No sensory deficit.      Deep Tendon Reflexes: Reflexes are normal and symmetric.         Assessment:       1. Coronary artery disease involving native coronary artery of native heart without angina pectoris    2. Mixed hyperlipidemia    3. Primary hypertension    4. S/P  CABG (coronary artery bypass graft)         Plan:     Continue all cardiac medications  Regular exercise program  Weight loss  6 m f/u

## 2022-09-22 ENCOUNTER — TELEPHONE (OUTPATIENT)
Dept: BARIATRICS | Facility: CLINIC | Age: 57
End: 2022-09-22
Payer: COMMERCIAL

## 2022-09-28 ENCOUNTER — OFFICE VISIT (OUTPATIENT)
Dept: BARIATRICS | Facility: CLINIC | Age: 57
End: 2022-09-28
Payer: COMMERCIAL

## 2022-09-28 VITALS
BODY MASS INDEX: 39.05 KG/M2 | RESPIRATION RATE: 16 BRPM | TEMPERATURE: 98 F | HEART RATE: 68 BPM | WEIGHT: 212.19 LBS | HEIGHT: 62 IN | DIASTOLIC BLOOD PRESSURE: 77 MMHG | SYSTOLIC BLOOD PRESSURE: 172 MMHG

## 2022-09-28 DIAGNOSIS — I10 PRIMARY HYPERTENSION: ICD-10-CM

## 2022-09-28 DIAGNOSIS — G47.33 OSA (OBSTRUCTIVE SLEEP APNEA): ICD-10-CM

## 2022-09-28 DIAGNOSIS — I25.10 CORONARY ARTERY DISEASE INVOLVING NATIVE CORONARY ARTERY OF NATIVE HEART WITHOUT ANGINA PECTORIS: Chronic | ICD-10-CM

## 2022-09-28 DIAGNOSIS — M51.36 DDD (DEGENERATIVE DISC DISEASE), LUMBAR: ICD-10-CM

## 2022-09-28 DIAGNOSIS — E66.01 CLASS 2 SEVERE OBESITY DUE TO EXCESS CALORIES WITH SERIOUS COMORBIDITY AND BODY MASS INDEX (BMI) OF 39.0 TO 39.9 IN ADULT: ICD-10-CM

## 2022-09-28 DIAGNOSIS — E66.01 MORBID OBESITY: Primary | ICD-10-CM

## 2022-09-28 DIAGNOSIS — E78.2 MIXED HYPERLIPIDEMIA: ICD-10-CM

## 2022-09-28 PROCEDURE — 4010F PR ACE/ARB THEARPY RXD/TAKEN: ICD-10-PCS | Mod: CPTII,S$GLB,, | Performed by: SURGERY

## 2022-09-28 PROCEDURE — 1159F MED LIST DOCD IN RCRD: CPT | Mod: CPTII,S$GLB,, | Performed by: SURGERY

## 2022-09-28 PROCEDURE — 99205 PR OFFICE/OUTPT VISIT, NEW, LEVL V, 60-74 MIN: ICD-10-PCS | Mod: S$GLB,,, | Performed by: SURGERY

## 2022-09-28 PROCEDURE — 3077F PR MOST RECENT SYSTOLIC BLOOD PRESSURE >= 140 MM HG: ICD-10-PCS | Mod: CPTII,S$GLB,, | Performed by: SURGERY

## 2022-09-28 PROCEDURE — 99205 OFFICE O/P NEW HI 60 MIN: CPT | Mod: S$GLB,,, | Performed by: SURGERY

## 2022-09-28 PROCEDURE — 3008F BODY MASS INDEX DOCD: CPT | Mod: CPTII,S$GLB,, | Performed by: SURGERY

## 2022-09-28 PROCEDURE — 4010F ACE/ARB THERAPY RXD/TAKEN: CPT | Mod: CPTII,S$GLB,, | Performed by: SURGERY

## 2022-09-28 PROCEDURE — 3008F PR BODY MASS INDEX (BMI) DOCUMENTED: ICD-10-PCS | Mod: CPTII,S$GLB,, | Performed by: SURGERY

## 2022-09-28 PROCEDURE — 3078F PR MOST RECENT DIASTOLIC BLOOD PRESSURE < 80 MM HG: ICD-10-PCS | Mod: CPTII,S$GLB,, | Performed by: SURGERY

## 2022-09-28 PROCEDURE — 3078F DIAST BP <80 MM HG: CPT | Mod: CPTII,S$GLB,, | Performed by: SURGERY

## 2022-09-28 PROCEDURE — 99999 PR PBB SHADOW E&M-EST. PATIENT-LVL V: CPT | Mod: PBBFAC,,, | Performed by: SURGERY

## 2022-09-28 PROCEDURE — 99999 PR PBB SHADOW E&M-EST. PATIENT-LVL V: ICD-10-PCS | Mod: PBBFAC,,, | Performed by: SURGERY

## 2022-09-28 PROCEDURE — 1159F PR MEDICATION LIST DOCUMENTED IN MEDICAL RECORD: ICD-10-PCS | Mod: CPTII,S$GLB,, | Performed by: SURGERY

## 2022-09-28 PROCEDURE — 3077F SYST BP >= 140 MM HG: CPT | Mod: CPTII,S$GLB,, | Performed by: SURGERY

## 2022-09-28 RX ORDER — AMOXICILLIN 250 MG
1 CAPSULE ORAL 2 TIMES DAILY
COMMUNITY

## 2022-09-28 NOTE — PROGRESS NOTES
"Initial Consult    Chief Complaint   Patient presents with    Obesity       History of Present Illness:  Patient is a 57 y.o. female who is referred for evaluation of surgical treatment of morbid obesity. Her Body mass index is 39.44 kg/m². She has known comorbidities of dyslipidemia, hypertension, obstructive sleep apnea, and osteoarthritis. She has not attended the bariatric seminar and is most interested in  hearing options .      Past attempts at weight loss include: Unsupervised: calorie counting, high protein, low carb;  Supervised:  none;  Diet pills: none;  Exercise attempts: walking, treadmill    Weight history:   At current weight:  1-1.5 years  Obese for entire life.  More than 35 pounds overweight for 15-40 years old  More than 100 pounds overweight for 23 years.  Started dieting at 44 years old.  Maximum weight reached: 252  Most weight lost was 48 pounds through allergy to medication for 6 month.  She describes Her eating habits as volume eater, emotional eater.    RAHUL screening: has mask and machine    Reflux screening: not much anymore    Review of patient's allergies indicates:   Allergen Reactions    Celexa [citalopram] Other (See Comments)     GI upset  Stated "knocked me out"    Cephalexin Anaphylaxis    Zoloft [sertraline] Other (See Comments)     Stated "knocked me out"    Codeine Other (See Comments)     hallucinations  hallucinations    Isosorbide Nausea And Vomiting    Ciprofloxacin Itching    Levaquin [levofloxacin] Other (See Comments)     tendinitis    Metformin      Nausea     Penicillins Other (See Comments)     Was told per mother that as child was allergic to penicillin.  Childhood allergy/ unknown reaction    Sulfa (sulfonamide antibiotics)        Current Outpatient Medications   Medication Sig Dispense Refill    acetaminophen (TYLENOL) 650 MG TbSR Take 1,300 mg by mouth every 8 (eight) hours as needed (pain).       allopurinoL (ZYLOPRIM) 100 MG tablet TAKE 1 TABLET BY MOUTH ONCE " DAILY (Patient taking differently: Take 100 mg by mouth once daily.) 90 tablet 3    aspirin (ECOTRIN) 81 MG EC tablet Take 81 mg by mouth once daily.      atorvastatin (LIPITOR) 40 MG tablet TAKE 1 TABLET (40 MG TOTAL) BY MOUTH EVERY EVENING.  90 tablet 3    baclofen (LIORESAL) 10 MG tablet Take 10 mg by mouth 3 (three) times daily.      blood-glucose meter (GLUCOSE MONITORING KIT) kit Use as instructed 1 each 0    BRILINTA 90 mg tablet TAKE 1 TABLET BY MOUTH 2 TIMES A DAY 60 tablet 4    carvediloL (COREG) 25 MG tablet Take 1 tablet (25 mg total) by mouth 2 (two) times daily. 180 tablet 2    cetirizine (ZYRTEC) 10 MG tablet Take 10 mg by mouth daily as needed for Allergies.      famotidine (PEPCID) 40 MG tablet Take 1 tablet (40 mg total) by mouth every evening. 30 tablet 1    furosemide (LASIX) 20 MG tablet TAKE 1 TABLET BY MOUTH EVERY DAY (Patient taking differently: Take 20 mg by mouth once daily.) 90 tablet 2    Lactobacillus acidophilus (PROBIOTIC) 10 billion cell Cap 1 capsule by mouth daily 10 capsule 0    lancets Misc 1 Units by Misc.(Non-Drug; Combo Route) route 2 (two) times daily as needed. 100 each 3    levothyroxine (SYNTHROID) 100 MCG tablet Take 1 tablet (100 mcg total) by mouth before breakfast. 30 tablet 11    pantoprazole (PROTONIX) 40 MG tablet TAKE 1 TABLET BY MOUTH EVERY DAY 90 tablet 3    pregabalin (LYRICA) 100 MG capsule Take 200 mg by mouth 2 (two) times a day.      ramipriL (ALTACE) 5 MG capsule TAKE 1 CAPSULE BY MOUTH EVERY EVENING 90 capsule 3    ranolazine (RANEXA) 500 MG Tb12 TAKE 2 TABLETS (1 000 MG TOTAL) BY MOUTH 2 (TWO) TIMES DAILY. 120 tablet 1    senna-docusate 8.6-50 mg (PERICOLACE) 8.6-50 mg per tablet Take 1 tablet by mouth once daily.      venlafaxine (EFFEXOR-XR) 150 MG Cp24 TAKE 1 CAPSULE BY MOUTH ONCE DAILY  (Patient taking differently: Take 150 mg by mouth every evening.) 90 capsule 3    albuterol (PROVENTIL/VENTOLIN HFA) 90 mcg/actuation inhaler Inhale 2 puffs into the  lungs every 6 (six) hours as needed for Shortness of Breath.       ibuprofen (ADVIL,MOTRIN) 800 MG tablet Take 1 tablet (800 mg total) by mouth 3 (three) times daily as needed for Pain. (Patient not taking: Reported on 9/28/2022) 90 tablet 0    nitroGLYCERIN (NITROSTAT) 0.4 MG SL tablet Place 1 tablet (0.4 mg total) under the tongue every 5 (five) minutes as needed for Chest pain. (Patient not taking: Reported on 9/28/2022) 25 tablet 3    nitroGLYCERIN 0.1 mg/hr TD PT24 (NITRODUR) 0.1 mg/hr PT24 Place 1 patch onto the skin once daily. (Patient not taking: Reported on 9/28/2022) 30 patch 11    ondansetron (ZOFRAN) 4 MG tablet Take 1 tablet (4 mg total) by mouth every 6 (six) hours as needed for Nausea. (Patient not taking: Reported on 9/28/2022) 20 tablet 1    potassium chloride SA (K-DUR,KLOR-CON) 20 MEQ tablet Take 1 tablet (20 mEq total) by mouth once daily. (Patient not taking: Reported on 9/28/2022) 90 tablet 0     Current Facility-Administered Medications   Medication Dose Route Frequency Provider Last Rate Last Admin    sodium chloride 0.9% flush 10 mL  10 mL Intravenous PRN Juan Wilson MD           Past Medical History:   Diagnosis Date    Allergy     Anticoagulant long-term use     ASA 81mg, Effient    Anxiety     Arthritis     Asthma     As child    CAD (coronary artery disease) 01/27/2012    s/p stents x4 , CABG, 3 vessel, (5/2013) newest stent prior to CABG    Chronic constipation     Colon polyp     Coronary artery disease involving native coronary artery of native heart without angina pectoris 1/27/2012 12/19 Significant ostial RCA lesion as above treated with drug-eluting stenting as above. Occluded proximal LAD with patent lima lad Patent vein graft to 2nd obtuse marginal Known occluded vein graft to unknown vessel.  s/p stents x 3 (2010) s/p LAD stent (DSE) 3/2012    DDD (degenerative disc disease), cervical     DDD (degenerative disc disease), lumbar     DDD (degenerative disc disease),  "thoracic     Depression     Edema     Encounter for blood transfusion     Headache(784.0)     History of nephrolithiasis     Hyperlipidemia     Hypertension     Hypothyroid 2012    Insomnia     Insulin resistance     patient stated not diebetic    Joint stiffness     Joint swelling     Kidney disease     ; Frequent UTIs     Kidney stones     Low back pain     Neck pain     Obstructive sleep apnea on CPAP 2012    PONV (postoperative nausea and vomiting)     S/P CABG (coronary artery bypass graft) 2013     Sleep apnea 12    Patient reports "severe" sleep apnea, uses C-pap    Stented coronary artery 2019  ostial RCA -Osirio2.5 x 18 post dilated 2.75     Thoracic back pain     Usual hyperplasia of lactiferous duct 2017    left breast     Past Surgical History:   Procedure Laterality Date    ADENOIDECTOMY      BACK SURGERY      BREAST BIOPSY Left 2017    duct excision- Dr. Jimenez    BREAST CYST ASPIRATION Left 2020    @ 's office- old hematoma drained (per office)    CARDIAC SURGERY      CARPAL TUNNEL RELEASE      bilateral    CERVICAL LAMINECTOMY WITH SPINAL FUSION      2016     SECTION, CLASSIC      x 2    COLONOSCOPY      CORONARY ANGIOGRAPHY  2019    Procedure: ANGIOGRAM, CORONARY ARTERY;  Surgeon: Timi Millan MD;  Location: [facility] CATH;  Service: Cardiology;;    CORONARY ANGIOPLASTY WITH STENT PLACEMENT      x 4    CORONARY ARTERY BYPASS GRAFT  2013    3 vessel    ESOPHAGOGASTRODUODENOSCOPY N/A 2020    Procedure: EGD (ESOPHAGOGASTRODUODENOSCOPY);  Surgeon: Mk Warren MD;  Location: [facility] ENDO;  Service: Endoscopy;  Laterality: N/A;    HYSTERECTOMY      Complete    JOINT REPLACEMENT      KNEE ARTHROPLASTY Left 2019    Procedure: ARTHROPLASTY, KNEE;  Surgeon: Juan Wilson MD;  Location: [facility] OR;  Service: Orthopedics;  Laterality: Left;    KNEE ARTHROSCOPY W/ MENISCAL REPAIR Left     twice, last " one 2014    LAMINECTOMY      L4/L5    LEFT HEART CATHETERIZATION  2019    Procedure: Left heart cath-  # 219 A;  Surgeon: Timi Millan MD;  Location: [facility] CATH;  Service: Cardiology;;    OOPHORECTOMY Bilateral     ROBOTIC ARTHROPLASTY, KNEE Right 2021    Procedure: ROBOTIC ARTHROPLASTY, KNEE, TOTAL;  Surgeon: Juan Wilson MD;  Location: [facility] OR;  Service: Orthopedics;  Laterality: Right;    SINUS SURGERY      x3    TENDON REPAIR Left     ankle surgery    THYROIDECTOMY      2 separate surgeries    TONSILLECTOMY      TOTAL KNEE ARTHROPLASTY Left 2019    Surgeon: Juan Wilson MD     Family History   Problem Relation Age of Onset    Heart failure Mother     Asthma Mother     Macular degeneration Mother     Cancer Mother         Breast    Cataracts Mother     Heart disease Mother     COPD Mother     Breast cancer Mother     Heart disease Father     Diabetes Father     Hypertension Father     Stroke Father     Cataracts Father     Obesity Father     Obesity Sister     Glaucoma Sister     Thyroid disease Sister     Glaucoma Sister     Other Brother         stiff man syndrome    Cancer Maternal Grandmother         Breast with Mets    Breast cancer Maternal Grandmother     Cancer Paternal Grandmother         Colon    Strabismus Other     Amblyopia Neg Hx     Blindness Neg Hx     Retinal detachment Neg Hx      Social History     Tobacco Use    Smoking status: Former     Packs/day: 0.50     Years: 14.00     Pack years: 7.00     Types: Cigarettes     Start date: 1984     Quit date: 1998     Years since quittin.7    Smokeless tobacco: Never   Substance Use Topics    Alcohol use: No    Drug use: Not Currently     Types: Benzodiazepines      Review of Systems   Constitutional:  Positive for fatigue.   HENT:  Positive for congestion, nosebleeds and sinus pressure.    Respiratory:  Positive for apnea and cough.    Cardiovascular:  Positive for leg swelling.   Musculoskeletal:  Positive  "for back pain, joint swelling and neck pain.   Hematological:  Bruises/bleeds easily.   Psychiatric/Behavioral:  Positive for sleep disturbance.    All other systems reviewed and are negative.    Physical:     Vital Signs (Most Recent)  Temp: 98.3 °F (36.8 °C) (09/28/22 1501)  Pulse: 68 (09/28/22 1501)  Resp: 16 (09/28/22 1501)  BP: (!) 172/77 (09/28/22 1501)  5' 1.5" (1.562 m)  96.2 kg (212 lb 3.1 oz)     Body comp:  Fat Percent:  42.3 %  Fat Mass:  88.2 lb  FFM:  120.2 lb  TBW: 86.4 lb  TBW %:  41.5 %  BMR: 1663 kcal          Physical Exam  Vitals and nursing note reviewed.   Constitutional:       General: She is not in acute distress.     Appearance: She is well-developed. She is not diaphoretic.   HENT:      Head: Normocephalic and atraumatic.        Mouth/Throat:      Pharynx: No oropharyngeal exudate.   Eyes:      General: No scleral icterus.     Conjunctiva/sclera: Conjunctivae normal.      Pupils: Pupils are equal, round, and reactive to light.   Neck:      Thyroid: No thyromegaly.      Vascular: No JVD.      Trachea: No tracheal deviation.   Cardiovascular:      Rate and Rhythm: Normal rate and regular rhythm.      Heart sounds: Normal heart sounds. No murmur heard.    No friction rub. No gallop.   Pulmonary:      Effort: Pulmonary effort is normal. No respiratory distress.      Breath sounds: Normal breath sounds. No stridor. No wheezing or rales.   Chest:      Chest wall: No tenderness.       Abdominal:      General: Bowel sounds are normal. There is no distension.      Palpations: Abdomen is soft. There is no mass.      Tenderness: There is no abdominal tenderness. There is no guarding or rebound.   Musculoskeletal:         General: No tenderness. Normal range of motion.      Cervical back: Normal range of motion and neck supple.   Lymphadenopathy:      Cervical: No cervical adenopathy.   Skin:     General: Skin is warm and dry.      Findings: No erythema or rash.   Neurological:      Mental Status: She " is alert and oriented to person, place, and time.      Cranial Nerves: No cranial nerve deficit.   Psychiatric:         Behavior: Behavior normal.       ASSESSMENT/PLAN:        1. Morbid obesity  Ambulatory referral/consult to Nutrition Services    Ambulatory referral/consult to Psychiatry    EKG 12-lead    FL Upper GI    Ambulatory referral/consult to Cardiology      2. Class 2 severe obesity due to excess calories with serious comorbidity and body mass index (BMI) of 39.0 to 39.9 in adult        3. Primary hypertension        4. Mixed hyperlipidemia        5. DDD (degenerative disc disease), lumbar        6. Coronary artery disease involving native coronary artery of native heart without angina pectoris        7. RAHUL (obstructive sleep apnea)            Plan:  Josephine Bolton has morbid obesity as their Body mass index is 39.44 kg/m². She would benefit from weight loss surgery and has chosen gastric sleeve surgery as the preferred procedure. She understands that this is a tool and lifestyle change will be necessary to keep weight off. I went over possible complications of all surgeries with the patient and she is agreeable to surgery.    She will need:    Labs  EKG  UGI   dietary consult  psych consult   Seminar    I will obtain the following clearances prior to surgery: cardiology     She has worsening medical problems which include obesity, hypertension, hyperlipidemia, degenerative disc disease, heart disease, and sleep apnea.  We are planning to treat this with diet, exercise and possibly elective major surgery.  She has risk factors for elective major surgery which include high blood pressure, high cholesterol, heart disease, sleep apnea.    Diet plan: high protein low carb- mainly meats and vegetables  Exercise plan: Cardiovascular exercise, get HR over 100 for 20 minutes 3 times per week.  Start multivitamin

## 2022-09-29 ENCOUNTER — TELEPHONE (OUTPATIENT)
Dept: BARIATRICS | Facility: CLINIC | Age: 57
End: 2022-09-29
Payer: COMMERCIAL

## 2022-09-29 NOTE — TELEPHONE ENCOUNTER
----- Message from Mary Camacho sent at 9/29/2022 10:01 AM CDT -----  Type: Needs Medical Advice  Who Called:  pt said she think she left her name tag and classroom key at the office yesterday--will the office please give her a call if she did so she can send her  to come get it--please call and advise    Best Call Back Number: [phone] Bobo Bolton her     Additional Information: thank you

## 2022-10-04 ENCOUNTER — TELEPHONE (OUTPATIENT)
Dept: BARIATRICS | Facility: CLINIC | Age: 57
End: 2022-10-04
Payer: COMMERCIAL

## 2022-10-04 ENCOUNTER — PATIENT MESSAGE (OUTPATIENT)
Dept: BARIATRICS | Facility: CLINIC | Age: 57
End: 2022-10-04
Payer: COMMERCIAL

## 2022-10-04 NOTE — TELEPHONE ENCOUNTER
Called pt to reschedule appointment due to provider schedule change. No answer, LMOM. Also sent message on portal.

## 2022-10-10 ENCOUNTER — PATIENT MESSAGE (OUTPATIENT)
Dept: ADMINISTRATIVE | Facility: HOSPITAL | Age: 57
End: 2022-10-10
Payer: COMMERCIAL

## 2022-10-12 ENCOUNTER — PATIENT OUTREACH (OUTPATIENT)
Dept: ADMINISTRATIVE | Facility: HOSPITAL | Age: 57
End: 2022-10-12
Payer: COMMERCIAL

## 2022-10-12 NOTE — PROGRESS NOTES
10/12/2022 Care Everywhere updates requested and reviewed.  Immunizations reconciled. Media reports reviewed.  Duplicate HM overrides and  orders removed.  Overdue HM topic chart audit and/or requested.  Overdue lab testing linked to upcoming lab appointments if applies.    Letter mailed  Health Maintenance Due   Topic Date Due    High Dose Statin  Never done    Shingles Vaccine (1 of 2) Never done    Pneumococcal Vaccines (Age 0-64) (2 - PPSV23 if available, else PCV20) 10/16/2018    Mammogram  2022    Influenza Vaccine (1) 2022    COVID-19 Vaccine (4 - Booster for Pfizer series) 2022

## 2022-10-12 NOTE — LETTER
October 12, 2022    Josephine Bolton  [address]  [place] [address]             [facility]  [address]  [place] [address]  Phone: [phone] Dear Josephine Sen,    Your [facility] primary care team is dedicated to assisting you achieve your health goals.  In order to do so, scheduling your routine screenings and tests are key to your good health.  Our records indicate you may be overdue for your mammogram screening.  Mammography screening can help find breast cancer at an early stage, when it is most likely to be successfully treated.    Froylan Smart MD has entered your screening mammogram order.  We encourage you to schedule your appointment at any of our [facility] imaging locations.  You can schedule this Mammogram exam via [email or web address] or call [phone] and we will be more than happy to assist you in scheduling your mammogram.     If you recently had your mammogram screening outside of [facility], please let your primary care team know so that they can update your health record. Simona send a message to your primary care physician via [email or web address] or contact his/her office at [phone].     Thank you for letting us care for you,      Sincerely,      Your Women's Health Care Team

## 2022-10-13 ENCOUNTER — TELEPHONE (OUTPATIENT)
Dept: BARIATRICS | Facility: CLINIC | Age: 57
End: 2022-10-13
Payer: COMMERCIAL

## 2022-10-20 ENCOUNTER — TELEPHONE (OUTPATIENT)
Dept: OPHTHALMOLOGY | Facility: CLINIC | Age: 57
End: 2022-10-20
Payer: COMMERCIAL

## 2022-10-21 ENCOUNTER — TELEPHONE (OUTPATIENT)
Dept: OPHTHALMOLOGY | Facility: CLINIC | Age: 57
End: 2022-10-21
Payer: COMMERCIAL

## 2022-10-21 NOTE — TELEPHONE ENCOUNTER
----- Message from Teodora Brunson MA sent at 10/20/2022  4:34 PM CDT -----  Regarding: FW: Appt Inquiry  Not sure if she on the wait list. 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    ----- Message -----  From: Ella Wei  Sent: 10/20/2022   2:47 PM CDT  To: [facility] Staff  Subject: Appt Inquiry                                     Pt asked to speak to office about questions she has about an appt.     Pts call back :[phone] please call after 3 pm.

## 2022-10-24 ENCOUNTER — OFFICE VISIT (OUTPATIENT)
Dept: FAMILY MEDICINE | Facility: CLINIC | Age: 57
End: 2022-10-24
Payer: COMMERCIAL

## 2022-10-24 VITALS
WEIGHT: 208.31 LBS | DIASTOLIC BLOOD PRESSURE: 82 MMHG | HEART RATE: 59 BPM | TEMPERATURE: 98 F | BODY MASS INDEX: 38.33 KG/M2 | OXYGEN SATURATION: 98 % | HEIGHT: 62 IN | SYSTOLIC BLOOD PRESSURE: 152 MMHG

## 2022-10-24 DIAGNOSIS — M10.9 GOUT, UNSPECIFIED CAUSE, UNSPECIFIED CHRONICITY, UNSPECIFIED SITE: ICD-10-CM

## 2022-10-24 DIAGNOSIS — H69.91 ETD (EUSTACHIAN TUBE DYSFUNCTION), RIGHT: ICD-10-CM

## 2022-10-24 DIAGNOSIS — I10 ESSENTIAL HYPERTENSION: ICD-10-CM

## 2022-10-24 DIAGNOSIS — F33.9 RECURRENT MAJOR DEPRESSIVE DISORDER, REMISSION STATUS UNSPECIFIED: ICD-10-CM

## 2022-10-24 DIAGNOSIS — K21.9 GASTROESOPHAGEAL REFLUX DISEASE, UNSPECIFIED WHETHER ESOPHAGITIS PRESENT: ICD-10-CM

## 2022-10-24 DIAGNOSIS — F41.9 ANXIETY: ICD-10-CM

## 2022-10-24 DIAGNOSIS — Z00.00 ROUTINE MEDICAL EXAM: Primary | ICD-10-CM

## 2022-10-24 DIAGNOSIS — I25.10 CORONARY ARTERY DISEASE, UNSPECIFIED VESSEL OR LESION TYPE, UNSPECIFIED WHETHER ANGINA PRESENT, UNSPECIFIED WHETHER NATIVE OR TRANSPLANTED HEART: ICD-10-CM

## 2022-10-24 PROCEDURE — 1159F PR MEDICATION LIST DOCUMENTED IN MEDICAL RECORD: ICD-10-PCS | Mod: CPTII,S$GLB,, | Performed by: FAMILY MEDICINE

## 2022-10-24 PROCEDURE — 90686 IIV4 VACC NO PRSV 0.5 ML IM: CPT | Mod: S$GLB,,, | Performed by: FAMILY MEDICINE

## 2022-10-24 PROCEDURE — 3079F PR MOST RECENT DIASTOLIC BLOOD PRESSURE 80-89 MM HG: ICD-10-PCS | Mod: CPTII,S$GLB,, | Performed by: FAMILY MEDICINE

## 2022-10-24 PROCEDURE — 99999 PR PBB SHADOW E&M-EST. PATIENT-LVL V: CPT | Mod: PBBFAC,,, | Performed by: FAMILY MEDICINE

## 2022-10-24 PROCEDURE — 90686 FLU VACCINE (QUAD) GREATER THAN OR EQUAL TO 3YO PRESERVATIVE FREE IM: ICD-10-PCS | Mod: S$GLB,,, | Performed by: FAMILY MEDICINE

## 2022-10-24 PROCEDURE — 99999 PR PBB SHADOW E&M-EST. PATIENT-LVL V: ICD-10-PCS | Mod: PBBFAC,,, | Performed by: FAMILY MEDICINE

## 2022-10-24 PROCEDURE — 90471 FLU VACCINE (QUAD) GREATER THAN OR EQUAL TO 3YO PRESERVATIVE FREE IM: ICD-10-PCS | Mod: S$GLB,,, | Performed by: FAMILY MEDICINE

## 2022-10-24 PROCEDURE — 90471 IMMUNIZATION ADMIN: CPT | Mod: S$GLB,,, | Performed by: FAMILY MEDICINE

## 2022-10-24 PROCEDURE — 4010F PR ACE/ARB THEARPY RXD/TAKEN: ICD-10-PCS | Mod: CPTII,S$GLB,, | Performed by: FAMILY MEDICINE

## 2022-10-24 PROCEDURE — 3077F PR MOST RECENT SYSTOLIC BLOOD PRESSURE >= 140 MM HG: ICD-10-PCS | Mod: CPTII,S$GLB,, | Performed by: FAMILY MEDICINE

## 2022-10-24 PROCEDURE — 3079F DIAST BP 80-89 MM HG: CPT | Mod: CPTII,S$GLB,, | Performed by: FAMILY MEDICINE

## 2022-10-24 PROCEDURE — 99396 PREV VISIT EST AGE 40-64: CPT | Mod: 25,S$GLB,, | Performed by: FAMILY MEDICINE

## 2022-10-24 PROCEDURE — 99396 PR PREVENTIVE VISIT,EST,40-64: ICD-10-PCS | Mod: 25,S$GLB,, | Performed by: FAMILY MEDICINE

## 2022-10-24 PROCEDURE — 1159F MED LIST DOCD IN RCRD: CPT | Mod: CPTII,S$GLB,, | Performed by: FAMILY MEDICINE

## 2022-10-24 PROCEDURE — 4010F ACE/ARB THERAPY RXD/TAKEN: CPT | Mod: CPTII,S$GLB,, | Performed by: FAMILY MEDICINE

## 2022-10-24 PROCEDURE — 3077F SYST BP >= 140 MM HG: CPT | Mod: CPTII,S$GLB,, | Performed by: FAMILY MEDICINE

## 2022-10-24 RX ORDER — KETOROLAC TROMETHAMINE 10 MG/1
TABLET, FILM COATED ORAL
COMMUNITY
End: 2023-02-15 | Stop reason: ALTCHOICE

## 2022-10-24 RX ORDER — AMLODIPINE BESYLATE 10 MG/1
TABLET ORAL
COMMUNITY
End: 2022-10-24

## 2022-10-24 RX ORDER — GABAPENTIN 100 MG/1
CAPSULE ORAL
COMMUNITY
End: 2022-10-24

## 2022-10-24 RX ORDER — OXYCODONE AND ACETAMINOPHEN 10; 325 MG/1; MG/1
TABLET ORAL
COMMUNITY
End: 2023-02-15 | Stop reason: ALTCHOICE

## 2022-10-24 RX ORDER — FLUTICASONE PROPIONATE 50 MCG
SPRAY, SUSPENSION (ML) NASAL
COMMUNITY

## 2022-10-24 RX ORDER — AZELASTINE 1 MG/ML
SPRAY, METERED NASAL
COMMUNITY
End: 2023-02-15 | Stop reason: ALTCHOICE

## 2022-10-24 RX ORDER — KETOCONAZOLE 20 MG/G
CREAM TOPICAL
COMMUNITY
End: 2023-02-15 | Stop reason: ALTCHOICE

## 2022-10-24 RX ORDER — RAMIPRIL 10 MG/1
10 CAPSULE ORAL DAILY
Qty: 90 CAPSULE | Refills: 3 | Status: SHIPPED | OUTPATIENT
Start: 2022-10-24 | End: 2023-03-10 | Stop reason: DRUGHIGH

## 2022-10-24 NOTE — PROGRESS NOTES
Subjective:       Patient ID: Josephine Bolton is a 57 y.o. female.    Chief Complaint: Annual Exam    HPI      Here for a check up    Cad-followed by cardiology     htn stable.    Attending physio for chronic cervical and lumbar radiculopathy.     Depression stable while on stable    Reports right ear pressure sensation x 1 day.     Planning to get gastric sleeve in future.     Review of Systems      Review of Systems   Constitutional: Negative for fever and chills.   HENT: Negative for hearing loss and neck stiffness.    Eyes: Negative for redness and itching.   Respiratory: Negative for cough and choking.    Cardiovascular: Negative for chest pain and leg swelling.  Abdomen: Negative for abdominal pain and blood in stool.   Genitourinary: Negative for dysuria and flank pain.   Musculoskeletal: Negative for back pain and gait problem.   Neurological: Negative for light-headedness and headaches.   Hematological: Negative for adenopathy.   Psychiatric/Behavioral: Negative for behavioral problems.     Objective:      Physical Exam  Constitutional:       General: She is not in acute distress.     Appearance: She is well-developed.   HENT:      Head: Normocephalic.      Left Ear: Tympanic membrane normal.      Ears:      Comments: Right tm: retracted  Eyes:      Pupils: Pupils are equal, round, and reactive to light.   Neck:      Thyroid: No thyromegaly.   Cardiovascular:      Rate and Rhythm: Normal rate and regular rhythm.      Heart sounds: Normal heart sounds. No murmur heard.    No friction rub.   Pulmonary:      Breath sounds: Normal breath sounds. No wheezing or rales.   Abdominal:      General: Bowel sounds are normal. There is no distension.      Palpations: Abdomen is soft. There is no mass.      Tenderness: There is no abdominal tenderness.   Musculoskeletal:         General: No tenderness. Normal range of motion.      Cervical back: Normal range of motion and neck supple.   Skin:     General: Skin is  warm.      Findings: No erythema or rash.   Neurological:      Mental Status: She is alert and oriented to person, place, and time.      Cranial Nerves: No cranial nerve deficit.      Deep Tendon Reflexes: Reflexes are normal and symmetric.   Psychiatric:         Thought Content: Thought content normal.             Assessment:       1. Routine medical exam    2. Coronary artery disease, unspecified vessel or lesion type, unspecified whether angina present, unspecified whether native or transplanted heart    3. Essential hypertension    4. ETD (Eustachian tube dysfunction), right    5. Gastroesophageal reflux disease, unspecified whether esophagitis present    6. Recurrent major depressive disorder, remission status unspecified    7. Anxiety    8. Gout, unspecified cause, unspecified chronicity, unspecified site        Plan:       Routine medical exam    Coronary artery disease, unspecified vessel or lesion type, unspecified whether angina present, unspecified whether native or transplanted heart    Essential hypertension    ETD (Eustachian tube dysfunction), right    Gastroesophageal reflux disease, unspecified whether esophagitis present    Recurrent major depressive disorder, remission status unspecified    Anxiety    Gout, unspecified cause, unspecified chronicity, unspecified site    Other orders  -     ramipriL (ALTACE) 10 MG capsule; Take 1 capsule (10 mg total) by mouth once daily.  Dispense: 90 capsule; Refill: 3  -     Influenza - Quadrivalent (PF)            Plan:  Restart flonase for etd  Increase ramipril to 10 mg daily. Serial bp checks. Nurse bp visit in 2 weeks  Cont all other meds      Medication List with Changes/Refills   New Medications    RAMIPRIL (ALTACE) 10 MG CAPSULE    Take 1 capsule (10 mg total) by mouth once daily.   Current Medications    ACETAMINOPHEN (TYLENOL) 650 MG TBSR    Take 1,300 mg by mouth every 8 (eight) hours as needed (pain).     ALBUTEROL (PROVENTIL/VENTOLIN HFA) 90  MCG/ACTUATION INHALER    Inhale 2 puffs into the lungs every 6 (six) hours as needed for Shortness of Breath.     ALLOPURINOL (ZYLOPRIM) 100 MG TABLET    TAKE 1 TABLET BY MOUTH ONCE DAILY    ASPIRIN (ECOTRIN) 81 MG EC TABLET    Take 81 mg by mouth once daily.    ATORVASTATIN (LIPITOR) 40 MG TABLET    TAKE 1 TABLET (40 MG TOTAL) BY MOUTH EVERY EVENING.     AZELASTINE (ASTELIN) 137 MCG (0.1 %) NASAL SPRAY    azelastine 137 mcg (0.1 %) nasal spray aerosol    BACLOFEN (LIORESAL) 10 MG TABLET    Take 10 mg by mouth 3 (three) times daily.    BLOOD-GLUCOSE METER (GLUCOSE MONITORING KIT) KIT    Use as instructed    BRILINTA 90 MG TABLET    TAKE 1 TABLET BY MOUTH 2 TIMES A DAY    CARVEDILOL (COREG) 25 MG TABLET    Take 1 tablet (25 mg total) by mouth 2 (two) times daily.    CETIRIZINE (ZYRTEC) 10 MG TABLET    Take 10 mg by mouth daily as needed for Allergies.    DEXBROMPHENIRAMN-CHLOPHEDIANOL (CHLO HIST) 1-12.5 MG/5 ML SOLN    Chlo Hist 1 mg-12.5 mg/5 mL oral solution    FAMOTIDINE (PEPCID) 40 MG TABLET    Take 1 tablet (40 mg total) by mouth every evening.    FLUTICASONE PROPIONATE (FLONASE) 50 MCG/ACTUATION NASAL SPRAY    fluticasone propionate 50 mcg/actuation nasal spray,suspension    FUROSEMIDE (LASIX) 20 MG TABLET    TAKE 1 TABLET BY MOUTH EVERY DAY    KETOCONAZOLE (NIZORAL) 2 % CREAM    ketoconazole 2 % topical cream    KETOROLAC (TORADOL) 10 MG TABLET    ketorolac 10 mg tablet    LACTOBACILLUS ACIDOPHILUS (PROBIOTIC) 10 BILLION CELL CAP    1 capsule by mouth daily    LANCETS MISC    1 Units by Misc.(Non-Drug; Combo Route) route 2 (two) times daily as needed.    LEVOTHYROXINE (SYNTHROID) 100 MCG TABLET    Take 1 tablet (100 mcg total) by mouth before breakfast.    NITROGLYCERIN (NITROSTAT) 0.4 MG SL TABLET    Place 1 tablet (0.4 mg total) under the tongue every 5 (five) minutes as needed for Chest pain.    NITROGLYCERIN 0.1 MG/HR TD PT24 (NITRODUR) 0.1 MG/HR PT24    Place 1 patch onto the skin once daily.     ONDANSETRON (ZOFRAN) 4 MG TABLET    Take 1 tablet (4 mg total) by mouth every 6 (six) hours as needed for Nausea.    OXYCODONE-ACETAMINOPHEN (PERCOCET)  MG PER TABLET    oxycodone-acetaminophen 10 mg-325 mg tablet    PANTOPRAZOLE (PROTONIX) 40 MG TABLET    TAKE 1 TABLET BY MOUTH EVERY DAY    POTASSIUM CHLORIDE SA (K-DUR,KLOR-CON) 20 MEQ TABLET    Take 1 tablet (20 mEq total) by mouth once daily.    PREGABALIN (LYRICA) 100 MG CAPSULE    Take 200 mg by mouth 2 (two) times a day.    RANOLAZINE (RANEXA) 500 MG TB12    TAKE 2 TABLETS (1 000 MG TOTAL) BY MOUTH 2 (TWO) TIMES DAILY.    SENNA-DOCUSATE 8.6-50 MG (PERICOLACE) 8.6-50 MG PER TABLET    Take 1 tablet by mouth once daily.    VENLAFAXINE (EFFEXOR-XR) 150 MG CP24    TAKE 1 CAPSULE BY MOUTH ONCE DAILY    Discontinued Medications    AMLODIPINE (NORVASC) 10 MG TABLET    amlodipine 10 mg tablet    GABAPENTIN (NEURONTIN) 100 MG CAPSULE    gabapentin 100 mg capsule    IBUPROFEN (ADVIL,MOTRIN) 800 MG TABLET    Take 1 tablet (800 mg total) by mouth 3 (three) times daily as needed for Pain.    RAMIPRIL (ALTACE) 5 MG CAPSULE    TAKE 1 CAPSULE BY MOUTH EVERY EVENING

## 2022-10-25 ENCOUNTER — CLINICAL SUPPORT (OUTPATIENT)
Dept: BARIATRICS | Facility: CLINIC | Age: 57
End: 2022-10-25
Payer: COMMERCIAL

## 2022-10-25 VITALS — WEIGHT: 206.81 LBS | BODY MASS INDEX: 38.06 KG/M2 | HEIGHT: 62 IN

## 2022-10-25 DIAGNOSIS — Z71.3 DIETARY COUNSELING AND SURVEILLANCE: Primary | ICD-10-CM

## 2022-10-25 DIAGNOSIS — E66.01 MORBID OBESITY: ICD-10-CM

## 2022-10-25 DIAGNOSIS — E66.9 OBESITY, CLASS II, BMI 35-39.9: ICD-10-CM

## 2022-10-25 PROCEDURE — 97802 MEDICAL NUTRITION INDIV IN: CPT | Mod: S$GLB,,, | Performed by: DIETITIAN, REGISTERED

## 2022-10-25 PROCEDURE — 97802 PR MED NUTR THER, 1ST, INDIV, EA 15 MIN: ICD-10-PCS | Mod: S$GLB,,, | Performed by: DIETITIAN, REGISTERED

## 2022-10-25 PROCEDURE — 99999 PR PBB SHADOW E&M-EST. PATIENT-LVL III: ICD-10-PCS | Mod: PBBFAC,,, | Performed by: DIETITIAN, REGISTERED

## 2022-10-25 PROCEDURE — 99999 PR PBB SHADOW E&M-EST. PATIENT-LVL III: CPT | Mod: PBBFAC,,, | Performed by: DIETITIAN, REGISTERED

## 2022-10-25 NOTE — PROGRESS NOTES
NUTRITIONAL CONSULT    Referring Physician: Dr. Whipple  Reason for MNT Referral: Initial assessment for bariatric surgery work-up possibly sleeve gastrectomy    PAST MEDICAL HISTORY:   57 y.o. female presents with a BMI of Body mass index is 38.44 kg/m²..    Past attempts at weight loss include: Unsupervised: calorie counting, high protein, low carb;  Supervised:  none;  Diet pills: none;  Exercise attempts: walking, treadmill     Weight history:   At current weight:  1-1.5 years  Obese for entire life.  More than 35 pounds overweight for 15-40 years old  More than 100 pounds overweight for 23 years.  Started dieting at 44 years old.  Maximum weight reached: 252  Most weight lost was 48 pounds through allergy to medication for 6 month.  She describes Her eating habits as volume eater, emotional eater.    Past Medical History:   Diagnosis Date    Allergy     Anticoagulant long-term use     ASA 81mg, Effient    Anxiety     Arthritis     Asthma     As child    CAD (coronary artery disease) 01/27/2012    s/p stents x4 , CABG, 3 vessel, (5/2013) newest stent prior to CABG    Chronic constipation     Colon polyp     Coronary artery disease involving native coronary artery of native heart without angina pectoris 1/27/2012 12/19 Significant ostial RCA lesion as above treated with drug-eluting stenting as above. Occluded proximal LAD with patent lima lad Patent vein graft to 2nd obtuse marginal Known occluded vein graft to unknown vessel.  s/p stents x 3 (2010) s/p LAD stent (DSE) 3/2012    DDD (degenerative disc disease), cervical     DDD (degenerative disc disease), lumbar     DDD (degenerative disc disease), thoracic     Depression     Edema     Encounter for blood transfusion     Headache(784.0)     History of nephrolithiasis     Hyperlipidemia     Hypertension     Hypothyroid 7/5/2012    Insomnia     Insulin resistance     patient stated not diebetic    Joint stiffness     Joint swelling     Kidney disease      "; Frequent UTIs     Kidney stones     Low back pain     Neck pain     Obstructive sleep apnea on CPAP 2012    PONV (postoperative nausea and vomiting)     S/P CABG (coronary artery bypass graft) 2013     Sleep apnea 12    Patient reports "severe" sleep apnea, uses C-pap    Stented coronary artery 2019  ostial RCA -Osirio2.5 x 18 post dilated 2.75     Thoracic back pain     Usual hyperplasia of lactiferous duct 2017    left breast       CLINICAL DATA:  57 y.o.-year-old White female.  Height: 5'1.5  Weight: 206 lbs  IBW: 107 lbs  BMI: 38.44  The patient's goal weight (50 % EBW): 157 lbs    Goal for Bariatric Surgery: to see grandkids grow up and return to the sport of rowing.    NUTRITION & HEALTH HISTORY:  Greatest challenge: portion control and emotional eating    Current diet recall: Breakfast: Premier protein shake  Lunch: Premier protein shake  Dinner: salad with chicken  Snacks: turkey jerkey, cheese stick    Current Diet:  Meal pattern: irregular. Relying too heavily on protein shakes.  Protein supplements: 2. Premier  Snackin / day  Vegetables: Likes a variety. Eats daily.  Fruits: Likes a variety. Eats rarely.  Beverages: sugar-free beverages >64 oz.  Dining out: Weekly. Mostly restaurants and take-out.  Cooking at home: Daily. Mostly baked and grilled meat, fish, and vegetables.    Exercise:  Past exercise: Fair    Current exercise: Adequate. Gym to ride the recumbent bicycle.  Restrictions to exercise: back pain.    Vitamins / Minerals / Herbs:   Women's MVI, vitamin D, Sennakot with stool softner    Food Allergies:   NKFA  She reports she "does not digest bread well."    Social:  Works as a teacher  Lives with  and mother.  Grocery shopping and food prep self.  Patient believes the household will be supportive after surgery.  Alcohol: Socially.  Smoking: None.    ASSESSMENT:  Patient reports attempts at weight loss, only to regain lost " weight.  Patient demonstrated knowledge of healthy eating behaviors and exercise patterns; admits to not eating healthy and not exercising at this point.  Patient demonstrates willingness to change lifestyle and make behavior modifications.  Expect fair  compliance after surgery at this time.    Insurance requires medically supervised diet prior to consideration for bariatric surgery.    BARIATRIC DIET DISCUSSION:  Discussed diet after surgery and related to patients food record.  Reviewed diet progression before and after surgery.  Stressed importance of exercise and its role in achieving weight loss goals.  Answered all questions.    RECOMMENDATIONS:  Patient is a potential candidate for bariatric surgery.    Needs additional visit(s) with RD.    PLAN:  Resume work-up for surgery.  Continue to review Bariatric Nutrition Guidebook at home and call with any questions.  Work on Bariatric Nutrition Checklist.  Only replace at most one meal a day with a protein shake.  Stop Senakot because it is a stimulant laxative.    SESSION TIME:  60 minutes

## 2022-10-27 ENCOUNTER — PATIENT MESSAGE (OUTPATIENT)
Dept: BARIATRICS | Facility: CLINIC | Age: 57
End: 2022-10-27
Payer: COMMERCIAL

## 2022-11-11 ENCOUNTER — PATIENT MESSAGE (OUTPATIENT)
Dept: FAMILY MEDICINE | Facility: CLINIC | Age: 57
End: 2022-11-11
Payer: COMMERCIAL

## 2022-11-13 RX ORDER — PREGABALIN 100 MG/1
200 CAPSULE ORAL 2 TIMES DAILY
Qty: 60 CAPSULE | Refills: 5 | Status: SHIPPED | OUTPATIENT
Start: 2022-11-13 | End: 2023-01-17

## 2022-11-13 RX ORDER — BACLOFEN 10 MG/1
10 TABLET ORAL 3 TIMES DAILY
Qty: 90 TABLET | Refills: 5 | Status: SHIPPED | OUTPATIENT
Start: 2022-11-13 | End: 2023-07-20

## 2022-11-14 ENCOUNTER — OFFICE VISIT (OUTPATIENT)
Dept: BARIATRICS | Facility: CLINIC | Age: 57
End: 2022-11-14
Payer: COMMERCIAL

## 2022-11-14 VITALS
RESPIRATION RATE: 16 BRPM | TEMPERATURE: 98 F | WEIGHT: 201.63 LBS | HEART RATE: 62 BPM | DIASTOLIC BLOOD PRESSURE: 74 MMHG | HEIGHT: 62 IN | SYSTOLIC BLOOD PRESSURE: 148 MMHG | BODY MASS INDEX: 37.1 KG/M2

## 2022-11-14 DIAGNOSIS — I25.10 CORONARY ARTERY DISEASE INVOLVING NATIVE CORONARY ARTERY OF NATIVE HEART WITHOUT ANGINA PECTORIS: ICD-10-CM

## 2022-11-14 DIAGNOSIS — M51.36 DDD (DEGENERATIVE DISC DISEASE), LUMBAR: ICD-10-CM

## 2022-11-14 DIAGNOSIS — I10 PRIMARY HYPERTENSION: ICD-10-CM

## 2022-11-14 DIAGNOSIS — E78.2 MIXED HYPERLIPIDEMIA: ICD-10-CM

## 2022-11-14 DIAGNOSIS — E66.01 MORBID OBESITY: Primary | ICD-10-CM

## 2022-11-14 DIAGNOSIS — E66.9 OBESITY, CLASS II, BMI 35-39.9: ICD-10-CM

## 2022-11-14 DIAGNOSIS — G47.33 OSA (OBSTRUCTIVE SLEEP APNEA): ICD-10-CM

## 2022-11-14 DIAGNOSIS — E03.9 HYPOTHYROIDISM, UNSPECIFIED TYPE: Chronic | ICD-10-CM

## 2022-11-14 PROCEDURE — 99999 PR PBB SHADOW E&M-EST. PATIENT-LVL V: ICD-10-PCS | Mod: PBBFAC,,, | Performed by: NURSE PRACTITIONER

## 2022-11-14 PROCEDURE — 1159F MED LIST DOCD IN RCRD: CPT | Mod: CPTII,S$GLB,, | Performed by: NURSE PRACTITIONER

## 2022-11-14 PROCEDURE — 99999 PR PBB SHADOW E&M-EST. PATIENT-LVL V: CPT | Mod: PBBFAC,,, | Performed by: NURSE PRACTITIONER

## 2022-11-14 PROCEDURE — 3008F BODY MASS INDEX DOCD: CPT | Mod: CPTII,S$GLB,, | Performed by: NURSE PRACTITIONER

## 2022-11-14 PROCEDURE — 1160F PR REVIEW ALL MEDS BY PRESCRIBER/CLIN PHARMACIST DOCUMENTED: ICD-10-PCS | Mod: CPTII,S$GLB,, | Performed by: NURSE PRACTITIONER

## 2022-11-14 PROCEDURE — 1160F RVW MEDS BY RX/DR IN RCRD: CPT | Mod: CPTII,S$GLB,, | Performed by: NURSE PRACTITIONER

## 2022-11-14 PROCEDURE — 4010F PR ACE/ARB THEARPY RXD/TAKEN: ICD-10-PCS | Mod: CPTII,S$GLB,, | Performed by: NURSE PRACTITIONER

## 2022-11-14 PROCEDURE — 99215 PR OFFICE/OUTPT VISIT, EST, LEVL V, 40-54 MIN: ICD-10-PCS | Mod: S$GLB,,, | Performed by: NURSE PRACTITIONER

## 2022-11-14 PROCEDURE — 3078F DIAST BP <80 MM HG: CPT | Mod: CPTII,S$GLB,, | Performed by: NURSE PRACTITIONER

## 2022-11-14 PROCEDURE — 3077F SYST BP >= 140 MM HG: CPT | Mod: CPTII,S$GLB,, | Performed by: NURSE PRACTITIONER

## 2022-11-14 PROCEDURE — 1159F PR MEDICATION LIST DOCUMENTED IN MEDICAL RECORD: ICD-10-PCS | Mod: CPTII,S$GLB,, | Performed by: NURSE PRACTITIONER

## 2022-11-14 PROCEDURE — 3077F PR MOST RECENT SYSTOLIC BLOOD PRESSURE >= 140 MM HG: ICD-10-PCS | Mod: CPTII,S$GLB,, | Performed by: NURSE PRACTITIONER

## 2022-11-14 PROCEDURE — 3078F PR MOST RECENT DIASTOLIC BLOOD PRESSURE < 80 MM HG: ICD-10-PCS | Mod: CPTII,S$GLB,, | Performed by: NURSE PRACTITIONER

## 2022-11-14 PROCEDURE — 4010F ACE/ARB THERAPY RXD/TAKEN: CPT | Mod: CPTII,S$GLB,, | Performed by: NURSE PRACTITIONER

## 2022-11-14 PROCEDURE — 99215 OFFICE O/P EST HI 40 MIN: CPT | Mod: S$GLB,,, | Performed by: NURSE PRACTITIONER

## 2022-11-14 PROCEDURE — 3008F PR BODY MASS INDEX (BMI) DOCUMENTED: ICD-10-PCS | Mod: CPTII,S$GLB,, | Performed by: NURSE PRACTITIONER

## 2022-11-14 NOTE — PROGRESS NOTES
Medically Supervised Weight Loss Documentation    Chief Complaint   Patient presents with    Follow-up    Obesity    Nutrition Counseling       History of Present Illness:  Patient is a 57 y.o. female who is referred for evaluation of surgical treatment of morbid obesity. Patient has a Body mass index is 37.48 kg/m². kg/m².  She has known comorbidities of coronary artery disease, dyslipidemia, and hypertension. Patient has attended the bariatric seminar and is most interested in gastric sleeve surgery, (but wanting option by cardiologist) surgery evaluation of surgical treatment of morbid obesity.       Comorbidities:   Past Medical History:   Diagnosis Date    Allergy     Anticoagulant long-term use     ASA 81mg, Effient    Anxiety     Arthritis     Asthma     As child    CAD (coronary artery disease) 01/27/2012    s/p stents x4 , CABG, 3 vessel, (5/2013) newest stent prior to CABG    Chronic constipation     Colon polyp     Coronary artery disease involving native coronary artery of native heart without angina pectoris 1/27/2012 12/19 Significant ostial RCA lesion as above treated with drug-eluting stenting as above. Occluded proximal LAD with patent lima lad Patent vein graft to 2nd obtuse marginal Known occluded vein graft to unknown vessel.  s/p stents x 3 (2010) s/p LAD stent (DSE) 3/2012    DDD (degenerative disc disease), cervical     DDD (degenerative disc disease), lumbar     DDD (degenerative disc disease), thoracic     Depression     Edema     Encounter for blood transfusion     Headache(784.0)     History of nephrolithiasis     Hyperlipidemia     Hypertension     Hypothyroid 7/5/2012    Insomnia     Insulin resistance     patient stated not diebetic    Joint stiffness     Joint swelling     Kidney disease     ; Frequent UTIs     Kidney stones     Low back pain     Neck pain     Obstructive sleep apnea on CPAP 7/17/2012    PONV (postoperative nausea and vomiting)     S/P CABG (coronary  "artery bypass graft) 2013     Sleep apnea 12    Patient reports "severe" sleep apnea, uses C-pap    Stented coronary artery 2019  ostial RCA -Osirio2.5 x 18 post dilated 2.75     Thoracic back pain     Usual hyperplasia of lactiferous duct 2017    left breast     Past Surgical History:   Procedure Laterality Date    ADENOIDECTOMY      BACK SURGERY      BREAST BIOPSY Left 2017    duct excision- Dr. Jimenez    BREAST CYST ASPIRATION Left 2020    @ 's office- old hematoma drained (per office)    CARDIAC SURGERY      CARPAL TUNNEL RELEASE      bilateral    CERVICAL LAMINECTOMY WITH SPINAL FUSION      2016     SECTION, CLASSIC      x 2    COLONOSCOPY      CORONARY ANGIOGRAPHY  2019    Procedure: ANGIOGRAM, CORONARY ARTERY;  Surgeon: Timi Millan MD;  Location: [facility] CATH;  Service: Cardiology;;    CORONARY ANGIOPLASTY WITH STENT PLACEMENT      x 4    CORONARY ARTERY BYPASS GRAFT  2013    3 vessel    ESOPHAGOGASTRODUODENOSCOPY N/A 2020    Procedure: EGD (ESOPHAGOGASTRODUODENOSCOPY);  Surgeon: Mk Warren MD;  Location: [facility] ENDO;  Service: Endoscopy;  Laterality: N/A;    HYSTERECTOMY      Complete    JOINT REPLACEMENT      KNEE ARTHROPLASTY Left 2019    Procedure: ARTHROPLASTY, KNEE;  Surgeon: Juan Wilson MD;  Location: [facility] OR;  Service: Orthopedics;  Laterality: Left;    KNEE ARTHROSCOPY W/ MENISCAL REPAIR Left     twice, last one 2014    LAMINECTOMY      L4/L5    LEFT HEART CATHETERIZATION  2019    Procedure: Left heart cath-  # 219 A;  Surgeon: Timi Millan MD;  Location: [facility] CATH;  Service: Cardiology;;    OOPHORECTOMY Bilateral     ROBOTIC ARTHROPLASTY, KNEE Right 2021    Procedure: ROBOTIC ARTHROPLASTY, KNEE, TOTAL;  Surgeon: Juan Wilson MD;  Location: [facility] OR;  Service: Orthopedics;  Laterality: Right;    SINUS SURGERY      x3    TENDON REPAIR Left     ankle surgery    THYROIDECTOMY   " "   2 separate surgeries    TONSILLECTOMY      TOTAL KNEE ARTHROPLASTY Left 2019    Surgeon: Juan Wilson MD     Family History   Problem Relation Age of Onset    Heart failure Mother     Asthma Mother     Macular degeneration Mother     Cancer Mother         Breast    Cataracts Mother     Heart disease Mother     COPD Mother     Breast cancer Mother     Heart disease Father     Diabetes Father     Hypertension Father     Stroke Father     Cataracts Father     Obesity Father     Obesity Sister     Glaucoma Sister     Thyroid disease Sister     Glaucoma Sister     Other Brother         stiff man syndrome    Cancer Maternal Grandmother         Breast with Mets    Breast cancer Maternal Grandmother     Cancer Paternal Grandmother         Colon    Strabismus Other     Amblyopia Neg Hx     Blindness Neg Hx     Retinal detachment Neg Hx      Social History     Tobacco Use    Smoking status: Former     Packs/day: 0.50     Years: 14.00     Pack years: 7.00     Types: Cigarettes     Start date: 1984     Quit date: 1998     Years since quittin.9    Smokeless tobacco: Never   Substance Use Topics    Alcohol use: No    Drug use: Not Currently     Types: Benzodiazepines        Review of patient's allergies indicates:   Allergen Reactions    Celexa [citalopram] Other (See Comments)     GI upset  Stated "knocked me out"    Cephalexin Anaphylaxis    Zoloft [sertraline] Other (See Comments)     Stated "knocked me out"    Codeine Other (See Comments)     hallucinations  hallucinations    Isosorbide Nausea And Vomiting    Ciprofloxacin Itching    Levaquin [levofloxacin] Other (See Comments)     tendinitis    Metformin      Nausea     Penicillamine     Penicillins Other (See Comments)     Was told per mother that as child was allergic to penicillin.  Childhood allergy/ unknown reaction    Sulfa (sulfonamide antibiotics)        Medications:  Current Outpatient Medications on File Prior to Visit   Medication Sig " Dispense Refill    acetaminophen (TYLENOL) 650 MG TbSR Take 1,300 mg by mouth every 8 (eight) hours as needed (pain).       albuterol (PROVENTIL/VENTOLIN HFA) 90 mcg/actuation inhaler Inhale 2 puffs into the lungs every 6 (six) hours as needed for Shortness of Breath.       allopurinoL (ZYLOPRIM) 100 MG tablet TAKE 1 TABLET BY MOUTH ONCE DAILY (Patient taking differently: Take 100 mg by mouth once daily.) 90 tablet 3    aspirin (ECOTRIN) 81 MG EC tablet Take 81 mg by mouth once daily.      atorvastatin (LIPITOR) 40 MG tablet TAKE 1 TABLET (40 MG TOTAL) BY MOUTH EVERY EVENING.  90 tablet 3    azelastine (ASTELIN) 137 mcg (0.1 %) nasal spray azelastine 137 mcg (0.1 %) nasal spray aerosol      baclofen (LIORESAL) 10 MG tablet Take 1 tablet (10 mg total) by mouth 3 (three) times daily. 90 tablet 5    blood-glucose meter (GLUCOSE MONITORING KIT) kit Use as instructed 1 each 0    BRILINTA 90 mg tablet TAKE 1 TABLET BY MOUTH 2 TIMES A DAY 60 tablet 4    carvediloL (COREG) 25 MG tablet Take 1 tablet (25 mg total) by mouth 2 (two) times daily. 180 tablet 2    cetirizine (ZYRTEC) 10 MG tablet Take 10 mg by mouth daily as needed for Allergies.      dexbrompheniramn-chlophedianol (CHLO HIST) 1-12.5 mg/5 mL Soln Chlo Hist 1 mg-12.5 mg/5 mL oral solution      famotidine (PEPCID) 40 MG tablet Take 1 tablet (40 mg total) by mouth every evening. 30 tablet 1    fluticasone propionate (FLONASE) 50 mcg/actuation nasal spray fluticasone propionate 50 mcg/actuation nasal spray,suspension      furosemide (LASIX) 20 MG tablet TAKE 1 TABLET BY MOUTH EVERY DAY (Patient taking differently: Take 20 mg by mouth once daily.) 90 tablet 2    ketoconazole (NIZORAL) 2 % cream ketoconazole 2 % topical cream      ketorolac (TORADOL) 10 mg tablet ketorolac 10 mg tablet      Lactobacillus acidophilus (PROBIOTIC) 10 billion cell Cap 1 capsule by mouth daily 10 capsule 0    lancets Misc 1 Units by Misc.(Non-Drug; Combo Route) route 2 (two) times daily as  needed. 100 each 3    levothyroxine (SYNTHROID) 100 MCG tablet Take 1 tablet (100 mcg total) by mouth before breakfast. 30 tablet 11    oxyCODONE-acetaminophen (PERCOCET)  mg per tablet oxycodone-acetaminophen 10 mg-325 mg tablet      pantoprazole (PROTONIX) 40 MG tablet TAKE 1 TABLET BY MOUTH EVERY DAY 90 tablet 3    pregabalin (LYRICA) 100 MG capsule Take 2 capsules (200 mg total) by mouth 2 (two) times a day. 60 capsule 5    ramipriL (ALTACE) 10 MG capsule Take 1 capsule (10 mg total) by mouth once daily. 90 capsule 3    ranolazine (RANEXA) 500 MG Tb12 TAKE 2 TABLETS (1 000 MG TOTAL) BY MOUTH 2 (TWO) TIMES DAILY. 120 tablet 1    senna-docusate 8.6-50 mg (PERICOLACE) 8.6-50 mg per tablet Take 1 tablet by mouth once daily.      venlafaxine (EFFEXOR-XR) 150 MG Cp24 TAKE 1 CAPSULE BY MOUTH ONCE DAILY  (Patient taking differently: Take 150 mg by mouth every evening.) 90 capsule 3    nitroGLYCERIN (NITROSTAT) 0.4 MG SL tablet Place 1 tablet (0.4 mg total) under the tongue every 5 (five) minutes as needed for Chest pain. (Patient not taking: Reported on 11/14/2022) 25 tablet 3    ondansetron (ZOFRAN) 4 MG tablet Take 1 tablet (4 mg total) by mouth every 6 (six) hours as needed for Nausea. (Patient not taking: Reported on 10/24/2022) 20 tablet 1    potassium chloride SA (K-DUR,KLOR-CON) 20 MEQ tablet Take 1 tablet (20 mEq total) by mouth once daily. (Patient not taking: Reported on 9/28/2022) 90 tablet 0     Current Facility-Administered Medications on File Prior to Visit   Medication Dose Route Frequency Provider Last Rate Last Admin    sodium chloride 0.9% flush 10 mL  10 mL Intravenous PRN Juan Wilson MD             Body mass index is 37.48 kg/m².     Beginning Weight: 212 lbs    Last Weight: 212 lbs    Current Weight: 201 lbs   Weight Change: -11 lbs      Body comp:  Fat Percent:  42.6 %  Fat Mass:  85.8 lb  FFM:  115.8 lb  TBW: 82.8 lb  TBW %:  41.1 %  BMR: 1604 kcal    Wt Readings from Last 5 Encounters:    11/14/22 91.4 kg (201 lb 9.6 oz)   10/25/22 93.8 kg (206 lb 12.7 oz)   10/24/22 94.5 kg (208 lb 5.4 oz)   09/28/22 96.2 kg (212 lb 3.1 oz)   09/13/22 96.3 kg (212 lb 4.9 oz)         Chart review:  Primary Care Physician: Bing      Lab review:  Most Recent Data:  CBC:   Lab Results   Component Value Date    WBC 10.05 05/06/2022    HGB 10.0 (L) 05/06/2022    HCT 33.5 (L) 05/06/2022     05/06/2022    MCV 83 05/06/2022    RDW 13.9 05/06/2022     BMP:   Lab Results   Component Value Date     09/13/2022    K 3.2 (L) 09/13/2022     09/13/2022    CO2 27 09/13/2022    BUN 15 09/13/2022    CREATININE 0.8 09/13/2022    GLU 87 09/13/2022    CALCIUM 9.3 09/13/2022    MG 2.1 12/22/2021    PHOS 4.9 (H) 01/29/2021     LFTs:   Lab Results   Component Value Date    PROT 7.2 09/13/2022    ALBUMIN 3.8 09/13/2022    BILITOT 0.5 09/13/2022    AST 13 09/13/2022    ALKPHOS 78 09/13/2022    ALT 6 (L) 09/13/2022     Coags:   Lab Results   Component Value Date    INR 1.0 12/24/2019     FLP:   Lab Results   Component Value Date    CHOL 176 02/21/2022    HDL 68 02/21/2022    LDLCALC 89 02/21/2022    TRIG 93 02/21/2022    CHOLHDL 21.3 12/24/2019     DM:   Lab Results   Component Value Date    HGBA1C 5.4 04/09/2022    HGBA1C 5.2 06/01/2021    HGBA1C 5.2 01/25/2021    GLUF 132 (H) 09/01/2012    LDLCALC 89 02/21/2022    CREATININE 0.8 09/13/2022     Thyroid:   Lab Results   Component Value Date    TSH 2.193 09/13/2022    FREET4 1.30 07/15/2022     Anemia:   Lab Results   Component Value Date    IRON 15 (L) 05/11/2022    TIBC 440 05/11/2022    FERRITIN 24 05/11/2022    AVFZKEXM86 183 (L) 12/22/2021    FOLATE 9.5 12/22/2021     Cardiac:   Lab Results   Component Value Date    TROPONINI <0.012 10/18/2021     03/12/2016     Urinalysis:   Lab Results   Component Value Date    LABURIN ESCHERICHIA COLI  >100,000 cfu/ml   09/29/2017    COLORU Yellow 05/06/2022    SPECGRAV 1.010 05/06/2022    NITRITE Negative 05/06/2022     "KETONESU Negative 05/06/2022    UROBILINOGEN norm 09/29/2017    WBCUA 0-3 07/24/2006         Radiology review: Images independently reviewed and interpreted.    UGI- pending      Other Results:  EKG (my interpretation): pending.        Review of Systems:  Review of Systems   Constitutional:  Positive for fatigue.   HENT:  Positive for congestion, nosebleeds and sinus pressure.    Respiratory:  Positive for apnea and cough.    Cardiovascular:  Positive for leg swelling.   Musculoskeletal:  Positive for back pain, joint swelling and neck pain.   Hematological:  Bruises/bleeds easily.   Psychiatric/Behavioral:  Positive for sleep disturbance.    All other systems reviewed and are negative.    Physical:     Vital Signs (Most Recent)  Temp: 98.4 °F (36.9 °C) (11/14/22 1400)  Pulse: 62 (11/14/22 1400)  Resp: 16 (11/14/22 1400)  BP: (!) 148/74 (11/14/22 1400)  5' 1.5" (1.562 m)  91.4 kg (201 lb 9.6 oz)     Physical Exam:  Physical Exam  Vitals and nursing note reviewed.   Constitutional:       General: She is not in acute distress.     Appearance: Normal appearance. She is well-developed. She is obese. She is not ill-appearing, toxic-appearing or diaphoretic.   HENT:      Head: Normocephalic and atraumatic.        Right Ear: External ear normal.      Left Ear: External ear normal.      Nose: Nose normal. No congestion or rhinorrhea.      Mouth/Throat:      Mouth: Mucous membranes are moist.      Pharynx: Oropharynx is clear. No oropharyngeal exudate.   Eyes:      General: No scleral icterus.        Right eye: No discharge.         Left eye: No discharge.      Conjunctiva/sclera: Conjunctivae normal.      Pupils: Pupils are equal, round, and reactive to light.   Neck:      Thyroid: No thyromegaly.      Vascular: No JVD.      Trachea: No tracheal deviation.   Cardiovascular:      Rate and Rhythm: Normal rate and regular rhythm.      Pulses: Normal pulses.      Heart sounds: Normal heart sounds. No murmur heard.    No " friction rub. No gallop.   Pulmonary:      Effort: Pulmonary effort is normal. No respiratory distress.      Breath sounds: Normal breath sounds. No stridor. No wheezing or rales.   Chest:      Chest wall: No tenderness.       Abdominal:      General: Bowel sounds are normal. There is no distension.      Palpations: Abdomen is soft. There is no mass.      Tenderness: There is no abdominal tenderness. There is no guarding or rebound.      Hernia: No hernia is present.   Musculoskeletal:         General: No swelling, tenderness, deformity or signs of injury. Normal range of motion.      Cervical back: Normal range of motion and neck supple.      Right lower leg: No edema.      Left lower leg: No edema.   Lymphadenopathy:      Cervical: No cervical adenopathy.   Skin:     General: Skin is warm and dry.      Capillary Refill: Capillary refill takes less than 2 seconds.      Coloration: Skin is not jaundiced or pale.      Findings: No bruising, erythema or rash.   Neurological:      General: No focal deficit present.      Mental Status: She is alert and oriented to person, place, and time.      Cranial Nerves: No cranial nerve deficit.      Motor: No weakness.      Gait: Gait normal.   Psychiatric:         Mood and Affect: Mood normal.         Behavior: Behavior normal.         Thought Content: Thought content normal.         Judgment: Judgment normal.       ASSESSMENT/PLAN:        1. Morbid obesity        2. Obesity, Class II, BMI 35-39.9        3. Primary hypertension        4. Coronary artery disease involving native coronary artery of native heart without angina pectoris        5. DDD (degenerative disc disease), lumbar        6. Mixed hyperlipidemia        7. RAHUL (obstructive sleep apnea)        8. Hypothyroidism, unspecified type            Continue with Dr. Whipple's established plan on initial evaluation on 9/28/22       MSD 2/4     Patient will need:     Labs- pending  EKG- pending  UGI- 11/22/22  dietary  consult- 10/25/22  psych consult- 11/17/22  Seminar- done  PCP Letter- Bing     Will obtain the following clearances prior to surgery:   Cardiology- 12/20/22      Diet Education Discussed:  Recommend high protein, low carb meals- mainly meats and vegetables.    Breakfast:  boil egg, wilkerson,   Lunch:  premier shake  Snack- turkey stick, 1 apple  Dinner:  tilapia with [place] sprouts (green beans, cauliflower)  Water: 44 oz   Transitioning to more fluids at the end of the day to curb appetite to eat      MVI: baripal      Exercise/Activity Discussed: Cardiovascular exercise, get HR over 100 for 20 minutes 3 times per week-  Walking daily at school, walking at lunch time  3x/wk riding bike, likes to row      Plan for this patient:  Diet adherence- doing well   Extensive meal planning and tracking of food   Plans for 1 meal out per week  Tanita scale results reviewed with patient  Exercise/Activity adherence   Increase activity  Fall reviewed with patient  Continue multivitamins- discussion of post-operative life long MVI need, including Calcium, and a B-Complex   Transition to OTC vitamins  Increased stress at home with mother and continues to do well  Discussed MBSAQ risk benefits for surgery,   Discussion on surgery- will decided after seeing cardiology as wants to know how surgery will effect her medications        I spent a total of 43 minutes on the day of the visit.This includes face to face time and non-face to face time preparing to see the patient (eg, review of tests), obtaining and/or reviewing separately obtained history, documenting clinical information in the electronic or other health record, independently interpreting results and communicating results to the patient/family/caregiver, or care coordinator.

## 2022-11-17 ENCOUNTER — OFFICE VISIT (OUTPATIENT)
Dept: PSYCHIATRY | Facility: CLINIC | Age: 57
End: 2022-11-17
Payer: COMMERCIAL

## 2022-11-17 ENCOUNTER — TELEPHONE (OUTPATIENT)
Dept: FAMILY MEDICINE | Facility: CLINIC | Age: 57
End: 2022-11-17

## 2022-11-17 DIAGNOSIS — F32.A DEPRESSION, UNSPECIFIED DEPRESSION TYPE: Primary | ICD-10-CM

## 2022-11-17 DIAGNOSIS — F41.9 ANXIETY DISORDER, UNSPECIFIED TYPE: ICD-10-CM

## 2022-11-17 PROCEDURE — 99999 PR PBB SHADOW E&M-EST. PATIENT-LVL II: CPT | Mod: PBBFAC,,, | Performed by: PSYCHOLOGIST

## 2022-11-17 PROCEDURE — 90791 PSYCH DIAGNOSTIC EVALUATION: CPT | Mod: S$GLB,,, | Performed by: PSYCHOLOGIST

## 2022-11-17 PROCEDURE — 90791 PR PSYCHIATRIC DIAGNOSTIC EVALUATION: ICD-10-PCS | Mod: S$GLB,,, | Performed by: PSYCHOLOGIST

## 2022-11-17 PROCEDURE — 4010F PR ACE/ARB THEARPY RXD/TAKEN: ICD-10-PCS | Mod: CPTII,S$GLB,, | Performed by: PSYCHOLOGIST

## 2022-11-17 PROCEDURE — 4010F ACE/ARB THERAPY RXD/TAKEN: CPT | Mod: CPTII,S$GLB,, | Performed by: PSYCHOLOGIST

## 2022-11-17 PROCEDURE — 99999 PR PBB SHADOW E&M-EST. PATIENT-LVL II: ICD-10-PCS | Mod: PBBFAC,,, | Performed by: PSYCHOLOGIST

## 2022-11-17 NOTE — PROGRESS NOTES
"BEHAVIORAL HEALTH PRE-SURGICAL EVALUATION: BARIATRIC SURGERY     PROBLEM:  Josephine Bolton is a 57 y.o. female White referred by Dr. Whipple for a routine behavioral health evaluation for bariatric surgery. Patient notes being interested in gastric sleeve surgery and has considered this for for several years.  Patient notes she has been unable to pursue such surgery until recently due to insurance issues.  Patient notes her primary goal for pursuing bariatric surgery is improved health and improved quality of life.      Past Medical History:   Diagnosis Date    Allergy     Anticoagulant long-term use     ASA 81mg, Effient    Anxiety     Arthritis     Asthma     As child    CAD (coronary artery disease) 01/27/2012    s/p stents x4 , CABG, 3 vessel, (5/2013) newest stent prior to CABG    Chronic constipation     Colon polyp     Coronary artery disease involving native coronary artery of native heart without angina pectoris 1/27/2012 12/19 Significant ostial RCA lesion as above treated with drug-eluting stenting as above. Occluded proximal LAD with patent lima lad Patent vein graft to 2nd obtuse marginal Known occluded vein graft to unknown vessel.  s/p stents x 3 (2010) s/p LAD stent (DSE) 3/2012    DDD (degenerative disc disease), cervical     DDD (degenerative disc disease), lumbar     DDD (degenerative disc disease), thoracic     Depression     Edema     Encounter for blood transfusion     Headache(784.0)     History of nephrolithiasis     Hyperlipidemia     Hypertension     Hypothyroid 7/5/2012    Insomnia     Insulin resistance     patient stated not diebetic    Joint stiffness     Joint swelling     Kidney disease     ; Frequent UTIs     Kidney stones     Low back pain     Neck pain     Obstructive sleep apnea on CPAP 7/17/2012    PONV (postoperative nausea and vomiting)     S/P CABG (coronary artery bypass graft) 6/25/2013 6/23/2013     Sleep apnea 01/27/12    Patient reports "severe" " sleep apnea, uses C-pap    Stented coronary artery 12/20/2019 12/19  ostial RCA -Osirio2.5 x 18 post dilated 2.75     Thoracic back pain     Usual hyperplasia of lactiferous duct 02/2017    left breast         Current Outpatient Medications:     acetaminophen (TYLENOL) 650 MG TbSR, Take 1,300 mg by mouth every 8 (eight) hours as needed (pain). , Disp: , Rfl:     albuterol (PROVENTIL/VENTOLIN HFA) 90 mcg/actuation inhaler, Inhale 2 puffs into the lungs every 6 (six) hours as needed for Shortness of Breath. , Disp: , Rfl:     allopurinoL (ZYLOPRIM) 100 MG tablet, TAKE 1 TABLET BY MOUTH ONCE DAILY (Patient taking differently: Take 100 mg by mouth once daily.), Disp: 90 tablet, Rfl: 3    aspirin (ECOTRIN) 81 MG EC tablet, Take 81 mg by mouth once daily., Disp: , Rfl:     atorvastatin (LIPITOR) 40 MG tablet, TAKE 1 TABLET (40 MG TOTAL) BY MOUTH EVERY EVENING. , Disp: 90 tablet, Rfl: 3    azelastine (ASTELIN) 137 mcg (0.1 %) nasal spray, azelastine 137 mcg (0.1 %) nasal spray aerosol, Disp: , Rfl:     baclofen (LIORESAL) 10 MG tablet, Take 1 tablet (10 mg total) by mouth 3 (three) times daily., Disp: 90 tablet, Rfl: 5    blood-glucose meter (GLUCOSE MONITORING KIT) kit, Use as instructed, Disp: 1 each, Rfl: 0    BRILINTA 90 mg tablet, TAKE 1 TABLET BY MOUTH 2 TIMES A DAY, Disp: 60 tablet, Rfl: 4    carvediloL (COREG) 25 MG tablet, Take 1 tablet (25 mg total) by mouth 2 (two) times daily., Disp: 180 tablet, Rfl: 2    cetirizine (ZYRTEC) 10 MG tablet, Take 10 mg by mouth daily as needed for Allergies., Disp: , Rfl:     dexbrompheniramn-chlophedianol (CHLO HIST) 1-12.5 mg/5 mL Soln, Chlo Hist 1 mg-12.5 mg/5 mL oral solution, Disp: , Rfl:     famotidine (PEPCID) 40 MG tablet, Take 1 tablet (40 mg total) by mouth every evening., Disp: 30 tablet, Rfl: 1    fluticasone propionate (FLONASE) 50 mcg/actuation nasal spray, fluticasone propionate 50 mcg/actuation nasal spray,suspension, Disp: , Rfl:     furosemide (LASIX) 20 MG  tablet, TAKE 1 TABLET BY MOUTH EVERY DAY (Patient taking differently: Take 20 mg by mouth once daily.), Disp: 90 tablet, Rfl: 2    ketoconazole (NIZORAL) 2 % cream, ketoconazole 2 % topical cream, Disp: , Rfl:     ketorolac (TORADOL) 10 mg tablet, ketorolac 10 mg tablet, Disp: , Rfl:     Lactobacillus acidophilus (PROBIOTIC) 10 billion cell Cap, 1 capsule by mouth daily, Disp: 10 capsule, Rfl: 0    lancets Misc, 1 Units by Misc.(Non-Drug; Combo Route) route 2 (two) times daily as needed., Disp: 100 each, Rfl: 3    levothyroxine (SYNTHROID) 100 MCG tablet, Take 1 tablet (100 mcg total) by mouth before breakfast., Disp: 30 tablet, Rfl: 11    nitroGLYCERIN (NITROSTAT) 0.4 MG SL tablet, Place 1 tablet (0.4 mg total) under the tongue every 5 (five) minutes as needed for Chest pain. (Patient not taking: Reported on 11/14/2022), Disp: 25 tablet, Rfl: 3    ondansetron (ZOFRAN) 4 MG tablet, Take 1 tablet (4 mg total) by mouth every 6 (six) hours as needed for Nausea. (Patient not taking: Reported on 10/24/2022), Disp: 20 tablet, Rfl: 1    oxyCODONE-acetaminophen (PERCOCET)  mg per tablet, oxycodone-acetaminophen 10 mg-325 mg tablet, Disp: , Rfl:     pantoprazole (PROTONIX) 40 MG tablet, TAKE 1 TABLET BY MOUTH EVERY DAY, Disp: 90 tablet, Rfl: 3    potassium chloride SA (K-DUR,KLOR-CON) 20 MEQ tablet, Take 1 tablet (20 mEq total) by mouth once daily. (Patient not taking: Reported on 9/28/2022), Disp: 90 tablet, Rfl: 0    pregabalin (LYRICA) 100 MG capsule, Take 2 capsules (200 mg total) by mouth 2 (two) times a day., Disp: 60 capsule, Rfl: 5    ramipriL (ALTACE) 10 MG capsule, Take 1 capsule (10 mg total) by mouth once daily., Disp: 90 capsule, Rfl: 3    ranolazine (RANEXA) 500 MG Tb12, TAKE 2 TABLETS (1 000 MG TOTAL) BY MOUTH 2 (TWO) TIMES DAILY., Disp: 120 tablet, Rfl: 1    senna-docusate 8.6-50 mg (PERICOLACE) 8.6-50 mg per tablet, Take 1 tablet by mouth once daily., Disp: , Rfl:     venlafaxine (EFFEXOR-XR) 150 MG  Cp24, TAKE 1 CAPSULE BY MOUTH ONCE DAILY  (Patient taking differently: Take 150 mg by mouth every evening.), Disp: 90 capsule, Rfl: 3    Current Facility-Administered Medications:     sodium chloride 0.9% flush 10 mL, 10 mL, Intravenous, PRN, Juan Wilson MD     KNOWLEDGE OF SURGERY: Patient notes that they have been talking with treatment team about the risks and benefits of bariatric surgery and appears to generally have realistic expectations.  Patient reports asking questions of treatment team.  Patient reports being motivated for recovery, rehabilitation, and lifestyle changes to support an active post bariatric surgery life.     WEIGHT MANAGEMENT HISTORY: Patient notes throughout childhood and adolescence her weight fluctuated and at times she was overweight.  Patient notes in high school she was very physically active and struggled to maintain her weight.  Patient notes she weighed approxiamtely 145lbs. 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  Patient notes after high school she lost approxiamtely 15lbs after the passing of her grandmother.  Patient notes she was feeling depressed after the passing of her grandmother and she was diagnosed with an ulcers and was hospitalized for 3 weeks due to this.  Patient notes at the age of 23 years old she  her .  Patient notes at the age of 25 years old she was on bedrest and in the hospital throughout much of that pregnancy and gained approxiamtely 100lbs while pregnant with her first child.  Patient notes after the birth of her daughter she lost approximately 50lbs.  Patient notes during pregnancy with her son at the age of 30 years old she gained approxiamtely 28lbs.  Patient notes following the birth of her son she struggled with weight loss. Patient notes john rivera underwent thyroid surgery on two occasions and was diagnosed with hypothyroidism.   Patient notes since  the age of 30 years old her weight has fluctuated from 180lbs-236lbs. Patient notes she has had several surgeries throughout the last 10 years and notes has significantly impacted her activity level. Patient denies hx of purging, excessive restrictive caloric intake, excessive laxative or dieuretic use. Patient notes during periods of stress she has had a hx of some mindless eating.  Patient notes she has become more mindful of this habit and has been more mindful of eating habits.  Patient denies hx of eating to cope wth stress, anxiety and depression.       Historical Diet:   Patient notes historically she struggled with portion control at times, skipping meals and food choices.  Patient notes some intermittent increase in mindless eating during periods of stress.  Breakfast: Skip  Lunch: Skip  Dinner: Protein, Carbohydrate, Vegetable or Restaurant Food 1x/week (Steak with loaded baked potato, salad or Fast Food)  Snacks: 3pm: Candy, Chips; 7pm: Microwave Popcorn or Ice Cream or Cereal  Drinks:Soda     Current Diet:   Patient notes she started making changes to her diet 1 year ago and notes she started Bariatric Surgery Evaluation process in October 2022.  Patient notes she met with dietician and has made significant changes to her diet.  Patient notes she has lost 11lbs since that time.    Breakfast: Boiled Eggs or Cereal on occasion and notes she measures this  Lunch: Salad, Cheese Stick and Turkey Stick  Dinner: Salad, Cheese Stick and Turkey Stick or Restaurant Food 1x/week (Steak-orders smaller portion, Vegetable)  Snacks: Cheese Stick, Turkey Stick  Drinks: Water, Crystal Light     Exercise:  Patient notes in high school she was very physically active.  Patient notes she has underwent several surgeries throughout the last 10 years and recovering from these have significantly impacted her ability to engage in physical activity.  Patietn notes between surgeries she did attempt to engage in physical activity.   Patient notes she plans to restart phyiscal therapy in the near future.  Richien notes notes she goes to the gym 3x/week and rides the bike.      MEDICAL ADHERENCE: Patient denies hx of difficulty with medical adherence.  Patient denies history of interpersonal conflict (anger management concerns, difficulty with doctors/nurses or leaving AMA.)         PSYCHOSOCIAL HISTORY   Patient notes she works as a , previously worked as a  and prior to that worked as a . 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  Patient notes she Bachelors Degree.  Patient notes she has two children; one who resides in [place] and one who resides in [place].  Patient notes she has 3 grandchildren.       SUPPORT SYSTEM FOR SURGERY  Patient notes she currently resides with her  and her Mother resides with them.  Patient notes post-operatively her  is planned to assist her.  Patient notes he drives and has reliable transportation.  Patient notes her  is supportive of her having surgery.  Patient denies pressure to lose weight from any family or friends.     PSYCHIATRIC HISTORY  Patient reports hx of anxiety throughout much of her adult life.  Patient notes she has experienced notable worry throughout much of her life.  Patient notes she is currently prescribed Effexor and has been taking this for several years.  Patient notes she has tried other psychotopic medication prior to that time however they were discontinued due to side effects.   Patient notes when she was 47 years old she started to experience significant cardiac issues.  Patient notes at that time symptoms of anxiety significantly exacerbated.  Patient notes this medication is very helpful for her.  Patient notes she has experienced a few episodes of panic in her life.  Patient notes the last time she experienced an episode of panic is when her brother passed away 1 year  ago.  Patient notes prior to his passing she was heavily involved in her brother's care.  Patient notes during that time there was very significant familial tension.  Patient notes her Mother currently resides with her and she requires care and this is a notable source of stress for her at this time.    Patient notes hx of experiencing symptoms of depression during periods of stress.  Patient notes in the beginning of college she experienced symptoms of depression after the passing of her grandmother and she engaged in therapy.    Patient denies hx of inpatient mental health treatment.     Patient denies hx of bipolar disorder, schizophrenia.  Patient denies history of manic symptoms or psychosis.  Patient notes historically she has experienced visual hallucinations from taking pain medication.       Patient denies hx of suicide attempt or self-injurious behavior.     Patient denies history of closed head injury, seizure or stroke.      Patient notes she believes she might have ADHD and a learning disability with reading.  Patient notes she has worked very hard to come up with compensatory strategies to manage this.      Patient denies history of interpersonal conflict (anger management concerns, difficulty with doctors/nurses or leaving AMA.)      Patient notes infidelity from her spouse early in their marriage and the passing of her family members as major stressful life events she experienced.  Patient denies nightmares, flashbacks, vivid/disturbing images.       PSYCHIATRIC SYMPTOMS     Mental Status Examination  Pt was alert, attentive, and cooperative with examination.  Mood was euthymic with appropriate and congruent affect.  Speech was generally clear and intelligible with normal rate and rhythm. Patient demonstrated capability of tracking conversation and providing appropriate responses.  Patient provided spontaneous discussion and engaged in reciprocal conversation.  Thought processes generally linear and  goal directed. Thought content focused on present problem.  Memory and cognition demonstrated no gross impairments.  No evidence of perceptual disturbances noted at this time.  No evident psychomotor abnormalities observed at this time.  Insight and judgment fair-good.     Psychiatric Symptoms:     Depression: Patient endorses symptoms of down mood, feeling tired, difficulty with attention/concentration, restlessness.        Anxiety:  Patient endorses symptoms of nervousness, restlessness, worry.    Little interest or pleasure in doing things: Not at all  Feeling down, depressed, or hopeless: Several days  Trouble falling or staying asleep, or sleeping too much: Not at all  Feeling tired or having little energy: Nearly every day  Poor appetite or overeating: Not at all  Feeling bad about yourself - or that you are a failure or have let yourself or your family down: Not at all  Trouble concentrating on things, such as reading the newspaper or watching television: Nearly every day  Moving or speaking so slowly that other people could have noticed. Or the opposite - being so fidgety or restless that you have been moving around a lot more than usual: Several days  Thoughts that you would be better off dead, or of hurting yourself in some way: Not at all  PHQ-9 Total Score: 8     PHQ-9 Total Score: 8    GAD7 11/17/2022 10/28/2019   1. Feeling nervous, anxious, or on edge? 2 2   2. Not being able to stop or control worrying? 3 3   3. Worrying too much about different things? 2 -   4. Trouble relaxing? 2 -   5. Being so restless that it is hard to sit still? 1 -   6. Becoming easily annoyed or irritable? 1 -   7. Feeling afraid as if something awful might happen? 1 -   VANDA-7 Score 12 -      At the time of appointment patient denied suicidal and homicidal ideation, plan and intent.  Patient advised to call 911 and/or present to the ED if she experienced such.        SUBSTANCE ABUSE/DEPENDENCY HISTORY   ETOH: Patient  denies.  Illicit Drugs/Prescription Misuse: Patient denies.  Nicotine:  Patient notes she quit smoking when she was 23 years old.    Caffeine: Patient notes she quit drinking caffeine 1 year ago.     FAMILY PSYCHIATRIC HISTORY  Patient denies.      FAMILY SUBSTANCE ABUSE/DEPENDENCY HISTORY  Patient denies.      IMPRESSIONS: Josephine Bolton reports they have been talking with treatment team about the risks and benefits of bariatric surgery and appears to generally have realistic expectations.  Josephine Bolton reports that they have been asking questions of treatment team.  Patient reports being motivated for recovery, rehabilitation, and lifestyle changes to support an active post bariatric surgery life.  Patient notes a hx of anxiety and depression which has notably worsened during periods of stress in her life.  Patient notes she is currently prescribed Effexor and does find this very helpful for management of symptoms.  Patient notes some hx of increased mindless eating during periods of stress however denies hx of coping with anxiety and depression with eating.  Patient notes she has become more mindful of eating habits and this has significantly helped with eating habits.  Patient also notes prescribed medication has assisted with her stress management.  Patient notes she has made several changes healthy lifestyle changes since starting bariatric surgery evaluation process; particularly with diet and engagement in physical activity.  At this time, given the information provided, from a behavioral health perspective patient presents with some moderate, generally modifiable, risk factors that could potentially impact post bariatric surgery.    DIAGNOSIS:  1. Depression, unspecified depression type        2. Anxiety disorder, unspecified type  Ambulatory referral/consult to Psychiatry        PLAN:  1. Mindful eating discussed.  2. Setting small attainable physical activity goals discussed.  3. Patient to  follow-up PRN.        Thank you very much for the opportunity to participate in this patient's care. Please do not hesitate to contact us directly if we can be of any additional assistance.     Sincerely,  Ashley Dobson PsyD  Clinical Psychologist    64 min were spent with the patient and relevant others today.   12 min were spent in counseling and coordination of care.

## 2022-11-22 ENCOUNTER — HOSPITAL ENCOUNTER (OUTPATIENT)
Dept: RADIOLOGY | Facility: HOSPITAL | Age: 57
Discharge: HOME | End: 2022-11-22
Attending: SURGERY
Payer: COMMERCIAL

## 2022-11-22 DIAGNOSIS — E66.01 MORBID OBESITY: ICD-10-CM

## 2022-11-22 PROCEDURE — 25500020 PHARM REV CODE 255: Mod: PO | Performed by: SURGERY

## 2022-11-22 PROCEDURE — 74240 X-RAY XM UPR GI TRC 1CNTRST: CPT | Mod: TC,FY,PO

## 2022-11-22 PROCEDURE — 74240 FL UPPER GI: ICD-10-PCS | Mod: 26,,, | Performed by: RADIOLOGY

## 2022-11-22 PROCEDURE — A9698 NON-RAD CONTRAST MATERIALNOC: HCPCS | Mod: PO | Performed by: SURGERY

## 2022-11-22 PROCEDURE — 74240 X-RAY XM UPR GI TRC 1CNTRST: CPT | Mod: 26,,, | Performed by: RADIOLOGY

## 2022-11-22 RX ADMIN — BARIUM SULFATE 355 ML: 0.6 SUSPENSION ORAL at 08:11

## 2022-11-22 RX ADMIN — BARIUM SULFATE 340 ML: 980 POWDER, FOR SUSPENSION ORAL at 08:11

## 2022-12-14 ENCOUNTER — OFFICE VISIT (OUTPATIENT)
Dept: BARIATRICS | Facility: CLINIC | Age: 57
End: 2022-12-14
Payer: COMMERCIAL

## 2022-12-14 VITALS
TEMPERATURE: 98 F | WEIGHT: 200.19 LBS | DIASTOLIC BLOOD PRESSURE: 91 MMHG | RESPIRATION RATE: 16 BRPM | BODY MASS INDEX: 36.84 KG/M2 | HEIGHT: 62 IN | HEART RATE: 60 BPM | SYSTOLIC BLOOD PRESSURE: 160 MMHG

## 2022-12-14 DIAGNOSIS — N20.0 KIDNEY STONES: ICD-10-CM

## 2022-12-14 DIAGNOSIS — M51.36 DDD (DEGENERATIVE DISC DISEASE), LUMBAR: ICD-10-CM

## 2022-12-14 DIAGNOSIS — I10 PRIMARY HYPERTENSION: ICD-10-CM

## 2022-12-14 DIAGNOSIS — I25.10 CORONARY ARTERY DISEASE INVOLVING NATIVE CORONARY ARTERY OF NATIVE HEART WITHOUT ANGINA PECTORIS: ICD-10-CM

## 2022-12-14 DIAGNOSIS — E66.9 OBESITY, CLASS II, BMI 35-39.9: ICD-10-CM

## 2022-12-14 DIAGNOSIS — E66.01 MORBID OBESITY: Primary | ICD-10-CM

## 2022-12-14 DIAGNOSIS — E78.2 MIXED HYPERLIPIDEMIA: ICD-10-CM

## 2022-12-14 DIAGNOSIS — G47.33 OSA (OBSTRUCTIVE SLEEP APNEA): ICD-10-CM

## 2022-12-14 PROCEDURE — 99213 PR OFFICE/OUTPT VISIT, EST, LEVL III, 20-29 MIN: ICD-10-PCS | Mod: S$GLB,,, | Performed by: SURGERY

## 2022-12-14 PROCEDURE — 3008F PR BODY MASS INDEX (BMI) DOCUMENTED: ICD-10-PCS | Mod: CPTII,S$GLB,, | Performed by: SURGERY

## 2022-12-14 PROCEDURE — 3077F SYST BP >= 140 MM HG: CPT | Mod: CPTII,S$GLB,, | Performed by: SURGERY

## 2022-12-14 PROCEDURE — 99999 PR PBB SHADOW E&M-EST. PATIENT-LVL IV: ICD-10-PCS | Mod: PBBFAC,,, | Performed by: SURGERY

## 2022-12-14 PROCEDURE — 3077F PR MOST RECENT SYSTOLIC BLOOD PRESSURE >= 140 MM HG: ICD-10-PCS | Mod: CPTII,S$GLB,, | Performed by: SURGERY

## 2022-12-14 PROCEDURE — 1159F MED LIST DOCD IN RCRD: CPT | Mod: CPTII,S$GLB,, | Performed by: SURGERY

## 2022-12-14 PROCEDURE — 1159F PR MEDICATION LIST DOCUMENTED IN MEDICAL RECORD: ICD-10-PCS | Mod: CPTII,S$GLB,, | Performed by: SURGERY

## 2022-12-14 PROCEDURE — 3008F BODY MASS INDEX DOCD: CPT | Mod: CPTII,S$GLB,, | Performed by: SURGERY

## 2022-12-14 PROCEDURE — 99999 PR PBB SHADOW E&M-EST. PATIENT-LVL IV: CPT | Mod: PBBFAC,,, | Performed by: SURGERY

## 2022-12-14 PROCEDURE — 4010F ACE/ARB THERAPY RXD/TAKEN: CPT | Mod: CPTII,S$GLB,, | Performed by: SURGERY

## 2022-12-14 PROCEDURE — 99213 OFFICE O/P EST LOW 20 MIN: CPT | Mod: S$GLB,,, | Performed by: SURGERY

## 2022-12-14 PROCEDURE — 3080F PR MOST RECENT DIASTOLIC BLOOD PRESSURE >= 90 MM HG: ICD-10-PCS | Mod: CPTII,S$GLB,, | Performed by: SURGERY

## 2022-12-14 PROCEDURE — 4010F PR ACE/ARB THEARPY RXD/TAKEN: ICD-10-PCS | Mod: CPTII,S$GLB,, | Performed by: SURGERY

## 2022-12-14 PROCEDURE — 3080F DIAST BP >= 90 MM HG: CPT | Mod: CPTII,S$GLB,, | Performed by: SURGERY

## 2022-12-14 RX ORDER — ALLOPURINOL 100 MG/1
100 TABLET ORAL DAILY
Start: 2022-12-14 | End: 2023-05-01

## 2022-12-14 RX ORDER — FUROSEMIDE 20 MG/1
20 TABLET ORAL DAILY
Qty: 90 TABLET | Refills: 2
Start: 2022-12-14 | End: 2023-03-02

## 2022-12-14 RX ORDER — VENLAFAXINE HYDROCHLORIDE 150 MG/1
150 CAPSULE, EXTENDED RELEASE ORAL NIGHTLY
Start: 2022-12-14 | End: 2023-02-07

## 2022-12-14 NOTE — PROGRESS NOTES
"Medically Supervised Weight Loss Documentation    Date of Visit: 12/14/2022    Patient: Josephine Bolton    Current Weight: 200  Current BMI: Body mass index is 37.21 kg/m².  Weight Change: - 12    Last Weight:201    Beginning Weight:212      Vitals:   Vitals:    12/14/22 1318   BP: (!) 160/91   Pulse: 60   Resp: 16   Temp: 97.9 °F (36.6 °C)       Comorbidities:   Past Medical History:   Diagnosis Date    Allergy     Anticoagulant long-term use     ASA 81mg, Effient    Anxiety     Arthritis     Asthma     As child    CAD (coronary artery disease) 01/27/2012    s/p stents x4 , CABG, 3 vessel, (5/2013) newest stent prior to CABG    Chronic constipation     Colon polyp     Coronary artery disease involving native coronary artery of native heart without angina pectoris 1/27/2012 12/19 Significant ostial RCA lesion as above treated with drug-eluting stenting as above. Occluded proximal LAD with patent lima lad Patent vein graft to 2nd obtuse marginal Known occluded vein graft to unknown vessel.  s/p stents x 3 (2010) s/p LAD stent (DSE) 3/2012    DDD (degenerative disc disease), cervical     DDD (degenerative disc disease), lumbar     DDD (degenerative disc disease), thoracic     Depression     Edema     Encounter for blood transfusion     Headache(784.0)     History of nephrolithiasis     Hyperlipidemia     Hypertension     Hypothyroid 7/5/2012    Insomnia     Insulin resistance     patient stated not diebetic    Joint stiffness     Joint swelling     Kidney disease     ; Frequent UTIs     Kidney stones     Low back pain     Neck pain     Obstructive sleep apnea on CPAP 7/17/2012    PONV (postoperative nausea and vomiting)     S/P CABG (coronary artery bypass graft) 6/25/2013 6/23/2013     Sleep apnea 01/27/12    Patient reports "severe" sleep apnea, uses C-pap    Stented coronary artery 12/20/2019 12/19  ostial RCA -Osirio2.5 x 18 post dilated 2.75     Thoracic back pain     Usual hyperplasia of " lactiferous duct 02/2017    left breast       Medications:  Current Outpatient Medications on File Prior to Visit   Medication Sig Dispense Refill    acetaminophen (TYLENOL) 650 MG TbSR Take 1,300 mg by mouth every 8 (eight) hours as needed (pain).       albuterol (PROVENTIL/VENTOLIN HFA) 90 mcg/actuation inhaler Inhale 2 puffs into the lungs every 6 (six) hours as needed for Shortness of Breath.       aspirin (ECOTRIN) 81 MG EC tablet Take 81 mg by mouth once daily.      atorvastatin (LIPITOR) 40 MG tablet TAKE 1 TABLET (40 MG TOTAL) BY MOUTH EVERY EVENING.  90 tablet 3    azelastine (ASTELIN) 137 mcg (0.1 %) nasal spray azelastine 137 mcg (0.1 %) nasal spray aerosol      baclofen (LIORESAL) 10 MG tablet Take 1 tablet (10 mg total) by mouth 3 (three) times daily. 90 tablet 5    blood-glucose meter (GLUCOSE MONITORING KIT) kit Use as instructed 1 each 0    BRILINTA 90 mg tablet TAKE 1 TABLET BY MOUTH 2 TIMES A DAY 60 tablet 4    carvediloL (COREG) 25 MG tablet Take 1 tablet (25 mg total) by mouth 2 (two) times daily. 180 tablet 2    cetirizine (ZYRTEC) 10 MG tablet Take 10 mg by mouth daily as needed for Allergies.      dexbrompheniramn-chlophedianol (CHLO HIST) 1-12.5 mg/5 mL Soln Chlo Hist 1 mg-12.5 mg/5 mL oral solution      famotidine (PEPCID) 40 MG tablet Take 1 tablet (40 mg total) by mouth every evening. 30 tablet 1    fluticasone propionate (FLONASE) 50 mcg/actuation nasal spray fluticasone propionate 50 mcg/actuation nasal spray,suspension      ketoconazole (NIZORAL) 2 % cream ketoconazole 2 % topical cream      ketorolac (TORADOL) 10 mg tablet ketorolac 10 mg tablet      Lactobacillus acidophilus (PROBIOTIC) 10 billion cell Cap 1 capsule by mouth daily 10 capsule 0    lancets Misc 1 Units by Misc.(Non-Drug; Combo Route) route 2 (two) times daily as needed. 100 each 3    levothyroxine (SYNTHROID) 100 MCG tablet Take 1 tablet (100 mcg total) by mouth before breakfast. 30 tablet 11    oxyCODONE-acetaminophen  (PERCOCET)  mg per tablet oxycodone-acetaminophen 10 mg-325 mg tablet      pantoprazole (PROTONIX) 40 MG tablet TAKE 1 TABLET BY MOUTH EVERY DAY 90 tablet 3    pregabalin (LYRICA) 100 MG capsule Take 2 capsules (200 mg total) by mouth 2 (two) times a day. 60 capsule 5    ramipriL (ALTACE) 10 MG capsule Take 1 capsule (10 mg total) by mouth once daily. 90 capsule 3    ranolazine (RANEXA) 500 MG Tb12 TAKE 2 TABLETS (1 000 MG TOTAL) BY MOUTH 2 (TWO) TIMES DAILY. 120 tablet 1    senna-docusate 8.6-50 mg (PERICOLACE) 8.6-50 mg per tablet Take 1 tablet by mouth once daily.      [DISCONTINUED] allopurinoL (ZYLOPRIM) 100 MG tablet TAKE 1 TABLET BY MOUTH ONCE DAILY (Patient taking differently: Take 100 mg by mouth once daily.) 90 tablet 3    [DISCONTINUED] furosemide (LASIX) 20 MG tablet TAKE 1 TABLET BY MOUTH EVERY DAY (Patient taking differently: Take 20 mg by mouth once daily.) 90 tablet 2    [DISCONTINUED] nitroGLYCERIN (NITROSTAT) 0.4 MG SL tablet Place 1 tablet (0.4 mg total) under the tongue every 5 (five) minutes as needed for Chest pain. (Patient not taking: Reported on 11/14/2022) 25 tablet 3    [DISCONTINUED] ondansetron (ZOFRAN) 4 MG tablet Take 1 tablet (4 mg total) by mouth every 6 (six) hours as needed for Nausea. (Patient not taking: Reported on 10/24/2022) 20 tablet 1    [DISCONTINUED] potassium chloride SA (K-DUR,KLOR-CON) 20 MEQ tablet Take 1 tablet (20 mEq total) by mouth once daily. (Patient not taking: Reported on 9/28/2022) 90 tablet 0    [DISCONTINUED] venlafaxine (EFFEXOR-XR) 150 MG Cp24 TAKE 1 CAPSULE BY MOUTH ONCE DAILY  (Patient taking differently: Take 150 mg by mouth every evening.) 90 capsule 3     Current Facility-Administered Medications on File Prior to Visit   Medication Dose Route Frequency Provider Last Rate Last Admin    sodium chloride 0.9% flush 10 mL  10 mL Intravenous PRN Juan Wilson MD             Body comp:  Fat Percent:  40.5 %  Fat Mass:  81 lb  FFM:  119.2 lb  TBW:  85.2 lb  TBW %:  42.6 %  BMR: 1639 kcal      Diet Education Discussed:    Breakfast:  protein shake  Lunch:  turkey stick- couple with raw veggies  Dinner:  salad with meat and egg    Exercise/Activity Discussed:    Not yet    Behavior or Diet Goals for this patient:    Continue low carb dieting  Exercise at least 20 minutes 3 times per week     Hoping for feb 13- gastric sleeve    Labs  EKG    UGI - Impression:  1. Mild gastroesophageal reflux.  2. Mild esophageal dysmotility.  3. Small incidental duodenal diverticulum.  4. Subglottic penetration without tracheal aspiration.     dietary consult- done  psych consult - done  Seminar     I will obtain the following clearances prior to surgery: cardiology - pending   : total time spent for visit: 20 minutes

## 2022-12-15 ENCOUNTER — TELEPHONE (OUTPATIENT)
Dept: FAMILY MEDICINE | Facility: CLINIC | Age: 57
End: 2022-12-15
Payer: COMMERCIAL

## 2022-12-15 NOTE — TELEPHONE ENCOUNTER
----- Message from Kim Hernandez sent at 12/15/2022 10:23 AM CST -----  Regarding: pt call back  Name of Who is Calling: VLADIMIR FAUSTIN [[id number]]      What is the request in detail: Pt is requesting a call back to schedule a pre-op appt for a clearance. Pt declined next available on 1/30/2023 and states she needs an appt before 1/18/2022. Please advise!        Can the clinic reply by [facility]: NO         [facility]: [phone]

## 2022-12-15 NOTE — TELEPHONE ENCOUNTER
Spoke to pt to advise of pre op appointment can be with provider's nurse practitioner. She can consult with Dr. Smart to clear her for surgery. Pt YASIR, ruth set.

## 2022-12-20 ENCOUNTER — OFFICE VISIT (OUTPATIENT)
Dept: CARDIOLOGY | Facility: CLINIC | Age: 57
End: 2022-12-20
Payer: COMMERCIAL

## 2022-12-20 ENCOUNTER — PATIENT MESSAGE (OUTPATIENT)
Dept: CARDIOLOGY | Facility: CLINIC | Age: 57
End: 2022-12-20
Payer: COMMERCIAL

## 2022-12-20 VITALS
DIASTOLIC BLOOD PRESSURE: 76 MMHG | HEIGHT: 62 IN | BODY MASS INDEX: 37.48 KG/M2 | HEART RATE: 73 BPM | WEIGHT: 203.69 LBS | SYSTOLIC BLOOD PRESSURE: 154 MMHG

## 2022-12-20 DIAGNOSIS — I10 PRIMARY HYPERTENSION: ICD-10-CM

## 2022-12-20 DIAGNOSIS — I25.10 CORONARY ARTERY DISEASE INVOLVING NATIVE CORONARY ARTERY OF NATIVE HEART WITHOUT ANGINA PECTORIS: Primary | Chronic | ICD-10-CM

## 2022-12-20 DIAGNOSIS — Z95.1 S/P CABG (CORONARY ARTERY BYPASS GRAFT): ICD-10-CM

## 2022-12-20 DIAGNOSIS — E66.01 MORBID OBESITY: ICD-10-CM

## 2022-12-20 DIAGNOSIS — E78.2 MIXED HYPERLIPIDEMIA: ICD-10-CM

## 2022-12-20 PROBLEM — Z01.810 PREOP CARDIOVASCULAR EXAM: Status: ACTIVE | Noted: 2021-05-13

## 2022-12-20 PROCEDURE — 3077F SYST BP >= 140 MM HG: CPT | Mod: CPTII,S$GLB,, | Performed by: INTERNAL MEDICINE

## 2022-12-20 PROCEDURE — 1159F PR MEDICATION LIST DOCUMENTED IN MEDICAL RECORD: ICD-10-PCS | Mod: CPTII,S$GLB,, | Performed by: INTERNAL MEDICINE

## 2022-12-20 PROCEDURE — 3008F BODY MASS INDEX DOCD: CPT | Mod: CPTII,S$GLB,, | Performed by: INTERNAL MEDICINE

## 2022-12-20 PROCEDURE — 3078F PR MOST RECENT DIASTOLIC BLOOD PRESSURE < 80 MM HG: ICD-10-PCS | Mod: CPTII,S$GLB,, | Performed by: INTERNAL MEDICINE

## 2022-12-20 PROCEDURE — 99214 PR OFFICE/OUTPT VISIT, EST, LEVL IV, 30-39 MIN: ICD-10-PCS | Mod: S$GLB,,, | Performed by: INTERNAL MEDICINE

## 2022-12-20 PROCEDURE — 99214 OFFICE O/P EST MOD 30 MIN: CPT | Mod: S$GLB,,, | Performed by: INTERNAL MEDICINE

## 2022-12-20 PROCEDURE — 99999 PR PBB SHADOW E&M-EST. PATIENT-LVL II: ICD-10-PCS | Mod: PBBFAC,,, | Performed by: INTERNAL MEDICINE

## 2022-12-20 PROCEDURE — 4010F ACE/ARB THERAPY RXD/TAKEN: CPT | Mod: CPTII,S$GLB,, | Performed by: INTERNAL MEDICINE

## 2022-12-20 PROCEDURE — 99999 PR PBB SHADOW E&M-EST. PATIENT-LVL II: CPT | Mod: PBBFAC,,, | Performed by: INTERNAL MEDICINE

## 2022-12-20 PROCEDURE — 3077F PR MOST RECENT SYSTOLIC BLOOD PRESSURE >= 140 MM HG: ICD-10-PCS | Mod: CPTII,S$GLB,, | Performed by: INTERNAL MEDICINE

## 2022-12-20 PROCEDURE — 3078F DIAST BP <80 MM HG: CPT | Mod: CPTII,S$GLB,, | Performed by: INTERNAL MEDICINE

## 2022-12-20 PROCEDURE — 3008F PR BODY MASS INDEX (BMI) DOCUMENTED: ICD-10-PCS | Mod: CPTII,S$GLB,, | Performed by: INTERNAL MEDICINE

## 2022-12-20 PROCEDURE — 4010F PR ACE/ARB THEARPY RXD/TAKEN: ICD-10-PCS | Mod: CPTII,S$GLB,, | Performed by: INTERNAL MEDICINE

## 2022-12-20 PROCEDURE — 1159F MED LIST DOCD IN RCRD: CPT | Mod: CPTII,S$GLB,, | Performed by: INTERNAL MEDICINE

## 2022-12-20 NOTE — PROGRESS NOTES
Subjective:    Patient ID:  Josephine Bolton is a 57 y.o. female who presents for follow-up of cad, preop evaluation    HPI  She comes with no complaints, no chest pain, no shortness of breath  Having bariatric surgery next year    Review of Systems   Constitutional: Negative for decreased appetite, malaise/fatigue, weight gain and weight loss.   Cardiovascular:  Negative for chest pain, dyspnea on exertion, leg swelling, palpitations and syncope.   Respiratory:  Negative for cough and shortness of breath.    Gastrointestinal: Negative.    Neurological:  Negative for weakness.   All other systems reviewed and are negative.     Objective:      Physical Exam  Vitals and nursing note reviewed.   Constitutional:       Appearance: Normal appearance. She is well-developed.   HENT:      Head: Normocephalic.   Eyes:      Pupils: Pupils are equal, round, and reactive to light.   Neck:      Thyroid: No thyromegaly.      Vascular: No carotid bruit or JVD.   Cardiovascular:      Rate and Rhythm: Normal rate and regular rhythm.      Chest Wall: PMI is not displaced.      Pulses: Normal pulses and intact distal pulses.      Heart sounds: Normal heart sounds. No murmur heard.    No gallop.   Pulmonary:      Effort: Pulmonary effort is normal.      Breath sounds: Normal breath sounds.   Abdominal:      Palpations: Abdomen is soft. There is no mass.      Tenderness: There is no abdominal tenderness.   Musculoskeletal:         General: Normal range of motion.      Cervical back: Normal range of motion and neck supple.   Skin:     General: Skin is warm.   Neurological:      Mental Status: She is alert and oriented to person, place, and time.      Sensory: No sensory deficit.      Deep Tendon Reflexes: Reflexes are normal and symmetric.         Assessment:       1. Coronary artery disease involving native coronary artery of native heart without angina pectoris    2. Morbid obesity    3. S/P CABG (coronary artery bypass graft)    4.  Primary hypertension    5. Mixed hyperlipidemia         Plan:   No contraindication for surgery/anesthesia from cardiac standpoint    Continue all cardiac medications  Regular exercise program  Weight loss  6 m f/u

## 2022-12-29 ENCOUNTER — TELEPHONE (OUTPATIENT)
Dept: BARIATRICS | Facility: CLINIC | Age: 57
End: 2022-12-29
Payer: COMMERCIAL

## 2023-01-17 ENCOUNTER — LAB VISIT (OUTPATIENT)
Dept: LAB | Facility: HOSPITAL | Age: 58
End: 2023-01-17
Attending: NURSE PRACTITIONER
Payer: COMMERCIAL

## 2023-01-17 ENCOUNTER — OFFICE VISIT (OUTPATIENT)
Dept: FAMILY MEDICINE | Facility: CLINIC | Age: 58
End: 2023-01-17
Payer: COMMERCIAL

## 2023-01-17 VITALS
DIASTOLIC BLOOD PRESSURE: 80 MMHG | BODY MASS INDEX: 37.06 KG/M2 | HEART RATE: 60 BPM | WEIGHT: 202.63 LBS | OXYGEN SATURATION: 97 % | SYSTOLIC BLOOD PRESSURE: 166 MMHG

## 2023-01-17 DIAGNOSIS — G47.33 OSA (OBSTRUCTIVE SLEEP APNEA): ICD-10-CM

## 2023-01-17 DIAGNOSIS — E66.01 CLASS 2 SEVERE OBESITY WITH SERIOUS COMORBIDITY AND BODY MASS INDEX (BMI) OF 37.0 TO 37.9 IN ADULT, UNSPECIFIED OBESITY TYPE: ICD-10-CM

## 2023-01-17 DIAGNOSIS — Z01.818 PREOPERATIVE CLEARANCE: Primary | ICD-10-CM

## 2023-01-17 DIAGNOSIS — E78.5 HYPERLIPIDEMIA, UNSPECIFIED HYPERLIPIDEMIA TYPE: ICD-10-CM

## 2023-01-17 DIAGNOSIS — E03.9 HYPOTHYROIDISM, UNSPECIFIED TYPE: Chronic | ICD-10-CM

## 2023-01-17 DIAGNOSIS — Z01.818 PREOPERATIVE CLEARANCE: ICD-10-CM

## 2023-01-17 DIAGNOSIS — Z23 NEED FOR PNEUMOCOCCAL VACCINATION: ICD-10-CM

## 2023-01-17 DIAGNOSIS — I10 ESSENTIAL HYPERTENSION: ICD-10-CM

## 2023-01-17 DIAGNOSIS — Z95.1 S/P CABG (CORONARY ARTERY BYPASS GRAFT): ICD-10-CM

## 2023-01-17 DIAGNOSIS — I25.10 CORONARY ARTERY DISEASE, UNSPECIFIED VESSEL OR LESION TYPE, UNSPECIFIED WHETHER ANGINA PRESENT, UNSPECIFIED WHETHER NATIVE OR TRANSPLANTED HEART: ICD-10-CM

## 2023-01-17 LAB
ALBUMIN SERPL BCP-MCNC: 4.1 G/DL (ref 3.5–5.2)
ALP SERPL-CCNC: 78 U/L (ref 55–135)
ALT SERPL W/O P-5'-P-CCNC: 9 U/L (ref 10–44)
ANION GAP SERPL CALC-SCNC: 11 MMOL/L (ref 8–16)
AST SERPL-CCNC: 15 U/L (ref 10–40)
BILIRUB SERPL-MCNC: 0.6 MG/DL (ref 0.1–1)
BUN SERPL-MCNC: 21 MG/DL (ref 6–20)
CALCIUM SERPL-MCNC: 10 MG/DL (ref 8.7–10.5)
CHLORIDE SERPL-SCNC: 107 MMOL/L (ref 95–110)
CO2 SERPL-SCNC: 27 MMOL/L (ref 23–29)
CREAT SERPL-MCNC: 1.1 MG/DL (ref 0.5–1.4)
EST. GFR  (NO RACE VARIABLE): 59.3 ML/MIN/1.73 M^2
GLUCOSE SERPL-MCNC: 89 MG/DL (ref 70–110)
POTASSIUM SERPL-SCNC: 3.7 MMOL/L (ref 3.5–5.1)
PROT SERPL-MCNC: 7.8 G/DL (ref 6–8.4)
SODIUM SERPL-SCNC: 145 MMOL/L (ref 136–145)

## 2023-01-17 PROCEDURE — 1159F PR MEDICATION LIST DOCUMENTED IN MEDICAL RECORD: ICD-10-PCS | Mod: CPTII,S$GLB,, | Performed by: NURSE PRACTITIONER

## 2023-01-17 PROCEDURE — 1159F MED LIST DOCD IN RCRD: CPT | Mod: CPTII,S$GLB,, | Performed by: NURSE PRACTITIONER

## 2023-01-17 PROCEDURE — 99999 PR PBB SHADOW E&M-EST. PATIENT-LVL III: CPT | Mod: PBBFAC,,, | Performed by: NURSE PRACTITIONER

## 2023-01-17 PROCEDURE — 3008F BODY MASS INDEX DOCD: CPT | Mod: CPTII,S$GLB,, | Performed by: NURSE PRACTITIONER

## 2023-01-17 PROCEDURE — 85025 COMPLETE CBC W/AUTO DIFF WBC: CPT | Performed by: NURSE PRACTITIONER

## 2023-01-17 PROCEDURE — 80053 COMPREHEN METABOLIC PANEL: CPT | Performed by: NURSE PRACTITIONER

## 2023-01-17 PROCEDURE — 4010F ACE/ARB THERAPY RXD/TAKEN: CPT | Mod: CPTII,S$GLB,, | Performed by: NURSE PRACTITIONER

## 2023-01-17 PROCEDURE — 3079F PR MOST RECENT DIASTOLIC BLOOD PRESSURE 80-89 MM HG: ICD-10-PCS | Mod: CPTII,S$GLB,, | Performed by: NURSE PRACTITIONER

## 2023-01-17 PROCEDURE — 90471 PNEUMOCOCCAL CONJUGATE VACCINE 20-VALENT: ICD-10-PCS | Mod: S$GLB,,, | Performed by: NURSE PRACTITIONER

## 2023-01-17 PROCEDURE — 99214 PR OFFICE/OUTPT VISIT, EST, LEVL IV, 30-39 MIN: ICD-10-PCS | Mod: 25,S$GLB,, | Performed by: NURSE PRACTITIONER

## 2023-01-17 PROCEDURE — 3077F SYST BP >= 140 MM HG: CPT | Mod: CPTII,S$GLB,, | Performed by: NURSE PRACTITIONER

## 2023-01-17 PROCEDURE — 3079F DIAST BP 80-89 MM HG: CPT | Mod: CPTII,S$GLB,, | Performed by: NURSE PRACTITIONER

## 2023-01-17 PROCEDURE — 90677 PNEUMOCOCCAL CONJUGATE VACCINE 20-VALENT: ICD-10-PCS | Mod: S$GLB,,, | Performed by: NURSE PRACTITIONER

## 2023-01-17 PROCEDURE — 4010F PR ACE/ARB THEARPY RXD/TAKEN: ICD-10-PCS | Mod: CPTII,S$GLB,, | Performed by: NURSE PRACTITIONER

## 2023-01-17 PROCEDURE — 99214 OFFICE O/P EST MOD 30 MIN: CPT | Mod: 25,S$GLB,, | Performed by: NURSE PRACTITIONER

## 2023-01-17 PROCEDURE — 3077F PR MOST RECENT SYSTOLIC BLOOD PRESSURE >= 140 MM HG: ICD-10-PCS | Mod: CPTII,S$GLB,, | Performed by: NURSE PRACTITIONER

## 2023-01-17 PROCEDURE — 36415 COLL VENOUS BLD VENIPUNCTURE: CPT | Mod: PO | Performed by: NURSE PRACTITIONER

## 2023-01-17 PROCEDURE — 90677 PCV20 VACCINE IM: CPT | Mod: S$GLB,,, | Performed by: NURSE PRACTITIONER

## 2023-01-17 PROCEDURE — 90471 IMMUNIZATION ADMIN: CPT | Mod: S$GLB,,, | Performed by: NURSE PRACTITIONER

## 2023-01-17 PROCEDURE — 99999 PR PBB SHADOW E&M-EST. PATIENT-LVL III: ICD-10-PCS | Mod: PBBFAC,,, | Performed by: NURSE PRACTITIONER

## 2023-01-17 PROCEDURE — 3008F PR BODY MASS INDEX (BMI) DOCUMENTED: ICD-10-PCS | Mod: CPTII,S$GLB,, | Performed by: NURSE PRACTITIONER

## 2023-01-17 NOTE — PROGRESS NOTES
Subjective:       Patient ID: Josephine Bolton is a 57 y.o. female.    Chief Complaint: Pre-op Exam    HPI  Patient presents for medical clearance  Planning for gastric sleeve with Dr Whipple next month     CAD S/P CABG, HTN, HLD: has received cardiac clearance from Dr Abbasi. Discussed elevated BPs at office visit 12/20/22--did not recommend med adjustment    She underwent neck and back surgery last year--no complications   +RAHUL     Hypothyroidism most recent TSH WNL   GERD on PPI     Vitals:    01/17/23 1405   BP: (!) 166/80   Pulse: 60     Review of Systems   Constitutional:  Negative for fever.   Respiratory:  Negative for cough and shortness of breath.    Cardiovascular:  Negative for chest pain.   Neurological:  Negative for dizziness, syncope and light-headedness.     Past Medical History:   Diagnosis Date    Allergy     Anticoagulant long-term use     ASA 81mg, Effient    Anxiety     Arthritis     Asthma     As child    CAD (coronary artery disease) 01/27/2012    s/p stents x4 , CABG, 3 vessel, (5/2013) newest stent prior to CABG    Chronic constipation     Colon polyp     Coronary artery disease involving native coronary artery of native heart without angina pectoris 1/27/2012 12/19 Significant ostial RCA lesion as above treated with drug-eluting stenting as above. Occluded proximal LAD with patent lima lad Patent vein graft to 2nd obtuse marginal Known occluded vein graft to unknown vessel.  s/p stents x 3 (2010) s/p LAD stent (DSE) 3/2012    DDD (degenerative disc disease), cervical     DDD (degenerative disc disease), lumbar     DDD (degenerative disc disease), thoracic     Depression     Edema     Encounter for blood transfusion     Headache(784.0)     History of nephrolithiasis     Hyperlipidemia     Hypertension     Hypothyroid 7/5/2012    Insomnia     Insulin resistance     patient stated not diebetic    Joint stiffness     Joint swelling     Kidney disease     ; Frequent UTIs   "   Kidney stones     Low back pain     Neck pain     Obstructive sleep apnea on CPAP 7/17/2012    PONV (postoperative nausea and vomiting)     S/P CABG (coronary artery bypass graft) 6/25/2013 6/23/2013     Sleep apnea 01/27/12    Patient reports "severe" sleep apnea, uses C-pap    Stented coronary artery 12/20/2019 12/19  ostial RCA -Osirio2.5 x 18 post dilated 2.75     Thoracic back pain     Usual hyperplasia of lactiferous duct 02/2017    left breast     Objective:      Physical Exam  Vitals and nursing note reviewed.   Constitutional:       General: She is not in acute distress.     Appearance: She is not diaphoretic.   HENT:      Head: Normocephalic.   Eyes:      General:         Right eye: No discharge.         Left eye: No discharge.   Neck:      Trachea: No tracheal deviation.   Cardiovascular:      Rate and Rhythm: Normal rate.      Heart sounds: Normal heart sounds.   Pulmonary:      Effort: Pulmonary effort is normal.      Breath sounds: Normal breath sounds.   Skin:     Coloration: Skin is not pale.   Neurological:      Mental Status: She is alert and oriented to person, place, and time.   Psychiatric:         Speech: Speech normal.         Behavior: Behavior normal.         Thought Content: Thought content normal.         Judgment: Judgment normal.       Assessment:       1. Preoperative clearance    2. Class 2 severe obesity with serious comorbidity and body mass index (BMI) of 37.0 to 37.9 in adult, unspecified obesity type    3. Need for pneumococcal vaccination    4. RAHUL (obstructive sleep apnea)    5. Coronary artery disease, unspecified vessel or lesion type, unspecified whether angina present, unspecified whether native or transplanted heart    6. S/P CABG (coronary artery bypass graft)    7. Essential hypertension    8. Hyperlipidemia, unspecified hyperlipidemia type    9. Hypothyroidism, unspecified type        Plan:       Preoperative clearance  -     CBC Auto Differential; Future; " Expected date: 01/17/2023  -     Comprehensive Metabolic Panel; Future; Expected date: 01/17/2023    Class 2 severe obesity with serious comorbidity and body mass index (BMI) of 37.0 to 37.9 in adult, unspecified obesity type    Need for pneumococcal vaccination  -     (In Office Administered) Pneumococcal Conjugate Vaccine (20 Valent) (IM)    RAHUL (obstructive sleep apnea)    Coronary artery disease, unspecified vessel or lesion type, unspecified whether angina present, unspecified whether native or transplanted heart    S/P CABG (coronary artery bypass graft)    Essential hypertension    Hyperlipidemia, unspecified hyperlipidemia type    Hypothyroidism, unspecified type      Patient has received cardiac clearance   Labs today         Medication List with Changes/Refills   Current Medications    ACETAMINOPHEN (TYLENOL) 650 MG TBSR    Take 1,300 mg by mouth every 8 (eight) hours as needed (pain).     ALBUTEROL (PROVENTIL/VENTOLIN HFA) 90 MCG/ACTUATION INHALER    Inhale 2 puffs into the lungs every 6 (six) hours as needed for Shortness of Breath.     ALLOPURINOL (ZYLOPRIM) 100 MG TABLET    Take 1 tablet (100 mg total) by mouth once daily.    ASPIRIN (ECOTRIN) 81 MG EC TABLET    Take 81 mg by mouth once daily.    ATORVASTATIN (LIPITOR) 40 MG TABLET    TAKE 1 TABLET (40 MG TOTAL) BY MOUTH EVERY EVENING.     AZELASTINE (ASTELIN) 137 MCG (0.1 %) NASAL SPRAY    azelastine 137 mcg (0.1 %) nasal spray aerosol    BACLOFEN (LIORESAL) 10 MG TABLET    Take 1 tablet (10 mg total) by mouth 3 (three) times daily.    BLOOD-GLUCOSE METER (GLUCOSE MONITORING KIT) KIT    Use as instructed    BRILINTA 90 MG TABLET    TAKE 1 TABLET BY MOUTH 2 TIMES A DAY    CARVEDILOL (COREG) 25 MG TABLET    Take 1 tablet (25 mg total) by mouth 2 (two) times daily.    CETIRIZINE (ZYRTEC) 10 MG TABLET    Take 10 mg by mouth daily as needed for Allergies.    DEXBROMPHENIRAMN-CHLOPHEDIANOL (CHLO HIST) 1-12.5 MG/5 ML SOLN    Chlo Hist 1 mg-12.5 mg/5 mL oral  solution    FAMOTIDINE (PEPCID) 40 MG TABLET    Take 1 tablet (40 mg total) by mouth every evening.    FLUTICASONE PROPIONATE (FLONASE) 50 MCG/ACTUATION NASAL SPRAY    fluticasone propionate 50 mcg/actuation nasal spray,suspension    FUROSEMIDE (LASIX) 20 MG TABLET    Take 1 tablet (20 mg total) by mouth once daily.    KETOCONAZOLE (NIZORAL) 2 % CREAM    ketoconazole 2 % topical cream    KETOROLAC (TORADOL) 10 MG TABLET    ketorolac 10 mg tablet    LACTOBACILLUS ACIDOPHILUS (PROBIOTIC) 10 BILLION CELL CAP    1 capsule by mouth daily    LANCETS MISC    1 Units by Misc.(Non-Drug; Combo Route) route 2 (two) times daily as needed.    LEVOTHYROXINE (SYNTHROID) 100 MCG TABLET    Take 1 tablet (100 mcg total) by mouth before breakfast.    OXYCODONE-ACETAMINOPHEN (PERCOCET)  MG PER TABLET    oxycodone-acetaminophen 10 mg-325 mg tablet    PANTOPRAZOLE (PROTONIX) 40 MG TABLET    TAKE 1 TABLET BY MOUTH EVERY DAY    PREGABALIN (LYRICA) 100 MG CAPSULE    Take 2 capsules (200 mg total) by mouth 2 (two) times a day.    RAMIPRIL (ALTACE) 10 MG CAPSULE    Take 1 capsule (10 mg total) by mouth once daily.    RANOLAZINE (RANEXA) 500 MG TB12    TAKE 2 TABLETS (1 000 MG TOTAL) BY MOUTH 2 (TWO) TIMES DAILY.    SENNA-DOCUSATE 8.6-50 MG (PERICOLACE) 8.6-50 MG PER TABLET    Take 1 tablet by mouth once daily.    VENLAFAXINE (EFFEXOR-XR) 150 MG CP24    Take 1 capsule (150 mg total) by mouth every evening.

## 2023-01-18 ENCOUNTER — OFFICE VISIT (OUTPATIENT)
Dept: BARIATRICS | Facility: CLINIC | Age: 58
End: 2023-01-18
Payer: COMMERCIAL

## 2023-01-18 ENCOUNTER — TELEPHONE (OUTPATIENT)
Dept: BARIATRICS | Facility: CLINIC | Age: 58
End: 2023-01-18
Payer: COMMERCIAL

## 2023-01-18 VITALS
HEIGHT: 62 IN | WEIGHT: 196.19 LBS | RESPIRATION RATE: 16 BRPM | HEART RATE: 63 BPM | SYSTOLIC BLOOD PRESSURE: 141 MMHG | DIASTOLIC BLOOD PRESSURE: 70 MMHG | BODY MASS INDEX: 36.1 KG/M2 | TEMPERATURE: 98 F

## 2023-01-18 DIAGNOSIS — E66.9 OBESITY, CLASS II, BMI 35-39.9: ICD-10-CM

## 2023-01-18 DIAGNOSIS — G47.33 OSA (OBSTRUCTIVE SLEEP APNEA): ICD-10-CM

## 2023-01-18 DIAGNOSIS — M51.36 DDD (DEGENERATIVE DISC DISEASE), LUMBAR: ICD-10-CM

## 2023-01-18 DIAGNOSIS — E66.01 MORBID OBESITY: Primary | ICD-10-CM

## 2023-01-18 DIAGNOSIS — E78.2 MIXED HYPERLIPIDEMIA: ICD-10-CM

## 2023-01-18 DIAGNOSIS — I25.10 CORONARY ARTERY DISEASE INVOLVING NATIVE CORONARY ARTERY OF NATIVE HEART WITHOUT ANGINA PECTORIS: ICD-10-CM

## 2023-01-18 LAB
BASOPHILS # BLD AUTO: 0.08 K/UL (ref 0–0.2)
BASOPHILS NFR BLD: 0.8 % (ref 0–1.9)
DIFFERENTIAL METHOD: ABNORMAL
EOSINOPHIL # BLD AUTO: 0.1 K/UL (ref 0–0.5)
EOSINOPHIL NFR BLD: 1.1 % (ref 0–8)
ERYTHROCYTE [DISTWIDTH] IN BLOOD BY AUTOMATED COUNT: 13 % (ref 11.5–14.5)
HCT VFR BLD AUTO: 39.5 % (ref 37–48.5)
HGB BLD-MCNC: 12.1 G/DL (ref 12–16)
IMM GRANULOCYTES # BLD AUTO: 0.03 K/UL (ref 0–0.04)
IMM GRANULOCYTES NFR BLD AUTO: 0.3 % (ref 0–0.5)
LYMPHOCYTES # BLD AUTO: 2.5 K/UL (ref 1–4.8)
LYMPHOCYTES NFR BLD: 24.9 % (ref 18–48)
MCH RBC QN AUTO: 28.7 PG (ref 27–31)
MCHC RBC AUTO-ENTMCNC: 30.6 G/DL (ref 32–36)
MCV RBC AUTO: 94 FL (ref 82–98)
MONOCYTES # BLD AUTO: 0.6 K/UL (ref 0.3–1)
MONOCYTES NFR BLD: 6.4 % (ref 4–15)
NEUTROPHILS # BLD AUTO: 6.6 K/UL (ref 1.8–7.7)
NEUTROPHILS NFR BLD: 66.5 % (ref 38–73)
NRBC BLD-RTO: 0 /100 WBC
PLATELET # BLD AUTO: 312 K/UL (ref 150–450)
PMV BLD AUTO: 10 FL (ref 9.2–12.9)
RBC # BLD AUTO: 4.21 M/UL (ref 4–5.4)
WBC # BLD AUTO: 9.97 K/UL (ref 3.9–12.7)

## 2023-01-18 PROCEDURE — 3077F SYST BP >= 140 MM HG: CPT | Mod: CPTII,S$GLB,, | Performed by: SURGERY

## 2023-01-18 PROCEDURE — 1159F MED LIST DOCD IN RCRD: CPT | Mod: CPTII,S$GLB,, | Performed by: SURGERY

## 2023-01-18 PROCEDURE — 99214 OFFICE O/P EST MOD 30 MIN: CPT | Mod: S$GLB,,, | Performed by: SURGERY

## 2023-01-18 PROCEDURE — 1160F PR REVIEW ALL MEDS BY PRESCRIBER/CLIN PHARMACIST DOCUMENTED: ICD-10-PCS | Mod: CPTII,S$GLB,, | Performed by: SURGERY

## 2023-01-18 PROCEDURE — 3078F DIAST BP <80 MM HG: CPT | Mod: CPTII,S$GLB,, | Performed by: SURGERY

## 2023-01-18 PROCEDURE — 99214 PR OFFICE/OUTPT VISIT, EST, LEVL IV, 30-39 MIN: ICD-10-PCS | Mod: S$GLB,,, | Performed by: SURGERY

## 2023-01-18 PROCEDURE — 3008F PR BODY MASS INDEX (BMI) DOCUMENTED: ICD-10-PCS | Mod: CPTII,S$GLB,, | Performed by: SURGERY

## 2023-01-18 PROCEDURE — 3078F PR MOST RECENT DIASTOLIC BLOOD PRESSURE < 80 MM HG: ICD-10-PCS | Mod: CPTII,S$GLB,, | Performed by: SURGERY

## 2023-01-18 PROCEDURE — 3077F PR MOST RECENT SYSTOLIC BLOOD PRESSURE >= 140 MM HG: ICD-10-PCS | Mod: CPTII,S$GLB,, | Performed by: SURGERY

## 2023-01-18 PROCEDURE — 3008F BODY MASS INDEX DOCD: CPT | Mod: CPTII,S$GLB,, | Performed by: SURGERY

## 2023-01-18 PROCEDURE — 1159F PR MEDICATION LIST DOCUMENTED IN MEDICAL RECORD: ICD-10-PCS | Mod: CPTII,S$GLB,, | Performed by: SURGERY

## 2023-01-18 PROCEDURE — 4010F ACE/ARB THERAPY RXD/TAKEN: CPT | Mod: CPTII,S$GLB,, | Performed by: SURGERY

## 2023-01-18 PROCEDURE — 99999 PR PBB SHADOW E&M-EST. PATIENT-LVL V: ICD-10-PCS | Mod: PBBFAC,,, | Performed by: SURGERY

## 2023-01-18 PROCEDURE — 1160F RVW MEDS BY RX/DR IN RCRD: CPT | Mod: CPTII,S$GLB,, | Performed by: SURGERY

## 2023-01-18 PROCEDURE — 99999 PR PBB SHADOW E&M-EST. PATIENT-LVL V: CPT | Mod: PBBFAC,,, | Performed by: SURGERY

## 2023-01-18 PROCEDURE — 4010F PR ACE/ARB THEARPY RXD/TAKEN: ICD-10-PCS | Mod: CPTII,S$GLB,, | Performed by: SURGERY

## 2023-01-18 RX ORDER — PREGABALIN 100 MG/1
200 CAPSULE ORAL 2 TIMES DAILY
Qty: 120 CAPSULE | Refills: 5 | Status: SHIPPED | OUTPATIENT
Start: 2023-01-18 | End: 2023-07-20

## 2023-01-18 NOTE — PROGRESS NOTES
"  Follow up    SUBJECTIVE:     Chief Complaint   Patient presents with    Obesity       History of Present Illness:    Patient is a 57 y.o. female who is referred for evaluation of surgical treatment of morbid obesity. Her Body mass index is 36.47 kg/m². She has known comorbidities of dyslipidemia, hypertension, obstructive sleep apnea, and osteoarthritis. She has not attended the bariatric seminar and is most interested in gastric bypass.    Review of patient's allergies indicates:   Allergen Reactions    Celexa [citalopram] Other (See Comments)     GI upset  Stated "knocked me out"    Cephalexin Anaphylaxis    Zoloft [sertraline] Other (See Comments)     Stated "knocked me out"    Codeine Other (See Comments)     hallucinations  hallucinations    Isosorbide Nausea And Vomiting    Ciprofloxacin Itching    Levaquin [levofloxacin] Other (See Comments)     tendinitis    Metformin      Nausea     Penicillamine     Penicillins Other (See Comments)     Was told per mother that as child was allergic to penicillin.  Childhood allergy/ unknown reaction    Sulfa (sulfonamide antibiotics)        Current Outpatient Medications   Medication Sig Dispense Refill    acetaminophen (TYLENOL) 650 MG TbSR Take 1,300 mg by mouth every 8 (eight) hours as needed (pain).       albuterol (PROVENTIL/VENTOLIN HFA) 90 mcg/actuation inhaler Inhale 2 puffs into the lungs every 6 (six) hours as needed for Shortness of Breath.       allopurinoL (ZYLOPRIM) 100 MG tablet Take 1 tablet (100 mg total) by mouth once daily.      aspirin (ECOTRIN) 81 MG EC tablet Take 81 mg by mouth once daily.      atorvastatin (LIPITOR) 40 MG tablet TAKE 1 TABLET (40 MG TOTAL) BY MOUTH EVERY EVENING.  90 tablet 3    azelastine (ASTELIN) 137 mcg (0.1 %) nasal spray azelastine 137 mcg (0.1 %) nasal spray aerosol      baclofen (LIORESAL) 10 MG tablet Take 1 tablet (10 mg total) by mouth 3 (three) times daily. 90 tablet 5    blood-glucose meter (GLUCOSE MONITORING KIT) " kit Use as instructed 1 each 0    BRILINTA 90 mg tablet TAKE 1 TABLET BY MOUTH 2 TIMES A DAY 60 tablet 4    carvediloL (COREG) 25 MG tablet Take 1 tablet (25 mg total) by mouth 2 (two) times daily. 180 tablet 2    cetirizine (ZYRTEC) 10 MG tablet Take 10 mg by mouth daily as needed for Allergies.      dexbrompheniramn-chlophedianol (CHLO HIST) 1-12.5 mg/5 mL Soln Chlo Hist 1 mg-12.5 mg/5 mL oral solution      fluticasone propionate (FLONASE) 50 mcg/actuation nasal spray fluticasone propionate 50 mcg/actuation nasal spray,suspension      furosemide (LASIX) 20 MG tablet Take 1 tablet (20 mg total) by mouth once daily. 90 tablet 2    ketoconazole (NIZORAL) 2 % cream ketoconazole 2 % topical cream      ketorolac (TORADOL) 10 mg tablet ketorolac 10 mg tablet      Lactobacillus acidophilus (PROBIOTIC) 10 billion cell Cap 1 capsule by mouth daily 10 capsule 0    lancets Misc 1 Units by Misc.(Non-Drug; Combo Route) route 2 (two) times daily as needed. 100 each 3    levothyroxine (SYNTHROID) 100 MCG tablet Take 1 tablet (100 mcg total) by mouth before breakfast. 30 tablet 11    oxyCODONE-acetaminophen (PERCOCET)  mg per tablet oxycodone-acetaminophen 10 mg-325 mg tablet      pantoprazole (PROTONIX) 40 MG tablet TAKE 1 TABLET BY MOUTH EVERY DAY 90 tablet 3    pregabalin (LYRICA) 100 MG capsule Take 2 capsules (200 mg total) by mouth 2 (two) times a day. 120 capsule 5    ramipriL (ALTACE) 10 MG capsule Take 1 capsule (10 mg total) by mouth once daily. 90 capsule 3    ranolazine (RANEXA) 500 MG Tb12 TAKE 2 TABLETS (1 000 MG TOTAL) BY MOUTH 2 (TWO) TIMES DAILY. 120 tablet 1    senna-docusate 8.6-50 mg (PERICOLACE) 8.6-50 mg per tablet Take 1 tablet by mouth once daily.      venlafaxine (EFFEXOR-XR) 150 MG Cp24 Take 1 capsule (150 mg total) by mouth every evening.       Current Facility-Administered Medications   Medication Dose Route Frequency Provider Last Rate Last Admin    sodium chloride 0.9% flush 10 mL  10 mL  "Intravenous PRN Juan Wilson MD           Past Medical History:   Diagnosis Date    Allergy     Anticoagulant long-term use     ASA 81mg, Effient    Anxiety     Arthritis     Asthma     As child    CAD (coronary artery disease) 01/27/2012    s/p stents x4 , CABG, 3 vessel, (5/2013) newest stent prior to CABG    Chronic constipation     Colon polyp     Coronary artery disease involving native coronary artery of native heart without angina pectoris 1/27/2012 12/19 Significant ostial RCA lesion as above treated with drug-eluting stenting as above. Occluded proximal LAD with patent lima lad Patent vein graft to 2nd obtuse marginal Known occluded vein graft to unknown vessel.  s/p stents x 3 (2010) s/p LAD stent (DSE) 3/2012    DDD (degenerative disc disease), cervical     DDD (degenerative disc disease), lumbar     DDD (degenerative disc disease), thoracic     Depression     Edema     Encounter for blood transfusion     Headache(784.0)     History of nephrolithiasis     Hyperlipidemia     Hypertension     Hypothyroid 7/5/2012    Insomnia     Insulin resistance     patient stated not diebetic    Joint stiffness     Joint swelling     Kidney disease     ; Frequent UTIs     Kidney stones     Low back pain     Neck pain     Obstructive sleep apnea on CPAP 7/17/2012    PONV (postoperative nausea and vomiting)     S/P CABG (coronary artery bypass graft) 6/25/2013 6/23/2013     Sleep apnea 01/27/12    Patient reports "severe" sleep apnea, uses C-pap    Stented coronary artery 12/20/2019 12/19  ostial RCA -Osirio2.5 x 18 post dilated 2.75     Thoracic back pain     Usual hyperplasia of lactiferous duct 02/2017    left breast     Past Surgical History:   Procedure Laterality Date    ADENOIDECTOMY      BACK SURGERY      BREAST BIOPSY Left 02/2017    duct excision- Dr. Jimenez    BREAST CYST ASPIRATION Left 01/2020    @ 's office- old hematoma drained (per office)    CARDIAC SURGERY      CARPAL " TUNNEL RELEASE      bilateral    CERVICAL LAMINECTOMY WITH SPINAL FUSION      2016     SECTION, CLASSIC      x 2    COLONOSCOPY      CORONARY ANGIOGRAPHY  2019    Procedure: ANGIOGRAM, CORONARY ARTERY;  Surgeon: Timi Millan MD;  Location: [facility] CATH;  Service: Cardiology;;    CORONARY ANGIOPLASTY WITH STENT PLACEMENT      x 4    CORONARY ARTERY BYPASS GRAFT  2013    3 vessel    ESOPHAGOGASTRODUODENOSCOPY N/A 2020    Procedure: EGD (ESOPHAGOGASTRODUODENOSCOPY);  Surgeon: Mk Warren MD;  Location: [facility] ENDO;  Service: Endoscopy;  Laterality: N/A;    HYSTERECTOMY  2011    Complete    JOINT REPLACEMENT      KNEE ARTHROPLASTY Left 2019    Procedure: ARTHROPLASTY, KNEE;  Surgeon: Juan Wilson MD;  Location: [facility] OR;  Service: Orthopedics;  Laterality: Left;    KNEE ARTHROSCOPY W/ MENISCAL REPAIR Left     twice, last one 2014    LAMINECTOMY      L4/L5    LEFT HEART CATHETERIZATION  2019    Procedure: Left heart cath-  # 219 A;  Surgeon: Timi Millan MD;  Location: [facility] CATH;  Service: Cardiology;;    OOPHORECTOMY Bilateral     ROBOTIC ARTHROPLASTY, KNEE Right 2021    Procedure: ROBOTIC ARTHROPLASTY, KNEE, TOTAL;  Surgeon: Juan Wilson MD;  Location: [facility] OR;  Service: Orthopedics;  Laterality: Right;    SINUS SURGERY      x3    TENDON REPAIR Left     ankle surgery    THYROIDECTOMY      2 separate surgeries    TONSILLECTOMY      TOTAL KNEE ARTHROPLASTY Left 2019    Surgeon: Juan Wilson MD     Family History   Problem Relation Age of Onset    Heart failure Mother     Asthma Mother     Macular degeneration Mother     Cancer Mother         Breast    Cataracts Mother     Heart disease Mother     COPD Mother     Breast cancer Mother     Heart disease Father     Diabetes Father     Hypertension Father     Stroke Father     Cataracts Father     Obesity Father     Obesity Sister     Glaucoma Sister     Thyroid disease Sister     Glaucoma Sister     Other  "Brother         stiff man syndrome    Cancer Maternal Grandmother         Breast with Mets    Breast cancer Maternal Grandmother     Cancer Paternal Grandmother         Colon    Strabismus Other     Amblyopia Neg Hx     Blindness Neg Hx     Retinal detachment Neg Hx      Social History     Tobacco Use    Smoking status: Former     Packs/day: 0.50     Years: 14.00     Pack years: 7.00     Types: Cigarettes     Start date: 1984     Quit date: 1998     Years since quittin.0    Smokeless tobacco: Never   Substance Use Topics    Alcohol use: No    Drug use: Not Currently     Types: Benzodiazepines          Review of Systems   Constitutional:  Positive for fatigue.   HENT:  Positive for congestion, nosebleeds and sinus pressure.    Respiratory:  Positive for apnea and cough.    Cardiovascular:  Positive for leg swelling.   Musculoskeletal:  Positive for back pain, joint swelling and neck pain.   Hematological:  Bruises/bleeds easily.   Psychiatric/Behavioral:  Positive for sleep disturbance.    All other systems reviewed and are negative.    OBJECTIVE:     Vital Signs (Most Recent)  Temp: 98.4 °F (36.9 °C) (23 0900)  Pulse: 63 (23 0900)  Resp: 16 (23 0900)  BP: (!) 141/70 (23 0900)  5' 1.5" (1.562 m)  89 kg (196 lb 3.2 oz)     Body comp:  Fat Percent:  44.2 %  Fat Mass:  86.8 lb  FFM:  109.4 lb  TBW: 78.2 lb  TBW %:  39.9 %  BMR: 1528 kcal    Physical Exam  Vitals and nursing note reviewed.   Constitutional:       General: She is not in acute distress.     Appearance: She is well-developed. She is not diaphoretic.   HENT:      Head: Normocephalic and atraumatic.        Mouth/Throat:      Pharynx: No oropharyngeal exudate.   Eyes:      General: No scleral icterus.     Conjunctiva/sclera: Conjunctivae normal.      Pupils: Pupils are equal, round, and reactive to light.   Neck:      Thyroid: No thyromegaly.      Vascular: No JVD.      Trachea: No tracheal deviation.   Cardiovascular: "      Rate and Rhythm: Normal rate and regular rhythm.      Heart sounds: Normal heart sounds. No murmur heard.    No friction rub. No gallop.   Pulmonary:      Effort: Pulmonary effort is normal. No respiratory distress.      Breath sounds: Normal breath sounds. No stridor. No wheezing or rales.   Chest:      Chest wall: No tenderness.       Abdominal:      General: Bowel sounds are normal. There is no distension.      Palpations: Abdomen is soft. There is no mass.      Tenderness: There is no abdominal tenderness. There is no guarding or rebound.   Musculoskeletal:         General: No tenderness. Normal range of motion.      Cervical back: Normal range of motion and neck supple.   Lymphadenopathy:      Cervical: No cervical adenopathy.   Skin:     General: Skin is warm and dry.      Findings: No erythema or rash.   Neurological:      Mental Status: She is alert and oriented to person, place, and time.      Cranial Nerves: No cranial nerve deficit.   Psychiatric:         Behavior: Behavior normal.       ASSESSMENT/PLAN:        Hoping for feb 13- gastric sleeve     Labs  EKG     UGI - Impression:  1. Mild gastroesophageal reflux.  2. Mild esophageal dysmotility.  3. Small incidental duodenal diverticulum.  4. Subglottic penetration without tracheal aspiration.     dietary consult- done  psych consult - done  Seminar    1. Morbid obesity        2. Obesity, Class II, BMI 35-39.9        3. Mixed hyperlipidemia        4. RAHUL (obstructive sleep apnea)        5. Coronary artery disease involving native coronary artery of native heart without angina pectoris        6. DDD (degenerative disc disease), lumbar            Plan:  Josephine Bolton has morbid obesity as their Body mass index is 36.47 kg/m². She would benefit from weight loss surgery and has chosen gastric bypass surgery as the preferred procedure. She understands that this is a tool and lifestyle change will be necessary to keep weight off. I went over  possible complications of the chosen surgery with the patient and she is agreeable to surgery.    She has been instructed to start the pre operative diet.    She surgical date is February 20, 2023- [facility]      The patient has been taught the post operative diet and understands that she will need to stay on this diet to prevent complication.  They are aware of the post operative follow up and return to see me after surgery to treat/prevent/identify any complications.  she has been advised to attend support groups post operatively.

## 2023-01-18 NOTE — TELEPHONE ENCOUNTER
Returned call to pt in response to message below. Pt reports that she is almost here. Informed her ok to check-in late.     Mo Whipple [place] Staff  Caller: patient (Today,  7:29 AM)  [place] to know if she still can come will be running 15-20mins late, please call back at [phone] (home)     Case number [id number]

## 2023-02-06 RX ORDER — PANTOPRAZOLE SODIUM 40 MG/10ML
40 INJECTION, POWDER, LYOPHILIZED, FOR SOLUTION INTRAVENOUS DAILY
Status: CANCELLED | OUTPATIENT
Start: 2023-02-06

## 2023-02-06 RX ORDER — SODIUM CHLORIDE 9 MG/ML
INJECTION, SOLUTION INTRAVENOUS CONTINUOUS
Status: CANCELLED | OUTPATIENT
Start: 2023-02-06

## 2023-02-06 RX ORDER — HEPARIN SODIUM 5000 [USP'U]/ML
5000 INJECTION, SOLUTION INTRAVENOUS; SUBCUTANEOUS EVERY 8 HOURS
Status: CANCELLED | OUTPATIENT
Start: 2023-02-06

## 2023-02-15 ENCOUNTER — CLINICAL SUPPORT (OUTPATIENT)
Dept: BARIATRICS | Facility: CLINIC | Age: 58
End: 2023-02-15
Payer: COMMERCIAL

## 2023-02-15 ENCOUNTER — HOSPITAL ENCOUNTER (OUTPATIENT)
Dept: PREADMISSION TESTING | Facility: HOSPITAL | Age: 58
Discharge: HOME | End: 2023-02-15
Payer: COMMERCIAL

## 2023-02-15 VITALS — WEIGHT: 196 LBS | HEIGHT: 62 IN | BODY MASS INDEX: 36.07 KG/M2

## 2023-02-15 DIAGNOSIS — E66.01 MORBID OBESITY: Primary | ICD-10-CM

## 2023-02-15 PROCEDURE — 99499 NO LOS: ICD-10-PCS | Mod: S$GLB,,, | Performed by: SURGERY

## 2023-02-15 PROCEDURE — 99900103 DSU ONLY-NO CHARGE-INITIAL HR (STAT)

## 2023-02-15 PROCEDURE — 99499 UNLISTED E&M SERVICE: CPT | Mod: S$GLB,,, | Performed by: SURGERY

## 2023-02-15 NOTE — PROGRESS NOTES
Confirmed with patient that she is having a Samaria-en-Y gastric bypass on  02/20/2023 at [facility].    Weight 194.2 lb  Fat%  44.1 %  Fat mass 88.6lb   .6lb  TBW  77.6lb  TBW% 40.0%  BMI  36.1        Preop diet reviewed with patient.   Reminded of liquids only (water and protein shakes) on the day before surgery.   Reviewed progression of diet after surgery    ~Clear liquid diet for the 1st week post op with a goal of 64oz of fluid intake daily  ~encouraged protein intake to facilitate the healing process, this can include protein russo, gatorade zero with protein, protein broth, etc  ~provided patient with (7) Liquicel protein packs to consume 1 pack daily x7 days of post-op week 1  ~avoid food/liquids with temperature extreme of too hot or too cold, as this may cause spasms of the stomach leading to increased pain and complications with the healing process  ~Full liquid diet for weeks 2 and 3 post-op with a goal of 60 gm of protein daily   ~begin bariatric vitamins on 1st day of post-op week 2  ~emphasized smooth, liquid consistency of intake. Any chunks or bits of food may cause complications of healing.   Reviewed the importance of  ~post op activity as tolerated  ~ incentive spirometry as provided by the respiratory therapist at the hospital   ~adequate oral fluid intake/hydration  ~written copy of ambulation, and hydration tracking tool provided  Reviewed post-op wound/incision care (written copy provided)   ~may shower 48 hours after surgery, no rubbing or scrubbing and pat to dry   ~no tub baths, swimming pool, hot tub until cleared at 2 week post-op visit   ~may remove outer bandage after 1st shower, steri-strips remain until they fall off  ~educated patient of signs and symptoms of infection and importance of   reporting them (redness, swelling, drainage, fever)  List of approved post-op over-the-counter meds provided   ~emphasized NO NSAIDS  Reviewed post-op constipation protocol, written copy  provided.    Patient verbalized complete understanding.

## 2023-02-17 ENCOUNTER — ANESTHESIA EVENT (OUTPATIENT)
Dept: SURGERY | Facility: HOSPITAL | Age: 58
DRG: 621 | End: 2023-02-17
Payer: COMMERCIAL

## 2023-02-20 ENCOUNTER — ANESTHESIA (OUTPATIENT)
Dept: SURGERY | Facility: HOSPITAL | Age: 58
DRG: 621 | End: 2023-02-20
Payer: COMMERCIAL

## 2023-02-20 ENCOUNTER — HOSPITAL ENCOUNTER (INPATIENT)
Facility: HOSPITAL | Age: 58
LOS: 1 days | Discharge: HOME | DRG: 621 | End: 2023-02-21
Attending: SURGERY | Admitting: SURGERY
Payer: COMMERCIAL

## 2023-02-20 DIAGNOSIS — E78.2 MIXED HYPERLIPIDEMIA: ICD-10-CM

## 2023-02-20 DIAGNOSIS — G47.33 OSA (OBSTRUCTIVE SLEEP APNEA): ICD-10-CM

## 2023-02-20 DIAGNOSIS — Z98.84 S/P GASTRIC BYPASS: Primary | ICD-10-CM

## 2023-02-20 DIAGNOSIS — E66.9 OBESITY, CLASS II, BMI 35-39.9: ICD-10-CM

## 2023-02-20 DIAGNOSIS — I25.10 CORONARY ARTERY DISEASE INVOLVING NATIVE CORONARY ARTERY OF NATIVE HEART WITHOUT ANGINA PECTORIS: ICD-10-CM

## 2023-02-20 DIAGNOSIS — E66.01 MORBID OBESITY: ICD-10-CM

## 2023-02-20 DIAGNOSIS — M51.36 DDD (DEGENERATIVE DISC DISEASE), LUMBAR: ICD-10-CM

## 2023-02-20 PROBLEM — D64.9 ANEMIA: Status: ACTIVE | Noted: 2022-04-08

## 2023-02-20 PROBLEM — M54.32 BILATERAL SCIATICA: Status: ACTIVE | Noted: 2017-08-11

## 2023-02-20 PROBLEM — J12.82 PNEUMONIA DUE TO COVID-19 VIRUS: Status: RESOLVED | Noted: 2021-01-24 | Resolved: 2023-02-20

## 2023-02-20 PROBLEM — R10.13 EPIGASTRIC PAIN: Status: RESOLVED | Noted: 2020-08-25 | Resolved: 2023-02-20

## 2023-02-20 PROBLEM — M10.9 GOUT: Status: ACTIVE | Noted: 2023-02-20

## 2023-02-20 PROBLEM — I20.0 UNSTABLE ANGINA: Status: RESOLVED | Noted: 2021-10-04 | Resolved: 2023-02-20

## 2023-02-20 PROBLEM — R06.02 SOB (SHORTNESS OF BREATH): Status: RESOLVED | Noted: 2019-12-23 | Resolved: 2023-02-20

## 2023-02-20 PROBLEM — N64.52 DISCHARGE FROM NIPPLE: Status: RESOLVED | Noted: 2017-04-19 | Resolved: 2023-02-20

## 2023-02-20 PROBLEM — U07.1 PNEUMONIA DUE TO COVID-19 VIRUS: Status: RESOLVED | Noted: 2021-01-24 | Resolved: 2023-02-20

## 2023-02-20 PROBLEM — Z98.1 S/P LUMBAR SPINAL FUSION: Status: ACTIVE | Noted: 2022-06-24

## 2023-02-20 PROBLEM — M54.31 BILATERAL SCIATICA: Status: ACTIVE | Noted: 2017-08-11

## 2023-02-20 PROBLEM — G62.9 POLYNEUROPATHY: Status: ACTIVE | Noted: 2023-02-20

## 2023-02-20 PROBLEM — Z01.810 PREOP CARDIOVASCULAR EXAM: Status: RESOLVED | Noted: 2021-05-13 | Resolved: 2023-02-20

## 2023-02-20 LAB — POCT GLUCOSE: 135 MG/DL (ref 70–110)

## 2023-02-20 PROCEDURE — 25000003 PHARM REV CODE 250: Performed by: SURGERY

## 2023-02-20 PROCEDURE — 25000003 PHARM REV CODE 250: Performed by: NURSE ANESTHETIST, CERTIFIED REGISTERED

## 2023-02-20 PROCEDURE — 99900035 HC TECH TIME PER 15 MIN (STAT)

## 2023-02-20 PROCEDURE — D9220A PRA ANESTHESIA: Mod: CRNA,,, | Performed by: NURSE ANESTHETIST, CERTIFIED REGISTERED

## 2023-02-20 PROCEDURE — 71000039 HC RECOVERY, EACH ADD'L HOUR: Performed by: SURGERY

## 2023-02-20 PROCEDURE — D9220A PRA ANESTHESIA: ICD-10-PCS | Mod: CRNA,,, | Performed by: NURSE ANESTHETIST, CERTIFIED REGISTERED

## 2023-02-20 PROCEDURE — 99900104 DSU ONLY-NO CHARGE-EA ADD'L HR (STAT): Performed by: SURGERY

## 2023-02-20 PROCEDURE — 63600175 PHARM REV CODE 636 W HCPCS: Performed by: SURGERY

## 2023-02-20 PROCEDURE — 36000713 HC OR TIME LEV V EA ADD 15 MIN: Performed by: SURGERY

## 2023-02-20 PROCEDURE — 25000003 PHARM REV CODE 250: Performed by: ANESTHESIOLOGY

## 2023-02-20 PROCEDURE — 43644 LAP GASTRIC BYPASS/ROUX-EN-Y: CPT | Mod: ,,, | Performed by: SURGERY

## 2023-02-20 PROCEDURE — 37000009 HC ANESTHESIA EA ADD 15 MINS: Performed by: SURGERY

## 2023-02-20 PROCEDURE — 25000003 PHARM REV CODE 250: Performed by: NURSE PRACTITIONER

## 2023-02-20 PROCEDURE — 27201423 OPTIME MED/SURG SUP & DEVICES STERILE SUPPLY: Performed by: SURGERY

## 2023-02-20 PROCEDURE — 99900103 DSU ONLY-NO CHARGE-INITIAL HR (STAT): Performed by: SURGERY

## 2023-02-20 PROCEDURE — 94799 UNLISTED PULMONARY SVC/PX: CPT

## 2023-02-20 PROCEDURE — 11000001 HC ACUTE MED/SURG PRIVATE ROOM

## 2023-02-20 PROCEDURE — 63600175 PHARM REV CODE 636 W HCPCS: Performed by: NURSE PRACTITIONER

## 2023-02-20 PROCEDURE — D9220A PRA ANESTHESIA: Mod: ANES,,, | Performed by: ANESTHESIOLOGY

## 2023-02-20 PROCEDURE — D9220A PRA ANESTHESIA: ICD-10-PCS | Mod: ANES,,, | Performed by: ANESTHESIOLOGY

## 2023-02-20 PROCEDURE — C9399 UNCLASSIFIED DRUGS OR BIOLOG: HCPCS | Performed by: SURGERY

## 2023-02-20 PROCEDURE — 27000221 HC OXYGEN, UP TO 24 HOURS

## 2023-02-20 PROCEDURE — 63600175 PHARM REV CODE 636 W HCPCS: Performed by: NURSE ANESTHETIST, CERTIFIED REGISTERED

## 2023-02-20 PROCEDURE — 37000008 HC ANESTHESIA 1ST 15 MINUTES: Performed by: SURGERY

## 2023-02-20 PROCEDURE — 94760 N-INVAS EAR/PLS OXIMETRY 1: CPT

## 2023-02-20 PROCEDURE — 43644 PR LAP GASTRIC BYPASS/ROUX-EN-Y: ICD-10-PCS | Mod: ,,, | Performed by: SURGERY

## 2023-02-20 PROCEDURE — 36000712 HC OR TIME LEV V 1ST 15 MIN: Performed by: SURGERY

## 2023-02-20 PROCEDURE — 63600175 PHARM REV CODE 636 W HCPCS: Performed by: ANESTHESIOLOGY

## 2023-02-20 PROCEDURE — 71000033 HC RECOVERY, INTIAL HOUR: Performed by: SURGERY

## 2023-02-20 RX ORDER — MIDAZOLAM HYDROCHLORIDE 1 MG/ML
INJECTION INTRAMUSCULAR; INTRAVENOUS
Status: DISCONTINUED | OUTPATIENT
Start: 2023-02-20 | End: 2023-02-20

## 2023-02-20 RX ORDER — SUCCINYLCHOLINE CHLORIDE 20 MG/ML
INJECTION INTRAMUSCULAR; INTRAVENOUS
Status: DISCONTINUED | OUTPATIENT
Start: 2023-02-20 | End: 2023-02-20

## 2023-02-20 RX ORDER — ENOXAPARIN SODIUM 100 MG/ML
40 INJECTION SUBCUTANEOUS EVERY 12 HOURS
Status: DISCONTINUED | OUTPATIENT
Start: 2023-02-21 | End: 2023-02-21 | Stop reason: HOSPADM

## 2023-02-20 RX ORDER — LIDOCAINE HCL/PF 100 MG/5ML
SYRINGE (ML) INTRAVENOUS
Status: DISCONTINUED | OUTPATIENT
Start: 2023-02-20 | End: 2023-02-20

## 2023-02-20 RX ORDER — ALBUTEROL SULFATE 90 UG/1
2 AEROSOL, METERED RESPIRATORY (INHALATION) EVERY 6 HOURS PRN
Status: DISCONTINUED | OUTPATIENT
Start: 2023-02-20 | End: 2023-02-21 | Stop reason: HOSPADM

## 2023-02-20 RX ORDER — PANTOPRAZOLE SODIUM 40 MG/10ML
40 INJECTION, POWDER, LYOPHILIZED, FOR SOLUTION INTRAVENOUS DAILY
Status: DISCONTINUED | OUTPATIENT
Start: 2023-02-20 | End: 2023-02-20

## 2023-02-20 RX ORDER — LEVOTHYROXINE SODIUM 100 UG/1
100 TABLET ORAL
Status: DISCONTINUED | OUTPATIENT
Start: 2023-02-21 | End: 2023-02-21 | Stop reason: HOSPADM

## 2023-02-20 RX ORDER — VENLAFAXINE HYDROCHLORIDE 150 MG/1
150 CAPSULE, EXTENDED RELEASE ORAL DAILY
Status: DISCONTINUED | OUTPATIENT
Start: 2023-02-21 | End: 2023-02-21 | Stop reason: HOSPADM

## 2023-02-20 RX ORDER — HYDRALAZINE HYDROCHLORIDE 20 MG/ML
10 INJECTION INTRAMUSCULAR; INTRAVENOUS ONCE
Status: COMPLETED | OUTPATIENT
Start: 2023-02-20 | End: 2023-02-20

## 2023-02-20 RX ORDER — HYDROMORPHONE HYDROCHLORIDE 2 MG/ML
0.2 INJECTION, SOLUTION INTRAMUSCULAR; INTRAVENOUS; SUBCUTANEOUS EVERY 5 MIN PRN
Status: DISCONTINUED | OUTPATIENT
Start: 2023-02-20 | End: 2023-02-20 | Stop reason: HOSPADM

## 2023-02-20 RX ORDER — PROMETHAZINE HYDROCHLORIDE 25 MG/ML
INJECTION, SOLUTION INTRAMUSCULAR; INTRAVENOUS
Status: DISCONTINUED | OUTPATIENT
Start: 2023-02-20 | End: 2023-02-20

## 2023-02-20 RX ORDER — CLINDAMYCIN PHOSPHATE 900 MG/50ML
900 INJECTION, SOLUTION INTRAVENOUS
Status: COMPLETED | OUTPATIENT
Start: 2023-02-20 | End: 2023-02-20

## 2023-02-20 RX ORDER — HYDROMORPHONE HYDROCHLORIDE 1 MG/ML
1 INJECTION, SOLUTION INTRAMUSCULAR; INTRAVENOUS; SUBCUTANEOUS EVERY 6 HOURS PRN
Status: DISCONTINUED | OUTPATIENT
Start: 2023-02-20 | End: 2023-02-21 | Stop reason: HOSPADM

## 2023-02-20 RX ORDER — CLINDAMYCIN PHOSPHATE 900 MG/50ML
900 INJECTION, SOLUTION INTRAVENOUS
Status: COMPLETED | OUTPATIENT
Start: 2023-02-20 | End: 2023-02-21

## 2023-02-20 RX ORDER — INSULIN ASPART 100 [IU]/ML
1-10 INJECTION, SOLUTION INTRAVENOUS; SUBCUTANEOUS EVERY 6 HOURS PRN
Status: DISCONTINUED | OUTPATIENT
Start: 2023-02-20 | End: 2023-02-21

## 2023-02-20 RX ORDER — PHENYLEPHRINE HYDROCHLORIDE 10 MG/ML
INJECTION INTRAVENOUS
Status: DISCONTINUED | OUTPATIENT
Start: 2023-02-20 | End: 2023-02-20

## 2023-02-20 RX ORDER — GLUCAGON 1 MG
1 KIT INJECTION
Status: DISCONTINUED | OUTPATIENT
Start: 2023-02-20 | End: 2023-02-21

## 2023-02-20 RX ORDER — BUPIVACAINE HYDROCHLORIDE AND EPINEPHRINE 5; 5 MG/ML; UG/ML
INJECTION, SOLUTION EPIDURAL; INTRACAUDAL; PERINEURAL
Status: DISCONTINUED | OUTPATIENT
Start: 2023-02-20 | End: 2023-02-20 | Stop reason: HOSPADM

## 2023-02-20 RX ORDER — HEPARIN SODIUM 5000 [USP'U]/ML
5000 INJECTION, SOLUTION INTRAVENOUS; SUBCUTANEOUS EVERY 8 HOURS
Status: DISCONTINUED | OUTPATIENT
Start: 2023-02-20 | End: 2023-02-20 | Stop reason: HOSPADM

## 2023-02-20 RX ORDER — LIDOCAINE HYDROCHLORIDE 10 MG/ML
1 INJECTION, SOLUTION EPIDURAL; INFILTRATION; INTRACAUDAL; PERINEURAL ONCE
Status: DISCONTINUED | OUTPATIENT
Start: 2023-02-20 | End: 2023-02-20 | Stop reason: HOSPADM

## 2023-02-20 RX ORDER — PROPOFOL 10 MG/ML
VIAL (ML) INTRAVENOUS
Status: DISCONTINUED | OUTPATIENT
Start: 2023-02-20 | End: 2023-02-20

## 2023-02-20 RX ORDER — OXYCODONE HYDROCHLORIDE 5 MG/1
10 TABLET ORAL EVERY 4 HOURS PRN
Status: DISCONTINUED | OUTPATIENT
Start: 2023-02-20 | End: 2023-02-21 | Stop reason: HOSPADM

## 2023-02-20 RX ORDER — ONDANSETRON 2 MG/ML
8 INJECTION INTRAMUSCULAR; INTRAVENOUS EVERY 6 HOURS PRN
Status: DISCONTINUED | OUTPATIENT
Start: 2023-02-20 | End: 2023-02-21 | Stop reason: HOSPADM

## 2023-02-20 RX ORDER — DEXAMETHASONE SODIUM PHOSPHATE 4 MG/ML
INJECTION, SOLUTION INTRA-ARTICULAR; INTRALESIONAL; INTRAMUSCULAR; INTRAVENOUS; SOFT TISSUE
Status: DISCONTINUED | OUTPATIENT
Start: 2023-02-20 | End: 2023-02-20

## 2023-02-20 RX ORDER — OXYCODONE HYDROCHLORIDE 5 MG/1
5 TABLET ORAL
Status: DISCONTINUED | OUTPATIENT
Start: 2023-02-20 | End: 2023-02-20 | Stop reason: HOSPADM

## 2023-02-20 RX ORDER — KETOROLAC TROMETHAMINE 30 MG/ML
INJECTION, SOLUTION INTRAMUSCULAR; INTRAVENOUS
Status: DISCONTINUED | OUTPATIENT
Start: 2023-02-20 | End: 2023-02-20

## 2023-02-20 RX ORDER — ONDANSETRON 2 MG/ML
INJECTION INTRAMUSCULAR; INTRAVENOUS
Status: DISCONTINUED | OUTPATIENT
Start: 2023-02-20 | End: 2023-02-20

## 2023-02-20 RX ORDER — DIPHENHYDRAMINE HYDROCHLORIDE 50 MG/ML
12.5 INJECTION INTRAMUSCULAR; INTRAVENOUS EVERY 4 HOURS PRN
Status: DISCONTINUED | OUTPATIENT
Start: 2023-02-20 | End: 2023-02-21 | Stop reason: HOSPADM

## 2023-02-20 RX ORDER — EPHEDRINE SULFATE 50 MG/ML
INJECTION, SOLUTION INTRAVENOUS
Status: DISCONTINUED | OUTPATIENT
Start: 2023-02-20 | End: 2023-02-20

## 2023-02-20 RX ORDER — ACETAMINOPHEN 10 MG/ML
INJECTION, SOLUTION INTRAVENOUS
Status: DISCONTINUED | OUTPATIENT
Start: 2023-02-20 | End: 2023-02-20

## 2023-02-20 RX ORDER — ROCURONIUM BROMIDE 10 MG/ML
INJECTION, SOLUTION INTRAVENOUS
Status: DISCONTINUED | OUTPATIENT
Start: 2023-02-20 | End: 2023-02-20

## 2023-02-20 RX ORDER — PANTOPRAZOLE SODIUM 40 MG/1
40 TABLET, DELAYED RELEASE ORAL DAILY
Status: DISCONTINUED | OUTPATIENT
Start: 2023-02-21 | End: 2023-02-21 | Stop reason: HOSPADM

## 2023-02-20 RX ORDER — SODIUM CHLORIDE 9 MG/ML
INJECTION, SOLUTION INTRAVENOUS CONTINUOUS
Status: DISCONTINUED | OUTPATIENT
Start: 2023-02-20 | End: 2023-02-20

## 2023-02-20 RX ORDER — FENTANYL CITRATE 50 UG/ML
25 INJECTION, SOLUTION INTRAMUSCULAR; INTRAVENOUS EVERY 5 MIN PRN
Status: DISCONTINUED | OUTPATIENT
Start: 2023-02-20 | End: 2023-02-20 | Stop reason: HOSPADM

## 2023-02-20 RX ORDER — FENTANYL CITRATE 50 UG/ML
INJECTION, SOLUTION INTRAMUSCULAR; INTRAVENOUS
Status: DISCONTINUED | OUTPATIENT
Start: 2023-02-20 | End: 2023-02-20

## 2023-02-20 RX ORDER — ONDANSETRON 2 MG/ML
4 INJECTION INTRAMUSCULAR; INTRAVENOUS ONCE
Status: COMPLETED | OUTPATIENT
Start: 2023-02-20 | End: 2023-02-20

## 2023-02-20 RX ORDER — CARVEDILOL 25 MG/1
25 TABLET ORAL 2 TIMES DAILY
Status: DISCONTINUED | OUTPATIENT
Start: 2023-02-20 | End: 2023-02-21 | Stop reason: HOSPADM

## 2023-02-20 RX ORDER — SODIUM CHLORIDE, SODIUM LACTATE, POTASSIUM CHLORIDE, CALCIUM CHLORIDE 600; 310; 30; 20 MG/100ML; MG/100ML; MG/100ML; MG/100ML
INJECTION, SOLUTION INTRAVENOUS CONTINUOUS
Status: DISCONTINUED | OUTPATIENT
Start: 2023-02-20 | End: 2023-02-21 | Stop reason: HOSPADM

## 2023-02-20 RX ADMIN — ONDANSETRON 4 MG: 2 INJECTION INTRAMUSCULAR; INTRAVENOUS at 11:02

## 2023-02-20 RX ADMIN — ROCURONIUM BROMIDE 5 MG: 10 INJECTION, SOLUTION INTRAVENOUS at 11:02

## 2023-02-20 RX ADMIN — CARVEDILOL 25 MG: 25 TABLET, FILM COATED ORAL at 08:02

## 2023-02-20 RX ADMIN — EPHEDRINE SULFATE 10 MG: 50 INJECTION, SOLUTION INTRAMUSCULAR; INTRAVENOUS; SUBCUTANEOUS at 11:02

## 2023-02-20 RX ADMIN — EPHEDRINE SULFATE 10 MG: 50 INJECTION, SOLUTION INTRAMUSCULAR; INTRAVENOUS; SUBCUTANEOUS at 12:02

## 2023-02-20 RX ADMIN — MIDAZOLAM HYDROCHLORIDE 2 MG: 1 INJECTION, SOLUTION INTRAMUSCULAR; INTRAVENOUS at 11:02

## 2023-02-20 RX ADMIN — HYDROMORPHONE HYDROCHLORIDE 0.2 MG: 2 INJECTION INTRAMUSCULAR; INTRAVENOUS; SUBCUTANEOUS at 02:02

## 2023-02-20 RX ADMIN — CLINDAMYCIN PHOSPHATE 900 MG: 18 INJECTION, SOLUTION INTRAVENOUS at 11:02

## 2023-02-20 RX ADMIN — PREGABALIN 200 MG: 75 CAPSULE ORAL at 08:02

## 2023-02-20 RX ADMIN — ROCURONIUM BROMIDE 20 MG: 10 INJECTION, SOLUTION INTRAVENOUS at 12:02

## 2023-02-20 RX ADMIN — SUGAMMADEX 200 MG: 100 INJECTION, SOLUTION INTRAVENOUS at 02:02

## 2023-02-20 RX ADMIN — FENTANYL CITRATE 50 MCG: 50 INJECTION, SOLUTION INTRAMUSCULAR; INTRAVENOUS at 11:02

## 2023-02-20 RX ADMIN — OXYCODONE 10 MG: 5 TABLET ORAL at 06:02

## 2023-02-20 RX ADMIN — LIDOCAINE HYDROCHLORIDE 100 MG: 20 INJECTION INTRAVENOUS at 11:02

## 2023-02-20 RX ADMIN — HEPARIN SODIUM 5000 UNITS: 5000 INJECTION INTRAVENOUS; SUBCUTANEOUS at 10:02

## 2023-02-20 RX ADMIN — HYDROMORPHONE HYDROCHLORIDE 0.2 MG: 2 INJECTION INTRAMUSCULAR; INTRAVENOUS; SUBCUTANEOUS at 03:02

## 2023-02-20 RX ADMIN — HYDRALAZINE HYDROCHLORIDE 10 MG: 20 INJECTION INTRAMUSCULAR; INTRAVENOUS at 03:02

## 2023-02-20 RX ADMIN — FIBRINOGEN HUMAN AND THROMBIN HUMAN: 90; 500 SOLUTION TOPICAL at 11:02

## 2023-02-20 RX ADMIN — DEXAMETHASONE SODIUM PHOSPHATE 4 MG: 4 INJECTION, SOLUTION INTRA-ARTICULAR; INTRALESIONAL; INTRAMUSCULAR; INTRAVENOUS; SOFT TISSUE at 11:02

## 2023-02-20 RX ADMIN — ONDANSETRON 4 MG: 2 INJECTION INTRAMUSCULAR; INTRAVENOUS at 03:02

## 2023-02-20 RX ADMIN — PROPOFOL 150 MG: 10 INJECTION, EMULSION INTRAVENOUS at 11:02

## 2023-02-20 RX ADMIN — SUCCINYLCHOLINE CHLORIDE 160 MG: 20 INJECTION, SOLUTION INTRAMUSCULAR; INTRAVENOUS; PARENTERAL at 11:02

## 2023-02-20 RX ADMIN — FENTANYL CITRATE 50 MCG: 50 INJECTION, SOLUTION INTRAMUSCULAR; INTRAVENOUS at 01:02

## 2023-02-20 RX ADMIN — PHENYLEPHRINE HYDROCHLORIDE 100 MCG: 10 INJECTION INTRAVENOUS at 01:02

## 2023-02-20 RX ADMIN — ROCURONIUM BROMIDE 45 MG: 10 INJECTION, SOLUTION INTRAVENOUS at 11:02

## 2023-02-20 RX ADMIN — PROMETHAZINE HYDROCHLORIDE 6.25 MG: 25 INJECTION INTRAMUSCULAR; INTRAVENOUS at 11:02

## 2023-02-20 RX ADMIN — SODIUM CHLORIDE, SODIUM GLUCONATE, SODIUM ACETATE, POTASSIUM CHLORIDE AND MAGNESIUM CHLORIDE: 526; 502; 368; 37; 30 INJECTION, SOLUTION INTRAVENOUS at 02:02

## 2023-02-20 RX ADMIN — SODIUM CHLORIDE, SODIUM GLUCONATE, SODIUM ACETATE, POTASSIUM CHLORIDE AND MAGNESIUM CHLORIDE: 526; 502; 368; 37; 30 INJECTION, SOLUTION INTRAVENOUS at 12:02

## 2023-02-20 RX ADMIN — CLINDAMYCIN PHOSPHATE 900 MG: 900 INJECTION, SOLUTION INTRAVENOUS at 08:02

## 2023-02-20 RX ADMIN — ACETAMINOPHEN 1000 MG: 10 INJECTION, SOLUTION INTRAVENOUS at 11:02

## 2023-02-20 RX ADMIN — SODIUM CHLORIDE, POTASSIUM CHLORIDE, SODIUM LACTATE AND CALCIUM CHLORIDE: 600; 310; 30; 20 INJECTION, SOLUTION INTRAVENOUS at 03:02

## 2023-02-20 RX ADMIN — SODIUM CHLORIDE, SODIUM GLUCONATE, SODIUM ACETATE, POTASSIUM CHLORIDE AND MAGNESIUM CHLORIDE: 526; 502; 368; 37; 30 INJECTION, SOLUTION INTRAVENOUS at 09:02

## 2023-02-20 RX ADMIN — KETOROLAC TROMETHAMINE 30 MG: 30 INJECTION, SOLUTION INTRAMUSCULAR; INTRAVENOUS at 02:02

## 2023-02-20 NOTE — ANESTHESIA PREPROCEDURE EVALUATION
02/20/2023  Josephine Bolton is a 57 y.o., female.      Pre-op Assessment    I have reviewed the Patient Summary Reports.     I have reviewed the Nursing Notes. I have reviewed the NPO Status.   I have reviewed the Medications.     Review of Systems  Anesthesia Hx:  No problems with previous Anesthesia    Social:  Former Smoker    Cardiovascular:   Hypertension CAD  CABG/stent     Pulmonary:   Asthma Sleep Apnea, CPAP    Renal/:   Chronic Renal Disease    Hepatic/GI:   GERD    Musculoskeletal:   Arthritis     Neurological:   Neuromuscular Disease, Headaches    Endocrine:   Hypothyroidism  Obesity / BMI > 30  Psych:   Psychiatric History          Physical Exam  General: Cooperative, Alert and Oriented    Airway:  Mallampati: III / II  Mouth Opening: Small, but > 3cm  TM Distance: Normal  Tongue: Normal  Neck ROM: Extension Decreased    Dental:  Intact        Anesthesia Plan  Type of Anesthesia, risks & benefits discussed:    Anesthesia Type: Gen ETT  Intra-op Monitoring Plan: Standard ASA Monitors  Post Op Pain Control Plan: multimodal analgesia and IV/PO Opioids PRN  Induction:  IV  Airway Plan: Direct and Video, Post-Induction  Informed Consent: Informed consent signed with the Patient and all parties understand the risks and agree with anesthesia plan.  All questions answered.   ASA Score: 3  Day of Surgery Review of History & Physical: H&P Update referred to the surgeon/provider.    Ready For Surgery From Anesthesia Perspective.     .

## 2023-02-20 NOTE — H&P
"Follow up     SUBJECTIVE:          Chief Complaint   Patient presents with    Obesity         History of Present Illness:     Patient is a 57 y.o. female who is referred for evaluation of surgical treatment of morbid obesity. Her Body mass index is 36.47 kg/m². She has known comorbidities of dyslipidemia, hypertension, obstructive sleep apnea, and osteoarthritis. She has not attended the bariatric seminar and is most interested in gastric bypass.           Review of patient's allergies indicates:   Allergen Reactions    Celexa [citalopram] Other (See Comments)       GI upset  Stated "knocked me out"    Cephalexin Anaphylaxis    Zoloft [sertraline] Other (See Comments)       Stated "knocked me out"    Codeine Other (See Comments)       hallucinations  hallucinations    Isosorbide Nausea And Vomiting    Ciprofloxacin Itching    Levaquin [levofloxacin] Other (See Comments)       tendinitis    Metformin         Nausea     Penicillamine      Penicillins Other (See Comments)       Was told per mother that as child was allergic to penicillin.  Childhood allergy/ unknown reaction    Sulfa (sulfonamide antibiotics)           Current Medications          Current Outpatient Medications   Medication Sig Dispense Refill    acetaminophen (TYLENOL) 650 MG TbSR Take 1,300 mg by mouth every 8 (eight) hours as needed (pain).         albuterol (PROVENTIL/VENTOLIN HFA) 90 mcg/actuation inhaler Inhale 2 puffs into the lungs every 6 (six) hours as needed for Shortness of Breath.         allopurinoL (ZYLOPRIM) 100 MG tablet Take 1 tablet (100 mg total) by mouth once daily.        aspirin (ECOTRIN) 81 MG EC tablet Take 81 mg by mouth once daily.        atorvastatin (LIPITOR) 40 MG tablet TAKE 1 TABLET (40 MG TOTAL) BY MOUTH EVERY EVENING.  90 tablet 3    azelastine (ASTELIN) 137 mcg (0.1 %) nasal spray azelastine 137 mcg (0.1 %) nasal spray aerosol        baclofen (LIORESAL) 10 MG tablet Take 1 tablet (10 mg total) by mouth 3 (three) times " daily. 90 tablet 5    blood-glucose meter (GLUCOSE MONITORING KIT) kit Use as instructed 1 each 0    BRILINTA 90 mg tablet TAKE 1 TABLET BY MOUTH 2 TIMES A DAY 60 tablet 4    carvediloL (COREG) 25 MG tablet Take 1 tablet (25 mg total) by mouth 2 (two) times daily. 180 tablet 2    cetirizine (ZYRTEC) 10 MG tablet Take 10 mg by mouth daily as needed for Allergies.        dexbrompheniramn-chlophedianol (CHLO HIST) 1-12.5 mg/5 mL Soln Chlo Hist 1 mg-12.5 mg/5 mL oral solution        fluticasone propionate (FLONASE) 50 mcg/actuation nasal spray fluticasone propionate 50 mcg/actuation nasal spray,suspension        furosemide (LASIX) 20 MG tablet Take 1 tablet (20 mg total) by mouth once daily. 90 tablet 2    ketoconazole (NIZORAL) 2 % cream ketoconazole 2 % topical cream        ketorolac (TORADOL) 10 mg tablet ketorolac 10 mg tablet        Lactobacillus acidophilus (PROBIOTIC) 10 billion cell Cap 1 capsule by mouth daily 10 capsule 0    lancets Misc 1 Units by Misc.(Non-Drug; Combo Route) route 2 (two) times daily as needed. 100 each 3    levothyroxine (SYNTHROID) 100 MCG tablet Take 1 tablet (100 mcg total) by mouth before breakfast. 30 tablet 11    oxyCODONE-acetaminophen (PERCOCET)  mg per tablet oxycodone-acetaminophen 10 mg-325 mg tablet        pantoprazole (PROTONIX) 40 MG tablet TAKE 1 TABLET BY MOUTH EVERY DAY 90 tablet 3    pregabalin (LYRICA) 100 MG capsule Take 2 capsules (200 mg total) by mouth 2 (two) times a day. 120 capsule 5    ramipriL (ALTACE) 10 MG capsule Take 1 capsule (10 mg total) by mouth once daily. 90 capsule 3    ranolazine (RANEXA) 500 MG Tb12 TAKE 2 TABLETS (1 000 MG TOTAL) BY MOUTH 2 (TWO) TIMES DAILY. 120 tablet 1    senna-docusate 8.6-50 mg (PERICOLACE) 8.6-50 mg per tablet Take 1 tablet by mouth once daily.        venlafaxine (EFFEXOR-XR) 150 MG Cp24 Take 1 capsule (150 mg total) by mouth every evening.                    Current Facility-Administered Medications   Medication Dose  "Route Frequency Provider Last Rate Last Admin    sodium chloride 0.9% flush 10 mL  10 mL Intravenous PRN Juan Wilson MD                     Past Medical History:   Diagnosis Date    Allergy      Anticoagulant long-term use       ASA 81mg, Effient    Anxiety      Arthritis      Asthma       As child    CAD (coronary artery disease) 01/27/2012     s/p stents x4 , CABG, 3 vessel, (5/2013) newest stent prior to CABG    Chronic constipation      Colon polyp      Coronary artery disease involving native coronary artery of native heart without angina pectoris 1/27/2012 12/19 Significant ostial RCA lesion as above treated with drug-eluting stenting as above. Occluded proximal LAD with patent lima lad Patent vein graft to 2nd obtuse marginal Known occluded vein graft to unknown vessel.  s/p stents x 3 (2010) s/p LAD stent (DSE) 3/2012    DDD (degenerative disc disease), cervical      DDD (degenerative disc disease), lumbar      DDD (degenerative disc disease), thoracic      Depression      Edema      Encounter for blood transfusion      Headache(784.0)      History of nephrolithiasis      Hyperlipidemia      Hypertension      Hypothyroid 7/5/2012    Insomnia      Insulin resistance       patient stated not diebetic    Joint stiffness      Joint swelling      Kidney disease       ; Frequent UTIs     Kidney stones      Low back pain      Neck pain      Obstructive sleep apnea on CPAP 7/17/2012    PONV (postoperative nausea and vomiting)      S/P CABG (coronary artery bypass graft) 6/25/2013 6/23/2013     Sleep apnea 01/27/12     Patient reports "severe" sleep apnea, uses C-pap    Stented coronary artery 12/20/2019 12/19  ostial RCA -Osirio2.5 x 18 post dilated 2.75     Thoracic back pain      Usual hyperplasia of lactiferous duct 02/2017     left breast            Past Surgical History:   Procedure Laterality Date    ADENOIDECTOMY        BACK SURGERY        BREAST BIOPSY Left 02/2017     duct " excision- Dr. Jimenez    BREAST CYST ASPIRATION Left 2020     @ 's office- old hematoma drained (per office)    CARDIAC SURGERY        CARPAL TUNNEL RELEASE         bilateral    CERVICAL LAMINECTOMY WITH SPINAL FUSION         2016     SECTION, CLASSIC         x 2    COLONOSCOPY        CORONARY ANGIOGRAPHY   2019     Procedure: ANGIOGRAM, CORONARY ARTERY;  Surgeon: Timi Millan MD;  Location: [facility] CATH;  Service: Cardiology;;    CORONARY ANGIOPLASTY WITH STENT PLACEMENT         x 4    CORONARY ARTERY BYPASS GRAFT   2013     3 vessel    ESOPHAGOGASTRODUODENOSCOPY N/A 2020     Procedure: EGD (ESOPHAGOGASTRODUODENOSCOPY);  Surgeon: Mk Warren MD;  Location: [facility] ENDO;  Service: Endoscopy;  Laterality: N/A;    HYSTERECTOMY        Complete    JOINT REPLACEMENT        KNEE ARTHROPLASTY Left 2019     Procedure: ARTHROPLASTY, KNEE;  Surgeon: Juan Wilson MD;  Location: [facility] OR;  Service: Orthopedics;  Laterality: Left;    KNEE ARTHROSCOPY W/ MENISCAL REPAIR Left       twice, last one 2014    LAMINECTOMY         L4/L5    LEFT HEART CATHETERIZATION   2019     Procedure: Left heart cath-  # 219 A;  Surgeon: Timi Millan MD;  Location: [facility] CATH;  Service: Cardiology;;    OOPHORECTOMY Bilateral     ROBOTIC ARTHROPLASTY, KNEE Right 2021     Procedure: ROBOTIC ARTHROPLASTY, KNEE, TOTAL;  Surgeon: Juan Wilson MD;  Location: [facility] OR;  Service: Orthopedics;  Laterality: Right;    SINUS SURGERY         x3    TENDON REPAIR Left       ankle surgery    THYROIDECTOMY         2 separate surgeries    TONSILLECTOMY        TOTAL KNEE ARTHROPLASTY Left 2019     Surgeon: Juan Wilson MD            Family History   Problem Relation Age of Onset    Heart failure Mother      Asthma Mother      Macular degeneration Mother      Cancer Mother           Breast    Cataracts Mother      Heart disease Mother      COPD Mother      Breast cancer Mother      Heart  "disease Father      Diabetes Father      Hypertension Father      Stroke Father      Cataracts Father      Obesity Father      Obesity Sister      Glaucoma Sister      Thyroid disease Sister      Glaucoma Sister      Other Brother           stiff man syndrome    Cancer Maternal Grandmother           Breast with Mets    Breast cancer Maternal Grandmother      Cancer Paternal Grandmother           Colon    Strabismus Other      Amblyopia Neg Hx      Blindness Neg Hx      Retinal detachment Neg Hx        Social History            Tobacco Use    Smoking status: Former       Packs/day: 0.50       Years: 14.00       Pack years: 7.00       Types: Cigarettes       Start date: 1984       Quit date: 1998       Years since quittin.0    Smokeless tobacco: Never   Substance Use Topics    Alcohol use: No    Drug use: Not Currently       Types: Benzodiazepines            Review of Systems   Constitutional:  Positive for fatigue.   HENT:  Positive for congestion, nosebleeds and sinus pressure.    Respiratory:  Positive for apnea and cough.    Cardiovascular:  Positive for leg swelling.   Musculoskeletal:  Positive for back pain, joint swelling and neck pain.   Hematological:  Bruises/bleeds easily.   Psychiatric/Behavioral:  Positive for sleep disturbance.    All other systems reviewed and are negative.     OBJECTIVE:      Vital Signs (Most Recent)  Temp: 98.4 °F (36.9 °C) (23 0900)  Pulse: 63 (23 09)  Resp: 16 (23 0900)  BP: (!) 141/70 (23 0900)  5' 1.5" (1.562 m)  89 kg (196 lb 3.2 oz)      Body comp:  Fat Percent:  44.2 %  Fat Mass:  86.8 lb  FFM:  109.4 lb  TBW: 78.2 lb  TBW %:  39.9 %  BMR: 1528 kcal     Physical Exam  Vitals and nursing note reviewed.   Constitutional:       General: She is not in acute distress.     Appearance: She is well-developed. She is not diaphoretic.   HENT:      Head: Normocephalic and atraumatic.        Mouth/Throat:      Pharynx: No oropharyngeal exudate. "   Eyes:      General: No scleral icterus.     Conjunctiva/sclera: Conjunctivae normal.      Pupils: Pupils are equal, round, and reactive to light.   Neck:      Thyroid: No thyromegaly.      Vascular: No JVD.      Trachea: No tracheal deviation.   Cardiovascular:      Rate and Rhythm: Normal rate and regular rhythm.      Heart sounds: Normal heart sounds. No murmur heard.    No friction rub. No gallop.   Pulmonary:      Effort: Pulmonary effort is normal. No respiratory distress.      Breath sounds: Normal breath sounds. No stridor. No wheezing or rales.   Chest:      Chest wall: No tenderness.        Abdominal:      General: Bowel sounds are normal. There is no distension.      Palpations: Abdomen is soft. There is no mass.      Tenderness: There is no abdominal tenderness. There is no guarding or rebound.   Musculoskeletal:         General: No tenderness. Normal range of motion.      Cervical back: Normal range of motion and neck supple.   Lymphadenopathy:      Cervical: No cervical adenopathy.   Skin:     General: Skin is warm and dry.      Findings: No erythema or rash.   Neurological:      Mental Status: She is alert and oriented to person, place, and time.      Cranial Nerves: No cranial nerve deficit.   Psychiatric:         Behavior: Behavior normal.         ASSESSMENT/PLAN:      Hoping for feb 13- gastric sleeve     Labs  EKG     UGI - Impression:  1. Mild gastroesophageal reflux.  2. Mild esophageal dysmotility.  3. Small incidental duodenal diverticulum.  4. Subglottic penetration without tracheal aspiration.     dietary consult- done  psych consult - done  Seminar     1. Morbid obesity          2. Obesity, Class II, BMI 35-39.9          3. Mixed hyperlipidemia          4. RAHUL (obstructive sleep apnea)          5. Coronary artery disease involving native coronary artery of native heart without angina pectoris          6. DDD (degenerative disc disease), lumbar                Plan:  Josephine Bolton has  morbid obesity as their Body mass index is 36.47 kg/m². She would benefit from weight loss surgery and has chosen gastric bypass surgery as the preferred procedure. She understands that this is a tool and lifestyle change will be necessary to keep weight off. I went over possible complications of the chosen surgery with the patient and she is agreeable to surgery.     She has been instructed to start the pre operative diet.     She surgical date is February 20, 2023- ons        The patient has been taught the post operative diet and understands that she will need to stay on this diet to prevent complication.  They are aware of the post operative follow up and return to see me after surgery to treat/prevent/identify any complications.  she has been advised to attend support groups post operatively.

## 2023-02-20 NOTE — HPI
Josephine Bolton is a 57 year old with a past medical history of CAD s/p PCI and CABG, HTN, HLD, RAHUL, O/A, gout, MDD/VANDA, hypothyroidism, and GERD who presented for elective gastric bypass per Dr. Whipple. She has been interviewed pre-operatively 2/20/2023. She will monitored overnight post-operatively.

## 2023-02-20 NOTE — ANESTHESIA PROCEDURE NOTES
Intubation    Date/Time: 2/20/2023 11:43 AM  Performed by: Randell Slade CRNA  Authorized by: Andres Braxton MD     Intubation:     Induction:  Intravenous    Intubated:  Postinduction    Mask Ventilation:  Easy with oral airway    Attempts:  1    Attempted By:  CRNA    Method of Intubation:  Video laryngoscopy    Blade:  Tapia 3    Laryngeal View Grade: Grade I - full view of cords      Difficult Airway Encountered?: No      Complications:  None    Airway Device:  Oral endotracheal tube    Airway Device Size:  7.0    Style/Cuff Inflation:  Cuffed (inflated to minimal occlusive pressure)    Tube secured:  21    Secured at:  The lips    Placement Verified By:  Capnometry    Complicating Factors:  None    Findings Post-Intubation:  BS equal bilateral and atraumatic/condition of teeth unchanged

## 2023-02-20 NOTE — SUBJECTIVE & OBJECTIVE
"Past Medical History:   Diagnosis Date    Allergy     Anticoagulant long-term use     ASA 81mg, Effient    Anxiety     Arthritis     Asthma     As child    CAD (coronary artery disease) 01/27/2012    s/p stents x4 , CABG, 3 vessel, (5/2013) newest stent prior to CABG    Chronic constipation     Colon polyp     Coronary artery disease involving native coronary artery of native heart without angina pectoris 01/27/2012 12/19 Significant ostial RCA lesion as above treated with drug-eluting stenting as above. Occluded proximal LAD with patent lima lad Patent vein graft to 2nd obtuse marginal Known occluded vein graft to unknown vessel.  s/p stents x 3 (2010) s/p LAD stent (DSE) 3/2012    DDD (degenerative disc disease), cervical     DDD (degenerative disc disease), lumbar     DDD (degenerative disc disease), thoracic     Depression     Edema     Encounter for blood transfusion     General anesthetics causing adverse effect in therapeutic use     Headache(784.0)     History of nephrolithiasis     Hyperlipidemia     Hypertension     Hypothyroid 07/05/2012    Insomnia     Insulin resistance     patient stated not diebetic    Joint stiffness     Joint swelling     Kidney disease     ; Frequent UTIs     Kidney stones     Low back pain     Neck pain     Obstructive sleep apnea on CPAP 07/17/2012    PONV (postoperative nausea and vomiting)     S/P CABG (coronary artery bypass graft) 06/25/2013 6/23/2013     Sleep apnea 01/27/2012    Patient reports "severe" sleep apnea, uses C-pap    Stented coronary artery 12/20/2019 12/19  ostial RCA -Osirio2.5 x 18 post dilated 2.75     Thoracic back pain     Usual hyperplasia of lactiferous duct 02/2017    left breast       Past Surgical History:   Procedure Laterality Date    ADENOIDECTOMY      BACK SURGERY      BREAST BIOPSY Left 02/2017    duct excision- Dr. Jimenez    BREAST CYST ASPIRATION Left 01/2020    @ 's office- old hematoma drained (per office)    " "CARDIAC SURGERY      CARPAL TUNNEL RELEASE      bilateral    CERVICAL LAMINECTOMY WITH SPINAL FUSION      2016     SECTION, CLASSIC      x 2    COLONOSCOPY      CORONARY ANGIOGRAPHY  2019    Procedure: ANGIOGRAM, CORONARY ARTERY;  Surgeon: iTmi Millan MD;  Location: [facility] CATH;  Service: Cardiology;;    CORONARY ANGIOPLASTY WITH STENT PLACEMENT      x 4    CORONARY ARTERY BYPASS GRAFT  2013    3 vessel    ESOPHAGOGASTRODUODENOSCOPY N/A 2020    Procedure: EGD (ESOPHAGOGASTRODUODENOSCOPY);  Surgeon: Mk Warren MD;  Location: [facility] ENDO;  Service: Endoscopy;  Laterality: N/A;    HYSTERECTOMY  2011    Complete    JOINT REPLACEMENT      KNEE ARTHROPLASTY Left 2019    Procedure: ARTHROPLASTY, KNEE;  Surgeon: Juan Wilson MD;  Location: [facility] OR;  Service: Orthopedics;  Laterality: Left;    KNEE ARTHROSCOPY W/ MENISCAL REPAIR Left     twice, last one 2014    LAMINECTOMY      L4/L5    LEFT HEART CATHETERIZATION  2019    Procedure: Left heart cath- RM # 219 A;  Surgeon: Timi Millan MD;  Location: [facility] CATH;  Service: Cardiology;;    OOPHORECTOMY Bilateral     ROBOTIC ARTHROPLASTY, KNEE Right 2021    Procedure: ROBOTIC ARTHROPLASTY, KNEE, TOTAL;  Surgeon: Juan Wilson MD;  Location: [facility] OR;  Service: Orthopedics;  Laterality: Right;    SINUS SURGERY      x3    TENDON REPAIR Left     ankle surgery    THYROIDECTOMY      2 separate surgeries    TONSILLECTOMY      TOTAL KNEE ARTHROPLASTY Left 2019    Surgeon: Juan Wilson MD       Review of patient's allergies indicates:   Allergen Reactions    Celexa [citalopram] Other (See Comments)     GI upset  Stated "knocked me out"    Cephalexin Anaphylaxis    Zoloft [sertraline] Other (See Comments)     Stated "knocked me out"    Codeine Other (See Comments)     hallucinations  hallucinations    Isosorbide Nausea And Vomiting    Ciprofloxacin Itching    Levaquin [levofloxacin] Other (See Comments)     tendinitis    " Metformin      Nausea     Penicillamine     Penicillins Other (See Comments)     Was told per mother that as child was allergic to penicillin.  Childhood allergy/ unknown reaction    Sulfa (sulfonamide antibiotics)        No current facility-administered medications on file prior to encounter.     Current Outpatient Medications on File Prior to Encounter   Medication Sig    acetaminophen (TYLENOL) 650 MG TbSR Take 1,300 mg by mouth every 8 (eight) hours as needed (pain).     albuterol (PROVENTIL/VENTOLIN HFA) 90 mcg/actuation inhaler Inhale 2 puffs into the lungs every 6 (six) hours as needed for Shortness of Breath.     allopurinoL (ZYLOPRIM) 100 MG tablet Take 1 tablet (100 mg total) by mouth once daily.    atorvastatin (LIPITOR) 40 MG tablet TAKE 1 TABLET BY MOUTH EVERY DAY    baclofen (LIORESAL) 10 MG tablet Take 1 tablet (10 mg total) by mouth 3 (three) times daily.    carvediloL (COREG) 25 MG tablet Take 1 tablet (25 mg total) by mouth 2 (two) times daily.    cetirizine (ZYRTEC) 10 MG tablet Take 10 mg by mouth daily as needed for Allergies.    furosemide (LASIX) 20 MG tablet Take 1 tablet (20 mg total) by mouth once daily.    Lactobacillus acidophilus (PROBIOTIC) 10 billion cell Cap 1 capsule by mouth daily    levothyroxine (SYNTHROID) 100 MCG tablet Take 1 tablet (100 mcg total) by mouth before breakfast.    pantoprazole (PROTONIX) 40 MG tablet TAKE 1 TABLET BY MOUTH EVERY DAY    pregabalin (LYRICA) 100 MG capsule Take 2 capsules (200 mg total) by mouth 2 (two) times a day.    ramipriL (ALTACE) 10 MG capsule Take 1 capsule (10 mg total) by mouth once daily.    ranolazine (RANEXA) 500 MG Tb12 TAKE 2 TABLETS (1 000 MG TOTAL) BY MOUTH 2 (TWO) TIMES DAILY.    senna-docusate 8.6-50 mg (PERICOLACE) 8.6-50 mg per tablet Take 1 tablet by mouth once daily.    venlafaxine (EFFEXOR-XR) 150 MG Cp24 TAKE 1 CAPSULE BY MOUTH EVERY DAY    aspirin (ECOTRIN) 81 MG EC tablet Take 81 mg by mouth once daily.    blood-glucose  meter (GLUCOSE MONITORING KIT) kit Use as instructed    BRILINTA 90 mg tablet TAKE 1 TABLET BY MOUTH 2 TIMES A DAY    fluticasone propionate (FLONASE) 50 mcg/actuation nasal spray fluticasone propionate 50 mcg/actuation nasal spray,suspension    lancets Misc 1 Units by Misc.(Non-Drug; Combo Route) route 2 (two) times daily as needed.     Family History       Problem Relation (Age of Onset)    Asthma Mother    Breast cancer Mother, Maternal Grandmother    COPD Mother    Cancer Mother, Maternal Grandmother, Paternal Grandmother    Cataracts Mother, Father    Diabetes Father    Glaucoma Sister, Sister    Heart disease Mother, Father    Heart failure Mother    Hypertension Father    Macular degeneration Mother    Obesity Father, Sister    Other Brother    Strabismus Other    Stroke Father    Thyroid disease Sister          Tobacco Use    Smoking status: Former     Packs/day: 0.50     Years: 14.00     Pack years: 7.00     Types: Cigarettes     Start date: 1984     Quit date: 1998     Years since quittin.1    Smokeless tobacco: Never   Substance and Sexual Activity    Alcohol use: No    Drug use: Not Currently     Types: Benzodiazepines    Sexual activity: Yes     Partners: Male     Review of Systems   All other systems reviewed and are negative.  Objective:     Vital Signs (Most Recent):  Temp: 98 °F (36.7 °C) (23 1000)  Pulse: 66 (23 1000)  Resp: 18 (23 1000)  BP: (!) 115/53 (23 1000)  SpO2: 97 % (23 1000)   Vital Signs (24h Range):  Temp:  [98 °F (36.7 °C)] 98 °F (36.7 °C)  Pulse:  [66] 66  Resp:  [18] 18  SpO2:  [97 %] 97 %  BP: (115)/(53) 115/53     Weight: 88.9 kg (195 lb 15.8 oz)  Body mass index is 36.43 kg/m².    Physical Exam  Vitals and nursing note reviewed.   Constitutional:       General: She is not in acute distress.     Appearance: She is obese.   HENT:      Head: Normocephalic and atraumatic.      Right Ear: External ear normal.      Left Ear: External ear  normal.      Nose: Nose normal.      Mouth/Throat:      Mouth: Mucous membranes are moist.      Pharynx: Oropharynx is clear.   Eyes:      Extraocular Movements: Extraocular movements intact.      Conjunctiva/sclera: Conjunctivae normal.   Cardiovascular:      Rate and Rhythm: Normal rate and regular rhythm.      Pulses: Normal pulses.      Heart sounds: Normal heart sounds.   Pulmonary:      Effort: Pulmonary effort is normal.      Breath sounds: Normal breath sounds.   Abdominal:      General: Bowel sounds are normal. There is no distension.      Palpations: Abdomen is soft.      Tenderness: There is no abdominal tenderness.   Musculoskeletal:         General: Normal range of motion.      Cervical back: Normal range of motion and neck supple.      Right lower leg: No edema.      Left lower leg: No edema.   Skin:     General: Skin is warm and dry.   Neurological:      General: No focal deficit present.      Mental Status: She is alert and oriented to person, place, and time. Mental status is at baseline.   Psychiatric:         Mood and Affect: Mood normal.         Behavior: Behavior normal.           Significant Labs: All pertinent labs within the past 24 hours have been reviewed.    Significant Imaging: I have reviewed all pertinent imaging results/findings within the past 24 hours.

## 2023-02-20 NOTE — ASSESSMENT & PLAN NOTE
Per Dr. Whipple.  -Monitor overnight  -Diet per Surgery  -PRN analgesics and antiemetics  -Early mobilization  -Incentive spirometry  -DVT prophylaxis

## 2023-02-20 NOTE — TRANSFER OF CARE
"Anesthesia Transfer of Care Note    Patient: Josephine Bolton    Procedure(s) Performed: Procedure(s) (LRB):  XI ROBOTIC REVISION,GASTROENTEROSTOMY,MITCHELL-EN-Y (N/A)    Patient location: PACU    Anesthesia Type: general    Transport from OR: Transported from OR on 6-10 L/min O2 by face mask with adequate spontaneous ventilation    Post pain: adequate analgesia    Post assessment: no apparent anesthetic complications    Post vital signs: stable    Level of consciousness: responds to stimulation    Nausea/Vomiting: no nausea/vomiting    Complications: none    Transfer of care protocol was followed      Last vitals:   Visit Vitals  BP (!) 115/53 (BP Location: Right arm, Patient Position: Sitting)   Pulse 66   Temp 36.7 °C (98 °F) (Oral)   Resp 18   Ht 5' 1.5" (1.562 m)   Wt 88.9 kg (195 lb 15.8 oz)   LMP  (LMP Unknown)   SpO2 97%   Breastfeeding No   BMI 36.43 kg/m²     "

## 2023-02-20 NOTE — ANESTHESIA POSTPROCEDURE EVALUATION
Anesthesia Post Evaluation    Patient: Josephine Bolton    Procedure(s) Performed: Procedure(s) (LRB):  XI ROBOTIC REVISION,GASTROENTEROSTOMY,MITCHELL-EN-Y (N/A)    Final Anesthesia Type: general      Patient location during evaluation: PACU  Patient participation: Yes- Able to Participate  Level of consciousness: awake and alert  Post-procedure vital signs: reviewed and stable  Pain management: adequate  Airway patency: patent    PONV status at discharge: No PONV  Anesthetic complications: no      Cardiovascular status: hemodynamically stable  Respiratory status: unassisted and nasal cannula  Hydration status: euvolemic  Follow-up not needed.          Vitals Value Taken Time   /63 02/20/23 1545   Temp 36.3 °C (97.4 °F) 02/20/23 1545   Pulse 66 02/20/23 1545   Resp 15 02/20/23 1545   SpO2 98 % 02/20/23 1545         Event Time   Out of Recovery 15:35:00         Pain/Chantal Score: Pain Rating Prior to Med Admin: 5 (2/20/2023  3:25 PM)  Chantal Score: 9 (2/20/2023  3:25 PM)

## 2023-02-20 NOTE — OP NOTE
Robotic Gastric bypass Procedure Note    Date of procedure:   02/20/2023    Indications: Inability to maintain weight loss    Pre-operative Diagnosis:    1. Morbid obesity          2. Obesity, Class II, BMI 35-39.9          3. Mixed hyperlipidemia          4. RAHUL (obstructive sleep apnea)          5. Coronary artery disease involving native coronary artery of native heart without angina pectoris          6. DDD (degenerative disc disease), lumbar               Post-operative Diagnosis: Same    Surgeon: Obinna Whipple MD    Assistants: Mirella Staples CFA    Anesthesia: General endotracheal anesthesia    ASA Class: 3    Procedure Details   The patient was seen in the Holding Room. The risks, benefits, complications, treatment options, and expected outcomes were discussed with the patient. The possibilities of reaction to medication, pulmonary aspiration, perforation of viscus, bleeding, recurrent infection, the need for additional procedures, failure to diagnose a condition, and creating a complication requiring transfusion or operation were discussed with the patient. The patient concurred with the proposed plan, giving informed consent. The site of surgery properly noted/marked. The patient was taken to Operating Room 4 identified as Josephine Bolton and the procedure verified as Laparoscopic/Robotic Gastric Bypass. A Time Out was held and the above information confirmed.    Full general anesthesia was induced with orotracheal intubation. The patient was prepped and draped in a supine position. Appropriate antibiotics were given intravenously. Arms were out.    The abdomen was entered using a veress needle technique.  Once safely entered, CO2 insufflation was started and maintained at 15 mm hg.  Trocars were then inserted.  The patient was placed in reverse Trendelenburg.  The robot was then docked to the patient.    The procedure was started by identifying the entire gastric body.  With slight retraction of the  liver, a perigastric dissection was undertaken 5 cm from the GE junction.  A blue load stapler was then used to fashion a gastric pouch.  This was done by 1st stapling across the stomach and then up to the left crura.  Some crural dissection was required to appropriately disconnect the stomach completely.    The omentum was placed above the colon and divided in half.  The ligament of treitz was identified, an Omega loop was created, and the bowel was run approximately 70 cm.  Bringing the bowel up to the newly formed gastric pouch, a 2 layered end-to-side hand-sewn anastomosis was created between the distal gastric pouch and Omega loop of small bowel.  This was done with absorbable V lock sutures and over a 36 Telugu bougie.    The proximal bowel was then transected using a white load.  The distal bowel was was run another 120 cm and and a side to side jejunojejunostomy was created with a white load.  The enterotomies were closed with a running V lock and mesentery closed with a nonabsorbable V lock.      Provacative leak test was negative.  tiseel was sprayed on the anastomosis for hemostasis.    Retro nelly defect was closed with non absorbable v lock.    Abdomen was inspected for an abnormalities, none were seen    Instrument, sponge, and needle counts were correct prior to wound closure and at the conclusion of the case.     Findings:  normal anatomy    Estimated Blood Loss: 20.0 cc    Drains: none    Total IV Fluids: see anesthesia    Specimens: none    Implants: none    Complications:  None; patient tolerated the procedure well.    Disposition: PACU - hemodynamically stable.    Condition: stable    Attending Attestation: I was present and scrubbed for the entire procedure.

## 2023-02-20 NOTE — H&P
[facility] Medicine  History & Physical    Patient Name: Josephine Bolton  MRN: [id number]  Patient Class: IP- Surgery Admit  Admission Date: 2/20/2023  Attending Physician: Devonte Aaron MD  Primary Care Provider: Froylan Smart MD         Patient information was obtained from patient, past medical records and ER records.     Subjective:     Principal Problem:S/P gastric bypass    Chief Complaint: No chief complaint on file.       HPI: Josephine Bolton is a 57 year old with a past medical history of CAD s/p PCI and CABG, HTN, HLD, RAHUL, O/A, gout, MDD/VANDA, hypothyroidism, and GERD who presented for elective gastric bypass per Dr. Whipple. She has been interviewed pre-operatively 2/20/2023. She will monitored overnight post-operatively.      Past Medical History:   Diagnosis Date    Allergy     Anticoagulant long-term use     ASA 81mg, Effient    Anxiety     Arthritis     Asthma     As child    CAD (coronary artery disease) 01/27/2012    s/p stents x4 , CABG, 3 vessel, (5/2013) newest stent prior to CABG    Chronic constipation     Colon polyp     Coronary artery disease involving native coronary artery of native heart without angina pectoris 01/27/2012 12/19 Significant ostial RCA lesion as above treated with drug-eluting stenting as above. Occluded proximal LAD with patent lima lad Patent vein graft to 2nd obtuse marginal Known occluded vein graft to unknown vessel.  s/p stents x 3 (2010) s/p LAD stent (DSE) 3/2012    DDD (degenerative disc disease), cervical     DDD (degenerative disc disease), lumbar     DDD (degenerative disc disease), thoracic     Depression     Edema     Encounter for blood transfusion     General anesthetics causing adverse effect in therapeutic use     Headache(784.0)     History of nephrolithiasis     Hyperlipidemia     Hypertension     Hypothyroid 07/05/2012    Insomnia     Insulin resistance     patient stated not diebetic    Joint  "stiffness     Joint swelling     Kidney disease     ; Frequent UTIs     Kidney stones     Low back pain     Neck pain     Obstructive sleep apnea on CPAP 2012    PONV (postoperative nausea and vomiting)     S/P CABG (coronary artery bypass graft) 2013     Sleep apnea 2012    Patient reports "severe" sleep apnea, uses C-pap    Stented coronary artery 2019  ostial RCA -Osirio2.5 x 18 post dilated 2.75     Thoracic back pain     Usual hyperplasia of lactiferous duct 2017    left breast       Past Surgical History:   Procedure Laterality Date    ADENOIDECTOMY      BACK SURGERY      BREAST BIOPSY Left 2017    duct excision- Dr. Jimenez    BREAST CYST ASPIRATION Left 2020    @ 's office- old hematoma drained (per office)    CARDIAC SURGERY      CARPAL TUNNEL RELEASE      bilateral    CERVICAL LAMINECTOMY WITH SPINAL FUSION      2016     SECTION, CLASSIC      x 2    COLONOSCOPY      CORONARY ANGIOGRAPHY  2019    Procedure: ANGIOGRAM, CORONARY ARTERY;  Surgeon: Timi Millan MD;  Location: [facility] CATH;  Service: Cardiology;;    CORONARY ANGIOPLASTY WITH STENT PLACEMENT      x 4    CORONARY ARTERY BYPASS GRAFT  2013    3 vessel    ESOPHAGOGASTRODUODENOSCOPY N/A 2020    Procedure: EGD (ESOPHAGOGASTRODUODENOSCOPY);  Surgeon: Mk Warren MD;  Location: [facility] ENDO;  Service: Endoscopy;  Laterality: N/A;    HYSTERECTOMY      Complete    JOINT REPLACEMENT      KNEE ARTHROPLASTY Left 2019    Procedure: ARTHROPLASTY, KNEE;  Surgeon: Juan Wilson MD;  Location: [facility] OR;  Service: Orthopedics;  Laterality: Left;    KNEE ARTHROSCOPY W/ MENISCAL REPAIR Left     twice, last one 2014    LAMINECTOMY      L4/L5    LEFT HEART CATHETERIZATION  2019    Procedure: Left heart cath-  # 219 A;  Surgeon: Timi Millan MD;  Location: [facility] CATH;  Service: Cardiology;;    OOPHORECTOMY " "Bilateral 2011    ROBOTIC ARTHROPLASTY, KNEE Right 6/14/2021    Procedure: ROBOTIC ARTHROPLASTY, KNEE, TOTAL;  Surgeon: Juan Wilson MD;  Location: [facility] OR;  Service: Orthopedics;  Laterality: Right;    SINUS SURGERY      x3    TENDON REPAIR Left     ankle surgery    THYROIDECTOMY      2 separate surgeries    TONSILLECTOMY      TOTAL KNEE ARTHROPLASTY Left 06/17/2019    Surgeon: Juan Wilson MD       Review of patient's allergies indicates:   Allergen Reactions    Celexa [citalopram] Other (See Comments)     GI upset  Stated "knocked me out"    Cephalexin Anaphylaxis    Zoloft [sertraline] Other (See Comments)     Stated "knocked me out"    Codeine Other (See Comments)     hallucinations  hallucinations    Isosorbide Nausea And Vomiting    Ciprofloxacin Itching    Levaquin [levofloxacin] Other (See Comments)     tendinitis    Metformin      Nausea     Penicillamine     Penicillins Other (See Comments)     Was told per mother that as child was allergic to penicillin.  Childhood allergy/ unknown reaction    Sulfa (sulfonamide antibiotics)        No current facility-administered medications on file prior to encounter.     Current Outpatient Medications on File Prior to Encounter   Medication Sig    acetaminophen (TYLENOL) 650 MG TbSR Take 1,300 mg by mouth every 8 (eight) hours as needed (pain).     albuterol (PROVENTIL/VENTOLIN HFA) 90 mcg/actuation inhaler Inhale 2 puffs into the lungs every 6 (six) hours as needed for Shortness of Breath.     allopurinoL (ZYLOPRIM) 100 MG tablet Take 1 tablet (100 mg total) by mouth once daily.    atorvastatin (LIPITOR) 40 MG tablet TAKE 1 TABLET BY MOUTH EVERY DAY    baclofen (LIORESAL) 10 MG tablet Take 1 tablet (10 mg total) by mouth 3 (three) times daily.    carvediloL (COREG) 25 MG tablet Take 1 tablet (25 mg total) by mouth 2 (two) times daily.    cetirizine (ZYRTEC) 10 MG tablet Take 10 mg by mouth daily as needed for Allergies.    furosemide " (LASIX) 20 MG tablet Take 1 tablet (20 mg total) by mouth once daily.    Lactobacillus acidophilus (PROBIOTIC) 10 billion cell Cap 1 capsule by mouth daily    levothyroxine (SYNTHROID) 100 MCG tablet Take 1 tablet (100 mcg total) by mouth before breakfast.    pantoprazole (PROTONIX) 40 MG tablet TAKE 1 TABLET BY MOUTH EVERY DAY    pregabalin (LYRICA) 100 MG capsule Take 2 capsules (200 mg total) by mouth 2 (two) times a day.    ramipriL (ALTACE) 10 MG capsule Take 1 capsule (10 mg total) by mouth once daily.    ranolazine (RANEXA) 500 MG Tb12 TAKE 2 TABLETS (1 000 MG TOTAL) BY MOUTH 2 (TWO) TIMES DAILY.    senna-docusate 8.6-50 mg (PERICOLACE) 8.6-50 mg per tablet Take 1 tablet by mouth once daily.    venlafaxine (EFFEXOR-XR) 150 MG Cp24 TAKE 1 CAPSULE BY MOUTH EVERY DAY    aspirin (ECOTRIN) 81 MG EC tablet Take 81 mg by mouth once daily.    blood-glucose meter (GLUCOSE MONITORING KIT) kit Use as instructed    BRILINTA 90 mg tablet TAKE 1 TABLET BY MOUTH 2 TIMES A DAY    fluticasone propionate (FLONASE) 50 mcg/actuation nasal spray fluticasone propionate 50 mcg/actuation nasal spray,suspension    lancets Misc 1 Units by Misc.(Non-Drug; Combo Route) route 2 (two) times daily as needed.     Family History       Problem Relation (Age of Onset)    Asthma Mother    Breast cancer Mother, Maternal Grandmother    COPD Mother    Cancer Mother, Maternal Grandmother, Paternal Grandmother    Cataracts Mother, Father    Diabetes Father    Glaucoma Sister, Sister    Heart disease Mother, Father    Heart failure Mother    Hypertension Father    Macular degeneration Mother    Obesity Father, Sister    Other Brother    Strabismus Other    Stroke Father    Thyroid disease Sister          Tobacco Use    Smoking status: Former     Packs/day: 0.50     Years: 14.00     Pack years: 7.00     Types: Cigarettes     Start date: 1984     Quit date: 1998     Years since quittin.1    Smokeless tobacco: Never    Substance and Sexual Activity    Alcohol use: No    Drug use: Not Currently     Types: Benzodiazepines    Sexual activity: Yes     Partners: Male     Review of Systems   All other systems reviewed and are negative.  Objective:     Vital Signs (Most Recent):  Temp: 98 °F (36.7 °C) (02/20/23 1000)  Pulse: 66 (02/20/23 1000)  Resp: 18 (02/20/23 1000)  BP: (!) 115/53 (02/20/23 1000)  SpO2: 97 % (02/20/23 1000)   Vital Signs (24h Range):  Temp:  [98 °F (36.7 °C)] 98 °F (36.7 °C)  Pulse:  [66] 66  Resp:  [18] 18  SpO2:  [97 %] 97 %  BP: (115)/(53) 115/53     Weight: 88.9 kg (195 lb 15.8 oz)  Body mass index is 36.43 kg/m².    Physical Exam  Vitals and nursing note reviewed.   Constitutional:       General: She is not in acute distress.     Appearance: She is obese.   HENT:      Head: Normocephalic and atraumatic.      Right Ear: External ear normal.      Left Ear: External ear normal.      Nose: Nose normal.      Mouth/Throat:      Mouth: Mucous membranes are moist.      Pharynx: Oropharynx is clear.   Eyes:      Extraocular Movements: Extraocular movements intact.      Conjunctiva/sclera: Conjunctivae normal.   Cardiovascular:      Rate and Rhythm: Normal rate and regular rhythm.      Pulses: Normal pulses.      Heart sounds: Normal heart sounds.   Pulmonary:      Effort: Pulmonary effort is normal.      Breath sounds: Normal breath sounds.   Abdominal:      General: Bowel sounds are normal. There is no distension.      Palpations: Abdomen is soft.      Tenderness: There is no abdominal tenderness.   Musculoskeletal:         General: Normal range of motion.      Cervical back: Normal range of motion and neck supple.      Right lower leg: No edema.      Left lower leg: No edema.   Skin:     General: Skin is warm and dry.   Neurological:      General: No focal deficit present.      Mental Status: She is alert and oriented to person, place, and time. Mental status is at baseline.   Psychiatric:         Mood and Affect:  Mood normal.         Behavior: Behavior normal.           Significant Labs: All pertinent labs within the past 24 hours have been reviewed.    Significant Imaging: I have reviewed all pertinent imaging results/findings within the past 24 hours.    Assessment/Plan:     * S/P gastric bypass  Per Dr. Whipple.  -Monitor overnight  -Diet per Surgery  -PRN analgesics and antiemetics  -Early mobilization  -Incentive spirometry  -DVT prophylaxis      Gout  -Continue home allopurinol      Anxiety  -Continue home venlafaxine    Gastroesophageal reflux disease without esophagitis  -Continue PPI      Mild intermittent asthma  -PRN albuterol      Polyneuropathy  -Continue home Lyrica      Neurogenic claudication  -Continue home ranolazine  -Continue Lyrica      RAHUL on CPAP  -Continue home CPAP      S/P CABG (coronary artery bypass graft)  -Cardiac telemetry  -Continue home statin and DAPT    Hypothyroid  -Continue home Synthroid      Obesity  Body mass index is 36.43 kg/m². Morbid obesity complicates all aspects of disease management from diagnostic modalities to treatment.      Hyperlipidemia  -Continue home statin and DAPT      Hypertension  -Continue home ACE-I and Coreg        VTE Risk Mitigation (From admission, onward)         Ordered     heparin (porcine) injection 5,000 Units  Every 8 hours         02/20/23 0931     IP VTE HIGH RISK PATIENT  Once         02/20/23 0931     Place RAYNE hose  Until discontinued         02/20/23 0931     Place sequential compression device  Until discontinued         02/20/23 0931                   Devonte Aaron MD  Department of Hospital Medicine   [facility]

## 2023-02-20 NOTE — ASSESSMENT & PLAN NOTE
Body mass index is 36.43 kg/m². Morbid obesity complicates all aspects of disease management from diagnostic modalities to treatment.

## 2023-02-21 VITALS
SYSTOLIC BLOOD PRESSURE: 135 MMHG | RESPIRATION RATE: 18 BRPM | TEMPERATURE: 98 F | DIASTOLIC BLOOD PRESSURE: 63 MMHG | OXYGEN SATURATION: 98 % | BODY MASS INDEX: 36.07 KG/M2 | HEART RATE: 73 BPM | WEIGHT: 196 LBS | HEIGHT: 62 IN

## 2023-02-21 LAB
ANION GAP SERPL CALC-SCNC: 8 MMOL/L (ref 8–16)
BASOPHILS # BLD AUTO: 0.03 K/UL (ref 0–0.2)
BASOPHILS NFR BLD: 0.2 % (ref 0–1.9)
BUN SERPL-MCNC: 13 MG/DL (ref 6–20)
CALCIUM SERPL-MCNC: 8.6 MG/DL (ref 8.7–10.5)
CHLORIDE SERPL-SCNC: 105 MMOL/L (ref 95–110)
CO2 SERPL-SCNC: 26 MMOL/L (ref 23–29)
CREAT SERPL-MCNC: 0.8 MG/DL (ref 0.5–1.4)
DIFFERENTIAL METHOD: ABNORMAL
EOSINOPHIL # BLD AUTO: 0 K/UL (ref 0–0.5)
EOSINOPHIL NFR BLD: 0 % (ref 0–8)
ERYTHROCYTE [DISTWIDTH] IN BLOOD BY AUTOMATED COUNT: 12.7 % (ref 11.5–14.5)
EST. GFR  (NO RACE VARIABLE): >60 ML/MIN/1.73 M^2
GLUCOSE SERPL-MCNC: 120 MG/DL (ref 70–110)
HCT VFR BLD AUTO: 33.3 % (ref 37–48.5)
HGB BLD-MCNC: 10.6 G/DL (ref 12–16)
IMM GRANULOCYTES # BLD AUTO: 0.06 K/UL (ref 0–0.04)
IMM GRANULOCYTES NFR BLD AUTO: 0.4 % (ref 0–0.5)
LYMPHOCYTES # BLD AUTO: 1.6 K/UL (ref 1–4.8)
LYMPHOCYTES NFR BLD: 11 % (ref 18–48)
MAGNESIUM SERPL-MCNC: 2.3 MG/DL (ref 1.6–2.6)
MCH RBC QN AUTO: 29.8 PG (ref 27–31)
MCHC RBC AUTO-ENTMCNC: 31.8 G/DL (ref 32–36)
MCV RBC AUTO: 94 FL (ref 82–98)
MONOCYTES # BLD AUTO: 0.9 K/UL (ref 0.3–1)
MONOCYTES NFR BLD: 6.7 % (ref 4–15)
NEUTROPHILS # BLD AUTO: 11.6 K/UL (ref 1.8–7.7)
NEUTROPHILS NFR BLD: 81.7 % (ref 38–73)
NRBC BLD-RTO: 0 /100 WBC
PHOSPHATE SERPL-MCNC: 3.8 MG/DL (ref 2.7–4.5)
PLATELET # BLD AUTO: 247 K/UL (ref 150–450)
PMV BLD AUTO: 9.6 FL (ref 9.2–12.9)
POCT GLUCOSE: 111 MG/DL (ref 70–110)
POTASSIUM SERPL-SCNC: 4.5 MMOL/L (ref 3.5–5.1)
RBC # BLD AUTO: 3.56 M/UL (ref 4–5.4)
SODIUM SERPL-SCNC: 139 MMOL/L (ref 136–145)
WBC # BLD AUTO: 14.13 K/UL (ref 3.9–12.7)

## 2023-02-21 PROCEDURE — 25000003 PHARM REV CODE 250: Performed by: STUDENT IN AN ORGANIZED HEALTH CARE EDUCATION/TRAINING PROGRAM

## 2023-02-21 PROCEDURE — 80048 BASIC METABOLIC PNL TOTAL CA: CPT | Performed by: SURGERY

## 2023-02-21 PROCEDURE — 94761 N-INVAS EAR/PLS OXIMETRY MLT: CPT

## 2023-02-21 PROCEDURE — 63600175 PHARM REV CODE 636 W HCPCS: Performed by: SURGERY

## 2023-02-21 PROCEDURE — 25000003 PHARM REV CODE 250: Performed by: SURGERY

## 2023-02-21 PROCEDURE — 99900035 HC TECH TIME PER 15 MIN (STAT)

## 2023-02-21 PROCEDURE — 63600175 PHARM REV CODE 636 W HCPCS: Performed by: STUDENT IN AN ORGANIZED HEALTH CARE EDUCATION/TRAINING PROGRAM

## 2023-02-21 PROCEDURE — 83735 ASSAY OF MAGNESIUM: CPT | Performed by: SURGERY

## 2023-02-21 PROCEDURE — 36415 COLL VENOUS BLD VENIPUNCTURE: CPT | Performed by: SURGERY

## 2023-02-21 PROCEDURE — 94799 UNLISTED PULMONARY SVC/PX: CPT

## 2023-02-21 PROCEDURE — 85025 COMPLETE CBC W/AUTO DIFF WBC: CPT | Performed by: SURGERY

## 2023-02-21 PROCEDURE — 84100 ASSAY OF PHOSPHORUS: CPT | Performed by: SURGERY

## 2023-02-21 RX ORDER — ATORVASTATIN CALCIUM 40 MG/1
40 TABLET, FILM COATED ORAL DAILY
Status: DISCONTINUED | OUTPATIENT
Start: 2023-02-21 | End: 2023-02-21 | Stop reason: HOSPADM

## 2023-02-21 RX ORDER — HYDROCODONE BITARTRATE AND ACETAMINOPHEN 7.5; 325 MG/1; MG/1
1 TABLET ORAL EVERY 4 HOURS PRN
Qty: 20 TABLET | Refills: 0 | Status: SHIPPED | OUTPATIENT
Start: 2023-02-21 | End: 2023-06-19

## 2023-02-21 RX ORDER — NAPROXEN SODIUM 220 MG/1
81 TABLET, FILM COATED ORAL DAILY
Status: DISCONTINUED | OUTPATIENT
Start: 2023-02-21 | End: 2023-02-21 | Stop reason: HOSPADM

## 2023-02-21 RX ORDER — ALLOPURINOL 100 MG/1
100 TABLET ORAL DAILY
Status: DISCONTINUED | OUTPATIENT
Start: 2023-02-21 | End: 2023-02-21 | Stop reason: HOSPADM

## 2023-02-21 RX ORDER — BACLOFEN 10 MG/1
10 TABLET ORAL 3 TIMES DAILY PRN
Status: DISCONTINUED | OUTPATIENT
Start: 2023-02-21 | End: 2023-02-21 | Stop reason: HOSPADM

## 2023-02-21 RX ORDER — HYDRALAZINE HYDROCHLORIDE 20 MG/ML
10 INJECTION INTRAMUSCULAR; INTRAVENOUS EVERY 6 HOURS PRN
Status: DISCONTINUED | OUTPATIENT
Start: 2023-02-21 | End: 2023-02-21 | Stop reason: HOSPADM

## 2023-02-21 RX ORDER — LISINOPRIL 40 MG/1
40 TABLET ORAL DAILY
Status: DISCONTINUED | OUTPATIENT
Start: 2023-02-21 | End: 2023-02-21 | Stop reason: HOSPADM

## 2023-02-21 RX ORDER — ONDANSETRON 4 MG/1
4 TABLET, ORALLY DISINTEGRATING ORAL EVERY 6 HOURS PRN
Qty: 30 TABLET | Refills: 0 | Status: SHIPPED | OUTPATIENT
Start: 2023-02-21 | End: 2023-06-19

## 2023-02-21 RX ADMIN — ATORVASTATIN CALCIUM 40 MG: 40 TABLET, FILM COATED ORAL at 09:02

## 2023-02-21 RX ADMIN — OXYCODONE 10 MG: 5 TABLET ORAL at 04:02

## 2023-02-21 RX ADMIN — ONDANSETRON 8 MG: 2 INJECTION INTRAMUSCULAR; INTRAVENOUS at 11:02

## 2023-02-21 RX ADMIN — ENOXAPARIN SODIUM 40 MG: 40 INJECTION SUBCUTANEOUS at 09:02

## 2023-02-21 RX ADMIN — TICAGRELOR 90 MG: 90 TABLET ORAL at 09:02

## 2023-02-21 RX ADMIN — ASPIRIN 81 MG CHEWABLE TABLET 81 MG: 81 TABLET CHEWABLE at 09:02

## 2023-02-21 RX ADMIN — VENLAFAXINE HYDROCHLORIDE 150 MG: 150 CAPSULE, EXTENDED RELEASE ORAL at 09:02

## 2023-02-21 RX ADMIN — ALLOPURINOL 100 MG: 100 TABLET ORAL at 09:02

## 2023-02-21 RX ADMIN — HYDRALAZINE HYDROCHLORIDE 10 MG: 20 INJECTION INTRAMUSCULAR; INTRAVENOUS at 02:02

## 2023-02-21 RX ADMIN — LEVOTHYROXINE SODIUM 100 MCG: 0.1 TABLET ORAL at 05:02

## 2023-02-21 RX ADMIN — CARVEDILOL 25 MG: 25 TABLET, FILM COATED ORAL at 09:02

## 2023-02-21 RX ADMIN — CLINDAMYCIN PHOSPHATE 900 MG: 900 INJECTION, SOLUTION INTRAVENOUS at 03:02

## 2023-02-21 RX ADMIN — LISINOPRIL 40 MG: 40 TABLET ORAL at 09:02

## 2023-02-21 RX ADMIN — PREGABALIN 200 MG: 75 CAPSULE ORAL at 09:02

## 2023-02-21 RX ADMIN — OXYCODONE 10 MG: 5 TABLET ORAL at 10:02

## 2023-02-21 RX ADMIN — OXYCODONE 10 MG: 5 TABLET ORAL at 12:02

## 2023-02-21 RX ADMIN — PANTOPRAZOLE SODIUM 40 MG: 40 TABLET, DELAYED RELEASE ORAL at 09:02

## 2023-02-21 NOTE — NURSING
Discharge instructions given to pt. Pt verbalizes understanding. PIV removed. Pt tolerated well. Left floor safely via wheelchair with all belongings.

## 2023-02-21 NOTE — PLAN OF CARE
Patient cleared for discharge from case management standpoint.       02/21/23 1343   Final Note   Assessment Type Final Discharge Note   Anticipated Discharge Disposition Home   What phone number can be called within the next 1-3 days to see how you are doing after discharge? [phone]   Hospital Resources/Appts/Education Provided Provided education on problems/symptoms using teachback;Appointments scheduled and added to AVS;Community resources provided;Provided patient/caregiver with written discharge plan information

## 2023-02-21 NOTE — ASSESSMENT & PLAN NOTE
Per Dr. Whipple.  -Diet per Surgery  -PRN analgesics and antiemetics  -Early mobilization  -Incentive spirometry  -DVT prophylaxis

## 2023-02-21 NOTE — PLAN OF CARE
Problem: Adult Inpatient Plan of Care  Goal: Plan of Care Review  Outcome: Ongoing, Progressing     Problem: Adult Inpatient Plan of Care  Goal: Patient-Specific Goal (Individualized)  Outcome: Ongoing, Progressing   Patient alert and oriented resting in bed. NAD. Denies pain or SOB. VSS. Up with stand by assist to BSC. O2@2LNC. Bed alarm active. Plan of care reviewed with patient. Verbalizes understanding.Call light in reach. Pt free from fall or injury. Will monitor.

## 2023-02-21 NOTE — HOSPITAL COURSE
Josephine Bolton is a 57 year old with a past medical history of CAD s/p PCI and CABG, HTN, HLD, RAHUL, O/A, gout, MDD/VANDA, hypothyroidism, and GERD who presented for elective [place]-en-Y gastric bypass per Dr. Whipple. She tolerated the procedure well and there have been no perioperative complications. She is ambulating and tolerating PO. She did endorse some post-operative pain controlled with PRN analgesics. Surgery continued to follow and cleared the patient for discharge 2/21/2023. She will follow up with Dr. Whipple in the outpatient setting.

## 2023-02-21 NOTE — SUBJECTIVE & OBJECTIVE
"Interval History: see "Hospital Course"    Review of Systems   Gastrointestinal:  Positive for abdominal pain and nausea. Negative for abdominal distention and vomiting.   Skin:  Positive for wound.   Objective:     Vital Signs (Most Recent):  Temp: 98.3 °F (36.8 °C) (02/21/23 1114)  Pulse: 68 (02/21/23 1114)  Resp: 18 (02/21/23 1114)  BP: (!) 161/70 (02/21/23 1114)  SpO2: 95 % (02/21/23 1114)   Vital Signs (24h Range):  Temp:  [96.8 °F (36 °C)-98.3 °F (36.8 °C)] 98.3 °F (36.8 °C)  Pulse:  [] 68  Resp:  [12-19] 18  SpO2:  [94 %-100 %] 95 %  BP: (135-191)/(63-89) 161/70     Weight: 88.9 kg (195 lb 15.8 oz)  Body mass index is 36.43 kg/m².    Intake/Output Summary (Last 24 hours) at 2/21/2023 1129  Last data filed at 2/21/2023 0240  Gross per 24 hour   Intake 2200 ml   Output 1750 ml   Net 450 ml      Physical Exam  Vitals and nursing note reviewed.   Constitutional:       General: She is not in acute distress.     Appearance: She is obese.   HENT:      Head: Normocephalic and atraumatic.      Right Ear: External ear normal.      Left Ear: External ear normal.      Nose: Nose normal.      Mouth/Throat:      Mouth: Mucous membranes are moist.      Pharynx: Oropharynx is clear.   Eyes:      Extraocular Movements: Extraocular movements intact.      Conjunctiva/sclera: Conjunctivae normal.   Cardiovascular:      Rate and Rhythm: Normal rate and regular rhythm.      Pulses: Normal pulses.      Heart sounds: Normal heart sounds.   Pulmonary:      Effort: Pulmonary effort is normal.      Breath sounds: Normal breath sounds.   Abdominal:      General: Bowel sounds are normal. There is no distension.      Palpations: Abdomen is soft.      Tenderness: There is no abdominal tenderness.      Comments: Wound dressings clean,dry and intact.   Musculoskeletal:         General: Normal range of motion.      Cervical back: Normal range of motion and neck supple.      Right lower leg: No edema.      Left lower leg: No edema. "   Skin:     General: Skin is warm and dry.   Neurological:      General: No focal deficit present.      Mental Status: She is alert and oriented to person, place, and time. Mental status is at baseline.   Psychiatric:         Mood and Affect: Mood normal.         Behavior: Behavior normal.       Significant Labs: All pertinent labs within the past 24 hours have been reviewed.    Significant Imaging: I have reviewed all pertinent imaging results/findings within the past 24 hours.

## 2023-02-21 NOTE — PROGRESS NOTES
Progress Note  Gen Surg    Admit Date: 2/20/2023  Attending: Sarabjit  S/P: Procedure(s) (LRB):  XI ROBOTIC REVISION,GASTROENTEROSTOMY,MITCHELL-EN-Y (N/A)    Post-operative Day: 1 Day Post-Op    Hospital Day: 2    SUBJECTIVE:     Doing well  Tolerating liquids     OBJECTIVE:     Vital Signs (Most Recent)  Temp: 98.3 °F (36.8 °C) (02/21/23 1114)  Pulse: 64 (02/21/23 1205)  Resp: 18 (02/21/23 1205)  BP: (!) 161/70 (02/21/23 1114)  SpO2: (!) 94 % (02/21/23 1205)    Vital Signs Range (Last 24H):  Temp:  [96.8 °F (36 °C)-98.3 °F (36.8 °C)]   Pulse:  []   Resp:  [12-19]   BP: (135-191)/(63-89)   SpO2:  [94 %-100 %]     I & O (Last 24H):  Intake/Output Summary (Last 24 hours) at 2/21/2023 1222  Last data filed at 2/21/2023 0240  Gross per 24 hour   Intake 1200 ml   Output 1610 ml   Net -410 ml       Scheduled medications:   allopurinol  100 mg Oral Daily    aspirin  81 mg Oral Daily    atorvastatin  40 mg Oral Daily    carvediloL  25 mg Oral BID    enoxparin  40 mg Subcutaneous Q12H    levothyroxine  100 mcg Oral Before breakfast    lisinopriL  40 mg Oral Daily    pantoprazole  40 mg Oral Daily    pregabalin  200 mg Oral BID    ticagrelor  90 mg Oral BID    venlafaxine  150 mg Oral Daily       Physical Exam:  General: no distress  Lungs:  clear to auscultation bilaterally and normal respiratory effort  Heart: regular rate and rhythm, S1, S2 normal, no murmur, rub or gallop  Abdomen: soft, non-tender non-distented; bowel sounds normal; no masses,  no organomegaly    Wound/Incision:  clean, dry, intact    Laboratory:  Labs within the past 24 hours have been reviewed.    ASSESSMENT/PLAN:       Ok to dc    Obinna Whipple MD

## 2023-02-21 NOTE — DISCHARGE INSTRUCTIONS
Remove band aids tonight  Keep white strips until they fall off  Shower over incisions daily with soap and water  Do not bath or get into a pool    Use Incentive Spirometer at Home

## 2023-02-21 NOTE — ASSESSMENT & PLAN NOTE
Per Dr. Whipple.  -Diet per Surgery; follow up in clinic  -PRN analgesics and antiemetics  -Early mobilization  -Incentive spirometry  -DVT prophylaxis

## 2023-02-21 NOTE — CARE UPDATE
02/21/23 0803   Patient Assessment/Suction   Level of Consciousness (AVPU) alert   Respiratory Effort Unlabored;Normal   PRE-TX-O2   Device (Oxygen Therapy) nasal cannula;room air   Flow (L/min) 2  (placed on room air)   SpO2 98 %   Pulse Oximetry Type Intermittent   $ Pulse Oximetry - Multiple Charge Pulse Oximetry - Multiple   Pulse 71   Resp 17   Inhaler   $ Inhaler Charges PRN treatment not required   Respiratory Treatment Status (Inhaler) PRN treatment not required   Respiratory Evaluation   $ Care Plan Tech Time 15 min   Evaluation For New Orders   Admitting Diagnosis gastric bypass   Pulmonary Diagnosis RAHUL   Home Oxygen   Has Home Oxygen? No

## 2023-02-21 NOTE — PROGRESS NOTES
"[facility] Hospital Medicine  Progress Note    Patient Name: Josephine Bolton  MRN: [id number]  Patient Class: IP- Inpatient   Admission Date: 2/20/2023  Length of Stay: 1 days  Attending Physician: Obinna Whipple MD  Primary Care Provider: rFoylan Smart MD        Subjective:     Principal Problem:S/P gastric bypass        HPI:  Josephine Bolton is a 57 year old with a past medical history of CAD s/p PCI and CABG, HTN, HLD, RAHUL, O/A, gout, MDD/VANDA, hypothyroidism, and GERD who presented for elective gastric bypass per Dr. Whipple. She has been interviewed pre-operatively 2/20/2023. She will monitored overnight post-operatively.      Overview/Hospital Course:  Josephine Bolton is a 57 year old with a past medical history of CAD s/p PCI and CABG, HTN, HLD, RAHUL, O/A, gout, MDD/VANDA, hypothyroidism, and GERD who presented for elective Samaria-en-Y gastric bypass per Dr. Whipple. She tolerated the procedure well and there have been no perioperative complications. She is ambulating and tolerating PO. She does endorse some post-operative pain controlled with PRN analgesics. Surgery continues to follow.      Interval History: see "Hospital Course"    Review of Systems   Gastrointestinal:  Positive for abdominal pain and nausea. Negative for abdominal distention and vomiting.   Skin:  Positive for wound.   Objective:     Vital Signs (Most Recent):  Temp: 98.3 °F (36.8 °C) (02/21/23 1114)  Pulse: 68 (02/21/23 1114)  Resp: 18 (02/21/23 1114)  BP: (!) 161/70 (02/21/23 1114)  SpO2: 95 % (02/21/23 1114)   Vital Signs (24h Range):  Temp:  [96.8 °F (36 °C)-98.3 °F (36.8 °C)] 98.3 °F (36.8 °C)  Pulse:  [] 68  Resp:  [12-19] 18  SpO2:  [94 %-100 %] 95 %  BP: (135-191)/(63-89) 161/70     Weight: 88.9 kg (195 lb 15.8 oz)  Body mass index is 36.43 kg/m².    Intake/Output Summary (Last 24 hours) at 2/21/2023 1129  Last data filed at 2/21/2023 0240  Gross per 24 hour   Intake 2200 ml   Output 1750 ml   Net 450 ml "      Physical Exam  Vitals and nursing note reviewed.   Constitutional:       General: She is not in acute distress.     Appearance: She is obese.   HENT:      Head: Normocephalic and atraumatic.      Right Ear: External ear normal.      Left Ear: External ear normal.      Nose: Nose normal.      Mouth/Throat:      Mouth: Mucous membranes are moist.      Pharynx: Oropharynx is clear.   Eyes:      Extraocular Movements: Extraocular movements intact.      Conjunctiva/sclera: Conjunctivae normal.   Cardiovascular:      Rate and Rhythm: Normal rate and regular rhythm.      Pulses: Normal pulses.      Heart sounds: Normal heart sounds.   Pulmonary:      Effort: Pulmonary effort is normal.      Breath sounds: Normal breath sounds.   Abdominal:      General: Bowel sounds are normal. There is no distension.      Palpations: Abdomen is soft.      Tenderness: There is no abdominal tenderness.      Comments: Wound dressings clean,dry and intact.   Musculoskeletal:         General: Normal range of motion.      Cervical back: Normal range of motion and neck supple.      Right lower leg: No edema.      Left lower leg: No edema.   Skin:     General: Skin is warm and dry.   Neurological:      General: No focal deficit present.      Mental Status: She is alert and oriented to person, place, and time. Mental status is at baseline.   Psychiatric:         Mood and Affect: Mood normal.         Behavior: Behavior normal.       Significant Labs: All pertinent labs within the past 24 hours have been reviewed.    Significant Imaging: I have reviewed all pertinent imaging results/findings within the past 24 hours.      Assessment/Plan:      * S/P gastric bypass  Per Dr. Whipple.  -Diet per Surgery  -PRN analgesics and antiemetics  -Early mobilization  -Incentive spirometry  -DVT prophylaxis      Gout  -Continue home allopurinol      Anemia  Stable.  -Trend Hgb with CBC      Anxiety  -Continue home venlafaxine    Gastroesophageal reflux  disease without esophagitis  -Continue PPI      Mild intermittent asthma  -PRN albuterol      Polyneuropathy  -Continue home Lyrica      Neurogenic claudication  -Continue Lyrica      RAHUL on CPAP  -Continue home CPAP      S/P CABG (coronary artery bypass graft)  -Cardiac telemetry  -Continue home statin and DAPT    Hypothyroid  -Continue home Synthroid      Obesity  Body mass index is 36.43 kg/m². Morbid obesity complicates all aspects of disease management from diagnostic modalities to treatment.      Hyperlipidemia  -Continue home statin and DAPT      Hypertension  -Continue home ACE-I and Coreg  -PRN IV hydralazine  -Continue to monitor      VTE Risk Mitigation (From admission, onward)         Ordered     enoxaparin injection 40 mg  Every 12 hours         02/20/23 1552     IP VTE HIGH RISK PATIENT  Once         02/20/23 1552     Place RAYNE hose  Until discontinued         02/20/23 1552     Place sequential compression device  Until discontinued         02/20/23 1552                Discharge Planning   ANAI: 2/22/2023    Code Status: Prior   Is the patient medically ready for discharge?:     Reason for patient still in hospital (select all that apply): Patient trending condition and Consult recommendations                     Devonte Aaron MD  Department of Hospital Medicine   [facility]

## 2023-02-21 NOTE — DISCHARGE SUMMARY
[facility] Hospital Medicine  Discharge Summary      Patient Name: Josephine Bolton  MRN: [id number]  NIRMALA: [id number]  Patient Class: IP- Inpatient  Admission Date: 2/20/2023  Hospital Length of Stay: 1 days  Discharge Date and Time: No discharge date for patient encounter.  Attending Physician: Obinna Whipple MD   Discharging Provider: Devonte Aaron MD  Primary Care Provider: Froylan Smart MD    Primary Care Team: Networked reference to record PCT     HPI:   Josephine Bolton is a 57 year old with a past medical history of CAD s/p PCI and CABG, HTN, HLD, RAHUL, O/A, gout, MDD/VANDA, hypothyroidism, and GERD who presented for elective gastric bypass per Dr. Whipple. She has been interviewed pre-operatively 2/20/2023. She will monitored overnight post-operatively.      Procedure(s) (LRB):  XI ROBOTIC REVISION,GASTROENTEROSTOMY,SAMARIA-EN-Y (N/A)      Hospital Course:   Josephine Bolton is a 57 year old with a past medical history of CAD s/p PCI and CABG, HTN, HLD, RAHUL, O/A, gout, MDD/VANDA, hypothyroidism, and GERD who presented for elective Samaria-en-Y gastric bypass per Dr. Whipple. She tolerated the procedure well and there have been no perioperative complications. She is ambulating and tolerating PO. She did endorse some post-operative pain controlled with PRN analgesics. Surgery continued to follow and cleared the patient for discharge 2/21/2023. She will follow up with Dr. Whipple in the outpatient setting.       Goals of Care Treatment Preferences:  Code Status: Full Code    Living Will: Yes              Consults:     Neuro  Polyneuropathy  -Continue home Lyrica      Neurogenic claudication  -Continue Lyrica      Pulmonary  Mild intermittent asthma  -PRN albuterol      Cardiac/Vascular  S/P CABG (coronary artery bypass graft)  -Cardiac telemetry  -Continue home statin and DAPT    Hyperlipidemia  -Continue home statin and DAPT      Hypertension  -Continue home ACE-I and Coreg  -PRN IV  hydralazine  -Continue to monitor    Oncology  Anemia  Stable.  -Trend Hgb with CBC      Endocrine  * S/P gastric bypass  Per Dr. Whipple.  -Diet per Surgery; follow up in clinic  -PRN analgesics and antiemetics  -Early mobilization  -Incentive spirometry  -DVT prophylaxis      Hypothyroid  -Continue home Synthroid      Obesity  Body mass index is 36.43 kg/m². Morbid obesity complicates all aspects of disease management from diagnostic modalities to treatment.      GI  Gastroesophageal reflux disease without esophagitis  -Continue PPI      Orthopedic  Gout  -Continue home allopurinol      Other  Anxiety  -Continue home venlafaxine    RAHUL on CPAP  -Continue home CPAP        Final Active Diagnoses:    Diagnosis Date Noted POA    PRINCIPAL PROBLEM:  S/P gastric bypass [Z98.84] 02/20/2023 Not Applicable    Polyneuropathy [G62.9] 02/20/2023 Yes    Gout [M10.9] 02/20/2023 Yes    Anemia [D64.9] 04/08/2022 Yes    Neurogenic claudication [G95.19] 01/04/2022 Yes    RAHUL on CPAP [G47.33, Z99.89] 10/04/2021 Not Applicable    Mild intermittent asthma [J45.20] 11/13/2016 Yes    Gastroesophageal reflux disease without esophagitis [K21.9] 11/13/2016 Yes    Anxiety [F41.9] 11/13/2016 Yes    S/P CABG (coronary artery bypass graft) [Z95.1] 06/25/2013 Not Applicable    Hypothyroid [E03.9] 07/05/2012 Yes     Chronic    Obesity [E66.9] 05/11/2012 Yes    Hypertension [I10]  Yes    Hyperlipidemia [E78.5]  Yes      Problems Resolved During this Admission:       Discharged Condition: stable    Disposition: Home or Self Care    Follow Up:   Follow-up Information     Obinna Whipple MD Follow up on 3/8/2023.    Specialties: General Surgery, Bariatrics, Surgery  Contact information:  [address] [address]  [place] [address] [phone]                       Patient Instructions:      Diet clear liquid     Notify your health care provider if you experience any of the following:  increased confusion or weakness     Notify your  health care provider if you experience any of the following:  persistent dizziness, light-headedness, or visual disturbances     Notify your health care provider if you experience any of the following:  worsening rash     Notify your health care provider if you experience any of the following:  severe persistent headache     Notify your health care provider if you experience any of the following:  difficulty breathing or increased cough     Notify your health care provider if you experience any of the following:  redness, tenderness, or signs of infection (pain, swelling, redness, odor or green/yellow discharge around incision site)     Notify your health care provider if you experience any of the following:  severe uncontrolled pain     Notify your health care provider if you experience any of the following:  temperature >100.4     Notify your health care provider if you experience any of the following:  persistent nausea and vomiting or diarrhea     Activity as tolerated       Significant Diagnostic Studies: Labs: All labs within the past 24 hours have been reviewed    Pending Diagnostic Studies:     None         Medications:  Reconciled Home Medications:      Medication List      START taking these medications    HYDROcodone-acetaminophen 7.5-325 mg per tablet  Commonly known as: NORCO  Take 1 tablet by mouth every 4 (four) hours as needed for Pain.     ondansetron 4 MG Tbdl  Commonly known as: ZOFRAN-ODT  Take 1 tablet (4 mg total) by mouth every 6 (six) hours as needed (nv).        CONTINUE taking these medications    acetaminophen 650 MG Tbsr  Commonly known as: TYLENOL  Take 1,300 mg by mouth every 8 (eight) hours as needed (pain).     albuterol 90 mcg/actuation inhaler  Commonly known as: PROVENTIL/VENTOLIN HFA  Inhale 2 puffs into the lungs every 6 (six) hours as needed for Shortness of Breath.     allopurinoL 100 MG tablet  Commonly known as: ZYLOPRIM  Take 1 tablet (100 mg total) by mouth once daily.      aspirin 81 MG EC tablet  Commonly known as: ECOTRIN  Take 81 mg by mouth once daily.     atorvastatin 40 MG tablet  Commonly known as: LIPITOR  TAKE 1 TABLET BY MOUTH EVERY DAY     baclofen 10 MG tablet  Commonly known as: LIORESAL  Take 1 tablet (10 mg total) by mouth 3 (three) times daily.     blood-glucose meter kit  Commonly known as: FREESTYLE SYSTEM KIT  Use as instructed     BRILINTA 90 mg tablet  Generic drug: ticagrelor  TAKE 1 TABLET BY MOUTH 2 TIMES A DAY     carvediloL 25 MG tablet  Commonly known as: COREG  Take 1 tablet (25 mg total) by mouth 2 (two) times daily.     cetirizine 10 MG tablet  Commonly known as: ZYRTEC  Take 10 mg by mouth daily as needed for Allergies.     fluticasone propionate 50 mcg/actuation nasal spray  Commonly known as: FLONASE  fluticasone propionate 50 mcg/actuation nasal spray,suspension     furosemide 20 MG tablet  Commonly known as: LASIX  Take 1 tablet (20 mg total) by mouth once daily.     lancets Misc  1 Units by Misc.(Non-Drug; Combo Route) route 2 (two) times daily as needed.     levothyroxine 100 MCG tablet  Commonly known as: SYNTHROID  Take 1 tablet (100 mcg total) by mouth before breakfast.     pantoprazole 40 MG tablet  Commonly known as: PROTONIX  TAKE 1 TABLET BY MOUTH EVERY DAY     pregabalin 100 MG capsule  Commonly known as: LYRICA  Take 2 capsules (200 mg total) by mouth 2 (two) times a day.     PROBIOTIC 10 billion cell Cap  Generic drug: Lactobacillus acidophilus  1 capsule by mouth daily     ramipriL 10 MG capsule  Commonly known as: ALTACE  Take 1 capsule (10 mg total) by mouth once daily.     ranolazine 500 MG Tb12  Commonly known as: RANEXA  TAKE 2 TABLETS (1 000 MG TOTAL) BY MOUTH 2 (TWO) TIMES DAILY.     senna-docusate 8.6-50 mg 8.6-50 mg per tablet  Commonly known as: PERICOLACE  Take 1 tablet by mouth once daily.     venlafaxine 150 MG Cp24  Commonly known as: EFFEXOR-XR  TAKE 1 CAPSULE BY MOUTH EVERY DAY            Indwelling Lines/Drains at time  of discharge:   Lines/Drains/Airways     None                 Time spent on the discharge of patient: 33 minutes         Devonte Aaron MD  Department of Hospital Medicine  [facility]

## 2023-02-21 NOTE — CARE UPDATE
02/20/23 1950   Patient Assessment/Suction   Level of Consciousness (AVPU) alert   Respiratory Effort Unlabored   PRE-TX-O2   Device (Oxygen Therapy) nasal cannula   $ Is the patient on Low Flow Oxygen? Yes   Flow (L/min) 2   SpO2 99 %   Pulse Oximetry Type Intermittent   $ Pulse Oximetry - Single Charge Pulse Oximetry - Single   Pulse 101   Resp 18   Inhaler   $ Inhaler Charges PRN treatment not required

## 2023-02-21 NOTE — PLAN OF CARE
[facility]  Initial Discharge Assessment       Primary Care Provider: Froylan Smart MD    Admission Diagnosis: Morbid obesity [E66.01]  Obesity, Class II, BMI 35-39.9 [E66.9]  Mixed hyperlipidemia [E78.2]  RAHUL (obstructive sleep apnea) [G47.33]  Coronary artery disease involving native coronary artery of native heart without angina pectoris [I25.10]  DDD (degenerative disc disease), lumbar [M51.36]    Admission Date: 2/20/2023  Expected Discharge Date: 2/21/2023    Discharge Barriers Identified: None    Payor: BLUE CROSS BLUE SHIELD / [facility] [facility] [place] [facility] / Product Type: Commercial /     Extended Emergency Contact Information  Primary Emergency Contact: Bobo Bolton  [address]           [place] [address] [place]  Mobile Phone: [phone]  Relation: Spouse  Secondary Emergency Contact: Bonnie Quintero  [facility] [place]  Mobile Phone: [phone]  Relation: Daughter    Discharge Plan A: Home  Discharge Plan B: Home with family      [facility] [address]  [address] [facility] [address]  Phone: [phone] Fax: [phone]    [id number] - [place] - [address] AT [facility] [address] [address]  Phone: [phone] Fax: [phone]     met with patient and patient's spouse Bobo Bolton at bedside to complete discharge planning assessment.  Patient alert and oriented xs 4.  Patient verified all demographic information on facesheet is correct.  Patient verified PCP is Dr. Smart.  Patient verified primary health insurance is BCBS and secondary is BCBS.  Patient with NO home health but has listed DME.  Patient with NO POA or Living Will.  Patient not on dialysis or medication coumadin.  Patient with no 30 day admission.  Patient with no financial issues at this time.  Patient family will provide transportation upon  discharge from facility.  Patient independent with ADLs, live with spouse, drives self.      Initial Assessment (most recent)       Adult Discharge Assessment - 02/21/23 1340          Discharge Assessment    Assessment Type Discharge Planning Assessment     Confirmed/corrected address, phone number and insurance Yes     Confirmed Demographics Correct on Facesheet     Source of Information patient     Communicated ANAI with patient/caregiver Yes     People in Home spouse     Facility Arrived From: home     Do you expect to return to your current living situation? Yes     Do you have help at home or someone to help you manage your care at home? Yes     Who are your caregiver(s) and their phone number(s)? spouse     Prior to hospitilization cognitive status: Alert/Oriented     Current cognitive status: Alert/Oriented     Equipment Currently Used at Home CPAP     Readmission within 30 days? No     Patient currently being followed by outpatient case management? No     Do you currently have service(s) that help you manage your care at home? No     Do you take prescription medications? Yes     Do you have prescription coverage? Yes     Do you have any problems affording any of your prescribed medications? No     Is the patient taking medications as prescribed? yes     Who is going to help you get home at discharge? spouse     How do you get to doctors appointments? car, drives self     Are you on dialysis? No     Do you take coumadin? No     Discharge Plan A Home     Discharge Plan B Home with family     DME Needed Upon Discharge  none     Discharge Barriers Identified None        Physical Activity    On average, how many days per week do you engage in moderate to strenuous exercise (like a brisk walk)? Patient refused     On average, how many minutes do you engage in exercise at this level? Patient refused        Financial Resource Strain    How hard is it for you to pay for the very basics like food, housing, medical  care, and heating? Patient refused        Housing Stability    In the last 12 months, was there a time when you were not able to pay the mortgage or rent on time? Patient refused     In the last 12 months, was there a time when you did not have a steady place to sleep or slept in a shelter (including now)? Patient refused        Transportation Needs    In the past 12 months, has lack of transportation kept you from medical appointments or from getting medications? Patient refused     In the past 12 months, has lack of transportation kept you from meetings, work, or from getting things needed for daily living? Patient refused        Food Insecurity    Within the past 12 months, you worried that your food would run out before you got the money to buy more. Patient refused     Within the past 12 months, the food you bought just didn't last and you didn't have money to get more. Patient refused        Stress    Do you feel stress - tense, restless, nervous, or anxious, or unable to sleep at night because your mind is troubled all the time - these days? Patient refused        Social Connections    In a typical week, how many times do you talk on the phone with family, friends, or neighbors? Patient refused     How often do you get together with friends or relatives? Patient refused     How often do you attend Jainism or Alevism services? Patient refused     Do you belong to any clubs or organizations such as Jainism groups, unions, fraternal or athletic groups, or school groups? Patient refused     How often do you attend meetings of the clubs or organizations you belong to? Patient refused     Are you , , , , never , or living with a partner?         Alcohol Use    Q1: How often do you have a drink containing alcohol? Patient refused     Q2: How many drinks containing alcohol do you have on a typical day when you are drinking? Patient refused     Q3: How often do you have six  or more drinks on one occasion? Patient refused

## 2023-02-22 ENCOUNTER — PATIENT OUTREACH (OUTPATIENT)
Dept: ADMINISTRATIVE | Facility: CLINIC | Age: 58
End: 2023-02-22
Payer: COMMERCIAL

## 2023-02-22 NOTE — PROGRESS NOTES
C3 nurse attempted to contact Josehpine Bolton  for a TCC post hospital discharge follow up call. No answer. Left voicemail with callback information. The patient does not have a scheduled HOSFU appointment. Message sent to PCP staff for assistance with scheduling visit with patient.

## 2023-02-22 NOTE — PROGRESS NOTES
C3 nurse attempted to contact Josephine Bolton  for a TCC post hospital discharge follow up call. No answer. Left voicemail with callback information. The patient does not have a scheduled HOSFU appointment. Message sent to PCP staff for assistance with scheduling visit with patient.

## 2023-02-27 ENCOUNTER — TELEPHONE (OUTPATIENT)
Dept: BARIATRICS | Facility: CLINIC | Age: 58
End: 2023-02-27
Payer: COMMERCIAL

## 2023-02-27 ENCOUNTER — HOSPITAL ENCOUNTER (EMERGENCY)
Facility: HOSPITAL | Age: 58
Discharge: HOME | End: 2023-02-27
Attending: EMERGENCY MEDICINE
Payer: COMMERCIAL

## 2023-02-27 ENCOUNTER — PATIENT MESSAGE (OUTPATIENT)
Dept: BARIATRICS | Facility: CLINIC | Age: 58
End: 2023-02-27
Payer: COMMERCIAL

## 2023-02-27 VITALS
SYSTOLIC BLOOD PRESSURE: 147 MMHG | HEART RATE: 70 BPM | OXYGEN SATURATION: 97 % | BODY MASS INDEX: 34.96 KG/M2 | HEIGHT: 62 IN | WEIGHT: 190 LBS | RESPIRATION RATE: 18 BRPM | DIASTOLIC BLOOD PRESSURE: 67 MMHG | TEMPERATURE: 99 F

## 2023-02-27 DIAGNOSIS — R53.1 WEAKNESS: ICD-10-CM

## 2023-02-27 DIAGNOSIS — K92.2 UPPER GI BLEED: Primary | ICD-10-CM

## 2023-02-27 LAB
ALBUMIN SERPL BCP-MCNC: 3.1 G/DL (ref 3.5–5.2)
ALP SERPL-CCNC: 73 U/L (ref 55–135)
ALT SERPL W/O P-5'-P-CCNC: 67 U/L (ref 10–44)
ANION GAP SERPL CALC-SCNC: 15 MMOL/L (ref 8–16)
AST SERPL-CCNC: 36 U/L (ref 10–40)
BASOPHILS # BLD AUTO: 0.04 K/UL (ref 0–0.2)
BASOPHILS NFR BLD: 0.6 % (ref 0–1.9)
BILIRUB SERPL-MCNC: 1 MG/DL (ref 0.1–1)
BILIRUB UR QL STRIP: NEGATIVE
BUN SERPL-MCNC: 20 MG/DL (ref 6–20)
CALCIUM SERPL-MCNC: 9.5 MG/DL (ref 8.7–10.5)
CHLORIDE SERPL-SCNC: 100 MMOL/L (ref 95–110)
CLARITY UR: CLEAR
CO2 SERPL-SCNC: 23 MMOL/L (ref 23–29)
COLOR UR: YELLOW
CREAT SERPL-MCNC: 0.8 MG/DL (ref 0.5–1.4)
DIFFERENTIAL METHOD: ABNORMAL
EOSINOPHIL # BLD AUTO: 0.1 K/UL (ref 0–0.5)
EOSINOPHIL NFR BLD: 1.7 % (ref 0–8)
ERYTHROCYTE [DISTWIDTH] IN BLOOD BY AUTOMATED COUNT: 12.8 % (ref 11.5–14.5)
EST. GFR  (NO RACE VARIABLE): >60 ML/MIN/1.73 M^2
GLUCOSE SERPL-MCNC: 87 MG/DL (ref 70–110)
GLUCOSE UR QL STRIP: NEGATIVE
HCT VFR BLD AUTO: 33.9 % (ref 37–48.5)
HGB BLD-MCNC: 11 G/DL (ref 12–16)
HGB UR QL STRIP: NEGATIVE
IMM GRANULOCYTES # BLD AUTO: 0.02 K/UL (ref 0–0.04)
IMM GRANULOCYTES NFR BLD AUTO: 0.3 % (ref 0–0.5)
KETONES UR QL STRIP: ABNORMAL
LEUKOCYTE ESTERASE UR QL STRIP: NEGATIVE
LYMPHOCYTES # BLD AUTO: 2 K/UL (ref 1–4.8)
LYMPHOCYTES NFR BLD: 30.7 % (ref 18–48)
MCH RBC QN AUTO: 29.2 PG (ref 27–31)
MCHC RBC AUTO-ENTMCNC: 32.4 G/DL (ref 32–36)
MCV RBC AUTO: 90 FL (ref 82–98)
MONOCYTES # BLD AUTO: 1.1 K/UL (ref 0.3–1)
MONOCYTES NFR BLD: 16.8 % (ref 4–15)
NEUTROPHILS # BLD AUTO: 3.3 K/UL (ref 1.8–7.7)
NEUTROPHILS NFR BLD: 49.9 % (ref 38–73)
NITRITE UR QL STRIP: NEGATIVE
NRBC BLD-RTO: 0 /100 WBC
PH UR STRIP: 6 [PH] (ref 5–8)
PLATELET # BLD AUTO: 361 K/UL (ref 150–450)
PLATELET BLD QL SMEAR: ABNORMAL
PMV BLD AUTO: 8.9 FL (ref 9.2–12.9)
POTASSIUM SERPL-SCNC: 3.9 MMOL/L (ref 3.5–5.1)
PROT SERPL-MCNC: 7.5 G/DL (ref 6–8.4)
PROT UR QL STRIP: ABNORMAL
RBC # BLD AUTO: 3.77 M/UL (ref 4–5.4)
SODIUM SERPL-SCNC: 138 MMOL/L (ref 136–145)
SP GR UR STRIP: 1.02 (ref 1–1.03)
URN SPEC COLLECT METH UR: ABNORMAL
UROBILINOGEN UR STRIP-ACNC: NEGATIVE EU/DL
WBC # BLD AUTO: 6.54 K/UL (ref 3.9–12.7)

## 2023-02-27 PROCEDURE — 96374 THER/PROPH/DIAG INJ IV PUSH: CPT

## 2023-02-27 PROCEDURE — 25000003 PHARM REV CODE 250: Performed by: EMERGENCY MEDICINE

## 2023-02-27 PROCEDURE — C9113 INJ PANTOPRAZOLE SODIUM, VIA: HCPCS | Performed by: EMERGENCY MEDICINE

## 2023-02-27 PROCEDURE — 85025 COMPLETE CBC W/AUTO DIFF WBC: CPT | Performed by: EMERGENCY MEDICINE

## 2023-02-27 PROCEDURE — 36415 COLL VENOUS BLD VENIPUNCTURE: CPT | Performed by: EMERGENCY MEDICINE

## 2023-02-27 PROCEDURE — 93005 ELECTROCARDIOGRAM TRACING: CPT

## 2023-02-27 PROCEDURE — 99285 EMERGENCY DEPT VISIT HI MDM: CPT | Mod: 25

## 2023-02-27 PROCEDURE — 93010 ELECTROCARDIOGRAM REPORT: CPT | Mod: ,,, | Performed by: INTERNAL MEDICINE

## 2023-02-27 PROCEDURE — 93010 EKG 12-LEAD: ICD-10-PCS | Mod: ,,, | Performed by: INTERNAL MEDICINE

## 2023-02-27 PROCEDURE — 80053 COMPREHEN METABOLIC PANEL: CPT | Performed by: EMERGENCY MEDICINE

## 2023-02-27 PROCEDURE — 63600175 PHARM REV CODE 636 W HCPCS: Performed by: EMERGENCY MEDICINE

## 2023-02-27 PROCEDURE — 81003 URINALYSIS AUTO W/O SCOPE: CPT | Performed by: EMERGENCY MEDICINE

## 2023-02-27 RX ORDER — SODIUM CHLORIDE 9 MG/ML
INJECTION, SOLUTION INTRAVENOUS
Status: COMPLETED | OUTPATIENT
Start: 2023-02-27 | End: 2023-02-27

## 2023-02-27 RX ORDER — PANTOPRAZOLE SODIUM 40 MG/1
40 TABLET, DELAYED RELEASE ORAL 2 TIMES DAILY
Qty: 90 TABLET | Refills: 3 | Status: SHIPPED | OUTPATIENT
Start: 2023-02-27 | End: 2023-08-17

## 2023-02-27 RX ORDER — PANTOPRAZOLE SODIUM 40 MG/10ML
40 INJECTION, POWDER, LYOPHILIZED, FOR SOLUTION INTRAVENOUS
Status: COMPLETED | OUTPATIENT
Start: 2023-02-27 | End: 2023-02-27

## 2023-02-27 RX ADMIN — SODIUM CHLORIDE: 9 INJECTION, SOLUTION INTRAVENOUS at 03:02

## 2023-02-27 RX ADMIN — PANTOPRAZOLE SODIUM 40 MG: 40 INJECTION, POWDER, LYOPHILIZED, FOR SOLUTION INTRAVENOUS at 03:02

## 2023-02-27 NOTE — TELEPHONE ENCOUNTER
Called pt in response to portal msg. Pt reports extreme weakness, denies pain, reports having 50-55 oz of fluid intake daily. She reports that she needed a blood transfusion following a previous surgery and she is experiencing similar symptoms. Instructed her to go to the emergency room. Complete understanding verbalized. Msg sent to Dr. Whipple.

## 2023-02-27 NOTE — ED PROVIDER NOTES
"Encounter Date: 2/27/2023    SCRIBE #1 NOTE: I, Amilcar Maldonado, am scribing for, and in the presence of,  Sharad Art III, MD.     History     Chief Complaint   Patient presents with    Weakness    Dehydration     Hx. Gastric bypass / blood transfusion      Time seen by provider: 1:44 PM on 02/27/2023    Josephine Bolton is a 57 y.o. female who presents to the ED with an onset of persistent weakness that began 7 days ago following a gastric bypass, as well as black stools for 4 days and SOB that began today. She was constipated for three days following her gastric bypass 7 days ago, but she began having black stools 4 days ago. She states that she feels periumbilical abdominal pain, but only when she moves and turns in certain ways. She states that she has been bedridden since the surgery, and it is difficult to even hold a phone to her ear. The patient states that she feels similar to when she needed blood transfusions last year following her back and neck surgeries. She has been trying to drink water, but her weakness has been making it difficult for her to do so. She denies a history of stomach ulcers. The patient denies vomiting or any other symptoms at this time. PMHx of HTN, edema, chronic constipation, CAD, long term anticoagulant use, kidney disease, blood tranfusion, stented coronary artery, and colon polyp. PSHx of gastric bypass, thyroidectomy, CABG, coronary stent placement, left heart catheterization, lumbar laminectomy, and cervical laminectomy.    The history is provided by the patient.   Review of patient's allergies indicates:   Allergen Reactions    Celexa [citalopram] Other (See Comments)     GI upset  Stated "knocked me out"    Cephalexin Anaphylaxis    Zoloft [sertraline] Other (See Comments)     Stated "knocked me out"    Codeine Other (See Comments)     hallucinations  hallucinations    Isosorbide Nausea And Vomiting    Ciprofloxacin Itching    Levaquin [levofloxacin] Other (See Comments) " "    tendinitis    Metformin      Nausea     Penicillamine     Penicillins Other (See Comments)     Was told per mother that as child was allergic to penicillin.  Childhood allergy/ unknown reaction    Sulfa (sulfonamide antibiotics)      Past Medical History:   Diagnosis Date    Allergy     Anticoagulant long-term use     ASA 81mg, Effient    Anxiety     Arthritis     Asthma     As child    CAD (coronary artery disease) 01/27/2012    s/p stents x4 , CABG, 3 vessel, (5/2013) newest stent prior to CABG    Chronic constipation     Colon polyp     Coronary artery disease involving native coronary artery of native heart without angina pectoris 01/27/2012 12/19 Significant ostial RCA lesion as above treated with drug-eluting stenting as above. Occluded proximal LAD with patent lima lad Patent vein graft to 2nd obtuse marginal Known occluded vein graft to unknown vessel.  s/p stents x 3 (2010) s/p LAD stent (DSE) 3/2012    DDD (degenerative disc disease), cervical     DDD (degenerative disc disease), lumbar     DDD (degenerative disc disease), thoracic     Depression     Edema     Encounter for blood transfusion     General anesthetics causing adverse effect in therapeutic use     Headache(784.0)     History of nephrolithiasis     Hyperlipidemia     Hypertension     Hypothyroid 07/05/2012    Insomnia     Insulin resistance     patient stated not diebetic    Joint stiffness     Joint swelling     Kidney disease     ; Frequent UTIs     Kidney stones     Low back pain     Neck pain     Obstructive sleep apnea on CPAP 07/17/2012    PONV (postoperative nausea and vomiting)     S/P CABG (coronary artery bypass graft) 06/25/2013 6/23/2013     Sleep apnea 01/27/2012    Patient reports "severe" sleep apnea, uses C-pap    Stented coronary artery 12/20/2019 12/19  ostial RCA -Osirio2.5 x 18 post dilated 2.75     Thoracic back pain     Usual hyperplasia of lactiferous duct 02/2017    left breast     Past Surgical " History:   Procedure Laterality Date    ADENOIDECTOMY      BACK SURGERY      BREAST BIOPSY Left 2017    duct excision- Dr. Jimenez    BREAST CYST ASPIRATION Left 2020    @ 's office- old hematoma drained (per office)    CARDIAC SURGERY      CARPAL TUNNEL RELEASE      bilateral    CERVICAL LAMINECTOMY WITH SPINAL FUSION      2016     SECTION, CLASSIC      x 2    COLONOSCOPY      CORONARY ANGIOGRAPHY  2019    Procedure: ANGIOGRAM, CORONARY ARTERY;  Surgeon: Timi Millan MD;  Location: [facility] CATH;  Service: Cardiology;;    CORONARY ANGIOPLASTY WITH STENT PLACEMENT      x 4    CORONARY ARTERY BYPASS GRAFT  2013    3 vessel    ESOPHAGOGASTRODUODENOSCOPY N/A 2020    Procedure: EGD (ESOPHAGOGASTRODUODENOSCOPY);  Surgeon: Mk Warren MD;  Location: [facility];  Service: Endoscopy;  Laterality: N/A;    HYSTERECTOMY      Complete    JOINT REPLACEMENT      KNEE ARTHROPLASTY Left 2019    Procedure: ARTHROPLASTY, KNEE;  Surgeon: Juan Wilson MD;  Location: [facility] OR;  Service: Orthopedics;  Laterality: Left;    KNEE ARTHROSCOPY W/ MENISCAL REPAIR Left     twice, last one 2014    LAMINECTOMY      L4/L5    LEFT HEART CATHETERIZATION  2019    Procedure: Left heart cath- RM # [id number];  Surgeon: Timi Millan MD;  Location: [facility] CATH;  Service: Cardiology;;    OOPHORECTOMY Bilateral     ROBOTIC ARTHROPLASTY, KNEE Right 2021    Procedure: ROBOTIC ARTHROPLASTY, KNEE, TOTAL;  Surgeon: Juan Wilson MD;  Location: [facility] OR;  Service: Orthopedics;  Laterality: Right;    SINUS SURGERY      x3    TENDON REPAIR Left     ankle surgery    THYROIDECTOMY      2 separate surgeries    TONSILLECTOMY      TOTAL KNEE ARTHROPLASTY Left 2019    Surgeon: Juan Wilson MD    XI ROBOTIC REVISION, GASTROENTEROSTOMY, MITCHELL-EN-Y N/A 2023    Procedure: XI ROBOTIC REVISION,GASTROENTEROSTOMY,MITCHELL-EN-Y;  Surgeon: Obinna Whipple MD;  Location: [facility] OR;  Service: General;   Laterality: N/A;  creation of Samaria-N-Y anastomsis     Family History   Problem Relation Age of Onset    Heart failure Mother     Asthma Mother     Macular degeneration Mother     Cancer Mother         Breast    Cataracts Mother     Heart disease Mother     COPD Mother     Breast cancer Mother     Heart disease Father     Diabetes Father     Hypertension Father     Stroke Father     Cataracts Father     Obesity Father     Obesity Sister     Glaucoma Sister     Thyroid disease Sister     Glaucoma Sister     Other Brother         stiff man syndrome    Cancer Maternal Grandmother         Breast with Mets    Breast cancer Maternal Grandmother     Cancer Paternal Grandmother         Colon    Strabismus Other     Amblyopia Neg Hx     Blindness Neg Hx     Retinal detachment Neg Hx      Social History     Tobacco Use    Smoking status: Former     Packs/day: 0.50     Years: 14.00     Pack years: 7.00     Types: Cigarettes     Start date: 1984     Quit date: 1998     Years since quittin.2    Smokeless tobacco: Never   Substance Use Topics    Alcohol use: No    Drug use: Not Currently     Types: Benzodiazepines     Review of Systems   Constitutional:  Negative for activity change, appetite change, chills, fatigue and fever.   Eyes:  Negative for visual disturbance.   Respiratory:  Positive for shortness of breath. Negative for apnea.    Cardiovascular:  Negative for chest pain and palpitations.   Gastrointestinal:  Positive for abdominal pain, blood in stool and constipation (resolved). Negative for abdominal distention and vomiting.   Genitourinary:  Negative for difficulty urinating.   Musculoskeletal:  Negative for neck pain.   Skin:  Negative for pallor and rash.   Neurological:  Positive for weakness. Negative for headaches.   Hematological:  Bruises/bleeds easily (aspirin).   Psychiatric/Behavioral:  Negative for agitation.      Physical Exam     Initial Vitals [23 1338]   BP Pulse Resp Temp SpO2    (!) 141/65 75 20 97.9 °F (36.6 °C) 98 %      MAP       --         Physical Exam    Nursing note and vitals reviewed.  Constitutional: She appears well-developed and well-nourished.   HENT:   Head: Normocephalic and atraumatic.   Eyes: Conjunctivae are normal.   Neck: Neck supple.   Normal range of motion.  Cardiovascular:  Normal rate, regular rhythm and normal heart sounds.     Exam reveals no gallop and no friction rub.       No murmur heard.  Pulmonary/Chest: Breath sounds normal. No respiratory distress. She has no wheezes. She has no rhonchi. She has no rales.   Abdominal: Abdomen is soft. She exhibits no distension. There is no abdominal tenderness.   Genitourinary: Rectum:      Guaiac result positive.   Guaiac positive stool. : Acceptable.   Genitourinary Comments: Brown heme positive stools.     Musculoskeletal:         General: Normal range of motion.      Cervical back: Normal range of motion and neck supple.     Neurological: She is alert and oriented to person, place, and time.   Skin: Skin is warm and dry. No erythema.   Psychiatric: She has a normal mood and affect.       ED Course   Procedures  Labs Reviewed   CBC W/ AUTO DIFFERENTIAL - Abnormal; Notable for the following components:       Result Value    RBC 3.77 (*)     Hemoglobin 11.0 (*)     Hematocrit 33.9 (*)     MPV 8.9 (*)     Mono # 1.1 (*)     Mono % 16.8 (*)     All other components within normal limits   COMPREHENSIVE METABOLIC PANEL - Abnormal; Notable for the following components:    Albumin 3.1 (*)     ALT 67 (*)     All other components within normal limits   URINALYSIS, REFLEX TO URINE CULTURE     EKG Readings: (Independently Interpreted)   Normal sinus rhythm at a ventricular rate of 69 bpm with normal axis and normal intervals.        Imaging Results    None          Medications   0.9%  NaCl infusion (has no administration in time range)   pantoprazole injection 40 mg (has no administration in time range)      Medical Decision Making:   History:   Old Medical Records: I decided to obtain old medical records.  Independently Interpreted Test(s):   I have ordered and independently interpreted EKG Reading(s) - see prior notes  Clinical Tests:   Lab Tests: Ordered and Reviewed  Radiological Study: Ordered and Reviewed  Medical Tests: Ordered and Reviewed  ED Management:  57-year-old female presents with a 1 week history of fatigue.  She reports that the symptoms in the past have resulted from anemia; however, she has no significant reduction in hemoglobin.  She does have heme-positive black stool suggestive of an upper GI bleed.  She currently takes Protonix daily and is encouraged to increase the dosage to twice daily.  There is no evidence of cardiac ischemia/MI.  She has no fever leukocytosis with infectious etiology unlikely.     APC / Resident Notes:   I, Dr. Sharad Art III, personally performed the services described in this documentation. All medical record entries made by the scribe were at my direction and in my presence.  I have reviewed the chart and agree that the record reflects my personal performance and is accurate and complete   Scribe Attestation:   Scribe #1: I performed the above scribed service and the documentation accurately describes the services I performed. I attest to the accuracy of the note.                   Clinical Impression:   Final diagnoses:  [R53.1] Weakness               Sharad Art III, MD  02/27/23 2868

## 2023-03-03 ENCOUNTER — OFFICE VISIT (OUTPATIENT)
Dept: FAMILY MEDICINE | Facility: CLINIC | Age: 58
End: 2023-03-03
Payer: COMMERCIAL

## 2023-03-03 VITALS
WEIGHT: 186.94 LBS | OXYGEN SATURATION: 99 % | HEART RATE: 65 BPM | BODY MASS INDEX: 34.4 KG/M2 | TEMPERATURE: 98 F | DIASTOLIC BLOOD PRESSURE: 76 MMHG | HEIGHT: 62 IN | SYSTOLIC BLOOD PRESSURE: 124 MMHG

## 2023-03-03 DIAGNOSIS — R53.1 GENERALIZED WEAKNESS: ICD-10-CM

## 2023-03-03 DIAGNOSIS — Z98.84 S/P GASTRIC BYPASS: ICD-10-CM

## 2023-03-03 DIAGNOSIS — Z09 HOSPITAL DISCHARGE FOLLOW-UP: Primary | ICD-10-CM

## 2023-03-03 PROCEDURE — 3008F BODY MASS INDEX DOCD: CPT | Mod: CPTII,S$GLB,, | Performed by: NURSE PRACTITIONER

## 2023-03-03 PROCEDURE — 3078F PR MOST RECENT DIASTOLIC BLOOD PRESSURE < 80 MM HG: ICD-10-PCS | Mod: CPTII,S$GLB,, | Performed by: NURSE PRACTITIONER

## 2023-03-03 PROCEDURE — 4010F ACE/ARB THERAPY RXD/TAKEN: CPT | Mod: CPTII,S$GLB,, | Performed by: NURSE PRACTITIONER

## 2023-03-03 PROCEDURE — 4010F PR ACE/ARB THEARPY RXD/TAKEN: ICD-10-PCS | Mod: CPTII,S$GLB,, | Performed by: NURSE PRACTITIONER

## 2023-03-03 PROCEDURE — 99999 PR PBB SHADOW E&M-EST. PATIENT-LVL IV: ICD-10-PCS | Mod: PBBFAC,,, | Performed by: NURSE PRACTITIONER

## 2023-03-03 PROCEDURE — 99213 OFFICE O/P EST LOW 20 MIN: CPT | Mod: S$GLB,,, | Performed by: NURSE PRACTITIONER

## 2023-03-03 PROCEDURE — 1159F PR MEDICATION LIST DOCUMENTED IN MEDICAL RECORD: ICD-10-PCS | Mod: CPTII,S$GLB,, | Performed by: NURSE PRACTITIONER

## 2023-03-03 PROCEDURE — 1111F DSCHRG MED/CURRENT MED MERGE: CPT | Mod: CPTII,S$GLB,, | Performed by: NURSE PRACTITIONER

## 2023-03-03 PROCEDURE — 3074F PR MOST RECENT SYSTOLIC BLOOD PRESSURE < 130 MM HG: ICD-10-PCS | Mod: CPTII,S$GLB,, | Performed by: NURSE PRACTITIONER

## 2023-03-03 PROCEDURE — 99999 PR PBB SHADOW E&M-EST. PATIENT-LVL IV: CPT | Mod: PBBFAC,,, | Performed by: NURSE PRACTITIONER

## 2023-03-03 PROCEDURE — 3008F PR BODY MASS INDEX (BMI) DOCUMENTED: ICD-10-PCS | Mod: CPTII,S$GLB,, | Performed by: NURSE PRACTITIONER

## 2023-03-03 PROCEDURE — 99213 PR OFFICE/OUTPT VISIT, EST, LEVL III, 20-29 MIN: ICD-10-PCS | Mod: S$GLB,,, | Performed by: NURSE PRACTITIONER

## 2023-03-03 PROCEDURE — 1159F MED LIST DOCD IN RCRD: CPT | Mod: CPTII,S$GLB,, | Performed by: NURSE PRACTITIONER

## 2023-03-03 PROCEDURE — 3074F SYST BP LT 130 MM HG: CPT | Mod: CPTII,S$GLB,, | Performed by: NURSE PRACTITIONER

## 2023-03-03 PROCEDURE — 1111F PR DISCHARGE MEDS RECONCILED W/ CURRENT OUTPATIENT MED LIST: ICD-10-PCS | Mod: CPTII,S$GLB,, | Performed by: NURSE PRACTITIONER

## 2023-03-03 PROCEDURE — 3078F DIAST BP <80 MM HG: CPT | Mod: CPTII,S$GLB,, | Performed by: NURSE PRACTITIONER

## 2023-03-03 NOTE — PROGRESS NOTES
Subjective:       Patient ID: Josephine Bolton is a 57 y.o. female.    Chief Complaint: Hospital Follow Up ([place] )    HPI  Underwent [place] en Y gastric bypass with Dr Whipple 2/20/23  On 2/27/23 she messaged surgeon via [employer] with concerns of anemia--weakness, white gums. Advised to go to ED for evaluation--seen at [facility] ED same day. She noted black stools x 4 days. CBC noted mild anemia 11/33.9. CXR, EKG unremarkable. CMP mildly elevated ALT otherwise unremarkable. Urinalysis trace protein, 2+ ketones otherwise noraml. Given 1L IVF, pantoprazole 40mg. DCd home, advised to increase pantoprazole to BID    Dark stools have resolved   Generalized weakness-mildly improved. Denies n/v or abdominal pain  Afebrile     Scheduled to see Dr Whipple in 5 days on 3/8/23    Left facial numbness/tingling--has happened in the past related to neck. Denies extremity weakness, confusion, dysarthria, facial drooping    Vitals:    03/03/23 0947   BP: 124/76   Pulse: 65   Temp: 97.6 °F (36.4 °C)     Review of Systems   Constitutional:  Negative for fever.   Respiratory:  Negative for cough.    Cardiovascular:  Negative for chest pain.   Gastrointestinal:  Negative for abdominal pain, nausea and vomiting.   Genitourinary:  Negative for difficulty urinating.   Neurological:  Positive for weakness and numbness. Negative for dizziness, tremors, syncope, speech difficulty and headaches.     Past Medical History:   Diagnosis Date    Allergy     Anticoagulant long-term use     ASA 81mg, Effient    Anxiety     Arthritis     Asthma     As child    CAD (coronary artery disease) 01/27/2012    s/p stents x4 , CABG, 3 vessel, (5/2013) newest stent prior to CABG    Chronic constipation     Colon polyp     Coronary artery disease involving native coronary artery of native heart without angina pectoris 01/27/2012 12/19 Significant ostial RCA lesion as above treated with drug-eluting stenting as above. Occluded proximal LAD with patent  "lopez lad Patent vein graft to 2nd obtuse marginal Known occluded vein graft to unknown vessel.  s/p stents x 3 (2010) s/p LAD stent (DSE) 3/2012    DDD (degenerative disc disease), cervical     DDD (degenerative disc disease), lumbar     DDD (degenerative disc disease), thoracic     Depression     Edema     Encounter for blood transfusion     General anesthetics causing adverse effect in therapeutic use     Headache(784.0)     History of nephrolithiasis     Hyperlipidemia     Hypertension     Hypothyroid 07/05/2012    Insomnia     Insulin resistance     patient stated not diebetic    Joint stiffness     Joint swelling     Kidney disease     ; Frequent UTIs     Kidney stones     Low back pain     Neck pain     Obstructive sleep apnea on CPAP 07/17/2012    PONV (postoperative nausea and vomiting)     S/P CABG (coronary artery bypass graft) 06/25/2013 6/23/2013     Sleep apnea 01/27/2012    Patient reports "severe" sleep apnea, uses C-pap    Stented coronary artery 12/20/2019 12/19  ostial RCA -Osirio2.5 x 18 post dilated 2.75     Thoracic back pain     Usual hyperplasia of lactiferous duct 02/2017    left breast     Objective:      Physical Exam  Vitals and nursing note reviewed.   Constitutional:       General: She is not in acute distress.     Appearance: She is not diaphoretic.   HENT:      Head: Normocephalic.   Eyes:      General:         Right eye: No discharge.         Left eye: No discharge.   Neck:      Trachea: No tracheal deviation.   Cardiovascular:      Rate and Rhythm: Normal rate.   Pulmonary:      Effort: Pulmonary effort is normal.   Skin:     Coloration: Skin is not pale.      Comments: Lap sites CDI well approximated   Healing ecchymosis    Neurological:      Mental Status: She is alert and oriented to person, place, and time.   Psychiatric:         Speech: Speech normal.         Behavior: Behavior normal.         Thought Content: Thought content normal.         Judgment: Judgment " normal.       Assessment:       1. Hospital discharge follow-up    2. S/P gastric bypass    3. Generalized weakness        Plan:       Hospital discharge follow-up    S/P gastric bypass    Generalized weakness          Clinically improving  FU with surgery next week as scheduled  education provided on supportive care, symptom monitoring and return precautions         Medication List with Changes/Refills   Current Medications    ACETAMINOPHEN (TYLENOL) 650 MG TBSR    Take 1,300 mg by mouth every 8 (eight) hours as needed (pain).     ALBUTEROL (PROVENTIL/VENTOLIN HFA) 90 MCG/ACTUATION INHALER    Inhale 2 puffs into the lungs every 6 (six) hours as needed for Shortness of Breath.     ALLOPURINOL (ZYLOPRIM) 100 MG TABLET    Take 1 tablet (100 mg total) by mouth once daily.    ASPIRIN (ECOTRIN) 81 MG EC TABLET    Take 81 mg by mouth once daily.    ATORVASTATIN (LIPITOR) 40 MG TABLET    TAKE 1 TABLET BY MOUTH EVERY DAY    BACLOFEN (LIORESAL) 10 MG TABLET    Take 1 tablet (10 mg total) by mouth 3 (three) times daily.    BLOOD-GLUCOSE METER (GLUCOSE MONITORING KIT) KIT    Use as instructed    BRILINTA 90 MG TABLET    TAKE 1 TABLET BY MOUTH 2 TIMES A DAY    CARVEDILOL (COREG) 25 MG TABLET    Take 1 tablet (25 mg total) by mouth 2 (two) times daily.    CETIRIZINE (ZYRTEC) 10 MG TABLET    Take 10 mg by mouth daily as needed for Allergies.    FLUTICASONE PROPIONATE (FLONASE) 50 MCG/ACTUATION NASAL SPRAY    fluticasone propionate 50 mcg/actuation nasal spray,suspension    FUROSEMIDE (LASIX) 20 MG TABLET    TAKE 1 TABLET BY MOUTH EVERY DAY    HYDROCODONE-ACETAMINOPHEN (NORCO) 7.5-325 MG PER TABLET    Take 1 tablet by mouth every 4 (four) hours as needed for Pain.    LACTOBACILLUS ACIDOPHILUS (PROBIOTIC) 10 BILLION CELL CAP    1 capsule by mouth daily    LANCETS MISC    1 Units by Misc.(Non-Drug; Combo Route) route 2 (two) times daily as needed.    LEVOTHYROXINE (SYNTHROID) 100 MCG TABLET    Take 1 tablet (100 mcg total) by mouth  before breakfast.    ONDANSETRON (ZOFRAN-ODT) 4 MG TBDL    Take 1 tablet (4 mg total) by mouth every 6 (six) hours as needed (nv).    PANTOPRAZOLE (PROTONIX) 40 MG TABLET    Take 1 tablet (40 mg total) by mouth 2 (two) times daily.    PREGABALIN (LYRICA) 100 MG CAPSULE    Take 2 capsules (200 mg total) by mouth 2 (two) times a day.    RAMIPRIL (ALTACE) 10 MG CAPSULE    Take 1 capsule (10 mg total) by mouth once daily.    RANOLAZINE (RANEXA) 500 MG TB12    TAKE 2 TABLETS (1 000 MG TOTAL) BY MOUTH 2 (TWO) TIMES DAILY.    SENNA-DOCUSATE 8.6-50 MG (PERICOLACE) 8.6-50 MG PER TABLET    Take 1 tablet by mouth once daily.    VENLAFAXINE (EFFEXOR-XR) 150 MG CP24    TAKE 1 CAPSULE BY MOUTH EVERY DAY

## 2023-03-03 NOTE — LETTER
March 3, 2023      [facility]  [address]  [place] [address]  Phone: [phone]  Fax: [phone]       Patient: Josephine Bolton   YOB: 1965  Date of Visit: 03/03/2023    To Whom It May Concern:    Vandana Bolton  was at [facility] on 03/03/2023. The patient is schedule to see surgeon 3/8/23. Needs to be out of work at least until 3/13/23 pending reassessment and recommendations per surgeon. If you have any questions or concerns, or if I can be of further assistance, please do not hesitate to contact me.    Sincerely,        Smitha Kate NP

## 2023-03-07 ENCOUNTER — PATIENT MESSAGE (OUTPATIENT)
Dept: BARIATRICS | Facility: CLINIC | Age: 58
End: 2023-03-07
Payer: COMMERCIAL

## 2023-03-08 ENCOUNTER — OFFICE VISIT (OUTPATIENT)
Dept: BARIATRICS | Facility: CLINIC | Age: 58
End: 2023-03-08
Payer: COMMERCIAL

## 2023-03-08 ENCOUNTER — CLINICAL SUPPORT (OUTPATIENT)
Dept: BARIATRICS | Facility: CLINIC | Age: 58
End: 2023-03-08
Payer: COMMERCIAL

## 2023-03-08 VITALS — WEIGHT: 178.56 LBS | BODY MASS INDEX: 32.86 KG/M2 | HEIGHT: 62 IN

## 2023-03-08 VITALS
BODY MASS INDEX: 32.87 KG/M2 | RESPIRATION RATE: 16 BRPM | TEMPERATURE: 99 F | HEIGHT: 62 IN | WEIGHT: 178.63 LBS | SYSTOLIC BLOOD PRESSURE: 127 MMHG | DIASTOLIC BLOOD PRESSURE: 78 MMHG | HEART RATE: 79 BPM

## 2023-03-08 DIAGNOSIS — M51.36 DDD (DEGENERATIVE DISC DISEASE), LUMBAR: ICD-10-CM

## 2023-03-08 DIAGNOSIS — Z71.3 ENCOUNTER FOR DIETARY COUNSELING AND SURVEILLANCE: ICD-10-CM

## 2023-03-08 DIAGNOSIS — E78.2 MIXED HYPERLIPIDEMIA: ICD-10-CM

## 2023-03-08 DIAGNOSIS — E66.9 OBESITY, CLASS II, BMI 35-39.9: ICD-10-CM

## 2023-03-08 DIAGNOSIS — E66.9 OBESITY (BMI 30-39.9): ICD-10-CM

## 2023-03-08 DIAGNOSIS — E66.01 MORBID OBESITY: Primary | ICD-10-CM

## 2023-03-08 DIAGNOSIS — G47.33 OSA (OBSTRUCTIVE SLEEP APNEA): ICD-10-CM

## 2023-03-08 PROCEDURE — 99999 PR PBB SHADOW E&M-EST. PATIENT-LVL IV: CPT | Mod: PBBFAC,,,

## 2023-03-08 PROCEDURE — 4010F PR ACE/ARB THEARPY RXD/TAKEN: ICD-10-PCS | Mod: CPTII,S$GLB,, | Performed by: SURGERY

## 2023-03-08 PROCEDURE — 3078F DIAST BP <80 MM HG: CPT | Mod: CPTII,S$GLB,, | Performed by: SURGERY

## 2023-03-08 PROCEDURE — 3008F PR BODY MASS INDEX (BMI) DOCUMENTED: ICD-10-PCS | Mod: CPTII,S$GLB,, | Performed by: SURGERY

## 2023-03-08 PROCEDURE — 99024 PR POST-OP FOLLOW-UP VISIT: ICD-10-PCS | Mod: S$GLB,,, | Performed by: SURGERY

## 2023-03-08 PROCEDURE — 1159F MED LIST DOCD IN RCRD: CPT | Mod: CPTII,S$GLB,, | Performed by: SURGERY

## 2023-03-08 PROCEDURE — 3008F BODY MASS INDEX DOCD: CPT | Mod: CPTII,S$GLB,, | Performed by: SURGERY

## 2023-03-08 PROCEDURE — 3074F SYST BP LT 130 MM HG: CPT | Mod: CPTII,S$GLB,, | Performed by: SURGERY

## 2023-03-08 PROCEDURE — 97803 PR MED NUTR THER, SUBSQ, INDIV, EA 15 MIN: ICD-10-PCS | Mod: S$GLB,,,

## 2023-03-08 PROCEDURE — 99999 PR PBB SHADOW E&M-EST. PATIENT-LVL V: CPT | Mod: PBBFAC,,, | Performed by: SURGERY

## 2023-03-08 PROCEDURE — 99999 PR PBB SHADOW E&M-EST. PATIENT-LVL IV: ICD-10-PCS | Mod: PBBFAC,,,

## 2023-03-08 PROCEDURE — 99024 POSTOP FOLLOW-UP VISIT: CPT | Mod: S$GLB,,, | Performed by: SURGERY

## 2023-03-08 PROCEDURE — 97803 MED NUTRITION INDIV SUBSEQ: CPT | Mod: S$GLB,,,

## 2023-03-08 PROCEDURE — 1159F PR MEDICATION LIST DOCUMENTED IN MEDICAL RECORD: ICD-10-PCS | Mod: CPTII,S$GLB,, | Performed by: SURGERY

## 2023-03-08 PROCEDURE — 3078F PR MOST RECENT DIASTOLIC BLOOD PRESSURE < 80 MM HG: ICD-10-PCS | Mod: CPTII,S$GLB,, | Performed by: SURGERY

## 2023-03-08 PROCEDURE — 4010F ACE/ARB THERAPY RXD/TAKEN: CPT | Mod: CPTII,S$GLB,, | Performed by: SURGERY

## 2023-03-08 PROCEDURE — 3074F PR MOST RECENT SYSTOLIC BLOOD PRESSURE < 130 MM HG: ICD-10-PCS | Mod: CPTII,S$GLB,, | Performed by: SURGERY

## 2023-03-08 PROCEDURE — 99999 PR PBB SHADOW E&M-EST. PATIENT-LVL V: ICD-10-PCS | Mod: PBBFAC,,, | Performed by: SURGERY

## 2023-03-08 RX ORDER — VITAMIN E (DL,TOCOPHERYL ACET) 45 MG/0.25
2 DROPS ORAL ONCE
COMMUNITY

## 2023-03-08 RX ORDER — MULTIVITAMIN
1 TABLET ORAL DAILY
COMMUNITY

## 2023-03-08 NOTE — PROGRESS NOTES
Post Op Note    Surgery: gastric bypass surgery  Date: 2/20/23  Initial weight: 212  Last weight: 196  Current weight: 178    Constipation: struggles with this  Reflux: none  Vomiting: none    Diet:    Drinking 2 protein shaker per day  Doing at least 60 ounces of water    Exercise:  none    MVI: carlos mvi, calcium, iron    Vitals:    03/08/23 1411   BP: 127/78   Pulse: 79   Resp: 16   Temp: 98.7 °F (37.1 °C)       Body comp:  Fat Percent:  43.2 %  Fat Mass:  77.2 lb  FFM:  101.4 lb  TBW: 71.8 lb  TBW %:  40.2 %  BMR: 1416 kcal    PE:  NAD  RRR  Soft/nt/nd, incisions well healed, some bruising    Labs: reviewed from ER    Constipation- fiber when possible     A/P: s/p gastric bypass     Counseling for patient:    Diet: continue protocol  Exercise: ok for cardio, no heavy lifting over 30 pounds for another month  Vitamins: 2 mvi daily, calcium, and b complex    Co morbidities:     1. Morbid obesity        2. Obesity, Class II, BMI 35-39.9        3. Mixed hyperlipidemia        4. RAHUL (obstructive sleep apnea)        5. DDD (degenerative disc disease), lumbar            : I met with the patient for 15 minutes and counseled her for over 50% of that time

## 2023-03-08 NOTE — PROGRESS NOTES
"NUTRITION NOTE    Referring Physician: Dr. Whipple  Reason for MNT Referral: Follow-up 2 Weeks s/p Gastric Bypass on 2/20/23    PAST MEDICAL HISTORY:  Denies nausea, vomiting, and diarrhea.  Reports problems, including constipation . Dulcolax x 3d. BM x 1 today.     Past Medical History:   Diagnosis Date    Allergy     Anticoagulant long-term use     ASA 81mg, Effient    Anxiety     Arthritis     Asthma     As child    CAD (coronary artery disease) 01/27/2012    s/p stents x4 , CABG, 3 vessel, (5/2013) newest stent prior to CABG    Chronic constipation     Colon polyp     Coronary artery disease involving native coronary artery of native heart without angina pectoris 01/27/2012 12/19 Significant ostial RCA lesion as above treated with drug-eluting stenting as above. Occluded proximal LAD with patent lima lad Patent vein graft to 2nd obtuse marginal Known occluded vein graft to unknown vessel.  s/p stents x 3 (2010) s/p LAD stent (DSE) 3/2012    DDD (degenerative disc disease), cervical     DDD (degenerative disc disease), lumbar     DDD (degenerative disc disease), thoracic     Depression     Edema     Encounter for blood transfusion     General anesthetics causing adverse effect in therapeutic use     Headache(784.0)     History of nephrolithiasis     Hyperlipidemia     Hypertension     Hypothyroid 07/05/2012    Insomnia     Insulin resistance     patient stated not diebetic    Joint stiffness     Joint swelling     Kidney disease     ; Frequent UTIs     Kidney stones     Low back pain     Neck pain     Obstructive sleep apnea on CPAP 07/17/2012    PONV (postoperative nausea and vomiting)     S/P CABG (coronary artery bypass graft) 06/25/2013 6/23/2013     Sleep apnea 01/27/2012    Patient reports "severe" sleep apnea, uses C-pap    Stented coronary artery 12/20/2019 12/19  ostial RCA -Osirio2.5 x 18 post dilated 2.75     Thoracic back pain     Usual hyperplasia of lactiferous duct 02/2017    " left breast       CLINICAL DATA:  57 y.o. female.    There were no vitals filed for this visit.    Current Weight: 178 lbs  BMI: 33.19  Total Weight Loss: -34 lbs    LABS:  Reviewed labs from 2/27/23.    CURRENT DIET:  Bariatric Liquid Diet    Diet Recall: 60 grams of protein/day; >64 oz of fluids/day    Diet Includes: 2 premier protein shakes, SF jello, SF popsicles, bone broth, 5-6 20 oz. Water bottles/day  Protein Supplements: Premier protein (30g)- chocolate, peanut butter, and coffee flavor    EXERCISE:  None.    Restrictions to Exercise: None.    VITAMINS/MINERALS:  Multivitamins: Bariatric Pal  B-Complex: Included with multivitamin.  Calcium Citrate + Vitamin D: 500mg chews x 2d  Vitamin B12:  Included with MVI    ASSESSMENT:  Doing fairly well overall.  Adequate protein intake.  Excess fluid intake.    Patient reporting holding blood pressure medication as of yesterday. Stating she is able to hold her head up and walk around more without it. Blood pressure has been 120/45-60. RD encouraged a conversation with Dr. Whipple to assess if that is the appropriate route.     Patient reporting left sided facial numbness with coldness within mouth and on lips. RD reviewed vitamins/minerals regimen to learn calcium citrate, MVI w/ iron, and levothyroxine were being taken together. Created regimen with times to ensure levothyroxine with  4 hours from iron and calcium while  iron and calcium by 2 hours. Encourage patient to not consume coffee flavored protein shake with bariatric MVI due to limiting absorption of iron.     BARIATRIC DIET DISCUSSION:  Instructed and provided written materials on bariatric liquid diet plan.  Reinforced post-op nutrition guidelines.    PLAN/RECOMMONDATIONS:  Advance to bariatric soft diet.  Maintain protein intake.  Maintain fluid intake.  Begin light exercise.  Continue appropriate vitamins & minerals.  Adjust vitamins & minerals by: Created vitamin/mineral regimen with  patient to ensure adequate absorption of all nutrients required after surgery.     Return to clinic in 4 weeks.    SESSION TIME: 60 minutes

## 2023-03-10 ENCOUNTER — PATIENT MESSAGE (OUTPATIENT)
Dept: CARDIOLOGY | Facility: CLINIC | Age: 58
End: 2023-03-10
Payer: COMMERCIAL

## 2023-03-10 DIAGNOSIS — I10 PRIMARY HYPERTENSION: Primary | ICD-10-CM

## 2023-03-10 RX ORDER — RAMIPRIL 5 MG/1
5 CAPSULE ORAL DAILY
Qty: 90 CAPSULE | Refills: 3 | Status: SHIPPED | OUTPATIENT
Start: 2023-03-10 | End: 2023-03-13

## 2023-03-14 RX ORDER — CARVEDILOL 6.25 MG/1
6.25 TABLET ORAL 2 TIMES DAILY WITH MEALS
Qty: 180 TABLET | Refills: 3 | Status: SHIPPED | OUTPATIENT
Start: 2023-03-14 | End: 2023-03-21 | Stop reason: SDUPTHER

## 2023-03-21 ENCOUNTER — OFFICE VISIT (OUTPATIENT)
Dept: CARDIOLOGY | Facility: CLINIC | Age: 58
End: 2023-03-21
Payer: COMMERCIAL

## 2023-03-21 VITALS
SYSTOLIC BLOOD PRESSURE: 129 MMHG | BODY MASS INDEX: 33.15 KG/M2 | WEIGHT: 180.13 LBS | DIASTOLIC BLOOD PRESSURE: 77 MMHG | HEART RATE: 64 BPM | HEIGHT: 62 IN

## 2023-03-21 DIAGNOSIS — E78.2 MIXED HYPERLIPIDEMIA: ICD-10-CM

## 2023-03-21 DIAGNOSIS — I10 PRIMARY HYPERTENSION: ICD-10-CM

## 2023-03-21 DIAGNOSIS — Z95.5 STENTED CORONARY ARTERY: ICD-10-CM

## 2023-03-21 DIAGNOSIS — Z95.1 S/P CABG (CORONARY ARTERY BYPASS GRAFT): ICD-10-CM

## 2023-03-21 PROCEDURE — 99999 PR PBB SHADOW E&M-EST. PATIENT-LVL III: ICD-10-PCS | Mod: PBBFAC,,,

## 2023-03-21 PROCEDURE — 99999 PR PBB SHADOW E&M-EST. PATIENT-LVL III: CPT | Mod: PBBFAC,,,

## 2023-03-21 PROCEDURE — 3078F PR MOST RECENT DIASTOLIC BLOOD PRESSURE < 80 MM HG: ICD-10-PCS | Mod: CPTII,S$GLB,,

## 2023-03-21 PROCEDURE — 3008F BODY MASS INDEX DOCD: CPT | Mod: CPTII,S$GLB,,

## 2023-03-21 PROCEDURE — 1159F PR MEDICATION LIST DOCUMENTED IN MEDICAL RECORD: ICD-10-PCS | Mod: CPTII,S$GLB,,

## 2023-03-21 PROCEDURE — 1111F PR DISCHARGE MEDS RECONCILED W/ CURRENT OUTPATIENT MED LIST: ICD-10-PCS | Mod: CPTII,S$GLB,,

## 2023-03-21 PROCEDURE — 99214 OFFICE O/P EST MOD 30 MIN: CPT | Mod: S$GLB,,,

## 2023-03-21 PROCEDURE — 4010F PR ACE/ARB THEARPY RXD/TAKEN: ICD-10-PCS | Mod: CPTII,S$GLB,,

## 2023-03-21 PROCEDURE — 3078F DIAST BP <80 MM HG: CPT | Mod: CPTII,S$GLB,,

## 2023-03-21 PROCEDURE — 1111F DSCHRG MED/CURRENT MED MERGE: CPT | Mod: CPTII,S$GLB,,

## 2023-03-21 PROCEDURE — 99214 PR OFFICE/OUTPT VISIT, EST, LEVL IV, 30-39 MIN: ICD-10-PCS | Mod: S$GLB,,,

## 2023-03-21 PROCEDURE — 3074F PR MOST RECENT SYSTOLIC BLOOD PRESSURE < 130 MM HG: ICD-10-PCS | Mod: CPTII,S$GLB,,

## 2023-03-21 PROCEDURE — 3008F PR BODY MASS INDEX (BMI) DOCUMENTED: ICD-10-PCS | Mod: CPTII,S$GLB,,

## 2023-03-21 PROCEDURE — 3074F SYST BP LT 130 MM HG: CPT | Mod: CPTII,S$GLB,,

## 2023-03-21 PROCEDURE — 4010F ACE/ARB THERAPY RXD/TAKEN: CPT | Mod: CPTII,S$GLB,,

## 2023-03-21 PROCEDURE — 1159F MED LIST DOCD IN RCRD: CPT | Mod: CPTII,S$GLB,,

## 2023-03-21 RX ORDER — CARVEDILOL 6.25 MG/1
6.25 TABLET ORAL 2 TIMES DAILY WITH MEALS
Qty: 180 TABLET | Refills: 3 | Status: SHIPPED | OUTPATIENT
Start: 2023-03-21 | End: 2023-08-25

## 2023-03-21 NOTE — ASSESSMENT & PLAN NOTE
Requiring less anti-HTN since baratric surgery   Continue coreg 6.25 mg BID   Low Na diet   Continue to monitor

## 2023-03-21 NOTE — ASSESSMENT & PLAN NOTE
GI bleed recently   Has been off of Brillinta and ASA for few weeks   Has resumed Brillinta   Will continue to hold ASA

## 2023-03-21 NOTE — ASSESSMENT & PLAN NOTE
Continue statin medication   Labs soon after few months post bariatric surgery per PCP   Last LDL 89

## 2023-03-21 NOTE — PROGRESS NOTES
" Subjective:    Patient ID:  Josephine Bolton is a 57 y.o. female patient here for evaluation Hypotension      History of Present Illness:     Mrs. Burton is a 57 year old F who follows with Dr. Abbasi here today because since her bariatric surgery 2/23/2023, she has lost 30 pounds and her blood pressure has been very low. Via the portal we have stopped altace, lasix, and cut her coreg in half. It has improved a good bit.   She has been taking lasix still as she needs it for LE edema. NO chest pain or SOB, No syncope, falls, dizziness, palpations.     ECHO 10/2021 Summary    The left ventricle is normal in size with concentric remodeling and normal systolic function.  Normal left ventricular diastolic function.  The estimated ejection fraction is 55-60 %.  Normal right ventricular size with normal right ventricular systolic function.  Normal central venous pressure (3 mmHg).    Cardiac PET stress 2021:   There are no clinically significant perfusion abnormalities. There is a small (<10%) amount of mild diffuse fixed heterogeneity that does not change with stress, indicative of non-obstructive disease.    Review of patient's allergies indicates:   Allergen Reactions    Celexa [citalopram] Other (See Comments)     GI upset  Stated "knocked me out"    Cephalexin Anaphylaxis    Zoloft [sertraline] Other (See Comments)     Stated "knocked me out"    Codeine Other (See Comments)     hallucinations  hallucinations    Isosorbide Nausea And Vomiting    Ciprofloxacin Itching    Levaquin [levofloxacin] Other (See Comments)     tendinitis    Metformin      Nausea     Penicillamine     Penicillins Other (See Comments)     Was told per mother that as child was allergic to penicillin.  Childhood allergy/ unknown reaction    Sulfa (sulfonamide antibiotics)        Past Medical History:   Diagnosis Date    Allergy     Anticoagulant long-term use     ASA 81mg, Effient    Anxiety     Arthritis     Asthma     As child    CAD " "(coronary artery disease) 2012    s/p stents x4 , CABG, 3 vessel, (2013) newest stent prior to CABG    Chronic constipation     Colon polyp     Coronary artery disease involving native coronary artery of native heart without angina pectoris 2012 Significant ostial RCA lesion as above treated with drug-eluting stenting as above. Occluded proximal LAD with patent lima lad Patent vein graft to 2nd obtuse marginal Known occluded vein graft to unknown vessel.  s/p stents x 3 () s/p LAD stent (DSE) 3/2012    DDD (degenerative disc disease), cervical     DDD (degenerative disc disease), lumbar     DDD (degenerative disc disease), thoracic     Depression     Edema     Encounter for blood transfusion     General anesthetics causing adverse effect in therapeutic use     Headache(784.0)     History of nephrolithiasis     Hyperlipidemia     Hypertension     Hypothyroid 2012    Insomnia     Insulin resistance     patient stated not diebetic    Joint stiffness     Joint swelling     Kidney disease     ; Frequent UTIs     Kidney stones     Low back pain     Neck pain     Obstructive sleep apnea on CPAP 2012    PONV (postoperative nausea and vomiting)     S/P CABG (coronary artery bypass graft) 2013     Sleep apnea 2012    Patient reports "severe" sleep apnea, uses C-pap    Stented coronary artery 2019  ostial RCA -Osirio2.5 x 18 post dilated 2.75     Thoracic back pain     Usual hyperplasia of lactiferous duct 2017    left breast     Past Surgical History:   Procedure Laterality Date    ADENOIDECTOMY      BACK SURGERY      BREAST BIOPSY Left 2017    duct excision- Dr. Jimenez    BREAST CYST ASPIRATION Left 2020    @ 's office- old hematoma drained (per office)    CARDIAC SURGERY      CARPAL TUNNEL RELEASE      bilateral    CERVICAL LAMINECTOMY WITH SPINAL FUSION      2016     SECTION, CLASSIC      x 2    " COLONOSCOPY      CORONARY ANGIOGRAPHY  2019    Procedure: ANGIOGRAM, CORONARY ARTERY;  Surgeon: Timi Millan MD;  Location: [facility] CATH;  Service: Cardiology;;    CORONARY ANGIOPLASTY WITH STENT PLACEMENT      x 4    CORONARY ARTERY BYPASS GRAFT  2013    3 vessel    ESOPHAGOGASTRODUODENOSCOPY N/A 2020    Procedure: EGD (ESOPHAGOGASTRODUODENOSCOPY);  Surgeon: Mk Warren MD;  [facility];  Service: Endoscopy;  Laterality: N/A;    HYSTERECTOMY      Complete    JOINT REPLACEMENT      KNEE ARTHROPLASTY Left 2019    Procedure: ARTHROPLASTY, KNEE;  Surgeon: Juan Wilson MD;  Location: [facility] OR;  Service: Orthopedics;  Laterality: Left;    KNEE ARTHROSCOPY W/ MENISCAL REPAIR Left     twice, last one 2014    LAMINECTOMY      L4/L5    LEFT HEART CATHETERIZATION  2019    Procedure: Left heart cath- RM # [id number];  Surgeon: Timi Millan MD;  Location: [facility] CATH;  Service: Cardiology;;    OOPHORECTOMY Bilateral     ROBOTIC ARTHROPLASTY, KNEE Right 2021    Procedure: ROBOTIC ARTHROPLASTY, KNEE, TOTAL;  Surgeon: Juan Wilson MD;  Location: [facility] OR;  Service: Orthopedics;  Laterality: Right;    SINUS SURGERY      x3    TENDON REPAIR Left     ankle surgery    THYROIDECTOMY      2 separate surgeries    TONSILLECTOMY      TOTAL KNEE ARTHROPLASTY Left 2019    Surgeon: Juan Wilson MD    XI ROBOTIC REVISION, GASTROENTEROSTOMY, SAMARIA-EN-Y N/A 2023    Procedure: XI ROBOTIC REVISION,GASTROENTEROSTOMY,SAMARIA-EN-Y;  Surgeon: Obinna Whipple MD;  Location: [facility];  Service: General;  Laterality: N/A;  creation of Samaria-N-Y anastomsis     Social History     Tobacco Use    Smoking status: Former     Packs/day: 0.50     Years: 14.00     Pack years: 7.00     Types: Cigarettes     Start date: 1984     Quit date: 1998     Years since quittin.2    Smokeless tobacco: Never   Substance Use Topics    Alcohol use: No    Drug use: Not Currently     Types:  Benzodiazepines        REVIEW OF SYSTEMS: As noted in HPI   CARDIOVASCULAR: No recent chest pain, palpitations, arm, neck, or jaw pain  RESPIRATORY: No recent fever, cough chills, SOB or congestion  : No blood in the urine  GI: No Nausea, vomiting, constipation, diarrhea, blood, or reflux.  MUSCULOSKELETAL: No myalgias  NEURO: No lightheadedness or dizziness  EYES: No Double vision, blurry, vision or headache        Objective        Vitals:    03/21/23 1423   BP: 129/77   Pulse: 64       LIPIDS - LAST 2   Lab Results   Component Value Date    CHOL 176 02/21/2022    CHOL 170 04/28/2021    HDL 68 02/21/2022    HDL 68 04/28/2021    LDLCALC 89 02/21/2022    LDLCALC 70 04/28/2021    TRIG 93 02/21/2022    TRIG 159 04/28/2021    CHOLHDL 21.3 12/24/2019    CHOLHDL 23.8 12/13/2019       CBC - LAST 2  Lab Results   Component Value Date    WBC 6.54 02/27/2023    WBC 14.13 (H) 02/21/2023    RBC 3.77 (L) 02/27/2023    RBC 3.56 (L) 02/21/2023    HGB 11.0 (L) 02/27/2023    HGB 10.6 (L) 02/21/2023    HCT 33.9 (L) 02/27/2023    HCT 33.3 (L) 02/21/2023    MCV 90 02/27/2023    MCV 94 02/21/2023    MCH 29.2 02/27/2023    MCH 29.8 02/21/2023    MCHC 32.4 02/27/2023    MCHC 31.8 (L) 02/21/2023    RDW 12.8 02/27/2023    RDW 12.7 02/21/2023     02/27/2023     02/21/2023    MPV 8.9 (L) 02/27/2023    MPV 9.6 02/21/2023    GRAN 3.3 02/27/2023    GRAN 49.9 02/27/2023    LYMPH 2.0 02/27/2023    LYMPH 30.7 02/27/2023    MONO 1.1 (H) 02/27/2023    MONO 16.8 (H) 02/27/2023    BASO 0.04 02/27/2023    BASO 0.03 02/21/2023    NRBC 0 02/27/2023    NRBC 0 02/21/2023       CHEMISTRY & LIVER FUNCTION - LAST 2  Lab Results   Component Value Date     02/27/2023     02/21/2023    K 3.9 02/27/2023    K 4.5 02/21/2023     02/27/2023     02/21/2023    CO2 23 02/27/2023    CO2 26 02/21/2023    ANIONGAP 15 02/27/2023    ANIONGAP 8 02/21/2023    BUN 20 02/27/2023    BUN 13 02/21/2023    CREATININE 0.8 02/27/2023     CREATININE 0.8 02/21/2023    GLU 87 02/27/2023     (H) 02/21/2023    CALCIUM 9.5 02/27/2023    CALCIUM 8.6 (L) 02/21/2023    MG 2.3 02/21/2023    MG 2.1 12/22/2021    ALBUMIN 3.1 (L) 02/27/2023    ALBUMIN 4.1 01/17/2023    PROT 7.5 02/27/2023    PROT 7.8 01/17/2023    ALKPHOS 73 02/27/2023    ALKPHOS 78 01/17/2023    ALT 67 (H) 02/27/2023    ALT 9 (L) 01/17/2023    AST 36 02/27/2023    AST 15 01/17/2023    BILITOT 1.0 02/27/2023    BILITOT 0.6 01/17/2023        CARDIAC PROFILE - LAST 2  Lab Results   Component Value Date     03/12/2016    BNP 38 07/17/2013    TROPONINI <0.012 10/18/2021    TROPONINI <0.012 10/05/2021        COAGULATION - LAST 2  Lab Results   Component Value Date    LABPT 12.3 12/24/2019    LABPT 13.1 10/16/2017    INR 1.0 12/24/2019    INR 1.0 10/16/2017    APTT 29.8 10/16/2017    APTT 25.2 10/10/2016       ENDOCRINE & PSA - LAST 2  Lab Results   Component Value Date    HGBA1C 5.4 04/09/2022    HGBA1C 5.2 06/01/2021    TSH 2.193 09/13/2022    TSH 0.211 (L) 07/15/2022    PROCAL 0.05 01/24/2021    PROCAL <0.05 12/23/2019        ECHOCARDIOGRAM RESULTS  Results for orders placed during the hospital encounter of 10/04/21    Echo Saline Bubble? No    Interpretation Summary  · The left ventricle is normal in size with concentric remodeling and normal systolic function.  · Normal left ventricular diastolic function.  · The estimated ejection fraction is 55-60 %.  · Normal right ventricular size with normal right ventricular systolic function.  · Normal central venous pressure (3 mmHg).      CURRENT/PREVIOUS VISIT EKG  Results for orders placed or performed during the hospital encounter of 02/27/23   EKG 12-lead    Collection Time: 02/27/23  2:45 PM    Narrative    Test Reason : R53.1,    Vent. Rate : 069 BPM     Atrial Rate : 069 BPM     P-R Int : 166 ms          QRS Dur : 092 ms      QT Int : 438 ms       P-R-T Axes : 048 041 056 degrees     QTc Int : 469 ms    Normal sinus rhythm  Normal  ECG  When compared with ECG of 17-JAN-2023 15:59,  No significant change was found  Confirmed by Aleksandar Newton MD ([id number]) on 3/7/2023 8:31:30 AM    Referred By: LINDA   SELF           Confirmed By:Aleksandar Newton MD     No valid procedures specified.   Results for orders placed during the hospital encounter of 10/04/21    Nuclear Stress - Cardiology Interpreted    Interpretation Summary    Normal resting nuclear images.    No valid procedures specified.    PHYSICAL EXAM  CONSTITUTIONAL: Well built, well nourished in no apparent distress  NECK: no carotid bruit, no JVD  LUNGS: CTA  CHEST WALL: no tenderness  HEART: regular rate and rhythm, S1, S2 normal, no murmur, click, rub or gallop   ABDOMEN: soft, non-tender; bowel sounds normal; no masses,  no organomegaly  EXTREMITIES: Extremities normal, no edema, no calf tenderness noted  NEURO: AAO X 3    I HAVE REVIEWED :    The vital signs, nurses notes, and all the pertinent radiology and labs.    Current Outpatient Medications   Medication Instructions    acetaminophen (TYLENOL) 1,300 mg, Oral, Every 8 hours PRN    albuterol (PROVENTIL/VENTOLIN HFA) 90 mcg/actuation inhaler 2 puffs, Inhalation, Every 6 hours PRN    allopurinoL (ZYLOPRIM) 100 mg, Oral, Daily    aspirin (ECOTRIN) 81 mg, Oral, Daily    atorvastatin (LIPITOR) 40 MG tablet TAKE 1 TABLET BY MOUTH EVERY DAY    baclofen (LIORESAL) 10 mg, Oral, 3 times daily    blood-glucose meter (GLUCOSE MONITORING KIT) kit Use as instructed    BRILINTA 90 mg tablet TAKE 1 TABLET BY MOUTH 2 TIMES A DAY    calcium carb and citrate-vitD3 600 mg-12.5 mcg (500 unit) TbSR 2 tablets, Oral, Once    carvediloL (COREG) 6.25 mg, Oral, 2 times daily with meals    cetirizine (ZYRTEC) 10 mg, Oral, Daily PRN    fluticasone propionate (FLONASE) 50 mcg/actuation nasal spray fluticasone propionate 50 mcg/actuation nasal spray,suspension    HYDROcodone-acetaminophen (NORCO) 7.5-325 mg per tablet 1 tablet, Oral, Every 4 hours PRN     Lactobacillus acidophilus (PROBIOTIC) 10 billion cell Cap 1 capsule by mouth daily    lancets Misc 1 Units, Misc.(Non-Drug; Combo Route), 2 times daily PRN    levothyroxine (SYNTHROID) 100 mcg, Oral, Before breakfast    multivitamin (THERAGRAN) per tablet 1 tablet, Oral, Daily    ondansetron (ZOFRAN-ODT) 4 mg, Oral, Every 6 hours PRN    pantoprazole (PROTONIX) 40 mg, Oral, 2 times daily    pregabalin (LYRICA) 200 mg, Oral, 2 times daily    ranolazine (RANEXA) 500 MG Tb12 TAKE 2 TABLETS (1 000 MG TOTAL) BY MOUTH 2 (TWO) TIMES DAILY.    senna-docusate 8.6-50 mg (PERICOLACE) 8.6-50 mg per tablet 1 tablet, Oral, Daily    venlafaxine (EFFEXOR-XR) 150 MG Cp24 TAKE 1 CAPSULE BY MOUTH EVERY DAY        Assessment & Plan     Hypertension  Requiring less anti-HTN since baratric surgery   Continue coreg 6.25 mg BID   Low Na diet   Continue to monitor       Hyperlipidemia  Continue statin medication   Labs soon after few months post bariatric surgery per PCP     S/P CABG (coronary artery bypass graft)  GI bleed recently   Has been off of Brillinta and ASA for few weeks   Has resumed Brillinta   Will continue to hold ASA     Stented coronary artery  No anginal equivalentes noted   She would like to try to come off the ranexa as she has had no chest pain   Try every other day and then discontinue           Follow up in about 3 months (around 6/21/2023).

## 2023-03-24 ENCOUNTER — PATIENT MESSAGE (OUTPATIENT)
Dept: FAMILY MEDICINE | Facility: CLINIC | Age: 58
End: 2023-03-24
Payer: COMMERCIAL

## 2023-03-24 ENCOUNTER — PATIENT MESSAGE (OUTPATIENT)
Dept: BARIATRICS | Facility: CLINIC | Age: 58
End: 2023-03-24
Payer: COMMERCIAL

## 2023-03-24 DIAGNOSIS — E03.9 HYPOTHYROIDISM, UNSPECIFIED TYPE: ICD-10-CM

## 2023-03-24 DIAGNOSIS — E78.5 HYPERLIPIDEMIA, UNSPECIFIED HYPERLIPIDEMIA TYPE: ICD-10-CM

## 2023-03-24 DIAGNOSIS — Z98.84 S/P GASTRIC BYPASS: Primary | ICD-10-CM

## 2023-04-04 ENCOUNTER — PATIENT MESSAGE (OUTPATIENT)
Dept: BARIATRICS | Facility: CLINIC | Age: 58
End: 2023-04-04
Payer: COMMERCIAL

## 2023-04-05 ENCOUNTER — OFFICE VISIT (OUTPATIENT)
Dept: BARIATRICS | Facility: CLINIC | Age: 58
End: 2023-04-05
Payer: COMMERCIAL

## 2023-04-05 VITALS
SYSTOLIC BLOOD PRESSURE: 138 MMHG | BODY MASS INDEX: 31.68 KG/M2 | DIASTOLIC BLOOD PRESSURE: 67 MMHG | TEMPERATURE: 98 F | RESPIRATION RATE: 16 BRPM | HEART RATE: 67 BPM | HEIGHT: 62 IN | WEIGHT: 172.19 LBS

## 2023-04-05 DIAGNOSIS — G47.33 OSA (OBSTRUCTIVE SLEEP APNEA): ICD-10-CM

## 2023-04-05 DIAGNOSIS — E66.01 MORBID OBESITY: Primary | ICD-10-CM

## 2023-04-05 DIAGNOSIS — E78.2 MIXED HYPERLIPIDEMIA: ICD-10-CM

## 2023-04-05 PROCEDURE — 4010F ACE/ARB THERAPY RXD/TAKEN: CPT | Mod: CPTII,S$GLB,, | Performed by: SURGERY

## 2023-04-05 PROCEDURE — 3008F BODY MASS INDEX DOCD: CPT | Mod: CPTII,S$GLB,, | Performed by: SURGERY

## 2023-04-05 PROCEDURE — 3008F PR BODY MASS INDEX (BMI) DOCUMENTED: ICD-10-PCS | Mod: CPTII,S$GLB,, | Performed by: SURGERY

## 2023-04-05 PROCEDURE — 3075F SYST BP GE 130 - 139MM HG: CPT | Mod: CPTII,S$GLB,, | Performed by: SURGERY

## 2023-04-05 PROCEDURE — 99999 PR PBB SHADOW E&M-EST. PATIENT-LVL IV: ICD-10-PCS | Mod: PBBFAC,,, | Performed by: SURGERY

## 2023-04-05 PROCEDURE — 4010F PR ACE/ARB THEARPY RXD/TAKEN: ICD-10-PCS | Mod: CPTII,S$GLB,, | Performed by: SURGERY

## 2023-04-05 PROCEDURE — 99024 POSTOP FOLLOW-UP VISIT: CPT | Mod: S$GLB,,, | Performed by: SURGERY

## 2023-04-05 PROCEDURE — 3078F DIAST BP <80 MM HG: CPT | Mod: CPTII,S$GLB,, | Performed by: SURGERY

## 2023-04-05 PROCEDURE — 99024 PR POST-OP FOLLOW-UP VISIT: ICD-10-PCS | Mod: S$GLB,,, | Performed by: SURGERY

## 2023-04-05 PROCEDURE — 3075F PR MOST RECENT SYSTOLIC BLOOD PRESS GE 130-139MM HG: ICD-10-PCS | Mod: CPTII,S$GLB,, | Performed by: SURGERY

## 2023-04-05 PROCEDURE — 99999 PR PBB SHADOW E&M-EST. PATIENT-LVL IV: CPT | Mod: PBBFAC,,, | Performed by: SURGERY

## 2023-04-05 PROCEDURE — 1159F MED LIST DOCD IN RCRD: CPT | Mod: CPTII,S$GLB,, | Performed by: SURGERY

## 2023-04-05 PROCEDURE — 3078F PR MOST RECENT DIASTOLIC BLOOD PRESSURE < 80 MM HG: ICD-10-PCS | Mod: CPTII,S$GLB,, | Performed by: SURGERY

## 2023-04-05 PROCEDURE — 1159F PR MEDICATION LIST DOCUMENTED IN MEDICAL RECORD: ICD-10-PCS | Mod: CPTII,S$GLB,, | Performed by: SURGERY

## 2023-04-05 NOTE — PROGRESS NOTES
Post Op Note    Surgery: gastric bypass surgery  Date: 2/20/23  Initial weight: 212  Last weight: 178  Current weight: 172    Constipation: colace bid, dulcolax, miralax   Reflux: none  Vomiting: greater than 1 ounce makes her nauseated     Diet:  Breakfast:  yogurt  Lunch: chicken salad- 1 ounce  Dinner: protein shake/fluids    Exercise:  Trying- cant walk far without neck and back spasms   Doing bicycle, pool    MVI: carlos mvi, calcium, iron    Vitals:    04/05/23 1614   BP: 138/67   Pulse: 67   Resp: 16   Temp: 97.8 °F (36.6 °C)       Body comp:  Fat Percent:  38.9 %  Fat Mass:  67 lb  FFM:  105.2 lb  TBW: 74.2 lb  TBW %:  43.1 %  BMR: 1444 kcal    PE:  NAD  RRR  Soft/nt/nd  Incisions: well healed    Labs: none    A/P: s/p gastric bypass      Counseling for patient:    Diet: add fiber - meta mucil plenty of water, then stimulant every 2-3 days; ok to try salads  Exercise: as much as possible  Vitamins: continue vitamins   Co morbidities:     1. Morbid obesity  CBC w/ Auto Differential    CMP    B12    B1    Lipid Panel    Iron Studies      2. BMI 33.0-33.9,adult        3. Mixed hyperlipidemia        4. RAHUL (obstructive sleep apnea)            -All above: Evaluated, monitored, and treated with diet and exercise program.

## 2023-04-26 ENCOUNTER — TELEPHONE (OUTPATIENT)
Dept: FAMILY MEDICINE | Facility: CLINIC | Age: 58
End: 2023-04-26

## 2023-05-01 DIAGNOSIS — N20.0 KIDNEY STONES: ICD-10-CM

## 2023-05-01 RX ORDER — ALLOPURINOL 100 MG/1
TABLET ORAL
Qty: 30 TABLET | Refills: 3 | Status: SHIPPED | OUTPATIENT
Start: 2023-05-01 | End: 2023-08-17

## 2023-05-01 NOTE — TELEPHONE ENCOUNTER
No care due was identified.  [facility] Embedded Care Due Messages. Reference number: [id number].   5/01/2023 8:50:03 AM CDT

## 2023-05-15 ENCOUNTER — PATIENT MESSAGE (OUTPATIENT)
Dept: BARIATRICS | Facility: CLINIC | Age: 58
End: 2023-05-15
Payer: COMMERCIAL

## 2023-05-15 DIAGNOSIS — Z98.84 HX OF BARIATRIC SURGERY: Primary | ICD-10-CM

## 2023-05-18 ENCOUNTER — HOSPITAL ENCOUNTER (OUTPATIENT)
Dept: RADIOLOGY | Facility: HOSPITAL | Age: 58
Discharge: HOME | End: 2023-05-18
Attending: SURGERY
Payer: COMMERCIAL

## 2023-05-18 DIAGNOSIS — Z98.84 HX OF BARIATRIC SURGERY: ICD-10-CM

## 2023-05-18 PROCEDURE — 76705 ECHO EXAM OF ABDOMEN: CPT | Mod: 26,,, | Performed by: RADIOLOGY

## 2023-05-18 PROCEDURE — 76705 ECHO EXAM OF ABDOMEN: CPT | Mod: TC

## 2023-05-18 PROCEDURE — 76705 US ABDOMEN LIMITED: ICD-10-PCS | Mod: 26,,, | Performed by: RADIOLOGY

## 2023-05-24 ENCOUNTER — TELEPHONE (OUTPATIENT)
Dept: BARIATRICS | Facility: CLINIC | Age: 58
End: 2023-05-24
Payer: COMMERCIAL

## 2023-05-24 ENCOUNTER — PATIENT MESSAGE (OUTPATIENT)
Dept: SURGERY | Facility: HOSPITAL | Age: 58
End: 2023-05-24
Payer: COMMERCIAL

## 2023-05-24 DIAGNOSIS — R17 JAUNDICE: Primary | ICD-10-CM

## 2023-05-24 NOTE — TELEPHONE ENCOUNTER
US suspicious for cbd obstruction, will get MRCP  Having some abnormally colored, abnormally textured stools.

## 2023-05-24 NOTE — TELEPHONE ENCOUNTER
Called pt to schedule MRCP. No answer, lmom. Called again with no answer. Will try again tomorrow.

## 2023-05-25 ENCOUNTER — OFFICE VISIT (OUTPATIENT)
Dept: FAMILY MEDICINE | Facility: CLINIC | Age: 58
End: 2023-05-25
Payer: COMMERCIAL

## 2023-05-25 VITALS
HEIGHT: 62 IN | OXYGEN SATURATION: 97 % | HEART RATE: 65 BPM | WEIGHT: 165.38 LBS | BODY MASS INDEX: 30.44 KG/M2 | DIASTOLIC BLOOD PRESSURE: 86 MMHG | SYSTOLIC BLOOD PRESSURE: 136 MMHG | TEMPERATURE: 98 F

## 2023-05-25 DIAGNOSIS — I10 ESSENTIAL HYPERTENSION: ICD-10-CM

## 2023-05-25 DIAGNOSIS — Z95.1 S/P CABG (CORONARY ARTERY BYPASS GRAFT): ICD-10-CM

## 2023-05-25 DIAGNOSIS — E78.2 MIXED HYPERLIPIDEMIA: ICD-10-CM

## 2023-05-25 DIAGNOSIS — Z98.84 S/P GASTRIC BYPASS: ICD-10-CM

## 2023-05-25 DIAGNOSIS — Z00.00 PREVENTATIVE HEALTH CARE: Primary | ICD-10-CM

## 2023-05-25 DIAGNOSIS — I25.10 CORONARY ARTERY DISEASE, UNSPECIFIED VESSEL OR LESION TYPE, UNSPECIFIED WHETHER ANGINA PRESENT, UNSPECIFIED WHETHER NATIVE OR TRANSPLANTED HEART: ICD-10-CM

## 2023-05-25 PROCEDURE — 99999 PR PBB SHADOW E&M-EST. PATIENT-LVL IV: CPT | Mod: PBBFAC,,, | Performed by: NURSE PRACTITIONER

## 2023-05-25 PROCEDURE — 1159F MED LIST DOCD IN RCRD: CPT | Mod: CPTII,S$GLB,, | Performed by: NURSE PRACTITIONER

## 2023-05-25 PROCEDURE — 3075F SYST BP GE 130 - 139MM HG: CPT | Mod: CPTII,S$GLB,, | Performed by: NURSE PRACTITIONER

## 2023-05-25 PROCEDURE — 99396 PR PREVENTIVE VISIT,EST,40-64: ICD-10-PCS | Mod: S$GLB,,, | Performed by: NURSE PRACTITIONER

## 2023-05-25 PROCEDURE — 3075F PR MOST RECENT SYSTOLIC BLOOD PRESS GE 130-139MM HG: ICD-10-PCS | Mod: CPTII,S$GLB,, | Performed by: NURSE PRACTITIONER

## 2023-05-25 PROCEDURE — 99999 PR PBB SHADOW E&M-EST. PATIENT-LVL IV: ICD-10-PCS | Mod: PBBFAC,,, | Performed by: NURSE PRACTITIONER

## 2023-05-25 PROCEDURE — 3008F BODY MASS INDEX DOCD: CPT | Mod: CPTII,S$GLB,, | Performed by: NURSE PRACTITIONER

## 2023-05-25 PROCEDURE — 3079F DIAST BP 80-89 MM HG: CPT | Mod: CPTII,S$GLB,, | Performed by: NURSE PRACTITIONER

## 2023-05-25 PROCEDURE — 3079F PR MOST RECENT DIASTOLIC BLOOD PRESSURE 80-89 MM HG: ICD-10-PCS | Mod: CPTII,S$GLB,, | Performed by: NURSE PRACTITIONER

## 2023-05-25 PROCEDURE — 1159F PR MEDICATION LIST DOCUMENTED IN MEDICAL RECORD: ICD-10-PCS | Mod: CPTII,S$GLB,, | Performed by: NURSE PRACTITIONER

## 2023-05-25 PROCEDURE — 99396 PREV VISIT EST AGE 40-64: CPT | Mod: S$GLB,,, | Performed by: NURSE PRACTITIONER

## 2023-05-25 PROCEDURE — 4010F PR ACE/ARB THEARPY RXD/TAKEN: ICD-10-PCS | Mod: CPTII,S$GLB,, | Performed by: NURSE PRACTITIONER

## 2023-05-25 PROCEDURE — 3008F PR BODY MASS INDEX (BMI) DOCUMENTED: ICD-10-PCS | Mod: CPTII,S$GLB,, | Performed by: NURSE PRACTITIONER

## 2023-05-25 PROCEDURE — 4010F ACE/ARB THERAPY RXD/TAKEN: CPT | Mod: CPTII,S$GLB,, | Performed by: NURSE PRACTITIONER

## 2023-05-25 NOTE — PROGRESS NOTES
Subjective:       Patient ID: Josephine Bolton is a 57 y.o. female.    Chief Complaint: Annual Exam    HPI  Known patient to me presents for routine follow up    Morbid obesity s/p gastric bypass 2/20/23. Following with bariatrics.   White stool, loose--US shows sludge and mildly dilated bile duct; scheduled for MRCP     HTN, HLD, CAD S/P CABG: hypotension related to weight loss, meds adjusted via portal through cardiology.        Vitals:    05/25/23 1313   BP: 136/86   Pulse: 65   Temp: 98 °F (36.7 °C)     Review of Systems   Constitutional:  Negative for fever.   Respiratory:  Negative for cough and shortness of breath.    Cardiovascular:  Negative for chest pain.     Past Medical History:   Diagnosis Date    Allergy     Anticoagulant long-term use     ASA 81mg, Effient    Anxiety     Arthritis     Asthma     As child    CAD (coronary artery disease) 01/27/2012    s/p stents x4 , CABG, 3 vessel, (5/2013) newest stent prior to CABG    Chronic constipation     Colon polyp     Coronary artery disease involving native coronary artery of native heart without angina pectoris 01/27/2012 12/19 Significant ostial RCA lesion as above treated with drug-eluting stenting as above. Occluded proximal LAD with patent lima lad Patent vein graft to 2nd obtuse marginal Known occluded vein graft to unknown vessel.  s/p stents x 3 (2010) s/p LAD stent (DSE) 3/2012    DDD (degenerative disc disease), cervical     DDD (degenerative disc disease), lumbar     DDD (degenerative disc disease), thoracic     Depression     Edema     Encounter for blood transfusion     General anesthetics causing adverse effect in therapeutic use     Headache(784.0)     History of nephrolithiasis     Hyperlipidemia     Hypertension     Hypothyroid 07/05/2012    Insomnia     Insulin resistance     patient stated not diebetic    Joint stiffness     Joint swelling     Kidney disease     ; Frequent UTIs     Kidney stones     Low back pain      "Neck pain     Obstructive sleep apnea on CPAP 07/17/2012    PONV (postoperative nausea and vomiting)     S/P CABG (coronary artery bypass graft) 06/25/2013 6/23/2013     Sleep apnea 01/27/2012    Patient reports "severe" sleep apnea, uses C-pap    Stented coronary artery 12/20/2019 12/19  ostial RCA -Osirio2.5 x 18 post dilated 2.75     Thoracic back pain     Usual hyperplasia of lactiferous duct 02/2017    left breast     Objective:      Physical Exam  Vitals and nursing note reviewed.   Constitutional:       General: She is not in acute distress.     Appearance: She is not diaphoretic.   HENT:      Head: Normocephalic.   Eyes:      General:         Right eye: No discharge.         Left eye: No discharge.   Neck:      Trachea: No tracheal deviation.   Cardiovascular:      Rate and Rhythm: Normal rate.      Heart sounds: Normal heart sounds.   Pulmonary:      Effort: Pulmonary effort is normal.      Breath sounds: Normal breath sounds.   Skin:     Coloration: Skin is not pale.   Neurological:      Mental Status: She is alert and oriented to person, place, and time.   Psychiatric:         Speech: Speech normal.         Behavior: Behavior normal.         Thought Content: Thought content normal.         Judgment: Judgment normal.       Assessment:       1. Preventative health care    2. S/P gastric bypass    3. Essential hypertension    4. Mixed hyperlipidemia    5. Coronary artery disease, unspecified vessel or lesion type, unspecified whether angina present, unspecified whether native or transplanted heart    6. S/P CABG (coronary artery bypass graft)        Plan:       Preventative health care    S/P gastric bypass    Essential hypertension    Mixed hyperlipidemia    Coronary artery disease, unspecified vessel or lesion type, unspecified whether angina present, unspecified whether native or transplanted heart    S/P CABG (coronary artery bypass graft)      Labs up to date per bariatrics  FU 3-6 months to " transition care to new PCP         Medication List with Changes/Refills   Current Medications    ACETAMINOPHEN (TYLENOL) 650 MG TBSR    Take 1,300 mg by mouth every 8 (eight) hours as needed (pain).     ALBUTEROL (PROVENTIL/VENTOLIN HFA) 90 MCG/ACTUATION INHALER    Inhale 2 puffs into the lungs every 6 (six) hours as needed for Shortness of Breath.     ALLOPURINOL (ZYLOPRIM) 100 MG TABLET    TAKE 1 TABLET BY MOUTH EVERY DAY    ASPIRIN (ECOTRIN) 81 MG EC TABLET    Take 81 mg by mouth once daily.    ATORVASTATIN (LIPITOR) 40 MG TABLET    TAKE 1 TABLET BY MOUTH EVERY DAY    BACLOFEN (LIORESAL) 10 MG TABLET    Take 1 tablet (10 mg total) by mouth 3 (three) times daily.    BLOOD-GLUCOSE METER (GLUCOSE MONITORING KIT) KIT    Use as instructed    BRILINTA 90 MG TABLET    TAKE 1 TABLET BY MOUTH 2 TIMES A DAY    CALCIUM CARB AND CITRATE-VITD3 600 MG-12.5 MCG (500 UNIT) TBSR    Take 2 tablets by mouth once.    CARVEDILOL (COREG) 6.25 MG TABLET    Take 1 tablet (6.25 mg total) by mouth 2 (two) times daily with meals.    CETIRIZINE (ZYRTEC) 10 MG TABLET    Take 10 mg by mouth daily as needed for Allergies.    FLUTICASONE PROPIONATE (FLONASE) 50 MCG/ACTUATION NASAL SPRAY    fluticasone propionate 50 mcg/actuation nasal spray,suspension    HYDROCODONE-ACETAMINOPHEN (NORCO) 7.5-325 MG PER TABLET    Take 1 tablet by mouth every 4 (four) hours as needed for Pain.    LACTOBACILLUS ACIDOPHILUS (PROBIOTIC) 10 BILLION CELL CAP    1 capsule by mouth daily    LANCETS MISC    1 Units by Misc.(Non-Drug; Combo Route) route 2 (two) times daily as needed.    LEVOTHYROXINE (SYNTHROID) 100 MCG TABLET    Take 1 tablet (100 mcg total) by mouth before breakfast.    MULTIVITAMIN (THERAGRAN) PER TABLET    Take 1 tablet by mouth once daily. Bariatric vitamin    ONDANSETRON (ZOFRAN-ODT) 4 MG TBDL    Take 1 tablet (4 mg total) by mouth every 6 (six) hours as needed (nv).    PANTOPRAZOLE (PROTONIX) 40 MG TABLET    Take 1 tablet (40 mg total) by mouth 2  (two) times daily.    PREGABALIN (LYRICA) 100 MG CAPSULE    Take 2 capsules (200 mg total) by mouth 2 (two) times a day.    RANOLAZINE (RANEXA) 500 MG TB12    TAKE 1 TABLET BY MOUTH 2 TIMES A DAY    SENNA-DOCUSATE 8.6-50 MG (PERICOLACE) 8.6-50 MG PER TABLET    Take 1 tablet by mouth 2 (two) times a day.    VENLAFAXINE (EFFEXOR-XR) 150 MG CP24    TAKE 1 CAPSULE BY MOUTH EVERY DAY

## 2023-05-25 NOTE — TELEPHONE ENCOUNTER
Care Due:                  Date            Visit Type   Department     Provider  --------------------------------------------------------------------------------                                EP -                              PRIMARY      [facility] FAMILY  Last Visit: 10-      CARE ([facility])   MEDICINE       Froylan BALDERRAMA  Next Visit: None Scheduled  None         None Found                                                            Last  Test          Frequency    Reason                     Performed    Due Date  --------------------------------------------------------------------------------    Lipid Panel.  12 months..  atorvastatin.............  02- 02-    Uric Acid...  12 months..  allopurinoL..............  05- 05-    [employer] Embedded Care Due Messages. Reference number: [id number].   5/25/2023 10:48:28 AM CDT

## 2023-05-25 NOTE — TELEPHONE ENCOUNTER
Called pt to schedule MRCP. Pt agreed to 1st available on 5/27/23 at 10:00 AM. Instructions given for NPO 4 hours prior and arrive 30 minutes early. Location verified.

## 2023-05-27 RX ORDER — TICAGRELOR 90 MG/1
TABLET ORAL
Qty: 60 TABLET | Refills: 1 | Status: SHIPPED | OUTPATIENT
Start: 2023-05-27 | End: 2023-07-20

## 2023-06-01 ENCOUNTER — LAB VISIT (OUTPATIENT)
Dept: LAB | Facility: HOSPITAL | Age: 58
End: 2023-06-01
Attending: SURGERY
Payer: COMMERCIAL

## 2023-06-01 DIAGNOSIS — E66.01 MORBID OBESITY: ICD-10-CM

## 2023-06-01 LAB
ALBUMIN SERPL BCP-MCNC: 3.9 G/DL (ref 3.5–5.2)
ALP SERPL-CCNC: 100 U/L (ref 55–135)
ALT SERPL W/O P-5'-P-CCNC: 36 U/L (ref 10–44)
ANION GAP SERPL CALC-SCNC: 10 MMOL/L (ref 8–16)
AST SERPL-CCNC: 63 U/L (ref 10–40)
BASOPHILS # BLD AUTO: 0.12 K/UL (ref 0–0.2)
BASOPHILS NFR BLD: 2 % (ref 0–1.9)
BILIRUB SERPL-MCNC: 0.5 MG/DL (ref 0.1–1)
BUN SERPL-MCNC: 12 MG/DL (ref 6–20)
CALCIUM SERPL-MCNC: 9.7 MG/DL (ref 8.7–10.5)
CHLORIDE SERPL-SCNC: 109 MMOL/L (ref 95–110)
CHOLEST SERPL-MCNC: 125 MG/DL (ref 120–199)
CHOLEST/HDLC SERPL: 3.2 {RATIO} (ref 2–5)
CO2 SERPL-SCNC: 28 MMOL/L (ref 23–29)
CREAT SERPL-MCNC: 0.8 MG/DL (ref 0.5–1.4)
DIFFERENTIAL METHOD: ABNORMAL
EOSINOPHIL # BLD AUTO: 0.1 K/UL (ref 0–0.5)
EOSINOPHIL NFR BLD: 2.4 % (ref 0–8)
ERYTHROCYTE [DISTWIDTH] IN BLOOD BY AUTOMATED COUNT: 13.2 % (ref 11.5–14.5)
EST. GFR  (NO RACE VARIABLE): >60 ML/MIN/1.73 M^2
GLUCOSE SERPL-MCNC: 90 MG/DL (ref 70–110)
HCT VFR BLD AUTO: 42.3 % (ref 37–48.5)
HDLC SERPL-MCNC: 39 MG/DL (ref 40–75)
HDLC SERPL: 31.2 % (ref 20–50)
HGB BLD-MCNC: 12.6 G/DL (ref 12–16)
IMM GRANULOCYTES # BLD AUTO: 0.01 K/UL (ref 0–0.04)
IMM GRANULOCYTES NFR BLD AUTO: 0.2 % (ref 0–0.5)
IRON SERPL-MCNC: 41 UG/DL (ref 30–160)
LDLC SERPL CALC-MCNC: 67.2 MG/DL (ref 63–159)
LYMPHOCYTES # BLD AUTO: 2.2 K/UL (ref 1–4.8)
LYMPHOCYTES NFR BLD: 36.7 % (ref 18–48)
MCH RBC QN AUTO: 27.8 PG (ref 27–31)
MCHC RBC AUTO-ENTMCNC: 29.8 G/DL (ref 32–36)
MCV RBC AUTO: 93 FL (ref 82–98)
MONOCYTES # BLD AUTO: 0.4 K/UL (ref 0.3–1)
MONOCYTES NFR BLD: 7.5 % (ref 4–15)
NEUTROPHILS # BLD AUTO: 3 K/UL (ref 1.8–7.7)
NEUTROPHILS NFR BLD: 51.2 % (ref 38–73)
NONHDLC SERPL-MCNC: 86 MG/DL
NRBC BLD-RTO: 0 /100 WBC
PLATELET # BLD AUTO: 271 K/UL (ref 150–450)
PMV BLD AUTO: 10.2 FL (ref 9.2–12.9)
POTASSIUM SERPL-SCNC: 4.1 MMOL/L (ref 3.5–5.1)
PROT SERPL-MCNC: 7.3 G/DL (ref 6–8.4)
RBC # BLD AUTO: 4.54 M/UL (ref 4–5.4)
SATURATED IRON: 13 % (ref 20–50)
SODIUM SERPL-SCNC: 147 MMOL/L (ref 136–145)
TOTAL IRON BINDING CAPACITY: 321 UG/DL (ref 250–450)
TRANSFERRIN SERPL-MCNC: 217 MG/DL (ref 200–375)
TRIGL SERPL-MCNC: 94 MG/DL (ref 30–150)
VIT B12 SERPL-MCNC: 348 PG/ML (ref 210–950)
WBC # BLD AUTO: 5.86 K/UL (ref 3.9–12.7)

## 2023-06-01 PROCEDURE — 84425 ASSAY OF VITAMIN B-1: CPT | Performed by: SURGERY

## 2023-06-01 PROCEDURE — 36415 COLL VENOUS BLD VENIPUNCTURE: CPT | Mod: PO | Performed by: SURGERY

## 2023-06-01 PROCEDURE — 84466 ASSAY OF TRANSFERRIN: CPT | Performed by: SURGERY

## 2023-06-01 PROCEDURE — 82607 VITAMIN B-12: CPT | Performed by: SURGERY

## 2023-06-01 PROCEDURE — 80061 LIPID PANEL: CPT | Performed by: SURGERY

## 2023-06-01 PROCEDURE — 85025 COMPLETE CBC W/AUTO DIFF WBC: CPT | Performed by: SURGERY

## 2023-06-01 PROCEDURE — 80053 COMPREHEN METABOLIC PANEL: CPT | Performed by: SURGERY

## 2023-06-02 ENCOUNTER — HOSPITAL ENCOUNTER (OUTPATIENT)
Dept: RADIOLOGY | Facility: HOSPITAL | Age: 58
Discharge: HOME | End: 2023-06-02
Attending: SURGERY
Payer: COMMERCIAL

## 2023-06-02 DIAGNOSIS — R17 JAUNDICE: ICD-10-CM

## 2023-06-02 PROCEDURE — 76376 MRI ABDOMEN WITHOUT CONTRAST MRCP: ICD-10-PCS | Mod: 26,,, | Performed by: RADIOLOGY

## 2023-06-02 PROCEDURE — 74181 MRI ABDOMEN WITHOUT CONTRAST MRCP: ICD-10-PCS | Mod: 26,,, | Performed by: RADIOLOGY

## 2023-06-02 PROCEDURE — 74181 MRI ABDOMEN W/O CONTRAST: CPT | Mod: 26,,, | Performed by: RADIOLOGY

## 2023-06-02 PROCEDURE — 74181 MRI ABDOMEN W/O CONTRAST: CPT | Mod: TC,PO

## 2023-06-02 PROCEDURE — 76376 3D RENDER W/INTRP POSTPROCES: CPT | Mod: 26,,, | Performed by: RADIOLOGY

## 2023-06-06 LAB — VIT B1 BLD-MCNC: 86 UG/L (ref 38–122)

## 2023-06-09 ENCOUNTER — OFFICE VISIT (OUTPATIENT)
Dept: BARIATRICS | Facility: CLINIC | Age: 58
End: 2023-06-09
Payer: COMMERCIAL

## 2023-06-09 VITALS
WEIGHT: 157.81 LBS | HEART RATE: 60 BPM | BODY MASS INDEX: 29.04 KG/M2 | RESPIRATION RATE: 16 BRPM | SYSTOLIC BLOOD PRESSURE: 153 MMHG | DIASTOLIC BLOOD PRESSURE: 69 MMHG | HEIGHT: 62 IN | TEMPERATURE: 96 F

## 2023-06-09 DIAGNOSIS — E66.01 MORBID OBESITY: Primary | ICD-10-CM

## 2023-06-09 DIAGNOSIS — G47.33 OSA (OBSTRUCTIVE SLEEP APNEA): ICD-10-CM

## 2023-06-09 DIAGNOSIS — E78.2 MIXED HYPERLIPIDEMIA: ICD-10-CM

## 2023-06-09 DIAGNOSIS — K83.8 DILATED CBD, ACQUIRED: ICD-10-CM

## 2023-06-09 DIAGNOSIS — M51.36 DDD (DEGENERATIVE DISC DISEASE), LUMBAR: ICD-10-CM

## 2023-06-09 PROCEDURE — 3008F BODY MASS INDEX DOCD: CPT | Mod: CPTII,S$GLB,, | Performed by: SURGERY

## 2023-06-09 PROCEDURE — 99999 PR PBB SHADOW E&M-EST. PATIENT-LVL V: ICD-10-PCS | Mod: PBBFAC,,, | Performed by: SURGERY

## 2023-06-09 PROCEDURE — 1159F MED LIST DOCD IN RCRD: CPT | Mod: CPTII,S$GLB,, | Performed by: SURGERY

## 2023-06-09 PROCEDURE — 99213 OFFICE O/P EST LOW 20 MIN: CPT | Mod: S$GLB,,, | Performed by: SURGERY

## 2023-06-09 PROCEDURE — 99999 PR PBB SHADOW E&M-EST. PATIENT-LVL V: CPT | Mod: PBBFAC,,, | Performed by: SURGERY

## 2023-06-09 PROCEDURE — 1159F PR MEDICATION LIST DOCUMENTED IN MEDICAL RECORD: ICD-10-PCS | Mod: CPTII,S$GLB,, | Performed by: SURGERY

## 2023-06-09 PROCEDURE — 3077F PR MOST RECENT SYSTOLIC BLOOD PRESSURE >= 140 MM HG: ICD-10-PCS | Mod: CPTII,S$GLB,, | Performed by: SURGERY

## 2023-06-09 PROCEDURE — 4010F ACE/ARB THERAPY RXD/TAKEN: CPT | Mod: CPTII,S$GLB,, | Performed by: SURGERY

## 2023-06-09 PROCEDURE — 99213 PR OFFICE/OUTPT VISIT, EST, LEVL III, 20-29 MIN: ICD-10-PCS | Mod: S$GLB,,, | Performed by: SURGERY

## 2023-06-09 PROCEDURE — 3078F DIAST BP <80 MM HG: CPT | Mod: CPTII,S$GLB,, | Performed by: SURGERY

## 2023-06-09 PROCEDURE — 3008F PR BODY MASS INDEX (BMI) DOCUMENTED: ICD-10-PCS | Mod: CPTII,S$GLB,, | Performed by: SURGERY

## 2023-06-09 PROCEDURE — 4010F PR ACE/ARB THEARPY RXD/TAKEN: ICD-10-PCS | Mod: CPTII,S$GLB,, | Performed by: SURGERY

## 2023-06-09 PROCEDURE — 3077F SYST BP >= 140 MM HG: CPT | Mod: CPTII,S$GLB,, | Performed by: SURGERY

## 2023-06-09 PROCEDURE — 3078F PR MOST RECENT DIASTOLIC BLOOD PRESSURE < 80 MM HG: ICD-10-PCS | Mod: CPTII,S$GLB,, | Performed by: SURGERY

## 2023-06-09 NOTE — PROGRESS NOTES
Post Op Note    Surgery: gastric bypass surgery  Date: 2/20/23  Initial weight: 212  Last weight: 172  Current weight: 157    Feels bubble that can be associated with epigastric pain, gets very gassy with broccoli which lasts for days, beans gives some gas    Very light colored stools    Constipation: none, having lighter colored stool   Reflux: sometimes but not regularly  Vomiting: none, but gets nauseated with meats    Diet:  Vegetables like lettuce and other cooked vegetables  Will still do meats but small amounts    Exercise:  As much as she can    MVI: daily mvi, calcium    Vitals:    06/09/23 1107   BP: (!) 153/69   Pulse: 60   Resp: 16   Temp: 96.1 °F (35.6 °C)       Body comp:  Fat Percent:  36.5 %  Fat Mass:  57.6 lb  FFM:  100.2 lb  TBW: 70.2 lb  TBW %:  44.5 %  BMR: 1369 kcal    PE:  NAD  RRR  Soft/nt/nd    Labs: reviewed    US reviewed- dilated cbd  MRi reviewed- dilated cbd- needs eus- will defer to gi    A/P: s/p gastric bypass    Will send patient to see gi, no ominous findings on mri, if gi plans eus then will be available for diagnostic laparoscopy and gastrotomy if necessary     Counseling for patient:    Diet: continue low carb dieting- keep trying meats  Exercise: as much as possible  Vitamins: continue regimen  Co morbidities:     1. Morbid obesity        2. BMI 33.0-33.9,adult        3. RAHUL (obstructive sleep apnea)        4. Mixed hyperlipidemia        5. DDD (degenerative disc disease), lumbar        6. Dilated cbd, acquired  Ambulatory referral/consult to Gastroenterology          -All above: Evaluated, monitored, and treated with diet and exercise program.

## 2023-06-12 ENCOUNTER — PATIENT MESSAGE (OUTPATIENT)
Dept: BARIATRICS | Facility: CLINIC | Age: 58
End: 2023-06-12
Payer: COMMERCIAL

## 2023-06-12 ENCOUNTER — TELEPHONE (OUTPATIENT)
Dept: BARIATRICS | Facility: CLINIC | Age: 58
End: 2023-06-12
Payer: COMMERCIAL

## 2023-06-12 NOTE — TELEPHONE ENCOUNTER
Faxed GI referral to Dr. Graves's office, including Dr. Whipple's last clinic note, most recent blood work, MRI and U/S.

## 2023-06-19 ENCOUNTER — OFFICE VISIT (OUTPATIENT)
Dept: CARDIOLOGY | Facility: CLINIC | Age: 58
End: 2023-06-19
Payer: COMMERCIAL

## 2023-06-19 ENCOUNTER — LAB VISIT (OUTPATIENT)
Dept: LAB | Facility: HOSPITAL | Age: 58
End: 2023-06-19
Attending: INTERNAL MEDICINE
Payer: COMMERCIAL

## 2023-06-19 ENCOUNTER — OFFICE VISIT (OUTPATIENT)
Dept: OPTOMETRY | Facility: CLINIC | Age: 58
End: 2023-06-19
Payer: COMMERCIAL

## 2023-06-19 VITALS
WEIGHT: 157.63 LBS | HEART RATE: 66 BPM | DIASTOLIC BLOOD PRESSURE: 80 MMHG | HEIGHT: 61 IN | BODY MASS INDEX: 29.76 KG/M2 | SYSTOLIC BLOOD PRESSURE: 144 MMHG

## 2023-06-19 DIAGNOSIS — H02.413 MECHANICAL PTOSIS, BILATERAL: ICD-10-CM

## 2023-06-19 DIAGNOSIS — Z13.5 GLAUCOMA SCREENING: ICD-10-CM

## 2023-06-19 DIAGNOSIS — H43.393 VITREOUS FLOATERS, BILATERAL: Primary | ICD-10-CM

## 2023-06-19 DIAGNOSIS — I15.0 RENOVASCULAR HYPERTENSION: ICD-10-CM

## 2023-06-19 DIAGNOSIS — H52.203 MYOPIA WITH ASTIGMATISM AND PRESBYOPIA, BILATERAL: ICD-10-CM

## 2023-06-19 DIAGNOSIS — H52.13 MYOPIA WITH ASTIGMATISM AND PRESBYOPIA, BILATERAL: ICD-10-CM

## 2023-06-19 DIAGNOSIS — Z95.1 S/P CABG (CORONARY ARTERY BYPASS GRAFT): Primary | ICD-10-CM

## 2023-06-19 DIAGNOSIS — E78.2 MIXED HYPERLIPIDEMIA: ICD-10-CM

## 2023-06-19 DIAGNOSIS — H52.4 MYOPIA WITH ASTIGMATISM AND PRESBYOPIA, BILATERAL: ICD-10-CM

## 2023-06-19 DIAGNOSIS — H35.033 HYPERTENSIVE RETINOPATHY OF BOTH EYES: ICD-10-CM

## 2023-06-19 DIAGNOSIS — Z95.5 STENTED CORONARY ARTERY: ICD-10-CM

## 2023-06-19 DIAGNOSIS — H02.63 XANTHELASMA OF EYELID, BILATERAL: ICD-10-CM

## 2023-06-19 DIAGNOSIS — Z98.84 BARIATRIC SURGERY STATUS: Primary | ICD-10-CM

## 2023-06-19 DIAGNOSIS — H02.66 XANTHELASMA OF EYELID, BILATERAL: ICD-10-CM

## 2023-06-19 LAB
ALBUMIN SERPL BCP-MCNC: 4.1 G/DL (ref 3.5–5.2)
ALP SERPL-CCNC: 108 U/L (ref 55–135)
ALT SERPL W/O P-5'-P-CCNC: 53 U/L (ref 10–44)
ANION GAP SERPL CALC-SCNC: 11 MMOL/L (ref 8–16)
AST SERPL-CCNC: 96 U/L (ref 10–40)
BILIRUB SERPL-MCNC: 0.5 MG/DL (ref 0.1–1)
BUN SERPL-MCNC: 18 MG/DL (ref 6–20)
CALCIUM SERPL-MCNC: 9.5 MG/DL (ref 8.7–10.5)
CHLORIDE SERPL-SCNC: 107 MMOL/L (ref 95–110)
CO2 SERPL-SCNC: 27 MMOL/L (ref 23–29)
CREAT SERPL-MCNC: 0.8 MG/DL (ref 0.5–1.4)
EST. GFR  (NO RACE VARIABLE): >60 ML/MIN/1.73 M^2
GLUCOSE SERPL-MCNC: 93 MG/DL (ref 70–110)
POTASSIUM SERPL-SCNC: 3.7 MMOL/L (ref 3.5–5.1)
PROT SERPL-MCNC: 7.4 G/DL (ref 6–8.4)
SODIUM SERPL-SCNC: 145 MMOL/L (ref 136–145)

## 2023-06-19 PROCEDURE — 1159F MED LIST DOCD IN RCRD: CPT | Mod: CPTII,S$GLB,, | Performed by: INTERNAL MEDICINE

## 2023-06-19 PROCEDURE — 4010F PR ACE/ARB THEARPY RXD/TAKEN: ICD-10-PCS | Mod: CPTII,S$GLB,, | Performed by: OPTOMETRIST

## 2023-06-19 PROCEDURE — 92015 DETERMINE REFRACTIVE STATE: CPT | Mod: S$GLB,,, | Performed by: OPTOMETRIST

## 2023-06-19 PROCEDURE — 3008F PR BODY MASS INDEX (BMI) DOCUMENTED: ICD-10-PCS | Mod: CPTII,S$GLB,, | Performed by: INTERNAL MEDICINE

## 2023-06-19 PROCEDURE — 3079F PR MOST RECENT DIASTOLIC BLOOD PRESSURE 80-89 MM HG: ICD-10-PCS | Mod: CPTII,S$GLB,, | Performed by: INTERNAL MEDICINE

## 2023-06-19 PROCEDURE — 4010F ACE/ARB THERAPY RXD/TAKEN: CPT | Mod: CPTII,S$GLB,, | Performed by: OPTOMETRIST

## 2023-06-19 PROCEDURE — 92014 COMPRE OPH EXAM EST PT 1/>: CPT | Mod: S$GLB,,, | Performed by: OPTOMETRIST

## 2023-06-19 PROCEDURE — 99999 PR PBB SHADOW E&M-EST. PATIENT-LVL IV: ICD-10-PCS | Mod: PBBFAC,,, | Performed by: OPTOMETRIST

## 2023-06-19 PROCEDURE — 3077F PR MOST RECENT SYSTOLIC BLOOD PRESSURE >= 140 MM HG: ICD-10-PCS | Mod: CPTII,S$GLB,, | Performed by: INTERNAL MEDICINE

## 2023-06-19 PROCEDURE — 92014 PR EYE EXAM, EST PATIENT,COMPREHESV: ICD-10-PCS | Mod: S$GLB,,, | Performed by: OPTOMETRIST

## 2023-06-19 PROCEDURE — 99999 PR PBB SHADOW E&M-EST. PATIENT-LVL IV: ICD-10-PCS | Mod: PBBFAC,,, | Performed by: INTERNAL MEDICINE

## 2023-06-19 PROCEDURE — 99214 OFFICE O/P EST MOD 30 MIN: CPT | Mod: S$GLB,,, | Performed by: INTERNAL MEDICINE

## 2023-06-19 PROCEDURE — 1159F PR MEDICATION LIST DOCUMENTED IN MEDICAL RECORD: ICD-10-PCS | Mod: CPTII,S$GLB,, | Performed by: OPTOMETRIST

## 2023-06-19 PROCEDURE — 4010F ACE/ARB THERAPY RXD/TAKEN: CPT | Mod: CPTII,S$GLB,, | Performed by: INTERNAL MEDICINE

## 2023-06-19 PROCEDURE — 99999 PR PBB SHADOW E&M-EST. PATIENT-LVL IV: CPT | Mod: PBBFAC,,, | Performed by: OPTOMETRIST

## 2023-06-19 PROCEDURE — 80053 COMPREHEN METABOLIC PANEL: CPT | Performed by: INTERNAL MEDICINE

## 2023-06-19 PROCEDURE — 3008F BODY MASS INDEX DOCD: CPT | Mod: CPTII,S$GLB,, | Performed by: INTERNAL MEDICINE

## 2023-06-19 PROCEDURE — 86301 IMMUNOASSAY TUMOR CA 19-9: CPT | Performed by: INTERNAL MEDICINE

## 2023-06-19 PROCEDURE — 3077F SYST BP >= 140 MM HG: CPT | Mod: CPTII,S$GLB,, | Performed by: INTERNAL MEDICINE

## 2023-06-19 PROCEDURE — 36415 COLL VENOUS BLD VENIPUNCTURE: CPT | Performed by: INTERNAL MEDICINE

## 2023-06-19 PROCEDURE — 4010F PR ACE/ARB THEARPY RXD/TAKEN: ICD-10-PCS | Mod: CPTII,S$GLB,, | Performed by: INTERNAL MEDICINE

## 2023-06-19 PROCEDURE — 1159F MED LIST DOCD IN RCRD: CPT | Mod: CPTII,S$GLB,, | Performed by: OPTOMETRIST

## 2023-06-19 PROCEDURE — 99999 PR PBB SHADOW E&M-EST. PATIENT-LVL IV: CPT | Mod: PBBFAC,,, | Performed by: INTERNAL MEDICINE

## 2023-06-19 PROCEDURE — 1159F PR MEDICATION LIST DOCUMENTED IN MEDICAL RECORD: ICD-10-PCS | Mod: CPTII,S$GLB,, | Performed by: INTERNAL MEDICINE

## 2023-06-19 PROCEDURE — 3079F DIAST BP 80-89 MM HG: CPT | Mod: CPTII,S$GLB,, | Performed by: INTERNAL MEDICINE

## 2023-06-19 PROCEDURE — 99214 PR OFFICE/OUTPT VISIT, EST, LEVL IV, 30-39 MIN: ICD-10-PCS | Mod: S$GLB,,, | Performed by: INTERNAL MEDICINE

## 2023-06-19 PROCEDURE — 92015 PR REFRACTION: ICD-10-PCS | Mod: S$GLB,,, | Performed by: OPTOMETRIST

## 2023-06-19 NOTE — PROGRESS NOTES
HPI     Concerns About Ocular Health     Additional comments: Ocular health exam           Comments    DLS: 6/8/22    Pt states gets headaches behind eyes OS > OD. Decrease in va OD > OS since   last visit. Eyes tear a lot  at night with crusting. Eyes feels dry. No   drops.           Last edited by Cheryle Quintana on 6/19/2023 11:06 AM.            Assessment /Plan     For exam results, see Encounter Report.    Vitreous floaters, bilateral    Hypertensive retinopathy of both eyes    Mechanical ptosis, bilateral    Glaucoma screening    Xanthelasma of eyelid, bilateral    Myopia with astigmatism and presbyopia, bilateral      RD precautions given and reviewed. Patient knows to call/ message if any further changes in symptoms occur.  Vasc caliber changes, no gross valerie/ no ischemia ---continue control BP   Longstanding / stable --had seen Dr Graham to r/o MG---to Dr Reagan for possible sx   Not suspect   Longstanding / bilateral ---continue tight control chol   Updated specs rx, gave copy, fill prn     Discussed and educated patient on current findings /plan.  RTC 1 year, prn if any changes / issues

## 2023-06-19 NOTE — PROGRESS NOTES
Subjective:    Patient ID:  Josephine Bolton is a 57 y.o. female who presents for follow-up of cad    HPI  She comes with no complaints, no chest pain, no shortness of breath  No cardiac complaints at this time.    She has lost around 45-50 lb for the last six-month  Having some GI issues for which she may need to have a EBUS    Review of Systems   Constitutional: Negative for decreased appetite, malaise/fatigue, weight gain and weight loss.   Cardiovascular:  Negative for chest pain, dyspnea on exertion, leg swelling, palpitations and syncope.   Respiratory:  Negative for cough and shortness of breath.    Gastrointestinal: Negative.    Neurological:  Negative for weakness.   All other systems reviewed and are negative.     Objective:      Physical Exam  Vitals and nursing note reviewed.   Constitutional:       Appearance: Normal appearance. She is well-developed.   HENT:      Head: Normocephalic.   Eyes:      Pupils: Pupils are equal, round, and reactive to light.   Neck:      Thyroid: No thyromegaly.      Vascular: No carotid bruit or JVD.   Cardiovascular:      Rate and Rhythm: Normal rate and regular rhythm.      Chest Wall: PMI is not displaced.      Pulses: Normal pulses and intact distal pulses.      Heart sounds: Normal heart sounds. No murmur heard.    No gallop.   Pulmonary:      Effort: Pulmonary effort is normal.      Breath sounds: Normal breath sounds.   Abdominal:      Palpations: Abdomen is soft. There is no mass.      Tenderness: There is no abdominal tenderness.   Musculoskeletal:         General: Normal range of motion.      Cervical back: Normal range of motion and neck supple.   Skin:     General: Skin is warm.   Neurological:      Mental Status: She is alert and oriented to person, place, and time.      Sensory: No sensory deficit.      Deep Tendon Reflexes: Reflexes are normal and symmetric.       Assessment:       1. S/P CABG (coronary artery bypass graft)    2. Stented coronary artery     3. Renovascular hypertension    4. Mixed hyperlipidemia         Plan:     Continue all cardiac medications  Regular exercise program  Weight loss  6-9 month f/u

## 2023-06-20 LAB — CANCER AG19-9 SERPL-ACNC: 11.9 U/ML (ref 0–40)

## 2023-06-21 LAB
CHOLEST SERPL-MSCNC: 118 MG/DL (ref 0–200)
HDLC SERPL-MCNC: 46 MG/DL (ref 35–70)
LDLC SERPL CALC-MCNC: 45 MG/DL
TRIGL SERPL-MCNC: 133 MG/DL

## 2023-06-22 RX ORDER — LEVOTHYROXINE SODIUM 100 UG/1
TABLET ORAL
Qty: 30 TABLET | Refills: 11 | Status: SHIPPED | OUTPATIENT
Start: 2023-06-22

## 2023-06-26 ENCOUNTER — HOSPITAL ENCOUNTER (OUTPATIENT)
Dept: RADIOLOGY | Facility: HOSPITAL | Age: 58
Discharge: HOME | End: 2023-06-26
Attending: INTERNAL MEDICINE
Payer: COMMERCIAL

## 2023-06-26 DIAGNOSIS — Z98.84 STATUS POST BARIATRIC SURGERY: ICD-10-CM

## 2023-06-26 PROCEDURE — 74170 CT ABD WO CNTRST FLWD CNTRST: CPT | Mod: TC

## 2023-06-26 PROCEDURE — 74170 CT ABDOMEN W WO CONTRAST: ICD-10-PCS | Mod: 26,,, | Performed by: RADIOLOGY

## 2023-06-26 PROCEDURE — 74170 CT ABD WO CNTRST FLWD CNTRST: CPT | Mod: 26,,, | Performed by: RADIOLOGY

## 2023-06-26 PROCEDURE — 25500020 PHARM REV CODE 255

## 2023-06-26 PROCEDURE — A9698 NON-RAD CONTRAST MATERIALNOC: HCPCS

## 2023-06-26 RX ADMIN — IOHEXOL 75 ML: 350 INJECTION, SOLUTION INTRAVENOUS at 04:06

## 2023-06-26 RX ADMIN — IOHEXOL 500 ML: 9 SOLUTION ORAL at 04:06

## 2023-06-27 ENCOUNTER — PATIENT MESSAGE (OUTPATIENT)
Dept: BARIATRICS | Facility: CLINIC | Age: 58
End: 2023-06-27
Payer: COMMERCIAL

## 2023-06-28 ENCOUNTER — PATIENT MESSAGE (OUTPATIENT)
Dept: CARDIOLOGY | Facility: CLINIC | Age: 58
End: 2023-06-28
Payer: COMMERCIAL

## 2023-06-28 ENCOUNTER — TELEPHONE (OUTPATIENT)
Dept: CARDIOLOGY | Facility: CLINIC | Age: 58
End: 2023-06-28
Payer: COMMERCIAL

## 2023-06-28 DIAGNOSIS — K80.44 CALCULUS OF BILE DUCT WITH CHRONIC CHOLECYSTITIS WITHOUT OBSTRUCTION: Primary | ICD-10-CM

## 2023-06-28 NOTE — TELEPHONE ENCOUNTER
Refill Authorization Note     is requesting a refill authorization.    Brief assessment and rational for refill: APPROVE: prr  Name of medication: prasugrel 10 mg / gabapentin 100 mg  How patient will take medication: t1t po daily / t1t po BID   Amount/Quantity of medication ordered: 90d   Medication reconciliation completed: No        Refills Authorized: Yes  If authorized number of refills: 0        Medication Therapy Plan: Pt was recently discharged from the hospital for unstable angina.  LHC was performed, no disease progression.  Last seen by you 4/17.  Approve prasugrel and gabapentin for 3 mo.  Patient may need f/u  Comments:   Lab Results   Component Value Date    CREATININE 0.83 10/16/2017    BUN 22 (H) 10/16/2017     10/16/2017    K 3.6 10/16/2017     10/16/2017    CO2 30 10/16/2017      Lab Results   Component Value Date    WBC 6.83 10/16/2017    HGB 12.1 10/16/2017    HCT 37.4 10/16/2017    MCV 89 10/16/2017     10/16/2017      
  See previous HPI.

## 2023-06-28 NOTE — TELEPHONE ENCOUNTER
----- Message from Izabela Pandya RN sent at 6/28/2023  4:04 PM CDT -----  Good evening. Ms. Bolton was added today for a Lap Lisa with Dr. Whipple for this coming Friday, 6/30/23. She takes Brilinta and has a h/o CABG, therefore, we need a Cardiac clearance please. Preadmit fax # [phone]    Thank you so much!

## 2023-06-29 ENCOUNTER — TELEPHONE (OUTPATIENT)
Dept: BARIATRICS | Facility: CLINIC | Age: 58
End: 2023-06-29
Payer: COMMERCIAL

## 2023-06-29 ENCOUNTER — TELEPHONE (OUTPATIENT)
Dept: CARDIOLOGY | Facility: CLINIC | Age: 58
End: 2023-06-29
Payer: COMMERCIAL

## 2023-06-29 NOTE — TELEPHONE ENCOUNTER
Notified Dr. Whipple of needing cardiac clearance and pt taking Brilinta (last dose 6/28/23 AM). He instructed to move her surgery until Monday 7/3/23. Pt notified and agreed. Reminded her to continue to hold Brilinta. Isabela at the OR scheduling desk at [facility] notified of schedule change.

## 2023-06-29 NOTE — TELEPHONE ENCOUNTER
----- Message from Madhavi David RN sent at 6/29/2023 12:10 PM CDT -----  Regarding: Cardiac clearance/hold Brilinta  Hi there,   I'm just checking in to see if there's any updates on the cardiac clearance for Ms. Bolton.   Her surgery is scheduled for tomorrow, but her last dose of Brilinta was yesterday morning.   I'm assuming that her surgery will have to be rescheduled due to this, but wanted to get the clearance with Dr. Mancuso's recs. On how long to hold.     Thanks for your help with this.     All the Best,  ESTEPHANIA Talbert

## 2023-06-30 ENCOUNTER — TELEPHONE (OUTPATIENT)
Dept: BARIATRICS | Facility: CLINIC | Age: 58
End: 2023-06-30
Payer: COMMERCIAL

## 2023-07-03 ENCOUNTER — ANESTHESIA EVENT (OUTPATIENT)
Dept: SURGERY | Facility: HOSPITAL | Age: 58
End: 2023-07-03
Payer: COMMERCIAL

## 2023-07-03 ENCOUNTER — ANESTHESIA (OUTPATIENT)
Dept: SURGERY | Facility: HOSPITAL | Age: 58
End: 2023-07-03
Payer: COMMERCIAL

## 2023-07-03 ENCOUNTER — HOSPITAL ENCOUNTER (OUTPATIENT)
Facility: HOSPITAL | Age: 58
Discharge: HOME | End: 2023-07-03
Attending: SURGERY | Admitting: SURGERY
Payer: COMMERCIAL

## 2023-07-03 VITALS
DIASTOLIC BLOOD PRESSURE: 74 MMHG | BODY MASS INDEX: 27.79 KG/M2 | HEART RATE: 68 BPM | RESPIRATION RATE: 16 BRPM | SYSTOLIC BLOOD PRESSURE: 164 MMHG | OXYGEN SATURATION: 99 % | TEMPERATURE: 98 F | WEIGHT: 151 LBS | HEIGHT: 62 IN

## 2023-07-03 DIAGNOSIS — Z01.818 PREOPERATIVE TESTING: ICD-10-CM

## 2023-07-03 DIAGNOSIS — K80.10 CALCULUS OF GALLBLADDER WITH CHRONIC CHOLECYSTITIS WITHOUT OBSTRUCTION: Primary | ICD-10-CM

## 2023-07-03 DIAGNOSIS — K81.9 CHOLECYSTITIS: ICD-10-CM

## 2023-07-03 LAB
ALBUMIN SERPL BCP-MCNC: 4.1 G/DL (ref 3.5–5.2)
ALP SERPL-CCNC: 75 U/L (ref 55–135)
ALT SERPL W/O P-5'-P-CCNC: 21 U/L (ref 10–44)
ANION GAP SERPL CALC-SCNC: 7 MMOL/L (ref 8–16)
APTT PPP: 26.5 SEC (ref 21–32)
AST SERPL-CCNC: 31 U/L (ref 10–40)
BILIRUB SERPL-MCNC: 0.7 MG/DL (ref 0.1–1)
BUN SERPL-MCNC: 13 MG/DL (ref 6–20)
CALCIUM SERPL-MCNC: 9.2 MG/DL (ref 8.7–10.5)
CHLORIDE SERPL-SCNC: 106 MMOL/L (ref 95–110)
CO2 SERPL-SCNC: 29 MMOL/L (ref 23–29)
CREAT SERPL-MCNC: 0.9 MG/DL (ref 0.5–1.4)
ERYTHROCYTE [DISTWIDTH] IN BLOOD BY AUTOMATED COUNT: 14 % (ref 11.5–14.5)
EST. GFR  (NO RACE VARIABLE): >60 ML/MIN/1.73 M^2
GLUCOSE SERPL-MCNC: 100 MG/DL (ref 70–110)
HCT VFR BLD AUTO: 36.2 % (ref 37–48.5)
HGB BLD-MCNC: 12 G/DL (ref 12–16)
INR PPP: 1 (ref 0.8–1.2)
MCH RBC QN AUTO: 28.8 PG (ref 27–31)
MCHC RBC AUTO-ENTMCNC: 33.1 G/DL (ref 32–36)
MCV RBC AUTO: 87 FL (ref 82–98)
PLATELET # BLD AUTO: 242 K/UL (ref 150–450)
PMV BLD AUTO: 9.6 FL (ref 9.2–12.9)
POTASSIUM SERPL-SCNC: 3.5 MMOL/L (ref 3.5–5.1)
PROT SERPL-MCNC: 7.2 G/DL (ref 6–8.4)
PROTHROMBIN TIME: 11.2 SEC (ref 9–12.5)
RBC # BLD AUTO: 4.16 M/UL (ref 4–5.4)
SODIUM SERPL-SCNC: 142 MMOL/L (ref 136–145)
WBC # BLD AUTO: 5.97 K/UL (ref 3.9–12.7)

## 2023-07-03 PROCEDURE — 63600175 PHARM REV CODE 636 W HCPCS: Performed by: ANESTHESIOLOGY

## 2023-07-03 PROCEDURE — 36000709 HC OR TIME LEV III EA ADD 15 MIN: Performed by: SURGERY

## 2023-07-03 PROCEDURE — D9220A PRA ANESTHESIA: ICD-10-PCS | Mod: CRNA,,, | Performed by: NURSE ANESTHETIST, CERTIFIED REGISTERED

## 2023-07-03 PROCEDURE — 71000039 HC RECOVERY, EACH ADD'L HOUR: Performed by: SURGERY

## 2023-07-03 PROCEDURE — 27000080 OPTIME MED/SURG SUP & DEVICES GENERAL CLASSIFICATION: Performed by: SURGERY

## 2023-07-03 PROCEDURE — D9220A PRA ANESTHESIA: Mod: CRNA,,, | Performed by: NURSE ANESTHETIST, CERTIFIED REGISTERED

## 2023-07-03 PROCEDURE — 37000009 HC ANESTHESIA EA ADD 15 MINS: Performed by: SURGERY

## 2023-07-03 PROCEDURE — 85730 THROMBOPLASTIN TIME PARTIAL: CPT | Performed by: ANESTHESIOLOGY

## 2023-07-03 PROCEDURE — 80053 COMPREHEN METABOLIC PANEL: CPT | Performed by: ANESTHESIOLOGY

## 2023-07-03 PROCEDURE — 37000008 HC ANESTHESIA 1ST 15 MINUTES: Performed by: SURGERY

## 2023-07-03 PROCEDURE — 47563 LAPARO CHOLECYSTECTOMY/GRAPH: CPT | Mod: ,,, | Performed by: SURGERY

## 2023-07-03 PROCEDURE — 36000708 HC OR TIME LEV III 1ST 15 MIN: Performed by: SURGERY

## 2023-07-03 PROCEDURE — 25000003 PHARM REV CODE 250: Performed by: SURGERY

## 2023-07-03 PROCEDURE — 63600175 PHARM REV CODE 636 W HCPCS: Performed by: NURSE ANESTHETIST, CERTIFIED REGISTERED

## 2023-07-03 PROCEDURE — 71000015 HC POSTOP RECOV 1ST HR: Performed by: SURGERY

## 2023-07-03 PROCEDURE — 25000003 PHARM REV CODE 250: Performed by: ANESTHESIOLOGY

## 2023-07-03 PROCEDURE — 47563 PR LAP,CHOLECYSTECTOMY/GRAPH: ICD-10-PCS | Mod: ,,, | Performed by: SURGERY

## 2023-07-03 PROCEDURE — C1894 INTRO/SHEATH, NON-LASER: HCPCS | Performed by: SURGERY

## 2023-07-03 PROCEDURE — 25000003 PHARM REV CODE 250: Performed by: NURSE ANESTHETIST, CERTIFIED REGISTERED

## 2023-07-03 PROCEDURE — D9220A PRA ANESTHESIA: Mod: ANES,,, | Performed by: ANESTHESIOLOGY

## 2023-07-03 PROCEDURE — 85027 COMPLETE CBC AUTOMATED: CPT | Performed by: ANESTHESIOLOGY

## 2023-07-03 PROCEDURE — 71000016 HC POSTOP RECOV ADDL HR: Performed by: SURGERY

## 2023-07-03 PROCEDURE — D9220A PRA ANESTHESIA: ICD-10-PCS | Mod: ANES,,, | Performed by: ANESTHESIOLOGY

## 2023-07-03 PROCEDURE — 27201423 OPTIME MED/SURG SUP & DEVICES STERILE SUPPLY: Performed by: SURGERY

## 2023-07-03 PROCEDURE — 71000033 HC RECOVERY, INTIAL HOUR: Performed by: SURGERY

## 2023-07-03 PROCEDURE — 85610 PROTHROMBIN TIME: CPT | Performed by: ANESTHESIOLOGY

## 2023-07-03 RX ORDER — FENTANYL CITRATE 50 UG/ML
25 INJECTION, SOLUTION INTRAMUSCULAR; INTRAVENOUS EVERY 5 MIN PRN
Status: DISCONTINUED | OUTPATIENT
Start: 2023-07-03 | End: 2023-07-03 | Stop reason: HOSPADM

## 2023-07-03 RX ORDER — FENTANYL CITRATE 50 UG/ML
INJECTION, SOLUTION INTRAMUSCULAR; INTRAVENOUS
Status: DISCONTINUED | OUTPATIENT
Start: 2023-07-03 | End: 2023-07-03

## 2023-07-03 RX ORDER — ONDANSETRON HYDROCHLORIDE 2 MG/ML
INJECTION, SOLUTION INTRAMUSCULAR; INTRAVENOUS
Status: DISCONTINUED | OUTPATIENT
Start: 2023-07-03 | End: 2023-07-03

## 2023-07-03 RX ORDER — FAMOTIDINE 10 MG/ML
INJECTION INTRAVENOUS
Status: DISCONTINUED | OUTPATIENT
Start: 2023-07-03 | End: 2023-07-03

## 2023-07-03 RX ORDER — ETOMIDATE 2 MG/ML
INJECTION INTRAVENOUS
Status: DISCONTINUED | OUTPATIENT
Start: 2023-07-03 | End: 2023-07-03

## 2023-07-03 RX ORDER — DIPHENHYDRAMINE HYDROCHLORIDE 50 MG/ML
INJECTION INTRAMUSCULAR; INTRAVENOUS
Status: DISCONTINUED | OUTPATIENT
Start: 2023-07-03 | End: 2023-07-03

## 2023-07-03 RX ORDER — BUPIVACAINE HCL/EPINEPHRINE 0.25-.0005
VIAL (ML) INJECTION
Status: DISCONTINUED | OUTPATIENT
Start: 2023-07-03 | End: 2023-07-03 | Stop reason: HOSPADM

## 2023-07-03 RX ORDER — SCOLOPAMINE TRANSDERMAL SYSTEM 1 MG/1
1 PATCH, EXTENDED RELEASE TRANSDERMAL ONCE
Status: DISCONTINUED | OUTPATIENT
Start: 2023-07-03 | End: 2023-07-03

## 2023-07-03 RX ORDER — DEXMEDETOMIDINE HYDROCHLORIDE 100 UG/ML
INJECTION, SOLUTION INTRAVENOUS
Status: COMPLETED
Start: 2023-07-03 | End: 2023-07-03

## 2023-07-03 RX ORDER — SCOLOPAMINE TRANSDERMAL SYSTEM 1 MG/1
1 PATCH, EXTENDED RELEASE TRANSDERMAL ONCE
Status: DISCONTINUED | OUTPATIENT
Start: 2023-07-03 | End: 2023-07-03 | Stop reason: HOSPADM

## 2023-07-03 RX ORDER — PROPOFOL 10 MG/ML
VIAL (ML) INTRAVENOUS
Status: DISCONTINUED | OUTPATIENT
Start: 2023-07-03 | End: 2023-07-03

## 2023-07-03 RX ORDER — ONDANSETRON 2 MG/ML
4 INJECTION INTRAMUSCULAR; INTRAVENOUS DAILY PRN
Status: DISCONTINUED | OUTPATIENT
Start: 2023-07-03 | End: 2023-07-03 | Stop reason: HOSPADM

## 2023-07-03 RX ORDER — DEXAMETHASONE SODIUM PHOSPHATE 4 MG/ML
INJECTION, SOLUTION INTRA-ARTICULAR; INTRALESIONAL; INTRAMUSCULAR; INTRAVENOUS; SOFT TISSUE
Status: DISCONTINUED | OUTPATIENT
Start: 2023-07-03 | End: 2023-07-03

## 2023-07-03 RX ORDER — ROCURONIUM BROMIDE 10 MG/ML
INJECTION, SOLUTION INTRAVENOUS
Status: DISCONTINUED | OUTPATIENT
Start: 2023-07-03 | End: 2023-07-03

## 2023-07-03 RX ORDER — ACETAMINOPHEN 10 MG/ML
INJECTION, SOLUTION INTRAVENOUS
Status: DISCONTINUED | OUTPATIENT
Start: 2023-07-03 | End: 2023-07-03

## 2023-07-03 RX ORDER — DIPHENHYDRAMINE HYDROCHLORIDE 50 MG/ML
12.5 INJECTION INTRAMUSCULAR; INTRAVENOUS ONCE AS NEEDED
Status: DISCONTINUED | OUTPATIENT
Start: 2023-07-03 | End: 2023-07-03 | Stop reason: HOSPADM

## 2023-07-03 RX ORDER — OXYCODONE HYDROCHLORIDE 5 MG/1
5 TABLET ORAL
Status: DISCONTINUED | OUTPATIENT
Start: 2023-07-03 | End: 2023-07-03 | Stop reason: HOSPADM

## 2023-07-03 RX ORDER — SODIUM CHLORIDE 9 MG/ML
INJECTION, SOLUTION INTRAVENOUS CONTINUOUS
Status: DISCONTINUED | OUTPATIENT
Start: 2023-07-03 | End: 2023-07-03 | Stop reason: HOSPADM

## 2023-07-03 RX ORDER — HYDROCODONE BITARTRATE AND ACETAMINOPHEN 7.5; 325 MG/1; MG/1
1 TABLET ORAL EVERY 4 HOURS PRN
Qty: 20 TABLET | Refills: 0 | Status: SHIPPED | OUTPATIENT
Start: 2023-07-03 | End: 2023-08-25

## 2023-07-03 RX ORDER — DEXMEDETOMIDINE HYDROCHLORIDE 100 UG/ML
15 INJECTION, SOLUTION INTRAVENOUS ONCE
Status: DISCONTINUED | OUTPATIENT
Start: 2023-07-03 | End: 2023-07-03 | Stop reason: HOSPADM

## 2023-07-03 RX ORDER — DEXMEDETOMIDINE HYDROCHLORIDE 100 UG/ML
INJECTION, SOLUTION INTRAVENOUS
Status: DISCONTINUED | OUTPATIENT
Start: 2023-07-03 | End: 2023-07-03

## 2023-07-03 RX ORDER — SODIUM CHLORIDE, SODIUM LACTATE, POTASSIUM CHLORIDE, CALCIUM CHLORIDE 600; 310; 30; 20 MG/100ML; MG/100ML; MG/100ML; MG/100ML
INJECTION, SOLUTION INTRAVENOUS CONTINUOUS PRN
Status: DISCONTINUED | OUTPATIENT
Start: 2023-07-03 | End: 2023-07-03

## 2023-07-03 RX ORDER — CLINDAMYCIN PHOSPHATE 900 MG/50ML
INJECTION, SOLUTION INTRAVENOUS
Status: DISCONTINUED | OUTPATIENT
Start: 2023-07-03 | End: 2023-07-03

## 2023-07-03 RX ORDER — LIDOCAINE HYDROCHLORIDE 10 MG/ML
INJECTION, SOLUTION EPIDURAL; INFILTRATION; INTRACAUDAL; PERINEURAL
Status: DISCONTINUED | OUTPATIENT
Start: 2023-07-03 | End: 2023-07-03

## 2023-07-03 RX ORDER — ONDANSETRON 4 MG/1
4 TABLET, ORALLY DISINTEGRATING ORAL EVERY 6 HOURS PRN
Qty: 30 TABLET | Refills: 0 | Status: SHIPPED | OUTPATIENT
Start: 2023-07-03 | End: 2023-08-25

## 2023-07-03 RX ORDER — SODIUM CHLORIDE 9 MG/ML
INJECTION, SOLUTION INTRAVENOUS CONTINUOUS
Status: CANCELLED | OUTPATIENT
Start: 2023-07-03

## 2023-07-03 RX ORDER — SUCCINYLCHOLINE CHLORIDE 20 MG/ML
INJECTION INTRAMUSCULAR; INTRAVENOUS
Status: DISCONTINUED | OUTPATIENT
Start: 2023-07-03 | End: 2023-07-03

## 2023-07-03 RX ADMIN — FENTANYL CITRATE 25 MCG: 50 INJECTION, SOLUTION INTRAMUSCULAR; INTRAVENOUS at 12:07

## 2023-07-03 RX ADMIN — ETOMIDATE 12 MG: 2 INJECTION, SOLUTION INTRAVENOUS at 10:07

## 2023-07-03 RX ADMIN — DIPHENHYDRAMINE HYDROCHLORIDE 6.25 MG: 50 INJECTION INTRAMUSCULAR; INTRAVENOUS at 10:07

## 2023-07-03 RX ADMIN — FENTANYL CITRATE 50 MCG: 50 INJECTION, SOLUTION INTRAMUSCULAR; INTRAVENOUS at 11:07

## 2023-07-03 RX ADMIN — SCOPOLAMINE 1 PATCH: 1 PATCH TRANSDERMAL at 10:07

## 2023-07-03 RX ADMIN — OXYCODONE HYDROCHLORIDE 5 MG: 5 TABLET ORAL at 12:07

## 2023-07-03 RX ADMIN — ROCURONIUM BROMIDE 5 MG: 10 INJECTION, SOLUTION INTRAVENOUS at 10:07

## 2023-07-03 RX ADMIN — SODIUM CHLORIDE, SODIUM LACTATE, POTASSIUM CHLORIDE, AND CALCIUM CHLORIDE: .6; .31; .03; .02 INJECTION, SOLUTION INTRAVENOUS at 10:07

## 2023-07-03 RX ADMIN — DEXMEDETOMIDINE HYDROCHLORIDE 20 MCG: 100 INJECTION, SOLUTION INTRAVENOUS at 10:07

## 2023-07-03 RX ADMIN — ONDANSETRON 4 MG: 2 INJECTION INTRAMUSCULAR; INTRAVENOUS at 10:07

## 2023-07-03 RX ADMIN — SUGAMMADEX 200 MG: 100 INJECTION, SOLUTION INTRAVENOUS at 11:07

## 2023-07-03 RX ADMIN — ROCURONIUM BROMIDE 25 MG: 10 INJECTION, SOLUTION INTRAVENOUS at 10:07

## 2023-07-03 RX ADMIN — FENTANYL CITRATE 50 MCG: 50 INJECTION, SOLUTION INTRAMUSCULAR; INTRAVENOUS at 10:07

## 2023-07-03 RX ADMIN — FAMOTIDINE 20 MG: 10 INJECTION, SOLUTION INTRAVENOUS at 10:07

## 2023-07-03 RX ADMIN — DEXAMETHASONE SODIUM PHOSPHATE 8 MG: 4 INJECTION, SOLUTION INTRAMUSCULAR; INTRAVENOUS at 10:07

## 2023-07-03 RX ADMIN — PROPOFOL 50 MG: 10 INJECTION, EMULSION INTRAVENOUS at 10:07

## 2023-07-03 RX ADMIN — CLINDAMYCIN IN 5 PERCENT DEXTROSE 900 MG: 18 INJECTION, SOLUTION INTRAVENOUS at 10:07

## 2023-07-03 RX ADMIN — GLYCOPYRROLATE 0.2 MG: 0.2 INJECTION, SOLUTION INTRAMUSCULAR; INTRAVITREAL at 10:07

## 2023-07-03 RX ADMIN — LIDOCAINE HYDROCHLORIDE 50 MG: 10 INJECTION, SOLUTION EPIDURAL; INFILTRATION; INTRACAUDAL; PERINEURAL at 10:07

## 2023-07-03 RX ADMIN — ACETAMINOPHEN 1000 MG: 10 INJECTION, SOLUTION INTRAVENOUS at 10:07

## 2023-07-03 RX ADMIN — Medication 100 MG: at 10:07

## 2023-07-03 NOTE — H&P
"[facility]  History & Physical    Subjective:      Chief Complaint/Reason for Admission: abdominal pain    Josephine Bolton is a 57 y.o. female abdominal pain and stool changes thought to be from gallstones.  Here for cholecystectomy.    Past Medical History:   Diagnosis Date    Allergy     Anticoagulant long-term use     ASA 81mg, Effient    Anxiety     Arthritis     Asthma     As child    CAD (coronary artery disease) 01/27/2012    s/p stents x4 , CABG, 3 vessel, (5/2013) newest stent prior to CABG    Cataract     Chronic constipation     Colon polyp     Coronary artery disease involving native coronary artery of native heart without angina pectoris 01/27/2012 12/19 Significant ostial RCA lesion as above treated with drug-eluting stenting as above. Occluded proximal LAD with patent lima lad Patent vein graft to 2nd obtuse marginal Known occluded vein graft to unknown vessel.  s/p stents x 3 (2010) s/p LAD stent (DSE) 3/2012    DDD (degenerative disc disease), cervical     DDD (degenerative disc disease), lumbar     DDD (degenerative disc disease), thoracic     Depression     Edema     Encounter for blood transfusion     General anesthetics causing adverse effect in therapeutic use     Headache(784.0)     History of nephrolithiasis     Hyperlipidemia     Hypertension     Hypothyroid 07/05/2012    Insomnia     Insulin resistance     patient stated not diebetic    Joint stiffness     Joint swelling     Kidney disease     ; Frequent UTIs     Kidney stones     Low back pain     Neck pain     Obstructive sleep apnea on CPAP 07/17/2012    PONV (postoperative nausea and vomiting)     S/P CABG (coronary artery bypass graft) 06/25/2013 6/23/2013     Sleep apnea 01/27/2012    Patient reports "severe" sleep apnea, uses C-pap    Stented coronary artery 12/20/2019 12/19  ostial RCA -Osirio2.5 x 18 post dilated 2.75     Thoracic back pain     Usual hyperplasia of lactiferous duct 02/2017    " left breast     Past Surgical History:   Procedure Laterality Date    ADENOIDECTOMY      BACK SURGERY      BREAST BIOPSY Left 2017    duct excision- Dr. Jimenez    BREAST CYST ASPIRATION Left 2020    @ 's office- old hematoma drained (per office)    CARDIAC SURGERY      CARPAL TUNNEL RELEASE      bilateral    CERVICAL LAMINECTOMY WITH SPINAL FUSION      2016     SECTION, CLASSIC      x 2    COLONOSCOPY      CORONARY ANGIOGRAPHY  2019    Procedure: ANGIOGRAM, CORONARY ARTERY;  Surgeon: Timi Millan MD;  Location: [facility] CATH;  Service: Cardiology;;    CORONARY ANGIOPLASTY WITH STENT PLACEMENT      x 4    CORONARY ARTERY BYPASS GRAFT  2013    3 vessel    ESOPHAGOGASTRODUODENOSCOPY N/A 2020    Procedure: EGD (ESOPHAGOGASTRODUODENOSCOPY);  Surgeon: Mk Warren MD;  Location: [facility];  Service: Endoscopy;  Laterality: N/A;    HYSTERECTOMY      Complete    JOINT REPLACEMENT      KNEE ARTHROPLASTY Left 2019    Procedure: ARTHROPLASTY, KNEE;  Surgeon: Juan Wilson MD;  Location: [facility] OR;  Service: Orthopedics;  Laterality: Left;    KNEE ARTHROSCOPY W/ MENISCAL REPAIR Left     twice, last one 2014    LAMINECTOMY      L4/L5    LEFT HEART CATHETERIZATION  2019    Procedure: Left heart cath- RM # [id number];  Surgeon: Timi Millan MD;  Location: [facility] CATH;  Service: Cardiology;;    OOPHORECTOMY Bilateral     ROBOTIC ARTHROPLASTY, KNEE Right 2021    Procedure: ROBOTIC ARTHROPLASTY, KNEE, TOTAL;  Surgeon: Juan Wilson MD;  Location: [facility] OR;  Service: Orthopedics;  Laterality: Right;    SINUS SURGERY      x3    TENDON REPAIR Left     ankle surgery    THYROIDECTOMY      2 separate surgeries    TONSILLECTOMY      TOTAL KNEE ARTHROPLASTY Left 2019    Surgeon: Juan Wilson MD    XI ROBOTIC REVISION, GASTROENTEROSTOMY, MITCHELL-EN-Y N/A 2023    Procedure: XI ROBOTIC REVISION,GASTROENTEROSTOMY,MITCHELL-EN-Y;  Surgeon: Obinna Whipple MD;  Location:  [facility] OR;  Service: General;  Laterality: N/A;  creation of [place]-N-Y anastomsis     Family History   Problem Relation Age of Onset    Heart failure Mother     Asthma Mother     Macular degeneration Mother     Cancer Mother         Breast    Cataracts Mother     Heart disease Mother     COPD Mother     Breast cancer Mother     Heart disease Father     Diabetes Father     Hypertension Father     Stroke Father     Cataracts Father     Obesity Father     Obesity Sister     Glaucoma Sister     Thyroid disease Sister     Glaucoma Sister     Other Brother         stiff man syndrome    Cancer Maternal Grandmother         Breast with Mets    Breast cancer Maternal Grandmother     Cancer Paternal Grandmother         Colon    Strabismus Other     Amblyopia Neg Hx     Blindness Neg Hx     Retinal detachment Neg Hx      Social History     Tobacco Use    Smoking status: Former     Packs/day: 0.50     Years: 14.00     Pack years: 7.00     Types: Cigarettes     Start date: 1984     Quit date: 1998     Years since quittin.5    Smokeless tobacco: Never   Substance Use Topics    Alcohol use: No    Drug use: Not Currently     Types: Benzodiazepines       PTA Medications   Medication Sig    acetaminophen (TYLENOL) 650 MG TbSR Take 1,300 mg by mouth every 8 (eight) hours as needed (pain).     albuterol (PROVENTIL/VENTOLIN HFA) 90 mcg/actuation inhaler Inhale 2 puffs into the lungs every 6 (six) hours as needed for Shortness of Breath.     allopurinoL (ZYLOPRIM) 100 MG tablet TAKE 1 TABLET BY MOUTH EVERY DAY    atorvastatin (LIPITOR) 40 MG tablet TAKE 1 TABLET BY MOUTH EVERY DAY    baclofen (LIORESAL) 10 MG tablet Take 1 tablet (10 mg total) by mouth 3 (three) times daily.    BRILINTA 90 mg tablet TAKE 1 TABLET BY MOUTH 2 TIMES A DAY    calcium carb and citrate-vitD3 600 mg-12.5 mcg (500 unit) TbSR Take 2 tablets by mouth once.    carvediloL (COREG) 6.25 MG tablet Take 1 tablet (6.25 mg total) by mouth 2 (two) times daily  "with meals.    cetirizine (ZYRTEC) 10 MG tablet Take 10 mg by mouth daily as needed for Allergies.    fluticasone propionate (FLONASE) 50 mcg/actuation nasal spray fluticasone propionate 50 mcg/actuation nasal spray,suspension    levothyroxine (SYNTHROID) 100 MCG tablet TAKE 1 TABLET BY MOUTH EVERY DAY BEFORE BREAKFAST    multivitamin (THERAGRAN) per tablet Take 1 tablet by mouth once daily. Bariatric vitamin    pantoprazole (PROTONIX) 40 MG tablet Take 1 tablet (40 mg total) by mouth 2 (two) times daily.    pregabalin (LYRICA) 100 MG capsule Take 2 capsules (200 mg total) by mouth 2 (two) times a day.    senna-docusate 8.6-50 mg (PERICOLACE) 8.6-50 mg per tablet Take 1 tablet by mouth 2 (two) times a day.    venlafaxine (EFFEXOR-XR) 150 MG Cp24 TAKE 1 CAPSULE BY MOUTH EVERY DAY    blood-glucose meter (GLUCOSE MONITORING KIT) kit Use as instructed     Review of patient's allergies indicates:   Allergen Reactions    Celexa [citalopram] Other (See Comments)     GI upset  Stated "knocked me out"    Cephalexin Anaphylaxis    Zoloft [sertraline] Other (See Comments)     Stated "knocked me out"    Codeine Other (See Comments)     hallucinations  hallucinations    Isosorbide Nausea And Vomiting    Ciprofloxacin Itching    Levaquin [levofloxacin] Other (See Comments)     tendinitis    Metformin      Nausea     Penicillamine     Penicillins Other (See Comments)     Was told per mother that as child was allergic to penicillin.  Childhood allergy/ unknown reaction    Sulfa (sulfonamide antibiotics)         Review of Systems   Gastrointestinal:  Positive for abdominal pain. Negative for heartburn, nausea and vomiting.        Stool changes   All other systems reviewed and are negative.    Objective:      Vital Signs (Most Recent)  Temp: 98.1 °F (36.7 °C) (07/03/23 0920)  Pulse: (!) 59 (07/03/23 0920)  Resp: 16 (07/03/23 0920)  BP: 129/66 (07/03/23 0928)  SpO2: 97 % (07/03/23 0920)    Vital Signs Range (Last 24H):  Temp:  [98.1 " °F (36.7 °C)]   Pulse:  [59]   Resp:  [16]   BP: (129)/(66)   SpO2:  [97 %]     Physical Exam  Vitals and nursing note reviewed.   Constitutional:       General: She is not in acute distress.     Appearance: She is well-developed. She is not diaphoretic.   HENT:      Head: Normocephalic and atraumatic.      Mouth/Throat:      Pharynx: No oropharyngeal exudate.   Eyes:      General: No scleral icterus.     Conjunctiva/sclera: Conjunctivae normal.      Pupils: Pupils are equal, round, and reactive to light.   Neck:      Thyroid: No thyromegaly.      Vascular: No JVD.      Trachea: No tracheal deviation.   Cardiovascular:      Rate and Rhythm: Normal rate and regular rhythm.      Heart sounds: Normal heart sounds. No murmur heard.    No friction rub. No gallop.   Pulmonary:      Effort: Pulmonary effort is normal. No respiratory distress.      Breath sounds: Normal breath sounds. No stridor. No wheezing or rales.   Chest:      Chest wall: No tenderness.   Abdominal:      General: Bowel sounds are normal. There is no distension.      Palpations: Abdomen is soft. There is no mass.      Tenderness: There is no abdominal tenderness. There is no guarding or rebound.   Musculoskeletal:         General: No tenderness. Normal range of motion.      Cervical back: Normal range of motion and neck supple.   Lymphadenopathy:      Cervical: No cervical adenopathy.   Skin:     General: Skin is warm and dry.      Findings: No erythema or rash.   Neurological:      Mental Status: She is alert and oriented to person, place, and time.      Cranial Nerves: No cranial nerve deficit.   Psychiatric:         Behavior: Behavior normal.       Data Review:  CBC:   Lab Results   Component Value Date    WBC 5.97 07/03/2023    RBC 4.16 07/03/2023    HGB 12.0 07/03/2023    HCT 36.2 (L) 07/03/2023     07/03/2023     BMP:   Lab Results   Component Value Date    GLU 93 06/19/2023     06/19/2023    K 3.7 06/19/2023     06/19/2023     CO2 27 06/19/2023    BUN 18 06/19/2023    CREATININE 0.8 06/19/2023    CALCIUM 9.5 06/19/2023        Assessment:      Cholecystitis    Plan:      Lap harman today

## 2023-07-03 NOTE — PLAN OF CARE
1415- pt is alert and oriented breathing even unlabored with 3/10 abd pain at this time. Surgical sites are clean and dry with no redness or swelling. Pt sitting up drinking fluids with family at the bedside. 1440- pt voided with no difficulty. 1600- pt is alert and oriented breathing even unlabored. Surgical sites are clean and dry with no redness or swelling noted. Pt tolerated po intake with no n/v. Detailed discharge instructions given to the pt and her daughter. Pt sent home with prescription.

## 2023-07-03 NOTE — H&P (VIEW-ONLY)
"[facility]  History & Physical    Subjective:      Chief Complaint/Reason for Admission: abdominal pain    Josephine Bolton is a 57 y.o. female abdominal pain and stool changes thought to be from gallstones.  Here for cholecystectomy.    Past Medical History:   Diagnosis Date    Allergy     Anticoagulant long-term use     ASA 81mg, Effient    Anxiety     Arthritis     Asthma     As child    CAD (coronary artery disease) 01/27/2012    s/p stents x4 , CABG, 3 vessel, (5/2013) newest stent prior to CABG    Cataract     Chronic constipation     Colon polyp     Coronary artery disease involving native coronary artery of native heart without angina pectoris 01/27/2012 12/19 Significant ostial RCA lesion as above treated with drug-eluting stenting as above. Occluded proximal LAD with patent lima lad Patent vein graft to 2nd obtuse marginal Known occluded vein graft to unknown vessel.  s/p stents x 3 (2010) s/p LAD stent (DSE) 3/2012    DDD (degenerative disc disease), cervical     DDD (degenerative disc disease), lumbar     DDD (degenerative disc disease), thoracic     Depression     Edema     Encounter for blood transfusion     General anesthetics causing adverse effect in therapeutic use     Headache(784.0)     History of nephrolithiasis     Hyperlipidemia     Hypertension     Hypothyroid 07/05/2012    Insomnia     Insulin resistance     patient stated not diebetic    Joint stiffness     Joint swelling     Kidney disease     ; Frequent UTIs     Kidney stones     Low back pain     Neck pain     Obstructive sleep apnea on CPAP 07/17/2012    PONV (postoperative nausea and vomiting)     S/P CABG (coronary artery bypass graft) 06/25/2013 6/23/2013     Sleep apnea 01/27/2012    Patient reports "severe" sleep apnea, uses C-pap    Stented coronary artery 12/20/2019 12/19  ostial RCA -Osirio2.5 x 18 post dilated 2.75     Thoracic back pain     Usual hyperplasia of lactiferous duct 02/2017    " left breast     Past Surgical History:   Procedure Laterality Date    ADENOIDECTOMY      BACK SURGERY      BREAST BIOPSY Left 2017    duct excision- Dr. Jimenez    BREAST CYST ASPIRATION Left 2020    @ 's office- old hematoma drained (per office)    CARDIAC SURGERY      CARPAL TUNNEL RELEASE      bilateral    CERVICAL LAMINECTOMY WITH SPINAL FUSION      2016     SECTION, CLASSIC      x 2    COLONOSCOPY      CORONARY ANGIOGRAPHY  2019    Procedure: ANGIOGRAM, CORONARY ARTERY;  Surgeon: Timi Millan MD;  Location: [facility] CATH;  Service: Cardiology;;    CORONARY ANGIOPLASTY WITH STENT PLACEMENT      x 4    CORONARY ARTERY BYPASS GRAFT  2013    3 vessel    ESOPHAGOGASTRODUODENOSCOPY N/A 2020    Procedure: EGD (ESOPHAGOGASTRODUODENOSCOPY);  Surgeon: Mk Warren MD;  Location: [facility];  Service: Endoscopy;  Laterality: N/A;    HYSTERECTOMY      Complete    JOINT REPLACEMENT      KNEE ARTHROPLASTY Left 2019    Procedure: ARTHROPLASTY, KNEE;  Surgeon: Juan Wilson MD;  Location: [facility] OR;  Service: Orthopedics;  Laterality: Left;    KNEE ARTHROSCOPY W/ MENISCAL REPAIR Left     twice, last one 2014    LAMINECTOMY      L4/L5    LEFT HEART CATHETERIZATION  2019    Procedure: Left heart cath- RM # [id number];  Surgeon: Timi Millan MD;  Location: [facility] CATH;  Service: Cardiology;;    OOPHORECTOMY Bilateral     ROBOTIC ARTHROPLASTY, KNEE Right 2021    Procedure: ROBOTIC ARTHROPLASTY, KNEE, TOTAL;  Surgeon: Juan Wilson MD;  Location: [facility] OR;  Service: Orthopedics;  Laterality: Right;    SINUS SURGERY      x3    TENDON REPAIR Left     ankle surgery    THYROIDECTOMY      2 separate surgeries    TONSILLECTOMY      TOTAL KNEE ARTHROPLASTY Left 2019    Surgeon: Juan Wilson MD    XI ROBOTIC REVISION, GASTROENTEROSTOMY, MITCHELL-EN-Y N/A 2023    Procedure: XI ROBOTIC REVISION,GASTROENTEROSTOMY,MITCHELL-EN-Y;  Surgeon: Obinna Whipple MD;  Location:  [facility] OR;  Service: General;  Laterality: N/A;  creation of Sam[place]-N-Y anastomsis     Family History   Problem Relation Age of Onset    Heart failure Mother     Asthma Mother     Macular degeneration Mother     Cancer Mother         Breast    Cataracts Mother     Heart disease Mother     COPD Mother     Breast cancer Mother     Heart disease Father     Diabetes Father     Hypertension Father     Stroke Father     Cataracts Father     Obesity Father     Obesity Sister     Glaucoma Sister     Thyroid disease Sister     Glaucoma Sister     Other Brother         stiff man syndrome    Cancer Maternal Grandmother         Breast with Mets    Breast cancer Maternal Grandmother     Cancer Paternal Grandmother         Colon    Strabismus Other     Amblyopia Neg Hx     Blindness Neg Hx     Retinal detachment Neg Hx      Social History     Tobacco Use    Smoking status: Former     Packs/day: 0.50     Years: 14.00     Pack years: 7.00     Types: Cigarettes     Start date: 1984     Quit date: 1998     Years since quittin.5    Smokeless tobacco: Never   Substance Use Topics    Alcohol use: No    Drug use: Not Currently     Types: Benzodiazepines       PTA Medications   Medication Sig    acetaminophen (TYLENOL) 650 MG TbSR Take 1,300 mg by mouth every 8 (eight) hours as needed (pain).     albuterol (PROVENTIL/VENTOLIN HFA) 90 mcg/actuation inhaler Inhale 2 puffs into the lungs every 6 (six) hours as needed for Shortness of Breath.     allopurinoL (ZYLOPRIM) 100 MG tablet TAKE 1 TABLET BY MOUTH EVERY DAY    atorvastatin (LIPITOR) 40 MG tablet TAKE 1 TABLET BY MOUTH EVERY DAY    baclofen (LIORESAL) 10 MG tablet Take 1 tablet (10 mg total) by mouth 3 (three) times daily.    BRILINTA 90 mg tablet TAKE 1 TABLET BY MOUTH 2 TIMES A DAY    calcium carb and citrate-vitD3 600 mg-12.5 mcg (500 unit) TbSR Take 2 tablets by mouth once.    carvediloL (COREG) 6.25 MG tablet Take 1 tablet (6.25 mg total) by mouth 2 (two) times daily  "with meals.    cetirizine (ZYRTEC) 10 MG tablet Take 10 mg by mouth daily as needed for Allergies.    fluticasone propionate (FLONASE) 50 mcg/actuation nasal spray fluticasone propionate 50 mcg/actuation nasal spray,suspension    levothyroxine (SYNTHROID) 100 MCG tablet TAKE 1 TABLET BY MOUTH EVERY DAY BEFORE BREAKFAST    multivitamin (THERAGRAN) per tablet Take 1 tablet by mouth once daily. Bariatric vitamin    pantoprazole (PROTONIX) 40 MG tablet Take 1 tablet (40 mg total) by mouth 2 (two) times daily.    pregabalin (LYRICA) 100 MG capsule Take 2 capsules (200 mg total) by mouth 2 (two) times a day.    senna-docusate 8.6-50 mg (PERICOLACE) 8.6-50 mg per tablet Take 1 tablet by mouth 2 (two) times a day.    venlafaxine (EFFEXOR-XR) 150 MG Cp24 TAKE 1 CAPSULE BY MOUTH EVERY DAY    blood-glucose meter (GLUCOSE MONITORING KIT) kit Use as instructed     Review of patient's allergies indicates:   Allergen Reactions    Celexa [citalopram] Other (See Comments)     GI upset  Stated "knocked me out"    Cephalexin Anaphylaxis    Zoloft [sertraline] Other (See Comments)     Stated "knocked me out"    Codeine Other (See Comments)     hallucinations  hallucinations    Isosorbide Nausea And Vomiting    Ciprofloxacin Itching    Levaquin [levofloxacin] Other (See Comments)     tendinitis    Metformin      Nausea     Penicillamine     Penicillins Other (See Comments)     Was told per mother that as child was allergic to penicillin.  Childhood allergy/ unknown reaction    Sulfa (sulfonamide antibiotics)         Review of Systems   Gastrointestinal:  Positive for abdominal pain. Negative for heartburn, nausea and vomiting.        Stool changes   All other systems reviewed and are negative.    Objective:      Vital Signs (Most Recent)  Temp: 98.1 °F (36.7 °C) (07/03/23 0920)  Pulse: (!) 59 (07/03/23 0920)  Resp: 16 (07/03/23 0920)  BP: 129/66 (07/03/23 0928)  SpO2: 97 % (07/03/23 0920)    Vital Signs Range (Last 24H):  Temp:  [98.1 " °F (36.7 °C)]   Pulse:  [59]   Resp:  [16]   BP: (129)/(66)   SpO2:  [97 %]     Physical Exam  Vitals and nursing note reviewed.   Constitutional:       General: She is not in acute distress.     Appearance: She is well-developed. She is not diaphoretic.   HENT:      Head: Normocephalic and atraumatic.      Mouth/Throat:      Pharynx: No oropharyngeal exudate.   Eyes:      General: No scleral icterus.     Conjunctiva/sclera: Conjunctivae normal.      Pupils: Pupils are equal, round, and reactive to light.   Neck:      Thyroid: No thyromegaly.      Vascular: No JVD.      Trachea: No tracheal deviation.   Cardiovascular:      Rate and Rhythm: Normal rate and regular rhythm.      Heart sounds: Normal heart sounds. No murmur heard.    No friction rub. No gallop.   Pulmonary:      Effort: Pulmonary effort is normal. No respiratory distress.      Breath sounds: Normal breath sounds. No stridor. No wheezing or rales.   Chest:      Chest wall: No tenderness.   Abdominal:      General: Bowel sounds are normal. There is no distension.      Palpations: Abdomen is soft. There is no mass.      Tenderness: There is no abdominal tenderness. There is no guarding or rebound.   Musculoskeletal:         General: No tenderness. Normal range of motion.      Cervical back: Normal range of motion and neck supple.   Lymphadenopathy:      Cervical: No cervical adenopathy.   Skin:     General: Skin is warm and dry.      Findings: No erythema or rash.   Neurological:      Mental Status: She is alert and oriented to person, place, and time.      Cranial Nerves: No cranial nerve deficit.   Psychiatric:         Behavior: Behavior normal.       Data Review:  CBC:   Lab Results   Component Value Date    WBC 5.97 07/03/2023    RBC 4.16 07/03/2023    HGB 12.0 07/03/2023    HCT 36.2 (L) 07/03/2023     07/03/2023     BMP:   Lab Results   Component Value Date    GLU 93 06/19/2023     06/19/2023    K 3.7 06/19/2023     06/19/2023     CO2 27 06/19/2023    BUN 18 06/19/2023    CREATININE 0.8 06/19/2023    CALCIUM 9.5 06/19/2023        Assessment:      Cholecystitis    Plan:      Lap harman today

## 2023-07-03 NOTE — ANESTHESIA PROCEDURE NOTES
Intubation    Date/Time: 7/3/2023 10:33 AM  Performed by: Carmela Melgar CRNA  Authorized by: Andres Hare MD     Intubation:     Induction:  Intravenous    Intubated:  Postinduction    Mask Ventilation:  Easy mask    Attempts:  1    Attempted By:  CRNA    Method of Intubation:  Video laryngoscopy    Blade:  Tapia 3    Laryngeal View Grade: Grade I - full view of cords      Difficult Airway Encountered?: No      Complications:  None    Airway Device:  Oral endotracheal tube    Airway Device Size:  7.5    Style/Cuff Inflation:  Cuffed (inflated to minimal occlusive pressure)    Tube secured:  22    Secured at:  The lips    Placement Verified By:  Capnometry and Revisualization with laryngoscopy    Complicating Factors:  Poor neck/head extension    Findings Post-Intubation:  BS equal bilateral

## 2023-07-03 NOTE — TRANSFER OF CARE
"Anesthesia Transfer of Care Note    Patient: Josephine Bolton    Procedure(s) Performed: Procedure(s) (LRB):  CHOLECYSTECTOMY, LAPAROSCOPIC (N/A)    Patient location: PACU    Anesthesia Type: general    Transport from OR: Transported from OR on room air with adequate spontaneous ventilation    Post pain: adequate analgesia    Post assessment: no apparent anesthetic complications and tolerated procedure well    Post vital signs: stable    Level of consciousness: awake    Nausea/Vomiting: no nausea/vomiting    Complications: none    Transfer of care protocol was followed      Last vitals:   Visit Vitals  /66   Pulse (!) 59   Temp 36.7 °C (98.1 °F) (Oral)   Resp 16   Ht 5' 2" (1.575 m)   Wt 68.5 kg (151 lb)   LMP  (LMP Unknown)   SpO2 97%   Breastfeeding No   BMI 27.62 kg/m²     "

## 2023-07-03 NOTE — DISCHARGE SUMMARY
[facility]  Discharge Note  Short Stay    Procedure(s) (LRB):  CHOLECYSTECTOMY, LAPAROSCOPIC (N/A)      OUTCOME: Patient tolerated treatment/procedure well without complication and is now ready for discharge.    DISPOSITION: Home or Self Care    FINAL DIAGNOSIS:  Cholelithiasis with chronic cholecystitis    FOLLOWUP: In clinic    DISCHARGE INSTRUCTIONS:    Discharge Procedure Orders   Diet Adult Regular     Lifting restrictions     Notify your health care provider if you experience any of the following:  temperature >100.4     Notify your health care provider if you experience any of the following:  persistent nausea and vomiting or diarrhea     Notify your health care provider if you experience any of the following:  severe uncontrolled pain     No dressing needed        TIME SPENT ON DISCHARGE:   3 minutes

## 2023-07-03 NOTE — ANESTHESIA PREPROCEDURE EVALUATION
07/03/2023  Josephine Bolton is a 57 y.o., female.    Tobacco Use:  The patient  reports that she quit smoking about 24 years ago. Her smoking use included cigarettes. She started smoking about 39 years ago. She has a 7.00 pack-year smoking history. She has never used smokeless tobacco.     Results for orders placed or performed during the hospital encounter of 02/27/23   EKG 12-lead    Collection Time: 02/27/23  2:45 PM    Narrative    Test Reason : R53.1,    Vent. Rate : 069 BPM     Atrial Rate : 069 BPM     P-R Int : 166 ms          QRS Dur : 092 ms      QT Int : 438 ms       P-R-T Axes : 048 041 056 degrees     QTc Int : 469 ms    Normal sinus rhythm  Normal ECG  When compared with ECG of 17-JAN-2023 15:59,  No significant change was found  Confirmed by Aleksandar Newton MD ([id number]) on 3/7/2023 8:31:30 AM    Referred By: [email or web address]   SELF           Confirmed By:Aleksandar Newton MD             Lab Results   Component Value Date    WBC 5.86 06/01/2023    HGB 12.6 06/01/2023    HCT 42.3 06/01/2023    MCV 93 06/01/2023     06/01/2023     BMP  Lab Results   Component Value Date     06/19/2023    K 3.7 06/19/2023     06/19/2023    CO2 27 06/19/2023    BUN 18 06/19/2023    CREATININE 0.8 06/19/2023    CALCIUM 9.5 06/19/2023    ANIONGAP 11 06/19/2023    GLU 93 06/19/2023    GLU 90 06/01/2023    GLU 87 02/27/2023       Results for orders placed during the hospital encounter of 10/04/21    Echo Saline Bubble? No    Interpretation Summary  · The left ventricle is normal in size with concentric remodeling and normal systolic function.  · Normal left ventricular diastolic function.  · The estimated ejection fraction is 55-60 %.  · Normal right ventricular size with normal right ventricular systolic function.  · Normal central venous pressure (3 mmHg).        Pre-op Assessment    I have reviewed the  Patient Summary Reports.     I have reviewed the Nursing Notes. I have reviewed the NPO Status.   I have reviewed the Medications.     Review of Systems  Anesthesia Hx:  PONV History of prior surgery of interest to airway management or planning: cervical fusion, heart surgery. Personal Hx of Anesthesia complications, Post-Operative Nausea/Vomiting, in the past, but not with recent anesthetics / prophylaxis   Social:  Former Smoker, No Alcohol Use    Hematology/Oncology:        Hematology Comments: Brilinta Therapy Held since 6/28/2023   EENT/Dental:   Pharyngeal dysphagia    Visual field defect, unspecified Eyes: Eye Disease: Glaucoma:     Cardiovascular:   Hypertension, poorly controlled CAD asymptomatic CABG/stent  hyperlipidemia ECG has been reviewed. Renovascular hypertension    Patient followed by Dr. Machado seen 2 weeks ago and told that her heart was fine    Pulmonary:   Asthma mild Sleep Apnea, CPAP Albuterol inhaler use as needed with last use 8 months ago    Education provided regarding risk of obstructive sleep apnea     Renal/:   Chronic Renal Disease renal calculi Renovascular hypertension Kidney Function/Disease    Hepatic/GI:   GERD, well controlled  Esophageal / Stomach Disorders Gerd Controlled by s/p Gastric Surgery.    Musculoskeletal:   Arthritis  Equinus deformity of both feet    Bilateral sciatica Joint Disease:  Arthritis, Osteoarthritis, Gout  Spine Disorders: thoracic, cervical and lumbar Chronic Pain, Degenerative disease and Disc disease  Cervical Spine Disorder, Cervical Disc Disease, S/P Cervical Fusion    Neurological:   Neuromuscular Disease, Headaches Neurogenic claudication    Polyneuropathy Osteoarthritis  Peripheral Neuropathy    Endocrine:   Hypothyroidism  Obesity / BMI > 30  Dermatological:  Skin Normal    Psych:   Psychiatric History anxiety depression Sleep Disorder and Insomnia. Sleep Disorder and Insomnia.        Physical Exam  General: Well nourished, Cooperative, Alert  and Oriented    Airway:  Mallampati: III   Mouth Opening: Normal  TM Distance: > 6 cm  Tongue: Normal  Neck ROM: Extension Decreased    Dental:  Intact, Caps / Implants    Chest/Lungs:  Clear to auscultation, Normal Respiratory Rate    Heart:  Rate: Normal  Rhythm: Regular Rhythm  Sounds: Normal        Anesthesia Plan  Type of Anesthesia, risks & benefits discussed:    Anesthesia Type: Gen ETT  Intra-op Monitoring Plan: Standard ASA Monitors  Post Op Pain Control Plan: multimodal analgesia and IV/PO Opioids PRN  Induction:  IV  Airway Plan: Direct and Video, Post-Induction  Informed Consent: Informed consent signed with the Patient and all parties understand the risks and agree with anesthesia plan.  All questions answered.   ASA Score: 3  Anesthesia Plan Notes:         GETA    Etomidate Induction    Hold Versed    Minimal Fentanyl    Benadryl 6.25 mg iv, Decadron 8 mg, iv Zofran 4 mg iv, Pepcid 20 mg iv, Scopolamine Patch     Ofirmev 1000 mg iv, Prededex iv    Sugammadex     RAHUL Precautions: Extubate patient awake with HOB elevated and oral airway in place     Ready For Surgery From Anesthesia Perspective.     .

## 2023-07-03 NOTE — ANESTHESIA POSTPROCEDURE EVALUATION
Anesthesia Post Evaluation    Patient: Josephine Bolton    Procedure(s) Performed: Procedure(s) (LRB):  CHOLECYSTECTOMY, LAPAROSCOPIC (N/A)    Final Anesthesia Type: general      Patient location during evaluation: PACU  Patient participation: Yes- Able to Participate  Level of consciousness: awake and alert  Post-procedure vital signs: reviewed and stable  Pain management: adequate  Airway patency: patent  RAHUL mitigation strategies: Extubation while patient is awake, Multimodal analgesia and Extubation and recovery carried out in lateral, semiupright, or other nonsupine position  PONV status at discharge: No PONV  Anesthetic complications: no      Cardiovascular status: stable  Respiratory status: unassisted and spontaneous ventilation  Hydration status: euvolemic  Follow-up not needed.    Patient very easily arousable and appropriate.  Oxygen saturation does not drift below 97%, even when patient sleeping.      Vitals Value Taken Time   /80 07/03/23 1403   Temp  07/03/23 1412   Pulse 67 07/03/23 1407   Resp 12 07/03/23 1406   SpO2 97 % 07/03/23 1407   Vitals shown include unvalidated device data.      Event Time   Out of Recovery 14:09:00         Pain/Chantal Score: Pain Rating Prior to Med Admin: 6 (7/3/2023 12:33 PM)  Chantal Score: 9 (7/3/2023  2:00 PM)

## 2023-07-03 NOTE — OP NOTE
[facility]  Cholecystectomy with IOC  Procedure Note    SUMMARY     Date of Procedure: 7/3/2023     Procedure: Laparoscopic cholecystectomy with IOC    Provider: Obinna Whipple MD    Assisting Provider: none    Indications: This patient presents with symptomatic gallbladder disease and will undergo laparoscopic cholecystectomy.    Pre-Operative Diagnosis: Calculus of gallbladder with other cholecystitis and obstruction    Post-Operative Diagnosis: Calculus of gallbladder with other cholecystitis, without mention of obstruction    Anesthesia: GETA    Technical Procedures Used: laparoscopic     Description of the Findings of the Procedure:     The patient was seen again in the Holding Room. The risks, benefits, complications, treatment options, and expected outcomes were discussed with the patient. The possibilities of reaction to medication, pulmonary aspiration, perforation of viscus, bleeding, recurrent infection, finding a normal gallbladder, the need for additional procedures, failure to diagnose a condition, the possible need to convert to an open procedure, and creating a complication requiring transfusion or operation were discussed with the patient. The patient and/or family concurred with the proposed plan, giving informed consent. The site of surgery properly noted/marked. The patient was taken to Operating Room, identified as Josephine Bolton and the procedure verified as Laparoscopic Cholecystectomy with Intraoperative Cholangiograms. A Time Out was held and the above information confirmed.    Prior to the induction of general anesthesia, antibiotic prophylaxis was administered. General endotracheal anesthesia was then administered and tolerated well. After the induction, the abdomen was prepped in the usual sterile fashion. The patient was positioned in the supine position with the left arm comfortably tucked, along with some reverse Trendelenburg.      Local anesthetic agent was  injected into the skin near the right upper quadrant and an incision made. 5 mm optiview trocar was used to enter safely into the peritoneal cavity.  Pneumoperitoneum was then created with CO2 and tolerated well without any adverse changes in the patient's vital signs. Additional trocars were introduced under direct vision. All skin incisions were infiltrated with a local anesthetic agent before making the incision and placing the trocars.       The gallbladder was identified, the fundus grasped and retracted cephalad. Adhesions were lysed bluntly and with the electrocautery where indicated, taking care not to injure any adjacent organs or viscus. The infundibulum was grasped and retracted laterally, exposing the peritoneum overlying the triangle of Calot. This was then divided and exposed in a blunt fashion. The cystic duct was clearly identified and bluntly dissected circumferentially. The junctions of the gallbladder, cystic duct and common bile duct were clearly identified prior to the division of any linear structure and photo documented.     An incision was made in the cystic duct and the cholangiogram catheter introduced. The catheter was secured using an endoclip. The study showed no stones and good visualization of the distal and proximal biliary tree. The catheter was then removed.     The cystic duct was then doubly ligated with surgical clips on the patient side and singly clipped on the gallbladder side and divided. The cystic artery was identified, dissected free, ligated with clips and divided as well.     The gallbladder was dissected from the liver bed in retrograde fashion with the electrocautery. The gallbladder was removed. The liver bed was irrigated and inspected. Hemostasis was achieved with the electrocautery and surgicell. Copious irrigation was utilized and was repeatedly aspirated until clear all particulate matter.    Pneumoperitoneum was completely reduced after viewing removal of the  trocars under direct vision. The wound was thoroughly irrigated and the fascia was then closed with a figure of eight suture; the skin was then closed with monocryl and a sterile dressing was applied.    Instrument, sponge, and needle counts were correct at closure and at the conclusion of the case.     Cholecystitis with Cholelithiasis           Significant Surgical Tasks Conducted by the Assistant(s), if Applicable: na    Complications: None; patient tolerated the procedure well.    Total IV Fluids: see anesthesia    Estimated Blood Loss (EBL): less than 50 mL     Drains: none           Implants: surgicell    Specimens: Gallbladder           Condition: stable    Disposition: PACU - hemodynamically stable.    Attestation: I was present and scrubbed for the entire procedure.

## 2023-07-06 ENCOUNTER — PATIENT OUTREACH (OUTPATIENT)
Dept: ADMINISTRATIVE | Facility: HOSPITAL | Age: 58
End: 2023-07-06
Payer: COMMERCIAL

## 2023-07-14 ENCOUNTER — OFFICE VISIT (OUTPATIENT)
Dept: BARIATRICS | Facility: CLINIC | Age: 58
End: 2023-07-14
Payer: COMMERCIAL

## 2023-07-14 ENCOUNTER — HOSPITAL ENCOUNTER (OUTPATIENT)
Dept: RADIOLOGY | Facility: HOSPITAL | Age: 58
Discharge: HOME | End: 2023-07-14
Attending: SURGERY
Payer: COMMERCIAL

## 2023-07-14 ENCOUNTER — PATIENT MESSAGE (OUTPATIENT)
Dept: SURGERY | Facility: HOSPITAL | Age: 58
End: 2023-07-14

## 2023-07-14 VITALS
RESPIRATION RATE: 16 BRPM | HEART RATE: 70 BPM | TEMPERATURE: 97 F | HEIGHT: 62 IN | WEIGHT: 148 LBS | BODY MASS INDEX: 27.23 KG/M2 | SYSTOLIC BLOOD PRESSURE: 108 MMHG | DIASTOLIC BLOOD PRESSURE: 56 MMHG

## 2023-07-14 DIAGNOSIS — T81.49XA ABSCESS AFTER PROCEDURE: ICD-10-CM

## 2023-07-14 DIAGNOSIS — R10.9 ABDOMINAL PAIN, UNSPECIFIED ABDOMINAL LOCATION: ICD-10-CM

## 2023-07-14 DIAGNOSIS — K81.9 CHOLECYSTITIS: Primary | ICD-10-CM

## 2023-07-14 DIAGNOSIS — E66.01 MORBID OBESITY: Primary | ICD-10-CM

## 2023-07-14 DIAGNOSIS — G47.33 OSA (OBSTRUCTIVE SLEEP APNEA): ICD-10-CM

## 2023-07-14 PROCEDURE — 99999 PR PBB SHADOW E&M-EST. PATIENT-LVL IV: CPT | Mod: PBBFAC,,, | Performed by: SURGERY

## 2023-07-14 PROCEDURE — 3078F DIAST BP <80 MM HG: CPT | Mod: CPTII,S$GLB,, | Performed by: SURGERY

## 2023-07-14 PROCEDURE — 74176 CT ABD & PELVIS W/O CONTRAST: CPT | Mod: TC,PO

## 2023-07-14 PROCEDURE — 25500020 PHARM REV CODE 255: Mod: PO | Performed by: SURGERY

## 2023-07-14 PROCEDURE — 3078F PR MOST RECENT DIASTOLIC BLOOD PRESSURE < 80 MM HG: ICD-10-PCS | Mod: CPTII,S$GLB,, | Performed by: SURGERY

## 2023-07-14 PROCEDURE — 99213 OFFICE O/P EST LOW 20 MIN: CPT | Mod: 24,S$GLB,, | Performed by: SURGERY

## 2023-07-14 PROCEDURE — 3074F PR MOST RECENT SYSTOLIC BLOOD PRESSURE < 130 MM HG: ICD-10-PCS | Mod: CPTII,S$GLB,, | Performed by: SURGERY

## 2023-07-14 PROCEDURE — 3074F SYST BP LT 130 MM HG: CPT | Mod: CPTII,S$GLB,, | Performed by: SURGERY

## 2023-07-14 PROCEDURE — 4010F PR ACE/ARB THEARPY RXD/TAKEN: ICD-10-PCS | Mod: CPTII,S$GLB,, | Performed by: SURGERY

## 2023-07-14 PROCEDURE — 3008F BODY MASS INDEX DOCD: CPT | Mod: CPTII,S$GLB,, | Performed by: SURGERY

## 2023-07-14 PROCEDURE — 99213 PR OFFICE/OUTPT VISIT, EST, LEVL III, 20-29 MIN: ICD-10-PCS | Mod: 24,S$GLB,, | Performed by: SURGERY

## 2023-07-14 PROCEDURE — 74176 CT ABDOMEN PELVIS WITHOUT CONTRAST: ICD-10-PCS | Mod: 26,,, | Performed by: RADIOLOGY

## 2023-07-14 PROCEDURE — 1159F PR MEDICATION LIST DOCUMENTED IN MEDICAL RECORD: ICD-10-PCS | Mod: CPTII,S$GLB,, | Performed by: SURGERY

## 2023-07-14 PROCEDURE — 99999 PR PBB SHADOW E&M-EST. PATIENT-LVL IV: ICD-10-PCS | Mod: PBBFAC,,, | Performed by: SURGERY

## 2023-07-14 PROCEDURE — 3008F PR BODY MASS INDEX (BMI) DOCUMENTED: ICD-10-PCS | Mod: CPTII,S$GLB,, | Performed by: SURGERY

## 2023-07-14 PROCEDURE — A9698 NON-RAD CONTRAST MATERIALNOC: HCPCS | Mod: PO | Performed by: SURGERY

## 2023-07-14 PROCEDURE — 74176 CT ABD & PELVIS W/O CONTRAST: CPT | Mod: 26,,, | Performed by: RADIOLOGY

## 2023-07-14 PROCEDURE — 1159F MED LIST DOCD IN RCRD: CPT | Mod: CPTII,S$GLB,, | Performed by: SURGERY

## 2023-07-14 PROCEDURE — 4010F ACE/ARB THERAPY RXD/TAKEN: CPT | Mod: CPTII,S$GLB,, | Performed by: SURGERY

## 2023-07-14 RX ORDER — CIPROFLOXACIN 500 MG/1
500 TABLET ORAL EVERY 12 HOURS
Qty: 28 TABLET | Refills: 0 | Status: SHIPPED | OUTPATIENT
Start: 2023-07-14 | End: 2023-07-28

## 2023-07-14 RX ORDER — METRONIDAZOLE 500 MG/1
500 TABLET ORAL EVERY 12 HOURS
Qty: 28 TABLET | Refills: 0 | Status: SHIPPED | OUTPATIENT
Start: 2023-07-14 | End: 2023-07-28

## 2023-07-14 RX ADMIN — IOHEXOL 1000 ML: 9 SOLUTION ORAL at 02:07

## 2023-07-14 NOTE — PROGRESS NOTES
Ct reviewed post op biloma, seroma, abscess   Antibiotics - follow up labs  Wants to avoid admission, I think ok as long as no fevers and depending on labs which are currently pending

## 2023-07-14 NOTE — PROGRESS NOTES
Post Op Note    Surgery: gastric bypass surgery  Date: 2/20/23  Initial weight: 212  Last weight: 157  Current weight: 148      Constipation: 2 bm since lap harman- still light colored  Reflux: none  Vomiting: none    Diet:    Has been feeling nauseated   Soon as eats or drinks feels like she wants to vomit    Exercise:  none    MVI: daily mvi, calcium    Vitals:    07/14/23 1106   BP: (!) 108/56   Pulse: 70   Resp: 16   Temp: 97.2 °F (36.2 °C)       Body comp:  Fat Percent:  35.8 %  Fat Mass:  53 lb  FFM:  95 lb  TBW: 66 lb  TBW %:  44.6 %  BMR: 1301 kcal    PE:  NAD  RRR  Soft/nt/nd  Incisions: well healed    Labs: none    A/P: s/p gastric bypass, now lap harman    Having some trouble tolerating liquids  Will check CT and UGI, will check labs for complication.    Continue diet as tolerated     Co morbidities:     1. Morbid obesity        2. BMI 33.0-33.9,adult        3. RAHUL (obstructive sleep apnea)        4. Abdominal pain, unspecified abdominal location  CBC W/ AUTO DIFFERENTIAL    COMPREHENSIVE METABOLIC PANEL    CT Abdomen Pelvis  Without Contrast    FL Upper GI to include esophagram          -All above: Evaluated, monitored, and treated with diet and exercise program.

## 2023-07-17 ENCOUNTER — OFFICE VISIT (OUTPATIENT)
Dept: ENDOCRINOLOGY | Facility: CLINIC | Age: 58
End: 2023-07-17
Payer: COMMERCIAL

## 2023-07-17 ENCOUNTER — LAB VISIT (OUTPATIENT)
Dept: LAB | Facility: HOSPITAL | Age: 58
End: 2023-07-17
Attending: INTERNAL MEDICINE
Payer: COMMERCIAL

## 2023-07-17 VITALS
HEART RATE: 65 BPM | WEIGHT: 154 LBS | HEIGHT: 62 IN | OXYGEN SATURATION: 95 % | BODY MASS INDEX: 28.34 KG/M2 | DIASTOLIC BLOOD PRESSURE: 76 MMHG | SYSTOLIC BLOOD PRESSURE: 130 MMHG

## 2023-07-17 DIAGNOSIS — I10 BENIGN ESSENTIAL HTN: ICD-10-CM

## 2023-07-17 DIAGNOSIS — E03.9 HYPOTHYROIDISM, UNSPECIFIED TYPE: ICD-10-CM

## 2023-07-17 DIAGNOSIS — E03.9 HYPOTHYROIDISM, UNSPECIFIED TYPE: Primary | ICD-10-CM

## 2023-07-17 DIAGNOSIS — E66.9 OBESITY, UNSPECIFIED CLASSIFICATION, UNSPECIFIED OBESITY TYPE, UNSPECIFIED WHETHER SERIOUS COMORBIDITY PRESENT: ICD-10-CM

## 2023-07-17 LAB — TSH SERPL DL<=0.005 MIU/L-ACNC: 3.4 UIU/ML (ref 0.4–4)

## 2023-07-17 PROCEDURE — 3078F PR MOST RECENT DIASTOLIC BLOOD PRESSURE < 80 MM HG: ICD-10-PCS | Mod: CPTII,S$GLB,, | Performed by: INTERNAL MEDICINE

## 2023-07-17 PROCEDURE — 1159F PR MEDICATION LIST DOCUMENTED IN MEDICAL RECORD: ICD-10-PCS | Mod: CPTII,S$GLB,, | Performed by: INTERNAL MEDICINE

## 2023-07-17 PROCEDURE — 84443 ASSAY THYROID STIM HORMONE: CPT | Performed by: INTERNAL MEDICINE

## 2023-07-17 PROCEDURE — 99999 PR PBB SHADOW E&M-EST. PATIENT-LVL V: ICD-10-PCS | Mod: PBBFAC,,, | Performed by: INTERNAL MEDICINE

## 2023-07-17 PROCEDURE — 3075F SYST BP GE 130 - 139MM HG: CPT | Mod: CPTII,S$GLB,, | Performed by: INTERNAL MEDICINE

## 2023-07-17 PROCEDURE — 3078F DIAST BP <80 MM HG: CPT | Mod: CPTII,S$GLB,, | Performed by: INTERNAL MEDICINE

## 2023-07-17 PROCEDURE — 3008F PR BODY MASS INDEX (BMI) DOCUMENTED: ICD-10-PCS | Mod: CPTII,S$GLB,, | Performed by: INTERNAL MEDICINE

## 2023-07-17 PROCEDURE — 4010F ACE/ARB THERAPY RXD/TAKEN: CPT | Mod: CPTII,S$GLB,, | Performed by: INTERNAL MEDICINE

## 2023-07-17 PROCEDURE — 1160F RVW MEDS BY RX/DR IN RCRD: CPT | Mod: CPTII,S$GLB,, | Performed by: INTERNAL MEDICINE

## 2023-07-17 PROCEDURE — 1160F PR REVIEW ALL MEDS BY PRESCRIBER/CLIN PHARMACIST DOCUMENTED: ICD-10-PCS | Mod: CPTII,S$GLB,, | Performed by: INTERNAL MEDICINE

## 2023-07-17 PROCEDURE — 1159F MED LIST DOCD IN RCRD: CPT | Mod: CPTII,S$GLB,, | Performed by: INTERNAL MEDICINE

## 2023-07-17 PROCEDURE — 99214 PR OFFICE/OUTPT VISIT, EST, LEVL IV, 30-39 MIN: ICD-10-PCS | Mod: S$GLB,,, | Performed by: INTERNAL MEDICINE

## 2023-07-17 PROCEDURE — 3075F PR MOST RECENT SYSTOLIC BLOOD PRESS GE 130-139MM HG: ICD-10-PCS | Mod: CPTII,S$GLB,, | Performed by: INTERNAL MEDICINE

## 2023-07-17 PROCEDURE — 99999 PR PBB SHADOW E&M-EST. PATIENT-LVL V: CPT | Mod: PBBFAC,,, | Performed by: INTERNAL MEDICINE

## 2023-07-17 PROCEDURE — 3008F BODY MASS INDEX DOCD: CPT | Mod: CPTII,S$GLB,, | Performed by: INTERNAL MEDICINE

## 2023-07-17 PROCEDURE — 4010F PR ACE/ARB THEARPY RXD/TAKEN: ICD-10-PCS | Mod: CPTII,S$GLB,, | Performed by: INTERNAL MEDICINE

## 2023-07-17 PROCEDURE — 36415 COLL VENOUS BLD VENIPUNCTURE: CPT | Mod: PO | Performed by: INTERNAL MEDICINE

## 2023-07-17 PROCEDURE — 99214 OFFICE O/P EST MOD 30 MIN: CPT | Mod: S$GLB,,, | Performed by: INTERNAL MEDICINE

## 2023-07-17 NOTE — PROGRESS NOTES
CHIEF COMPLAINT: Hypothyroidism   57 y.o. . She originally had a thyroidectomy 15 years ago. Appears she still had thyroid tissue and had a completion thyroidectomy 14 years ago. On synthroid 100 mcg daily. On generic. Had gastric bypass 2023. Has lost weight since then. Continuing to lose weight. Also had gallbladder. No Palpitations. NO tremors. Has had low BP since gastric bypass. Lats of BP meds recently stopped. Has had some constipation.             PAST MEDICAL HISTORY: Hypothyroidism, anxiety, CAD with stents, degenerative disc disease. HTN     PAST SURGICAL HISTORY: Tonsillectomy, LILLIAN, laminectomy, carpal tunnel release, . Thyroidectomy with a completion thyroidectomy 14 years ago. CABG     SOCIAL HISTORY: No T/A     FAMILY HISTORY: No thyroid disease or thyroid cancer. + Heart disease.     MEDICATIONS/ALLERGIES: The patient's MedCard has been updated and reviewed.         PE:   GENERAL: Well developed, well nourished.   NECK: Supple, trachea midline.  No palpable thyroid nodules   CHEST: Resp even and unlabored, CTA bilateral.   CARDIAC: RRR, S1, S2 heard, no murmurs, rubs, S3, or S4        Latest Reference Range & Units 22 13:36   TSH 0.400 - 4.000 uIU/mL 2.193         ASSESSMENT/PLAN:   1. Hypothyroidism- Hx thyroidectomy.  Has had a significant amount of weight loss with her gastric bypass.  Discussed with weight loss, sometimes thyroid medication requirements do decrease.  Does not appear to be having any hyperthyroid symptoms at this time.  Once TSH stable, okay to follow up with PCP.    2. HTN- appears was getting mildly hypotensive after her gastric bypass surgery with significant weight loss.  Has recently stopped last of her blood pressure medication.  Blood pressure today controlled    3. Insulin resistance- (NO DIABETES) . Exercise limited due to back surgery.     4. Obesity-- Urine cortisol WNL.  Status post gastric bypass.  Discussed once cleared from a surgical perspective  due to recent gallbladder surgery to make sure to be doing some resistance exercise to build or maintain muscle mass.  Discussed the importance of exercise in general.    FOLLOWUP  Needs TSH

## 2023-07-19 ENCOUNTER — HOSPITAL ENCOUNTER (OUTPATIENT)
Dept: RADIOLOGY | Facility: HOSPITAL | Age: 58
Discharge: HOME | End: 2023-07-19
Attending: SURGERY
Payer: COMMERCIAL

## 2023-07-19 VITALS
HEIGHT: 62 IN | HEART RATE: 70 BPM | WEIGHT: 147 LBS | RESPIRATION RATE: 16 BRPM | DIASTOLIC BLOOD PRESSURE: 60 MMHG | SYSTOLIC BLOOD PRESSURE: 121 MMHG | BODY MASS INDEX: 27.05 KG/M2 | OXYGEN SATURATION: 96 %

## 2023-07-19 DIAGNOSIS — T81.49XA ABSCESS AFTER PROCEDURE: ICD-10-CM

## 2023-07-19 DIAGNOSIS — I25.10 CORONARY ARTERY DISEASE INVOLVING NATIVE CORONARY ARTERY OF NATIVE HEART WITHOUT ANGINA PECTORIS: Chronic | ICD-10-CM

## 2023-07-19 DIAGNOSIS — E78.2 MIXED HYPERLIPIDEMIA: ICD-10-CM

## 2023-07-19 DIAGNOSIS — M51.36 DDD (DEGENERATIVE DISC DISEASE), LUMBAR: Primary | ICD-10-CM

## 2023-07-19 DIAGNOSIS — F41.9 ANXIETY: ICD-10-CM

## 2023-07-19 PROCEDURE — 87077 CULTURE AEROBIC IDENTIFY: CPT | Performed by: SURGERY

## 2023-07-19 PROCEDURE — 99152 MOD SED SAME PHYS/QHP 5/>YRS: CPT

## 2023-07-19 PROCEDURE — 87116 MYCOBACTERIA CULTURE: CPT | Performed by: SURGERY

## 2023-07-19 PROCEDURE — 87206 SMEAR FLUORESCENT/ACID STAI: CPT | Performed by: SURGERY

## 2023-07-19 PROCEDURE — 25000003 PHARM REV CODE 250

## 2023-07-19 PROCEDURE — 87102 FUNGUS ISOLATION CULTURE: CPT | Performed by: SURGERY

## 2023-07-19 PROCEDURE — 87075 CULTR BACTERIA EXCEPT BLOOD: CPT | Performed by: SURGERY

## 2023-07-19 PROCEDURE — 25000003 PHARM REV CODE 250: Performed by: PHYSICIAN ASSISTANT

## 2023-07-19 PROCEDURE — 27202663 CT GUIDED ABSCESS OR CYST ASPIRATION WITH CATHETER

## 2023-07-19 PROCEDURE — 75989 CT GUIDED ABSCESS OR CYST ASPIRATION WITH CATHETER: ICD-10-PCS | Mod: 26,,, | Performed by: RADIOLOGY

## 2023-07-19 PROCEDURE — 75989 ABSCESS DRAINAGE UNDER X-RAY: CPT | Mod: 26,,, | Performed by: RADIOLOGY

## 2023-07-19 PROCEDURE — 87070 CULTURE OTHR SPECIMN AEROBIC: CPT | Performed by: SURGERY

## 2023-07-19 PROCEDURE — 87205 SMEAR GRAM STAIN: CPT | Performed by: SURGERY

## 2023-07-19 PROCEDURE — 75989 ABSCESS DRAINAGE UNDER X-RAY: CPT | Mod: TC

## 2023-07-19 PROCEDURE — 87186 SC STD MICRODIL/AGAR DIL: CPT | Performed by: SURGERY

## 2023-07-19 PROCEDURE — 99153 MOD SED SAME PHYS/QHP EA: CPT

## 2023-07-19 PROCEDURE — 63600175 PHARM REV CODE 636 W HCPCS: Performed by: RADIOLOGY

## 2023-07-19 RX ORDER — HYDROCODONE BITARTRATE AND ACETAMINOPHEN 5; 325 MG/1; MG/1
1 TABLET ORAL EVERY 4 HOURS PRN
Status: CANCELLED | OUTPATIENT
Start: 2023-07-19

## 2023-07-19 RX ORDER — MIDAZOLAM HYDROCHLORIDE 1 MG/ML
INJECTION, SOLUTION INTRAMUSCULAR; INTRAVENOUS
Status: DISPENSED
Start: 2023-07-19 | End: 2023-07-19

## 2023-07-19 RX ORDER — FENTANYL CITRATE 50 UG/ML
INJECTION, SOLUTION INTRAMUSCULAR; INTRAVENOUS
Status: COMPLETED | OUTPATIENT
Start: 2023-07-19 | End: 2023-07-19

## 2023-07-19 RX ORDER — SODIUM CHLORIDE 9 MG/ML
INJECTION, SOLUTION INTRAVENOUS CONTINUOUS
Status: DISCONTINUED | OUTPATIENT
Start: 2023-07-19 | End: 2023-07-20 | Stop reason: HOSPADM

## 2023-07-19 RX ORDER — LIDOCAINE HYDROCHLORIDE 10 MG/ML
1 INJECTION, SOLUTION EPIDURAL; INFILTRATION; INTRACAUDAL; PERINEURAL ONCE AS NEEDED
Status: DISCONTINUED | OUTPATIENT
Start: 2023-07-19 | End: 2023-07-20 | Stop reason: HOSPADM

## 2023-07-19 RX ORDER — HYDROCODONE BITARTRATE AND ACETAMINOPHEN 5; 325 MG/1; MG/1
1 TABLET ORAL EVERY 4 HOURS PRN
Status: DISCONTINUED | OUTPATIENT
Start: 2023-07-19 | End: 2023-07-20 | Stop reason: HOSPADM

## 2023-07-19 RX ORDER — MIDAZOLAM HYDROCHLORIDE 1 MG/ML
INJECTION INTRAMUSCULAR; INTRAVENOUS
Status: COMPLETED | OUTPATIENT
Start: 2023-07-19 | End: 2023-07-19

## 2023-07-19 RX ORDER — FENTANYL CITRATE 50 UG/ML
INJECTION, SOLUTION INTRAMUSCULAR; INTRAVENOUS
Status: DISPENSED
Start: 2023-07-19 | End: 2023-07-19

## 2023-07-19 RX ORDER — LIDOCAINE HYDROCHLORIDE 10 MG/ML
INJECTION INFILTRATION; PERINEURAL
Status: DISPENSED
Start: 2023-07-19 | End: 2023-07-19

## 2023-07-19 RX ORDER — LIDOCAINE HYDROCHLORIDE 10 MG/ML
INJECTION INFILTRATION; PERINEURAL
Status: COMPLETED
Start: 2023-07-19 | End: 2023-07-19

## 2023-07-19 RX ADMIN — LIDOCAINE HYDROCHLORIDE 100 MG: 10 INJECTION, SOLUTION INFILTRATION; PERINEURAL at 09:07

## 2023-07-19 RX ADMIN — MIDAZOLAM HYDROCHLORIDE 1 MG: 1 INJECTION, SOLUTION INTRAMUSCULAR; INTRAVENOUS at 09:07

## 2023-07-19 RX ADMIN — FENTANYL CITRATE 25 MCG: 50 INJECTION INTRAMUSCULAR; INTRAVENOUS at 09:07

## 2023-07-19 RX ADMIN — HYDROCODONE BITARTRATE AND ACETAMINOPHEN 1 TABLET: 5; 325 TABLET ORAL at 11:07

## 2023-07-19 RX ADMIN — FENTANYL CITRATE 25 MCG: 50 INJECTION INTRAMUSCULAR; INTRAVENOUS at 10:07

## 2023-07-19 RX ADMIN — MIDAZOLAM HYDROCHLORIDE 2 MG: 1 INJECTION, SOLUTION INTRAMUSCULAR; INTRAVENOUS at 10:07

## 2023-07-19 RX ADMIN — FENTANYL CITRATE 50 MCG: 50 INJECTION INTRAMUSCULAR; INTRAVENOUS at 10:07

## 2023-07-19 NOTE — NURSING
Pt arrived via stretcher from DSU for an abscess drainage.   Pt AAOx4.- procedure explained and consent obtained by Dr Adams  Time out performed.   Pt received Fentanyl 175 mcg and Versed 5 mg IV under the direct supervision of qualified MD.   Direct patient monitoring provided by RN stable for duration of procedure- blood pressure elevated post procedure- see flow sheet- provider ordered PO pain meds for pt to take in DSU.    8.5 [place] drain placed by MD- 140 cc of blood tinged- purulent material drained.   Specimen submitted to lab per  orders. Drain left in place and attached to gravity drainage bag. Securement device applied to pts RIGHT Upper Quad  Bedside report given to ESTEPHANIA Nichole  Pt transported back to room via bed in stable condition.

## 2023-07-19 NOTE — TELEPHONE ENCOUNTER
She is scheduled with you in August to establish care.   She is not due for labs according to care gaps, and her labs all look to be recent.

## 2023-07-19 NOTE — PLAN OF CARE
Received from the waiting room with spouse at side, plan of care reviewed and warm blankets provided

## 2023-07-19 NOTE — PLAN OF CARE
Discharged home with spouse in no distress, handouts provided, and IV removed. Procedural site dressing is dry and intact. Drain to be pulled as per MD orders in one to two weeks. Both stated understanding of instructions provided

## 2023-07-19 NOTE — INTERVAL H&P NOTE
The patient has been examined and the H&P has been reviewed:    I concur with the findings and no changes have occurred since H&P was written.    ASA 2  Malampatti 2  Heart RRR  Lungs CTAB    Plan CT guided RUQ abscess drain with moderate sedation.        There are no hospital problems to display for this patient.

## 2023-07-19 NOTE — NURSING
Blood pressure elevated from baseline post procedure. Pt reports pain 7/10 to right upper quad abdomen- drain placement site. Contacted STAR Segal- recommended DSU RN administer PO medication for pain and continue to monitor. Report to Dayanara Zelaya-RN with recommendations.

## 2023-07-20 ENCOUNTER — PATIENT MESSAGE (OUTPATIENT)
Dept: BARIATRICS | Facility: CLINIC | Age: 58
End: 2023-07-20
Payer: COMMERCIAL

## 2023-07-20 RX ORDER — BACLOFEN 10 MG/1
10 TABLET ORAL 3 TIMES DAILY
Qty: 90 TABLET | Refills: 0 | Status: SHIPPED | OUTPATIENT
Start: 2023-07-20 | End: 2023-08-17

## 2023-07-20 RX ORDER — ATORVASTATIN CALCIUM 40 MG/1
TABLET, FILM COATED ORAL
Qty: 30 TABLET | Refills: 0 | Status: SHIPPED | OUTPATIENT
Start: 2023-07-20 | End: 2023-08-17

## 2023-07-20 RX ORDER — VENLAFAXINE HYDROCHLORIDE 150 MG/1
CAPSULE, EXTENDED RELEASE ORAL
Qty: 30 CAPSULE | Refills: 0 | Status: SHIPPED | OUTPATIENT
Start: 2023-07-20 | End: 2023-08-17

## 2023-07-20 RX ORDER — PREGABALIN 100 MG/1
200 CAPSULE ORAL 2 TIMES DAILY
Qty: 120 CAPSULE | Refills: 0 | Status: SHIPPED | OUTPATIENT
Start: 2023-07-20 | End: 2023-08-17

## 2023-07-20 RX ORDER — TICAGRELOR 90 MG/1
TABLET ORAL
Qty: 60 TABLET | Refills: 0 | Status: SHIPPED | OUTPATIENT
Start: 2023-07-20 | End: 2023-08-17

## 2023-07-21 LAB — BACTERIA SPEC AEROBE CULT: ABNORMAL

## 2023-07-22 LAB
BACTERIA SPEC ANAEROBE CULT: NORMAL
GRAM STN SPEC: NORMAL
GRAM STN SPEC: NORMAL

## 2023-07-24 ENCOUNTER — PATIENT MESSAGE (OUTPATIENT)
Dept: BARIATRICS | Facility: CLINIC | Age: 58
End: 2023-07-24
Payer: COMMERCIAL

## 2023-07-25 ENCOUNTER — OFFICE VISIT (OUTPATIENT)
Dept: BARIATRICS | Facility: CLINIC | Age: 58
End: 2023-07-25
Payer: COMMERCIAL

## 2023-07-25 VITALS
DIASTOLIC BLOOD PRESSURE: 56 MMHG | HEIGHT: 62 IN | TEMPERATURE: 98 F | BODY MASS INDEX: 26.46 KG/M2 | RESPIRATION RATE: 16 BRPM | SYSTOLIC BLOOD PRESSURE: 115 MMHG | HEART RATE: 78 BPM | WEIGHT: 143.81 LBS

## 2023-07-25 DIAGNOSIS — K65.1 PERITONEAL ABSCESS: Primary | ICD-10-CM

## 2023-07-25 PROCEDURE — 99999 PR PBB SHADOW E&M-EST. PATIENT-LVL IV: ICD-10-PCS | Mod: PBBFAC,,, | Performed by: SURGERY

## 2023-07-25 PROCEDURE — 3078F PR MOST RECENT DIASTOLIC BLOOD PRESSURE < 80 MM HG: ICD-10-PCS | Mod: CPTII,S$GLB,, | Performed by: SURGERY

## 2023-07-25 PROCEDURE — 4010F ACE/ARB THERAPY RXD/TAKEN: CPT | Mod: CPTII,S$GLB,, | Performed by: SURGERY

## 2023-07-25 PROCEDURE — 4010F PR ACE/ARB THEARPY RXD/TAKEN: ICD-10-PCS | Mod: CPTII,S$GLB,, | Performed by: SURGERY

## 2023-07-25 PROCEDURE — 1159F MED LIST DOCD IN RCRD: CPT | Mod: CPTII,S$GLB,, | Performed by: SURGERY

## 2023-07-25 PROCEDURE — 3074F PR MOST RECENT SYSTOLIC BLOOD PRESSURE < 130 MM HG: ICD-10-PCS | Mod: CPTII,S$GLB,, | Performed by: SURGERY

## 2023-07-25 PROCEDURE — 3078F DIAST BP <80 MM HG: CPT | Mod: CPTII,S$GLB,, | Performed by: SURGERY

## 2023-07-25 PROCEDURE — 3008F PR BODY MASS INDEX (BMI) DOCUMENTED: ICD-10-PCS | Mod: CPTII,S$GLB,, | Performed by: SURGERY

## 2023-07-25 PROCEDURE — 3074F SYST BP LT 130 MM HG: CPT | Mod: CPTII,S$GLB,, | Performed by: SURGERY

## 2023-07-25 PROCEDURE — 1159F PR MEDICATION LIST DOCUMENTED IN MEDICAL RECORD: ICD-10-PCS | Mod: CPTII,S$GLB,, | Performed by: SURGERY

## 2023-07-25 PROCEDURE — 3008F BODY MASS INDEX DOCD: CPT | Mod: CPTII,S$GLB,, | Performed by: SURGERY

## 2023-07-25 PROCEDURE — 99024 POSTOP FOLLOW-UP VISIT: CPT | Mod: S$GLB,,, | Performed by: SURGERY

## 2023-07-25 PROCEDURE — 99999 PR PBB SHADOW E&M-EST. PATIENT-LVL IV: CPT | Mod: PBBFAC,,, | Performed by: SURGERY

## 2023-07-25 PROCEDURE — 99024 PR POST-OP FOLLOW-UP VISIT: ICD-10-PCS | Mod: S$GLB,,, | Performed by: SURGERY

## 2023-07-25 NOTE — PROGRESS NOTES
Feeling better  Held bp med  No more chills  Still some nausea with eating  Holding brilinta    Vitals:    07/25/23 1033   BP: (!) 115/56   Pulse: 78   Resp: 16   Temp: 98.2 °F (36.8 °C)     Minimal ss drainage from drain    A/P    Sp lap harman complicated with abscess  Ok to remove drain - done at bs  Finish antibiotics  Ct scan and labs next week- call with results.

## 2023-08-01 ENCOUNTER — HOSPITAL ENCOUNTER (OUTPATIENT)
Dept: RADIOLOGY | Facility: HOSPITAL | Age: 58
Discharge: HOME | End: 2023-08-01
Attending: SURGERY
Payer: COMMERCIAL

## 2023-08-01 DIAGNOSIS — R10.9 ABDOMINAL PAIN, UNSPECIFIED ABDOMINAL LOCATION: ICD-10-CM

## 2023-08-01 PROCEDURE — 25500020 PHARM REV CODE 255: Mod: PO | Performed by: SURGERY

## 2023-08-01 PROCEDURE — 74240 X-RAY XM UPR GI TRC 1CNTRST: CPT | Mod: 26,,, | Performed by: RADIOLOGY

## 2023-08-01 PROCEDURE — 74240 X-RAY XM UPR GI TRC 1CNTRST: CPT | Mod: TC,FY,PO

## 2023-08-01 PROCEDURE — 74240 FL UPPER GI TO INCLUDE ESOPHAGRAM: ICD-10-PCS | Mod: 26,,, | Performed by: RADIOLOGY

## 2023-08-01 PROCEDURE — A9698 NON-RAD CONTRAST MATERIALNOC: HCPCS | Mod: PO | Performed by: SURGERY

## 2023-08-01 RX ADMIN — BARIUM SULFATE 340 ML: 980 POWDER, FOR SUSPENSION ORAL at 09:08

## 2023-08-01 RX ADMIN — BARIUM SULFATE 355 ML: 0.6 SUSPENSION ORAL at 09:08

## 2023-08-02 ENCOUNTER — TELEPHONE (OUTPATIENT)
Dept: SURGERY | Facility: HOSPITAL | Age: 58
End: 2023-08-02

## 2023-08-11 ENCOUNTER — LAB VISIT (OUTPATIENT)
Dept: LAB | Facility: HOSPITAL | Age: 58
End: 2023-08-11
Payer: COMMERCIAL

## 2023-08-11 ENCOUNTER — PATIENT MESSAGE (OUTPATIENT)
Dept: BARIATRICS | Facility: CLINIC | Age: 58
End: 2023-08-11
Payer: COMMERCIAL

## 2023-08-11 ENCOUNTER — OFFICE VISIT (OUTPATIENT)
Dept: BARIATRICS | Facility: CLINIC | Age: 58
End: 2023-08-11
Payer: COMMERCIAL

## 2023-08-11 VITALS
TEMPERATURE: 98 F | HEART RATE: 85 BPM | DIASTOLIC BLOOD PRESSURE: 73 MMHG | RESPIRATION RATE: 16 BRPM | HEIGHT: 62 IN | BODY MASS INDEX: 26.17 KG/M2 | WEIGHT: 142.19 LBS | SYSTOLIC BLOOD PRESSURE: 136 MMHG

## 2023-08-11 DIAGNOSIS — R10.9 FLANK PAIN: ICD-10-CM

## 2023-08-11 DIAGNOSIS — Z98.84 HX OF BARIATRIC SURGERY: ICD-10-CM

## 2023-08-11 LAB
BILIRUB UR QL STRIP: NEGATIVE
CLARITY UR: CLEAR
COLOR UR: YELLOW
GLUCOSE UR QL STRIP: NEGATIVE
HGB UR QL STRIP: NEGATIVE
KETONES UR QL STRIP: NEGATIVE
LEUKOCYTE ESTERASE UR QL STRIP: NEGATIVE
NITRITE UR QL STRIP: NEGATIVE
PH UR STRIP: 6 [PH] (ref 5–8)
PROT UR QL STRIP: NEGATIVE
SP GR UR STRIP: 1.02 (ref 1–1.03)
URN SPEC COLLECT METH UR: NORMAL

## 2023-08-11 PROCEDURE — 3008F BODY MASS INDEX DOCD: CPT | Mod: CPTII,S$GLB,, | Performed by: NURSE PRACTITIONER

## 2023-08-11 PROCEDURE — 1159F MED LIST DOCD IN RCRD: CPT | Mod: CPTII,S$GLB,, | Performed by: NURSE PRACTITIONER

## 2023-08-11 PROCEDURE — 3008F PR BODY MASS INDEX (BMI) DOCUMENTED: ICD-10-PCS | Mod: CPTII,S$GLB,, | Performed by: NURSE PRACTITIONER

## 2023-08-11 PROCEDURE — 1160F RVW MEDS BY RX/DR IN RCRD: CPT | Mod: CPTII,S$GLB,, | Performed by: NURSE PRACTITIONER

## 2023-08-11 PROCEDURE — 3078F DIAST BP <80 MM HG: CPT | Mod: CPTII,S$GLB,, | Performed by: NURSE PRACTITIONER

## 2023-08-11 PROCEDURE — 99999 PR PBB SHADOW E&M-EST. PATIENT-LVL V: ICD-10-PCS | Mod: PBBFAC,,, | Performed by: NURSE PRACTITIONER

## 2023-08-11 PROCEDURE — 1159F PR MEDICATION LIST DOCUMENTED IN MEDICAL RECORD: ICD-10-PCS | Mod: CPTII,S$GLB,, | Performed by: NURSE PRACTITIONER

## 2023-08-11 PROCEDURE — 1160F PR REVIEW ALL MEDS BY PRESCRIBER/CLIN PHARMACIST DOCUMENTED: ICD-10-PCS | Mod: CPTII,S$GLB,, | Performed by: NURSE PRACTITIONER

## 2023-08-11 PROCEDURE — 4010F ACE/ARB THERAPY RXD/TAKEN: CPT | Mod: CPTII,S$GLB,, | Performed by: NURSE PRACTITIONER

## 2023-08-11 PROCEDURE — 3075F SYST BP GE 130 - 139MM HG: CPT | Mod: CPTII,S$GLB,, | Performed by: NURSE PRACTITIONER

## 2023-08-11 PROCEDURE — 99999 PR PBB SHADOW E&M-EST. PATIENT-LVL V: CPT | Mod: PBBFAC,,, | Performed by: NURSE PRACTITIONER

## 2023-08-11 PROCEDURE — 81003 URINALYSIS AUTO W/O SCOPE: CPT | Mod: PO | Performed by: NURSE PRACTITIONER

## 2023-08-11 PROCEDURE — 4010F PR ACE/ARB THEARPY RXD/TAKEN: ICD-10-PCS | Mod: CPTII,S$GLB,, | Performed by: NURSE PRACTITIONER

## 2023-08-11 PROCEDURE — 3075F PR MOST RECENT SYSTOLIC BLOOD PRESS GE 130-139MM HG: ICD-10-PCS | Mod: CPTII,S$GLB,, | Performed by: NURSE PRACTITIONER

## 2023-08-11 PROCEDURE — 3078F PR MOST RECENT DIASTOLIC BLOOD PRESSURE < 80 MM HG: ICD-10-PCS | Mod: CPTII,S$GLB,, | Performed by: NURSE PRACTITIONER

## 2023-08-11 PROCEDURE — 99213 OFFICE O/P EST LOW 20 MIN: CPT | Mod: S$GLB,,, | Performed by: NURSE PRACTITIONER

## 2023-08-11 PROCEDURE — 99213 PR OFFICE/OUTPT VISIT, EST, LEVL III, 20-29 MIN: ICD-10-PCS | Mod: S$GLB,,, | Performed by: NURSE PRACTITIONER

## 2023-08-11 RX ORDER — PHENAZOPYRIDINE HYDROCHLORIDE 100 MG/1
100 TABLET, FILM COATED ORAL 3 TIMES DAILY PRN
Qty: 30 TABLET | Refills: 0 | Status: SHIPPED | OUTPATIENT
Start: 2023-08-11 | End: 2023-08-21

## 2023-08-11 NOTE — PROGRESS NOTES
Post Op Note    Surgery: gastric bypass surgery  Date: 2/20/23   Sp lap harman complicated with abscess 7/3/23  Initial weight: 212  Last weight: 148  Current weight: 142      Constipation: none, improved- qday  Reflux: none  Vomiting: none    Diet:  Breakfast: premier protein shake, eggs  Lunch: chicken, shrimp  Dinner: greens/lettuce/spinach, meats  Snack: edemema   Water: > 64 oz    Exercise:  Leg and arm exercise (PT type exercise- 2lb weight)    MVI:   daily mvi, calcium    Vitals:    08/11/23 1520   BP: 136/73   Pulse: 85   Resp: 16   Temp: 98.2 °F (36.8 °C)       Body comp:  Fat Percent:  29.8 %  Fat Mass:  42.4 lb  FFM:  99.8 lb  TBW: 9 lb  TBW %:  48.5 %  BMR: 1338 kcal    PE:  NAD  RRR  Soft/nt/nd  Incisions: well healed    Labs: reviewed    A/P: s/p gastric bypass, now lap harman 7/3/23  Sp lap harman complicated with abscess  Drain removed on last visit with Dr. Whipple  Labs reviewed with patient  Pending CT on 8/15, will call for results  Rt flank pain, nonradiating, painful to touch- pending UA   Pyridium Rx  If UA negative, will evaluate CT on 8/15    Continue diet as tolerated     Co morbidities:     1. BMI 26.0-26.9,adult        2. Hx of bariatric surgery        3. Flank pain  phenazopyridine (PYRIDIUM) 100 MG tablet    Urinalysis, Reflex to Urine Culture Urine, Clean Catch    CANCELED: Urinalysis, Reflex to Urine Culture Urine, Clean Catch    CANCELED: Urinalysis, Reflex to Urine Culture Urine, Clean Catch            -All above: Evaluated, monitored, and treated with diet and exercise program.

## 2023-08-15 ENCOUNTER — HOSPITAL ENCOUNTER (OUTPATIENT)
Dept: RADIOLOGY | Facility: HOSPITAL | Age: 58
Discharge: HOME | End: 2023-08-15
Attending: SURGERY
Payer: COMMERCIAL

## 2023-08-15 DIAGNOSIS — K65.1 PERITONEAL ABSCESS: ICD-10-CM

## 2023-08-15 PROCEDURE — 74176 CT ABDOMEN PELVIS WITHOUT CONTRAST: ICD-10-PCS | Mod: 26,,, | Performed by: RADIOLOGY

## 2023-08-15 PROCEDURE — A9698 NON-RAD CONTRAST MATERIALNOC: HCPCS | Mod: PO | Performed by: SURGERY

## 2023-08-15 PROCEDURE — 74176 CT ABD & PELVIS W/O CONTRAST: CPT | Mod: TC,PO

## 2023-08-15 PROCEDURE — 25500020 PHARM REV CODE 255: Mod: PO | Performed by: SURGERY

## 2023-08-15 PROCEDURE — 74176 CT ABD & PELVIS W/O CONTRAST: CPT | Mod: 26,,, | Performed by: RADIOLOGY

## 2023-08-15 RX ADMIN — IOHEXOL 1000 ML: 9 SOLUTION ORAL at 04:08

## 2023-08-16 ENCOUNTER — PATIENT MESSAGE (OUTPATIENT)
Dept: SURGERY | Facility: HOSPITAL | Age: 58
End: 2023-08-16

## 2023-08-16 DIAGNOSIS — I25.10 CORONARY ARTERY DISEASE INVOLVING NATIVE CORONARY ARTERY OF NATIVE HEART WITHOUT ANGINA PECTORIS: Chronic | ICD-10-CM

## 2023-08-16 DIAGNOSIS — E78.2 MIXED HYPERLIPIDEMIA: ICD-10-CM

## 2023-08-16 DIAGNOSIS — M51.36 DDD (DEGENERATIVE DISC DISEASE), LUMBAR: ICD-10-CM

## 2023-08-16 DIAGNOSIS — F41.9 ANXIETY: ICD-10-CM

## 2023-08-16 DIAGNOSIS — Z12.11 ENCOUNTER FOR SCREENING COLONOSCOPY: Primary | ICD-10-CM

## 2023-08-16 DIAGNOSIS — N20.0 KIDNEY STONES: ICD-10-CM

## 2023-08-17 RX ORDER — TICAGRELOR 90 MG/1
90 TABLET ORAL 2 TIMES DAILY
Qty: 60 TABLET | Refills: 0 | Status: SHIPPED | OUTPATIENT
Start: 2023-08-17 | End: 2023-09-12

## 2023-08-17 RX ORDER — PANTOPRAZOLE SODIUM 40 MG/1
40 TABLET, DELAYED RELEASE ORAL 2 TIMES DAILY
Qty: 90 TABLET | Refills: 3 | Status: SHIPPED | OUTPATIENT
Start: 2023-08-17 | End: 2024-01-30

## 2023-08-17 RX ORDER — BACLOFEN 10 MG/1
10 TABLET ORAL 3 TIMES DAILY
Qty: 90 TABLET | Refills: 0 | Status: SHIPPED | OUTPATIENT
Start: 2023-08-17 | End: 2023-09-12

## 2023-08-17 RX ORDER — VENLAFAXINE HYDROCHLORIDE 150 MG/1
150 CAPSULE, EXTENDED RELEASE ORAL DAILY
Qty: 30 CAPSULE | Refills: 0 | Status: SHIPPED | OUTPATIENT
Start: 2023-08-17 | End: 2023-08-25 | Stop reason: SDUPTHER

## 2023-08-17 RX ORDER — PREGABALIN 100 MG/1
200 CAPSULE ORAL 2 TIMES DAILY
Qty: 120 CAPSULE | Refills: 0 | Status: SHIPPED | OUTPATIENT
Start: 2023-08-17 | End: 2023-09-12

## 2023-08-17 RX ORDER — ALLOPURINOL 100 MG/1
TABLET ORAL
Qty: 30 TABLET | Refills: 0 | Status: SHIPPED | OUTPATIENT
Start: 2023-08-17 | End: 2023-08-25 | Stop reason: SDUPTHER

## 2023-08-17 RX ORDER — ATORVASTATIN CALCIUM 40 MG/1
TABLET, FILM COATED ORAL
Qty: 30 TABLET | Refills: 0 | Status: SHIPPED | OUTPATIENT
Start: 2023-08-17 | End: 2023-08-25 | Stop reason: SDUPTHER

## 2023-08-17 NOTE — TELEPHONE ENCOUNTER
Refill Routing Note   Medication(s) are not appropriate for processing by [facility] for the following reason(s):      Non-participating provider    [facility] action(s):  Route Care Due:  None identified            Appointments  past 12m or future 3m with PCP    Date Provider   Last Visit   5/25/2023 Smitha Kate NP   Next Visit   Visit date not found Smitha Kate NP   ED visits in past 90 days: 0        Note composed:10:52 AM 08/17/2023

## 2023-08-21 LAB — FUNGUS SPEC CULT: NORMAL

## 2023-08-25 ENCOUNTER — OFFICE VISIT (OUTPATIENT)
Dept: FAMILY MEDICINE | Facility: CLINIC | Age: 58
End: 2023-08-25
Payer: COMMERCIAL

## 2023-08-25 VITALS
HEIGHT: 62 IN | HEART RATE: 88 BPM | WEIGHT: 146.38 LBS | SYSTOLIC BLOOD PRESSURE: 138 MMHG | TEMPERATURE: 98 F | OXYGEN SATURATION: 98 % | DIASTOLIC BLOOD PRESSURE: 70 MMHG | BODY MASS INDEX: 26.94 KG/M2 | RESPIRATION RATE: 18 BRPM

## 2023-08-25 DIAGNOSIS — N20.0 KIDNEY STONES: ICD-10-CM

## 2023-08-25 DIAGNOSIS — F41.1 GAD (GENERALIZED ANXIETY DISORDER): ICD-10-CM

## 2023-08-25 DIAGNOSIS — E78.2 MIXED HYPERLIPIDEMIA: ICD-10-CM

## 2023-08-25 DIAGNOSIS — Z12.11 SCREEN FOR COLON CANCER: ICD-10-CM

## 2023-08-25 DIAGNOSIS — F41.9 ANXIETY: ICD-10-CM

## 2023-08-25 DIAGNOSIS — I10 ESSENTIAL HYPERTENSION: Primary | ICD-10-CM

## 2023-08-25 DIAGNOSIS — E03.9 ACQUIRED HYPOTHYROIDISM: Chronic | ICD-10-CM

## 2023-08-25 DIAGNOSIS — F33.9 RECURRENT MAJOR DEPRESSIVE DISORDER, REMISSION STATUS UNSPECIFIED: ICD-10-CM

## 2023-08-25 DIAGNOSIS — Z98.84 S/P GASTRIC BYPASS: ICD-10-CM

## 2023-08-25 DIAGNOSIS — I25.10 CORONARY ARTERY DISEASE, UNSPECIFIED VESSEL OR LESION TYPE, UNSPECIFIED WHETHER ANGINA PRESENT, UNSPECIFIED WHETHER NATIVE OR TRANSPLANTED HEART: ICD-10-CM

## 2023-08-25 DIAGNOSIS — M48.02 CERVICAL STENOSIS OF SPINAL CANAL: ICD-10-CM

## 2023-08-25 PROCEDURE — 1160F RVW MEDS BY RX/DR IN RCRD: CPT | Mod: CPTII,S$GLB,, | Performed by: FAMILY MEDICINE

## 2023-08-25 PROCEDURE — 99214 OFFICE O/P EST MOD 30 MIN: CPT | Mod: S$GLB,,, | Performed by: FAMILY MEDICINE

## 2023-08-25 PROCEDURE — 1159F PR MEDICATION LIST DOCUMENTED IN MEDICAL RECORD: ICD-10-PCS | Mod: CPTII,S$GLB,, | Performed by: FAMILY MEDICINE

## 2023-08-25 PROCEDURE — 1159F MED LIST DOCD IN RCRD: CPT | Mod: CPTII,S$GLB,, | Performed by: FAMILY MEDICINE

## 2023-08-25 PROCEDURE — 99214 PR OFFICE/OUTPT VISIT, EST, LEVL IV, 30-39 MIN: ICD-10-PCS | Mod: S$GLB,,, | Performed by: FAMILY MEDICINE

## 2023-08-25 PROCEDURE — 99999 PR PBB SHADOW E&M-EST. PATIENT-LVL IV: CPT | Mod: PBBFAC,,, | Performed by: FAMILY MEDICINE

## 2023-08-25 PROCEDURE — 4010F PR ACE/ARB THEARPY RXD/TAKEN: ICD-10-PCS | Mod: CPTII,S$GLB,, | Performed by: FAMILY MEDICINE

## 2023-08-25 PROCEDURE — 3008F BODY MASS INDEX DOCD: CPT | Mod: CPTII,S$GLB,, | Performed by: FAMILY MEDICINE

## 2023-08-25 PROCEDURE — 3078F DIAST BP <80 MM HG: CPT | Mod: CPTII,S$GLB,, | Performed by: FAMILY MEDICINE

## 2023-08-25 PROCEDURE — 99999 PR PBB SHADOW E&M-EST. PATIENT-LVL IV: ICD-10-PCS | Mod: PBBFAC,,, | Performed by: FAMILY MEDICINE

## 2023-08-25 PROCEDURE — 3075F SYST BP GE 130 - 139MM HG: CPT | Mod: CPTII,S$GLB,, | Performed by: FAMILY MEDICINE

## 2023-08-25 PROCEDURE — 3078F PR MOST RECENT DIASTOLIC BLOOD PRESSURE < 80 MM HG: ICD-10-PCS | Mod: CPTII,S$GLB,, | Performed by: FAMILY MEDICINE

## 2023-08-25 PROCEDURE — 1160F PR REVIEW ALL MEDS BY PRESCRIBER/CLIN PHARMACIST DOCUMENTED: ICD-10-PCS | Mod: CPTII,S$GLB,, | Performed by: FAMILY MEDICINE

## 2023-08-25 PROCEDURE — 3075F PR MOST RECENT SYSTOLIC BLOOD PRESS GE 130-139MM HG: ICD-10-PCS | Mod: CPTII,S$GLB,, | Performed by: FAMILY MEDICINE

## 2023-08-25 PROCEDURE — 4010F ACE/ARB THERAPY RXD/TAKEN: CPT | Mod: CPTII,S$GLB,, | Performed by: FAMILY MEDICINE

## 2023-08-25 PROCEDURE — 3008F PR BODY MASS INDEX (BMI) DOCUMENTED: ICD-10-PCS | Mod: CPTII,S$GLB,, | Performed by: FAMILY MEDICINE

## 2023-08-25 RX ORDER — ALLOPURINOL 100 MG/1
100 TABLET ORAL DAILY
Qty: 90 TABLET | Refills: 3 | Status: SHIPPED | OUTPATIENT
Start: 2023-08-25

## 2023-08-25 RX ORDER — VENLAFAXINE HYDROCHLORIDE 150 MG/1
150 CAPSULE, EXTENDED RELEASE ORAL DAILY
Qty: 90 CAPSULE | Refills: 3 | Status: SHIPPED | OUTPATIENT
Start: 2023-08-25 | End: 2024-08-19

## 2023-08-25 RX ORDER — ATORVASTATIN CALCIUM 40 MG/1
40 TABLET, FILM COATED ORAL DAILY
Qty: 90 TABLET | Refills: 3 | Status: SHIPPED | OUTPATIENT
Start: 2023-08-25

## 2023-08-25 NOTE — PROGRESS NOTES
"Subjective:       Patient ID: Josephine Bolton is a 58 y.o. female.    Chief Complaint: Establish Care    Here today to establish care with a new PCP.   She was seeing Dr. Smart who has left [facility]    Immunizations: Due Shingrix  Last Lab Work: 2023   Colon Ca screening: Colonoscopy 2016, repeat due  Breast Ca Screening: MMG 2023  PAP : s/p Hyst    HTN:  was on multiple medications.  Now after surgery, off all BP medication  HLD:  She is on Lipitor.  Cholesterol was checked in June.  CAD:  on Statin and Brilenta, Seeing Cardiology  Hypothyroid:  On synthroid.  TSH was 3.4.  GERD:  On Protonix  MDD/VANDA:  On Effexor 150 mg daily.  It works well for her.  Neuropathy:  On lyrica.  Works for her at this this time.    S/p bariatric surgery and had to have her gallbladder removed.  She had a CT scan which noted some thickening of her sigmoid colon.  She is due a repeat colonoscopy.       Review of Systems   Constitutional:  Negative for activity change, appetite change, fatigue and fever.   Respiratory:  Negative for cough, shortness of breath and wheezing.    Cardiovascular:  Negative for chest pain and palpitations.   Gastrointestinal:  Negative for abdominal pain, constipation, diarrhea and nausea.   Skin:  Negative for pallor and rash.   Neurological:  Negative for dizziness, syncope, light-headedness and headaches.   Psychiatric/Behavioral:  The patient is not nervous/anxious.        Objective:      Vitals:    08/25/23 1611   BP: 138/70   BP Location: Right arm   Patient Position: Sitting   BP Method: Medium (Manual)   Pulse: 88   Resp: 18   Temp: 98.4 °F (36.9 °C)   TempSrc: Oral   SpO2: 98%   Weight: 66.4 kg (146 lb 6.2 oz)   Height: 5' 1.5" (1.562 m)      Physical Exam  Constitutional:       General: She is not in acute distress.  Cardiovascular:      Rate and Rhythm: Normal rate and regular rhythm.      Heart sounds: Normal heart sounds. No murmur heard.  Pulmonary:      Effort: Pulmonary effort is normal. " No respiratory distress.      Breath sounds: Normal breath sounds. No wheezing, rhonchi or rales.   Skin:     General: Skin is warm and dry.   Neurological:      General: No focal deficit present.      Mental Status: She is alert.   Psychiatric:         Mood and Affect: Mood normal.         Behavior: Behavior normal.         Thought Content: Thought content normal.         Results for orders placed or performed in visit on 08/11/23   Urinalysis, Reflex to Urine Culture Urine, Clean Catch    Specimen: Urine   Result Value Ref Range    Specimen UA Urine, Clean Catch     Color, UA Yellow Yellow, Straw, Donna    Appearance, UA Clear Clear    pH, UA 6.0 5.0 - 8.0    Specific Gravity, UA 1.025 1.005 - 1.030    Protein, UA Negative Negative    Glucose, UA Negative Negative    Ketones, UA Negative Negative    Bilirubin (UA) Negative Negative    Occult Blood UA Negative Negative    Nitrite, UA Negative Negative    Leukocytes, UA Negative Negative     *Note: Due to a large number of results and/or encounters for the requested time period, some results have not been displayed. A complete set of results can be found in Results Review.      Assessment:       1. Essential hypertension    2. Mixed hyperlipidemia    3. Coronary artery disease, unspecified vessel or lesion type, unspecified whether angina present, unspecified whether native or transplanted heart    4. VANDA (generalized anxiety disorder)    5. Recurrent major depressive disorder, remission status unspecified    6. Acquired hypothyroidism    7. Cervical stenosis of spinal canal    8. S/P gastric bypass    9. Screen for colon cancer    10. Anxiety    11. Kidney stones        Plan:       Essential hypertension  BP controlled off medication  Mixed hyperlipidemia  -     atorvastatin (LIPITOR) 40 MG tablet; Take 1 tablet (40 mg total) by mouth once daily.  Dispense: 90 tablet; Refill: 3  Continue Lipitor  Coronary artery disease, unspecified vessel or lesion type,  unspecified whether angina present, unspecified whether native or transplanted heart    VANDA (generalized anxiety disorder)  Continue Effexor at this time  Recurrent major depressive disorder, remission status unspecified    Acquired hypothyroidism  TSH in range.  Continue current dose of Synthroid  Cervical stenosis of spinal canal    S/P gastric bypass    Screen for colon cancer  -     Case Request Endoscopy: COLONOSCOPY    Anxiety  -     venlafaxine (EFFEXOR-XR) 150 MG Cp24; Take 1 capsule (150 mg total) by mouth once daily.  Dispense: 90 capsule; Refill: 3    Kidney stones  -     allopurinoL (ZYLOPRIM) 100 MG tablet; Take 1 tablet (100 mg total) by mouth once daily.  Dispense: 90 tablet; Refill: 3          Medication List with Changes/Refills   Current Medications    ACETAMINOPHEN (TYLENOL) 650 MG TBSR    Take 1,300 mg by mouth every 8 (eight) hours as needed (pain).     ALBUTEROL (PROVENTIL/VENTOLIN HFA) 90 MCG/ACTUATION INHALER    Inhale 2 puffs into the lungs every 6 (six) hours as needed for Shortness of Breath.     BACLOFEN (LIORESAL) 10 MG TABLET    TAKE 1 TABLET (10 MG TOTAL) BY MOUTH 3 (THREE) TIMES DAILY.    BLOOD-GLUCOSE METER (GLUCOSE MONITORING KIT) KIT    Use as instructed    CALCIUM CARB AND CITRATE-VITD3 600 MG-12.5 MCG (500 UNIT) TBSR    Take 2 tablets by mouth once.    CETIRIZINE (ZYRTEC) 10 MG TABLET    Take 10 mg by mouth daily as needed for Allergies.    FLUTICASONE PROPIONATE (FLONASE) 50 MCG/ACTUATION NASAL SPRAY    fluticasone propionate 50 mcg/actuation nasal spray,suspension    LEVOTHYROXINE (SYNTHROID) 100 MCG TABLET    TAKE 1 TABLET BY MOUTH EVERY DAY BEFORE BREAKFAST    MULTIVITAMIN (THERAGRAN) PER TABLET    Take 1 tablet by mouth once daily. Bariatric vitamin    PANTOPRAZOLE (PROTONIX) 40 MG TABLET    TAKE 1 TABLET BY MOUTH 2 TIMES A DAY    PREGABALIN (LYRICA) 100 MG CAPSULE    Take 2 capsules (200 mg total) by mouth 2 (two) times daily.    SENNA-DOCUSATE 8.6-50 MG (PERICOLACE) 8.6-50  MG PER TABLET    Take 1 tablet by mouth 2 (two) times a day.    TICAGRELOR (BRILINTA) 90 MG TABLET    TAKE 1 TABLET BY MOUTH TWICE DAILY   Changed and/or Refilled Medications    Modified Medication Previous Medication    ALLOPURINOL (ZYLOPRIM) 100 MG TABLET allopurinoL (ZYLOPRIM) 100 MG tablet       Take 1 tablet (100 mg total) by mouth once daily.    TAKE 1 TABLET BY MOUTH EVERY DAY    ATORVASTATIN (LIPITOR) 40 MG TABLET atorvastatin (LIPITOR) 40 MG tablet       Take 1 tablet (40 mg total) by mouth once daily.    TAKE 1 TABLET BY MOUTH EVERY DAY    VENLAFAXINE (EFFEXOR-XR) 150 MG CP24 venlafaxine (EFFEXOR-XR) 150 MG Cp24       Take 1 capsule (150 mg total) by mouth once daily.    Take 1 capsule (150 mg total) by mouth once daily.   Discontinued Medications    CARVEDILOL (COREG) 6.25 MG TABLET    Take 1 tablet (6.25 mg total) by mouth 2 (two) times daily with meals.    HYDROCODONE-ACETAMINOPHEN (NORCO) 7.5-325 MG PER TABLET    Take 1 tablet by mouth every 4 (four) hours as needed for Pain.    ONDANSETRON (ZOFRAN-ODT) 4 MG TBDL    Take 1 tablet (4 mg total) by mouth every 6 (six) hours as needed (nausea/vomiting).

## 2023-08-29 ENCOUNTER — TELEPHONE (OUTPATIENT)
Dept: GASTROENTEROLOGY | Facility: CLINIC | Age: 58
End: 2023-08-29
Payer: COMMERCIAL

## 2023-08-29 NOTE — TELEPHONE ENCOUNTER
----- Message from Mya Floyd LPN sent at 8/25/2023  4:57 PM CDT -----  Regarding: FW: colonoscopy    ----- Message -----  From: Roxanne Lorenzo  Sent: 8/25/2023   4:51 PM CDT  To: Delio HUANG Jr. Staff  Subject: colonoscopy                                      Pt would like a call to schedule her colonoscopy       [phone]

## 2023-08-29 NOTE — TELEPHONE ENCOUNTER
Colonoscopy is scheduled on 10/17 with Dr. Warren at [address] [facility] [address] in [place]. After our Pre-service team gets the green light from your insurance, the Preop Nurse will call a couple of days before your procedure date with the arrival time.  Prep instructions has been sent via [facility] and via mail. Address is confirmed. Patient is informed the preop Nurse will call him/her with the arrival time a day or two prior to procedure. Patient verbalizes understanding.

## 2023-08-30 ENCOUNTER — TELEPHONE (OUTPATIENT)
Dept: GASTROENTEROLOGY | Facility: CLINIC | Age: 58
End: 2023-08-30
Payer: COMMERCIAL

## 2023-09-04 LAB
MYCOBACTERIUM SPEC QL CULT: NORMAL

## 2023-09-11 DIAGNOSIS — I25.10 CORONARY ARTERY DISEASE INVOLVING NATIVE CORONARY ARTERY OF NATIVE HEART WITHOUT ANGINA PECTORIS: Chronic | ICD-10-CM

## 2023-09-11 DIAGNOSIS — M51.36 DDD (DEGENERATIVE DISC DISEASE), LUMBAR: ICD-10-CM

## 2023-09-12 RX ORDER — PREGABALIN 100 MG/1
200 CAPSULE ORAL 2 TIMES DAILY
Qty: 120 CAPSULE | Refills: 3 | Status: SHIPPED | OUTPATIENT
Start: 2023-09-12 | End: 2024-01-30

## 2023-09-12 RX ORDER — BACLOFEN 10 MG/1
10 TABLET ORAL 3 TIMES DAILY
Qty: 90 TABLET | Refills: 3 | Status: SHIPPED | OUTPATIENT
Start: 2023-09-12 | End: 2024-02-26

## 2023-09-12 RX ORDER — TICAGRELOR 90 MG/1
90 TABLET ORAL 2 TIMES DAILY
Qty: 180 TABLET | Refills: 3 | Status: SHIPPED | OUTPATIENT
Start: 2023-09-12

## 2023-09-12 NOTE — TELEPHONE ENCOUNTER
Refill Routing Note   Medication(s) are not appropriate for processing by [facility] for the following reason(s):      Medication outside of protocol  New or recently adjusted medication    [facility] action(s):  Defer  Route Care Due:  Labs due            Appointments  past 12m or future 3m with PCP    Date Provider   Last Visit   8/25/2023 Ian Young MD   Next Visit   2/23/2024 Ian Young MD   ED visits in past 90 days: 0        Note composed:4:20 AM 09/12/2023

## 2023-09-21 ENCOUNTER — OFFICE VISIT (OUTPATIENT)
Dept: OPHTHALMOLOGY | Facility: CLINIC | Age: 58
End: 2023-09-21
Payer: COMMERCIAL

## 2023-09-21 ENCOUNTER — CLINICAL SUPPORT (OUTPATIENT)
Dept: OPHTHALMOLOGY | Facility: CLINIC | Age: 58
End: 2023-09-21
Payer: COMMERCIAL

## 2023-09-21 DIAGNOSIS — H02.835 DERMATOCHALASIS OF BOTH LOWER EYELIDS: ICD-10-CM

## 2023-09-21 DIAGNOSIS — H02.66 XANTHELASMA OF EYELID, BILATERAL: ICD-10-CM

## 2023-09-21 DIAGNOSIS — H02.63 XANTHELASMA OF EYELID, BILATERAL: ICD-10-CM

## 2023-09-21 DIAGNOSIS — H02.834 DERMATOCHALASIS OF BOTH UPPER EYELIDS: ICD-10-CM

## 2023-09-21 DIAGNOSIS — H57.813 BROW PTOSIS, BILATERAL: Primary | ICD-10-CM

## 2023-09-21 DIAGNOSIS — H02.832 DERMATOCHALASIS OF BOTH LOWER EYELIDS: ICD-10-CM

## 2023-09-21 DIAGNOSIS — H02.59 LAXITY OF EYELID: ICD-10-CM

## 2023-09-21 DIAGNOSIS — H02.831 DERMATOCHALASIS OF BOTH UPPER EYELIDS: ICD-10-CM

## 2023-09-21 PROCEDURE — 4010F ACE/ARB THERAPY RXD/TAKEN: CPT | Mod: CPTII,S$GLB,, | Performed by: OPHTHALMOLOGY

## 2023-09-21 PROCEDURE — 92285 EXTERNAL PHOTOGRAPHY - OU - BOTH EYES: ICD-10-PCS | Mod: S$GLB,,, | Performed by: OPHTHALMOLOGY

## 2023-09-21 PROCEDURE — 92083 EXTENDED VISUAL FIELD XM: CPT | Mod: S$GLB,,, | Performed by: OPHTHALMOLOGY

## 2023-09-21 PROCEDURE — 92285 EXTERNAL OCULAR PHOTOGRAPHY: CPT | Mod: S$GLB,,, | Performed by: OPHTHALMOLOGY

## 2023-09-21 PROCEDURE — 99214 PR OFFICE/OUTPT VISIT, EST, LEVL IV, 30-39 MIN: ICD-10-PCS | Mod: S$GLB,,, | Performed by: OPHTHALMOLOGY

## 2023-09-21 PROCEDURE — 1159F PR MEDICATION LIST DOCUMENTED IN MEDICAL RECORD: ICD-10-PCS | Mod: CPTII,S$GLB,, | Performed by: OPHTHALMOLOGY

## 2023-09-21 PROCEDURE — 99999 PR PBB SHADOW E&M-EST. PATIENT-LVL I: CPT | Mod: PBBFAC,,,

## 2023-09-21 PROCEDURE — 99999 PR PBB SHADOW E&M-EST. PATIENT-LVL III: ICD-10-PCS | Mod: PBBFAC,,, | Performed by: OPHTHALMOLOGY

## 2023-09-21 PROCEDURE — 92083 GOLDMANN PERIMETRY - OU - EXTENDED - BOTH EYES: ICD-10-PCS | Mod: S$GLB,,, | Performed by: OPHTHALMOLOGY

## 2023-09-21 PROCEDURE — 99999 PR PBB SHADOW E&M-EST. PATIENT-LVL I: ICD-10-PCS | Mod: PBBFAC,,,

## 2023-09-21 PROCEDURE — 99999 PR PBB SHADOW E&M-EST. PATIENT-LVL III: CPT | Mod: PBBFAC,,, | Performed by: OPHTHALMOLOGY

## 2023-09-21 PROCEDURE — 1160F PR REVIEW ALL MEDS BY PRESCRIBER/CLIN PHARMACIST DOCUMENTED: ICD-10-PCS | Mod: CPTII,S$GLB,, | Performed by: OPHTHALMOLOGY

## 2023-09-21 PROCEDURE — 1160F RVW MEDS BY RX/DR IN RCRD: CPT | Mod: CPTII,S$GLB,, | Performed by: OPHTHALMOLOGY

## 2023-09-21 PROCEDURE — 99214 OFFICE O/P EST MOD 30 MIN: CPT | Mod: S$GLB,,, | Performed by: OPHTHALMOLOGY

## 2023-09-21 PROCEDURE — 1159F MED LIST DOCD IN RCRD: CPT | Mod: CPTII,S$GLB,, | Performed by: OPHTHALMOLOGY

## 2023-09-21 PROCEDURE — 4010F PR ACE/ARB THEARPY RXD/TAKEN: ICD-10-PCS | Mod: CPTII,S$GLB,, | Performed by: OPHTHALMOLOGY

## 2023-09-21 NOTE — PROGRESS NOTES
HPI    Josephine Bolton is a/an 58 y.o. female here for ptosis.  Referred by: Dr. Bass  How long have eyelid(s) been droopy? 3 yrs   Any h/o wearing hard CLs? no  Do eyelids feel heavy? yes  Does the height of the eyelid(s) fluctuate throughout the day? no  Do eyelid(s) interfere with daily activities such as driving, reading,   working? yes  Spontaneous orbital pain? no  Orbital pain w eye movement? no  Red eyes? no  Red eyelids? no  Eyelid swelling? 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   Patient is bothered by cosmetic appearance of the xanthelasma but not as   worried about that as she is being able to see and function day to day.     Last edited by Princess Reagan MD on 9/21/2023 10:06 AM.            Assessment /Plan     For exam results, see Encounter Report.    Brow ptosis, bilateral  -     External Photography - OU - Both Eyes  -     GOLDMANN PERIMETRY - OU - EXTENDED - BOTH EYES    Dermatochalasis of both upper eyelids  -     External Photography - OU - Both Eyes  -     GOLDMANN PERIMETRY - OU - EXTENDED - BOTH EYES    Xanthelasma of eyelid, bilateral  -     External Photography - OU - Both Eyes    Dermatochalasis of both lower eyelids    Laxity of eyelid      The patient is a pleasant 58-year-old female here for evaluation of bilateral upper eyelid heaviness.  This has been progressive and affects activities of daily living.  The patient has noticed this heaviness over the last 3 years.  The patient notices that she has difficulty especially while teaching.  She is a .  She also noticed the heaviness at the end of the day as well.      On exam, the patient has chronic frontalis use.  She has bilateral temporal brow ptosis worse on the right than the left with the inferior aspect of the brow sitting below the frontal bone.  She has bilateral upper  eyelid dermatochalasis and xanthelasma.  She has prominent herniation of the medial fat pad more prominently on the right upper eyelid compared to the left upper eyelid.  She has bilateral lower eyelid dermatochalasis with herniation of orbital fat and moderate lateral canthal laxity.  She has mild proptosis of 1 mm on the right compared to the left.  She has full extraocular motility.  She has 2 mm of lagophthalmos on gentle closure on the right side with 0.5 mm of lagophthalmos on gentle closure on the left side.    Pt. With significant improvement of superior visual fields with lids and brows taped vs. untaped.      These findings were discussed with the patient.      Recommend bilateral direct temporal brow plasty right greater than left in the minor procedure room with Valium.      We discussed the option of cosmetic bilateral upper eyelid blepharoplasty and bilateral lower eyelid subtractive blepharoplasty with pinch and canthopexy.  The patient will consider dependent upon the cost the procedures.      Informed consent obtained after extensive risks/benefits/alternatives were discussed with the patient including but not limited to pain, bleeding, infection, ocular injury, loss of the eye, asymmetry, need for revision in future, scarring.  Alternatives such as waiting were discussed.  All questions were answered.       Hold ASA, NSAIDS, fish oil, Vitamin E and Multivitamins 5 to 7 days prior to procedure.     Return for surgery

## 2023-09-22 NOTE — PROGRESS NOTES
EXT PIX DONE OU     PTOSIS VF DONE OU     REL & FIX =  GOOD OU        COOP =     GOOD     PATIENT HAS NO ALLERGIES TO LATEX OR ADHESIVES AT THIS TIME    JTHOMAS    NO MRX NEEDED FOR TEST

## 2023-09-25 DIAGNOSIS — M25.512 BILATERAL SHOULDER PAIN, UNSPECIFIED CHRONICITY: Primary | ICD-10-CM

## 2023-09-25 DIAGNOSIS — M25.511 BILATERAL SHOULDER PAIN, UNSPECIFIED CHRONICITY: Primary | ICD-10-CM

## 2023-09-26 ENCOUNTER — HOSPITAL ENCOUNTER (OUTPATIENT)
Dept: RADIOLOGY | Facility: HOSPITAL | Age: 58
Discharge: HOME | End: 2023-09-26
Attending: ORTHOPAEDIC SURGERY
Payer: COMMERCIAL

## 2023-09-26 ENCOUNTER — OFFICE VISIT (OUTPATIENT)
Dept: ORTHOPEDICS | Facility: CLINIC | Age: 58
End: 2023-09-26
Payer: COMMERCIAL

## 2023-09-26 VITALS — BODY MASS INDEX: 26.87 KG/M2 | HEIGHT: 62 IN | WEIGHT: 146 LBS

## 2023-09-26 DIAGNOSIS — M75.22 BICEPS TENDINITIS OF LEFT UPPER EXTREMITY: ICD-10-CM

## 2023-09-26 DIAGNOSIS — M75.41 IMPINGEMENT SYNDROME, SHOULDER, RIGHT: ICD-10-CM

## 2023-09-26 DIAGNOSIS — M75.21 BICEPS TENDINITIS OF RIGHT UPPER EXTREMITY: ICD-10-CM

## 2023-09-26 DIAGNOSIS — M25.512 BILATERAL SHOULDER PAIN, UNSPECIFIED CHRONICITY: ICD-10-CM

## 2023-09-26 DIAGNOSIS — M75.42 IMPINGEMENT SYNDROME OF LEFT SHOULDER: Primary | ICD-10-CM

## 2023-09-26 DIAGNOSIS — M25.511 BILATERAL SHOULDER PAIN, UNSPECIFIED CHRONICITY: ICD-10-CM

## 2023-09-26 PROCEDURE — 1159F MED LIST DOCD IN RCRD: CPT | Mod: CPTII,S$GLB,, | Performed by: ORTHOPAEDIC SURGERY

## 2023-09-26 PROCEDURE — 99999 PR PBB SHADOW E&M-EST. PATIENT-LVL III: ICD-10-PCS | Mod: PBBFAC,,, | Performed by: ORTHOPAEDIC SURGERY

## 2023-09-26 PROCEDURE — 3008F BODY MASS INDEX DOCD: CPT | Mod: CPTII,S$GLB,, | Performed by: ORTHOPAEDIC SURGERY

## 2023-09-26 PROCEDURE — 20550 NJX 1 TENDON SHEATH/LIGAMENT: CPT | Mod: 50,51,XS,S$GLB | Performed by: ORTHOPAEDIC SURGERY

## 2023-09-26 PROCEDURE — 1160F PR REVIEW ALL MEDS BY PRESCRIBER/CLIN PHARMACIST DOCUMENTED: ICD-10-PCS | Mod: CPTII,S$GLB,, | Performed by: ORTHOPAEDIC SURGERY

## 2023-09-26 PROCEDURE — 99214 PR OFFICE/OUTPT VISIT, EST, LEVL IV, 30-39 MIN: ICD-10-PCS | Mod: 25,S$GLB,, | Performed by: ORTHOPAEDIC SURGERY

## 2023-09-26 PROCEDURE — 3008F PR BODY MASS INDEX (BMI) DOCUMENTED: ICD-10-PCS | Mod: CPTII,S$GLB,, | Performed by: ORTHOPAEDIC SURGERY

## 2023-09-26 PROCEDURE — 20610 DRAIN/INJ JOINT/BURSA W/O US: CPT | Mod: 50,S$GLB,, | Performed by: ORTHOPAEDIC SURGERY

## 2023-09-26 PROCEDURE — 4010F ACE/ARB THERAPY RXD/TAKEN: CPT | Mod: CPTII,S$GLB,, | Performed by: ORTHOPAEDIC SURGERY

## 2023-09-26 PROCEDURE — 1160F RVW MEDS BY RX/DR IN RCRD: CPT | Mod: CPTII,S$GLB,, | Performed by: ORTHOPAEDIC SURGERY

## 2023-09-26 PROCEDURE — 99999 PR PBB SHADOW E&M-EST. PATIENT-LVL III: CPT | Mod: PBBFAC,,, | Performed by: ORTHOPAEDIC SURGERY

## 2023-09-26 PROCEDURE — 20550 TENDON SHEATH: ICD-10-PCS | Mod: 50,51,XS,S$GLB | Performed by: ORTHOPAEDIC SURGERY

## 2023-09-26 PROCEDURE — 1159F PR MEDICATION LIST DOCUMENTED IN MEDICAL RECORD: ICD-10-PCS | Mod: CPTII,S$GLB,, | Performed by: ORTHOPAEDIC SURGERY

## 2023-09-26 PROCEDURE — 73030 X-RAY EXAM OF SHOULDER: CPT | Mod: TC,50,PO

## 2023-09-26 PROCEDURE — 99214 OFFICE O/P EST MOD 30 MIN: CPT | Mod: 25,S$GLB,, | Performed by: ORTHOPAEDIC SURGERY

## 2023-09-26 PROCEDURE — 73030 X-RAY EXAM OF SHOULDER: CPT | Mod: 26,LT,, | Performed by: RADIOLOGY

## 2023-09-26 PROCEDURE — 20610 LARGE JOINT ASPIRATION/INJECTION: BILATERAL SUBACROMIAL BURSA: ICD-10-PCS | Mod: 50,S$GLB,, | Performed by: ORTHOPAEDIC SURGERY

## 2023-09-26 PROCEDURE — 73030 X-RAY EXAM OF SHOULDER: CPT | Mod: 26,RT,, | Performed by: RADIOLOGY

## 2023-09-26 PROCEDURE — 4010F PR ACE/ARB THEARPY RXD/TAKEN: ICD-10-PCS | Mod: CPTII,S$GLB,, | Performed by: ORTHOPAEDIC SURGERY

## 2023-09-26 PROCEDURE — 73030 PR  X-RAY SHOULDER 2+ VW: ICD-10-PCS | Mod: 26,LT,, | Performed by: RADIOLOGY

## 2023-09-26 RX ADMIN — TRIAMCINOLONE ACETONIDE 40 MG: 40 INJECTION, SUSPENSION INTRA-ARTICULAR; INTRAMUSCULAR at 03:09

## 2023-10-02 ENCOUNTER — TELEPHONE (OUTPATIENT)
Dept: OPHTHALMOLOGY | Facility: CLINIC | Age: 58
End: 2023-10-02
Payer: COMMERCIAL

## 2023-10-03 ENCOUNTER — TELEPHONE (OUTPATIENT)
Dept: OPHTHALMOLOGY | Facility: CLINIC | Age: 58
End: 2023-10-03
Payer: COMMERCIAL

## 2023-10-03 RX ORDER — TRIAMCINOLONE ACETONIDE 40 MG/ML
40 INJECTION, SUSPENSION INTRA-ARTICULAR; INTRAMUSCULAR
Status: DISCONTINUED | OUTPATIENT
Start: 2023-09-26 | End: 2023-10-03 | Stop reason: HOSPADM

## 2023-10-03 NOTE — TELEPHONE ENCOUNTER
Called pt to Count includes the [facility] MR procedure pt states her mother recently passed and would like a call back next week to setup.     Rosi

## 2023-10-03 NOTE — PROCEDURES
Large Joint Aspiration/Injection: bilateral subacromial bursa    Date/Time: 9/26/2023 3:30 PM    Performed by: Juan Wilson MD  Authorized by: Juan Wilsno MD    Consent Done?:  Yes (Verbal)  Indications:  Pain  Timeout: prior to procedure the correct patient, procedure, and site was verified    Prep: patient was prepped and draped in usual sterile fashion      Local anesthesia used?: Yes    Local anesthetic:  Lidocaine 1% without epinephrine  Anesthetic total (ml):  5      Details:  Needle Size:  22 G  Ultrasonic Guidance for needle placement?: No    Approach:  Posterior  Location:  Shoulder  Laterality:  Bilateral  Site:  Bilateral subacromial bursa  Medications (Right):  40 mg triamcinolone acetonide 40 mg/mL  Medications (Left):  40 mg triamcinolone acetonide 40 mg/mL  Patient tolerance:  Patient tolerated the procedure well with no immediate complications  Tendon Sheath    Date/Time: 9/26/2023 3:30 PM    Performed by: Juan Wilson MD  Authorized by: Juan Wilson MD    Consent Done?:  Yes (Verbal)  Indications:  Pain  Timeout: prior to procedure the correct patient, procedure, and site was verified    Local anesthesia used?: No    Location:  Shoulder  Site:  R bicep tendon and L bicep tendon  Ultrasonic guidance for needle placement?: No    Needle size:  25 G  Medications:  40 mg triamcinolone acetonide 40 mg/mL; 40 mg triamcinolone acetonide 40 mg/mL  Patient tolerance:  Patient tolerated the procedure well with no immediate complications

## 2023-10-03 NOTE — PROGRESS NOTES
"Chief Complaint   Patient presents with    Right Shoulder - Pain    Left Shoulder - Pain       HPI:    This is a 58 y.o. who presents today complaining of bilateral shoulder pain for 1 months after no interval trauma. Pain is dull. No numbness or tingling. No associated signs or symptoms.      Past Medical History:   Diagnosis Date    Allergy     Anticoagulant long-term use     ASA 81mg, Effient    Anxiety     Arthritis     Asthma     As child    CAD (coronary artery disease) 01/27/2012    s/p stents x4 , CABG, 3 vessel, (5/2013) newest stent prior to CABG    Cataract     Chronic constipation     Colon polyp     Coronary artery disease involving native coronary artery of native heart without angina pectoris 01/27/2012 12/19 Significant ostial RCA lesion as above treated with drug-eluting stenting as above. Occluded proximal LAD with patent lima lad Patent vein graft to 2nd obtuse marginal Known occluded vein graft to unknown vessel.  s/p stents x 3 (2010) s/p LAD stent (DSE) 3/2012    DDD (degenerative disc disease), cervical     DDD (degenerative disc disease), lumbar     DDD (degenerative disc disease), thoracic     Depression     Edema     Encounter for blood transfusion     General anesthetics causing adverse effect in therapeutic use     Headache(784.0)     History of nephrolithiasis     Hyperlipidemia     Hypertension     Hypothyroid 07/05/2012    Insomnia     Insulin resistance     patient stated not diebetic    Joint stiffness     Joint swelling     Kidney disease     ; Frequent UTIs     Kidney stones     Low back pain     Neck pain     Obstructive sleep apnea on CPAP 07/17/2012    PONV (postoperative nausea and vomiting)     S/P CABG (coronary artery bypass graft) 06/25/2013 6/23/2013     Sleep apnea 01/27/2012    Patient reports "severe" sleep apnea, uses C-pap    Stented coronary artery 12/20/2019 12/19  ostial RCA -Osirio2.5 x 18 post dilated 2.75     Thoracic back pain     Usual " hyperplasia of lactiferous duct 2017    left breast      Past Surgical History:   Procedure Laterality Date    ADENOIDECTOMY      BACK SURGERY      BREAST BIOPSY Left 2017    duct excision- Dr. Jimenez    BREAST CYST ASPIRATION Left 2020    @ 's office- old hematoma drained (per office)    CARDIAC SURGERY      CARPAL TUNNEL RELEASE      bilateral    CERVICAL LAMINECTOMY WITH SPINAL FUSION      2016     SECTION, CLASSIC      x 2    COLONOSCOPY      CORONARY ANGIOGRAPHY  2019    Procedure: ANGIOGRAM, CORONARY ARTERY;  Surgeon: Timi Millan MD;  Location: [facility] CATH;  Service: Cardiology;;    CORONARY ANGIOPLASTY WITH STENT PLACEMENT      x 4    CORONARY ARTERY BYPASS GRAFT  2013    3 vessel    ESOPHAGOGASTRODUODENOSCOPY N/A 2020    Procedure: EGD (ESOPHAGOGASTRODUODENOSCOPY);  Surgeon: Mk Warren MD;  Location: [facility];  Service: Endoscopy;  Laterality: N/A;    HYSTERECTOMY      Complete    JOINT REPLACEMENT      KNEE ARTHROPLASTY Left 2019    Procedure: ARTHROPLASTY, KNEE;  Surgeon: Juan Wilson MD;  Location: [facility] OR;  Service: Orthopedics;  Laterality: Left;    KNEE ARTHROSCOPY W/ MENISCAL REPAIR Left     twice, last one 2014    LAMINECTOMY      L4/L5    LAPAROSCOPIC CHOLECYSTECTOMY N/A 7/3/2023    Procedure: CHOLECYSTECTOMY, LAPAROSCOPIC;  Surgeon: Obinna Whipple MD;  Location: [facility] OR;  Service: General;  Laterality: N/A;  with cholangiogram    LEFT HEART CATHETERIZATION  2019    Procedure: Left heart cath-  # 219 A;  Surgeon: Timi Millan MD;  Location: [facility] CATH;  Service: Cardiology;;    OOPHORECTOMY Bilateral     ROBOTIC ARTHROPLASTY, KNEE Right 2021    Procedure: ROBOTIC ARTHROPLASTY, KNEE, TOTAL;  Surgeon: Juan Wilson MD;  Location: [facility] OR;  Service: Orthopedics;  Laterality: Right;    SINUS SURGERY      x3    TENDON REPAIR Left     ankle surgery    THYROIDECTOMY      2 separate surgeries    TONSILLECTOMY       TOTAL KNEE ARTHROPLASTY Left 06/17/2019    Surgeon: Juan Wilson MD    XI ROBOTIC REVISION, GASTROENTEROSTOMY, SAMARIA-EN-Y N/A 2/20/2023    Procedure: XI ROBOTIC REVISION,GASTROENTEROSTOMY,SAMARIA-EN-Y;  Surgeon: Obinna Whipple MD;  Location: [facility];  Service: General;  Laterality: N/A;  creation of Samaria-N-Y anastomsis      Current Outpatient Medications on File Prior to Visit   Medication Sig Dispense Refill    acetaminophen (TYLENOL) 650 MG TbSR Take 1,300 mg by mouth every 8 (eight) hours as needed (pain).       albuterol (PROVENTIL/VENTOLIN HFA) 90 mcg/actuation inhaler Inhale 2 puffs into the lungs every 6 (six) hours as needed for Shortness of Breath.       allopurinoL (ZYLOPRIM) 100 MG tablet Take 1 tablet (100 mg total) by mouth once daily. 90 tablet 3    atorvastatin (LIPITOR) 40 MG tablet Take 1 tablet (40 mg total) by mouth once daily. 90 tablet 3    baclofen (LIORESAL) 10 MG tablet TAKE 1 TABLET BY MOUTH THREE TIMES DAILY 90 tablet 3    calcium carb and citrate-vitD3 600 mg-12.5 mcg (500 unit) TbSR Take 2 tablets by mouth once.      cetirizine (ZYRTEC) 10 MG tablet Take 10 mg by mouth daily as needed for Allergies.      fluticasone propionate (FLONASE) 50 mcg/actuation nasal spray fluticasone propionate 50 mcg/actuation nasal spray,suspension      levothyroxine (SYNTHROID) 100 MCG tablet TAKE 1 TABLET BY MOUTH EVERY DAY BEFORE BREAKFAST 30 tablet 11    multivitamin (THERAGRAN) per tablet Take 1 tablet by mouth once daily. Bariatric vitamin      pantoprazole (PROTONIX) 40 MG tablet TAKE 1 TABLET BY MOUTH 2 TIMES A DAY 90 tablet 3    pregabalin (LYRICA) 100 MG capsule TAKE 2 CAPSULES BY MOUTH TWICE DAILY 120 capsule 3    senna-docusate 8.6-50 mg (PERICOLACE) 8.6-50 mg per tablet Take 1 tablet by mouth 2 (two) times a day.      ticagrelor (BRILINTA) 90 mg tablet TAKE 1 TABLET BY MOUTH TWICE DAILY 180 tablet 3    venlafaxine (EFFEXOR-XR) 150 MG Cp24 Take 1 capsule (150 mg total) by mouth once daily. 90  "capsule 3    blood-glucose meter (GLUCOSE MONITORING KIT) kit Use as instructed 1 each 0     No current facility-administered medications on file prior to visit.      Review of patient's allergies indicates:   Allergen Reactions    Celexa [citalopram] Other (See Comments)     GI upset  Stated "knocked me out"    Cephalexin Anaphylaxis    Zoloft [sertraline] Other (See Comments)     Stated "knocked me out"    Codeine Other (See Comments)     hallucinations  hallucinations    Isosorbide Nausea And Vomiting    Ciprofloxacin Itching    Levaquin [levofloxacin] Other (See Comments)     tendinitis    Metformin      Nausea     Penicillamine     Penicillins Other (See Comments)     Was told per mother that as child was allergic to penicillin.  Childhood allergy/ unknown reaction    Sulfa (sulfonamide antibiotics)       Family History not pertinent   Social History     Socioeconomic History    Marital status:    Occupational History    Occupation: teacher   Tobacco Use    Smoking status: Former     Current packs/day: 0.00     Average packs/day: 0.5 packs/day for 14.9 years (7.5 ttl pk-yrs)     Types: Cigarettes     Start date: 1984     Quit date: 1998     Years since quittin.8    Smokeless tobacco: Never   Substance and Sexual Activity    Alcohol use: No    Drug use: Not Currently     Types: Benzodiazepines    Sexual activity: Yes     Partners: Male     Social Determinants of Health     Financial Resource Strain: Unknown (2023)    Overall Financial Resource Strain (CARDIA)     Difficulty of Paying Living Expenses: Patient refused   Food Insecurity: Unknown (2023)    Hunger Vital Sign     Worried About Running Out of Food in the Last Year: Patient refused     Ran Out of Food in the Last Year: Patient refused   Transportation Needs: Unknown (2023)    PRAPARE - Transportation     Lack of Transportation (Medical): Patient refused     Lack of Transportation (Non-Medical): Patient refused "   Physical Activity: Unknown (2/21/2023)    Exercise Vital Sign     Days of Exercise per Week: Patient refused     Minutes of Exercise per Session: Patient refused   Stress: Unknown (2/21/2023)    [place] [employer] - Occupational Stress Questionnaire     Feeling of Stress : Patient refused   Social Connections: Unknown (2/21/2023)    Social Connection and Isolation Panel [NHANES]     Frequency of Communication with Friends and Family: Patient refused     Frequency of Social Gatherings with Friends and Family: Patient refused     Attends Hinduism Services: Patient refused     Active Member of Clubs or Organizations: Patient refused     Attends Club or Organization Meetings: Patient refused     Marital Status:    Housing Stability: Unknown (2/21/2023)    Housing Stability Vital Sign     Unable to Pay for Housing in the Last Year: Patient refused     Unstable Housing in the Last Year: Patient refused         Review of Systems:   Constitutional:  Denies fever or chills    Eyes:  Denies change in visual acuity    HENT:  Denies nasal congestion or sore throat    Respiratory:  Denies cough or shortness of breath    Cardiovascular:  Denies chest pain or edema    GI:  Denies abdominal pain, nausea, vomiting, bloody stools or diarrhea    :  Denies dysuria    Integument:  Denies rash    Neurologic:  Denies headache, focal weakness or sensory changes    Endocrine:  Denies polyuria or polydipsia    Lymphatic:  Denies swollen glands    Psychiatric:  Denies depression or anxiety       Physical Exam:    Constitutional:  Well developed, well nourished, no acute distress, non-toxic appearance    Integument:  Well hydrated, no rash    Lymphatic:  No lymphadenopathy noted    Neurologic:  Alert & oriented x 3,     Psychiatric:  Speech and behavior appropriate    Gi: abdomen soft  Eyes: EOMI     Bilateral Shoulder Exam  Tenderness   Shoulder tenderness location: diffusely about shoulder.    Range of  Motion   Forward Flexion: abnormal   External Rotation: abnormal     Muscle Strength   Supraspinatus: 4/5     Tests   Hawkin's test: positive  Impingement: positive    Other   Erythema: absent  Sensation: normal  Pulse: present      X-rays were performed today, personally reviewed by me and findings discussed with the patient.   3 views of the bilateral shoulders show no fractures or dislocations      Impingement syndrome of left shoulder    Impingement syndrome, shoulder, right    Biceps tendinitis of left upper extremity    Biceps tendinitis of right upper extremity          Using an aseptic technique, I injected 5 cc of lidocaine 1% without and 1 cc of kenalog 40mg into the bilateral Shoulder and 1:1 in the bilateral biceps. The patient tolerated this well. I will have them return to clinic in 6 weeks.

## 2023-10-09 ENCOUNTER — TELEPHONE (OUTPATIENT)
Dept: OPHTHALMOLOGY | Facility: CLINIC | Age: 58
End: 2023-10-09
Payer: COMMERCIAL

## 2023-10-09 NOTE — TELEPHONE ENCOUNTER
Will call back at a later date to [facility] states her mother recently passed and she needs some time to process and deal with things. Pt consent put to file.

## 2023-10-12 PROBLEM — E87.6 HYPOKALEMIA: Status: ACTIVE | Noted: 2023-10-12

## 2023-10-12 PROBLEM — Z71.89 ACP (ADVANCE CARE PLANNING): Status: ACTIVE | Noted: 2023-10-12

## 2023-10-13 ENCOUNTER — TELEPHONE (OUTPATIENT)
Dept: SURGERY | Facility: HOSPITAL | Age: 58
End: 2023-10-13
Payer: COMMERCIAL

## 2023-10-13 NOTE — TELEPHONE ENCOUNTER
Good afternoon,     Ms. Bolton is currently hospitalized at [facility] for a suspected myocardial infarction. Her  states she is currently on heparin and has an angiogram scheduled today at 3PM.     Ms. Bolton has an upcoming colonoscopy with Dr. Warren on Tuesday, 10/17/23. This procedure should be rescheduled after Ms. Bolton has recovered and received cardiac clearance.     Thank you!

## 2023-10-16 DIAGNOSIS — R00.2 PALPITATIONS: Primary | ICD-10-CM

## 2023-10-19 ENCOUNTER — CLINICAL SUPPORT (OUTPATIENT)
Dept: CARDIOLOGY | Facility: HOSPITAL | Age: 58
End: 2023-10-19
Attending: INTERNAL MEDICINE
Payer: COMMERCIAL

## 2023-10-19 DIAGNOSIS — R00.2 PALPITATIONS: ICD-10-CM

## 2023-10-19 PROCEDURE — 93271 ECG/MONITORING AND ANALYSIS: CPT | Mod: PO

## 2023-10-19 PROCEDURE — 93272 CARDIAC EVENT MONITOR (CUPID ONLY): ICD-10-PCS | Mod: ,,, | Performed by: INTERNAL MEDICINE

## 2023-10-19 PROCEDURE — 93272 ECG/REVIEW INTERPRET ONLY: CPT | Mod: ,,, | Performed by: INTERNAL MEDICINE

## 2023-10-19 PROCEDURE — 93270 REMOTE 30 DAY ECG REV/REPORT: CPT | Mod: PO

## 2023-11-07 ENCOUNTER — OFFICE VISIT (OUTPATIENT)
Dept: ORTHOPEDICS | Facility: CLINIC | Age: 58
End: 2023-11-07
Payer: COMMERCIAL

## 2023-11-07 VITALS — HEIGHT: 62 IN | BODY MASS INDEX: 25.4 KG/M2 | WEIGHT: 138 LBS

## 2023-11-07 DIAGNOSIS — M75.21 BICEPS TENDINITIS OF RIGHT UPPER EXTREMITY: ICD-10-CM

## 2023-11-07 DIAGNOSIS — M75.42 IMPINGEMENT SYNDROME OF LEFT SHOULDER: Primary | ICD-10-CM

## 2023-11-07 DIAGNOSIS — M75.41 IMPINGEMENT SYNDROME, SHOULDER, RIGHT: ICD-10-CM

## 2023-11-07 DIAGNOSIS — M75.22 BICEPS TENDINITIS OF LEFT UPPER EXTREMITY: ICD-10-CM

## 2023-11-07 PROCEDURE — 20550 NJX 1 TENDON SHEATH/LIGAMENT: CPT | Mod: 50,59,S$GLB, | Performed by: NURSE PRACTITIONER

## 2023-11-07 PROCEDURE — 99499 NO LOS: ICD-10-PCS | Mod: S$GLB,,, | Performed by: NURSE PRACTITIONER

## 2023-11-07 PROCEDURE — 3008F BODY MASS INDEX DOCD: CPT | Mod: CPTII,S$GLB,, | Performed by: NURSE PRACTITIONER

## 2023-11-07 PROCEDURE — 99499 UNLISTED E&M SERVICE: CPT | Mod: S$GLB,,, | Performed by: NURSE PRACTITIONER

## 2023-11-07 PROCEDURE — 1159F PR MEDICATION LIST DOCUMENTED IN MEDICAL RECORD: ICD-10-PCS | Mod: CPTII,S$GLB,, | Performed by: NURSE PRACTITIONER

## 2023-11-07 PROCEDURE — 20550 TENDON SHEATH: ICD-10-PCS | Mod: 50,59,S$GLB, | Performed by: NURSE PRACTITIONER

## 2023-11-07 PROCEDURE — 99999 PR PBB SHADOW E&M-EST. PATIENT-LVL III: CPT | Mod: PBBFAC,,, | Performed by: NURSE PRACTITIONER

## 2023-11-07 PROCEDURE — 99999 PR PBB SHADOW E&M-EST. PATIENT-LVL III: ICD-10-PCS | Mod: PBBFAC,,, | Performed by: NURSE PRACTITIONER

## 2023-11-07 PROCEDURE — 1160F RVW MEDS BY RX/DR IN RCRD: CPT | Mod: CPTII,S$GLB,, | Performed by: NURSE PRACTITIONER

## 2023-11-07 PROCEDURE — 4010F ACE/ARB THERAPY RXD/TAKEN: CPT | Mod: CPTII,S$GLB,, | Performed by: NURSE PRACTITIONER

## 2023-11-07 PROCEDURE — 20610 LARGE JOINT ASPIRATION/INJECTION: BILATERAL SUBACROMIAL BURSA: ICD-10-PCS | Mod: 50,S$GLB,, | Performed by: NURSE PRACTITIONER

## 2023-11-07 PROCEDURE — 1159F MED LIST DOCD IN RCRD: CPT | Mod: CPTII,S$GLB,, | Performed by: NURSE PRACTITIONER

## 2023-11-07 PROCEDURE — 3008F PR BODY MASS INDEX (BMI) DOCUMENTED: ICD-10-PCS | Mod: CPTII,S$GLB,, | Performed by: NURSE PRACTITIONER

## 2023-11-07 PROCEDURE — 1160F PR REVIEW ALL MEDS BY PRESCRIBER/CLIN PHARMACIST DOCUMENTED: ICD-10-PCS | Mod: CPTII,S$GLB,, | Performed by: NURSE PRACTITIONER

## 2023-11-07 PROCEDURE — 4010F PR ACE/ARB THEARPY RXD/TAKEN: ICD-10-PCS | Mod: CPTII,S$GLB,, | Performed by: NURSE PRACTITIONER

## 2023-11-07 PROCEDURE — 20610 DRAIN/INJ JOINT/BURSA W/O US: CPT | Mod: 50,S$GLB,, | Performed by: NURSE PRACTITIONER

## 2023-11-07 RX ADMIN — TRIAMCINOLONE ACETONIDE 40 MG: 40 INJECTION, SUSPENSION INTRA-ARTICULAR; INTRAMUSCULAR at 03:11

## 2023-11-08 RX ORDER — TRIAMCINOLONE ACETONIDE 40 MG/ML
40 INJECTION, SUSPENSION INTRA-ARTICULAR; INTRAMUSCULAR
Status: DISCONTINUED | OUTPATIENT
Start: 2023-11-07 | End: 2023-11-08 | Stop reason: HOSPADM

## 2023-11-08 NOTE — PROCEDURES
Large Joint Aspiration/Injection: bilateral subacromial bursa    Date/Time: 11/7/2023 3:20 PM    Performed by: Leandra Wilkins FNP  Authorized by: Leandra Wilkins FNP    Consent Done?:  Yes (Verbal)  Indications:  Pain  Timeout: prior to procedure the correct patient, procedure, and site was verified    Prep: patient was prepped and draped in usual sterile fashion      Local anesthesia used?: Yes    Local anesthetic:  Lidocaine 1% without epinephrine  Anesthetic total (ml):  5      Details:  Needle Size:  22 G  Ultrasonic Guidance for needle placement?: No    Approach:  Posterior  Location:  Shoulder  Laterality:  Bilateral  Site:  Bilateral subacromial bursa  Medications (Right):  40 mg triamcinolone acetonide 40 mg/mL  Medications (Left):  40 mg triamcinolone acetonide 40 mg/mL  Patient tolerance:  Patient tolerated the procedure well with no immediate complications  Tendon Sheath    Date/Time: 11/7/2023 3:20 PM    Performed by: Leandra Wilkins FNP  Authorized by: Leandra Wilkins FNP    Consent Done?:  Yes (Verbal)  Indications:  Pain  Timeout: prior to procedure the correct patient, procedure, and site was verified    Local anesthesia used?: No    Local anesthetic:  Lidocaine 1% without epinephrine  Anesthetic total (ml):  1    Location:  Shoulder  Site:  L bicep tendon  Ultrasonic guidance for needle placement?: No    Needle size:  25 G  Medications:  40 mg triamcinolone acetonide 40 mg/mL  Patient tolerance:  Patient tolerated the procedure well with no immediate complications  Tendon Sheath    Date/Time: 11/7/2023 3:20 PM    Performed by: Leandra Wilkins FNP  Authorized by: Leandra Wilkins, FNP    Consent Done?:  Yes (Verbal)  Indications:  Pain  Timeout: prior to procedure the correct patient, procedure, and site was verified    Local anesthesia used?: No    Location:  Shoulder  Site:  R bicep tendon  Ultrasonic guidance for needle placement?: No    Needle size:  25 G  Medications:  40 mg triamcinolone acetonide 40  mg/mL  Patient tolerance:  Patient tolerated the procedure well with no immediate complications

## 2023-11-08 NOTE — PROGRESS NOTES
"Chief Complaint   Patient presents with    Right Shoulder - Pain    Left Shoulder - Pain       HPI:   This is a 58 y.o. who returns to clinic today in follow-up for bilateral shoulder for the past 2 weeks after no injury. Pain is aching and dull. No numbness or tingling. No associated signs or symptoms.    Past Medical History:   Diagnosis Date    Allergy     Anticoagulant long-term use     ASA 81mg, Effient    Anxiety     Arthritis     Asthma     As child    CAD (coronary artery disease) 01/27/2012    s/p stents x4 , CABG, 3 vessel, (5/2013) newest stent prior to CABG    Cataract     Chronic constipation     Colon polyp     Coronary artery disease involving native coronary artery of native heart without angina pectoris 01/27/2012 12/19 Significant ostial RCA lesion as above treated with drug-eluting stenting as above. Occluded proximal LAD with patent lima lad Patent vein graft to 2nd obtuse marginal Known occluded vein graft to unknown vessel.  s/p stents x 3 (2010) s/p LAD stent (DSE) 3/2012    DDD (degenerative disc disease), cervical     DDD (degenerative disc disease), lumbar     DDD (degenerative disc disease), thoracic     Depression     Edema     Encounter for blood transfusion     General anesthetics causing adverse effect in therapeutic use     Headache(784.0)     History of nephrolithiasis     Hyperlipidemia     Hypertension     Hypothyroid 07/05/2012    Insomnia     Insulin resistance     patient stated not diebetic    Joint stiffness     Joint swelling     Kidney disease     ; Frequent UTIs     Kidney stones     Low back pain     Neck pain     Obstructive sleep apnea on CPAP 07/17/2012    PONV (postoperative nausea and vomiting)     S/P CABG (coronary artery bypass graft) 06/25/2013 6/23/2013     Sleep apnea 01/27/2012    Patient reports "severe" sleep apnea, uses C-pap    Stented coronary artery 12/20/2019 12/19  ostial RCA -Osirio2.5 x 18 post dilated 2.75     Thoracic back pain  "    Usual hyperplasia of lactiferous duct 2017    left breast     Past Surgical History:   Procedure Laterality Date    ADENOIDECTOMY      BACK SURGERY      BREAST BIOPSY Left 2017    duct excision- Dr. Jimenez    BREAST CYST ASPIRATION Left 2020    @ 's office- old hematoma drained (per office)    CARDIAC SURGERY      CARPAL TUNNEL RELEASE      bilateral    CERVICAL LAMINECTOMY WITH SPINAL FUSION      2016     SECTION, CLASSIC      x 2    COLONOSCOPY      CORONARY ANGIOGRAPHY  2019    Procedure: ANGIOGRAM, CORONARY ARTERY;  Surgeon: Timi Millan MD;  Location: [facility] CATH;  Service: Cardiology;;    CORONARY ANGIOGRAPHY  10/13/2023    Procedure: Coronary angiogram study;  Surgeon: Azra Stoll MD;  [facility];  Service: Cardiology;;    CORONARY ANGIOGRAPHY INCLUDING BYPASS GRAFTS WITH CATHETERIZATION OF LEFT HEART  10/13/2023    Procedure: Left heart cath w/Grafts RM 3640;  Surgeon: Azra Stoll MD;  Location: [facility] CATH;  Service: Cardiology;;    CORONARY ANGIOPLASTY WITH STENT PLACEMENT      x 4    CORONARY ARTERY BYPASS GRAFT  2013    3 vessel    CORONARY BYPASS GRAFT ANGIOGRAPHY  10/13/2023    Procedure: Bypass graft study;  Surgeon: Azra Stoll MD;  Location: [facility] CATH;  Service: Cardiology;;    ESOPHAGOGASTRODUODENOSCOPY N/A 2020    Procedure: EGD (ESOPHAGOGASTRODUODENOSCOPY);  Surgeon: Mk Warren MD;  Location: [facility];  Service: Endoscopy;  Laterality: N/A;    HYSTERECTOMY      Complete    JOINT REPLACEMENT      KNEE ARTHROPLASTY Left 2019    Procedure: ARTHROPLASTY, KNEE;  Surgeon: Juan Wilson MD;  Location: [facility] OR;  Service: Orthopedics;  Laterality: Left;    KNEE ARTHROSCOPY W/ MENISCAL REPAIR Left     twice, last one 2014    LAMINECTOMY      L4/L5    LAPAROSCOPIC CHOLECYSTECTOMY N/A 7/3/2023    Procedure: CHOLECYSTECTOMY, LAPAROSCOPIC;  Surgeon: Obinna Whipple MD;  Location: [facility] OR;  Service: General;  Laterality: N/A;  with  cholangiogram    LEFT HEART CATHETERIZATION  12/13/2019    Procedure: Left heart cath- RM # 219 [id number];  Surgeon: Timi Millan MD;  Location: [facility] CATH;  Service: Cardiology;;    OOPHORECTOMY Bilateral 2011    ROBOTIC ARTHROPLASTY, KNEE Right 6/14/2021    Procedure: ROBOTIC ARTHROPLASTY, KNEE, TOTAL;  Surgeon: Juan Wilson MD;  Location: [facility] OR;  Service: Orthopedics;  Laterality: Right;    SINUS SURGERY      x3    TENDON REPAIR Left     ankle surgery    THYROIDECTOMY      2 separate surgeries    TONSILLECTOMY      TOTAL KNEE ARTHROPLASTY Left 06/17/2019    Surgeon: Juan Wilson MD    XI ROBOTIC REVISION, GASTROENTEROSTOMY, MITCHELL-EN-Y N/A 2/20/2023    Procedure: XI ROBOTIC REVISION,GASTROENTEROSTOMY,MITCHELL-EN-Y;  Surgeon: Obinna Whipple MD;  Location: [facility] OR;  Service: General;  Laterality: N/A;  creation of Mitchell-N-Y anastomsis     Current Outpatient Medications on File Prior to Visit   Medication Sig Dispense Refill    acetaminophen (TYLENOL) 650 MG TbSR Take 1,300 mg by mouth every 8 (eight) hours as needed (pain).       albuterol (PROVENTIL/VENTOLIN HFA) 90 mcg/actuation inhaler Inhale 2 puffs into the lungs every 6 (six) hours as needed for Shortness of Breath.       allopurinoL (ZYLOPRIM) 100 MG tablet Take 1 tablet (100 mg total) by mouth once daily. 90 tablet 3    amLODIPine (NORVASC) 5 MG tablet Take 1 tablet (5 mg total) by mouth once daily. 30 tablet 11    aspirin (ECOTRIN) 81 MG EC tablet Take 1 tablet (81 mg total) by mouth once daily. 30 tablet 11    atorvastatin (LIPITOR) 40 MG tablet Take 1 tablet (40 mg total) by mouth once daily. 90 tablet 3    baclofen (LIORESAL) 10 MG tablet TAKE 1 TABLET BY MOUTH THREE TIMES DAILY 90 tablet 3    calcium carb and citrate-vitD3 600 mg-12.5 mcg (500 unit) TbSR Take 2 tablets by mouth once.      cetirizine (ZYRTEC) 10 MG tablet Take 10 mg by mouth daily as needed for Allergies.      fluticasone propionate (FLONASE) 50 mcg/actuation nasal spray fluticasone  "propionate 50 mcg/actuation nasal spray,suspension      levothyroxine (SYNTHROID) 100 MCG tablet TAKE 1 TABLET BY MOUTH EVERY DAY BEFORE BREAKFAST 30 tablet 11    metoprolol succinate (TOPROL-XL) 25 MG 24 hr tablet Take 1 tablet (25 mg total) by mouth every evening. 30 tablet 11    multivitamin (THERAGRAN) per tablet Take 1 tablet by mouth once daily. Bariatric vitamin      pantoprazole (PROTONIX) 40 MG tablet TAKE 1 TABLET BY MOUTH 2 TIMES A DAY 90 tablet 3    pregabalin (LYRICA) 100 MG capsule TAKE 2 CAPSULES BY MOUTH TWICE DAILY 120 capsule 3    senna-docusate 8.6-50 mg (PERICOLACE) 8.6-50 mg per tablet Take 1 tablet by mouth 2 (two) times a day.      ticagrelor (BRILINTA) 90 mg tablet TAKE 1 TABLET BY MOUTH TWICE DAILY 180 tablet 3    venlafaxine (EFFEXOR-XR) 150 MG Cp24 Take 1 capsule (150 mg total) by mouth once daily. 90 capsule 3    blood-glucose meter (GLUCOSE MONITORING KIT) kit Use as instructed 1 each 0     No current facility-administered medications on file prior to visit.     Review of patient's allergies indicates:   Allergen Reactions    Celexa [citalopram] Other (See Comments)     GI upset  Stated "knocked me out"    Cephalexin Anaphylaxis    Zoloft [sertraline] Other (See Comments)     Stated "knocked me out"    Codeine Other (See Comments)     hallucinations  hallucinations    Isosorbide Nausea And Vomiting    Ciprofloxacin Itching    Levaquin [levofloxacin] Other (See Comments)     tendinitis    Metformin      Nausea     Penicillamine     Penicillins Other (See Comments)     Was told per mother that as child was allergic to penicillin.  Childhood allergy/ unknown reaction    Sulfa (sulfonamide antibiotics)      Family History   Problem Relation Age of Onset    Heart failure Mother     Asthma Mother     Macular degeneration Mother     Cancer Mother         Breast    Cataracts Mother     Heart disease Mother     COPD Mother     Breast cancer Mother     Heart disease Father     Diabetes Father     " Hypertension Father     Stroke Father     Cataracts Father     Obesity Father     Obesity Sister     Glaucoma Sister     Thyroid disease Sister     Glaucoma Sister     Other Brother         stiff man syndrome    Cancer Maternal Grandmother         Breast with Mets    Breast cancer Maternal Grandmother     Cancer Paternal Grandmother         Colon    Strabismus Other     Amblyopia Neg Hx     Blindness Neg Hx     Retinal detachment Neg Hx      Social History     Socioeconomic History    Marital status:    Occupational History    Occupation: teacher   Tobacco Use    Smoking status: Former     Current packs/day: 0.00     Average packs/day: 0.5 packs/day for 14.9 years (7.5 ttl pk-yrs)     Types: Cigarettes     Start date: 1984     Quit date: 1998     Years since quittin.9    Smokeless tobacco: Never   Substance and Sexual Activity    Alcohol use: No    Drug use: Not Currently     Types: Benzodiazepines    Sexual activity: Yes     Partners: Male     Social Determinants of Health     Financial Resource Strain: Unknown (2023)    Overall Financial Resource Strain (CARDIA)     Difficulty of Paying Living Expenses: Patient refused   Food Insecurity: Unknown (2023)    Hunger Vital Sign     Worried About Running Out of Food in the Last Year: Patient refused     Ran Out of Food in the Last Year: Patient refused   Transportation Needs: Unknown (2023)    PRAPARE - Transportation     Lack of Transportation (Medical): Patient refused     Lack of Transportation (Non-Medical): Patient refused   Physical Activity: Unknown (2023)    Exercise Vital Sign     Days of Exercise per Week: Patient refused     Minutes of Exercise per Session: Patient refused   Stress: Unknown (2023)    [employer] - Occupational Stress Questionnaire     Feeling of Stress : Patient refused   Social Connections: Unknown (2023)    Social Connection and Isolation Panel [NHANES]      Frequency of Communication with Friends and Family: Patient refused     Frequency of Social Gatherings with Friends and Family: Patient refused     Attends [employer] Services: Patient refused     Active Member of Clubs or Organizations: Patient refused     Attends Club or Organization Meetings: Patient refused     Marital Status:    Housing Stability: Unknown (2/21/2023)    Housing Stability Vital Sign     Unable to Pay for Housing in the Last Year: Patient refused     Unstable Housing in the Last Year: Patient refused       Review of Systems:  Constitutional:  Denies fever or chills   Eyes:  Denies change in visual acuity   HENT:  Denies nasal congestion or sore throat   Respiratory:  Denies cough or shortness of breath   Cardiovascular:  Denies chest pain or edema   GI:  Denies abdominal pain, nausea, vomiting, bloody stools or diarrhea   :  Denies dysuria   Integument:  Denies rash   Neurologic:  Denies headache, focal weakness or sensory changes   Endocrine:  Denies polyuria or polydipsia   Lymphatic:  Denies swollen glands   Psychiatric:  Denies depression or anxiety     Physical Exam:   Constitutional:  Well developed, well nourished, no acute distress, non-toxic appearance   Integument:  Well hydrated, no rash   Lymphatic:  No lymphadenopathy noted   Neurologic:  Alert & oriented x 3,    Psychiatric:  Speech and behavior appropriate     Bilateral Shoulder Exam    bilateral Shoulder Exam   Tenderness   Shoulder tenderness location: diffusely about shoulder and +TTP to bilateral bicep tendons.    Range of Motion   Forward Flexion: abnormal   External Rotation: abnormal     Muscle Strength   Supraspinatus: 4/5     Tests   Hawkin's test: positive  Impingement: positive    Other   Erythema: absent  Sensation: normal  Pulse: present         Impingement syndrome of left shoulder  -     Large Joint Aspiration/Injection: bilateral subacromial bursa  -     triamcinolone acetonide injection 40 mg  -      triamcinolone acetonide injection 40 mg    Impingement syndrome, shoulder, right  -     Large Joint Aspiration/Injection: bilateral subacromial bursa  -     triamcinolone acetonide injection 40 mg  -     triamcinolone acetonide injection 40 mg    Biceps tendinitis of left upper extremity  -     Tendon Sheath  -     triamcinolone acetonide injection 40 mg    Biceps tendinitis of right upper extremity  -     Tendon Sheath  -     triamcinolone acetonide injection 40 mg         Using an aseptic technique, I injected 5 cc of lidocaine 1% without and 1 cc of kenalog 40mg into the bilateral Shoulder. The patient tolerated this well.    Using an aseptic technique, I injected 1 cc of lidocaine 1% without and 1 cc of kenalog 40mg into the bilateral bicep tendons. The patient tolerated this well.   Rtc in 6 weeks.

## 2023-11-20 ENCOUNTER — TELEPHONE (OUTPATIENT)
Dept: FAMILY MEDICINE | Facility: CLINIC | Age: 58
End: 2023-11-20
Payer: COMMERCIAL

## 2023-11-20 NOTE — TELEPHONE ENCOUNTER
----- Message from Cristy Ivey sent at 11/20/2023 10:30 AM CST -----  Regarding: Sugar low  Type:  Same Day Appointment Request    Caller is requesting a same day appointment.  Caller declined first available appointment listed below.      Name of Caller:Pt    When is the first available appointment?n/a    Symptoms:Sugar level low    Best Call Back Number:[phone]    Additional Information: Pt sts she needs to be seen. Please advise --------------thank you

## 2023-11-20 NOTE — TELEPHONE ENCOUNTER
Patient states she is having issues with her sugars dropping in the 50's.      Patient is scheduled with Smitha on 11/28     She will reach out to [facility] also for her levels being down.

## 2023-11-28 ENCOUNTER — OFFICE VISIT (OUTPATIENT)
Dept: FAMILY MEDICINE | Facility: CLINIC | Age: 58
End: 2023-11-28
Payer: COMMERCIAL

## 2023-11-28 VITALS
HEIGHT: 62 IN | BODY MASS INDEX: 25.88 KG/M2 | WEIGHT: 140.63 LBS | OXYGEN SATURATION: 98 % | DIASTOLIC BLOOD PRESSURE: 64 MMHG | SYSTOLIC BLOOD PRESSURE: 124 MMHG | HEART RATE: 92 BPM | TEMPERATURE: 98 F

## 2023-11-28 DIAGNOSIS — Z98.84 S/P GASTRIC BYPASS: ICD-10-CM

## 2023-11-28 DIAGNOSIS — I10 ESSENTIAL HYPERTENSION: ICD-10-CM

## 2023-11-28 DIAGNOSIS — E16.2 HYPOGLYCEMIA: Primary | ICD-10-CM

## 2023-11-28 PROCEDURE — 3008F PR BODY MASS INDEX (BMI) DOCUMENTED: ICD-10-PCS | Mod: CPTII,S$GLB,, | Performed by: NURSE PRACTITIONER

## 2023-11-28 PROCEDURE — 1159F PR MEDICATION LIST DOCUMENTED IN MEDICAL RECORD: ICD-10-PCS | Mod: CPTII,S$GLB,, | Performed by: NURSE PRACTITIONER

## 2023-11-28 PROCEDURE — 99999 PR PBB SHADOW E&M-EST. PATIENT-LVL V: ICD-10-PCS | Mod: PBBFAC,,, | Performed by: NURSE PRACTITIONER

## 2023-11-28 PROCEDURE — 3074F SYST BP LT 130 MM HG: CPT | Mod: CPTII,S$GLB,, | Performed by: NURSE PRACTITIONER

## 2023-11-28 PROCEDURE — 3008F BODY MASS INDEX DOCD: CPT | Mod: CPTII,S$GLB,, | Performed by: NURSE PRACTITIONER

## 2023-11-28 PROCEDURE — 99999 PR PBB SHADOW E&M-EST. PATIENT-LVL V: CPT | Mod: PBBFAC,,, | Performed by: NURSE PRACTITIONER

## 2023-11-28 PROCEDURE — 4010F PR ACE/ARB THEARPY RXD/TAKEN: ICD-10-PCS | Mod: CPTII,S$GLB,, | Performed by: NURSE PRACTITIONER

## 2023-11-28 PROCEDURE — 3074F PR MOST RECENT SYSTOLIC BLOOD PRESSURE < 130 MM HG: ICD-10-PCS | Mod: CPTII,S$GLB,, | Performed by: NURSE PRACTITIONER

## 2023-11-28 PROCEDURE — 4010F ACE/ARB THERAPY RXD/TAKEN: CPT | Mod: CPTII,S$GLB,, | Performed by: NURSE PRACTITIONER

## 2023-11-28 PROCEDURE — 99214 PR OFFICE/OUTPT VISIT, EST, LEVL IV, 30-39 MIN: ICD-10-PCS | Mod: S$GLB,,, | Performed by: NURSE PRACTITIONER

## 2023-11-28 PROCEDURE — 99214 OFFICE O/P EST MOD 30 MIN: CPT | Mod: S$GLB,,, | Performed by: NURSE PRACTITIONER

## 2023-11-28 PROCEDURE — 3078F PR MOST RECENT DIASTOLIC BLOOD PRESSURE < 80 MM HG: ICD-10-PCS | Mod: CPTII,S$GLB,, | Performed by: NURSE PRACTITIONER

## 2023-11-28 PROCEDURE — 3078F DIAST BP <80 MM HG: CPT | Mod: CPTII,S$GLB,, | Performed by: NURSE PRACTITIONER

## 2023-11-28 PROCEDURE — 1159F MED LIST DOCD IN RCRD: CPT | Mod: CPTII,S$GLB,, | Performed by: NURSE PRACTITIONER

## 2023-11-28 RX ORDER — INSULIN PUMP SYRINGE, 3 ML
EACH MISCELLANEOUS
Qty: 1 EACH | Refills: 0 | Status: SHIPPED | OUTPATIENT
Start: 2023-11-28 | End: 2024-11-27

## 2023-11-28 RX ORDER — LANCETS
EACH MISCELLANEOUS
Qty: 100 EACH | Refills: 1 | Status: SHIPPED | OUTPATIENT
Start: 2023-11-28

## 2023-11-28 NOTE — PROGRESS NOTES
Subjective:       Patient ID: Josephine Bolton is a 58 y.o. female.    Chief Complaint: Hospital Follow Up    HPI  Hospitalized 10/12/23-10/14/23 for evaluation of CP/palpitations, see discharge summary:    HPI: This is a 57 y/o woman with HTN, HLD, CAD, hypothyroid who presents here with complaints of chest pain. Patient reports she was at work today when she began having chest pain. Patient says pain was severe and seemed to radiate to her head. Patient says she had associated nausea and diaphoresis. Patient says symptoms were severe and similar to previous cardiac events. Patient says these symptoms were slightly different from previous cardiac events in that she felt very weak with the symptoms. Patient was encouraged by the school nurse to come to the ED for further evaluation.     Procedure(s) (LRB):  Left heart cath w/Grafts RM 3640 (N/A)  ANGIOGRAM, CORONARY ARTERY (N/A)  Bypass graft study      Hospital Course: Ms. Bolton was admitted for CP.  Given her history of CABG and stents she underwent LHC late on 10/13 which did not show any worsening obstructive disease from previous angiogram.  Restarted on metoprolol because she was noted to have elevated BP during LHC which were thought to be the cause of her CP.  Discharged home 10/14.     She denies CP    Morbid obesity s/p gastric bypast 2/20/23 --BMI now 25!   Hypoglycemic episodes: 40s and 50s several times per week, occurring during daytime mid morning 0900. Works at school--school nurse checked sugar   She drinks protein drink between 7-730, around 10 shredded wheats sometimes   Lunch at 1050--2 turkey sticks, cheese, crystal light   Belvita cookies 1-2 times per day     She is not on any glucose lowering agents    Vitals:    11/28/23 1422   BP: 124/64   Pulse: 92   Temp: 98.4 °F (36.9 °C)     Review of Systems   Constitutional:  Negative for fever.   Neurological:  Negative for syncope.       Past Medical History:   Diagnosis Date    Allergy      "Anticoagulant long-term use     ASA 81mg, Effient    Anxiety     Arthritis     Asthma     As child    CAD (coronary artery disease) 01/27/2012    s/p stents x4 , CABG, 3 vessel, (5/2013) newest stent prior to CABG    Cataract     Chronic constipation     Colon polyp     Coronary artery disease involving native coronary artery of native heart without angina pectoris 01/27/2012 12/19 Significant ostial RCA lesion as above treated with drug-eluting stenting as above. Occluded proximal LAD with patent lima lad Patent vein graft to 2nd obtuse marginal Known occluded vein graft to unknown vessel.  s/p stents x 3 (2010) s/p LAD stent (DSE) 3/2012    DDD (degenerative disc disease), cervical     DDD (degenerative disc disease), lumbar     DDD (degenerative disc disease), thoracic     Depression     Edema     Encounter for blood transfusion     General anesthetics causing adverse effect in therapeutic use     Headache(784.0)     History of nephrolithiasis     Hyperlipidemia     Hypertension     Hypothyroid 07/05/2012    Insomnia     Insulin resistance     patient stated not diebetic    Joint stiffness     Joint swelling     Kidney disease     ; Frequent UTIs     Kidney stones     Low back pain     Neck pain     Obstructive sleep apnea on CPAP 07/17/2012    PONV (postoperative nausea and vomiting)     S/P CABG (coronary artery bypass graft) 06/25/2013 6/23/2013     Sleep apnea 01/27/2012    Patient reports "severe" sleep apnea, uses C-pap    Stented coronary artery 12/20/2019 12/19  ostial RCA -Osirio2.5 x 18 post dilated 2.75     Thoracic back pain     Usual hyperplasia of lactiferous duct 02/2017    left breast     Objective:      Physical Exam  Constitutional:       General: She is not in acute distress.     Appearance: She is well-developed. She is not ill-appearing, toxic-appearing or diaphoretic.   HENT:      Right Ear: Hearing normal.      Left Ear: Hearing normal.   Pulmonary:      Effort: No " tachypnea or respiratory distress.   Skin:     Coloration: Skin is not pale.   Neurological:      Mental Status: She is alert and oriented to person, place, and time.   Psychiatric:         Speech: Speech normal.         Behavior: Behavior normal.         Thought Content: Thought content normal.         Judgment: Judgment normal.         Assessment:       1. Hypoglycemia    2. S/P gastric bypass    3. Essential hypertension        Plan:       Hypoglycemia  -     blood-glucose meter kit; To check BG TID PRN, to use with insurance preferred meter  Dispense: 1 each; Refill: 0  -     lancets Misc; To check BG TID PRN, to use with insurance preferred meter  Dispense: 100 each; Refill: 1  -     blood sugar diagnostic Strp; To check BG TID PRN, to use with insurance preferred meter  Dispense: 100 each; Refill: 0    S/P gastric bypass    Essential hypertension          We discussed diet adjustments for hypoglycemia--discuss with bariatrics next week as well  Discussed monitoring and acute management of hypoglycemia    Follow up for further evaluation if s/s worsen, fail to improve, or new symptoms arise.    Medication List with Changes/Refills   New Medications    BLOOD SUGAR DIAGNOSTIC STRP    To check BG TID PRN, to use with insurance preferred meter    BLOOD-GLUCOSE METER KIT    To check BG TID PRN, to use with insurance preferred meter    LANCETS MISC    To check BG TID PRN, to use with insurance preferred meter   Current Medications    ACETAMINOPHEN (TYLENOL) 650 MG TBSR    Take 1,300 mg by mouth every 8 (eight) hours as needed (pain).     ALBUTEROL (PROVENTIL/VENTOLIN HFA) 90 MCG/ACTUATION INHALER    Inhale 2 puffs into the lungs every 6 (six) hours as needed for Shortness of Breath.     ALLOPURINOL (ZYLOPRIM) 100 MG TABLET    Take 1 tablet (100 mg total) by mouth once daily.    AMLODIPINE (NORVASC) 5 MG TABLET    Take 1 tablet (5 mg total) by mouth once daily.    ASPIRIN (ECOTRIN) 81 MG EC TABLET    Take 1 tablet (81  mg total) by mouth once daily.    ATORVASTATIN (LIPITOR) 40 MG TABLET    Take 1 tablet (40 mg total) by mouth once daily.    BACLOFEN (LIORESAL) 10 MG TABLET    TAKE 1 TABLET BY MOUTH THREE TIMES DAILY    CALCIUM CARB AND CITRATE-VITD3 600 MG-12.5 MCG (500 UNIT) TBSR    Take 2 tablets by mouth once.    CETIRIZINE (ZYRTEC) 10 MG TABLET    Take 10 mg by mouth daily as needed for Allergies.    FLUTICASONE PROPIONATE (FLONASE) 50 MCG/ACTUATION NASAL SPRAY    fluticasone propionate 50 mcg/actuation nasal spray,suspension    LEVOTHYROXINE (SYNTHROID) 100 MCG TABLET    TAKE 1 TABLET BY MOUTH EVERY DAY BEFORE BREAKFAST    METOPROLOL SUCCINATE (TOPROL-XL) 25 MG 24 HR TABLET    Take 1 tablet (25 mg total) by mouth every evening.    MULTIVITAMIN (THERAGRAN) PER TABLET    Take 1 tablet by mouth once daily. Bariatric vitamin    PANTOPRAZOLE (PROTONIX) 40 MG TABLET    TAKE 1 TABLET BY MOUTH 2 TIMES A DAY    PREGABALIN (LYRICA) 100 MG CAPSULE    TAKE 2 CAPSULES BY MOUTH TWICE DAILY    SENNA-DOCUSATE 8.6-50 MG (PERICOLACE) 8.6-50 MG PER TABLET    Take 1 tablet by mouth 2 (two) times a day.    TICAGRELOR (BRILINTA) 90 MG TABLET    TAKE 1 TABLET BY MOUTH TWICE DAILY    VENLAFAXINE (EFFEXOR-XR) 150 MG CP24    Take 1 capsule (150 mg total) by mouth once daily.   Discontinued Medications    BLOOD-GLUCOSE METER (GLUCOSE MONITORING KIT) KIT    Use as instructed

## 2023-12-08 ENCOUNTER — OFFICE VISIT (OUTPATIENT)
Dept: BARIATRICS | Facility: CLINIC | Age: 58
End: 2023-12-08
Payer: COMMERCIAL

## 2023-12-08 VITALS
TEMPERATURE: 97 F | RESPIRATION RATE: 16 BRPM | BODY MASS INDEX: 24.91 KG/M2 | HEIGHT: 62 IN | HEART RATE: 74 BPM | DIASTOLIC BLOOD PRESSURE: 75 MMHG | WEIGHT: 135.38 LBS | SYSTOLIC BLOOD PRESSURE: 136 MMHG

## 2023-12-08 DIAGNOSIS — K91.2 HYPOGLYCEMIA AFTER GI (GASTROINTESTINAL) SURGERY: ICD-10-CM

## 2023-12-08 DIAGNOSIS — Z98.84 HX OF BARIATRIC SURGERY: ICD-10-CM

## 2023-12-08 DIAGNOSIS — M51.36 DDD (DEGENERATIVE DISC DISEASE), LUMBAR: ICD-10-CM

## 2023-12-08 DIAGNOSIS — E78.2 MIXED HYPERLIPIDEMIA: ICD-10-CM

## 2023-12-08 DIAGNOSIS — E03.9 HYPOTHYROIDISM, UNSPECIFIED TYPE: ICD-10-CM

## 2023-12-08 DIAGNOSIS — G47.33 OSA (OBSTRUCTIVE SLEEP APNEA): ICD-10-CM

## 2023-12-08 DIAGNOSIS — E66.01 MORBID OBESITY: Primary | ICD-10-CM

## 2023-12-08 PROCEDURE — 3075F PR MOST RECENT SYSTOLIC BLOOD PRESS GE 130-139MM HG: ICD-10-PCS | Mod: CPTII,S$GLB,, | Performed by: SURGERY

## 2023-12-08 PROCEDURE — 3008F PR BODY MASS INDEX (BMI) DOCUMENTED: ICD-10-PCS | Mod: CPTII,S$GLB,, | Performed by: SURGERY

## 2023-12-08 PROCEDURE — 4010F ACE/ARB THERAPY RXD/TAKEN: CPT | Mod: CPTII,S$GLB,, | Performed by: SURGERY

## 2023-12-08 PROCEDURE — 99999 PR PBB SHADOW E&M-EST. PATIENT-LVL IV: CPT | Mod: PBBFAC,,, | Performed by: SURGERY

## 2023-12-08 PROCEDURE — 99213 PR OFFICE/OUTPT VISIT, EST, LEVL III, 20-29 MIN: ICD-10-PCS | Mod: S$GLB,,, | Performed by: SURGERY

## 2023-12-08 PROCEDURE — 1159F PR MEDICATION LIST DOCUMENTED IN MEDICAL RECORD: ICD-10-PCS | Mod: CPTII,S$GLB,, | Performed by: SURGERY

## 2023-12-08 PROCEDURE — 3075F SYST BP GE 130 - 139MM HG: CPT | Mod: CPTII,S$GLB,, | Performed by: SURGERY

## 2023-12-08 PROCEDURE — 99999 PR PBB SHADOW E&M-EST. PATIENT-LVL IV: ICD-10-PCS | Mod: PBBFAC,,, | Performed by: SURGERY

## 2023-12-08 PROCEDURE — 3078F PR MOST RECENT DIASTOLIC BLOOD PRESSURE < 80 MM HG: ICD-10-PCS | Mod: CPTII,S$GLB,, | Performed by: SURGERY

## 2023-12-08 PROCEDURE — 3008F BODY MASS INDEX DOCD: CPT | Mod: CPTII,S$GLB,, | Performed by: SURGERY

## 2023-12-08 PROCEDURE — 99213 OFFICE O/P EST LOW 20 MIN: CPT | Mod: S$GLB,,, | Performed by: SURGERY

## 2023-12-08 PROCEDURE — 4010F PR ACE/ARB THEARPY RXD/TAKEN: ICD-10-PCS | Mod: CPTII,S$GLB,, | Performed by: SURGERY

## 2023-12-08 PROCEDURE — 3078F DIAST BP <80 MM HG: CPT | Mod: CPTII,S$GLB,, | Performed by: SURGERY

## 2023-12-08 PROCEDURE — 1159F MED LIST DOCD IN RCRD: CPT | Mod: CPTII,S$GLB,, | Performed by: SURGERY

## 2023-12-08 RX ORDER — ACARBOSE 25 MG/1
25 TABLET ORAL
Qty: 270 TABLET | Refills: 3 | Status: SHIPPED | OUTPATIENT
Start: 2023-12-08 | End: 2024-02-23 | Stop reason: SDUPTHER

## 2023-12-08 NOTE — PROGRESS NOTES
Post Op Note    Surgery: gastric bypass surgery  Date: 2/20/23  Initial weight: 212  Last weight: 142  Current weight: 135    Lap harman in july    Having hypoglycemic episodes - avoiding sugars and happens anyway     Measuring blood sugars and it drops into the 40s - she feels its random    Diet:  Breakfast:  egg wilkerson  Lunch: meat in breadless sandwich   Dinner: meat and vegetable  Snack: nuts and turkey stick    Exercise:  Walking puppy     MVI: carlos mvi, calcium     Vitals:    12/08/23 1529   BP: 136/75   Pulse: 74   Resp: 16   Temp: 97.2 °F (36.2 °C)       Body comp:  Fat Percent:  28.7 %  Fat Mass:  38.8 lb  FFM:  96.6 lb  TBW: 66.4 lb  TBW %:  49 %  BMR: 1294 kcal    PE:  NAD  RRR  Soft/nt/nd    Labs: none    A/P: s/p gastric bypass  Now with hypoglycemia  Will try acarabose, already avoiding carbs     Counseling for patient:    Diet: stick to plan continue to avoid carbs  Exercise: continue routine  Vitamins: try regular mvi    Co morbidities:     1. Morbid obesity  B12    B1    Iron Studies    Vitamin D 25 Hydroxy      2. Hx of bariatric surgery        3. BMI 26.0-26.9,adult        4. Mixed hyperlipidemia        5. RAHUL (obstructive sleep apnea)        6. DDD (degenerative disc disease), lumbar        7. Hypoglycemia after GI (gastrointestinal) surgery  Insulin, random    Hemoglobin A1C      8. Hypothyroidism, unspecified type  T4, FREE    T3          -All above: Evaluated, monitored, and treated with diet and exercise program.

## 2023-12-12 ENCOUNTER — OFFICE VISIT (OUTPATIENT)
Dept: ORTHOPEDICS | Facility: CLINIC | Age: 58
End: 2023-12-12
Payer: OTHER MISCELLANEOUS

## 2023-12-12 VITALS — BODY MASS INDEX: 24.91 KG/M2 | WEIGHT: 135.38 LBS | HEIGHT: 62 IN

## 2023-12-12 DIAGNOSIS — M70.51 PES ANSERINUS BURSITIS OF RIGHT KNEE: Primary | ICD-10-CM

## 2023-12-12 PROCEDURE — 20610 DRAIN/INJ JOINT/BURSA W/O US: CPT | Mod: RT,S$GLB,, | Performed by: ORTHOPAEDIC SURGERY

## 2023-12-12 PROCEDURE — 99214 OFFICE O/P EST MOD 30 MIN: CPT | Mod: 25,S$GLB,, | Performed by: ORTHOPAEDIC SURGERY

## 2023-12-12 PROCEDURE — 99999 PR PBB SHADOW E&M-EST. PATIENT-LVL III: ICD-10-PCS | Mod: PBBFAC,,, | Performed by: ORTHOPAEDIC SURGERY

## 2023-12-12 PROCEDURE — 20610 LARGE JOINT ASPIRATION/INJECTION: R ANSERINE BURSA: ICD-10-PCS | Mod: RT,S$GLB,, | Performed by: ORTHOPAEDIC SURGERY

## 2023-12-12 PROCEDURE — 99999 PR PBB SHADOW E&M-EST. PATIENT-LVL III: CPT | Mod: PBBFAC,,, | Performed by: ORTHOPAEDIC SURGERY

## 2023-12-12 PROCEDURE — 99214 PR OFFICE/OUTPT VISIT, EST, LEVL IV, 30-39 MIN: ICD-10-PCS | Mod: 25,S$GLB,, | Performed by: ORTHOPAEDIC SURGERY

## 2023-12-12 RX ADMIN — TRIAMCINOLONE ACETONIDE 40 MG: 40 INJECTION, SUSPENSION INTRA-ARTICULAR; INTRAMUSCULAR at 03:12

## 2023-12-18 RX ORDER — TRIAMCINOLONE ACETONIDE 40 MG/ML
40 INJECTION, SUSPENSION INTRA-ARTICULAR; INTRAMUSCULAR
Status: DISCONTINUED | OUTPATIENT
Start: 2023-12-12 | End: 2023-12-18 | Stop reason: HOSPADM

## 2023-12-18 NOTE — PROCEDURES
Large Joint Aspiration/Injection: R anserine bursa    Date/Time: 12/12/2023 3:30 PM    Performed by: Juan Wilson MD  Authorized by: Juan Wilson MD    Consent Done?:  Yes (Verbal)  Indications:  Pain  Timeout: prior to procedure the correct patient, procedure, and site was verified    Prep: patient was prepped and draped in usual sterile fashion    Local anesthetic:  Lidocaine 1% without epinephrine  Anesthetic total (ml):  1      Details:  Needle Size:  21 G  Approach:  Anterolateral  Location:  Knee  Site:  R anserine bursa  Medications:  40 mg triamcinolone acetonide 40 mg/mL  Patient tolerance:  Patient tolerated the procedure well with no immediate complications

## 2023-12-18 NOTE — PROGRESS NOTES
"Chief Complaint   Patient presents with    Right Knee - Pain     5/10         HPI:   This is a 58 y.o. who presents to clinic today complaining of right knee pain for 2 months after no known trauma. Pain is progressively worsening. No numbness or tingling. No associated signs or symptoms.    Past Medical History:   Diagnosis Date    Allergy     Anticoagulant long-term use     ASA 81mg, Effient    Anxiety     Arthritis     Asthma     As child    CAD (coronary artery disease) 01/27/2012    s/p stents x4 , CABG, 3 vessel, (5/2013) newest stent prior to CABG    Cataract     Chronic constipation     Colon polyp     Coronary artery disease involving native coronary artery of native heart without angina pectoris 01/27/2012 12/19 Significant ostial RCA lesion as above treated with drug-eluting stenting as above. Occluded proximal LAD with patent lima lad Patent vein graft to 2nd obtuse marginal Known occluded vein graft to unknown vessel.  s/p stents x 3 (2010) s/p LAD stent (DSE) 3/2012    DDD (degenerative disc disease), cervical     DDD (degenerative disc disease), lumbar     DDD (degenerative disc disease), thoracic     Depression     Edema     Encounter for blood transfusion     General anesthetics causing adverse effect in therapeutic use     Headache(784.0)     History of nephrolithiasis     Hyperlipidemia     Hypertension     Hypothyroid 07/05/2012    Insomnia     Insulin resistance     patient stated not diebetic    Joint stiffness     Joint swelling     Kidney disease     ; Frequent UTIs     Kidney stones     Low back pain     Neck pain     Obstructive sleep apnea on CPAP 07/17/2012    PONV (postoperative nausea and vomiting)     S/P CABG (coronary artery bypass graft) 06/25/2013 6/23/2013     Sleep apnea 01/27/2012    Patient reports "severe" sleep apnea, uses C-pap    Stented coronary artery 12/20/2019 12/19  ostial RCA -Osirio2.5 x 18 post dilated 2.75     Thoracic back pain     Usual " hyperplasia of lactiferous duct 2017    left breast     Past Surgical History:   Procedure Laterality Date    ADENOIDECTOMY      BACK SURGERY      BREAST BIOPSY Left 2017    duct excision- Dr. Jimenez    BREAST CYST ASPIRATION Left 2020    @ 's office- old hematoma drained (per office)    CARDIAC SURGERY      CARPAL TUNNEL RELEASE      bilateral    CERVICAL LAMINECTOMY WITH SPINAL FUSION      2016     SECTION, CLASSIC      x 2    COLONOSCOPY      CORONARY ANGIOGRAPHY  2019    Procedure: ANGIOGRAM, CORONARY ARTERY;  Surgeon: Timi Millan MD;  Location: [facility] CATH;  Service: Cardiology;;    CORONARY ANGIOGRAPHY  10/13/2023    Procedure: Coronary angiogram study;  Surgeon: Azra Stoll MD;  [facility];  Service: Cardiology;;    CORONARY ANGIOGRAPHY INCLUDING BYPASS GRAFTS WITH CATHETERIZATION OF LEFT HEART  10/13/2023    Procedure: Left heart cath w/Grafts RM 3640;  Surgeon: Azra Stoll MD;  Location: [facility] CATH;  Service: Cardiology;;    CORONARY ANGIOPLASTY WITH STENT PLACEMENT      x 4    CORONARY ARTERY BYPASS GRAFT  2013    3 vessel    CORONARY BYPASS GRAFT ANGIOGRAPHY  10/13/2023    Procedure: Bypass graft study;  Surgeon: Azra Stoll MD;  Location: [facility] CATH;  Service: Cardiology;;    ESOPHAGOGASTRODUODENOSCOPY N/A 2020    Procedure: EGD (ESOPHAGOGASTRODUODENOSCOPY);  Surgeon: Mk Warren MD;  Location: [facility];  Service: Endoscopy;  Laterality: N/A;    HYSTERECTOMY      Complete    JOINT REPLACEMENT      KNEE ARTHROPLASTY Left 2019    Procedure: ARTHROPLASTY, KNEE;  Surgeon: Juan Wilson MD;  Location: [facility] OR;  Service: Orthopedics;  Laterality: Left;    KNEE ARTHROSCOPY W/ MENISCAL REPAIR Left     twice, last one 2014    LAMINECTOMY      L4/L5    LAPAROSCOPIC CHOLECYSTECTOMY N/A 7/3/2023    Procedure: CHOLECYSTECTOMY, LAPAROSCOPIC;  Surgeon: Obinna Whipple MD;  Location: [facility] OR;  Service: General;  Laterality: N/A;  with  cholangiogram    LEFT HEART CATHETERIZATION  12/13/2019    Procedure: Left heart cath- RM # [id number];  Surgeon: Timi Millan MD;  Location: [facility] CATH;  Service: Cardiology;;    OOPHORECTOMY Bilateral 2011    ROBOTIC ARTHROPLASTY, KNEE Right 6/14/2021    Procedure: ROBOTIC ARTHROPLASTY, KNEE, TOTAL;  Surgeon: Juan Wilson MD;  Location: [facility] OR;  Service: Orthopedics;  Laterality: Right;    SINUS SURGERY      x3    TENDON REPAIR Left     ankle surgery    THYROIDECTOMY      2 separate surgeries    TONSILLECTOMY      TOTAL KNEE ARTHROPLASTY Left 06/17/2019    Surgeon: Juan Wilson MD    XI ROBOTIC REVISION, GASTROENTEROSTOMY, MITCHELL-EN-Y N/A 2/20/2023    Procedure: XI ROBOTIC REVISION,GASTROENTEROSTOMY,MITCHELL-EN-Y;  Surgeon: Obinna Whipple MD;  Location: [facility] OR;  Service: General;  Laterality: N/A;  creation of Mitchell-N-Y anastomsis     Current Outpatient Medications on File Prior to Visit   Medication Sig Dispense Refill    acarbose (PRECOSE) 25 MG Tab Take 1 tablet (25 mg total) by mouth 3 (three) times daily with meals. 270 tablet 3    acetaminophen (TYLENOL) 650 MG TbSR Take 1,300 mg by mouth every 8 (eight) hours as needed (pain).       albuterol (PROVENTIL/VENTOLIN HFA) 90 mcg/actuation inhaler Inhale 2 puffs into the lungs every 6 (six) hours as needed for Shortness of Breath.       allopurinoL (ZYLOPRIM) 100 MG tablet Take 1 tablet (100 mg total) by mouth once daily. 90 tablet 3    amLODIPine (NORVASC) 5 MG tablet Take 1 tablet (5 mg total) by mouth once daily. 30 tablet 11    aspirin (ECOTRIN) 81 MG EC tablet Take 1 tablet (81 mg total) by mouth once daily. 30 tablet 11    atorvastatin (LIPITOR) 40 MG tablet Take 1 tablet (40 mg total) by mouth once daily. 90 tablet 3    baclofen (LIORESAL) 10 MG tablet TAKE 1 TABLET BY MOUTH THREE TIMES DAILY 90 tablet 3    blood sugar diagnostic Strp To check BG TID PRN, to use with insurance preferred meter 100 each 0    blood-glucose meter kit To check BG TID PRN, to use  "with insurance preferred meter 1 each 0    calcium carb and citrate-vitD3 600 mg-12.5 mcg (500 unit) TbSR Take 2 tablets by mouth once.      cetirizine (ZYRTEC) 10 MG tablet Take 10 mg by mouth daily as needed for Allergies.      fluticasone propionate (FLONASE) 50 mcg/actuation nasal spray fluticasone propionate 50 mcg/actuation nasal spray,suspension      lancets Misc To check BG TID PRN, to use with insurance preferred meter 100 each 1    levothyroxine (SYNTHROID) 100 MCG tablet TAKE 1 TABLET BY MOUTH EVERY DAY BEFORE BREAKFAST 30 tablet 11    metoprolol succinate (TOPROL-XL) 25 MG 24 hr tablet Take 1 tablet (25 mg total) by mouth every evening. 30 tablet 11    multivitamin (THERAGRAN) per tablet Take 1 tablet by mouth once daily. Bariatric vitamin      pantoprazole (PROTONIX) 40 MG tablet TAKE 1 TABLET BY MOUTH 2 TIMES A DAY 90 tablet 3    pregabalin (LYRICA) 100 MG capsule TAKE 2 CAPSULES BY MOUTH TWICE DAILY 120 capsule 3    senna-docusate 8.6-50 mg (PERICOLACE) 8.6-50 mg per tablet Take 1 tablet by mouth 2 (two) times a day.      ticagrelor (BRILINTA) 90 mg tablet TAKE 1 TABLET BY MOUTH TWICE DAILY 180 tablet 3    venlafaxine (EFFEXOR-XR) 150 MG Cp24 Take 1 capsule (150 mg total) by mouth once daily. 90 capsule 3     No current facility-administered medications on file prior to visit.     Review of patient's allergies indicates:   Allergen Reactions    Celexa [citalopram] Other (See Comments)     GI upset  Stated "knocked me out"    Cephalexin Anaphylaxis    Zoloft [sertraline] Other (See Comments)     Stated "knocked me out"    Codeine Other (See Comments)     hallucinations  hallucinations    Isosorbide Nausea And Vomiting    Ciprofloxacin Itching    Levaquin [levofloxacin] Other (See Comments)     tendinitis    Metformin      Nausea     Penicillamine     Penicillins Other (See Comments)     Was told per mother that as child was allergic to penicillin.  Childhood allergy/ unknown reaction    Sulfa " (sulfonamide antibiotics)      Family History   Problem Relation Age of Onset    Heart failure Mother     Asthma Mother     Macular degeneration Mother     Cancer Mother         Breast    Cataracts Mother     Heart disease Mother     COPD Mother     Breast cancer Mother     Heart disease Father     Diabetes Father     Hypertension Father     Stroke Father     Cataracts Father     Obesity Father     Obesity Sister     Glaucoma Sister     Thyroid disease Sister     Glaucoma Sister     Other Brother         stiff man syndrome    Cancer Maternal Grandmother         Breast with Mets    Breast cancer Maternal Grandmother     Cancer Paternal Grandmother         Colon    Strabismus Other     Amblyopia Neg Hx     Blindness Neg Hx     Retinal detachment Neg Hx      Social History     Socioeconomic History    Marital status:    Occupational History    Occupation: teacher   Tobacco Use    Smoking status: Former     Current packs/day: 0.00     Average packs/day: 0.5 packs/day for 14.9 years (7.5 ttl pk-yrs)     Types: Cigarettes     Start date: 1984     Quit date: 1998     Years since quittin.0    Smokeless tobacco: Never   Substance and Sexual Activity    Alcohol use: No    Drug use: Not Currently     Types: Benzodiazepines    Sexual activity: Yes     Partners: Male     Social Determinants of Health     Financial Resource Strain: Unknown (2023)    Overall Financial Resource Strain (CARDIA)     Difficulty of Paying Living Expenses: Patient declined   Food Insecurity: Unknown (2023)    Hunger Vital Sign     Worried About Running Out of Food in the Last Year: Patient declined     Ran Out of Food in the Last Year: Patient declined   Transportation Needs: Unknown (2023)    PRAPARE - Transportation     Lack of Transportation (Medical): Patient declined     Lack of Transportation (Non-Medical): Patient declined   Physical Activity: Unknown (2023)    Exercise Vital Sign     Days of Exercise  per Week: Patient declined     Minutes of Exercise per Session: Patient declined   Stress: Unknown (2/21/2023)    [employer] [place] [employer] - Occupational Stress Questionnaire     Feeling of Stress : Patient declined   Social Connections: Unknown (2/21/2023)    Social Connection and Isolation Panel [NHANES]     Frequency of Communication with Friends and Family: Patient declined     Frequency of Social Gatherings with Friends and Family: Patient declined     Attends Confucianist Services: Patient declined     Active Member of Clubs or Organizations: Patient declined     Attends Club or Organization Meetings: Patient declined     Marital Status:    Housing Stability: Unknown (2/21/2023)    Housing Stability Vital Sign     Unable to Pay for Housing in the Last Year: Patient refused     Unstable Housing in the Last Year: Patient refused       Review of Systems:  Constitutional:  Denies fever or chills   Eyes:  Denies change in visual acuity   HENT:  Denies nasal congestion or sore throat   Respiratory:  Denies cough or shortness of breath   Cardiovascular:  Denies chest pain or edema   GI:  Denies abdominal pain, nausea, vomiting, bloody stools or diarrhea   :  Denies dysuria   Integument:  Denies rash   Neurologic:  Denies headache, focal weakness or sensory changes   Endocrine:  Denies polyuria or polydipsia   Lymphatic:  Denies swollen glands   Psychiatric:  Denies depression or anxiety     Physical Exam:   Constitutional:  Well developed, well nourished, no acute distress, non-toxic appearance   Integument:  Well hydrated, no rash   Lymphatic:  No lymphadenopathy noted   Neurologic:  Alert & oriented x 3, CN 2-12 normal, normal motor function, normal sensory function, no focal deficits noted   Psychiatric:  Speech and behavior appropriate   Eyes: EOMI  Gi: abdomen soft    Bilateral Knee Exam    right Knee Exam     Tenderness   The patient is experiencing tenderness in the medial pes  bursa.    Range of Motion   Extension: abnormal   Flexion: abnormal     Muscle Strength     The patient has normal knee strength.    Tests   Livia:  Medial - negative   Lachman:  Anterior - negative      Varus: negative  Valgus: negative  Patellar Apprehension: negative    Other   Erythema: absent  Sensation: normal  Pulse: present  Swelling: mild      left Knee Exam   left knee exam performed same as contralateral side and is normal.      Pes anserinus bursitis of right knee            Using an aseptic technique, I injected 1 cc of lidocaine 1% without and 1 cc of kenalog 40mg into the right Knee pes bursa. The patient tolerated this well. I will have them return to clinic in 6 weeks.

## 2024-01-02 ENCOUNTER — OFFICE VISIT (OUTPATIENT)
Dept: ORTHOPEDICS | Facility: CLINIC | Age: 59
End: 2024-01-02
Payer: COMMERCIAL

## 2024-01-02 VITALS — HEIGHT: 62 IN | WEIGHT: 135 LBS | BODY MASS INDEX: 24.84 KG/M2

## 2024-01-02 DIAGNOSIS — M75.42 IMPINGEMENT SYNDROME OF LEFT SHOULDER: ICD-10-CM

## 2024-01-02 DIAGNOSIS — M25.512 BILATERAL SHOULDER PAIN, UNSPECIFIED CHRONICITY: Primary | ICD-10-CM

## 2024-01-02 DIAGNOSIS — M25.511 BILATERAL SHOULDER PAIN, UNSPECIFIED CHRONICITY: Primary | ICD-10-CM

## 2024-01-02 DIAGNOSIS — M75.41 IMPINGEMENT SYNDROME, SHOULDER, RIGHT: ICD-10-CM

## 2024-01-02 PROCEDURE — 99499 UNLISTED E&M SERVICE: CPT | Mod: S$GLB,,, | Performed by: ORTHOPAEDIC SURGERY

## 2024-01-02 PROCEDURE — 99999 PR PBB SHADOW E&M-EST. PATIENT-LVL IV: CPT | Mod: PBBFAC,,, | Performed by: ORTHOPAEDIC SURGERY

## 2024-01-02 PROCEDURE — 20610 DRAIN/INJ JOINT/BURSA W/O US: CPT | Mod: 50,S$GLB,, | Performed by: ORTHOPAEDIC SURGERY

## 2024-01-02 RX ORDER — DIAZEPAM 10 MG/1
10 TABLET ORAL
Qty: 2 TABLET | Refills: 0 | Status: SHIPPED | OUTPATIENT
Start: 2024-01-02 | End: 2024-02-23

## 2024-01-02 RX ADMIN — TRIAMCINOLONE ACETONIDE 40 MG: 40 INJECTION, SUSPENSION INTRA-ARTICULAR; INTRAMUSCULAR at 09:01

## 2024-01-04 DIAGNOSIS — E16.2 HYPOGLYCEMIA: ICD-10-CM

## 2024-01-04 NOTE — TELEPHONE ENCOUNTER
Care Due:                  Date            Visit Type   Department     Provider  --------------------------------------------------------------------------------                                EP -                              PRIMARY      [facility] FAMILY  Last Visit: 08-      CARE ([facility])   CARMEL Young                               -                              [facility]  Next Visit: 02-      CARE ([facility])   CARMEL Young                                                            Last  Test          Frequency    Reason                     Performed    Due Date  --------------------------------------------------------------------------------    Uric Acid...  12 months..  allopurinoL..............  Not Found    Overdue    [facility] Embedded Care Due Messages. Reference number: [id number].   1/04/2024 5:30:49 PM CST

## 2024-01-05 RX ORDER — CALCIUM CITRATE/VITAMIN D3 200MG-6.25
TABLET ORAL
Qty: 300 EACH | Refills: 3 | Status: SHIPPED | OUTPATIENT
Start: 2024-01-05

## 2024-01-05 NOTE — TELEPHONE ENCOUNTER
Refill Routing Note   Medication(s) are not appropriate for processing by [facility] for the following reason(s):        Required labs outdated  New or recently adjusted medication    [facility] action(s):  Route      Medication Therapy Plan: Patient appears to be followed by NP ASHER Kate      Appointments  past 12m or future 3m with PCP    Date Provider   Last Visit   11/28/2023 Smitha Kate NP   Next Visit   Visit date not found Smitha Kate NP   ED visits in past 90 days: 0        Note composed:12:05 AM 01/05/2024

## 2024-01-05 NOTE — TELEPHONE ENCOUNTER
Provider Staff:     Action is required for this patient.   Please see care gap opportunities below in Care Due Message.     Thanks!  [facility]     Appointments      Date Provider   Last Visit   11/28/2023 Smitha Kate NP   Next Visit   Visit date not found Smitha Kate NP     Note composed:12:06 AM 01/05/2024

## 2024-01-09 RX ORDER — TRIAMCINOLONE ACETONIDE 40 MG/ML
40 INJECTION, SUSPENSION INTRA-ARTICULAR; INTRAMUSCULAR
Status: DISCONTINUED | OUTPATIENT
Start: 2024-01-02 | End: 2024-01-09 | Stop reason: HOSPADM

## 2024-01-09 NOTE — PROCEDURES
Large Joint Aspiration/Injection: bilateral subacromial bursa    Date/Time: 1/2/2024 9:30 AM    Performed by: Juan Wilson MD  Authorized by: Juan Wilson MD    Consent Done?:  Yes (Verbal)  Indications:  Pain  Timeout: prior to procedure the correct patient, procedure, and site was verified    Prep: patient was prepped and draped in usual sterile fashion      Local anesthesia used?: Yes    Local anesthetic:  Lidocaine 1% without epinephrine  Anesthetic total (ml):  5      Details:  Needle Size:  22 G  Ultrasonic Guidance for needle placement?: No    Approach:  Posterior  Location:  Shoulder  Laterality:  Bilateral  Site:  Bilateral subacromial bursa  Medications (Right):  40 mg triamcinolone acetonide 40 mg/mL  Medications (Left):  40 mg triamcinolone acetonide 40 mg/mL  Patient tolerance:  Patient tolerated the procedure well with no immediate complications

## 2024-01-09 NOTE — PROGRESS NOTES
"Chief Complaint   Patient presents with    Left Shoulder - Pain    Right Shoulder - Pain       HPI:    This is a 58 y.o. who presents today complaining of bilateral shoulder pain for 2 weeks after no interval trauma. Pain is dull. No numbness or tingling. No associated signs or symptoms.      Past Medical History:   Diagnosis Date    Allergy     Anticoagulant long-term use     ASA 81mg, Effient    Anxiety     Arthritis     Asthma     As child    CAD (coronary artery disease) 01/27/2012    s/p stents x4 , CABG, 3 vessel, (5/2013) newest stent prior to CABG    Cataract     Chronic constipation     Colon polyp     Coronary artery disease involving native coronary artery of native heart without angina pectoris 01/27/2012 12/19 Significant ostial RCA lesion as above treated with drug-eluting stenting as above. Occluded proximal LAD with patent lima lad Patent vein graft to 2nd obtuse marginal Known occluded vein graft to unknown vessel.  s/p stents x 3 (2010) s/p LAD stent (DSE) 3/2012    DDD (degenerative disc disease), cervical     DDD (degenerative disc disease), lumbar     DDD (degenerative disc disease), thoracic     Depression     Edema     Encounter for blood transfusion     General anesthetics causing adverse effect in therapeutic use     Headache(784.0)     History of nephrolithiasis     Hyperlipidemia     Hypertension     Hypothyroid 07/05/2012    Insomnia     Insulin resistance     patient stated not diebetic    Joint stiffness     Joint swelling     Kidney disease     ; Frequent UTIs     Kidney stones     Low back pain     Neck pain     Obstructive sleep apnea on CPAP 07/17/2012    PONV (postoperative nausea and vomiting)     S/P CABG (coronary artery bypass graft) 06/25/2013 6/23/2013     Sleep apnea 01/27/2012    Patient reports "severe" sleep apnea, uses C-pap    Stented coronary artery 12/20/2019 12/19  ostial RCA -Osirio2.5 x 18 post dilated 2.75     Thoracic back pain     Usual " hyperplasia of lactiferous duct 2017    left breast      Past Surgical History:   Procedure Laterality Date    ADENOIDECTOMY      BACK SURGERY      BREAST BIOPSY Left 2017    duct excision- Dr. Jimenez    BREAST CYST ASPIRATION Left 2020    @ 's office- old hematoma drained (per office)    CARDIAC SURGERY      CARPAL TUNNEL RELEASE      bilateral    CERVICAL LAMINECTOMY WITH SPINAL FUSION      2016     SECTION, CLASSIC      x 2    COLONOSCOPY      CORONARY ANGIOGRAPHY  2019    Procedure: ANGIOGRAM, CORONARY ARTERY;  Surgeon: Timi Millan MD;  Location: [facility] CATH;  Service: Cardiology;;    CORONARY ANGIOGRAPHY  10/13/2023    Procedure: Coronary angiogram study;  Surgeon: Azra Stoll MD;  [facility];  Service: Cardiology;;    CORONARY ANGIOGRAPHY INCLUDING BYPASS GRAFTS WITH CATHETERIZATION OF LEFT HEART  10/13/2023    Procedure: Left heart cath w/Grafts RM 3640;  Surgeon: Azra Stoll MD;  Location: [facility] CATH;  Service: Cardiology;;    CORONARY ANGIOPLASTY WITH STENT PLACEMENT      x 4    CORONARY ARTERY BYPASS GRAFT  2013    3 vessel    CORONARY BYPASS GRAFT ANGIOGRAPHY  10/13/2023    Procedure: Bypass graft study;  Surgeon: Azra Stoll MD;  Location: [facility] CATH;  Service: Cardiology;;    ESOPHAGOGASTRODUODENOSCOPY N/A 2020    Procedure: EGD (ESOPHAGOGASTRODUODENOSCOPY);  Surgeon: Mk Warren MD;  Location: [facility];  Service: Endoscopy;  Laterality: N/A;    HYSTERECTOMY      Complete    JOINT REPLACEMENT      KNEE ARTHROPLASTY Left 2019    Procedure: ARTHROPLASTY, KNEE;  Surgeon: Juan Wilson MD;  Location: [facility] OR;  Service: Orthopedics;  Laterality: Left;    KNEE ARTHROSCOPY W/ MENISCAL REPAIR Left     twice, last one 2014    LAMINECTOMY      L4/L5    LAPAROSCOPIC CHOLECYSTECTOMY N/A 7/3/2023    Procedure: CHOLECYSTECTOMY, LAPAROSCOPIC;  Surgeon: Obinna Whipple MD;  Location: [facility] OR;  Service: General;  Laterality: N/A;  with  cholangiogram    LEFT HEART CATHETERIZATION  12/13/2019    Procedure: Left heart cath- RM # [id number];  Surgeon: Timi Millan MD;  Location: [facility] CATH;  Service: Cardiology;;    OOPHORECTOMY Bilateral 2011    ROBOTIC ARTHROPLASTY, KNEE Right 6/14/2021    Procedure: ROBOTIC ARTHROPLASTY, KNEE, TOTAL;  Surgeon: Juan Wilson MD;  Location: [facility] OR;  Service: Orthopedics;  Laterality: Right;    SINUS SURGERY      x3    TENDON REPAIR Left     ankle surgery    THYROIDECTOMY      2 separate surgeries    TONSILLECTOMY      TOTAL KNEE ARTHROPLASTY Left 06/17/2019    Surgeon: Juan Wilson MD    XI ROBOTIC REVISION, GASTROENTEROSTOMY, MITCHELL-EN-Y N/A 2/20/2023    Procedure: XI ROBOTIC REVISION,GASTROENTEROSTOMY,MITCHELL-EN-Y;  Surgeon: Obinna Whipple MD;  Location: [facility] OR;  Service: General;  Laterality: N/A;  creation of Mitchell-N-Y anastomsis      Current Outpatient Medications on File Prior to Visit   Medication Sig Dispense Refill    acarbose (PRECOSE) 25 MG Tab Take 1 tablet (25 mg total) by mouth 3 (three) times daily with meals. 270 tablet 3    acetaminophen (TYLENOL) 650 MG TbSR Take 1,300 mg by mouth every 8 (eight) hours as needed (pain).       albuterol (PROVENTIL/VENTOLIN HFA) 90 mcg/actuation inhaler Inhale 2 puffs into the lungs every 6 (six) hours as needed for Shortness of Breath.       allopurinoL (ZYLOPRIM) 100 MG tablet Take 1 tablet (100 mg total) by mouth once daily. 90 tablet 3    amLODIPine (NORVASC) 5 MG tablet Take 1 tablet (5 mg total) by mouth once daily. 30 tablet 11    aspirin (ECOTRIN) 81 MG EC tablet Take 1 tablet (81 mg total) by mouth once daily. 30 tablet 11    atorvastatin (LIPITOR) 40 MG tablet Take 1 tablet (40 mg total) by mouth once daily. 90 tablet 3    baclofen (LIORESAL) 10 MG tablet TAKE 1 TABLET BY MOUTH THREE TIMES DAILY 90 tablet 3    blood-glucose meter kit To check BG TID PRN, to use with insurance preferred meter 1 each 0    calcium carb and citrate-vitD3 600 mg-12.5 mcg (500  "unit) TbSR Take 2 tablets by mouth once.      cetirizine (ZYRTEC) 10 MG tablet Take 10 mg by mouth daily as needed for Allergies.      fluticasone propionate (FLONASE) 50 mcg/actuation nasal spray fluticasone propionate 50 mcg/actuation nasal spray,suspension      lancets Misc To check BG TID PRN, to use with insurance preferred meter 100 each 1    levothyroxine (SYNTHROID) 100 MCG tablet TAKE 1 TABLET BY MOUTH EVERY DAY BEFORE BREAKFAST 30 tablet 11    metoprolol succinate (TOPROL-XL) 25 MG 24 hr tablet Take 1 tablet (25 mg total) by mouth every evening. 30 tablet 11    multivitamin (THERAGRAN) per tablet Take 1 tablet by mouth once daily. Bariatric vitamin      pantoprazole (PROTONIX) 40 MG tablet TAKE 1 TABLET BY MOUTH 2 TIMES A DAY 90 tablet 3    pregabalin (LYRICA) 100 MG capsule TAKE 2 CAPSULES BY MOUTH TWICE DAILY 120 capsule 3    senna-docusate 8.6-50 mg (PERICOLACE) 8.6-50 mg per tablet Take 1 tablet by mouth 2 (two) times a day.      ticagrelor (BRILINTA) 90 mg tablet TAKE 1 TABLET BY MOUTH TWICE DAILY 180 tablet 3    venlafaxine (EFFEXOR-XR) 150 MG Cp24 Take 1 capsule (150 mg total) by mouth once daily. 90 capsule 3     No current facility-administered medications on file prior to visit.      Review of patient's allergies indicates:   Allergen Reactions    Celexa [citalopram] Other (See Comments)     GI upset  Stated "knocked me out"    Cephalexin Anaphylaxis    Zoloft [sertraline] Other (See Comments)     Stated "knocked me out"    Codeine Other (See Comments)     hallucinations  hallucinations    Isosorbide Nausea And Vomiting    Ciprofloxacin Itching    Levaquin [levofloxacin] Other (See Comments)     tendinitis    Metformin      Nausea     Penicillamine     Penicillins Other (See Comments)     Was told per mother that as child was allergic to penicillin.  Childhood allergy/ unknown reaction    Sulfa (sulfonamide antibiotics)       Family History not pertinent   Social History     Socioeconomic History "    Marital status:    Occupational History    Occupation: teacher   Tobacco Use    Smoking status: Former     Current packs/day: 0.00     Average packs/day: 0.5 packs/day for 14.9 years (7.5 ttl pk-yrs)     Types: Cigarettes     Start date: 1984     Quit date: 1998     Years since quittin.0    Smokeless tobacco: Never   Substance and Sexual Activity    Alcohol use: No    Drug use: Not Currently     Types: Benzodiazepines    Sexual activity: Yes     Partners: Male     Social Determinants of Health     Financial Resource Strain: Patient Declined (2023)    Overall Financial Resource Strain (CARDIA)     Difficulty of Paying Living Expenses: Patient declined   Food Insecurity: Patient Declined (2023)    Hunger Vital Sign     Worried About Running Out of Food in the Last Year: Patient declined     Ran Out of Food in the Last Year: Patient declined   Transportation Needs: Patient Declined (2023)    PRAPARE - Transportation     Lack of Transportation (Medical): Patient declined     Lack of Transportation (Non-Medical): Patient declined   Physical Activity: Patient Declined (2023)    Exercise Vital Sign     Days of Exercise per Week: Patient declined     Minutes of Exercise per Session: Patient declined   Stress: Patient Declined (2023)    [employer] - Occupational Stress Questionnaire     Feeling of Stress : Patient declined   Social Connections: Unknown (2023)    Social Connection and Isolation Panel [NHANES]     Frequency of Communication with Friends and Family: Patient declined     Frequency of Social Gatherings with Friends and Family: Patient declined     Attends Gnosticism Services: Patient declined     Active Member of Clubs or Organizations: Patient declined     Attends Club or Organization Meetings: Patient declined     Marital Status:    Housing Stability: Unknown (2023)    Housing Stability Vital Sign     Unable to Pay  for Housing in the Last Year: Patient refused     Unstable Housing in the Last Year: Patient refused         Review of Systems:   Constitutional:  Denies fever or chills    Eyes:  Denies change in visual acuity    HENT:  Denies nasal congestion or sore throat    Respiratory:  Denies cough or shortness of breath    Cardiovascular:  Denies chest pain or edema    GI:  Denies abdominal pain, nausea, vomiting, bloody stools or diarrhea    :  Denies dysuria    Integument:  Denies rash    Neurologic:  Denies headache, focal weakness or sensory changes    Endocrine:  Denies polyuria or polydipsia    Lymphatic:  Denies swollen glands    Psychiatric:  Denies depression or anxiety       Physical Exam:    Constitutional:  Well developed, well nourished, no acute distress, non-toxic appearance    Integument:  Well hydrated, no rash    Lymphatic:  No lymphadenopathy noted    Neurologic:  Alert & oriented x 3,     Psychiatric:  Speech and behavior appropriate    Gi: abdomen soft  Eyes: EOMI     Bilateral Shoulder Exam  Tenderness   Shoulder tenderness location: diffusely about shoulder.    Range of Motion   Forward Flexion: abnormal   External Rotation: abnormal     Muscle Strength   Supraspinatus: 4/5     Tests   Hawkin's test: positive  Impingement: positive    Other   Erythema: absent  Sensation: normal  Pulse: present       Bilateral shoulder pain, unspecified chronicity  -     MRI Shoulder Without Contrast Left; Future; Expected date: 01/02/2024  -     MRI Shoulder Without Contrast Right; Future; Expected date: 01/02/2024    Other orders  -     diazePAM (VALIUM) 10 MG Tab; Take 1 tablet (10 mg total) by mouth On call Procedure (MRI).  Dispense: 2 tablet; Refill: 0          Using an aseptic technique, I injected 5 cc of lidocaine 1% without and 1 cc of kenalog 40mg into the bilateral Shoulder. The patient tolerated this well. I will have them return to clinic in 6 weeks.

## 2024-01-11 ENCOUNTER — TELEPHONE (OUTPATIENT)
Dept: ORTHOPEDICS | Facility: CLINIC | Age: 59
End: 2024-01-11
Payer: COMMERCIAL

## 2024-01-11 NOTE — TELEPHONE ENCOUNTER
----- Message from Emily Castelan MA sent at 1/11/2024 10:39 AM CST -----  Contact: pt  Returning you call regarding MRI cost   Call back

## 2024-01-12 ENCOUNTER — TELEPHONE (OUTPATIENT)
Dept: CARDIOLOGY | Facility: CLINIC | Age: 59
End: 2024-01-12
Payer: COMMERCIAL

## 2024-01-12 ENCOUNTER — HOSPITAL ENCOUNTER (OUTPATIENT)
Dept: RADIOLOGY | Facility: HOSPITAL | Age: 59
Discharge: HOME | End: 2024-01-12
Attending: ORTHOPAEDIC SURGERY
Payer: COMMERCIAL

## 2024-01-12 DIAGNOSIS — M25.512 BILATERAL SHOULDER PAIN, UNSPECIFIED CHRONICITY: ICD-10-CM

## 2024-01-12 DIAGNOSIS — M25.511 BILATERAL SHOULDER PAIN, UNSPECIFIED CHRONICITY: ICD-10-CM

## 2024-01-12 PROCEDURE — 73221 MRI JOINT UPR EXTREM W/O DYE: CPT | Mod: 26,LT,, | Performed by: RADIOLOGY

## 2024-01-12 PROCEDURE — 73221 MRI JOINT UPR EXTREM W/O DYE: CPT | Mod: TC,PO,RT

## 2024-01-12 PROCEDURE — 73221 MRI JOINT UPR EXTREM W/O DYE: CPT | Mod: 26,RT,, | Performed by: RADIOLOGY

## 2024-01-12 PROCEDURE — 73221 MRI JOINT UPR EXTREM W/O DYE: CPT | Mod: TC,PO,LT

## 2024-01-18 ENCOUNTER — PATIENT MESSAGE (OUTPATIENT)
Dept: ORTHOPEDICS | Facility: CLINIC | Age: 59
End: 2024-01-18
Payer: COMMERCIAL

## 2024-01-23 ENCOUNTER — LAB VISIT (OUTPATIENT)
Dept: LAB | Facility: HOSPITAL | Age: 59
End: 2024-01-23
Attending: SURGERY
Payer: COMMERCIAL

## 2024-01-23 ENCOUNTER — OFFICE VISIT (OUTPATIENT)
Dept: ORTHOPEDICS | Facility: CLINIC | Age: 59
End: 2024-01-23
Payer: COMMERCIAL

## 2024-01-23 VITALS — BODY MASS INDEX: 24.84 KG/M2 | HEIGHT: 62 IN | WEIGHT: 135 LBS

## 2024-01-23 DIAGNOSIS — M70.51 PES ANSERINUS BURSITIS OF RIGHT KNEE: Primary | ICD-10-CM

## 2024-01-23 DIAGNOSIS — E66.01 MORBID OBESITY: ICD-10-CM

## 2024-01-23 DIAGNOSIS — E03.9 HYPOTHYROIDISM, UNSPECIFIED TYPE: ICD-10-CM

## 2024-01-23 DIAGNOSIS — K91.2 HYPOGLYCEMIA AFTER GI (GASTROINTESTINAL) SURGERY: ICD-10-CM

## 2024-01-23 LAB
IRON SERPL-MCNC: 72 UG/DL (ref 30–160)
SATURATED IRON: 19 % (ref 20–50)
TOTAL IRON BINDING CAPACITY: 370 UG/DL (ref 250–450)
TRANSFERRIN SERPL-MCNC: 250 MG/DL (ref 200–375)

## 2024-01-23 PROCEDURE — 36415 COLL VENOUS BLD VENIPUNCTURE: CPT | Mod: PO | Performed by: SURGERY

## 2024-01-23 PROCEDURE — 99214 OFFICE O/P EST MOD 30 MIN: CPT | Mod: 25,S$GLB,, | Performed by: ORTHOPAEDIC SURGERY

## 2024-01-23 PROCEDURE — 99999 PR PBB SHADOW E&M-EST. PATIENT-LVL IV: CPT | Mod: PBBFAC,,, | Performed by: ORTHOPAEDIC SURGERY

## 2024-01-23 PROCEDURE — 82306 VITAMIN D 25 HYDROXY: CPT | Performed by: SURGERY

## 2024-01-23 PROCEDURE — 83036 HEMOGLOBIN GLYCOSYLATED A1C: CPT | Performed by: SURGERY

## 2024-01-23 PROCEDURE — 84425 ASSAY OF VITAMIN B-1: CPT | Performed by: SURGERY

## 2024-01-23 PROCEDURE — 20610 DRAIN/INJ JOINT/BURSA W/O US: CPT | Mod: RT,S$GLB,, | Performed by: ORTHOPAEDIC SURGERY

## 2024-01-23 PROCEDURE — 83540 ASSAY OF IRON: CPT | Performed by: SURGERY

## 2024-01-23 PROCEDURE — 3008F BODY MASS INDEX DOCD: CPT | Mod: CPTII,S$GLB,, | Performed by: ORTHOPAEDIC SURGERY

## 2024-01-23 PROCEDURE — 83525 ASSAY OF INSULIN: CPT | Performed by: SURGERY

## 2024-01-23 PROCEDURE — 84439 ASSAY OF FREE THYROXINE: CPT | Performed by: SURGERY

## 2024-01-23 PROCEDURE — 1160F RVW MEDS BY RX/DR IN RCRD: CPT | Mod: CPTII,S$GLB,, | Performed by: ORTHOPAEDIC SURGERY

## 2024-01-23 PROCEDURE — 3044F HG A1C LEVEL LT 7.0%: CPT | Mod: CPTII,S$GLB,, | Performed by: ORTHOPAEDIC SURGERY

## 2024-01-23 PROCEDURE — 82607 VITAMIN B-12: CPT | Performed by: SURGERY

## 2024-01-23 PROCEDURE — 84480 ASSAY TRIIODOTHYRONINE (T3): CPT | Performed by: SURGERY

## 2024-01-23 PROCEDURE — 1159F MED LIST DOCD IN RCRD: CPT | Mod: CPTII,S$GLB,, | Performed by: ORTHOPAEDIC SURGERY

## 2024-01-23 RX ORDER — METHOCARBAMOL 750 MG/1
750 TABLET, FILM COATED ORAL 4 TIMES DAILY PRN
Qty: 44 TABLET | Refills: 3 | Status: SHIPPED | OUTPATIENT
Start: 2024-01-23 | End: 2024-05-07

## 2024-01-23 RX ADMIN — TRIAMCINOLONE ACETONIDE 40 MG: 40 INJECTION, SUSPENSION INTRA-ARTICULAR; INTRAMUSCULAR at 09:01

## 2024-01-24 LAB
25(OH)D3+25(OH)D2 SERPL-MCNC: 19 NG/ML (ref 30–96)
ESTIMATED AVG GLUCOSE: 88 MG/DL (ref 68–131)
HBA1C MFR BLD: 4.7 % (ref 4–5.6)
INSULIN COLLECTION INTERVAL: NORMAL
INSULIN SERPL-ACNC: 7.8 UU/ML
T3 SERPL-MCNC: 79 NG/DL (ref 60–180)
T4 FREE SERPL-MCNC: 1.21 NG/DL (ref 0.71–1.51)
VIT B12 SERPL-MCNC: 265 PG/ML (ref 210–950)

## 2024-01-28 LAB — VIT B1 BLD-MCNC: 67 UG/L (ref 38–122)

## 2024-01-29 DIAGNOSIS — M51.36 DDD (DEGENERATIVE DISC DISEASE), LUMBAR: ICD-10-CM

## 2024-01-29 NOTE — TELEPHONE ENCOUNTER
No care due was identified.  [facility] Embedded Care Due Messages. Reference number: [id number].   1/29/2024 3:11:33 PM CST

## 2024-01-30 ENCOUNTER — OFFICE VISIT (OUTPATIENT)
Dept: ORTHOPEDICS | Facility: CLINIC | Age: 59
End: 2024-01-30
Payer: COMMERCIAL

## 2024-01-30 VITALS — HEIGHT: 62 IN | WEIGHT: 135 LBS | BODY MASS INDEX: 24.84 KG/M2

## 2024-01-30 DIAGNOSIS — M75.21 BICEPS TENDINITIS OF RIGHT UPPER EXTREMITY: ICD-10-CM

## 2024-01-30 DIAGNOSIS — M75.41 IMPINGEMENT SYNDROME, SHOULDER, RIGHT: ICD-10-CM

## 2024-01-30 DIAGNOSIS — M75.22 BICEPS TENDINITIS OF LEFT UPPER EXTREMITY: ICD-10-CM

## 2024-01-30 DIAGNOSIS — M75.102 TEAR OF LEFT SUPRASPINATUS TENDON: Primary | ICD-10-CM

## 2024-01-30 PROCEDURE — 3044F HG A1C LEVEL LT 7.0%: CPT | Mod: CPTII,S$GLB,, | Performed by: ORTHOPAEDIC SURGERY

## 2024-01-30 PROCEDURE — 20610 DRAIN/INJ JOINT/BURSA W/O US: CPT | Mod: 50,S$GLB,, | Performed by: ORTHOPAEDIC SURGERY

## 2024-01-30 PROCEDURE — 1159F MED LIST DOCD IN RCRD: CPT | Mod: CPTII,S$GLB,, | Performed by: ORTHOPAEDIC SURGERY

## 2024-01-30 PROCEDURE — 99999 PR PBB SHADOW E&M-EST. PATIENT-LVL V: CPT | Mod: PBBFAC,,, | Performed by: ORTHOPAEDIC SURGERY

## 2024-01-30 PROCEDURE — 3008F BODY MASS INDEX DOCD: CPT | Mod: CPTII,S$GLB,, | Performed by: ORTHOPAEDIC SURGERY

## 2024-01-30 PROCEDURE — 1160F RVW MEDS BY RX/DR IN RCRD: CPT | Mod: CPTII,S$GLB,, | Performed by: ORTHOPAEDIC SURGERY

## 2024-01-30 PROCEDURE — 99213 OFFICE O/P EST LOW 20 MIN: CPT | Mod: 25,S$GLB,, | Performed by: ORTHOPAEDIC SURGERY

## 2024-01-30 RX ORDER — PANTOPRAZOLE SODIUM 40 MG/1
40 TABLET, DELAYED RELEASE ORAL 2 TIMES DAILY
Qty: 180 TABLET | Refills: 1 | Status: SHIPPED | OUTPATIENT
Start: 2024-01-30

## 2024-01-30 RX ORDER — TRIAMCINOLONE ACETONIDE 40 MG/ML
40 INJECTION, SUSPENSION INTRA-ARTICULAR; INTRAMUSCULAR
Status: DISCONTINUED | OUTPATIENT
Start: 2024-01-23 | End: 2024-01-30 | Stop reason: HOSPADM

## 2024-01-30 RX ORDER — PREGABALIN 100 MG/1
CAPSULE ORAL
Qty: 120 CAPSULE | Refills: 3 | Status: SHIPPED | OUTPATIENT
Start: 2024-01-30 | End: 2024-06-11 | Stop reason: SDUPTHER

## 2024-01-30 RX ADMIN — TRIAMCINOLONE ACETONIDE 40 MG: 40 INJECTION, SUSPENSION INTRA-ARTICULAR; INTRAMUSCULAR at 03:01

## 2024-01-30 NOTE — TELEPHONE ENCOUNTER
Refill Routing Note   Medication(s) are not appropriate for processing by [facility] for the following reason(s):        Outside of protocol  No active prescription written by provider  ED/Hospital Visit since last OV with provider    [facility] action(s):  Route        Medication Therapy Plan: 40 mg twice daily dose outside protocol      Appointments  past 12m or future 3m with PCP    Date Provider   Last Visit   8/25/2023 Ian Young MD   Next Visit   2/23/2024 Ian Young MD   ED visits in past 90 days: 0        Note composed:1:58 AM 01/30/2024

## 2024-01-30 NOTE — PROGRESS NOTES
"Chief Complaint   Patient presents with    Right Knee - Pain         HPI:   This is a 58 y.o. who presents to clinic today complaining of right knee pain for 1 months after work injury. Pain is progressively worsening. No numbness or tingling. No associated signs or symptoms.    Past Medical History:   Diagnosis Date    Allergy     Anticoagulant long-term use     ASA 81mg, Effient    Anxiety     Arthritis     Asthma     As child    CAD (coronary artery disease) 01/27/2012    s/p stents x4 , CABG, 3 vessel, (5/2013) newest stent prior to CABG    Cataract     Chronic constipation     Colon polyp     Coronary artery disease involving native coronary artery of native heart without angina pectoris 01/27/2012 12/19 Significant ostial RCA lesion as above treated with drug-eluting stenting as above. Occluded proximal LAD with patent lima lad Patent vein graft to 2nd obtuse marginal Known occluded vein graft to unknown vessel.  s/p stents x 3 (2010) s/p LAD stent (DSE) 3/2012    DDD (degenerative disc disease), cervical     DDD (degenerative disc disease), lumbar     DDD (degenerative disc disease), thoracic     Depression     Edema     Encounter for blood transfusion     General anesthetics causing adverse effect in therapeutic use     Headache(784.0)     History of nephrolithiasis     Hyperlipidemia     Hypertension     Hypothyroid 07/05/2012    Insomnia     Insulin resistance     patient stated not diebetic    Joint stiffness     Joint swelling     Kidney disease     ; Frequent UTIs     Kidney stones     Low back pain     Neck pain     Obstructive sleep apnea on CPAP 07/17/2012    PONV (postoperative nausea and vomiting)     S/P CABG (coronary artery bypass graft) 06/25/2013 6/23/2013     Sleep apnea 01/27/2012    Patient reports "severe" sleep apnea, uses C-pap    Stented coronary artery 12/20/2019 12/19  ostial RCA -Osirio2.5 x 18 post dilated 2.75     Thoracic back pain     Usual hyperplasia of " lactiferous duct 2017    left breast     Past Surgical History:   Procedure Laterality Date    ADENOIDECTOMY      BACK SURGERY      BREAST BIOPSY Left 2017    duct excision- Dr. Jimenez    BREAST CYST ASPIRATION Left 2020    @ 's office- old hematoma drained (per office)    CARDIAC SURGERY      CARPAL TUNNEL RELEASE      bilateral    CERVICAL LAMINECTOMY WITH SPINAL FUSION      2016     SECTION, CLASSIC      x 2    COLONOSCOPY      CORONARY ANGIOGRAPHY  2019    Procedure: ANGIOGRAM, CORONARY ARTERY;  Surgeon: Timi Millan MD;  Location: [facility] CATH;  Service: Cardiology;;    CORONARY ANGIOGRAPHY  10/13/2023    Procedure: Coronary angiogram study;  Surgeon: Azra Stoll MD;  [facility];  Service: Cardiology;;    CORONARY ANGIOGRAPHY INCLUDING BYPASS GRAFTS WITH CATHETERIZATION OF LEFT HEART  10/13/2023    Procedure: Left heart cath w/Grafts RM 3640;  Surgeon: Azra Stoll MD;  [facility];  Service: Cardiology;;    CORONARY ANGIOPLASTY WITH STENT PLACEMENT      x 4    CORONARY ARTERY BYPASS GRAFT  2013    3 vessel    CORONARY BYPASS GRAFT ANGIOGRAPHY  10/13/2023    Procedure: Bypass graft study;  Surgeon: Azra Stoll MD;  Location: [facility] CATH;  Service: Cardiology;;    ESOPHAGOGASTRODUODENOSCOPY N/A 2020    Procedure: EGD (ESOPHAGOGASTRODUODENOSCOPY);  Surgeon: Mk Warren MD;  Location: [facility];  Service: Endoscopy;  Laterality: N/A;    HYSTERECTOMY      Complete    JOINT REPLACEMENT      KNEE ARTHROPLASTY Left 2019    Procedure: ARTHROPLASTY, KNEE;  Surgeon: Juan Wilson MD;  Location: [facility] OR;  Service: Orthopedics;  Laterality: Left;    KNEE ARTHROSCOPY W/ MENISCAL REPAIR Left     twice, last one 2014    LAMINECTOMY      L4/L5    LAPAROSCOPIC CHOLECYSTECTOMY N/A 7/3/2023    Procedure: CHOLECYSTECTOMY, LAPAROSCOPIC;  Surgeon: Obinna Whipple MD;  Location: [facility] OR;  Service: General;  Laterality: N/A;  with cholangiogram    LEFT  HEART CATHETERIZATION  12/13/2019    Procedure: Left heart cath- RM # [id number];  Surgeon: Timi Millan MD;  Location: [facility] CATH;  Service: Cardiology;;    OOPHORECTOMY Bilateral 2011    ROBOTIC ARTHROPLASTY, KNEE Right 6/14/2021    Procedure: ROBOTIC ARTHROPLASTY, KNEE, TOTAL;  Surgeon: Juan Wilson MD;  Location: [facility] OR;  Service: Orthopedics;  Laterality: Right;    SINUS SURGERY      x3    TENDON REPAIR Left     ankle surgery    THYROIDECTOMY      2 separate surgeries    TONSILLECTOMY      TOTAL KNEE ARTHROPLASTY Left 06/17/2019    Surgeon: Juan Wilson MD    XI ROBOTIC REVISION, GASTROENTEROSTOMY, MITCHELL-EN-Y N/A 2/20/2023    Procedure: XI ROBOTIC REVISION,GASTROENTEROSTOMY,MITCHELL-EN-Y;  Surgeon: Obinna Whipple MD;  Location: [facility] OR;  Service: General;  Laterality: N/A;  creation of Mitchell-N-Y anastomsis     Current Outpatient Medications on File Prior to Visit   Medication Sig Dispense Refill    acarbose (PRECOSE) 25 MG Tab Take 1 tablet (25 mg total) by mouth 3 (three) times daily with meals. 270 tablet 3    acetaminophen (TYLENOL) 650 MG TbSR Take 1,300 mg by mouth every 8 (eight) hours as needed (pain).       albuterol (PROVENTIL/VENTOLIN HFA) 90 mcg/actuation inhaler Inhale 2 puffs into the lungs every 6 (six) hours as needed for Shortness of Breath.       allopurinoL (ZYLOPRIM) 100 MG tablet Take 1 tablet (100 mg total) by mouth once daily. 90 tablet 3    amLODIPine (NORVASC) 5 MG tablet Take 1 tablet (5 mg total) by mouth once daily. 30 tablet 11    aspirin (ECOTRIN) 81 MG EC tablet Take 1 tablet (81 mg total) by mouth once daily. 30 tablet 11    atorvastatin (LIPITOR) 40 MG tablet Take 1 tablet (40 mg total) by mouth once daily. 90 tablet 3    baclofen (LIORESAL) 10 MG tablet TAKE 1 TABLET BY MOUTH THREE TIMES DAILY 90 tablet 3    blood-glucose meter kit To check BG TID PRN, to use with insurance preferred meter 1 each 0    calcium carb and citrate-vitD3 600 mg-12.5 mcg (500 unit) TbSR Take 2 tablets  "by mouth once.      cetirizine (ZYRTEC) 10 MG tablet Take 10 mg by mouth daily as needed for Allergies.      diazePAM (VALIUM) 10 MG Tab Take 1 tablet (10 mg total) by mouth On call Procedure (MRI). 2 tablet 0    fluticasone propionate (FLONASE) 50 mcg/actuation nasal spray fluticasone propionate 50 mcg/actuation nasal spray,suspension      lancets Misc To check BG TID PRN, to use with insurance preferred meter 100 each 1    levothyroxine (SYNTHROID) 100 MCG tablet TAKE 1 TABLET BY MOUTH EVERY DAY BEFORE BREAKFAST 30 tablet 11    metoprolol succinate (TOPROL-XL) 25 MG 24 hr tablet Take 1 tablet (25 mg total) by mouth every evening. 30 tablet 11    multivitamin (THERAGRAN) per tablet Take 1 tablet by mouth once daily. Bariatric vitamin      senna-docusate 8.6-50 mg (PERICOLACE) 8.6-50 mg per tablet Take 1 tablet by mouth 2 (two) times a day.      ticagrelor (BRILINTA) 90 mg tablet TAKE 1 TABLET BY MOUTH TWICE DAILY 180 tablet 3    TRUE METRIX GLUCOSE TEST STRIP Strp TO CHECK BLOOD GLUCOSE THREE TIMES DAILY AS NEEDED 300 each 3    venlafaxine (EFFEXOR-XR) 150 MG Cp24 Take 1 capsule (150 mg total) by mouth once daily. 90 capsule 3    [DISCONTINUED] pantoprazole (PROTONIX) 40 MG tablet TAKE 1 TABLET BY MOUTH 2 TIMES A DAY 90 tablet 3    [DISCONTINUED] pregabalin (LYRICA) 100 MG capsule TAKE 2 CAPSULES BY MOUTH TWICE DAILY 120 capsule 3     No current facility-administered medications on file prior to visit.     Review of patient's allergies indicates:   Allergen Reactions    Celexa [citalopram] Other (See Comments)     GI upset  Stated "knocked me out"    Cephalexin Anaphylaxis    Zoloft [sertraline] Other (See Comments)     Stated "knocked me out"    Codeine Other (See Comments)     hallucinations  hallucinations    Isosorbide Nausea And Vomiting    Ciprofloxacin Itching    Levaquin [levofloxacin] Other (See Comments)     tendinitis    Metformin      Nausea     Penicillamine     Penicillins Other (See Comments)     Was " told per mother that as child was allergic to penicillin.  Childhood allergy/ unknown reaction    Sulfa (sulfonamide antibiotics)      Family History   Problem Relation Age of Onset    Heart failure Mother     Asthma Mother     Macular degeneration Mother     Cancer Mother         Breast    Cataracts Mother     Heart disease Mother     COPD Mother     Breast cancer Mother     Heart disease Father     Diabetes Father     Hypertension Father     Stroke Father     Cataracts Father     Obesity Father     Obesity Sister     Glaucoma Sister     Thyroid disease Sister     Glaucoma Sister     Other Brother         stiff man syndrome    Cancer Maternal Grandmother         Breast with Mets    Breast cancer Maternal Grandmother     Cancer Paternal Grandmother         Colon    Strabismus Other     Amblyopia Neg Hx     Blindness Neg Hx     Retinal detachment Neg Hx      Social History     Socioeconomic History    Marital status:    Occupational History    Occupation: teacher   Tobacco Use    Smoking status: Former     Current packs/day: 0.00     Average packs/day: 0.5 packs/day for 14.9 years (7.5 ttl pk-yrs)     Types: Cigarettes     Start date: 1984     Quit date: 1998     Years since quittin.1    Smokeless tobacco: Never   Substance and Sexual Activity    Alcohol use: No    Drug use: Not Currently     Types: Benzodiazepines    Sexual activity: Yes     Partners: Male     Social Determinants of Health     Financial Resource Strain: Patient Declined (2023)    Overall Financial Resource Strain (CARDIA)     Difficulty of Paying Living Expenses: Patient declined   Food Insecurity: Patient Declined (2023)    Hunger Vital Sign     Worried About Running Out of Food in the Last Year: Patient declined     Ran Out of Food in the Last Year: Patient declined   Transportation Needs: Patient Declined (2023)    PRAPARE - Transportation     Lack of Transportation (Medical): Patient declined     Lack of  Transportation (Non-Medical): Patient declined   Physical Activity: Patient Declined (2/21/2023)    Exercise Vital Sign     Days of Exercise per Week: Patient declined     Minutes of Exercise per Session: Patient declined   Stress: Patient Declined (2/21/2023)    [employer] [place] [employer] - Occupational Stress Questionnaire     Feeling of Stress : Patient declined   Social Connections: Unknown (2/21/2023)    Social Connection and Isolation Panel [NHANES]     Frequency of Communication with Friends and Family: Patient declined     Frequency of Social Gatherings with Friends and Family: Patient declined     Attends Jew Services: Patient declined     Active Member of Clubs or Organizations: Patient declined     Attends Club or Organization Meetings: Patient declined     Marital Status:    Housing Stability: Unknown (2/21/2023)    Housing Stability Vital Sign     Unable to Pay for Housing in the Last Year: Patient refused     Unstable Housing in the Last Year: Patient refused       Review of Systems:  Constitutional:  Denies fever or chills   Eyes:  Denies change in visual acuity   HENT:  Denies nasal congestion or sore throat   Respiratory:  Denies cough or shortness of breath   Cardiovascular:  Denies chest pain or edema   GI:  Denies abdominal pain, nausea, vomiting, bloody stools or diarrhea   :  Denies dysuria   Integument:  Denies rash   Neurologic:  Denies headache, focal weakness or sensory changes   Endocrine:  Denies polyuria or polydipsia   Lymphatic:  Denies swollen glands   Psychiatric:  Denies depression or anxiety     Physical Exam:   Constitutional:  Well developed, well nourished, no acute distress, non-toxic appearance   Integument:  Well hydrated, no rash   Lymphatic:  No lymphadenopathy noted   Neurologic:  Alert & oriented x 3, CN 2-12 normal, normal motor function, normal sensory function, no focal deficits noted   Psychiatric:  Speech and behavior appropriate   Eyes:  EOMI  Gi: abdomen soft    Bilateral Knee Exam    right Knee Exam     Tenderness   The patient is experiencing tenderness in the medial pes bursa    Range of Motion   Extension: abnormal   Flexion: abnormal     Muscle Strength     The patient has normal knee strength.    Tests   Livia:  Medial - negative   Lachman:  Anterior - negative      Varus: negative  Valgus: negative  Patellar Apprehension: negative    Other   Erythema: absent  Sensation: normal  Pulse: present  Swelling: mild      left Knee Exam   left knee exam performed same as contralateral side and is normal.      Pes anserinus bursitis of right knee  -     Large Joint Aspiration/Injection: R anserine bursa    Other orders  -     methocarbamoL (ROBAXIN) 750 MG Tab; Take 1 tablet (750 mg total) by mouth 4 (four) times daily as needed.  Dispense: 44 tablet; Refill: 3            Using an aseptic technique, I injected 1 cc of lidocaine 1% without and 1 cc of kenalog 40mg into the right Knee pes bursa. The patient tolerated this well. I will have them return to clinic in 4 weeks.

## 2024-01-30 NOTE — PROCEDURES
Large Joint Aspiration/Injection: R anserine bursa    Date/Time: 1/23/2024 9:00 AM    Performed by: Juan Wilson MD  Authorized by: Juan Wilson MD    Consent Done?:  Yes (Verbal)  Indications:  Pain  Timeout: prior to procedure the correct patient, procedure, and site was verified    Prep: patient was prepped and draped in usual sterile fashion    Local anesthetic:  Lidocaine 1% without epinephrine  Anesthetic total (ml):  1      Details:  Needle Size:  21 G  Approach:  Anterolateral  Location:  Knee  Site:  R anserine bursa  Medications:  40 mg triamcinolone acetonide 40 mg/mL  Patient tolerance:  Patient tolerated the procedure well with no immediate complications

## 2024-02-06 RX ORDER — TRIAMCINOLONE ACETONIDE 40 MG/ML
40 INJECTION, SUSPENSION INTRA-ARTICULAR; INTRAMUSCULAR
Status: DISCONTINUED | OUTPATIENT
Start: 2024-01-30 | End: 2024-02-06 | Stop reason: HOSPADM

## 2024-02-06 NOTE — PROCEDURES
Large Joint Aspiration/Injection: bilateral subacromial bursa    Date/Time: 1/30/2024 3:45 PM    Performed by: Juan Wilson MD  Authorized by: Juan Wilson MD    Consent Done?:  Yes (Verbal)  Indications:  Pain  Timeout: prior to procedure the correct patient, procedure, and site was verified    Prep: patient was prepped and draped in usual sterile fashion      Local anesthesia used?: Yes    Local anesthetic:  Lidocaine 1% without epinephrine  Anesthetic total (ml):  5      Details:  Needle Size:  22 G  Ultrasonic Guidance for needle placement?: No    Approach:  Posterior  Location:  Shoulder  Laterality:  Bilateral  Site:  Bilateral subacromial bursa  Medications (Right):  40 mg triamcinolone acetonide 40 mg/mL  Medications (Left):  40 mg triamcinolone acetonide 40 mg/mL  Patient tolerance:  Patient tolerated the procedure well with no immediate complications  Tendon Sheath    Date/Time: 1/30/2024 3:45 PM    Performed by: Juan Wilson MD  Authorized by: Juan Wilson MD    Consent Done?:  Yes (Verbal)  Indications:  Pain  Timeout: prior to procedure the correct patient, procedure, and site was verified    Local anesthesia used?: No    Location:  Shoulder  Site:  R bicep tendon and L bicep tendon  Ultrasonic guidance for needle placement?: No    Needle size:  25 G  Medications:  40 mg triamcinolone acetonide 40 mg/mL; 40 mg triamcinolone acetonide 40 mg/mL  Patient tolerance:  Patient tolerated the procedure well with no immediate complications

## 2024-02-06 NOTE — PROGRESS NOTES
"Chief Complaint   Patient presents with    Right Shoulder - Pain    Left Shoulder - Pain    Right Upper Arm - Pain    Left Upper Arm - Pain       HPI:    This is a 58 y.o. who presents today complaining of bilateral shoulder pain for 1 weeks after no interval trauma. Pain is dull. No numbness or tingling. No associated signs or symptoms.      Past Medical History:   Diagnosis Date    Allergy     Anticoagulant long-term use     ASA 81mg, Effient    Anxiety     Arthritis     Asthma     As child    CAD (coronary artery disease) 01/27/2012    s/p stents x4 , CABG, 3 vessel, (5/2013) newest stent prior to CABG    Cataract     Chronic constipation     Colon polyp     Coronary artery disease involving native coronary artery of native heart without angina pectoris 01/27/2012 12/19 Significant ostial RCA lesion as above treated with drug-eluting stenting as above. Occluded proximal LAD with patent lima lad Patent vein graft to 2nd obtuse marginal Known occluded vein graft to unknown vessel.  s/p stents x 3 (2010) s/p LAD stent (DSE) 3/2012    DDD (degenerative disc disease), cervical     DDD (degenerative disc disease), lumbar     DDD (degenerative disc disease), thoracic     Depression     Edema     Encounter for blood transfusion     General anesthetics causing adverse effect in therapeutic use     Headache(784.0)     History of nephrolithiasis     Hyperlipidemia     Hypertension     Hypothyroid 07/05/2012    Insomnia     Insulin resistance     patient stated not diebetic    Joint stiffness     Joint swelling     Kidney disease     ; Frequent UTIs     Kidney stones     Low back pain     Neck pain     Obstructive sleep apnea on CPAP 07/17/2012    PONV (postoperative nausea and vomiting)     S/P CABG (coronary artery bypass graft) 06/25/2013 6/23/2013     Sleep apnea 01/27/2012    Patient reports "severe" sleep apnea, uses C-pap    Stented coronary artery 12/20/2019 12/19  ostial RCA -Osirio2.5 x 18 post " dilated 2.75     Thoracic back pain     Usual hyperplasia of lactiferous duct 2017    left breast      Past Surgical History:   Procedure Laterality Date    ADENOIDECTOMY      BACK SURGERY      BREAST BIOPSY Left 2017    duct excision- Dr. Jimenez    BREAST CYST ASPIRATION Left 2020    @ 's office- old hematoma drained (per office)    CARDIAC SURGERY      CARPAL TUNNEL RELEASE      bilateral    CERVICAL LAMINECTOMY WITH SPINAL FUSION      2016     SECTION, CLASSIC      x 2    COLONOSCOPY      CORONARY ANGIOGRAPHY  2019    Procedure: ANGIOGRAM, CORONARY ARTERY;  Surgeon: Timi Millan MD;  Location: [facility] CATH;  Service: Cardiology;;    CORONARY ANGIOGRAPHY  10/13/2023    Procedure: Coronary angiogram study;  Surgeon: Azra Stoll MD;  [facility];  Service: Cardiology;;    CORONARY ANGIOGRAPHY INCLUDING BYPASS GRAFTS WITH CATHETERIZATION OF LEFT HEART  10/13/2023    Procedure: Left heart cath w/Grafts RM 3640;  Surgeon: Azra Stoll MD;  Location: [facility] CATH;  Service: Cardiology;;    CORONARY ANGIOPLASTY WITH STENT PLACEMENT      x 4    CORONARY ARTERY BYPASS GRAFT  2013    3 vessel    CORONARY BYPASS GRAFT ANGIOGRAPHY  10/13/2023    Procedure: Bypass graft study;  Surgeon: Azra Stoll MD;  [facility];  Service: Cardiology;;    ESOPHAGOGASTRODUODENOSCOPY N/A 2020    Procedure: EGD (ESOPHAGOGASTRODUODENOSCOPY);  Surgeon: Mk Warren MD;  Location: [facility];  Service: Endoscopy;  Laterality: N/A;    HYSTERECTOMY      Complete    JOINT REPLACEMENT      KNEE ARTHROPLASTY Left 2019    Procedure: ARTHROPLASTY, KNEE;  Surgeon: Juan Wilson MD;  Location: [facility];  Service: Orthopedics;  Laterality: Left;    KNEE ARTHROSCOPY W/ MENISCAL REPAIR Left     twice, last one 2014    LAMINECTOMY      L4/L5    LAPAROSCOPIC CHOLECYSTECTOMY N/A 7/3/2023    Procedure: CHOLECYSTECTOMY, LAPAROSCOPIC;  Surgeon: Obinna Whipple MD;  Location: [facility];   Service: General;  Laterality: N/A;  with cholangiogram    LEFT HEART CATHETERIZATION  12/13/2019    Procedure: Left heart cath- RM # [id number];  Surgeon: Timi Millan MD;  Location: [facility] CATH;  Service: Cardiology;;    OOPHORECTOMY Bilateral 2011    ROBOTIC ARTHROPLASTY, KNEE Right 6/14/2021    Procedure: ROBOTIC ARTHROPLASTY, KNEE, TOTAL;  Surgeon: Juan Wilson MD;  Location: [facility] OR;  Service: Orthopedics;  Laterality: Right;    SINUS SURGERY      x3    TENDON REPAIR Left     ankle surgery    THYROIDECTOMY      2 separate surgeries    TONSILLECTOMY      TOTAL KNEE ARTHROPLASTY Left 06/17/2019    Surgeon: Juan Wilson MD    XI ROBOTIC REVISION, GASTROENTEROSTOMY, MITCHELL-EN-Y N/A 2/20/2023    Procedure: XI ROBOTIC REVISION,GASTROENTEROSTOMY,MITCHELL-EN-Y;  Surgeon: Obinna Whipple MD;  Location: [facility] OR;  Service: General;  Laterality: N/A;  creation of Mitchell-N-Y anastomsis      Current Outpatient Medications on File Prior to Visit   Medication Sig Dispense Refill    pregabalin (LYRICA) 100 MG capsule TAKE TWO (2) CAPSULES BY MOUTH TWICE DAILY 120 capsule 3    acarbose (PRECOSE) 25 MG Tab Take 1 tablet (25 mg total) by mouth 3 (three) times daily with meals. 270 tablet 3    acetaminophen (TYLENOL) 650 MG TbSR Take 1,300 mg by mouth every 8 (eight) hours as needed (pain).       albuterol (PROVENTIL/VENTOLIN HFA) 90 mcg/actuation inhaler Inhale 2 puffs into the lungs every 6 (six) hours as needed for Shortness of Breath.       allopurinoL (ZYLOPRIM) 100 MG tablet Take 1 tablet (100 mg total) by mouth once daily. 90 tablet 3    amLODIPine (NORVASC) 5 MG tablet Take 1 tablet (5 mg total) by mouth once daily. 30 tablet 11    aspirin (ECOTRIN) 81 MG EC tablet Take 1 tablet (81 mg total) by mouth once daily. 30 tablet 11    atorvastatin (LIPITOR) 40 MG tablet Take 1 tablet (40 mg total) by mouth once daily. 90 tablet 3    baclofen (LIORESAL) 10 MG tablet TAKE 1 TABLET BY MOUTH THREE TIMES DAILY 90 tablet 3    blood-glucose  "meter kit To check BG TID PRN, to use with insurance preferred meter 1 each 0    calcium carb and citrate-vitD3 600 mg-12.5 mcg (500 unit) TbSR Take 2 tablets by mouth once.      cetirizine (ZYRTEC) 10 MG tablet Take 10 mg by mouth daily as needed for Allergies.      diazePAM (VALIUM) 10 MG Tab Take 1 tablet (10 mg total) by mouth On call Procedure (MRI). 2 tablet 0    fluticasone propionate (FLONASE) 50 mcg/actuation nasal spray fluticasone propionate 50 mcg/actuation nasal spray,suspension      lancets Misc To check BG TID PRN, to use with insurance preferred meter 100 each 1    levothyroxine (SYNTHROID) 100 MCG tablet TAKE 1 TABLET BY MOUTH EVERY DAY BEFORE BREAKFAST 30 tablet 11    methocarbamoL (ROBAXIN) 750 MG Tab Take 1 tablet (750 mg total) by mouth 4 (four) times daily as needed. 44 tablet 3    metoprolol succinate (TOPROL-XL) 25 MG 24 hr tablet Take 1 tablet (25 mg total) by mouth every evening. 30 tablet 11    multivitamin (THERAGRAN) per tablet Take 1 tablet by mouth once daily. Bariatric vitamin      pantoprazole (PROTONIX) 40 MG tablet TAKE 1 TABLET BY MOUTH 2 TIMES A  tablet 1    senna-docusate 8.6-50 mg (PERICOLACE) 8.6-50 mg per tablet Take 1 tablet by mouth 2 (two) times a day.      ticagrelor (BRILINTA) 90 mg tablet TAKE 1 TABLET BY MOUTH TWICE DAILY 180 tablet 3    TRUE METRIX GLUCOSE TEST STRIP Strp TO CHECK BLOOD GLUCOSE THREE TIMES DAILY AS NEEDED 300 each 3    venlafaxine (EFFEXOR-XR) 150 MG Cp24 Take 1 capsule (150 mg total) by mouth once daily. 90 capsule 3     No current facility-administered medications on file prior to visit.      Review of patient's allergies indicates:   Allergen Reactions    Celexa [citalopram] Other (See Comments)     GI upset  Stated "knocked me out"    Cephalexin Anaphylaxis    Zoloft [sertraline] Other (See Comments)     Stated "knocked me out"    Codeine Other (See Comments)     hallucinations  hallucinations    Isosorbide Nausea And Vomiting    " Ciprofloxacin Itching    Levaquin [levofloxacin] Other (See Comments)     tendinitis    Metformin      Nausea     Penicillamine     Penicillins Other (See Comments)     Was told per mother that as child was allergic to penicillin.  Childhood allergy/ unknown reaction    Sulfa (sulfonamide antibiotics)       Family History not pertinent   Social History     Socioeconomic History    Marital status:    Occupational History    Occupation: teacher   Tobacco Use    Smoking status: Former     Current packs/day: 0.00     Average packs/day: 0.5 packs/day for 14.9 years (7.5 ttl pk-yrs)     Types: Cigarettes     Start date: 1984     Quit date: 1998     Years since quittin.1    Smokeless tobacco: Never   Substance and Sexual Activity    Alcohol use: No    Drug use: Not Currently     Types: Benzodiazepines    Sexual activity: Yes     Partners: Male     Social Determinants of Health     Financial Resource Strain: Patient Declined (2023)    Overall Financial Resource Strain (CARDIA)     Difficulty of Paying Living Expenses: Patient declined   Food Insecurity: Patient Declined (2023)    Hunger Vital Sign     Worried About Running Out of Food in the Last Year: Patient declined     Ran Out of Food in the Last Year: Patient declined   Transportation Needs: Patient Declined (2023)    PRAPARE - Transportation     Lack of Transportation (Medical): Patient declined     Lack of Transportation (Non-Medical): Patient declined   Physical Activity: Patient Declined (2023)    Exercise Vital Sign     Days of Exercise per Week: Patient declined     Minutes of Exercise per Session: Patient declined   Stress: Patient Declined (2023)    [employer] [place] [employer] - Occupational Stress Questionnaire     Feeling of Stress : Patient declined   Social Connections: Unknown (2023)    Social Connection and Isolation Panel [NHANES]     Frequency of Communication with Friends and Family:  Patient declined     Frequency of Social Gatherings with Friends and Family: Patient declined     Attends Restorationism Services: Patient declined     Active Member of Clubs or Organizations: Patient declined     Attends Club or Organization Meetings: Patient declined     Marital Status:    Housing Stability: Unknown (2/21/2023)    Housing Stability Vital Sign     Unable to Pay for Housing in the Last Year: Patient refused     Unstable Housing in the Last Year: Patient refused         Review of Systems:   Constitutional:  Denies fever or chills    Eyes:  Denies change in visual acuity    HENT:  Denies nasal congestion or sore throat    Respiratory:  Denies cough or shortness of breath    Cardiovascular:  Denies chest pain or edema    GI:  Denies abdominal pain, nausea, vomiting, bloody stools or diarrhea    :  Denies dysuria    Integument:  Denies rash    Neurologic:  Denies headache, focal weakness or sensory changes    Endocrine:  Denies polyuria or polydipsia    Lymphatic:  Denies swollen glands    Psychiatric:  Denies depression or anxiety       Physical Exam:    Constitutional:  Well developed, well nourished, no acute distress, non-toxic appearance    Integument:  Well hydrated, no rash    Lymphatic:  No lymphadenopathy noted    Neurologic:  Alert & oriented x 3,     Psychiatric:  Speech and behavior appropriate    Gi: abdomen soft  Eyes: EOMI     Bilateral Shoulder Exam  Tenderness   Shoulder tenderness location: diffusely about shoulder.    Range of Motion   Forward Flexion: abnormal   External Rotation: abnormal     Muscle Strength   Supraspinatus: 4/5     Tests   Hawkin's test: positive  Impingement: positive    Other   Erythema: absent  Sensation: normal  Pulse: present       Tear of left supraspinatus tendon  -     Cancel: Ambulatory referral/consult to Physical/Occupational Therapy; Future; Expected date: 02/06/2024  -     Ambulatory referral/consult to Physical/Occupational Therapy; Future;  Expected date: 02/06/2024    Impingement syndrome, shoulder, right    Biceps tendinitis of left upper extremity    Biceps tendinitis of right upper extremity          Using an aseptic technique, I injected 5 cc of lidocaine 1% without and 1 cc of kenalog 40mg into the bilateral Shoulder and 1:1 in the bilateral biceps. The patient tolerated this well. I will have them return to clinic in 6 weeks.

## 2024-02-22 ENCOUNTER — OFFICE VISIT (OUTPATIENT)
Dept: ORTHOPEDICS | Facility: CLINIC | Age: 59
End: 2024-02-22
Payer: COMMERCIAL

## 2024-02-22 VITALS — BODY MASS INDEX: 24.84 KG/M2 | WEIGHT: 135 LBS | HEIGHT: 62 IN

## 2024-02-22 DIAGNOSIS — M75.102 TEAR OF LEFT SUPRASPINATUS TENDON: Primary | ICD-10-CM

## 2024-02-22 DIAGNOSIS — M70.51 PES ANSERINUS BURSITIS OF RIGHT KNEE: ICD-10-CM

## 2024-02-22 PROCEDURE — 99499 UNLISTED E&M SERVICE: CPT | Mod: S$GLB,,, | Performed by: ORTHOPAEDIC SURGERY

## 2024-02-22 PROCEDURE — 99999 PR PBB SHADOW E&M-EST. PATIENT-LVL IV: CPT | Mod: PBBFAC,,, | Performed by: ORTHOPAEDIC SURGERY

## 2024-02-22 NOTE — PROGRESS NOTES
"Chief Complaint   Patient presents with    Right Knee - Pain         HPI:   This is a 58 y.o. who presents to clinic today complaining of right knee pain for 1 weeks after no known trauma. Pain is progressively worsening. No numbness or tingling. No associated signs or symptoms.    Past Medical History:   Diagnosis Date    Allergy     Anticoagulant long-term use     ASA 81mg, Effient    Anxiety     Arthritis     Asthma     As child    CAD (coronary artery disease) 01/27/2012    s/p stents x4 , CABG, 3 vessel, (5/2013) newest stent prior to CABG    Cataract     Chronic constipation     Colon polyp     Coronary artery disease involving native coronary artery of native heart without angina pectoris 01/27/2012 12/19 Significant ostial RCA lesion as above treated with drug-eluting stenting as above. Occluded proximal LAD with patent lima lad Patent vein graft to 2nd obtuse marginal Known occluded vein graft to unknown vessel.  s/p stents x 3 (2010) s/p LAD stent (DSE) 3/2012    DDD (degenerative disc disease), cervical     DDD (degenerative disc disease), lumbar     DDD (degenerative disc disease), thoracic     Depression     Edema     Encounter for blood transfusion     General anesthetics causing adverse effect in therapeutic use     Headache(784.0)     History of nephrolithiasis     Hyperlipidemia     Hypertension     Hypothyroid 07/05/2012    Insomnia     Insulin resistance     patient stated not diebetic    Joint stiffness     Joint swelling     Kidney disease     ; Frequent UTIs     Kidney stones     Low back pain     Neck pain     Obstructive sleep apnea on CPAP 07/17/2012    PONV (postoperative nausea and vomiting)     S/P CABG (coronary artery bypass graft) 06/25/2013 6/23/2013     Sleep apnea 01/27/2012    Patient reports "severe" sleep apnea, uses C-pap    Stented coronary artery 12/20/2019 12/19  ostial RCA -Osirio2.5 x 18 post dilated 2.75     Thoracic back pain     Usual hyperplasia of " lactiferous duct 2017    left breast     Past Surgical History:   Procedure Laterality Date    ADENOIDECTOMY      BACK SURGERY      BREAST BIOPSY Left 2017    duct excision- Dr. Jimenez    BREAST CYST ASPIRATION Left 2020    @ 's office- old hematoma drained (per office)    CARDIAC SURGERY      CARPAL TUNNEL RELEASE      bilateral    CERVICAL LAMINECTOMY WITH SPINAL FUSION      2016     SECTION, CLASSIC      x 2    COLONOSCOPY      CORONARY ANGIOGRAPHY  2019    Procedure: ANGIOGRAM, CORONARY ARTERY;  Surgeon: Timi Millan MD;  Location: [facility] CATH;  Service: Cardiology;;    CORONARY ANGIOGRAPHY  10/13/2023    Procedure: Coronary angiogram study;  Surgeon: Azra Stoll MD;  [facility];  Service: Cardiology;;    CORONARY ANGIOGRAPHY INCLUDING BYPASS GRAFTS WITH CATHETERIZATION OF LEFT HEART  10/13/2023    Procedure: Left heart cath w/Grafts RM 3640;  Surgeon: Azra Stoll MD;  [facility];  Service: Cardiology;;    CORONARY ANGIOPLASTY WITH STENT PLACEMENT      x 4    CORONARY ARTERY BYPASS GRAFT  2013    3 vessel    CORONARY BYPASS GRAFT ANGIOGRAPHY  10/13/2023    Procedure: Bypass graft study;  Surgeon: Azra Stoll MD;  Location: [facility] CATH;  Service: Cardiology;;    ESOPHAGOGASTRODUODENOSCOPY N/A 2020    Procedure: EGD (ESOPHAGOGASTRODUODENOSCOPY);  Surgeon: Mk Warren MD;  Location: [facility];  Service: Endoscopy;  Laterality: N/A;    HYSTERECTOMY      Complete    JOINT REPLACEMENT      KNEE ARTHROPLASTY Left 2019    Procedure: ARTHROPLASTY, KNEE;  Surgeon: Juan Wilson MD;  Location: [facility] OR;  Service: Orthopedics;  Laterality: Left;    KNEE ARTHROSCOPY W/ MENISCAL REPAIR Left     twice, last one 2014    LAMINECTOMY      L4/L5    LAPAROSCOPIC CHOLECYSTECTOMY N/A 7/3/2023    Procedure: CHOLECYSTECTOMY, LAPAROSCOPIC;  Surgeon: Obinna Whipple MD;  Location: [facility] OR;  Service: General;  Laterality: N/A;  with cholangiogram    LEFT  HEART CATHETERIZATION  12/13/2019    Procedure: Left heart cath- RM # [id number];  Surgeon: Timi Millan MD;  Location: [facility] CATH;  Service: Cardiology;;    OOPHORECTOMY Bilateral 2011    ROBOTIC ARTHROPLASTY, KNEE Right 6/14/2021    Procedure: ROBOTIC ARTHROPLASTY, KNEE, TOTAL;  Surgeon: Juan Wilson MD;  Location: [facility] OR;  Service: Orthopedics;  Laterality: Right;    SINUS SURGERY      x3    TENDON REPAIR Left     ankle surgery    THYROIDECTOMY      2 separate surgeries    TONSILLECTOMY      TOTAL KNEE ARTHROPLASTY Left 06/17/2019    Surgeon: Juan Wilson MD    XI ROBOTIC REVISION, GASTROENTEROSTOMY, MITCHELL-EN-Y N/A 2/20/2023    Procedure: XI ROBOTIC REVISION,GASTROENTEROSTOMY,MITCHELL-EN-Y;  Surgeon: Obinna Whipple MD;  Location: [facility] OR;  Service: General;  Laterality: N/A;  creation of Mitchell-N-Y anastomsis     Current Outpatient Medications on File Prior to Visit   Medication Sig Dispense Refill    acarbose (PRECOSE) 25 MG Tab Take 1 tablet (25 mg total) by mouth 3 (three) times daily with meals. 270 tablet 3    acetaminophen (TYLENOL) 650 MG TbSR Take 1,300 mg by mouth every 8 (eight) hours as needed (pain).       albuterol (PROVENTIL/VENTOLIN HFA) 90 mcg/actuation inhaler Inhale 2 puffs into the lungs every 6 (six) hours as needed for Shortness of Breath.       allopurinoL (ZYLOPRIM) 100 MG tablet Take 1 tablet (100 mg total) by mouth once daily. 90 tablet 3    amLODIPine (NORVASC) 5 MG tablet Take 1 tablet (5 mg total) by mouth once daily. 30 tablet 11    aspirin (ECOTRIN) 81 MG EC tablet Take 1 tablet (81 mg total) by mouth once daily. 30 tablet 11    atorvastatin (LIPITOR) 40 MG tablet Take 1 tablet (40 mg total) by mouth once daily. 90 tablet 3    baclofen (LIORESAL) 10 MG tablet TAKE 1 TABLET BY MOUTH THREE TIMES DAILY 90 tablet 3    blood-glucose meter kit To check BG TID PRN, to use with insurance preferred meter 1 each 0    calcium carb and citrate-vitD3 600 mg-12.5 mcg (500 unit) TbSR Take 2 tablets  "by mouth once.      cetirizine (ZYRTEC) 10 MG tablet Take 10 mg by mouth daily as needed for Allergies.      fluticasone propionate (FLONASE) 50 mcg/actuation nasal spray fluticasone propionate 50 mcg/actuation nasal spray,suspension      lancets Misc To check BG TID PRN, to use with insurance preferred meter 100 each 1    levothyroxine (SYNTHROID) 100 MCG tablet TAKE 1 TABLET BY MOUTH EVERY DAY BEFORE BREAKFAST 30 tablet 11    methocarbamoL (ROBAXIN) 750 MG Tab Take 1 tablet (750 mg total) by mouth 4 (four) times daily as needed. 44 tablet 3    metoprolol succinate (TOPROL-XL) 25 MG 24 hr tablet Take 1 tablet (25 mg total) by mouth every evening. 30 tablet 11    multivitamin (THERAGRAN) per tablet Take 1 tablet by mouth once daily. Bariatric vitamin      pantoprazole (PROTONIX) 40 MG tablet TAKE 1 TABLET BY MOUTH 2 TIMES A  tablet 1    pregabalin (LYRICA) 100 MG capsule TAKE TWO (2) CAPSULES BY MOUTH TWICE DAILY 120 capsule 3    senna-docusate 8.6-50 mg (PERICOLACE) 8.6-50 mg per tablet Take 1 tablet by mouth 2 (two) times a day.      ticagrelor (BRILINTA) 90 mg tablet TAKE 1 TABLET BY MOUTH TWICE DAILY 180 tablet 3    TRUE METRIX GLUCOSE TEST STRIP Strp TO CHECK BLOOD GLUCOSE THREE TIMES DAILY AS NEEDED 300 each 3    venlafaxine (EFFEXOR-XR) 150 MG Cp24 Take 1 capsule (150 mg total) by mouth once daily. 90 capsule 3    diazePAM (VALIUM) 10 MG Tab Take 1 tablet (10 mg total) by mouth On call Procedure (MRI). 2 tablet 0     No current facility-administered medications on file prior to visit.     Review of patient's allergies indicates:   Allergen Reactions    Celexa [citalopram] Other (See Comments)     GI upset  Stated "knocked me out"    Cephalexin Anaphylaxis    Zoloft [sertraline] Other (See Comments)     Stated "knocked me out"    Codeine Other (See Comments)     hallucinations  hallucinations    Isosorbide Nausea And Vomiting    Ciprofloxacin Itching    Levaquin [levofloxacin] Other (See Comments)     " tendinitis    Metformin      Nausea     Penicillamine     Penicillins Other (See Comments)     Was told per mother that as child was allergic to penicillin.  Childhood allergy/ unknown reaction    Sulfa (sulfonamide antibiotics)      Family History   Problem Relation Age of Onset    Heart failure Mother     Asthma Mother     Macular degeneration Mother     Cancer Mother         Breast    Cataracts Mother     Heart disease Mother     COPD Mother     Breast cancer Mother     Heart disease Father     Diabetes Father     Hypertension Father     Stroke Father     Cataracts Father     Obesity Father     Obesity Sister     Glaucoma Sister     Thyroid disease Sister     Glaucoma Sister     Other Brother         stiff man syndrome    Cancer Maternal Grandmother         Breast with Mets    Breast cancer Maternal Grandmother     Cancer Paternal Grandmother         Colon    Strabismus Other     Amblyopia Neg Hx     Blindness Neg Hx     Retinal detachment Neg Hx      Social History     Socioeconomic History    Marital status:    Occupational History    Occupation: teacher   Tobacco Use    Smoking status: Former     Current packs/day: 0.00     Average packs/day: 0.5 packs/day for 14.9 years (7.5 ttl pk-yrs)     Types: Cigarettes     Start date: 1984     Quit date: 1998     Years since quittin.1    Smokeless tobacco: Never   Substance and Sexual Activity    Alcohol use: No    Drug use: Not Currently     Types: Benzodiazepines    Sexual activity: Yes     Partners: Male     Social Determinants of Health     Financial Resource Strain: Patient Declined (2023)    Overall Financial Resource Strain (CARDIA)     Difficulty of Paying Living Expenses: Patient declined   Food Insecurity: Patient Declined (2023)    Hunger Vital Sign     Worried About Running Out of Food in the Last Year: Patient declined     Ran Out of Food in the Last Year: Patient declined   Transportation Needs: Patient Declined (2023)     PRAPARE - Transportation     Lack of Transportation (Medical): Patient declined     Lack of Transportation (Non-Medical): Patient declined   Physical Activity: Patient Declined (2/21/2023)    Exercise Vital Sign     Days of Exercise per Week: Patient declined     Minutes of Exercise per Session: Patient declined   Stress: Patient Declined (2/21/2023)    [employer] [place] [employer] - Occupational Stress Questionnaire     Feeling of Stress : Patient declined   Social Connections: Unknown (2/21/2023)    Social Connection and Isolation Panel [NHANES]     Frequency of Communication with Friends and Family: Patient declined     Frequency of Social Gatherings with Friends and Family: Patient declined     Attends Jewish Services: Patient declined     Active Member of Clubs or Organizations: Patient declined     Attends Club or Organization Meetings: Patient declined     Marital Status:    Housing Stability: Unknown (2/21/2023)    Housing Stability Vital Sign     Unable to Pay for Housing in the Last Year: Patient refused     Unstable Housing in the Last Year: Patient refused       Review of Systems:  Constitutional:  Denies fever or chills   Eyes:  Denies change in visual acuity   HENT:  Denies nasal congestion or sore throat   Respiratory:  Denies cough or shortness of breath   Cardiovascular:  Denies chest pain or edema   GI:  Denies abdominal pain, nausea, vomiting, bloody stools or diarrhea   :  Denies dysuria   Integument:  Denies rash   Neurologic:  Denies headache, focal weakness or sensory changes   Endocrine:  Denies polyuria or polydipsia   Lymphatic:  Denies swollen glands   Psychiatric:  Denies depression or anxiety     Physical Exam:   Constitutional:  Well developed, well nourished, no acute distress, non-toxic appearance   Integument:  Well hydrated, no rash   Lymphatic:  No lymphadenopathy noted   Neurologic:  Alert & oriented x 3, CN 2-12 normal, normal motor function, normal  sensory function, no focal deficits noted   Psychiatric:  Speech and behavior appropriate   Eyes: EOMI  Gi: abdomen soft    Bilateral Knee Exam    right Knee Exam     Tenderness   The patient is experiencing tenderness in the medial pes bursa    Range of Motion   Extension: abnormal   Flexion: abnormal     Muscle Strength     The patient has normal knee strength.    Tests   Livia:  Medial - negative   Lachman:  Anterior - negative      Varus: negative  Valgus: negative  Patellar Apprehension: negative    Other   Erythema: absent  Sensation: normal  Pulse: present  Swelling: mild      left Knee Exam   left knee exam performed same as contralateral side and is normal.    Tear of left supraspinatus tendon  -     Ambulatory referral/consult to Physical/Occupational Therapy; Future; Expected date: 02/29/2024    Pes anserinus bursitis of right knee  -     Ambulatory referral/consult to Physical/Occupational Therapy; Future; Expected date: 02/29/2024            Using an aseptic technique, I injected 1 cc of lidocaine 1% without and 1 cc of kenalog 40mg into the right Knee pes bursa. The patient tolerated this well. I will have them return to clinic in 6 weeks. Begin PT.

## 2024-02-23 ENCOUNTER — OFFICE VISIT (OUTPATIENT)
Dept: BARIATRICS | Facility: CLINIC | Age: 59
End: 2024-02-23
Payer: COMMERCIAL

## 2024-02-23 VITALS
BODY MASS INDEX: 24.59 KG/M2 | SYSTOLIC BLOOD PRESSURE: 149 MMHG | HEIGHT: 62 IN | DIASTOLIC BLOOD PRESSURE: 77 MMHG | WEIGHT: 133.63 LBS | RESPIRATION RATE: 16 BRPM | HEART RATE: 71 BPM | TEMPERATURE: 97 F

## 2024-02-23 DIAGNOSIS — E66.01 MORBID OBESITY: Primary | ICD-10-CM

## 2024-02-23 DIAGNOSIS — E78.2 MIXED HYPERLIPIDEMIA: ICD-10-CM

## 2024-02-23 PROCEDURE — 3008F BODY MASS INDEX DOCD: CPT | Mod: CPTII,S$GLB,, | Performed by: SURGERY

## 2024-02-23 PROCEDURE — 1159F MED LIST DOCD IN RCRD: CPT | Mod: CPTII,S$GLB,, | Performed by: SURGERY

## 2024-02-23 PROCEDURE — 3078F DIAST BP <80 MM HG: CPT | Mod: CPTII,S$GLB,, | Performed by: SURGERY

## 2024-02-23 PROCEDURE — 99213 OFFICE O/P EST LOW 20 MIN: CPT | Mod: S$GLB,,, | Performed by: SURGERY

## 2024-02-23 PROCEDURE — 3077F SYST BP >= 140 MM HG: CPT | Mod: CPTII,S$GLB,, | Performed by: SURGERY

## 2024-02-23 PROCEDURE — 3044F HG A1C LEVEL LT 7.0%: CPT | Mod: CPTII,S$GLB,, | Performed by: SURGERY

## 2024-02-23 PROCEDURE — 99999 PR PBB SHADOW E&M-EST. PATIENT-LVL V: CPT | Mod: PBBFAC,,, | Performed by: SURGERY

## 2024-02-23 RX ORDER — ACARBOSE 25 MG/1
25 TABLET ORAL
Qty: 270 TABLET | Refills: 3 | Status: SHIPPED | OUTPATIENT
Start: 2024-02-23 | End: 2025-02-22

## 2024-02-23 NOTE — PROGRESS NOTES
Post Op Note    Surgery: gastric bypass surgery  Date: 2/20/23  Initial weight: 212  Last weight: 135  Current weight: 133    Doing much better with just acarbose  Will continue for now    Vitals:    02/23/24 1529   BP: (!) 149/77   Pulse: 71   Resp: 16   Temp: 97.1 °F (36.2 °C)       Body comp:  Fat Percent:  26.9 %  Fat Mass:  36 lb  FFM:  97.6 lb  TBW: 67.2 lb  TBW %:  50.3 %  BMR: 1301 kcal    PE:  NAD  RRR  Soft/nt/nd    Labs: reviewed    A/P: s/p gastric bypass     Post gastrectomy hypoglycemia:  Continue acarabose    Counseling for patient:    Diet: low carb healthy dieting  Exercise:  as much as possible  Vitamins: daily mvi    Co morbidities:     1. Morbid obesity        2. BMI 26.0-26.9,adult        3. Mixed hyperlipidemia            -All above: Evaluated, monitored, and treated with diet and exercise program.

## 2024-02-26 DIAGNOSIS — M51.36 DDD (DEGENERATIVE DISC DISEASE), LUMBAR: ICD-10-CM

## 2024-02-26 RX ORDER — BACLOFEN 10 MG/1
10 TABLET ORAL 3 TIMES DAILY
Qty: 90 TABLET | Refills: 1 | Status: SHIPPED | OUTPATIENT
Start: 2024-02-26 | End: 2024-05-24 | Stop reason: SDUPTHER

## 2024-02-26 NOTE — TELEPHONE ENCOUNTER
Refill Routing Note   Medication(s) are not appropriate for processing by [facility] for the following reason(s):        Outside of protocol: non-delegated    [facility] action(s):  Route      Medication Therapy Plan:         Appointments  past 12m or future 3m with PCP    Date Provider   Last Visit   8/25/2023 Ian Young MD   Next Visit   Visit date not found Ian Young MD   ED visits in past 90 days: 0        Note composed:2:29 PM 02/26/2024

## 2024-03-05 LAB
CHOLEST SERPL-MSCNC: 129 MG/DL (ref 0–200)
HDLC SERPL-MCNC: 58 MG/DL (ref 35–70)
LDLC SERPL CALC-MCNC: 59 MG/DL (ref 0–160)
TRIGL SERPL-MCNC: 60 MG/DL (ref 40–160)

## 2024-03-06 ENCOUNTER — OFFICE VISIT (OUTPATIENT)
Dept: CARDIOLOGY | Facility: CLINIC | Age: 59
End: 2024-03-06
Payer: COMMERCIAL

## 2024-03-06 VITALS
HEIGHT: 61 IN | BODY MASS INDEX: 25.76 KG/M2 | WEIGHT: 136.44 LBS | DIASTOLIC BLOOD PRESSURE: 80 MMHG | HEART RATE: 71 BPM | SYSTOLIC BLOOD PRESSURE: 125 MMHG

## 2024-03-06 DIAGNOSIS — I25.700 CORONARY ARTERY DISEASE INVOLVING CORONARY BYPASS GRAFT OF NATIVE HEART WITH UNSTABLE ANGINA PECTORIS: ICD-10-CM

## 2024-03-06 DIAGNOSIS — I50.9 HEART FAILURE, UNSPECIFIED HF CHRONICITY, UNSPECIFIED HEART FAILURE TYPE: Primary | ICD-10-CM

## 2024-03-06 DIAGNOSIS — I10 ESSENTIAL HYPERTENSION: ICD-10-CM

## 2024-03-06 DIAGNOSIS — Z95.1 S/P CABG (CORONARY ARTERY BYPASS GRAFT): ICD-10-CM

## 2024-03-06 DIAGNOSIS — E78.2 MIXED HYPERLIPIDEMIA: ICD-10-CM

## 2024-03-06 DIAGNOSIS — Z95.5 STENTED CORONARY ARTERY: ICD-10-CM

## 2024-03-06 PROCEDURE — 1159F MED LIST DOCD IN RCRD: CPT | Mod: CPTII,S$GLB,, | Performed by: INTERNAL MEDICINE

## 2024-03-06 PROCEDURE — 3074F SYST BP LT 130 MM HG: CPT | Mod: CPTII,S$GLB,, | Performed by: INTERNAL MEDICINE

## 2024-03-06 PROCEDURE — 3044F HG A1C LEVEL LT 7.0%: CPT | Mod: CPTII,S$GLB,, | Performed by: INTERNAL MEDICINE

## 2024-03-06 PROCEDURE — 99214 OFFICE O/P EST MOD 30 MIN: CPT | Mod: S$GLB,,, | Performed by: INTERNAL MEDICINE

## 2024-03-06 PROCEDURE — 3079F DIAST BP 80-89 MM HG: CPT | Mod: CPTII,S$GLB,, | Performed by: INTERNAL MEDICINE

## 2024-03-06 PROCEDURE — 99999 PR PBB SHADOW E&M-EST. PATIENT-LVL IV: CPT | Mod: PBBFAC,,, | Performed by: INTERNAL MEDICINE

## 2024-03-06 PROCEDURE — 3008F BODY MASS INDEX DOCD: CPT | Mod: CPTII,S$GLB,, | Performed by: INTERNAL MEDICINE

## 2024-03-06 NOTE — PROGRESS NOTES
Subjective:    Patient ID:  Josephine Bolton is a 58 y.o. female who presents for follow-up of coronary artery disease    HPI  She comes with no complaints, no chest pain, no shortness of breath  Dealing with hypoglycemia  Lots of briuses    Review of Systems   Constitutional: Negative for decreased appetite, malaise/fatigue, weight gain and weight loss.   Cardiovascular:  Negative for chest pain, dyspnea on exertion, leg swelling, palpitations and syncope.   Respiratory:  Negative for cough and shortness of breath.    Gastrointestinal: Negative.    Neurological:  Negative for weakness.   All other systems reviewed and are negative.     Objective:      Physical Exam  Vitals and nursing note reviewed.   Constitutional:       Appearance: Normal appearance. She is well-developed.   HENT:      Head: Normocephalic.   Eyes:      Pupils: Pupils are equal, round, and reactive to light.   Neck:      Thyroid: No thyromegaly.      Vascular: No carotid bruit or JVD.   Cardiovascular:      Rate and Rhythm: Normal rate and regular rhythm.      Chest Wall: PMI is not displaced.      Pulses: Normal pulses and intact distal pulses.      Heart sounds: Normal heart sounds. No murmur heard.     No gallop.   Pulmonary:      Effort: Pulmonary effort is normal.      Breath sounds: Normal breath sounds.   Abdominal:      Palpations: Abdomen is soft. There is no mass.      Tenderness: There is no abdominal tenderness.   Musculoskeletal:         General: Normal range of motion.      Cervical back: Normal range of motion and neck supple.   Skin:     General: Skin is warm.   Neurological:      Mental Status: She is alert and oriented to person, place, and time.      Sensory: No sensory deficit.      Deep Tendon Reflexes: Reflexes are normal and symmetric.         Most Recent EKG Results  Results for orders placed or performed during the hospital encounter of 10/12/23   EKG 12-lead    Collection Time: 10/12/23 11:49 AM    Narrative    Test  Reason : R07.89,    Vent. Rate : 073 BPM     Atrial Rate : 073 BPM     P-R Int : 148 ms          QRS Dur : 084 ms      QT Int : 374 ms       P-R-T Axes : 065 051 033 degrees     QTc Int : 412 ms    Normal sinus rhythm  Nonspecific T wave abnormality  Abnormal ECG  Confirmed by Shanta Boyd ([id number]) on 10/23/2023 3:48:59 PM    Referred By: [email or web address]   SELF           Confirmed By:Shanta Boyd       Most Recent Echocardiogram Results  Results for orders placed during the hospital encounter of 10/04/21    Echo Saline Bubble? No    Interpretation Summary  · The left ventricle is normal in size with concentric remodeling and normal systolic function.  · Normal left ventricular diastolic function.  · The estimated ejection fraction is 55-60 %.  · Normal right ventricular size with normal right ventricular systolic function.  · Normal central venous pressure (3 mmHg).      Most Recent Nuclear Stress Test Results  Results for orders placed during the hospital encounter of 10/04/21    Nuclear Stress - Cardiology Interpreted    Interpretation Summary    Normal resting nuclear images.      Most Recent Cardiac PET Stress Test Results  Results for orders placed during the hospital encounter of 11/24/21    Cardiac PET Scan Stress    Interpretation Summary    There are no clinically significant perfusion abnormalities. There is a small (<10%) amount of mild diffuse fixed heterogeneity that does not change with stress, indicative of non-obstructive disease.    The whole heart absolute myocardial perfusion values averaged 0.57 cc/min/g at rest, which is reduced; 1.09 cc/min/g at stress, which is mildly reduced; and CFR is 1.91 , which is mildly reduced.    CT attenuation images demonstrate severe diffuse coronary calcifications in the LAD, and moderate diffuse coronary calcification of the LCX and RCA territory.    The gated perfusion images showed an ejection fraction of 69% at rest and 68% during stress. A normal ejection fraction  is greater than 53%.    The wall motion is normal at rest and during stress.    The LV cavity size is normal at rest and stress.    The EKG portion of this study is negative for ischemia.    There were no arrhythmias during stress.    The patient reported no chest pain during the stress test.    There are no prior studies for comparison.      Most Recent Cardiovascular Angiogram results  Results for orders placed during the hospital encounter of 10/12/23    Cardiac catheterization    Conclusion  Procedures:  Moderate sedation  Left heart cath  Coronary and bypass graft angiogram    Conclusions:  Patent SALAZAR to LAD which supplies the mid and distal LAD distribution  Patent SVG to LPL1 which supplies most of the codominant circumflex distribution  Chronically Occluded aortocoronary SVG  Severe distal left main stenosis.  The proximal to mid LAD gives rise to 2 large diagonals with D2 having a severe ostial stenosis at the proximal cap of the mid LAD   Patent stents in the proximal, mid and distal native codominant RCA  Elevated left filling pressure    Recommendations:  Optimal medical therapy including antiplatelets, high-intensity statin, beta blocker.  If there is objective ischemia in the anterior/anterolateral walls then PCI of the native left main and D2 is reasonable        Description of procedure:  The patient presented to the Cath Lab in a(n) elective fashion.  The patient was prepped and draped in a sterile manner.  Timeout, airway and ASA assessment were completed.  Local anesthesia with 2% lidocaine was administered and sedation/analgesia were administered as above.    Access was obtained using the modified Seldinger technique and a micropuncture needle in the left femoral artery under ultrasound guidance with 5 Yi sheath.  A limited angiogram was performed to ensure suitability for closure device.  Diagnostic angiography was performed using JL4, JR4, ANTONIA catheter(s).      Coronary  anatomy:  Dominance:  codominant  Left main: 70% distal stenosis with moderate calcification  Left anterior descendin% ostial LAD extending from distal LM. The mid LAD has a long in-stent . D1 is large.  D2 is large and has 90% focal ostial stenosis and originates at the  prox cap.  Ramus intermedius: absent  Left circumflex: large dominant LCx.  small to moderate OM1. Mid LCx has 80% discrete stenosis. There are three moderate sized LPL branches (LPL1 is larger).  Right coronary artery: large vessel with moderate diffuse disease and large RPDA. There are patent stents in the prox, mid and distal RCA  Aortocoronary SVG to LPL (previously referred to as OM2): widely patent with excellent antegrade and retrograde runoff to most of the LCx distribution (except OM1)  Aortocoronary SVG (likely to a diagonal): ostially occluded (known since 2019)  In situ LIMA to LAD: widely patent with excellent antegrade runoff in the mid and distal LAD>      The patient tolerated the procedure well and left the cath lab in stable condition.    Hemodynamics:  /22      Hemostasis:  Perclose x1    Contrast used:  105 mL  Air kerma:  450 mGy    Complications:  None  Estimated blood loss:  Minimal    Azra Stoll MD, [facility]  Interventional Cardiology/Structural Heart Disease  [facility]  Office: [phone]    The clinically important portion of this report has been dictated in the section above. Our Epic reporting system does not allow us to provide a clinically meaningful report without this dictation. Please beware that there may be errors and discrepancies in the computer transcription and all of the clicks required to finalize the report.  The procedure log was documented by Documenter: Josue Machuca and verified by Azra Stoll MD.    Date: 10/20/2023  Time: 9:28 AM      Other Most Recent Cardiology Results  Results for orders placed in visit on 10/19/23    Cardiac event  monitor    Interpretation Summary    Patient monitored for 26d 8h 31m    No ventricular or supraventricular ectopy noted    HR range , mean 70    3 patient triggers all correlated with normal sinus rhythm    No significant tachy- or bradyarrhythmia noted      Labs reviewed    Assessment:       1. Heart failure, unspecified HF chronicity, unspecified heart failure type    2. Coronary artery disease involving coronary bypass graft of native heart with unstable angina pectoris    3. Essential hypertension    4. Mixed hyperlipidemia    5. S/P CABG (coronary artery bypass graft)    6. Stented coronary artery         Plan:   Stop ASA  Continue:  Beta blocker, Calcium blocker, and Statin  Regular exercise program  Weight loss  Low cholesterol diet    Follow up in 6 months

## 2024-03-11 ENCOUNTER — PATIENT MESSAGE (OUTPATIENT)
Dept: OPTOMETRY | Facility: CLINIC | Age: 59
End: 2024-03-11
Payer: COMMERCIAL

## 2024-03-12 ENCOUNTER — PATIENT OUTREACH (OUTPATIENT)
Dept: ADMINISTRATIVE | Facility: HOSPITAL | Age: 59
End: 2024-03-12
Payer: COMMERCIAL

## 2024-03-12 ENCOUNTER — OFFICE VISIT (OUTPATIENT)
Dept: ORTHOPEDICS | Facility: CLINIC | Age: 59
End: 2024-03-12
Payer: COMMERCIAL

## 2024-03-12 VITALS — WEIGHT: 136.44 LBS | BODY MASS INDEX: 25.76 KG/M2 | HEIGHT: 61 IN

## 2024-03-12 DIAGNOSIS — M75.42 IMPINGEMENT SYNDROME OF LEFT SHOULDER: ICD-10-CM

## 2024-03-12 DIAGNOSIS — M75.22 BICEPS TENDINITIS OF LEFT UPPER EXTREMITY: ICD-10-CM

## 2024-03-12 DIAGNOSIS — M75.41 IMPINGEMENT SYNDROME, SHOULDER, RIGHT: Primary | ICD-10-CM

## 2024-03-12 DIAGNOSIS — M75.21 BICEPS TENDINITIS OF RIGHT UPPER EXTREMITY: ICD-10-CM

## 2024-03-12 PROCEDURE — 1159F MED LIST DOCD IN RCRD: CPT | Mod: CPTII,S$GLB,, | Performed by: ORTHOPAEDIC SURGERY

## 2024-03-12 PROCEDURE — 20610 DRAIN/INJ JOINT/BURSA W/O US: CPT | Mod: 50,S$GLB,, | Performed by: ORTHOPAEDIC SURGERY

## 2024-03-12 PROCEDURE — 3008F BODY MASS INDEX DOCD: CPT | Mod: CPTII,S$GLB,, | Performed by: ORTHOPAEDIC SURGERY

## 2024-03-12 PROCEDURE — 3044F HG A1C LEVEL LT 7.0%: CPT | Mod: CPTII,S$GLB,, | Performed by: ORTHOPAEDIC SURGERY

## 2024-03-12 PROCEDURE — 1160F RVW MEDS BY RX/DR IN RCRD: CPT | Mod: CPTII,S$GLB,, | Performed by: ORTHOPAEDIC SURGERY

## 2024-03-12 PROCEDURE — 99213 OFFICE O/P EST LOW 20 MIN: CPT | Mod: 25,S$GLB,, | Performed by: ORTHOPAEDIC SURGERY

## 2024-03-12 PROCEDURE — 99999 PR PBB SHADOW E&M-EST. PATIENT-LVL IV: CPT | Mod: PBBFAC,,, | Performed by: ORTHOPAEDIC SURGERY

## 2024-03-12 RX ADMIN — TRIAMCINOLONE ACETONIDE 40 MG: 40 INJECTION, SUSPENSION INTRA-ARTICULAR; INTRAMUSCULAR at 03:03

## 2024-03-12 NOTE — PROGRESS NOTES
Population Health Chart Review & Patient Outreach Details      Additional [facility] Health Notes:               Updates Requested / Reviewed:      Updated Care Coordination Note         Health Maintenance Topics Overdue:      VB Score: 2     Colon Cancer Screening  Mammogram                       Health Maintenance Topic(s) Outreach Outcomes & Actions Taken:    Lab(s) - Outreach Outcomes & Actions Taken  : External Records Uploaded & Care Team Updated if Applicable

## 2024-03-20 ENCOUNTER — TELEPHONE (OUTPATIENT)
Dept: ORTHOPEDICS | Facility: CLINIC | Age: 59
End: 2024-03-20
Payer: COMMERCIAL

## 2024-03-20 DIAGNOSIS — M70.51 PES ANSERINUS BURSITIS OF RIGHT KNEE: Primary | ICD-10-CM

## 2024-03-21 RX ORDER — TRIAMCINOLONE ACETONIDE 40 MG/ML
40 INJECTION, SUSPENSION INTRA-ARTICULAR; INTRAMUSCULAR
Status: DISCONTINUED | OUTPATIENT
Start: 2024-03-12 | End: 2024-03-21 | Stop reason: HOSPADM

## 2024-03-21 NOTE — PROCEDURES
Large Joint Aspiration/Injection: bilateral subacromial bursa    Date/Time: 3/12/2024 3:30 PM    Performed by: Juan Wilson MD  Authorized by: Juan Wilson MD    Consent Done?:  Yes (Verbal)  Indications:  Pain  Timeout: prior to procedure the correct patient, procedure, and site was verified    Prep: patient was prepped and draped in usual sterile fashion      Local anesthesia used?: Yes    Local anesthetic:  Lidocaine 1% without epinephrine  Anesthetic total (ml):  5      Details:  Needle Size:  22 G  Ultrasonic Guidance for needle placement?: No    Approach:  Posterior  Location:  Shoulder  Laterality:  Bilateral  Site:  Bilateral subacromial bursa  Medications (Right):  40 mg triamcinolone acetonide 40 mg/mL  Medications (Left):  40 mg triamcinolone acetonide 40 mg/mL  Patient tolerance:  Patient tolerated the procedure well with no immediate complications  Tendon Sheath    Date/Time: 3/12/2024 3:30 PM    Performed by: Juan Wilson MD  Authorized by: Juan Wilson MD    Consent Done?:  Yes (Verbal)  Indications:  Pain  Timeout: prior to procedure the correct patient, procedure, and site was verified    Local anesthesia used?: No    Location:  Shoulder  Site:  R bicep tendon and L bicep tendon  Ultrasonic guidance for needle placement?: No    Needle size:  25 G  Medications:  40 mg triamcinolone acetonide 40 mg/mL; 40 mg triamcinolone acetonide 40 mg/mL  Patient tolerance:  Patient tolerated the procedure well with no immediate complications

## 2024-03-21 NOTE — PROGRESS NOTES
"Chief Complaint   Patient presents with    Right Shoulder - Pain     4/10 doing better    Left Shoulder - Pain     5.5/10 hurts to do anything with it       HPI:    This is a 58 y.o. who presents today complaining of bilateral shoulder pain for 2 weeks after no interval trauma. Pain is throbbing. No numbness or tingling. No associated signs or symptoms.      Past Medical History:   Diagnosis Date    Allergy     Anticoagulant long-term use     ASA 81mg, Effient    Anxiety     Arthritis     Asthma     As child    CAD (coronary artery disease) 01/27/2012    s/p stents x4 , CABG, 3 vessel, (5/2013) newest stent prior to CABG    Cataract     Chronic constipation     Colon polyp     Coronary artery disease involving native coronary artery of native heart without angina pectoris 01/27/2012 12/19 Significant ostial RCA lesion as above treated with drug-eluting stenting as above. Occluded proximal LAD with patent lima lad Patent vein graft to 2nd obtuse marginal Known occluded vein graft to unknown vessel.  s/p stents x 3 (2010) s/p LAD stent (DSE) 3/2012    DDD (degenerative disc disease), cervical     DDD (degenerative disc disease), lumbar     DDD (degenerative disc disease), thoracic     Depression     Edema     Encounter for blood transfusion     General anesthetics causing adverse effect in therapeutic use     Headache(784.0)     History of nephrolithiasis     Hyperlipidemia     Hypertension     Hypothyroid 07/05/2012    Insomnia     Insulin resistance     patient stated not diebetic    Joint stiffness     Joint swelling     Kidney disease     ; Frequent UTIs     Kidney stones     Low back pain     Neck pain     Obstructive sleep apnea on CPAP 07/17/2012    PONV (postoperative nausea and vomiting)     S/P CABG (coronary artery bypass graft) 06/25/2013 6/23/2013     Sleep apnea 01/27/2012    Patient reports "severe" sleep apnea, uses C-pap    Stented coronary artery 12/20/2019 12/19  ostial RCA " -Osirio2.5 x 18 post dilated 2.75     Thoracic back pain     Usual hyperplasia of lactiferous duct 2017    left breast      Past Surgical History:   Procedure Laterality Date    ADENOIDECTOMY      BACK SURGERY      BREAST BIOPSY Left 2017    duct excision- Dr. Jimenez    BREAST CYST ASPIRATION Left 2020    @ 's office- old hematoma drained (per office)    CARDIAC SURGERY      CARPAL TUNNEL RELEASE      bilateral    CERVICAL LAMINECTOMY WITH SPINAL FUSION      2016     SECTION, CLASSIC      x 2    COLONOSCOPY      CORONARY ANGIOGRAPHY  2019    Procedure: ANGIOGRAM, CORONARY ARTERY;  Surgeon: Timi Millan MD;  Location: [facility] CATH;  Service: Cardiology;;    CORONARY ANGIOGRAPHY  10/13/2023    Procedure: Coronary angiogram study;  Surgeon: Azra Stoll MD;  Location: [facility] CATH;  Service: Cardiology;;    CORONARY ANGIOGRAPHY INCLUDING BYPASS GRAFTS WITH CATHETERIZATION OF LEFT HEART  10/13/2023    Procedure: Left heart cath w/Grafts RM [id number];  Surgeon: Azra Stoll MD;  Location: [facility] CATH;  Service: Cardiology;;    CORONARY ANGIOPLASTY WITH STENT PLACEMENT      x 4    CORONARY ARTERY BYPASS GRAFT  2013    3 vessel    CORONARY BYPASS GRAFT ANGIOGRAPHY  10/13/2023    Procedure: Bypass graft study;  Surgeon: Azra Stoll MD;  Location: [facility] CATH;  Service: Cardiology;;    ESOPHAGOGASTRODUODENOSCOPY N/A 2020    Procedure: EGD (ESOPHAGOGASTRODUODENOSCOPY);  Surgeon: Mk Warren MD;  Location: [facility] ENDO;  Service: Endoscopy;  Laterality: N/A;    HYSTERECTOMY      Complete    JOINT REPLACEMENT      KNEE ARTHROPLASTY Left 2019    Procedure: ARTHROPLASTY, KNEE;  Surgeon: Juan Wilson MD;  Location: [facility] OR;  Service: Orthopedics;  Laterality: Left;    KNEE ARTHROSCOPY W/ MENISCAL REPAIR Left     twice, last one 2014    LAMINECTOMY      L4/L5    LAPAROSCOPIC CHOLECYSTECTOMY N/A 7/3/2023    Procedure: CHOLECYSTECTOMY, LAPAROSCOPIC;  Surgeon: Obinna Whipple MD;   Location: [facility] OR;  Service: General;  Laterality: N/A;  with cholangiogram    LEFT HEART CATHETERIZATION  12/13/2019    Procedure: Left heart cath- RM # [id number];  Surgeon: Timi Millan MD;  Location: [facility] CATH;  Service: Cardiology;;    OOPHORECTOMY Bilateral 2011    ROBOTIC ARTHROPLASTY, KNEE Right 6/14/2021    Procedure: ROBOTIC ARTHROPLASTY, KNEE, TOTAL;  Surgeon: Juan Wilson MD;  Location: [facility] OR;  Service: Orthopedics;  Laterality: Right;    SINUS SURGERY      x3    TENDON REPAIR Left     ankle surgery    THYROIDECTOMY      2 separate surgeries    TONSILLECTOMY      TOTAL KNEE ARTHROPLASTY Left 06/17/2019    Surgeon: Juan Wilson MD    XI ROBOTIC REVISION, GASTROENTEROSTOMY, MITCHELL-EN-Y N/A 2/20/2023    Procedure: XI ROBOTIC REVISION,GASTROENTEROSTOMY,MITCHELL-EN-Y;  Surgeon: Obinna Whippel MD;  Location: [facility] OR;  Service: General;  Laterality: N/A;  creation of Mitchell-N-Y anastomsis      Current Outpatient Medications on File Prior to Visit   Medication Sig Dispense Refill    acarbose (PRECOSE) 25 MG Tab Take 1 tablet (25 mg total) by mouth 3 (three) times daily with meals. 270 tablet 3    acetaminophen (TYLENOL) 650 MG TbSR Take 1,300 mg by mouth every 8 (eight) hours as needed (pain).       albuterol (PROVENTIL/VENTOLIN HFA) 90 mcg/actuation inhaler Inhale 2 puffs into the lungs every 6 (six) hours as needed for Shortness of Breath.       allopurinoL (ZYLOPRIM) 100 MG tablet Take 1 tablet (100 mg total) by mouth once daily. 90 tablet 3    amLODIPine (NORVASC) 5 MG tablet Take 1 tablet (5 mg total) by mouth once daily. 30 tablet 11    aspirin (ECOTRIN) 81 MG EC tablet Take 1 tablet (81 mg total) by mouth once daily. 30 tablet 11    atorvastatin (LIPITOR) 40 MG tablet Take 1 tablet (40 mg total) by mouth once daily. 90 tablet 3    baclofen (LIORESAL) 10 MG tablet TAKE 1 TABLET BY MOUTH THREE TIMES A DAY 90 tablet 1    blood-glucose meter kit To check BG TID PRN, to use with insurance preferred meter 1 each  "0    calcium carb and citrate-vitD3 600 mg-12.5 mcg (500 unit) TbSR Take 2 tablets by mouth once.      cetirizine (ZYRTEC) 10 MG tablet Take 10 mg by mouth daily as needed for Allergies.      fluticasone propionate (FLONASE) 50 mcg/actuation nasal spray fluticasone propionate 50 mcg/actuation nasal spray,suspension      lancets Misc To check BG TID PRN, to use with insurance preferred meter 100 each 1    levothyroxine (SYNTHROID) 100 MCG tablet TAKE 1 TABLET BY MOUTH EVERY DAY BEFORE BREAKFAST 30 tablet 11    methocarbamoL (ROBAXIN) 750 MG Tab Take 1 tablet (750 mg total) by mouth 4 (four) times daily as needed. 44 tablet 3    metoprolol succinate (TOPROL-XL) 25 MG 24 hr tablet Take 1 tablet (25 mg total) by mouth every evening. 30 tablet 11    multivitamin (THERAGRAN) per tablet Take 1 tablet by mouth once daily. Bariatric vitamin      pantoprazole (PROTONIX) 40 MG tablet TAKE 1 TABLET BY MOUTH 2 TIMES A  tablet 1    pregabalin (LYRICA) 100 MG capsule TAKE TWO (2) CAPSULES BY MOUTH TWICE DAILY 120 capsule 3    senna-docusate 8.6-50 mg (PERICOLACE) 8.6-50 mg per tablet Take 1 tablet by mouth 2 (two) times a day.      ticagrelor (BRILINTA) 90 mg tablet TAKE 1 TABLET BY MOUTH TWICE DAILY 180 tablet 3    TRUE METRIX GLUCOSE TEST STRIP Strp TO CHECK BLOOD GLUCOSE THREE TIMES DAILY AS NEEDED 300 each 3    venlafaxine (EFFEXOR-XR) 150 MG Cp24 Take 1 capsule (150 mg total) by mouth once daily. 90 capsule 3    diazePAM (VALIUM) 10 MG Tab Take 1 tablet (10 mg total) by mouth On call Procedure (MRI). (Patient not taking: Reported on 3/6/2024) 2 tablet 0     No current facility-administered medications on file prior to visit.      Review of patient's allergies indicates:   Allergen Reactions    Celexa [citalopram] Other (See Comments)     GI upset  Stated "knocked me out"    Cephalexin Anaphylaxis    Zoloft [sertraline] Other (See Comments)     Stated "knocked me out"    Codeine Other (See Comments)     " hallucinations  hallucinations    Isosorbide Nausea And Vomiting    Ciprofloxacin Itching    Levaquin [levofloxacin] Other (See Comments)     tendinitis    Metformin      Nausea     Penicillamine     Penicillins Other (See Comments)     Was told per mother that as child was allergic to penicillin.  Childhood allergy/ unknown reaction    Sulfa (sulfonamide antibiotics)       Family History not pertinent   Social History     Socioeconomic History    Marital status:    Occupational History    Occupation: teacher   Tobacco Use    Smoking status: Former     Current packs/day: 0.00     Average packs/day: 0.5 packs/day for 14.9 years (7.5 ttl pk-yrs)     Types: Cigarettes     Start date: 1984     Quit date: 1998     Years since quittin.2    Smokeless tobacco: Never   Substance and Sexual Activity    Alcohol use: No    Drug use: Not Currently     Types: Benzodiazepines    Sexual activity: Yes     Partners: Male     Social Determinants of Health     Financial Resource Strain: Patient Declined (2023)    Overall Financial Resource Strain (CARDIA)     Difficulty of Paying Living Expenses: Patient declined   Food Insecurity: Patient Declined (2023)    Hunger Vital Sign     Worried About Running Out of Food in the Last Year: Patient declined     Ran Out of Food in the Last Year: Patient declined   Transportation Needs: Patient Declined (2023)    PRAPARE - Transportation     Lack of Transportation (Medical): Patient declined     Lack of Transportation (Non-Medical): Patient declined   Physical Activity: Patient Declined (2023)    Exercise Vital Sign     Days of Exercise per Week: Patient declined     Minutes of Exercise per Session: Patient declined   Stress: Patient Declined (2023)    [employer] [place] [employer] - Occupational Stress Questionnaire     Feeling of Stress : Patient declined   Social Connections: Unknown (2023)    Social Connection and Isolation Panel  [NHANES]     Frequency of Communication with Friends and Family: Patient declined     Frequency of Social Gatherings with Friends and Family: Patient declined     Attends [employer] Services: Patient declined     Active Member of Clubs or Organizations: Patient declined     Attends Club or Organization Meetings: Patient declined     Marital Status:    Housing Stability: Unknown (2/21/2023)    Housing Stability Vital Sign     Unable to Pay for Housing in the Last Year: Patient refused     Unstable Housing in the Last Year: Patient refused         Review of Systems:   Constitutional:  Denies fever or chills    Eyes:  Denies change in visual acuity    HENT:  Denies nasal congestion or sore throat    Respiratory:  Denies cough or shortness of breath    Cardiovascular:  Denies chest pain or edema    GI:  Denies abdominal pain, nausea, vomiting, bloody stools or diarrhea    :  Denies dysuria    Integument:  Denies rash    Neurologic:  Denies headache, focal weakness or sensory changes    Endocrine:  Denies polyuria or polydipsia    Lymphatic:  Denies swollen glands    Psychiatric:  Denies depression or anxiety       Physical Exam:    Constitutional:  Well developed, well nourished, no acute distress, non-toxic appearance    Integument:  Well hydrated, no rash    Lymphatic:  No lymphadenopathy noted    Neurologic:  Alert & oriented x 3,     Psychiatric:  Speech and behavior appropriate    Gi: abdomen soft  Eyes: EOMI     Bilateral Shoulder Exam    Tenderness   Shoulder tenderness location: diffusely about shoulder.    Range of Motion   Forward Flexion: abnormal   External Rotation: abnormal     Muscle Strength   Supraspinatus: 4/5     Tests   Hawkin's test: positive  Impingement: positive    Other   Erythema: absent  Sensation: normal  Pulse: present       Impingement syndrome, shoulder, right    Biceps tendinitis of left upper extremity    Biceps tendinitis of right upper extremity    Impingement syndrome of left  shoulder          Using an aseptic technique, I injected 5 cc of lidocaine 1% without and 1 cc of kenalog 40mg into the bilateral Shoulder and 1:1 in the bilateral biceps. The patient tolerated this well. I will have them return to clinic in 8 weeks. Continue PT.

## 2024-03-27 ENCOUNTER — OFFICE VISIT (OUTPATIENT)
Dept: FAMILY MEDICINE | Facility: CLINIC | Age: 59
End: 2024-03-27
Payer: COMMERCIAL

## 2024-03-27 VITALS
WEIGHT: 134.06 LBS | OXYGEN SATURATION: 95 % | DIASTOLIC BLOOD PRESSURE: 72 MMHG | HEART RATE: 76 BPM | BODY MASS INDEX: 25.31 KG/M2 | HEIGHT: 61 IN | TEMPERATURE: 98 F | SYSTOLIC BLOOD PRESSURE: 122 MMHG

## 2024-03-27 DIAGNOSIS — Z86.010 PERSONAL HISTORY OF COLONIC POLYPS: ICD-10-CM

## 2024-03-27 DIAGNOSIS — E03.9 HYPOTHYROIDISM, UNSPECIFIED TYPE: ICD-10-CM

## 2024-03-27 DIAGNOSIS — F41.8 ANXIETY WITH DEPRESSION: Primary | ICD-10-CM

## 2024-03-27 DIAGNOSIS — I10 ESSENTIAL HYPERTENSION: ICD-10-CM

## 2024-03-27 DIAGNOSIS — F43.21 GRIEF: ICD-10-CM

## 2024-03-27 PROCEDURE — 3074F SYST BP LT 130 MM HG: CPT | Mod: CPTII,S$GLB,, | Performed by: NURSE PRACTITIONER

## 2024-03-27 PROCEDURE — 99214 OFFICE O/P EST MOD 30 MIN: CPT | Mod: S$GLB,,, | Performed by: NURSE PRACTITIONER

## 2024-03-27 PROCEDURE — 3008F BODY MASS INDEX DOCD: CPT | Mod: CPTII,S$GLB,, | Performed by: NURSE PRACTITIONER

## 2024-03-27 PROCEDURE — 99999 PR PBB SHADOW E&M-EST. PATIENT-LVL V: CPT | Mod: PBBFAC,,, | Performed by: NURSE PRACTITIONER

## 2024-03-27 PROCEDURE — 3044F HG A1C LEVEL LT 7.0%: CPT | Mod: CPTII,S$GLB,, | Performed by: NURSE PRACTITIONER

## 2024-03-27 PROCEDURE — 1159F MED LIST DOCD IN RCRD: CPT | Mod: CPTII,S$GLB,, | Performed by: NURSE PRACTITIONER

## 2024-03-27 PROCEDURE — 3078F DIAST BP <80 MM HG: CPT | Mod: CPTII,S$GLB,, | Performed by: NURSE PRACTITIONER

## 2024-03-27 RX ORDER — VENLAFAXINE HYDROCHLORIDE 37.5 MG/1
37.5 CAPSULE, EXTENDED RELEASE ORAL DAILY
Qty: 30 CAPSULE | Refills: 11 | Status: SHIPPED | OUTPATIENT
Start: 2024-03-27 | End: 2025-03-27

## 2024-03-27 NOTE — PROGRESS NOTES
Subjective:       Patient ID: Josephine Bolton is a 58 y.o. female.    Chief Complaint: Health Maintenance    HPI  Morbid obesity s/p gastric bypast 2/20/23 --BMI now 25!     HTN:  was on multiple medications.  Now after surgery, off all BP medication  HLD:  She is on Lipitor.    CAD:  on Statin and Brilenta, Seeing Cardiology  Hypothyroid:  On synthroid.  TSH 1.1  GERD:  On Protonix  MDD/VANDA:  On Effexor 150 mg daily. Uncontrolled--feeling depressed. No SI. Open to therapy   Neuropathy:  On lyrica.  Works for her     Due for colonoscopy     Vitals:    03/27/24 1355   BP: 122/72   Pulse: 76   Temp: 98.1 °F (36.7 °C)     Review of Systems   Constitutional:  Negative for fever.   Respiratory:  Negative for cough and shortness of breath.    Cardiovascular:  Negative for chest pain.   Psychiatric/Behavioral:  Positive for dysphoric mood.        Past Medical History:   Diagnosis Date    Allergy     Anticoagulant long-term use     ASA 81mg, Effient    Anxiety     Arthritis     Asthma     As child    CAD (coronary artery disease) 01/27/2012    s/p stents x4 , CABG, 3 vessel, (5/2013) newest stent prior to CABG    Cataract     Chronic constipation     Colon polyp     Coronary artery disease involving native coronary artery of native heart without angina pectoris 01/27/2012 12/19 Significant ostial RCA lesion as above treated with drug-eluting stenting as above. Occluded proximal LAD with patent lima lad Patent vein graft to 2nd obtuse marginal Known occluded vein graft to unknown vessel.  s/p stents x 3 (2010) s/p LAD stent (DSE) 3/2012    DDD (degenerative disc disease), cervical     DDD (degenerative disc disease), lumbar     DDD (degenerative disc disease), thoracic     Depression     Edema     Encounter for blood transfusion     General anesthetics causing adverse effect in therapeutic use     Headache(784.0)     History of nephrolithiasis     Hyperlipidemia     Hypertension     Hypothyroid 07/05/2012    Insomnia  "    Insulin resistance     patient stated not diebetic    Joint stiffness     Joint swelling     Kidney disease     ; Frequent UTIs     Kidney stones     Low back pain     Neck pain     Obstructive sleep apnea on CPAP 07/17/2012    PONV (postoperative nausea and vomiting)     S/P CABG (coronary artery bypass graft) 06/25/2013 6/23/2013     Sleep apnea 01/27/2012    Patient reports "severe" sleep apnea, uses C-pap    Stented coronary artery 12/20/2019 12/19  ostial RCA -Osirio2.5 x 18 post dilated 2.75     Thoracic back pain     Usual hyperplasia of lactiferous duct 02/2017    left breast     Objective:      Physical Exam  Constitutional:       General: She is not in acute distress.     Appearance: She is well-developed. She is not ill-appearing, toxic-appearing or diaphoretic.   HENT:      Right Ear: Hearing normal.      Left Ear: Hearing normal.   Cardiovascular:      Rate and Rhythm: Normal rate.   Pulmonary:      Effort: No tachypnea or respiratory distress.   Skin:     Coloration: Skin is not pale.   Neurological:      Mental Status: She is alert and oriented to person, place, and time.   Psychiatric:         Speech: Speech normal.         Behavior: Behavior normal.         Thought Content: Thought content normal.         Judgment: Judgment normal.         Assessment:       1. Anxiety with depression    2. Grief    3. Essential hypertension    4. Hypothyroidism, unspecified type    5. Personal history of colonic polyps        Plan:       Anxiety with depression  -     Ambulatory referral/consult to Psychiatry; Future; Expected date: 04/03/2024  -     Ambulatory referral/consult to Psychology; Future; Expected date: 04/03/2024  -     venlafaxine (EFFEXOR-XR) 37.5 MG 24 hr capsule; Take 1 capsule (37.5 mg total) by mouth once daily.  Dispense: 30 capsule; Refill: 11    Grief  -     Ambulatory referral/consult to Psychiatry; Future; Expected date: 04/03/2024  -     Ambulatory referral/consult to " Psychology; Future; Expected date: 04/03/2024    Essential hypertension  -     CBC Auto Differential; Future; Expected date: 03/27/2024  -     Comprehensive Metabolic Panel; Future; Expected date: 03/27/2024    Hypothyroidism, unspecified type  -     TSH; Future; Expected date: 03/27/2024    Personal history of colonic polyps  -     Case Request Endoscopy: COLONOSCOPY            Follow up in about 1 month (around 4/27/2024).    Medication List with Changes/Refills   New Medications    VENLAFAXINE (EFFEXOR-XR) 37.5 MG 24 HR CAPSULE    Take 1 capsule (37.5 mg total) by mouth once daily.   Current Medications    ACARBOSE (PRECOSE) 25 MG TAB    Take 1 tablet (25 mg total) by mouth 3 (three) times daily with meals.    ACETAMINOPHEN (TYLENOL) 650 MG TBSR    Take 1,300 mg by mouth every 8 (eight) hours as needed (pain).     ALBUTEROL (PROVENTIL/VENTOLIN HFA) 90 MCG/ACTUATION INHALER    Inhale 2 puffs into the lungs every 6 (six) hours as needed for Shortness of Breath.     ALLOPURINOL (ZYLOPRIM) 100 MG TABLET    Take 1 tablet (100 mg total) by mouth once daily.    AMLODIPINE (NORVASC) 5 MG TABLET    Take 1 tablet (5 mg total) by mouth once daily.    ASPIRIN (ECOTRIN) 81 MG EC TABLET    Take 1 tablet (81 mg total) by mouth once daily.    ATORVASTATIN (LIPITOR) 40 MG TABLET    Take 1 tablet (40 mg total) by mouth once daily.    BACLOFEN (LIORESAL) 10 MG TABLET    TAKE 1 TABLET BY MOUTH THREE TIMES A DAY    BLOOD-GLUCOSE METER KIT    To check BG TID PRN, to use with insurance preferred meter    CALCIUM CARB AND CITRATE-VITD3 600 MG-12.5 MCG (500 UNIT) TBSR    Take 2 tablets by mouth once.    CETIRIZINE (ZYRTEC) 10 MG TABLET    Take 10 mg by mouth daily as needed for Allergies.    FLUTICASONE PROPIONATE (FLONASE) 50 MCG/ACTUATION NASAL SPRAY    fluticasone propionate 50 mcg/actuation nasal spray,suspension    LANCETS MISC    To check BG TID PRN, to use with insurance preferred meter    LEVOTHYROXINE (SYNTHROID) 100 MCG TABLET     TAKE 1 TABLET BY MOUTH EVERY DAY BEFORE BREAKFAST    METHOCARBAMOL (ROBAXIN) 750 MG TAB    Take 1 tablet (750 mg total) by mouth 4 (four) times daily as needed.    METOPROLOL SUCCINATE (TOPROL-XL) 25 MG 24 HR TABLET    Take 1 tablet (25 mg total) by mouth every evening.    MULTIVITAMIN (THERAGRAN) PER TABLET    Take 1 tablet by mouth once daily. Bariatric vitamin    PANTOPRAZOLE (PROTONIX) 40 MG TABLET    TAKE 1 TABLET BY MOUTH 2 TIMES A DAY    PREGABALIN (LYRICA) 100 MG CAPSULE    TAKE TWO (2) CAPSULES BY MOUTH TWICE DAILY    SENNA-DOCUSATE 8.6-50 MG (PERICOLACE) 8.6-50 MG PER TABLET    Take 1 tablet by mouth 2 (two) times a day.    TICAGRELOR (BRILINTA) 90 MG TABLET    TAKE 1 TABLET BY MOUTH TWICE DAILY    TRUE METRIX GLUCOSE TEST STRIP STRP    TO CHECK BLOOD GLUCOSE THREE TIMES DAILY AS NEEDED    VENLAFAXINE (EFFEXOR-XR) 150 MG CP24    Take 1 capsule (150 mg total) by mouth once daily.   Discontinued Medications    DIAZEPAM (VALIUM) 10 MG TAB    Take 1 tablet (10 mg total) by mouth On call Procedure (MRI).

## 2024-03-28 ENCOUNTER — TELEPHONE (OUTPATIENT)
Dept: GASTROENTEROLOGY | Facility: CLINIC | Age: 59
End: 2024-03-28
Payer: COMMERCIAL

## 2024-03-28 DIAGNOSIS — I25.700 CORONARY ARTERY DISEASE INVOLVING CORONARY BYPASS GRAFT OF NATIVE HEART WITH UNSTABLE ANGINA PECTORIS: Primary | ICD-10-CM

## 2024-03-28 NOTE — TELEPHONE ENCOUNTER
BMI: 25.33  I call Mrs. Bolton to schedule a colonoscopy/EGD as ordered by primary care physician. Patient doesn't answer. I leave patient a brief voicemail to call back. Provide pt with our callback number. [facility] message will be sent if active.

## 2024-03-28 NOTE — TELEPHONE ENCOUNTER
Diagnosis is confirmed from the case order. Screening  BMI: 25.33  First Colonoscopy? No (hx polyps)  Family history of colon cancer? Grandmother  Medical issues? Heart failure  Blood thinners? No  Preferred day: Tommy Guevara.    I instruct Mrs. Bolton to have a someone she knows (no Uber or taxi) to drive her home after the procedure since she'll be sedated.  I also inform pt the exact arrival time will be provided to her by the surgery center a couple of days to the afternoon prior to the procedure date.  Inform pt I will send prep instructions via [place] and Mail (address confirmed).   Pt verbalizes understanding to the statements above.   On license of [facility]'s pharmacy

## 2024-04-01 ENCOUNTER — CLINICAL SUPPORT (OUTPATIENT)
Dept: REHABILITATION | Facility: HOSPITAL | Age: 59
End: 2024-04-01
Payer: OTHER MISCELLANEOUS

## 2024-04-01 DIAGNOSIS — R53.1 DECREASED STRENGTH: ICD-10-CM

## 2024-04-01 DIAGNOSIS — R26.89 DECREASED FUNCTIONAL MOBILITY: ICD-10-CM

## 2024-04-01 DIAGNOSIS — M70.51 PES ANSERINUS BURSITIS OF RIGHT KNEE: ICD-10-CM

## 2024-04-01 DIAGNOSIS — M25.60 DECREASED RANGE OF MOTION: Primary | ICD-10-CM

## 2024-04-01 PROCEDURE — 97162 PT EVAL MOD COMPLEX 30 MIN: CPT | Mod: PN

## 2024-04-01 PROCEDURE — 97112 NEUROMUSCULAR REEDUCATION: CPT | Mod: PN

## 2024-04-02 ENCOUNTER — TELEPHONE (OUTPATIENT)
Dept: PSYCHIATRY | Facility: CLINIC | Age: 59
End: 2024-04-02
Payer: COMMERCIAL

## 2024-04-02 NOTE — PLAN OF CARE
[facility]   Physical Therapy Initial Evaluation - Workers Compensation     Name: Josephine Bolton  Clinic Number: [id number]    Therapy Diagnosis:   Encounter Diagnoses   Name Primary?    Pes anserinus bursitis of right knee     Decreased range of motion Yes    Decreased functional mobility     Decreased strength         Physician: Juan Wilson MD    Physician Orders: PT Eval and Treat   Medical Diagnosis from Referral: M70.51 (ICD-10-CM) - Pes anserinus bursitis of right knee   Evaluation Date: 4/1/2024  Authorization Period Expiration: 6/21/2025  Plan of Care Expiration: 6/21/2024  Progress Note Due: 5/1/2024  Date of Surgery: n/a  Visit # / Visits authorized: 1/ 12   FOTO: 1/ 3 eval    Precautions: Standard     Time In: 4:18  Time Out: 5:15  Total Billable Time: 57 minutes    Subjective     Date of onset: 11/1/2023    History of current condition - Josephine reports: foot caught the leg of a desk and she tripped landing on other desks with her arms out in front. Not sure if she twisted her knee or his it. She has history of TKA bilaterally. Injections seem to help and the pain comes back. Shoulder issues also, but this is not worker's comp related, just the knee.     Falls: tripped over a desk at work, no other falls    Imaging: x-ray from 11//2023: see chart    Prior Therapy: not for this current condition  Social History: lives with    Occupation: Teacher  Prior Level of Function: no issues prior to fall  Current Level of Function: limited with pain when walking, stairs, step, careful getting up from squatting/stooping, sit to stand difficult using right knee    Pain:  Current 3/10, worst 8/10, best 3/10   Location: right medial knee   Description: pinch then progresses to throbbing and then stabbing  Aggravating Factors: standing, walking, stairs  Easing Factors: rubbing, heat, ice, Tylenol arthritis    Patients goals: learn stretches in order to straighten leg and  "avoid surgery     Medical History:   Past Medical History:   Diagnosis Date    Allergy     Anticoagulant long-term use     ASA 81mg, Effient    Anxiety     Arthritis     Asthma     As child    CAD (coronary artery disease) 01/27/2012    s/p stents x4 , CABG, 3 vessel, (5/2013) newest stent prior to CABG    Cataract     Chronic constipation     Colon polyp     Coronary artery disease involving native coronary artery of native heart without angina pectoris 01/27/2012 12/19 Significant ostial RCA lesion as above treated with drug-eluting stenting as above. Occluded proximal LAD with patent lima lad Patent vein graft to 2nd obtuse marginal Known occluded vein graft to unknown vessel.  s/p stents x 3 (2010) s/p LAD stent (DSE) 3/2012    DDD (degenerative disc disease), cervical     DDD (degenerative disc disease), lumbar     DDD (degenerative disc disease), thoracic     Depression     Edema     Encounter for blood transfusion     General anesthetics causing adverse effect in therapeutic use     Headache(784.0)     History of nephrolithiasis     Hyperlipidemia     Hypertension     Hypothyroid 07/05/2012    Insomnia     Insulin resistance     patient stated not diebetic    Joint stiffness     Joint swelling     Kidney disease     ; Frequent UTIs     Kidney stones     Low back pain     Neck pain     Obstructive sleep apnea on CPAP 07/17/2012    PONV (postoperative nausea and vomiting)     S/P CABG (coronary artery bypass graft) 06/25/2013 6/23/2013     Sleep apnea 01/27/2012    Patient reports "severe" sleep apnea, uses C-pap    Stented coronary artery 12/20/2019 12/19  ostial RCA -Osirio2.5 x 18 post dilated 2.75     Thoracic back pain     Usual hyperplasia of lactiferous duct 02/2017    left breast       Surgical History:   Josephine Bolton  has a past surgical history that includes Thyroidectomy; Carpal tunnel release; Laminectomy; Knee arthroscopy w/ meniscal repair (Left); Sinus surgery; " "Colonoscopy; Tonsillectomy;  section, classic; Adenoidectomy; Coronary artery bypass graft (2013); Coronary angioplasty with stent; Tendon repair (Left); Back surgery; Breast biopsy (Left, 2017); Knee Arthroplasty (Left, 2019); Total knee arthroplasty (Left, 2019); Cervical laminectomy with spinal fusion; Left heart catheterization (2019); Coronary angiography (2019); Hysterectomy (); Oophorectomy (Bilateral, ); Breast cyst aspiration (Left, 2020); Esophagogastroduodenoscopy (N/A, 2020); Cardiac surgery; Joint replacement; robotic arthroplasty, knee (Right, 2021); xi robotic revision, gastroenterostomy, nelly-en-y (N/A, 2023); Laparoscopic cholecystectomy (N/A, 7/3/2023); Coronary angiography including bypass grafts with catheterization of left heart (10/13/2023); Coronary angiography (10/13/2023); and Coronary bypass graft angiography (10/13/2023).    Medications:   Josephine has a current medication list which includes the following prescription(s): acarbose, acetaminophen, albuterol, allopurinol, amlodipine, aspirin, atorvastatin, baclofen, blood-glucose meter, calcium carb and citrate-vitd3, cetirizine, fluticasone propionate, lancets, levothyroxine, methocarbamol, metoprolol succinate, multivitamin, pantoprazole, pregabalin, senna-docusate 8.6-50 mg, brilinta, true metrix glucose test strip, venlafaxine, and venlafaxine.    Allergies:   Review of patient's allergies indicates:   Allergen Reactions    Celexa [citalopram] Other (See Comments)     GI upset  Stated "knocked me out"    Cephalexin Anaphylaxis    Zoloft [sertraline] Other (See Comments)     Stated "knocked me out"    Codeine Other (See Comments)     hallucinations  hallucinations    Isosorbide Nausea And Vomiting    Ciprofloxacin Itching    Levaquin [levofloxacin] Other (See Comments)     tendinitis    Metformin      Nausea     Penicillamine     Penicillins Other (See Comments)     Was told " per mother that as child was allergic to penicillin.  Childhood allergy/ unknown reaction    Sulfa (sulfonamide antibiotics)         Objective      Knee Left Right   Flexion 138 134   Extention 0 (+2)   *Tested with patient supine     Knee Strength Left Right   Flexion 5/5 4/5   Extension 5/5 pain/5      Hip Strength Left Right   Flexion 4-/5 4-/5   Extension /5 /5   Abduction 4-/5 3+/5         Intake Outcome Measure for [facility] [employer] [facility] Survey    Therapist reviewed FOTO scores for Josephine Bolton on 4/1/2024.   FOTO report - see Media section or FOTO account episode details.    Intake Score: 47.5/100 (lower score = greater disability         Treatment     Total Treatment time (time-based codes) separate from Evaluation: 15 minutes     Josephine received the treatments listed below:      neuromuscular re-education activities to improve: Coordination, Kinesthetic Sense, and Proprioception for 15 minutes. The following activities were included:  Tendon loading hamstring seated 45 second hold 2 miniute rest, perform 5 times   Seated hamstring stretch 10 x 10 seconds  Hip abduction with ball x 10 3 second hold    Patient Education and Home Exercises     Education provided:   - reduce inflammation and not pushing through pain  - weakness in hips translates to knee pain  - icing knee    Written Home Exercises Provided: yes. Exercises were reviewed and Josephine was able to demonstrate them prior to the end of the session.  Josephine demonstrated good  understanding of the education provided. See EMR under Patient Instructions for exercises provided during therapy sessions.    Assessment     Josephine is a 58 y.o. female referred to outpatient Physical Therapy with a medical diagnosis of M70.51 (ICD-10-CM) - Pes anserinus bursitis of right knee. Patient presents with decreased range of motion to left knee, decreased strength to bilateral knees and hips, and decreased functional mobility.     Patient prognosis is Good.   Patient  will benefit from skilled outpatient Physical Therapy to address the deficits stated above and in the chart below, provide patient /family education, and to maximize patientt's level of independence.     Plan of care discussed with patient: Yes  Patient's spiritual, cultural and educational needs considered and patient is agreeable to the plan of care and goals as stated below:     Anticipated Barriers for therapy: none    Medical Necessity is demonstrated by the following  History  Co-morbidities and personal factors that may impact the plan of care [] LOW: no personal factors / co-morbidities  [] MODERATE: 1-2 personal factors / co-morbidities  [x] HIGH: 3+ personal factors / co-morbidities    Moderate / High Support Documentation:   Co-morbidities affecting plan of care: high BMI, HTN, CAD    Personal Factors:   no deficits     Examination  Body Structures and Functions, activity limitations and participation restrictions that may impact the plan of care [] LOW: addressing 1-2 elements  [x] MODERATE: 3+ elements  [] HIGH: 4+ elements (please support below)    Moderate / High Support Documentation: strength, range of motion, difficulty bending     Clinical Presentation [] LOW: stable  [x] MODERATE: Evolving  [] HIGH: Unstable     Decision Making/ Complexity Score: moderate       Goals:  Short Term Goals: 4 weeks   1. Patient will present with increased hip strength by one half grade for improved support to knee for improved ADLs.   2. Patient will report decreased pain at worst to 6/10 for improved ability to perform ADLs.   3. Patient will presents with increased knee strength by one half grade for improved ability to perform ADLs.   4. Patient will report increased ability to walk 10 minute without increased pain to knee for improved ability to perform work tasks.   5. Patient will be independent with initial home exercise program.     Long Term Goals: 6 weeks   1. Patient will present with increased hip  strength by one full grade for improved support to knee for improved ADLs.   2. Patient will report decreased pain at worst to 4/10 for improved ability to perform ADLs.   3. Patient will presents with increased knee strength by one full grade for improved ability to perform ADLs.   4. Patient will report increased ability to walk 20 minute without increased pain to knee for improved ability to perform work tasks.   5. Patient will be independent with discharge home exercise program.     Plan     Plan of care Certification: 4/1/2024 to 6/21/2024.    Outpatient Physical Therapy 2 times weekly for 6 weeks to include the following interventions: Manual Therapy, Moist Heat/ Ice, Neuromuscular Re-ed, Patient Education, Therapeutic Activities, Therapeutic Exercise, and dry needling.     Milagros Pickens PT        Physician's Signature: _________________________________________ Date: ________________

## 2024-04-03 ENCOUNTER — CLINICAL SUPPORT (OUTPATIENT)
Dept: REHABILITATION | Facility: HOSPITAL | Age: 59
End: 2024-04-03
Payer: OTHER MISCELLANEOUS

## 2024-04-03 DIAGNOSIS — R53.1 DECREASED STRENGTH: Primary | ICD-10-CM

## 2024-04-03 DIAGNOSIS — R26.89 DECREASED FUNCTIONAL MOBILITY: ICD-10-CM

## 2024-04-03 DIAGNOSIS — M25.60 DECREASED RANGE OF MOTION: ICD-10-CM

## 2024-04-03 PROCEDURE — 97110 THERAPEUTIC EXERCISES: CPT | Mod: PN

## 2024-04-03 PROCEDURE — 97140 MANUAL THERAPY 1/> REGIONS: CPT | Mod: PN

## 2024-04-03 PROCEDURE — 97112 NEUROMUSCULAR REEDUCATION: CPT | Mod: PN

## 2024-04-03 NOTE — PROGRESS NOTES
[facility]   Physical Therapy Treatment Note      Name: Josephine Sen [facility] Number: [id number]    Therapy Diagnosis:   Encounter Diagnoses   Name Primary?    Decreased strength Yes    Decreased range of motion     Decreased functional mobility      Physician: Juan Wilson MD    Visit Date: 4/3/2024    Physician Orders: PT Eval and Treat   Medical Diagnosis from Referral: M70.51 (ICD-10-CM) - Pes anserinus bursitis of right knee   Evaluation Date: 4/1/2024  Authorization Period Expiration: 3/20/2025  Plan of Care Expiration: 6/21/2024  Progress Note Due: 5/1/2024  Date of Surgery: n/a  Visit # / Visits authorized: 2/ 12   FOTO: 1/ 3 eval     Precautions: Standard      Time In: 9:07  Time Out: 9:57  Total Billable Time: 50 minutes    PTA Visit #: 0/5       Subjective     Patient reports: her knee was very tense after the evaluation. Not so bad this morning. Stiff when waking up. Warm shower helped  She was compliant with home exercise program.  Response to previous treatment: sore and tense  Functional change: none noted yet    Pain: 3/10  Location: right knee     Objective      Objective Measures updated at progress report unless specified.     Treatment     Rehab tech assisted with a portion of the treatment today under direct supervision of treating physical therapist.      Josephine received the treatments listed below:      therapeutic exercises to develop strength, endurance, and ROM for 10 minutes including:  Hip adduction   Hip abduction with red  Heel slide with strap x 10     manual therapy techniques: Myofacial release and Soft tissue Mobilization were applied to the: right knee/thigh for 30 minutes, including:  KinesioTaping to right medial knee with I strip for inhibition of sartorius  IASTM - Sartorius muscle belly, TFL muscle belly, pes anserine, gracilis muscle belly, quads muscle bellies    neuromuscular re-education activities to improve: Coordination, core control  and motor recruitment and Proprioception for 10 minutes. The following activities were included:  Quad set - cues for proper technique to prevent SAQ x 20 with 3 second hold  Bridge x 20  supine Hip marching with red x 20     Patient Education and Home Exercises       Education provided:   - use and removal of tape including wear time  - purpose of manual techniques  - continue with current exercises    Written Home Exercises Provided: Patient instructed to cont prior HEP. Exercises were reviewed and Josephine was able to demonstrate them prior to the end of the session.  Josephine demonstrated good  understanding of the education provided. See Electronic Medical Record under Patient Instructions for exercises provided during therapy sessions    Assessment     Patient presents with significant tightness to right medial and anterior thigh which improved with manual techniques. Noted palpable decrease in edema to pes anserine after treatment.     Josephine Is progressing well towards her goals.   Patient prognosis is Good.     Patient will continue to benefit from skilled outpatient physical therapy to address the deficits listed in the problem list box on initial evaluation, provide pt/family education and to maximize pt's level of independence in the home and community environment.     Patient's spiritual, cultural and educational needs considered and pt agreeable to plan of care and goals.     Anticipated barriers to physical therapy: none    Goals:   Short Term Goals: 4 weeks (in progress, not met)  1. Patient will present with increased hip strength by one half grade for improved support to knee for improved ADLs.   2. Patient will report decreased pain at worst to 6/10 for improved ability to perform ADLs.   3. Patient will presents with increased knee strength by one half grade for improved ability to perform ADLs.   4. Patient will report increased ability to walk 10 minute without increased pain to knee for  improved ability to perform work tasks.   5. Patient will be independent with initial home exercise program.      Long Term Goals: 6 weeks (in progress, not met)  1. Patient will present with increased hip strength by one full grade for improved support to knee for improved ADLs.   2. Patient will report decreased pain at worst to 4/10 for improved ability to perform ADLs.   3. Patient will presents with increased knee strength by one full grade for improved ability to perform ADLs.   4. Patient will report increased ability to walk 20 minute without increased pain to knee for improved ability to perform work tasks.   5. Patient will be independent with discharge home exercise program.      Plan      Plan of care Certification: 4/1/2024 to 6/21/2024.     Outpatient Physical Therapy 2 times weekly for 6 weeks to include the following interventions: Manual Therapy, Moist Heat/ Ice, Neuromuscular Re-ed, Patient Education, Therapeutic Activities, Therapeutic Exercise, and dry needling.             Milagros Pickens, PT

## 2024-04-04 ENCOUNTER — DOCUMENTATION ONLY (OUTPATIENT)
Dept: REHABILITATION | Facility: HOSPITAL | Age: 59
End: 2024-04-04
Payer: COMMERCIAL

## 2024-04-04 ENCOUNTER — OFFICE VISIT (OUTPATIENT)
Dept: ORTHOPEDICS | Facility: CLINIC | Age: 59
End: 2024-04-04
Payer: OTHER MISCELLANEOUS

## 2024-04-04 VITALS — HEIGHT: 62 IN | BODY MASS INDEX: 24.11 KG/M2 | WEIGHT: 131 LBS

## 2024-04-04 DIAGNOSIS — M25.60 DECREASED RANGE OF MOTION: ICD-10-CM

## 2024-04-04 DIAGNOSIS — R53.1 DECREASED STRENGTH: Primary | ICD-10-CM

## 2024-04-04 DIAGNOSIS — R26.89 DECREASED FUNCTIONAL MOBILITY: ICD-10-CM

## 2024-04-04 DIAGNOSIS — M70.51 PES ANSERINUS BURSITIS OF RIGHT KNEE: ICD-10-CM

## 2024-04-04 PROCEDURE — 20610 DRAIN/INJ JOINT/BURSA W/O US: CPT | Mod: RT,S$GLB,, | Performed by: ORTHOPAEDIC SURGERY

## 2024-04-04 PROCEDURE — 99499 UNLISTED E&M SERVICE: CPT | Mod: S$GLB,,, | Performed by: ORTHOPAEDIC SURGERY

## 2024-04-04 PROCEDURE — 99999 PR PBB SHADOW E&M-EST. PATIENT-LVL IV: CPT | Mod: PBBFAC,,, | Performed by: ORTHOPAEDIC SURGERY

## 2024-04-04 RX ADMIN — TRIAMCINOLONE ACETONIDE 40 MG: 40 INJECTION, SUSPENSION INTRA-ARTICULAR; INTRAMUSCULAR at 03:04

## 2024-04-04 NOTE — PROGRESS NOTES
Patient was seen yesterday, 4/3/2024, and KT tape applied to right knee. She remembered after tape was applied that she was having an injection this morning by her MD. She was advised to come back this morning by PT for new application of KT tape. KT tape applied to right medial knee along sartorius with I strip for inhibition.

## 2024-04-09 ENCOUNTER — TELEPHONE (OUTPATIENT)
Dept: PSYCHIATRY | Facility: CLINIC | Age: 59
End: 2024-04-09
Payer: COMMERCIAL

## 2024-04-10 ENCOUNTER — OFFICE VISIT (OUTPATIENT)
Dept: PSYCHIATRY | Facility: CLINIC | Age: 59
End: 2024-04-10
Payer: COMMERCIAL

## 2024-04-10 ENCOUNTER — TELEPHONE (OUTPATIENT)
Dept: PSYCHIATRY | Facility: CLINIC | Age: 59
End: 2024-04-10
Payer: COMMERCIAL

## 2024-04-10 VITALS — SYSTOLIC BLOOD PRESSURE: 125 MMHG | HEART RATE: 69 BPM | DIASTOLIC BLOOD PRESSURE: 72 MMHG

## 2024-04-10 DIAGNOSIS — F43.10 PTSD (POST-TRAUMATIC STRESS DISORDER): ICD-10-CM

## 2024-04-10 DIAGNOSIS — Z86.59 HISTORY OF ATTENTION DEFICIT HYPERACTIVITY DISORDER (ADHD): Primary | ICD-10-CM

## 2024-04-10 DIAGNOSIS — F51.4 NIGHT TERRORS: ICD-10-CM

## 2024-04-10 DIAGNOSIS — F41.8 ANXIETY WITH DEPRESSION: ICD-10-CM

## 2024-04-10 DIAGNOSIS — F41.1 GAD (GENERALIZED ANXIETY DISORDER): ICD-10-CM

## 2024-04-10 DIAGNOSIS — F33.1 MODERATE EPISODE OF RECURRENT MAJOR DEPRESSIVE DISORDER: ICD-10-CM

## 2024-04-10 DIAGNOSIS — F51.04 PSYCHOPHYSIOLOGICAL INSOMNIA: ICD-10-CM

## 2024-04-10 DIAGNOSIS — F43.21 GRIEF: ICD-10-CM

## 2024-04-10 PROBLEM — G47.00 INSOMNIA: Status: ACTIVE | Noted: 2024-04-10

## 2024-04-10 PROCEDURE — 1159F MED LIST DOCD IN RCRD: CPT | Mod: CPTII,S$GLB,,

## 2024-04-10 PROCEDURE — 99204 OFFICE O/P NEW MOD 45 MIN: CPT | Mod: S$GLB,,,

## 2024-04-10 PROCEDURE — 99999 PR PBB SHADOW E&M-EST. PATIENT-LVL V: CPT | Mod: PBBFAC,,,

## 2024-04-10 PROCEDURE — 90833 PSYTX W PT W E/M 30 MIN: CPT | Mod: S$GLB,,,

## 2024-04-10 PROCEDURE — 3078F DIAST BP <80 MM HG: CPT | Mod: CPTII,S$GLB,,

## 2024-04-10 PROCEDURE — 3074F SYST BP LT 130 MM HG: CPT | Mod: CPTII,S$GLB,,

## 2024-04-10 PROCEDURE — 1160F RVW MEDS BY RX/DR IN RCRD: CPT | Mod: CPTII,S$GLB,,

## 2024-04-10 PROCEDURE — 3044F HG A1C LEVEL LT 7.0%: CPT | Mod: CPTII,S$GLB,,

## 2024-04-10 RX ORDER — PRAZOSIN HYDROCHLORIDE 1 MG/1
1 CAPSULE ORAL NIGHTLY
Qty: 30 CAPSULE | Refills: 1 | Status: SHIPPED | OUTPATIENT
Start: 2024-04-10 | End: 2024-06-09
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
## 2024-04-11 RX ORDER — TRIAMCINOLONE ACETONIDE 40 MG/ML
40 INJECTION, SUSPENSION INTRA-ARTICULAR; INTRAMUSCULAR
Status: DISCONTINUED | OUTPATIENT
Start: 2024-04-04 | End: 2024-04-11 | Stop reason: HOSPADM

## 2024-04-11 NOTE — PROCEDURES
Large Joint Aspiration/Injection: R anserine bursa    Date/Time: 4/4/2024 3:30 PM    Performed by: Juan Wilson MD  Authorized by: Juan Wilson MD    Consent Done?:  Yes (Verbal)  Indications:  Pain  Timeout: prior to procedure the correct patient, procedure, and site was verified    Prep: patient was prepped and draped in usual sterile fashion    Local anesthetic:  Lidocaine 1% without epinephrine  Anesthetic total (ml):  1      Details:  Needle Size:  21 G  Approach:  Anterolateral  Location:  Knee  Site:  R anserine bursa  Medications:  40 mg triamcinolone acetonide 40 mg/mL  Patient tolerance:  Patient tolerated the procedure well with no immediate complications

## 2024-04-11 NOTE — PROGRESS NOTES
"Chief Complaint   Patient presents with    Right Knee - Pain         HPI:   This is a 58 y.o. who presents to clinic today complaining of right knee pain for 2 weeks after no known trauma. Pain is progressively worsening. No numbness or tingling. No associated signs or symptoms.    Past Medical History:   Diagnosis Date    Allergy     Anticoagulant long-term use     ASA 81mg, Effient    Anxiety     Arthritis     Asthma     As child    CAD (coronary artery disease) 01/27/2012    s/p stents x4 , CABG, 3 vessel, (5/2013) newest stent prior to CABG    Cataract     Chronic constipation     Colon polyp     Coronary artery disease involving native coronary artery of native heart without angina pectoris 01/27/2012 12/19 Significant ostial RCA lesion as above treated with drug-eluting stenting as above. Occluded proximal LAD with patent lima lad Patent vein graft to 2nd obtuse marginal Known occluded vein graft to unknown vessel.  s/p stents x 3 (2010) s/p LAD stent (DSE) 3/2012    DDD (degenerative disc disease), cervical     DDD (degenerative disc disease), lumbar     DDD (degenerative disc disease), thoracic     Depression     Edema     Encounter for blood transfusion     General anesthetics causing adverse effect in therapeutic use     Headache(784.0)     History of nephrolithiasis     Hyperlipidemia     Hypertension     Hypothyroid 07/05/2012    Insomnia     Insulin resistance     patient stated not diebetic    Joint stiffness     Joint swelling     Kidney disease     ; Frequent UTIs     Kidney stones     Low back pain     Neck pain     Obstructive sleep apnea on CPAP 07/17/2012    PONV (postoperative nausea and vomiting)     S/P CABG (coronary artery bypass graft) 06/25/2013 6/23/2013     Sleep apnea 01/27/2012    Patient reports "severe" sleep apnea, uses C-pap    Stented coronary artery 12/20/2019 12/19  ostial RCA -Osirio2.5 x 18 post dilated 2.75     Thoracic back pain     Usual hyperplasia of " lactiferous duct 2017    left breast     Past Surgical History:   Procedure Laterality Date    ADENOIDECTOMY      BACK SURGERY      BREAST BIOPSY Left 2017    duct excision- Dr. Jimenez    BREAST CYST ASPIRATION Left 2020    @ 's office- old hematoma drained (per office)    CARDIAC SURGERY      CARPAL TUNNEL RELEASE      bilateral    CERVICAL LAMINECTOMY WITH SPINAL FUSION      2016     SECTION, CLASSIC      x 2    COLONOSCOPY      CORONARY ANGIOGRAPHY  2019    Procedure: ANGIOGRAM, CORONARY ARTERY;  Surgeon: Timi Millan MD;  Location: [facility] CATH;  Service: Cardiology;;    CORONARY ANGIOGRAPHY  10/13/2023    Procedure: Coronary angiogram study;  Surgeon: Azra Stoll MD;  [facility];  Service: Cardiology;;    CORONARY ANGIOGRAPHY INCLUDING BYPASS GRAFTS WITH CATHETERIZATION OF LEFT HEART  10/13/2023    Procedure: Left heart cath w/Grafts RM 3640;  Surgeon: Azra Stoll MD;  [facility];  Service: Cardiology;;    CORONARY ANGIOPLASTY WITH STENT PLACEMENT      x 4    CORONARY ARTERY BYPASS GRAFT  2013    3 vessel    CORONARY BYPASS GRAFT ANGIOGRAPHY  10/13/2023    Procedure: Bypass graft study;  Surgeon: Azra Stoll MD;  Location: [facility] CATH;  Service: Cardiology;;    ESOPHAGOGASTRODUODENOSCOPY N/A 2020    Procedure: EGD (ESOPHAGOGASTRODUODENOSCOPY);  Surgeon: Mk Warren MD;  Location: [facility];  Service: Endoscopy;  Laterality: N/A;    HYSTERECTOMY      Complete    JOINT REPLACEMENT      KNEE ARTHROPLASTY Left 2019    Procedure: ARTHROPLASTY, KNEE;  Surgeon: Juan Wilson MD;  Location: [facility] OR;  Service: Orthopedics;  Laterality: Left;    KNEE ARTHROSCOPY W/ MENISCAL REPAIR Left     twice, last one 2014    LAMINECTOMY      L4/L5    LAPAROSCOPIC CHOLECYSTECTOMY N/A 7/3/2023    Procedure: CHOLECYSTECTOMY, LAPAROSCOPIC;  Surgeon: Obinna Whipple MD;  Location: [facility] OR;  Service: General;  Laterality: N/A;  with cholangiogram    LEFT  HEART CATHETERIZATION  12/13/2019    Procedure: Left heart cath- RM # [id number];  Surgeon: Timi Millan MD;  Location: [facility] CATH;  Service: Cardiology;;    OOPHORECTOMY Bilateral 2011    ROBOTIC ARTHROPLASTY, KNEE Right 6/14/2021    Procedure: ROBOTIC ARTHROPLASTY, KNEE, TOTAL;  Surgeon: Juan Wilson MD;  Location: [facility] OR;  Service: Orthopedics;  Laterality: Right;    SINUS SURGERY      x3    TENDON REPAIR Left     ankle surgery    THYROIDECTOMY      2 separate surgeries    TONSILLECTOMY      TOTAL KNEE ARTHROPLASTY Left 06/17/2019    Surgeon: Juan Wilson MD    XI ROBOTIC REVISION, GASTROENTEROSTOMY, MITCHELL-EN-Y N/A 2/20/2023    Procedure: XI ROBOTIC REVISION,GASTROENTEROSTOMY,MITCHELL-EN-Y;  Surgeon: Obinna Whipple MD;  Location: [facility] OR;  Service: General;  Laterality: N/A;  creation of Mitchell-N-Y anastomsis     Current Outpatient Medications on File Prior to Visit   Medication Sig Dispense Refill    acarbose (PRECOSE) 25 MG Tab Take 1 tablet (25 mg total) by mouth 3 (three) times daily with meals. 270 tablet 3    acetaminophen (TYLENOL) 650 MG TbSR Take 1,300 mg by mouth every 8 (eight) hours as needed (pain).       albuterol (PROVENTIL/VENTOLIN HFA) 90 mcg/actuation inhaler Inhale 2 puffs into the lungs every 6 (six) hours as needed for Shortness of Breath.       allopurinoL (ZYLOPRIM) 100 MG tablet Take 1 tablet (100 mg total) by mouth once daily. 90 tablet 3    amLODIPine (NORVASC) 5 MG tablet Take 1 tablet (5 mg total) by mouth once daily. 30 tablet 11    aspirin (ECOTRIN) 81 MG EC tablet Take 1 tablet (81 mg total) by mouth once daily. (Patient not taking: Reported on 3/27/2024) 30 tablet 11    atorvastatin (LIPITOR) 40 MG tablet Take 1 tablet (40 mg total) by mouth once daily. 90 tablet 3    baclofen (LIORESAL) 10 MG tablet TAKE 1 TABLET BY MOUTH THREE TIMES A DAY 90 tablet 1    blood-glucose meter kit To check BG TID PRN, to use with insurance preferred meter 1 each 0    calcium carb and citrate-vitD3 600  "mg-12.5 mcg (500 unit) TbSR Take 2 tablets by mouth once.      cetirizine (ZYRTEC) 10 MG tablet Take 10 mg by mouth daily as needed for Allergies.      fluticasone propionate (FLONASE) 50 mcg/actuation nasal spray fluticasone propionate 50 mcg/actuation nasal spray,suspension      lancets Misc To check BG TID PRN, to use with insurance preferred meter 100 each 1    levothyroxine (SYNTHROID) 100 MCG tablet TAKE 1 TABLET BY MOUTH EVERY DAY BEFORE BREAKFAST 30 tablet 11    methocarbamoL (ROBAXIN) 750 MG Tab Take 1 tablet (750 mg total) by mouth 4 (four) times daily as needed. (Patient not taking: Reported on 3/27/2024) 44 tablet 3    metoprolol succinate (TOPROL-XL) 25 MG 24 hr tablet Take 1 tablet (25 mg total) by mouth every evening. 30 tablet 11    multivitamin (THERAGRAN) per tablet Take 1 tablet by mouth once daily. Bariatric vitamin      pantoprazole (PROTONIX) 40 MG tablet TAKE 1 TABLET BY MOUTH 2 TIMES A  tablet 1    pregabalin (LYRICA) 100 MG capsule TAKE TWO (2) CAPSULES BY MOUTH TWICE DAILY 120 capsule 3    senna-docusate 8.6-50 mg (PERICOLACE) 8.6-50 mg per tablet Take 1 tablet by mouth 2 (two) times a day. (Patient not taking: Reported on 4/10/2024)      ticagrelor (BRILINTA) 90 mg tablet TAKE 1 TABLET BY MOUTH TWICE DAILY 180 tablet 3    TRUE METRIX GLUCOSE TEST STRIP Strp TO CHECK BLOOD GLUCOSE THREE TIMES DAILY AS NEEDED 300 each 3    venlafaxine (EFFEXOR-XR) 150 MG Cp24 Take 1 capsule (150 mg total) by mouth once daily. 90 capsule 3    venlafaxine (EFFEXOR-XR) 37.5 MG 24 hr capsule Take 1 capsule (37.5 mg total) by mouth once daily. 30 capsule 11     No current facility-administered medications on file prior to visit.     Review of patient's allergies indicates:   Allergen Reactions    Celexa [citalopram] Other (See Comments)     GI upset  Stated "knocked me out"    Cephalexin Anaphylaxis    Zoloft [sertraline] Other (See Comments)     Stated "knocked me out"    Codeine Other (See Comments)     " hallucinations  hallucinations    Isosorbide Nausea And Vomiting    Ciprofloxacin Itching    Levaquin [levofloxacin] Other (See Comments)     tendinitis    Metformin      Nausea     Penicillamine     Penicillins Other (See Comments)     Was told per mother that as child was allergic to penicillin.  Childhood allergy/ unknown reaction    Sulfa (sulfonamide antibiotics)      Family History   Problem Relation Age of Onset    Heart failure Mother     Asthma Mother     Macular degeneration Mother     Cancer Mother         Breast    Cataracts Mother     Heart disease Mother     COPD Mother     Breast cancer Mother     Heart disease Father     Diabetes Father     Hypertension Father     Stroke Father     Cataracts Father     Obesity Father     Obesity Sister     Glaucoma Sister     Thyroid disease Sister     Glaucoma Sister     Other Brother         stiff man syndrome    Cancer Maternal Grandmother         Breast with Mets    Breast cancer Maternal Grandmother     Cancer Paternal Grandmother         Colon    Strabismus Other     Amblyopia Neg Hx     Blindness Neg Hx     Retinal detachment Neg Hx      Social History     Socioeconomic History    Marital status:    Occupational History    Occupation: teacher   Tobacco Use    Smoking status: Former     Current packs/day: 0.00     Average packs/day: 0.5 packs/day for 14.9 years (7.5 ttl pk-yrs)     Types: Cigarettes     Start date: 1984     Quit date: 1998     Years since quittin.3    Smokeless tobacco: Never   Substance and Sexual Activity    Alcohol use: No    Drug use: Not Currently     Types: Benzodiazepines    Sexual activity: Yes     Partners: Male     Social Determinants of Health     Financial Resource Strain: Patient Declined (2023)    Overall Financial Resource Strain (CARDIA)     Difficulty of Paying Living Expenses: Patient declined   Food Insecurity: Patient Declined (2023)    Hunger Vital Sign     Worried About Running Out of Food  in the Last Year: Patient declined     Ran Out of Food in the Last Year: Patient declined   Transportation Needs: Patient Declined (2/21/2023)    PRAPARE - Transportation     Lack of Transportation (Medical): Patient declined     Lack of Transportation (Non-Medical): Patient declined   Physical Activity: Patient Declined (2/21/2023)    Exercise Vital Sign     Days of Exercise per Week: Patient declined     Minutes of Exercise per Session: Patient declined   Stress: Patient Declined (2/21/2023)    [employer] [employer] - Occupational Stress Questionnaire     Feeling of Stress : Patient declined   Social Connections: Unknown (2/21/2023)    Social Connection and Isolation Panel [NHANES]     Frequency of Communication with Friends and Family: Patient declined     Frequency of Social Gatherings with Friends and Family: Patient declined     Attends [employer] Services: Patient declined     Active Member of Clubs or Organizations: Patient declined     Attends Club or Organization Meetings: Patient declined     Marital Status:    Housing Stability: Unknown (2/21/2023)    Housing Stability Vital Sign     Unable to Pay for Housing in the Last Year: Patient refused     Unstable Housing in the Last Year: Patient refused       Review of Systems:  Constitutional:  Denies fever or chills   Eyes:  Denies change in visual acuity   HENT:  Denies nasal congestion or sore throat   Respiratory:  Denies cough or shortness of breath   Cardiovascular:  Denies chest pain or edema   GI:  Denies abdominal pain, nausea, vomiting, bloody stools or diarrhea   :  Denies dysuria   Integument:  Denies rash   Neurologic:  Denies headache, focal weakness or sensory changes   Endocrine:  Denies polyuria or polydipsia   Lymphatic:  Denies swollen glands   Psychiatric:  Denies depression or anxiety     Physical Exam:   Constitutional:  Well developed, well nourished, no acute distress, non-toxic appearance   Integument:  Well  hydrated, no rash   Lymphatic:  No lymphadenopathy noted   Neurologic:  Alert & oriented x 3, CN 2-12 normal, normal motor function, normal sensory function, no focal deficits noted   Psychiatric:  Speech and behavior appropriate   Eyes: EOMI  Gi: abdomen soft    Bilateral Knee Exam    right Knee Exam     Tenderness   The patient is experiencing tenderness in the medial pes bursa.    Range of Motion   Extension: abnormal   Flexion: abnormal     Muscle Strength     The patient has normal knee strength.    Tests   Livia:  Medial - positive   Lachman:  Anterior - negative      Varus: negative  Valgus: negative  Patellar Apprehension: negative    Other   Erythema: absent  Sensation: normal  Pulse: present  Swelling: mild      left Knee Exam   left knee exam performed same as contralateral side and is normal.    Decreased strength    Decreased range of motion    Decreased functional mobility    Pes anserinus bursitis of right knee            Using an aseptic technique, I injected 1 cc of lidocaine 1% without and 1 cc of kenalog 40mg into the right Knee pes. The patient tolerated this well. I will have them return to clinic in 6 weeks.

## 2024-04-15 ENCOUNTER — CLINICAL SUPPORT (OUTPATIENT)
Dept: REHABILITATION | Facility: HOSPITAL | Age: 59
End: 2024-04-15
Payer: OTHER MISCELLANEOUS

## 2024-04-15 DIAGNOSIS — R53.1 DECREASED STRENGTH: Primary | ICD-10-CM

## 2024-04-15 DIAGNOSIS — M25.60 DECREASED RANGE OF MOTION: ICD-10-CM

## 2024-04-15 DIAGNOSIS — R26.89 DECREASED FUNCTIONAL MOBILITY: ICD-10-CM

## 2024-04-15 PROCEDURE — 97112 NEUROMUSCULAR REEDUCATION: CPT | Mod: PN

## 2024-04-15 PROCEDURE — 97140 MANUAL THERAPY 1/> REGIONS: CPT | Mod: PN

## 2024-04-15 PROCEDURE — 97530 THERAPEUTIC ACTIVITIES: CPT | Mod: PN

## 2024-04-15 PROCEDURE — 97110 THERAPEUTIC EXERCISES: CPT | Mod: PN

## 2024-04-15 NOTE — PROGRESS NOTES
[facility]   Physical Therapy Treatment Note      Name: Josephine Sen [facility] Number: [id number]    Therapy Diagnosis:   Encounter Diagnoses   Name Primary?    Decreased strength Yes    Decreased range of motion     Decreased functional mobility      Physician: Juan Wilson MD    Visit Date: 4/15/2024    Physician Orders: PT Eval and Treat   Medical Diagnosis from Referral: M70.51 (ICD-10-CM) - Pes anserinus bursitis of right knee   Evaluation Date: 4/1/2024  Authorization Period Expiration: 3/20/2025  Plan of Care Expiration: 6/21/2024  Progress Note Due: 5/1/2024  Date of Surgery: n/a  Visit # / Visits authorized: 4/ 12   FOTO: 1/ 3 eval     Precautions: Standard      Time In: 3:07  Time Out: 3:55  Total Billable Time: 44 minutes    PTA Visit #: 0/5       Subjective     Patient reports: the tape helps every time. She still has pain in her right knee.   She was compliant with home exercise program.  Response to previous treatment: sore and tense  Functional change: none noted yet    Pain: 3/10  Location: right knee     Objective      Objective Measures updated at progress report unless specified.     Treatment     Josephine received the treatments listed below:      therapeutic exercises to develop strength, endurance, and ROM for 12 minutes including:  Nustep seat 5 level 4 x 8 minutes  Hip abduction with red x 20  Heel slide with strap x 10     manual therapy techniques: Myofacial release and Soft tissue Mobilization were applied to the: right knee/thigh for 10 minutes, including:  KinesioTaping to right medial knee with I strip for inhibition of sartorius  IASTM - Sartorius muscle belly, TFL muscle belly, pes anserine, gracilis muscle belly, quads muscle bellies    neuromuscular re-education activities to improve: Coordination, core control and motor recruitment and Proprioception for 10 minutes. The following activities were included:  Quad set - cues for proper technique to  prevent SAQ x 20 with 3 second hold  Bridge x 20  supine Hip marching with red x 20     therapeutic activities to improve functional performance for 12  minutes, including:  Shuttle double leg with hip adduction ball x 30 one black band  Shuttle single leg x 30 each red band     Next visit:  Shuttle hip extension x 15 red band    Patient Education and Home Exercises       Education provided:   - use and removal of tape including wear time  - purpose of manual techniques  - continue with current exercises    Written Home Exercises Provided: Patient instructed to cont prior HEP. Exercises were reviewed and Josephine was able to demonstrate them prior to the end of the session.  Josephine demonstrated good  understanding of the education provided. See Electronic Medical Record under Patient Instructions for exercises provided during therapy sessions    Assessment     Patient presents with decreased tightness noted to pes anserine, continues with tenderness over region. Progressed with nustep without reports of increased symptoms. Able to perform shuttle progression.     Josephine Is progressing well towards her goals.   Patient prognosis is Good.     Patient will continue to benefit from skilled outpatient physical therapy to address the deficits listed in the problem list box on initial evaluation, provide pt/family education and to maximize pt's level of independence in the home and community environment.     Patient's spiritual, cultural and educational needs considered and pt agreeable to plan of care and goals.     Anticipated barriers to physical therapy: none    Goals:   Short Term Goals: 4 weeks (in progress, not met)  1. Patient will present with increased hip strength by one half grade for improved support to knee for improved ADLs.   2. Patient will report decreased pain at worst to 6/10 for improved ability to perform ADLs.   3. Patient will presents with increased knee strength by one half grade for improved  ability to perform ADLs.   4. Patient will report increased ability to walk 10 minute without increased pain to knee for improved ability to perform work tasks.   5. Patient will be independent with initial home exercise program.      Long Term Goals: 6 weeks (in progress, not met)  1. Patient will present with increased hip strength by one full grade for improved support to knee for improved ADLs.   2. Patient will report decreased pain at worst to 4/10 for improved ability to perform ADLs.   3. Patient will presents with increased knee strength by one full grade for improved ability to perform ADLs.   4. Patient will report increased ability to walk 20 minute without increased pain to knee for improved ability to perform work tasks.   5. Patient will be independent with discharge home exercise program.      Plan      Plan of care Certification: 4/1/2024 to 6/21/2024.     Outpatient Physical Therapy 2 times weekly for 6 weeks to include the following interventions: Manual Therapy, Moist Heat/ Ice, Neuromuscular Re-ed, Patient Education, Therapeutic Activities, Therapeutic Exercise, and dry needling.             Milagros Pickens, PT

## 2024-04-17 ENCOUNTER — CLINICAL SUPPORT (OUTPATIENT)
Dept: REHABILITATION | Facility: HOSPITAL | Age: 59
End: 2024-04-17
Payer: OTHER MISCELLANEOUS

## 2024-04-17 DIAGNOSIS — R53.1 DECREASED STRENGTH: Primary | ICD-10-CM

## 2024-04-17 DIAGNOSIS — R26.89 DECREASED FUNCTIONAL MOBILITY: ICD-10-CM

## 2024-04-17 DIAGNOSIS — M25.60 DECREASED RANGE OF MOTION: ICD-10-CM

## 2024-04-17 PROCEDURE — 97112 NEUROMUSCULAR REEDUCATION: CPT | Mod: PN

## 2024-04-17 PROCEDURE — 97530 THERAPEUTIC ACTIVITIES: CPT | Mod: PN

## 2024-04-17 PROCEDURE — 97140 MANUAL THERAPY 1/> REGIONS: CPT | Mod: PN

## 2024-04-17 PROCEDURE — 97110 THERAPEUTIC EXERCISES: CPT | Mod: PN

## 2024-04-17 NOTE — PROGRESS NOTES
[facility]   Physical Therapy Treatment Note      Name: Josephine Sen [facility] Number: [id number]    Therapy Diagnosis:   Encounter Diagnoses   Name Primary?    Decreased strength Yes    Decreased range of motion     Decreased functional mobility      Physician: Juan Wilson MD    Visit Date: 2024    Physician Orders: PT Eval and Treat   Medical Diagnosis from Referral: M70.51 (ICD-10-CM) - Pes anserinus bursitis of right knee   Evaluation Date: 2024  Authorization Period Expiration: 3/20/2025  Plan of Care Expiration: 2024  Progress Note Due: 2024  Date of Surgery: n/a  Visit # / Visits authorized:    FOTO: 1/ 3 eval     Precautions: Standard      Time In: 3:07  Time Out: 4:00  Total Billable Time: 44 minutes    PTA Visit #: 0/5       Subjective     Patient reports: she went to a  earlier today and wore different shoes which are not the best.   She was compliant with home exercise program.  Response to previous treatment: sore and tense  Functional change: none noted yet    Pain: 3/10  Location: right knee     Objective      Objective Measures updated at progress report unless specified.     Treatment     Josephine received the treatments listed below:      therapeutic exercises to develop strength, endurance, and ROM for 12 minutes including:  Nustep seat 5 level 4 x 8 minutes  Hip abduction with red x 20     manual therapy techniques: Myofacial release and Soft tissue Mobilization were applied to the: right knee/thigh for 10 minutes, including:  STM to adductors  STM quads  MFR pes anserine    neuromuscular re-education activities to improve: Coordination, core control and motor recruitment and Proprioception for 10 minutes. The following activities were included:  Quad set x 10 with 5 second hold  Bridge x 20  supine Hip marching with red x 30 (15 each leg)    therapeutic activities to improve functional performance for 12  minutes,  including:  Shuttle double leg x 30 one black band  Shuttle single leg x 30 each red band     Next visit:  Shuttle hip extension x 15 red band    Patient Education and Home Exercises       Education provided:   - use and removal of tape including wear time  - purpose of manual techniques  - continue with current exercises    Written Home Exercises Provided: Patient instructed to cont prior HEP. Exercises were reviewed and Josephine was able to demonstrate them prior to the end of the session.  Josephine demonstrated good  understanding of the education provided. See Electronic Medical Record under Patient Instructions for exercises provided during therapy sessions    Assessment     Patient presents with significant tightness to right medial and anterior thigh which improved with manual techniques. Noted palpable decrease in edema to pes anserine after treatment.     Josephine Is progressing well towards her goals.   Patient prognosis is Good.     Patient will continue to benefit from skilled outpatient physical therapy to address the deficits listed in the problem list box on initial evaluation, provide pt/family education and to maximize pt's level of independence in the home and community environment.     Patient's spiritual, cultural and educational needs considered and pt agreeable to plan of care and goals.     Anticipated barriers to physical therapy: none    Goals:   Short Term Goals: 4 weeks (in progress, not met)  1. Patient will present with increased hip strength by one half grade for improved support to knee for improved ADLs.   2. Patient will report decreased pain at worst to 6/10 for improved ability to perform ADLs.   3. Patient will presents with increased knee strength by one half grade for improved ability to perform ADLs.   4. Patient will report increased ability to walk 10 minute without increased pain to knee for improved ability to perform work tasks.   5. Patient will be independent with initial  home exercise program.      Long Term Goals: 6 weeks (in progress, not met)  1. Patient will present with increased hip strength by one full grade for improved support to knee for improved ADLs.   2. Patient will report decreased pain at worst to 4/10 for improved ability to perform ADLs.   3. Patient will presents with increased knee strength by one full grade for improved ability to perform ADLs.   4. Patient will report increased ability to walk 20 minute without increased pain to knee for improved ability to perform work tasks.   5. Patient will be independent with discharge home exercise program.      Plan      Plan of care Certification: 4/1/2024 to 6/21/2024.     Outpatient Physical Therapy 2 times weekly for 6 weeks to include the following interventions: Manual Therapy, Moist Heat/ Ice, Neuromuscular Re-ed, Patient Education, Therapeutic Activities, Therapeutic Exercise, and dry needling.             Milagros Pickens, PT

## 2024-04-22 ENCOUNTER — CLINICAL SUPPORT (OUTPATIENT)
Dept: REHABILITATION | Facility: HOSPITAL | Age: 59
End: 2024-04-22
Payer: OTHER MISCELLANEOUS

## 2024-04-22 DIAGNOSIS — R53.1 DECREASED STRENGTH: Primary | ICD-10-CM

## 2024-04-22 DIAGNOSIS — R26.89 DECREASED FUNCTIONAL MOBILITY: ICD-10-CM

## 2024-04-22 DIAGNOSIS — M25.60 DECREASED RANGE OF MOTION: ICD-10-CM

## 2024-04-22 PROCEDURE — 97112 NEUROMUSCULAR REEDUCATION: CPT | Mod: PN

## 2024-04-22 PROCEDURE — 97530 THERAPEUTIC ACTIVITIES: CPT | Mod: PN

## 2024-04-22 NOTE — PROGRESS NOTES
[facility]   Physical Therapy Treatment Note      Name: Josephine Sen [place]  Clinic Number: [id number]    Therapy Diagnosis:   Encounter Diagnoses   Name Primary?    Decreased strength Yes    Decreased range of motion     Decreased functional mobility      Physician: Juan Wilson MD    Visit Date: 4/22/2024    Physician Orders: PT Eval and Treat   Medical Diagnosis from Referral: M70.51 (ICD-10-CM) - Pes anserinus bursitis of right knee   Evaluation Date: 4/1/2024  Authorization Period Expiration: 3/20/2025  Plan of Care Expiration: 6/21/2024  Progress Note Due: 5/1/2024  Date of Surgery: n/a  Visit # / Visits authorized: 5/ 12   FOTO: 1/ 3 eval     Precautions: Standard      Time In: 3:10  Time Out: 4:00  Total Billable Time: 25 minutes    PTA Visit #: 0/5       Subjective     Patient reports: it's there  She was compliant with home exercise program.  Response to previous treatment: sore and tense  Functional change: none noted yet    Pain: 3/10  Location: right knee     Objective      Objective Measures updated at progress report unless specified.     Treatment     Josephine received the treatments listed below:      therapeutic exercises to develop strength, endurance, and ROM for 12 minutes including:  Nustep seat 5 level 4 x 10 minutes  Hip abduction with red x 20     Not performed  manual therapy techniques: Myofacial release and Soft tissue Mobilization were applied to the: right knee/thigh for 0 minutes, including:  KinesioTaping to right medial knee with I strip for inhibition of sartorius  IASTM - Sartorius muscle belly, TFL muscle belly, pes anserine, gracilis muscle belly, quads muscle bellies    neuromuscular re-education activities to improve: Coordination, core control and motor recruitment and Proprioception for 10 minutes. The following activities were included:  Quad set x 10 with 5 second hold  Bridge x 20  supine Hip marching with red x 30 (15 each leg)  SLR with quad  set 2 x 10 each leg    therapeutic activities to improve functional performance for 15  minutes, including:  Shuttle double leg with hip adduction ball x 30 one black band, one red band  Shuttle single leg x 30 each, left leg red and one black, right one black  Shuttle hip extension x 15 red band on left leg; performed right leg standing at counter.     Patient Education and Home Exercises       Education provided:   - use and removal of tape including wear time  - purpose of manual techniques  - continue with current exercises    Written Home Exercises Provided: Patient instructed to cont prior HEP. Exercises were reviewed and Josephine was able to demonstrate them prior to the end of the session.  Josephine demonstrated good  understanding of the education provided. See Electronic Medical Record under Patient Instructions for exercises provided during therapy sessions    Assessment     Decreased noted palpable inflammation to medial knee on right over pes anserine. Progressed exercises noted in bold with some effort.     Josephine Is progressing well towards her goals.   Patient prognosis is Good.     Patient will continue to benefit from skilled outpatient physical therapy to address the deficits listed in the problem list box on initial evaluation, provide pt/family education and to maximize pt's level of independence in the home and community environment.     Patient's spiritual, cultural and educational needs considered and pt agreeable to plan of care and goals.     Anticipated barriers to physical therapy: none    Goals:   Short Term Goals: 4 weeks (in progress, not met)  1. Patient will present with increased hip strength by one half grade for improved support to knee for improved ADLs.   2. Patient will report decreased pain at worst to 6/10 for improved ability to perform ADLs.   3. Patient will presents with increased knee strength by one half grade for improved ability to perform ADLs.   4. Patient will  report increased ability to walk 10 minute without increased pain to knee for improved ability to perform work tasks.   5. Patient will be independent with initial home exercise program.      Long Term Goals: 6 weeks (in progress, not met)  1. Patient will present with increased hip strength by one full grade for improved support to knee for improved ADLs.   2. Patient will report decreased pain at worst to 4/10 for improved ability to perform ADLs.   3. Patient will presents with increased knee strength by one full grade for improved ability to perform ADLs.   4. Patient will report increased ability to walk 20 minute without increased pain to knee for improved ability to perform work tasks.   5. Patient will be independent with discharge home exercise program.      Plan      Plan of care Certification: 4/1/2024 to 6/21/2024.     Outpatient Physical Therapy 2 times weekly for 6 weeks to include the following interventions: Manual Therapy, Moist Heat/ Ice, Neuromuscular Re-ed, Patient Education, Therapeutic Activities, Therapeutic Exercise, and dry needling.             Milagros Pickens, PT

## 2024-04-24 ENCOUNTER — CLINICAL SUPPORT (OUTPATIENT)
Dept: REHABILITATION | Facility: HOSPITAL | Age: 59
End: 2024-04-24
Payer: OTHER MISCELLANEOUS

## 2024-04-24 DIAGNOSIS — M25.60 DECREASED RANGE OF MOTION: ICD-10-CM

## 2024-04-24 DIAGNOSIS — R53.1 DECREASED STRENGTH: Primary | ICD-10-CM

## 2024-04-24 DIAGNOSIS — R26.89 DECREASED FUNCTIONAL MOBILITY: ICD-10-CM

## 2024-04-24 PROCEDURE — 97530 THERAPEUTIC ACTIVITIES: CPT | Mod: PN

## 2024-04-24 PROCEDURE — 97110 THERAPEUTIC EXERCISES: CPT | Mod: PN

## 2024-04-24 PROCEDURE — 97112 NEUROMUSCULAR REEDUCATION: CPT | Mod: PN

## 2024-04-24 NOTE — PROGRESS NOTES
[facility]   Physical Therapy Treatment Note      Name: Josephine Sen [facility] Number: [id number]    Therapy Diagnosis:   Encounter Diagnoses   Name Primary?    Decreased strength Yes    Decreased range of motion     Decreased functional mobility      Physician: Juan Wilson MD    Visit Date: 4/24/2024    Physician Orders: PT Eval and Treat   Medical Diagnosis from Referral: M70.51 (ICD-10-CM) - Pes anserinus bursitis of right knee   Evaluation Date: 4/1/2024  Authorization Period Expiration: 3/20/2025  Plan of Care Expiration: 6/21/2024  Progress Note Due: 5/1/2024  Date of Surgery: n/a  Visit # / Visits authorized: 6/ 12   FOTO: 1/ 3 eval     Precautions: Standard      Time In: 3:00  Time Out: 4:00  Total Billable Time: 37 minutes    PTA Visit #: 0/5       Subjective     Patient reports: she had soreness following last visit, but has noticed a decrease in her normal knee pain recently.   She was compliant with home exercise program.  Response to previous treatment: sore   Functional change: none noted yet    Pain: 3/10  Location: right knee     Objective      Objective Measures updated at progress report unless specified.     Treatment     Josephine received the treatments listed below:      therapeutic exercises to develop strength, endurance, and ROM for 12 minutes including:  Nustep seat 5 level 4 x 10 minutes  Hip abduction with red x 30     Not performed  manual therapy techniques: Myofacial release and Soft tissue Mobilization were applied to the: right knee/thigh for 0 minutes, including:  KinesioTaping to right medial knee with I strip for inhibition of sartorius  IASTM - Sartorius muscle belly, TFL muscle belly, pes anserine, gracilis muscle belly, quads muscle bellies    neuromuscular re-education activities to improve: Coordination, core control and motor recruitment and Proprioception for 10 minutes. The following activities were included:  Quad set x 10 with 5 second  hold  Bridge x 30  supine Hip marching with red x 30 (15 each leg)  SLR with quad set 2 x 10 each leg    therapeutic activities to improve functional performance for 15  minutes, including:  Shuttle double leg with hip adduction ball x 30 one black band, one red band  Shuttle single leg x 30 each,  red and one black,  Shuttle hip extension x 20 red band bilaterally    *A portion of this treatment session is provided with the assistance of a skilled rehbailitation technician under the supervision of a licensed physical therapist        Patient Education and Home Exercises       Education provided:   - use and removal of tape including wear time  - purpose of manual techniques  - continue with current exercises    Written Home Exercises Provided: Patient instructed to cont prior HEP. Exercises were reviewed and Josephine was able to demonstrate them prior to the end of the session.  Josephine demonstrated good  understanding of the education provided. See Electronic Medical Record under Patient Instructions for exercises provided during therapy sessions    Assessment     Josephine demonstrating improving strength with ability to progress shuttle resistance on right lower extremity.  She will continue with gradual progression as tolerated to improve stability during prolonged standing and walking activities.     Josephine Is progressing well towards her goals.   Patient prognosis is Good.     Patient will continue to benefit from skilled outpatient physical therapy to address the deficits listed in the problem list box on initial evaluation, provide pt/family education and to maximize pt's level of independence in the home and community environment.     Patient's spiritual, cultural and educational needs considered and pt agreeable to plan of care and goals.     Anticipated barriers to physical therapy: none    Goals:   Short Term Goals: 4 weeks (in progress, not met)  1. Patient will present with increased hip strength by one  half grade for improved support to knee for improved ADLs.   2. Patient will report decreased pain at worst to 6/10 for improved ability to perform ADLs.   3. Patient will presents with increased knee strength by one half grade for improved ability to perform ADLs.   4. Patient will report increased ability to walk 10 minute without increased pain to knee for improved ability to perform work tasks.   5. Patient will be independent with initial home exercise program.      Long Term Goals: 6 weeks (in progress, not met)  1. Patient will present with increased hip strength by one full grade for improved support to knee for improved ADLs.   2. Patient will report decreased pain at worst to 4/10 for improved ability to perform ADLs.   3. Patient will presents with increased knee strength by one full grade for improved ability to perform ADLs.   4. Patient will report increased ability to walk 20 minute without increased pain to knee for improved ability to perform work tasks.   5. Patient will be independent with discharge home exercise program.      Plan      Plan of care Certification: 4/1/2024 to 6/21/2024.     Outpatient Physical Therapy 2 times weekly for 6 weeks to include the following interventions: Manual Therapy, Moist Heat/ Ice, Neuromuscular Re-ed, Patient Education, Therapeutic Activities, Therapeutic Exercise, and dry needling.             Randall Segundo, PT

## 2024-04-30 ENCOUNTER — CLINICAL SUPPORT (OUTPATIENT)
Dept: REHABILITATION | Facility: HOSPITAL | Age: 59
End: 2024-04-30
Payer: OTHER MISCELLANEOUS

## 2024-04-30 DIAGNOSIS — R53.1 DECREASED STRENGTH: Primary | ICD-10-CM

## 2024-04-30 DIAGNOSIS — M25.60 DECREASED RANGE OF MOTION: ICD-10-CM

## 2024-04-30 DIAGNOSIS — R26.89 DECREASED FUNCTIONAL MOBILITY: ICD-10-CM

## 2024-04-30 PROCEDURE — 97530 THERAPEUTIC ACTIVITIES: CPT | Mod: PN

## 2024-04-30 PROCEDURE — 97140 MANUAL THERAPY 1/> REGIONS: CPT | Mod: PN

## 2024-04-30 PROCEDURE — 97110 THERAPEUTIC EXERCISES: CPT | Mod: PN

## 2024-04-30 NOTE — PROGRESS NOTES
[facility]   Physical Therapy Treatment and Progress Note      Name: Josephine Sen [place]  Clinic Number: [id number]    Therapy Diagnosis:   Encounter Diagnoses   Name Primary?    Decreased strength Yes    Decreased range of motion     Decreased functional mobility      Physician: Juan Wilson MD    Visit Date: 4/30/2024    Physician Orders: PT Eval and Treat   Medical Diagnosis from Referral: M70.51 (ICD-10-CM) - Pes anserinus bursitis of right knee   Evaluation Date: 4/1/2024  Authorization Period Expiration: 3/20/2025  Plan of Care Expiration: 6/21/2024  Progress Note Due: 5/1/2024  Date of Surgery: n/a  Visit # / Visits authorized: 6/ 12   FOTO: 1/ 3 eval     Precautions: Standard      Time In: 3:15  Time Out: 4:00  Total Billable Time: 45 minutes    PTA Visit #: 0/5       Subjective     Patient reports: she is able to walk longer distances with less pain. She walked 3 miles this morning. Notices increased strength and visible muscle along right quad. Increased hamstring soreness and pain.   She was compliant with home exercise program.  Response to previous treatment: sore   Functional change: walk longer with less pain    Pain: 3/10  Location: right knee     Objective      Objective Measures updated at progress report unless specified.      Knee Strength Left Right   Flexion 5/5 4+/5   Extension 5/5 4+/5      Hip Strength Left Right   Flexion 4-/5 4-/5   Extension NT NT   Abduction 4+/5 4-/5           Treatment     Josephine received the treatments listed below:      therapeutic exercises to develop strength, endurance, and ROM for 20 minutes including:  Nustep seat 5 level 5 x 10 minutes  Objective measures taken today - see above    manual therapy techniques: Myofacial release and Soft tissue Mobilization were applied to the: right knee/thigh for 10 minutes, including:  KinesioTaping to right medial knee with I strip for inhibition of sartorius to decreased stress to tissue and I  strips along hamstrings to decrease stress to tissue.     Not performed today  neuromuscular re-education activities to improve: Coordination, core control and motor recruitment and Proprioception for 0 minutes. The following activities were included:  Quad set x 10 with 5 second hold  Bridge x 30  supine Hip marching with red x 30 (15 each leg)  SLR with quad set 2 x 10 each leg    therapeutic activities to improve functional performance for 15  minutes, including:  Shuttle double leg with hip adduction ball x 30 one black band, one red band  Shuttle single leg x 30 each,  red and one black,  Shuttle hip extension x 20 red band bilaterally    *A portion of this treatment session is provided with the assistance of a skilled rehbailitation technician under the supervision of a licensed physical therapist        Patient Education and Home Exercises       Education provided:   - use and removal of tape including wear time  - purpose of manual techniques  - continue with current exercises    Written Home Exercises Provided: Patient instructed to cont prior HEP. Exercises were reviewed and Josephine was able to demonstrate them prior to the end of the session.  Josephine demonstrated good  understanding of the education provided. See Electronic Medical Record under Patient Instructions for exercises provided during therapy sessions    Assessment     Patient presents with improved strength since initial evaluation. She continues to progress with resistance on shuttle and is making good progress with endurance.     Josephine Is progressing well towards her goals.   Patient prognosis is Good.     Patient will continue to benefit from skilled outpatient physical therapy to address the deficits listed in the problem list box on initial evaluation, provide pt/family education and to maximize pt's level of independence in the home and community environment.     Patient's spiritual, cultural and educational needs considered and pt  agreeable to plan of care and goals.     Anticipated barriers to physical therapy: none    Goals:   Short Term Goals: 4 weeks (in progress, not met)  1. Patient will present with increased hip strength by one half grade for improved support to knee for improved ADLs. - MET where indicated  2. Patient will report decreased pain at worst to 6/10 for improved ability to perform ADLs.   3. Patient will presents with increased knee strength by one half grade for improved ability to perform ADLs. MET  4. Patient will report increased ability to walk 10 minute without increased pain to knee for improved ability to perform work tasks.   5. Patient will be independent with initial home exercise program. met      Long Term Goals: 6 weeks (in progress, not met)  1. Patient will present with increased hip strength by one full grade for improved support to knee for improved ADLs.   2. Patient will report decreased pain at worst to 4/10 for improved ability to perform ADLs.   3. Patient will presents with increased knee strength by one full grade for improved ability to perform ADLs.   4. Patient will report increased ability to walk 20 minute without increased pain to knee for improved ability to perform work tasks.   5. Patient will be independent with discharge home exercise program.      Plan      Plan of care Certification: 4/1/2024 to 6/21/2024.     Outpatient Physical Therapy 2 times weekly for 6 weeks to include the following interventions: Manual Therapy, Moist Heat/ Ice, Neuromuscular Re-ed, Patient Education, Therapeutic Activities, Therapeutic Exercise, and dry needling.             Milagros Pickens, PT

## 2024-05-07 ENCOUNTER — PATIENT MESSAGE (OUTPATIENT)
Dept: PSYCHIATRY | Facility: CLINIC | Age: 59
End: 2024-05-07
Payer: COMMERCIAL

## 2024-05-07 ENCOUNTER — E-CONSULT (OUTPATIENT)
Dept: CARDIOLOGY | Facility: CLINIC | Age: 59
End: 2024-05-07
Payer: COMMERCIAL

## 2024-05-07 ENCOUNTER — OFFICE VISIT (OUTPATIENT)
Dept: ORTHOPEDICS | Facility: CLINIC | Age: 59
End: 2024-05-07
Payer: COMMERCIAL

## 2024-05-07 VITALS — WEIGHT: 135 LBS | HEIGHT: 62 IN | BODY MASS INDEX: 24.84 KG/M2

## 2024-05-07 DIAGNOSIS — I10 PRIMARY HYPERTENSION: ICD-10-CM

## 2024-05-07 DIAGNOSIS — Z95.1 S/P CABG (CORONARY ARTERY BYPASS GRAFT): ICD-10-CM

## 2024-05-07 DIAGNOSIS — M70.51 PES ANSERINUS BURSITIS OF RIGHT KNEE: Primary | ICD-10-CM

## 2024-05-07 DIAGNOSIS — E78.2 HYPERLIPEMIA, MIXED: ICD-10-CM

## 2024-05-07 DIAGNOSIS — Z01.818 ENCOUNTER FOR PREOPERATIVE ASSESSMENT: Primary | ICD-10-CM

## 2024-05-07 DIAGNOSIS — I25.10 CORONARY ARTERY DISEASE WITHOUT ANGINA PECTORIS, UNSPECIFIED VESSEL OR LESION TYPE, UNSPECIFIED WHETHER NATIVE OR TRANSPLANTED HEART: ICD-10-CM

## 2024-05-07 PROCEDURE — 99451 NTRPROF PH1/NTRNET/EHR 5/>: CPT | Mod: S$GLB,,, | Performed by: INTERNAL MEDICINE

## 2024-05-07 PROCEDURE — 99999 PR PBB SHADOW E&M-EST. PATIENT-LVL IV: CPT | Mod: PBBFAC,,, | Performed by: ORTHOPAEDIC SURGERY

## 2024-05-07 PROCEDURE — 20610 DRAIN/INJ JOINT/BURSA W/O US: CPT | Mod: RT,S$GLB,, | Performed by: ORTHOPAEDIC SURGERY

## 2024-05-07 PROCEDURE — 99499 UNLISTED E&M SERVICE: CPT | Mod: S$GLB,,, | Performed by: ORTHOPAEDIC SURGERY

## 2024-05-07 RX ORDER — METHOCARBAMOL 750 MG/1
750 TABLET, FILM COATED ORAL 4 TIMES DAILY PRN
Qty: 44 TABLET | Refills: 3 | Status: SHIPPED | OUTPATIENT
Start: 2024-05-07

## 2024-05-07 RX ADMIN — TRIAMCINOLONE ACETONIDE 40 MG: 40 INJECTION, SUSPENSION INTRA-ARTICULAR; INTRAMUSCULAR at 03:05

## 2024-05-07 NOTE — CONSULTS
[facility] - Cardiology  Response for E-Consult     Patient Name: Josephine Bolton  MRN: [id number]  Primary Care Provider: Ian Young MD   Requesting Provider: Mk Warren MD  E-Consult to Cardiology  Consult performed by: Obdulio Abbasi MD  Consult ordered by: Mk Warren MD  Reason for consult: Preop clearance  Assessment/Recommendations:     58-year-old lady, very well known to me, with history of coronary artery disease.  No recent overt decompensation.  No contraindication for surgery/anesthesia from cardiac standpoint  Okay to hold Brilinta 5 days before procedure.  Resume as soon as possible            58-year-old lady, very well known to me, with history of coronary artery disease.  No recent overt decompensation.  No contraindication for surgery/anesthesia from cardiac standpoint  Okay to hold Brilinta 5 days before procedure.  Resume as soon as possible      Total time of Consultation: 15 minute    I did not speak to the requesting provider verbally about this.     *This eConsult is based on the clinical data available to me and is furnished without benefit of a physical examination. The eConsult will need to be interpreted in light of any clinical issues or changes in patient status not available to me at the time of filing this eConsults. Significant changes in patient condition or level of acuity should result in immediate formal consultation and reevaluation. Please alert me if you have further questions.    Thank you for this eConsult referral.     Obdulio Abbasi MD  [facility] Cardiology

## 2024-05-08 ENCOUNTER — OFFICE VISIT (OUTPATIENT)
Dept: OPTOMETRY | Facility: CLINIC | Age: 59
End: 2024-05-08
Payer: COMMERCIAL

## 2024-05-08 DIAGNOSIS — H35.033 HYPERTENSIVE RETINOPATHY OF BOTH EYES: ICD-10-CM

## 2024-05-08 DIAGNOSIS — H52.13 MYOPIA WITH ASTIGMATISM AND PRESBYOPIA, BILATERAL: ICD-10-CM

## 2024-05-08 DIAGNOSIS — H02.66 XANTHELASMA OF EYELID, BILATERAL: ICD-10-CM

## 2024-05-08 DIAGNOSIS — H52.4 MYOPIA WITH ASTIGMATISM AND PRESBYOPIA, BILATERAL: ICD-10-CM

## 2024-05-08 DIAGNOSIS — Z13.5 GLAUCOMA SCREENING: ICD-10-CM

## 2024-05-08 DIAGNOSIS — H43.393 VITREOUS FLOATERS, BILATERAL: Primary | ICD-10-CM

## 2024-05-08 DIAGNOSIS — H52.203 MYOPIA WITH ASTIGMATISM AND PRESBYOPIA, BILATERAL: ICD-10-CM

## 2024-05-08 DIAGNOSIS — H02.63 XANTHELASMA OF EYELID, BILATERAL: ICD-10-CM

## 2024-05-08 DIAGNOSIS — H02.413 MECHANICAL PTOSIS, BILATERAL: ICD-10-CM

## 2024-05-08 PROCEDURE — 3044F HG A1C LEVEL LT 7.0%: CPT | Mod: CPTII,S$GLB,, | Performed by: OPTOMETRIST

## 2024-05-08 PROCEDURE — 99999 PR PBB SHADOW E&M-EST. PATIENT-LVL IV: CPT | Mod: PBBFAC,,, | Performed by: OPTOMETRIST

## 2024-05-08 PROCEDURE — 92015 DETERMINE REFRACTIVE STATE: CPT | Mod: S$GLB,,, | Performed by: OPTOMETRIST

## 2024-05-08 PROCEDURE — 1159F MED LIST DOCD IN RCRD: CPT | Mod: CPTII,S$GLB,, | Performed by: OPTOMETRIST

## 2024-05-08 PROCEDURE — 92014 COMPRE OPH EXAM EST PT 1/>: CPT | Mod: S$GLB,,, | Performed by: OPTOMETRIST

## 2024-05-08 NOTE — PROGRESS NOTES
HPI    Routine-dle-9/23 Tez    Pt complains of blurred va at distance and near. Feels words are moving   with current rx when reading. Denies any flashes, many floaters OU. Had   consult with Dr. Reagan but waiting to schedule for now.       Last edited by Carol Goldsmith on 5/8/2024  3:59 PM.            Assessment /Plan     For exam results, see Encounter Report.    Vitreous floaters, bilateral    Hypertensive retinopathy of both eyes    Mechanical ptosis, bilateral    Glaucoma screening    Xanthelasma of eyelid, bilateral    Myopia with astigmatism and presbyopia, bilateral         RD precautions given and reviewed. Patient knows to call/ message if any further changes in symptoms occur.  Vasc caliber changes, no gross valerie/ no ischemia ---continue control BP   Longstanding / stable --had seen Dr Graham to r/o MG---  Consult w/ Dr Reagan 2023 ---approved for procedures, will schedule per patient in future due to some out of pocket cost   Not suspect   Longstanding / bilateral ---continue tight control chol   Updated specs rx, gave copy, fill prn      Discussed and educated patient on current findings /plan.  RTC 1 year, prn if any changes / issues

## 2024-05-08 NOTE — PATIENT INSTRUCTIONS
"DRY EYES -- BURNING OR JACKSON SYMPTOMS:  Use Over The Counter artificial tears as needed for dry eye symptoms.   Some common brands include:  Systane, Optive, Refresh, and Thera-Tears.  These drops can be used as frequently as desired, but may be most helpful use during long periods of concentrated work.  For example, reading / working at the computer. Start with 3-4x per day.     Nighttime Ophthalmic gel or ointments are available: Refresh PM, Genteal, and Lacrilube.    Avoid drops that "get redness out" (Visine, Murine, Clear Eyes), as these may contain medication that could further irritate the eyes, especially with chronic use.    ALLERGY EYES -- ITCHING SYMPTOMS:  Over the counter medications include--Pataday, Zaditor, and Alaway.  Use as directed 1-2 drops daily for symptoms of itching / watering eyes.  These drops will not help for dry eye or exposure symptoms.    REDNESS RELIEF:  Lumify---is a good redness reliever that will not cause irritation if used chronically.        FLASHES / FLOATERS / POSTERIOR VITREOUS DETACHMENT    Call the clinic if you have any further changes in symptoms.  Including:  Increased numbers of floaters or flashing lights, dimness or darkness that moves through or stays constant in your vision, or any pain in the eye (s).    You may sometimes see small specks or clouds moving in your field of vision.  They are called FLOATERS.  You can often see them when looking at a plain background, like a blank wall or blue kai.  Floaters are actually tiny clumps of gel or cells inside the VITREOUS, the clear jelly-like fluid that fills the inside of your eye.    While these objects look like they are in front of your eye, they are actually floating inside.  What you see are the shadows they cast on the RETINA, the nerve layer at the back of the eye that senses light and allows you to see.      POSTERIOR VITREOUS DETACHMENT    The appearance of new floaters may be alarming.  If you suddenly " develop new floaters, you should contact your eye care professional  right away.    The retina can tear if the shrinking vitreous pulls away from the wall of the eye.  This sometimes causes a small amount of bleeding in the eye that may appear as new floaters.    A torn retina is always a serious problem, since it can lead to a retinal detachment.  You should see your eye care professional as soon as possible if:    even one new floater appears suddenly;  you see sudden flashes of light;  you notice other symptoms, like the loss of side vision, or a curtain closes down in your vision        POSTERIOR VITREOUS DETACHMENT is more common for people who:    are nearsighted;  have had cataract surgery;  have had YAG laser surgery of the eye;  have had inflammation inside the eye;  are over age 60.      While some floaters may remain visible, many of them will fade over time and become less noticeable.  Even if you've had some floaters for years, you should have your eyes checked as soon as possible if you notice new ones.    FLASHING LIGHTS    When the vitreous gel rubs or pulls on the retina, you may see what look like flashing lights or lightning streaks.  These flashes can appear off and on for several weeks or months.      Some people experience flashes of light that appear as jagged lines or heat waves in both eyes, lasting 10-20 minutes.  These flashes are caused by a spasm of blood vessels in the brain, which is called a migraine.    If a headache follows these flashes, it's called a migraine headache.  If   no headache occurs, these flashes are called Ophthalmic or Ocular Migraine.

## 2024-05-09 ENCOUNTER — PATIENT MESSAGE (OUTPATIENT)
Dept: PSYCHIATRY | Facility: CLINIC | Age: 59
End: 2024-05-09
Payer: COMMERCIAL

## 2024-05-14 ENCOUNTER — TELEPHONE (OUTPATIENT)
Dept: ORTHOPEDICS | Facility: CLINIC | Age: 59
End: 2024-05-14
Payer: COMMERCIAL

## 2024-05-14 RX ORDER — TRIAMCINOLONE ACETONIDE 40 MG/ML
40 INJECTION, SUSPENSION INTRA-ARTICULAR; INTRAMUSCULAR
Status: DISCONTINUED | OUTPATIENT
Start: 2024-05-07 | End: 2024-05-14 | Stop reason: HOSPADM

## 2024-05-14 NOTE — LETTER
Robert Replaced by [facility] - [address] [place] [address]  [address]  [facility] [address]  Phone: [phone]  Fax: [phone]   June 8, 2022    LEENA Bass, OD  [address] [facility], [place] [address]    Patient: Josephine Bolton   MR Number: [id number]   YOB: 1965   Date of Visit: 6/8/2022       Dear Dr. Bass:    Thank you for referring Josephine Bolton to me for evaluation. Here is my assessment and plan of care:    Assessment/Plan     For exam results, see Encounter Report.    Mechanical ptosis, acquired, bilateral  -     Ambulatory referral/consult to Ophthalmology    Dermatochalasis of both upper eyelids      The  Negative icee test militates against myasthenia. I will refer Ms. Bolton to Dr. Reagan for possible eyelid surgery.          Below you will find my full exam findings. If you have questions, please do not hesitate to call me. I look forward to following Ms. Josephine Bolton along with you.    Sincerely,          Soren Graham MD       CC  No Recipients             Base Eye Exam     Visual Acuity (Snellen - Linear)       Right Left    Dist cc 20/25 +2 20/25    Correction: Glasses          Pupils       Dark Light Shape React APD    Right 5 3 Round Brisk None    Left 5 3 Round Brisk None          Visual Fields (Counting fingers)       Right Left     Full Full          Extraocular Movement       Right Left     Full, Ortho Full, Ortho          Neuro/Psych     Oriented x3: Yes    Mood/Affect: Normal            Slit Lamp and Fundus Exam     External Exam       Right Left    External Normal Normal    Ice test negative.    Palpebral fissure 7 mm 7 mm    MRD1 2.0 mm 2.0 mm    Superior scleral show 0 mm 0 mm    Inferior scleral show 0 mm 0 mm    Levator 18 mm 18 mm    Lagophthalmos 0 mm 0 mm    Lid crease 6 mm 5 mm          Slit Lamp Exam       Right Left    Lids/Lashes 2+ Ptosis, 2+ Dermatochalasis - upper lid 2+ Ptosis, 2+ Dermatochalasis - upper lid    Conjunctiva/Sclera White and quiet White and  [FreeTextEntry1] : 68y/o  female    never smoker  1-     asthma mild intermittent           trigger  URI   allergies  2- allergic rhinitis 3- osteoporosis 4-  vaccinations  refuses flu  no  RSV   Recommendations 1-   Claritin   d  as needed  2-    albuterol MDI  two inh    q  6 hour prn  3-  PFT 12/2024   4- bone health discussed      return in 6-12 mnths agreement on plan  5-   quiet    Cornea Clear Clear    Anterior Chamber Deep and quiet Deep and quiet    Iris Round and reactive Round and reactive    Mild fatigue of right upper eyelid on prolonged up gaze. Good orbicularis oculi strength.

## 2024-05-14 NOTE — TELEPHONE ENCOUNTER
----- Message from Betina Baker sent at 5/14/2024 10:38 AM CDT -----  Contact: Worker Comp Case Laith - Bharati  Type: Needs Medical Advice         Who Called:  Worker Comp Case Mgt - Bharati     Best Call Back Number:  [phone]     Additional Information: Requesting a call back regarding They need to set appt to go over pt case with office      Please Advise- Thank you

## 2024-05-14 NOTE — TELEPHONE ENCOUNTER
Detail Level: Generalized Refill Routing Note   Medication(s) are not appropriate for processing by [facility] for the following reason(s):      Responsible provider unclear    [facility] action(s):  Defer Care Due:  None identified            Appointments  past 12m or future 3m with PCP    Date Provider   Last Visit   Visit date not found Ian Young MD   Next Visit   8/25/2023 Ian Young MD   ED visits in past 90 days: 0        Note composed:5:25 PM 08/16/2023           Sunscreen Recommendations: Monitor not susp appearing today

## 2024-05-14 NOTE — PROGRESS NOTES
"Chief Complaint   Patient presents with    Right Knee - Pain         HPI:   This is a 58 y.o. who presents to clinic today complaining of right knee pain for 2 weeks after work injury. Pain is progressively worsening. No numbness or tingling. No associated signs or symptoms.    Past Medical History:   Diagnosis Date    Allergy     Anticoagulant long-term use     ASA 81mg, Effient    Anxiety     Arthritis     Asthma     As child    CAD (coronary artery disease) 01/27/2012    s/p stents x4 , CABG, 3 vessel, (5/2013) newest stent prior to CABG    Cataract     Chronic constipation     Colon polyp     Coronary artery disease involving native coronary artery of native heart without angina pectoris 01/27/2012 12/19 Significant ostial RCA lesion as above treated with drug-eluting stenting as above. Occluded proximal LAD with patent lima lad Patent vein graft to 2nd obtuse marginal Known occluded vein graft to unknown vessel.  s/p stents x 3 (2010) s/p LAD stent (DSE) 3/2012    DDD (degenerative disc disease), cervical     DDD (degenerative disc disease), lumbar     DDD (degenerative disc disease), thoracic     Depression     Edema     Encounter for blood transfusion     General anesthetics causing adverse effect in therapeutic use     Headache(784.0)     History of nephrolithiasis     Hyperlipidemia     Hypertension     Hypothyroid 07/05/2012    Insomnia     Insulin resistance     patient stated not diebetic    Joint stiffness     Joint swelling     Kidney disease     ; Frequent UTIs     Kidney stones     Low back pain     Neck pain     Obstructive sleep apnea on CPAP 07/17/2012    PONV (postoperative nausea and vomiting)     S/P CABG (coronary artery bypass graft) 06/25/2013 6/23/2013     Sleep apnea 01/27/2012    Patient reports "severe" sleep apnea, uses C-pap    Stented coronary artery 12/20/2019 12/19  ostial RCA -Osirio2.5 x 18 post dilated 2.75     Thoracic back pain     Usual hyperplasia of " lactiferous duct 2017    left breast     Past Surgical History:   Procedure Laterality Date    ADENOIDECTOMY      BACK SURGERY      BREAST BIOPSY Left 2017    duct excision- Dr. Jimenez    BREAST CYST ASPIRATION Left 2020    @ 's office- old hematoma drained (per office)    CARDIAC SURGERY      CARPAL TUNNEL RELEASE      bilateral    CERVICAL LAMINECTOMY WITH SPINAL FUSION      2016     SECTION, CLASSIC      x 2    COLONOSCOPY      CORONARY ANGIOGRAPHY  2019    Procedure: ANGIOGRAM, CORONARY ARTERY;  Surgeon: Timi Millan MD;  Location: [facility] CATH;  Service: Cardiology;;    CORONARY ANGIOGRAPHY  10/13/2023    Procedure: Coronary angiogram study;  Surgeon: Azra Stoll MD;  [facility];  Service: Cardiology;;    CORONARY ANGIOGRAPHY INCLUDING BYPASS GRAFTS WITH CATHETERIZATION OF LEFT HEART  10/13/2023    Procedure: Left heart cath w/Grafts RM 3640;  Surgeon: Azra Stoll MD;  [facility];  Service: Cardiology;;    CORONARY ANGIOPLASTY WITH STENT PLACEMENT      x 4    CORONARY ARTERY BYPASS GRAFT  2013    3 vessel    CORONARY BYPASS GRAFT ANGIOGRAPHY  10/13/2023    Procedure: Bypass graft study;  Surgeon: Azra Stoll MD;  Location: [facility] CATH;  Service: Cardiology;;    ESOPHAGOGASTRODUODENOSCOPY N/A 2020    Procedure: EGD (ESOPHAGOGASTRODUODENOSCOPY);  Surgeon: Mk Warren MD;  Location: [facility];  Service: Endoscopy;  Laterality: N/A;    HYSTERECTOMY      Complete    JOINT REPLACEMENT      KNEE ARTHROPLASTY Left 2019    Procedure: ARTHROPLASTY, KNEE;  Surgeon: Juan Wilson MD;  Location: [facility] OR;  Service: Orthopedics;  Laterality: Left;    KNEE ARTHROSCOPY W/ MENISCAL REPAIR Left     twice, last one 2014    LAMINECTOMY      L4/L5    LAPAROSCOPIC CHOLECYSTECTOMY N/A 7/3/2023    Procedure: CHOLECYSTECTOMY, LAPAROSCOPIC;  Surgeon: Obinna Whipple MD;  Location: [facility] OR;  Service: General;  Laterality: N/A;  with cholangiogram    LEFT  HEART CATHETERIZATION  12/13/2019    Procedure: Left heart cath- RM # [id number];  Surgeon: Timi Millan MD;  Location: [facility] CATH;  Service: Cardiology;;    OOPHORECTOMY Bilateral 2011    ROBOTIC ARTHROPLASTY, KNEE Right 6/14/2021    Procedure: ROBOTIC ARTHROPLASTY, KNEE, TOTAL;  Surgeon: Juan Wilson MD;  Location: [facility] OR;  Service: Orthopedics;  Laterality: Right;    SINUS SURGERY      x3    TENDON REPAIR Left     ankle surgery    THYROIDECTOMY      2 separate surgeries    TONSILLECTOMY      TOTAL KNEE ARTHROPLASTY Left 06/17/2019    Surgeon: Juan Wilson MD    XI ROBOTIC REVISION, GASTROENTEROSTOMY, MITCHELL-EN-Y N/A 2/20/2023    Procedure: XI ROBOTIC REVISION,GASTROENTEROSTOMY,MITCHELL-EN-Y;  Surgeon: Obinna Whipple MD;  Location: [facility] OR;  Service: General;  Laterality: N/A;  creation of Mitchell-N-Y anastomsis     Current Outpatient Medications on File Prior to Visit   Medication Sig Dispense Refill    acarbose (PRECOSE) 25 MG Tab Take 1 tablet (25 mg total) by mouth 3 (three) times daily with meals. 270 tablet 3    acetaminophen (TYLENOL) 650 MG TbSR Take 1,300 mg by mouth every 8 (eight) hours as needed (pain).       albuterol (PROVENTIL/VENTOLIN HFA) 90 mcg/actuation inhaler Inhale 2 puffs into the lungs every 6 (six) hours as needed for Shortness of Breath.       allopurinoL (ZYLOPRIM) 100 MG tablet Take 1 tablet (100 mg total) by mouth once daily. 90 tablet 3    amLODIPine (NORVASC) 5 MG tablet Take 1 tablet (5 mg total) by mouth once daily. 30 tablet 11    aspirin (ECOTRIN) 81 MG EC tablet Take 1 tablet (81 mg total) by mouth once daily. (Patient not taking: Reported on 3/27/2024) 30 tablet 11    atorvastatin (LIPITOR) 40 MG tablet Take 1 tablet (40 mg total) by mouth once daily. 90 tablet 3    baclofen (LIORESAL) 10 MG tablet TAKE 1 TABLET BY MOUTH THREE TIMES A DAY 90 tablet 1    blood-glucose meter kit To check BG TID PRN, to use with insurance preferred meter 1 each 0    calcium carb and citrate-vitD3 600  "mg-12.5 mcg (500 unit) TbSR Take 2 tablets by mouth once.      cetirizine (ZYRTEC) 10 MG tablet Take 10 mg by mouth daily as needed for Allergies.      fluticasone propionate (FLONASE) 50 mcg/actuation nasal spray fluticasone propionate 50 mcg/actuation nasal spray,suspension      lancets Misc To check BG TID PRN, to use with insurance preferred meter 100 each 1    levothyroxine (SYNTHROID) 100 MCG tablet TAKE 1 TABLET BY MOUTH EVERY DAY BEFORE BREAKFAST 30 tablet 11    metoprolol succinate (TOPROL-XL) 25 MG 24 hr tablet Take 1 tablet (25 mg total) by mouth every evening. 30 tablet 11    multivitamin (THERAGRAN) per tablet Take 1 tablet by mouth once daily. Bariatric vitamin      pantoprazole (PROTONIX) 40 MG tablet TAKE 1 TABLET BY MOUTH 2 TIMES A  tablet 1    prazosin (MINIPRESS) 1 MG Cap Take 1 capsule (1 mg total) by mouth every evening. 30 capsule 1    pregabalin (LYRICA) 100 MG capsule TAKE TWO (2) CAPSULES BY MOUTH TWICE DAILY 120 capsule 3    senna-docusate 8.6-50 mg (PERICOLACE) 8.6-50 mg per tablet Take 1 tablet by mouth 2 (two) times a day. (Patient not taking: Reported on 4/10/2024)      ticagrelor (BRILINTA) 90 mg tablet TAKE 1 TABLET BY MOUTH TWICE DAILY 180 tablet 3    TRUE METRIX GLUCOSE TEST STRIP Strp TO CHECK BLOOD GLUCOSE THREE TIMES DAILY AS NEEDED 300 each 3    venlafaxine (EFFEXOR-XR) 150 MG Cp24 Take 1 capsule (150 mg total) by mouth once daily. 90 capsule 3    venlafaxine (EFFEXOR-XR) 37.5 MG 24 hr capsule Take 1 capsule (37.5 mg total) by mouth once daily. 30 capsule 11     No current facility-administered medications on file prior to visit.     Review of patient's allergies indicates:   Allergen Reactions    Celexa [citalopram] Other (See Comments)     GI upset  Stated "knocked me out"    Cephalexin Anaphylaxis    Zoloft [sertraline] Other (See Comments)     Stated "knocked me out"    Codeine Other (See Comments)     hallucinations  hallucinations    Isosorbide Nausea And Vomiting "    Ciprofloxacin Itching    Levaquin [levofloxacin] Other (See Comments)     tendinitis    Metformin      Nausea     Penicillamine     Penicillins Other (See Comments)     Was told per mother that as child was allergic to penicillin.  Childhood allergy/ unknown reaction    Sulfa (sulfonamide antibiotics)      Family History   Problem Relation Name Age of Onset    Heart failure Mother      Asthma Mother      Macular degeneration Mother      Cancer Mother          Breast    Cataracts Mother      Heart disease Mother      COPD Mother      Breast cancer Mother      Heart disease Father      Diabetes Father      Hypertension Father      Stroke Father      Cataracts Father      Obesity Father      Obesity Sister      Glaucoma Sister      Thyroid disease Sister      Glaucoma Sister      Other Brother          stiff man syndrome    Cancer Maternal Grandmother          Breast with Mets    Breast cancer Maternal Grandmother      Cancer Paternal Grandmother          Colon    Strabismus Other niece     Amblyopia Neg Hx      Blindness Neg Hx      Retinal detachment Neg Hx       Social History     Socioeconomic History    Marital status:    Occupational History    Occupation: teacher   Tobacco Use    Smoking status: Former     Current packs/day: 0.00     Average packs/day: 0.5 packs/day for 14.9 years (7.5 ttl pk-yrs)     Types: Cigarettes     Start date: 1984     Quit date: 1998     Years since quittin.4    Smokeless tobacco: Never   Substance and Sexual Activity    Alcohol use: No    Drug use: Not Currently     Types: Benzodiazepines    Sexual activity: Yes     Partners: Male     Social Determinants of Health     Financial Resource Strain: Patient Declined (2023)    Overall Financial Resource Strain (CARDIA)     Difficulty of Paying Living Expenses: Patient declined   Food Insecurity: Patient Declined (2023)    Hunger Vital Sign     Worried About Running Out of Food in the Last Year: Patient  declined     Ran Out of Food in the Last Year: Patient declined   Transportation Needs: Patient Declined (2/21/2023)    PRAPARE - Transportation     Lack of Transportation (Medical): Patient declined     Lack of Transportation (Non-Medical): Patient declined   Physical Activity: Patient Declined (2/21/2023)    Exercise Vital Sign     Days of Exercise per Week: Patient declined     Minutes of Exercise per Session: Patient declined   Stress: Patient Declined (2/21/2023)    [employer] - Occupational Stress Questionnaire     Feeling of Stress : Patient declined   Housing Stability: Unknown (2/21/2023)    Housing Stability Vital Sign     Unable to Pay for Housing in the Last Year: Patient refused     Unstable Housing in the Last Year: Patient refused       Review of Systems:  Constitutional:  Denies fever or chills   Eyes:  Denies change in visual acuity   HENT:  Denies nasal congestion or sore throat   Respiratory:  Denies cough or shortness of breath   Cardiovascular:  Denies chest pain or edema   GI:  Denies abdominal pain, nausea, vomiting, bloody stools or diarrhea   :  Denies dysuria   Integument:  Denies rash   Neurologic:  Denies headache, focal weakness or sensory changes   Endocrine:  Denies polyuria or polydipsia   Lymphatic:  Denies swollen glands   Psychiatric:  Denies depression or anxiety     Physical Exam:   Constitutional:  Well developed, well nourished, no acute distress, non-toxic appearance   Integument:  Well hydrated, no rash   Lymphatic:  No lymphadenopathy noted   Neurologic:  Alert & oriented x 3, CN 2-12 normal, normal motor function, normal sensory function, no focal deficits noted   Psychiatric:  Speech and behavior appropriate   Eyes: EOMI  Gi: abdomen soft    Bilateral Knee Exam    right Knee Exam     Tenderness   The patient is experiencing tenderness in the medial pes bursa.    Range of Motion   Extension: abnormal   Flexion: abnormal     Muscle Strength      The patient has normal knee strength.    Tests   Livia:  Medial - negative   Lachman:  Anterior - negative      Varus: negative  Valgus: negative  Patellar Apprehension: negative    Other   Erythema: absent  Sensation: normal  Pulse: present  Swelling: mild      left Knee Exam   left knee exam performed same as contralateral side and is normal.    Pes anserinus bursitis of right knee    Other orders  -     methocarbamoL (ROBAXIN) 750 MG Tab; Take 1 tablet (750 mg total) by mouth 4 (four) times daily as needed.  Dispense: 44 tablet; Refill: 3            Using an aseptic technique, I injected 1 cc of lidocaine 1% without and 1 cc of kenalog 40mg into the right Knee pes bursa. The patient tolerated this well. I will have them return to clinic in 6 weeks.

## 2024-05-14 NOTE — PROCEDURES
Large Joint Aspiration/Injection: R anserine bursa    Date/Time: 5/7/2024 3:45 PM    Performed by: Juan Wilson MD  Authorized by: Juan Wilson MD    Consent Done?:  Yes (Verbal)  Indications:  Pain  Timeout: prior to procedure the correct patient, procedure, and site was verified    Prep: patient was prepped and draped in usual sterile fashion    Local anesthetic:  Lidocaine 1% without epinephrine  Anesthetic total (ml):  1      Details:  Needle Size:  21 G  Approach:  Anterolateral  Location:  Knee  Site:  R anserine bursa  Medications:  40 mg triamcinolone acetonide 40 mg/mL  Patient tolerance:  Patient tolerated the procedure well with no immediate complications

## 2024-05-15 ENCOUNTER — CLINICAL SUPPORT (OUTPATIENT)
Dept: REHABILITATION | Facility: HOSPITAL | Age: 59
End: 2024-05-15
Payer: OTHER MISCELLANEOUS

## 2024-05-15 DIAGNOSIS — M25.60 DECREASED RANGE OF MOTION: ICD-10-CM

## 2024-05-15 DIAGNOSIS — R26.89 DECREASED FUNCTIONAL MOBILITY: ICD-10-CM

## 2024-05-15 DIAGNOSIS — R53.1 DECREASED STRENGTH: Primary | ICD-10-CM

## 2024-05-15 PROCEDURE — 97112 NEUROMUSCULAR REEDUCATION: CPT | Mod: PN

## 2024-05-15 PROCEDURE — 97140 MANUAL THERAPY 1/> REGIONS: CPT | Mod: PN

## 2024-05-15 PROCEDURE — 97110 THERAPEUTIC EXERCISES: CPT | Mod: PN

## 2024-05-16 ENCOUNTER — OFFICE VISIT (OUTPATIENT)
Dept: ORTHOPEDICS | Facility: CLINIC | Age: 59
End: 2024-05-16
Payer: COMMERCIAL

## 2024-05-16 VITALS — WEIGHT: 134.94 LBS | HEIGHT: 62 IN | BODY MASS INDEX: 24.83 KG/M2

## 2024-05-16 DIAGNOSIS — M75.21 BICEPS TENDINITIS OF RIGHT UPPER EXTREMITY: ICD-10-CM

## 2024-05-16 DIAGNOSIS — M75.42 IMPINGEMENT SYNDROME OF LEFT SHOULDER: ICD-10-CM

## 2024-05-16 DIAGNOSIS — M75.22 BICEPS TENDINITIS OF LEFT UPPER EXTREMITY: ICD-10-CM

## 2024-05-16 DIAGNOSIS — M75.41 IMPINGEMENT SYNDROME, SHOULDER, RIGHT: Primary | ICD-10-CM

## 2024-05-16 PROCEDURE — 99499 UNLISTED E&M SERVICE: CPT | Mod: S$GLB,,, | Performed by: ORTHOPAEDIC SURGERY

## 2024-05-16 PROCEDURE — 20610 DRAIN/INJ JOINT/BURSA W/O US: CPT | Mod: 50,S$GLB,, | Performed by: ORTHOPAEDIC SURGERY

## 2024-05-16 PROCEDURE — 99999 PR PBB SHADOW E&M-EST. PATIENT-LVL IV: CPT | Mod: PBBFAC,,, | Performed by: ORTHOPAEDIC SURGERY

## 2024-05-16 RX ADMIN — TRIAMCINOLONE ACETONIDE 40 MG: 40 INJECTION, SUSPENSION INTRA-ARTICULAR; INTRAMUSCULAR at 03:05

## 2024-05-23 ENCOUNTER — CLINICAL SUPPORT (OUTPATIENT)
Dept: REHABILITATION | Facility: HOSPITAL | Age: 59
End: 2024-05-23
Payer: OTHER MISCELLANEOUS

## 2024-05-23 DIAGNOSIS — M25.60 DECREASED RANGE OF MOTION: ICD-10-CM

## 2024-05-23 DIAGNOSIS — R53.1 DECREASED STRENGTH: Primary | ICD-10-CM

## 2024-05-23 DIAGNOSIS — R26.89 DECREASED FUNCTIONAL MOBILITY: ICD-10-CM

## 2024-05-23 PROCEDURE — 97530 THERAPEUTIC ACTIVITIES: CPT | Mod: PN

## 2024-05-23 PROCEDURE — 97112 NEUROMUSCULAR REEDUCATION: CPT | Mod: PN

## 2024-05-23 PROCEDURE — 97140 MANUAL THERAPY 1/> REGIONS: CPT | Mod: PN

## 2024-05-23 PROCEDURE — 97110 THERAPEUTIC EXERCISES: CPT | Mod: PN

## 2024-05-23 NOTE — PROGRESS NOTES
[facility]   Physical Therapy Treatment  Worker's Comp     Name: Josephine Sen Round O  Clinic Number: [id number]    Therapy Diagnosis:   Encounter Diagnoses   Name Primary?    Decreased strength Yes    Decreased range of motion     Decreased functional mobility      Physician: Juan Wilson MD    Visit Date: 5/23/2024    Physician Orders: PT Eval and Treat   Medical Diagnosis from Referral: M70.51 (ICD-10-CM) - Pes anserinus bursitis of right knee   Evaluation Date: 4/1/2024  Authorization Period Expiration: 3/20/2025  Plan of Care Expiration: 6/21/2024  Progress Note Due: 5/30/2024  Date of Surgery: n/a  Visit # / Visits authorized: 9/12 (WC)  FOTO: 1/ 3 eval     Precautions: Standard      Time In: 3:15 pm (late arrival)  Time Out: 4:02 pm  Total Billable Time: 46 minutes    PTA Visit #: 0/5       Subjective     Patient reports: her knee has been doing better since injection, she reports that she has been walking 2-3 miles and doing step ups on curb (30x) she reports that her pain is at 4/10 over right knee - post knee and over inner knee. She reports relief with kinesiotape after last treatment session. 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  Will be going to [facility] with her daughter on vacation.    She was compliant with home exercise program.  Response to previous treatment: sore   Functional change: walk longer with less pain    Pain: 4/10  Location: right knee     Objective      Objective Measures updated at progress report unless specified.      Treatment     Josephine received the treatments listed below:      therapeutic exercises to develop strength, endurance, and ROM for 13 minutes including:  Nustep seat 5 level 5 x 10 minutes  Standing hamstring stretch on step 5x/15sec  Gastroc stretch 3x/ 30 sec    manual therapy techniques:  Myofacial release and Soft tissue Mobilization were applied to the: right knee/thigh for 8 minutes, including:    KinesioTaping to right medial knee with I strip 8 squares/inches long for inhibition of sartorius to decreased stress to tissue to decrease stress to tissue.       neuromuscular re-education activities to improve: Coordination, core control and motor recruitment and Proprioception for 12 minutes. The following activities were included:  Bridge x 30 with hip iso  supine Hip marching with red x 30 (15 each leg)  SLR with quad set 3 x 10 right leg only today 1#  Side lying hip abduction x 30 1# right side only today    therapeutic activities to improve functional performance for 13 minutes, including:  Shuttle double leg with hip adduction ball x 30 one black band, one red band  Shuttle single leg x 30 each,  red and one black  Shuttle hip extension x 20 red band bilaterally      Patient Education and Home Exercises       Education provided:   - use and removal of tape including wear time  - purpose of manual techniques  - continue with current exercises    Written Home Exercises Provided: Patient instructed to cont prior HEP. Exercises were reviewed and Josephine was able to demonstrate them prior to the end of the session.  Josephine demonstrated good  understanding of the education provided. See Electronic Medical Record under Patient Instructions for exercises provided during therapy sessions    Assessment     Patient tolerated all treatment well and was able to complete all interventions as noted, not all planned interventions completed due to patient's tardiness. Cues for technique as needed, encouraged continued home exercise program.    Josephine Is progressing well towards her goals.   Patient prognosis is Good.     Patient will continue to benefit from skilled outpatient physical therapy to address the deficits listed in the problem list box on initial evaluation, provide pt/family education and to  maximize pt's level of independence in the home and community environment.     Patient's spiritual, cultural and educational needs considered and pt agreeable to plan of care and goals.     Anticipated barriers to physical therapy: none    Goals:   Short Term Goals: 4 weeks (in progress, not met)  1. Patient will present with increased hip strength by one half grade for improved support to knee for improved ADLs. - MET where indicated  2. Patient will report decreased pain at worst to 6/10 for improved ability to perform ADLs.   3. Patient will presents with increased knee strength by one half grade for improved ability to perform ADLs. MET  4. Patient will report increased ability to walk 10 minute without increased pain to knee for improved ability to perform work tasks.   5. Patient will be independent with initial home exercise program. met      Long Term Goals: 6 weeks (in progress, not met)  1. Patient will present with increased hip strength by one full grade for improved support to knee for improved ADLs.   2. Patient will report decreased pain at worst to 4/10 for improved ability to perform ADLs.   3. Patient will presents with increased knee strength by one full grade for improved ability to perform ADLs.   4. Patient will report increased ability to walk 20 minute without increased pain to knee for improved ability to perform work tasks.   5. Patient will be independent with discharge home exercise program.      Plan      Plan of care Certification: 4/1/2024 to 6/21/2024.     Outpatient Physical Therapy 2 times weekly for 6 weeks to include the following interventions: Manual Therapy, Moist Heat/ Ice, Neuromuscular Re-ed, Patient Education, Therapeutic Activities, Therapeutic Exercise, and dry needling.         Monitor response to today's treatment session and progress with Physical Therapy plan of care as indicated.     Bella John, PT

## 2024-05-24 DIAGNOSIS — M51.36 DDD (DEGENERATIVE DISC DISEASE), LUMBAR: ICD-10-CM

## 2024-05-24 RX ORDER — BACLOFEN 10 MG/1
10 TABLET ORAL 3 TIMES DAILY
Qty: 90 TABLET | Refills: 1 | Status: SHIPPED | OUTPATIENT
Start: 2024-05-24

## 2024-05-28 ENCOUNTER — CLINICAL SUPPORT (OUTPATIENT)
Dept: REHABILITATION | Facility: HOSPITAL | Age: 59
End: 2024-05-28
Payer: OTHER MISCELLANEOUS

## 2024-05-28 DIAGNOSIS — R53.1 DECREASED STRENGTH: Primary | ICD-10-CM

## 2024-05-28 DIAGNOSIS — R26.89 DECREASED FUNCTIONAL MOBILITY: ICD-10-CM

## 2024-05-28 DIAGNOSIS — M25.60 DECREASED RANGE OF MOTION: ICD-10-CM

## 2024-05-28 PROCEDURE — 97530 THERAPEUTIC ACTIVITIES: CPT | Mod: PN

## 2024-05-28 PROCEDURE — 97112 NEUROMUSCULAR REEDUCATION: CPT | Mod: PN

## 2024-05-28 PROCEDURE — 97110 THERAPEUTIC EXERCISES: CPT | Mod: PN

## 2024-05-28 PROCEDURE — 97140 MANUAL THERAPY 1/> REGIONS: CPT | Mod: PN

## 2024-05-28 RX ORDER — TRIAMCINOLONE ACETONIDE 40 MG/ML
40 INJECTION, SUSPENSION INTRA-ARTICULAR; INTRAMUSCULAR
Status: DISCONTINUED | OUTPATIENT
Start: 2024-05-16 | End: 2024-05-28 | Stop reason: HOSPADM

## 2024-05-28 NOTE — PROCEDURES
Large Joint Aspiration/Injection: bilateral subacromial bursa    Date/Time: 5/16/2024 3:30 PM    Performed by: Juan Wilson MD  Authorized by: Juan Wilson MD    Consent Done?:  Yes (Verbal)  Indications:  Pain  Timeout: prior to procedure the correct patient, procedure, and site was verified    Prep: patient was prepped and draped in usual sterile fashion      Local anesthesia used?: Yes    Local anesthetic:  Lidocaine 1% without epinephrine  Anesthetic total (ml):  5      Details:  Needle Size:  22 G  Ultrasonic Guidance for needle placement?: No    Approach:  Posterior  Location:  Shoulder  Laterality:  Bilateral  Site:  Bilateral subacromial bursa  Medications (Right):  40 mg triamcinolone acetonide 40 mg/mL  Medications (Left):  40 mg triamcinolone acetonide 40 mg/mL  Patient tolerance:  Patient tolerated the procedure well with no immediate complications  Tendon Sheath    Date/Time: 5/16/2024 3:30 PM    Performed by: Juan Wilson MD  Authorized by: Juan Wilson MD    Consent Done?:  Yes (Verbal)  Indications:  Pain  Timeout: prior to procedure the correct patient, procedure, and site was verified    Local anesthesia used?: No    Location:  Shoulder  Site:  R bicep tendon and L bicep tendon  Ultrasonic guidance for needle placement?: No    Needle size:  25 G  Medications:  40 mg triamcinolone acetonide 40 mg/mL; 40 mg triamcinolone acetonide 40 mg/mL  Patient tolerance:  Patient tolerated the procedure well with no immediate complications

## 2024-05-28 NOTE — PROGRESS NOTES
"Chief Complaint   Patient presents with    Right Shoulder - Pain    Left Shoulder - Pain       HPI:    This is a 58 y.o. who presents today complaining of bilateral shoulder pain for 2 weeks after no interval trauma. Pain is dull. No numbness or tingling. No associated signs or symptoms.      Past Medical History:   Diagnosis Date    Allergy     Anticoagulant long-term use     ASA 81mg, Effient    Anxiety     Arthritis     Asthma     As child    CAD (coronary artery disease) 01/27/2012    s/p stents x4 , CABG, 3 vessel, (5/2013) newest stent prior to CABG    Cataract     Chronic constipation     Colon polyp     Coronary artery disease involving native coronary artery of native heart without angina pectoris 01/27/2012 12/19 Significant ostial RCA lesion as above treated with drug-eluting stenting as above. Occluded proximal LAD with patent lima lad Patent vein graft to 2nd obtuse marginal Known occluded vein graft to unknown vessel.  s/p stents x 3 (2010) s/p LAD stent (DSE) 3/2012    DDD (degenerative disc disease), cervical     DDD (degenerative disc disease), lumbar     DDD (degenerative disc disease), thoracic     Depression     Edema     Encounter for blood transfusion     General anesthetics causing adverse effect in therapeutic use     Headache(784.0)     History of nephrolithiasis     Hyperlipidemia     Hypertension     Hypothyroid 07/05/2012    Insomnia     Insulin resistance     patient stated not diebetic    Joint stiffness     Joint swelling     Kidney disease     ; Frequent UTIs     Kidney stones     Low back pain     Neck pain     Obstructive sleep apnea on CPAP 07/17/2012    PONV (postoperative nausea and vomiting)     S/P CABG (coronary artery bypass graft) 06/25/2013 6/23/2013     Sleep apnea 01/27/2012    Patient reports "severe" sleep apnea, uses C-pap    Stented coronary artery 12/20/2019 12/19  ostial RCA -Osirio2.5 x 18 post dilated 2.75     Thoracic back pain     Usual " hyperplasia of lactiferous duct 2017    left breast      Past Surgical History:   Procedure Laterality Date    ADENOIDECTOMY      BACK SURGERY      BREAST BIOPSY Left 2017    duct excision- Dr. Jimenez    BREAST CYST ASPIRATION Left 2020    @ 's office- old hematoma drained (per office)    CARDIAC SURGERY      CARPAL TUNNEL RELEASE      bilateral    CERVICAL LAMINECTOMY WITH SPINAL FUSION      2016     SECTION, CLASSIC      x 2    COLONOSCOPY      CORONARY ANGIOGRAPHY  2019    Procedure: ANGIOGRAM, CORONARY ARTERY;  Surgeon: Timi Millan MD;  Location: [facility] CATH;  Service: Cardiology;;    CORONARY ANGIOGRAPHY  10/13/2023    Procedure: Coronary angiogram study;  Surgeon: Azra Stoll MD;  [facility];  Service: Cardiology;;    CORONARY ANGIOGRAPHY INCLUDING BYPASS GRAFTS WITH CATHETERIZATION OF LEFT HEART  10/13/2023    Procedure: Left heart cath w/Grafts RM 3640;  Surgeon: Azra Stoll MD;  Location: [facility]ATH;  Service: Cardiology;;    CORONARY ANGIOPLASTY WITH STENT PLACEMENT      x 4    CORONARY ARTERY BYPASS GRAFT  2013    3 vessel    CORONARY BYPASS GRAFT ANGIOGRAPHY  10/13/2023    Procedure: Bypass graft study;  Surgeon: Azra Stoll MD;  Location: [facility] CATH;  Service: Cardiology;;    ESOPHAGOGASTRODUODENOSCOPY N/A 2020    Procedure: EGD (ESOPHAGOGASTRODUODENOSCOPY);  Surgeon: Mk Warren MD;  Location: [facility];  Service: Endoscopy;  Laterality: N/A;    HYSTERECTOMY      Complete    JOINT REPLACEMENT      KNEE ARTHROPLASTY Left 2019    Procedure: ARTHROPLASTY, KNEE;  Surgeon: Juan Wilson MD;  Location: [facility] OR;  Service: Orthopedics;  Laterality: Left;    KNEE ARTHROSCOPY W/ MENISCAL REPAIR Left     twice, last one 2014    LAMINECTOMY      L4/L5    LAPAROSCOPIC CHOLECYSTECTOMY N/A 7/3/2023    Procedure: CHOLECYSTECTOMY, LAPAROSCOPIC;  Surgeon: Obinna Whipple MD;  Location: [facility] OR;  Service: General;  Laterality: N/A;  with  cholangiogram    LEFT HEART CATHETERIZATION  12/13/2019    Procedure: Left heart cath- RM # [id number];  Surgeon: Timi Millan MD;  [facility];  Service: Cardiology;;    OOPHORECTOMY Bilateral 2011    ROBOTIC ARTHROPLASTY, KNEE Right 6/14/2021    Procedure: ROBOTIC ARTHROPLASTY, KNEE, TOTAL;  Surgeon: Juan Wilson MD;  Location: [facility] OR;  Service: Orthopedics;  Laterality: Right;    SINUS SURGERY      x3    TENDON REPAIR Left     ankle surgery    THYROIDECTOMY      2 separate surgeries    TONSILLECTOMY      TOTAL KNEE ARTHROPLASTY Left 06/17/2019    Surgeon: Juan Wilson MD    XI ROBOTIC REVISION, GASTROENTEROSTOMY, MITCHELL-EN-Y N/A 2/20/2023    Procedure: XI ROBOTIC REVISION,GASTROENTEROSTOMY,MITCHELL-EN-Y;  Surgeon: Obinna Whipple MD;  Location: [facility] OR;  Service: General;  Laterality: N/A;  creation of Mitchell-N-Y anastomsis      Current Outpatient Medications on File Prior to Visit   Medication Sig Dispense Refill    acarbose (PRECOSE) 25 MG Tab Take 1 tablet (25 mg total) by mouth 3 (three) times daily with meals. 270 tablet 3    acetaminophen (TYLENOL) 650 MG TbSR Take 1,300 mg by mouth every 8 (eight) hours as needed (pain).       albuterol (PROVENTIL/VENTOLIN HFA) 90 mcg/actuation inhaler Inhale 2 puffs into the lungs every 6 (six) hours as needed for Shortness of Breath.       allopurinoL (ZYLOPRIM) 100 MG tablet Take 1 tablet (100 mg total) by mouth once daily. 90 tablet 3    amLODIPine (NORVASC) 5 MG tablet Take 1 tablet (5 mg total) by mouth once daily. 30 tablet 11    aspirin (ECOTRIN) 81 MG EC tablet Take 1 tablet (81 mg total) by mouth once daily. (Patient not taking: Reported on 3/27/2024) 30 tablet 11    atorvastatin (LIPITOR) 40 MG tablet Take 1 tablet (40 mg total) by mouth once daily. 90 tablet 3    blood-glucose meter kit To check BG TID PRN, to use with insurance preferred meter 1 each 0    calcium carb and citrate-vitD3 600 mg-12.5 mcg (500 unit) TbSR Take 2 tablets by mouth once.       "cetirizine (ZYRTEC) 10 MG tablet Take 10 mg by mouth daily as needed for Allergies.      fluticasone propionate (FLONASE) 50 mcg/actuation nasal spray fluticasone propionate 50 mcg/actuation nasal spray,suspension      lancets Misc To check BG TID PRN, to use with insurance preferred meter 100 each 1    levothyroxine (SYNTHROID) 100 MCG tablet TAKE 1 TABLET BY MOUTH EVERY DAY BEFORE BREAKFAST 30 tablet 11    methocarbamoL (ROBAXIN) 750 MG Tab Take 1 tablet (750 mg total) by mouth 4 (four) times daily as needed. 44 tablet 3    metoprolol succinate (TOPROL-XL) 25 MG 24 hr tablet Take 1 tablet (25 mg total) by mouth every evening. 30 tablet 11    multivitamin (THERAGRAN) per tablet Take 1 tablet by mouth once daily. Bariatric vitamin      pantoprazole (PROTONIX) 40 MG tablet TAKE 1 TABLET BY MOUTH 2 TIMES A  tablet 1    prazosin (MINIPRESS) 1 MG Cap Take 1 capsule (1 mg total) by mouth every evening. 30 capsule 1    pregabalin (LYRICA) 100 MG capsule TAKE TWO (2) CAPSULES BY MOUTH TWICE DAILY 120 capsule 3    senna-docusate 8.6-50 mg (PERICOLACE) 8.6-50 mg per tablet Take 1 tablet by mouth 2 (two) times a day. (Patient not taking: Reported on 4/10/2024)      ticagrelor (BRILINTA) 90 mg tablet TAKE 1 TABLET BY MOUTH TWICE DAILY 180 tablet 3    TRUE METRIX GLUCOSE TEST STRIP Strp TO CHECK BLOOD GLUCOSE THREE TIMES DAILY AS NEEDED 300 each 3    venlafaxine (EFFEXOR-XR) 150 MG Cp24 Take 1 capsule (150 mg total) by mouth once daily. 90 capsule 3    venlafaxine (EFFEXOR-XR) 37.5 MG 24 hr capsule Take 1 capsule (37.5 mg total) by mouth once daily. 30 capsule 11     No current facility-administered medications on file prior to visit.      Review of patient's allergies indicates:   Allergen Reactions    Celexa [citalopram] Other (See Comments)     GI upset  Stated "knocked me out"    Cephalexin Anaphylaxis    Zoloft [sertraline] Other (See Comments)     Stated "knocked me out"    Codeine Other (See Comments)     " hallucinations  hallucinations    Isosorbide Nausea And Vomiting    Ciprofloxacin Itching    Levaquin [levofloxacin] Other (See Comments)     tendinitis    Metformin      Nausea     Penicillamine     Penicillins Other (See Comments)     Was told per mother that as child was allergic to penicillin.  Childhood allergy/ unknown reaction    Sulfa (sulfonamide antibiotics)       Family History not pertinent   Social History     Socioeconomic History    Marital status:    Occupational History    Occupation: teacher   Tobacco Use    Smoking status: Former     Current packs/day: 0.00     Average packs/day: 0.5 packs/day for 14.9 years (7.5 ttl pk-yrs)     Types: Cigarettes     Start date: 1984     Quit date: 1998     Years since quittin.4    Smokeless tobacco: Never   Substance and Sexual Activity    Alcohol use: No    Drug use: Not Currently     Types: Benzodiazepines    Sexual activity: Yes     Partners: Male     Social Determinants of Health     Financial Resource Strain: Patient Declined (2023)    Overall Financial Resource Strain (CARDIA)     Difficulty of Paying Living Expenses: Patient declined   Food Insecurity: Patient Declined (2023)    Hunger Vital Sign     Worried About Running Out of Food in the Last Year: Patient declined     Ran Out of Food in the Last Year: Patient declined   Transportation Needs: Patient Declined (2023)    PRAPARE - Transportation     Lack of Transportation (Medical): Patient declined     Lack of Transportation (Non-Medical): Patient declined   Physical Activity: Patient Declined (2023)    Exercise Vital Sign     Days of Exercise per Week: Patient declined     Minutes of Exercise per Session: Patient declined   Stress: Patient Declined (2023)    [employer] - Occupational Stress Questionnaire     Feeling of Stress : Patient declined   Housing Stability: Unknown (2023)    Housing Stability Vital Sign     Unable  to Pay for Housing in the Last Year: Patient refused     Unstable Housing in the Last Year: Patient refused         Review of Systems:   Constitutional:  Denies fever or chills    Eyes:  Denies change in visual acuity    HENT:  Denies nasal congestion or sore throat    Respiratory:  Denies cough or shortness of breath    Cardiovascular:  Denies chest pain or edema    GI:  Denies abdominal pain, nausea, vomiting, bloody stools or diarrhea    :  Denies dysuria    Integument:  Denies rash    Neurologic:  Denies headache, focal weakness or sensory changes    Endocrine:  Denies polyuria or polydipsia    Lymphatic:  Denies swollen glands    Psychiatric:  Denies depression or anxiety       Physical Exam:    Constitutional:  Well developed, well nourished, no acute distress, non-toxic appearance    Integument:  Well hydrated, no rash    Lymphatic:  No lymphadenopathy noted    Neurologic:  Alert & oriented x 3,     Psychiatric:  Speech and behavior appropriate    Gi: abdomen soft  Eyes: EOMI     Bilateral Shoulder Exam  Tenderness   Shoulder tenderness location: diffusely about shoulder.    Range of Motion   Forward Flexion: abnormal   External Rotation: abnormal     Muscle Strength   Supraspinatus: 4/5     Tests   Hawkin's test: positive  Impingement: positive    Other   Erythema: absent  Sensation: normal  Pulse: present       Impingement syndrome, shoulder, right    Impingement syndrome of left shoulder    Biceps tendinitis of left upper extremity    Biceps tendinitis of right upper extremity          Using an aseptic technique, I injected 5 cc of lidocaine 1% without and 1 cc of kenalog 40mg into the bilateral Shoulder and 1:1 in the bilateral biceps. The patient tolerated this well. I will have them return to clinic as scheduled.

## 2024-05-28 NOTE — PROGRESS NOTES
"[facility]   Physical Therapy Treatment  Worker's Comp     Name: Josephine Sen Hoang  Clinic Number: [id number]    Therapy Diagnosis:   Encounter Diagnoses   Name Primary?    Decreased strength Yes    Decreased range of motion     Decreased functional mobility        Physician: Juan Wilson MD    Visit Date: 5/28/2024    Physician Orders: PT Eval and Treat   Medical Diagnosis from Referral: M70.51 (ICD-10-CM) - Pes anserinus bursitis of right knee   Evaluation Date: 4/1/2024  Authorization Period Expiration: 3/20/2025  Plan of Care Expiration: 6/21/2024  Progress Note Due: 5/30/2024  Date of Surgery: n/a  Visit # / Visits authorized: 10/12 ()  FOTO: 1/ 3 eval     Precautions: Standard      Time In: 2:50 pm  Time Out: 3:50 pm  Total Billable Time: 49 minutes    PTA Visit #: 0/5       Subjective     Patient reports: her knee has been more sore but some pain in back of knee - feels like it is "catching". Tape does continue to assist with symptoms.     She was compliant with home exercise program.  Response to previous treatment: sore   Functional change: walk longer with less pain    Pain: 3/10  Location: right knee     Objective      Objective Measures updated at progress report unless specified.      Treatment     Josephine received the treatments listed below:      therapeutic exercises to develop strength, endurance, and ROM for 13 minutes including:  Nustep seat 5 level 5 x 10 minutes  Standing hamstring stretch on step 5x/15sec  Gastroc stretch 3x/ 30 sec    manual therapy techniques: Myofacial release and Soft tissue Mobilization were applied to the: right knee/thigh for 8 minutes, including:    KinesioTaping to right medial knee with I strip 8 squares/inches long for inhibition of sartorius to decreased stress to tissue to decrease stress to tissue.   Soft tissue mobility hamstrings and adductors.      neuromuscular re-education activities to improve: Coordination, core control and " motor recruitment and Proprioception for 14 minutes. The following activities were included:  Bridge x 30 with hip iso  supine Hip marching with red x 30 (15 each leg)  SLR with quad set 3 x 10 right leg only today 1#  Side lying hip abduction x 30 1# right side only today    therapeutic activities to improve functional performance for 14 minutes, including:  Shuttle double leg with hip adduction ball x 30; 2 black band  Shuttle single leg x 30 each; red and one black  Shuttle hip extension x 20 red band bilaterally      Patient Education and Home Exercises       Education provided:   - home exercise program    Written Home Exercises Provided: Patient instructed to cont prior HEP. Exercises were reviewed and Josephine was able to demonstrate them prior to the end of the session.  Josephine demonstrated good  understanding of the education provided. See Electronic Medical Record under Patient Instructions for exercises provided during therapy sessions    Assessment     Patient tolerated all treatment well today but continues to report knee pain. Cues both tactile and verbal for all interventions for correct technique and to avoid substitution. Encouraged home exercise program both walking and strengthening.    Josephine Is progressing well towards her goals.   Patient prognosis is Good.     Patient will continue to benefit from skilled outpatient physical therapy to address the deficits listed in the problem list box on initial evaluation, provide pt/family education and to maximize pt's level of independence in the home and community environment.     Patient's spiritual, cultural and educational needs considered and pt agreeable to plan of care and goals.     Anticipated barriers to physical therapy: none    Goals:   Short Term Goals: 4 weeks (in progress, not met)  1. Patient will present with increased hip strength by one half grade for improved support to knee for improved ADLs. - MET where indicated  2. Patient will  report decreased pain at worst to 6/10 for improved ability to perform ADLs.   3. Patient will presents with increased knee strength by one half grade for improved ability to perform ADLs. MET  4. Patient will report increased ability to walk 10 minute without increased pain to knee for improved ability to perform work tasks.   5. Patient will be independent with initial home exercise program. met      Long Term Goals: 6 weeks (in progress, not met)  1. Patient will present with increased hip strength by one full grade for improved support to knee for improved ADLs.   2. Patient will report decreased pain at worst to 4/10 for improved ability to perform ADLs.   3. Patient will presents with increased knee strength by one full grade for improved ability to perform ADLs.   4. Patient will report increased ability to walk 20 minute without increased pain to knee for improved ability to perform work tasks.   5. Patient will be independent with discharge home exercise program.      Plan      Plan of care Certification: 4/1/2024 to 6/21/2024.     Outpatient Physical Therapy 2 times weekly for 6 weeks to include the following interventions: Manual Therapy, Moist Heat/ Ice, Neuromuscular Re-ed, Patient Education, Therapeutic Activities, Therapeutic Exercise, and dry needling.         Monitor response to today's treatment session and progress with Physical Therapy plan of care as indicated.     Bella John, PT

## 2024-05-29 ENCOUNTER — TELEPHONE (OUTPATIENT)
Dept: GASTROENTEROLOGY | Facility: CLINIC | Age: 59
End: 2024-05-29
Payer: COMMERCIAL

## 2024-05-29 NOTE — TELEPHONE ENCOUNTER
----- Message from Mary Camacho sent at 5/29/2024  9:00 AM CDT -----  Type: Needs Medical Advice  Who Called:  pt  Symptoms (please be specific):  pt said she have a procedure w/ the provider tomorrow and she was not given any prep instructions--please call and advise  Best Call Back Number: [phone] (home)     Additional Information: thank you   calf pain

## 2024-05-29 NOTE — TELEPHONE ENCOUNTER
Spoke to pt. Pt did not hold Brilinta prior to procedure, states she was never told it needed to be stopped for procedure. Procedure rescheduled. Pt verbalized understanding to new procedure date and to hold Brilinta.

## 2024-06-04 ENCOUNTER — CLINICAL SUPPORT (OUTPATIENT)
Dept: REHABILITATION | Facility: HOSPITAL | Age: 59
End: 2024-06-04
Payer: OTHER MISCELLANEOUS

## 2024-06-04 DIAGNOSIS — M25.60 DECREASED RANGE OF MOTION: ICD-10-CM

## 2024-06-04 DIAGNOSIS — R26.89 DECREASED FUNCTIONAL MOBILITY: ICD-10-CM

## 2024-06-04 DIAGNOSIS — R53.1 DECREASED STRENGTH: Primary | ICD-10-CM

## 2024-06-04 PROCEDURE — 97530 THERAPEUTIC ACTIVITIES: CPT | Mod: PN

## 2024-06-04 PROCEDURE — 97110 THERAPEUTIC EXERCISES: CPT | Mod: PN

## 2024-06-04 PROCEDURE — 97112 NEUROMUSCULAR REEDUCATION: CPT | Mod: PN

## 2024-06-04 NOTE — PROGRESS NOTES
[facility]   Physical Therapy Treatment  Worker's Comp/ PROGRESS NOTE     Name: Josephine Bolton  Clinic Number: [id number]    Therapy Diagnosis:   Encounter Diagnoses   Name Primary?    Decreased strength Yes    Decreased range of motion     Decreased functional mobility          Physician: Juan Wilson MD    Visit Date: 6/4/2024    Physician Orders: PT Eval and Treat   Medical Diagnosis from Referral: M70.51 (ICD-10-CM) - Pes anserinus bursitis of right knee   Evaluation Date: 4/1/2024  Authorization Period Expiration: 3/20/2025  Plan of Care Expiration: 6/21/2024  Progress Note Due: 6/21/2024  Date of Surgery: n/a  Visit # / Visits authorized: 12/12 ()  FOTO: 3/ 3 eval     Precautions: Standard      Time In: 3:00 pm  Time Out: 4:05 pm  Total Billable Time: 62 minutes    PTA Visit #: 0/5       Subjective     Patient reports: rates her knee pain at 2/10 today, indicates pain over pes anserine and wrapping around medial knee to posterior knee.    She was compliant with home exercise program.  Response to previous treatment: sore   Functional change: walk longer with less pain    Pain: 2/10 best, worst 5/10 - after playing with grandkids and carrying baby up and down steps (~4 steps)  Location: right knee     Objective      Objective Measures updated at progress report unless specified.       Knee Strength Left  5/15/24 Right  5/15/24 6/4/24  Right    Flexion 5/5 5/5 5/5   Extension 5/5 4+/5 4+/5      Hip Strength Left  5/15/24 Right  5/15/24 6/4/24  Right    Flexion 4-/5 4-/5 4/5 - 4-/5  + trunk shift   Extension NT NT NT   Abduction 4+/5 4-/5 4/5   Hip IR NT NT 3+/5   Hip ER NT NT 3+/5      Knee Right  5/15/24 6/4/24   Flexion 134 130   Extention 0 0   *Tested with patient supine        Functional Tests  Outcome  6/2/24 Norms   30 second Sit to Stand 8x Age Male Female   60-64 <14 <12   65-69 <12 <11   70-74 <12 <10   75-79 <11 <10   80-84 <10 <9   85-89 <8 <8   90-93 <7 <4       Five Time Sit to Stand 16.95 sec 60s: <11.4 sec  70s: <12.6 sec  80s: 14.8 sec   Single leg balance 30 sec bilateral  Decreased hip/trunk control on right lower extremity  - + trunk lean and hip drop      FOTO:      Treatment     Josephine received the treatments listed below:      therapeutic exercises to develop strength, endurance, and ROM for 25 minutes including:  Nustep seat 5 level 5 x 10 minutes  Objective measures/FOTO taken today     manual therapy techniques: Myofacial release and Soft tissue Mobilization were applied to the: right knee/thigh for 5 minutes, including:    KinesioTaping to right medial knee with I strip 4 squares/inches long for inhibition of sartorius to decreased stress to tissue to decrease stress to tissue.   Soft tissue mobility adductors.      neuromuscular re-education activities to improve: Coordination, core control and motor recruitment and Proprioception for 18 minutes. The following activities were included:  Bridge x 30 with hip iso  Sidelying hip series:  Bilaterally   Clamshell 10x/ 2 sets   Abduction 10x/ 2 sets    Circles CW/CCW 5x/each   Standing tall mat on elbows hip extension 10x/ 3 sets (no weight)    therapeutic activities to improve functional performance for 14 minutes, including:  Shuttle double leg with hip adduction ball x 30; 2 black band ( increase next visit)  Shuttle single leg 8x/ 2 sets - each; 2 black  Shuttle hip extension x 10 red band bilaterally - large dowel for upper extremity support and other hand on shuttle      Patient Education and Home Exercises       Education provided:   - home exercise program    Written Home Exercises Provided: Patient instructed to cont prior HEP. Exercises were reviewed and Josephine was able to demonstrate them prior to the end of the session.  Josephine demonstrated good  understanding of the education provided. See Electronic Medical Record under Patient Instructions for exercises provided during therapy  sessions    Assessment     Patient has made good progress with objective measurements and is reporting overall improvement verbally and with FOTO. Patient remains with decreased hip rotation strength and poor motor patterns with single leg balance. Patient would benefit from continued skilled interventions to address remaining strength and balance deficits.    Josephine Is progressing well towards her goals.   Patient prognosis is Good.     Patient will continue to benefit from skilled outpatient physical therapy to address the deficits listed in the problem list box on initial evaluation, provide pt/family education and to maximize pt's level of independence in the home and community environment.     Patient's spiritual, cultural and educational needs considered and pt agreeable to plan of care and goals.     Anticipated barriers to physical therapy: none    Goals:   Reviewed: 6/4/2024    Short Term Goals: 4 weeks  1. Patient will present with increased hip strength by one half grade for improved support to knee for improved ADLs. - MET where indicated  2. Patient will report decreased pain at worst to 6/10 for improved ability to perform ADLs. 6/4/2024 MET  3. Patient will presents with increased knee strength by one half grade for improved ability to perform ADLs. MET  4. Patient will report increased ability to walk 10 minute without increased pain to knee for improved ability to perform work tasks.  6/4/2024 MET  5. Patient will be independent with initial home exercise program. met      Long Term Goals: 6 weeks   1. Patient will present with increased hip strength by one full grade for improved support to knee for improved ADLs.   2. Patient will report decreased pain at worst to 4/10 for improved ability to perform ADLs. 6/4/2024 Progressing not met  3. Patient will presents with increased knee strength by one full grade for improved ability to perform ADLs. 6/4/2024 Progressing not met  4. Patient will report  increased ability to walk 20 minute without increased pain to knee for improved ability to perform work tasks. 6/4/2024  met  5. Patient will be independent with discharge home exercise program. 6/4/2024 Ongoing     Plan      Plan of care Certification: 4/1/2024 to 6/21/2024.     Outpatient Physical Therapy 2 times weekly for 6 weeks to include the following interventions: Manual Therapy, Moist Heat/ Ice, Neuromuscular Re-ed, Patient Education, Therapeutic Activities, Therapeutic Exercise, and dry needling.         Monitor response to today's treatment session and progress with Physical Therapy plan of care as indicated.     Bella John, PT

## 2024-06-06 ENCOUNTER — DOCUMENTATION ONLY (OUTPATIENT)
Dept: REHABILITATION | Facility: HOSPITAL | Age: 59
End: 2024-06-06
Payer: COMMERCIAL

## 2024-06-06 NOTE — PROGRESS NOTES
Patient presented to Physical Therapy with reports of not feeling well. BP and O2 taken - all WNL's. She reports that she has hypoglycemia and thinks that her sugar is off - it was OK this morning but she has not been home to check. She reports that she is nauseated and we decided to cancel appointment today so she can return home to check her sugar. She declined further treatment at this time and reports that she feels well enough to leave on own/.  Bella John, PT

## 2024-06-11 DIAGNOSIS — M51.36 DDD (DEGENERATIVE DISC DISEASE), LUMBAR: ICD-10-CM

## 2024-06-11 RX ORDER — PREGABALIN 100 MG/1
CAPSULE ORAL
Qty: 120 CAPSULE | Refills: 1 | Status: SHIPPED | OUTPATIENT
Start: 2024-06-11

## 2024-06-11 NOTE — TELEPHONE ENCOUNTER
Care Due:                  Date            Visit Type   Department     Provider  --------------------------------------------------------------------------------                                EP -                              PRIMARY      [facility] FAMILY  Last Visit: 08-      CARE ([facility])   MEDICINE       Ian Young                              EP -                              PRIMARY      [facility] FAMILY  Next Visit: 09-      CARE ([facility])   MEDICINE       LU FAUSTIN                                                            Last  Test          Frequency    Reason                     Performed    Due Date  --------------------------------------------------------------------------------    Uric Acid...  12 months..  allopurinoL..............  Not Found    Overdue    [employer] Embedded Care Due Messages. Reference number: [id number].   6/11/2024 11:53:14 AM CDT

## 2024-06-19 NOTE — TELEPHONE ENCOUNTER
Refill Routing Note   Medication(s) are not appropriate for processing by [facility] for the following reason(s):        Responsible provider unclear  ED/Hospital Visit since last OV with provider      Medication Therapy Plan: Last ordered: 6/22/23 by Wilmer Aguilera DO. Recent OV but no current order under PCP.      Appointments  past 12m or future 3m with PCP    Date Provider   Last Visit   8/25/2023 Ian Young MD   Next Visit   Visit date not found Ian Young MD   ED visits in past 90 days: 0        Note composed:5:29 PM 06/19/2024

## 2024-06-19 NOTE — TELEPHONE ENCOUNTER
No care due was identified.  [facility] Embedded Care Due Messages. Reference number: [id number].   6/19/2024 2:49:22 PM CDT

## 2024-06-20 DIAGNOSIS — F43.10 PTSD (POST-TRAUMATIC STRESS DISORDER): ICD-10-CM

## 2024-06-20 DIAGNOSIS — F51.4 NIGHT TERRORS: ICD-10-CM

## 2024-06-20 RX ORDER — LEVOTHYROXINE SODIUM 100 UG/1
100 TABLET ORAL
Qty: 90 TABLET | Refills: 0 | Status: SHIPPED | OUTPATIENT
Start: 2024-06-20

## 2024-06-20 NOTE — TELEPHONE ENCOUNTER
No care due was identified.  [facility] Embedded Care Due Messages. Reference number: [id number].   6/20/2024 4:51:08 PM CDT

## 2024-06-21 RX ORDER — PRAZOSIN HYDROCHLORIDE 1 MG/1
1 CAPSULE ORAL NIGHTLY
Qty: 30 CAPSULE | Refills: 1 | Status: SHIPPED | OUTPATIENT
Start: 2024-06-21

## 2024-07-17 NOTE — TELEPHONE ENCOUNTER
Care Due:                  Date            Visit Type   Department     Provider  --------------------------------------------------------------------------------                                EP -                              PRIMARY      [facility] FAMILY  Last Visit: 08-      CARE ([facility])   MEDICINE       Ian Young                               -                              [facility]  Next Visit: 09-      CARE ([facility])   MEDICINE       LU FAUSTIN                                                            Last  Test          Frequency    Reason                     Performed    Due Date  --------------------------------------------------------------------------------    CBC.........  12 months..  allopurinoL, ticagrelor..  10-   10-    CMP.........  12 months..  allopurinoL,               10-   10-                             atorvastatin, venlafaxine    TSH.........  12 months..  levothyroxine............  10-   10-    [facility] Embedded Care Due Messages. Reference number: [id number].   7/17/2024 12:17:46 PM CDT   LLE/weight-bearing as tolerated

## 2024-07-18 RX ORDER — PANTOPRAZOLE SODIUM 40 MG/1
TABLET, DELAYED RELEASE ORAL
Qty: 180 TABLET | Refills: 1 | Status: SHIPPED | OUTPATIENT
Start: 2024-07-18

## 2024-07-18 NOTE — TELEPHONE ENCOUNTER
Refill Routing Note   Medication(s) are not appropriate for processing by [facility] for the following reason(s):        Outside of protocol    [facility] action(s):  Route     Requires labs : Yes      Medication Therapy Plan: Patient's total daily dose exceeds [facility] criteria.      Appointments  past 12m or future 3m with PCP    Date Provider   Last Visit   8/25/2023 Ian Young MD   Next Visit   Visit date not found Ian Young MD   ED visits in past 90 days: 0        Note composed:8:41 PM 07/17/2024

## 2024-08-04 DIAGNOSIS — N20.0 KIDNEY STONES: ICD-10-CM

## 2024-08-05 RX ORDER — ALLOPURINOL 100 MG/1
100 TABLET ORAL EVERY MORNING
Qty: 90 TABLET | Refills: 0 | Status: SHIPPED | OUTPATIENT
Start: 2024-08-05

## 2024-08-08 ENCOUNTER — OFFICE VISIT (OUTPATIENT)
Dept: ORTHOPEDICS | Facility: CLINIC | Age: 59
End: 2024-08-08
Payer: OTHER MISCELLANEOUS

## 2024-08-08 ENCOUNTER — HOSPITAL ENCOUNTER (OUTPATIENT)
Dept: RADIOLOGY | Facility: HOSPITAL | Age: 59
Discharge: HOME | End: 2024-08-08
Attending: ORTHOPAEDIC SURGERY
Payer: COMMERCIAL

## 2024-08-08 VITALS — BODY MASS INDEX: 24.83 KG/M2 | WEIGHT: 134.94 LBS | HEIGHT: 62 IN

## 2024-08-08 DIAGNOSIS — Z96.651 STATUS POST TOTAL RIGHT KNEE REPLACEMENT: Primary | ICD-10-CM

## 2024-08-08 DIAGNOSIS — M70.51 PES ANSERINUS BURSITIS OF RIGHT KNEE: ICD-10-CM

## 2024-08-08 DIAGNOSIS — Z96.651 STATUS POST TOTAL RIGHT KNEE REPLACEMENT: ICD-10-CM

## 2024-08-08 PROCEDURE — 20610 DRAIN/INJ JOINT/BURSA W/O US: CPT | Mod: RT,S$GLB,, | Performed by: ORTHOPAEDIC SURGERY

## 2024-08-08 PROCEDURE — 73562 X-RAY EXAM OF KNEE 3: CPT | Mod: 26,RT,, | Performed by: RADIOLOGY

## 2024-08-08 PROCEDURE — 73560 X-RAY EXAM OF KNEE 1 OR 2: CPT | Mod: 26,59,LT, | Performed by: RADIOLOGY

## 2024-08-08 PROCEDURE — 99213 OFFICE O/P EST LOW 20 MIN: CPT | Mod: 25,S$GLB,, | Performed by: ORTHOPAEDIC SURGERY

## 2024-08-08 PROCEDURE — 99999 PR PBB SHADOW E&M-EST. PATIENT-LVL IV: CPT | Mod: PBBFAC,,, | Performed by: ORTHOPAEDIC SURGERY

## 2024-08-08 PROCEDURE — 73560 X-RAY EXAM OF KNEE 1 OR 2: CPT | Mod: TC,PO,LT

## 2024-08-08 RX ADMIN — TRIAMCINOLONE ACETONIDE 40 MG: 40 INJECTION, SUSPENSION INTRA-ARTICULAR; INTRAMUSCULAR at 03:08

## 2024-08-14 DIAGNOSIS — E78.2 MIXED HYPERLIPIDEMIA: ICD-10-CM

## 2024-08-14 DIAGNOSIS — F41.9 ANXIETY: ICD-10-CM

## 2024-08-14 DIAGNOSIS — M51.36 DDD (DEGENERATIVE DISC DISEASE), LUMBAR: ICD-10-CM

## 2024-08-14 DIAGNOSIS — I25.10 CORONARY ARTERY DISEASE INVOLVING NATIVE CORONARY ARTERY OF NATIVE HEART WITHOUT ANGINA PECTORIS: Chronic | ICD-10-CM

## 2024-08-14 NOTE — TELEPHONE ENCOUNTER
Care Due:                  Date            Visit Type   Department     Provider  --------------------------------------------------------------------------------                                EP -                              PRIMARY      [facility] FAMILY  Last Visit: 08-      CARE ([facility])   MEDICINE       Ian Young                              EP -                              PRIMARY      [facility] FAMILY  Next Visit: 09-      CARE ([facility])   MEDICINE       LU FAUSTIN                                                            Last  Test          Frequency    Reason                     Performed    Due Date  --------------------------------------------------------------------------------    Uric Acid...  12 months..  allopurinoL..............  Not Found    Overdue    [employer] Embedded Care Due Messages. Reference number: [id number].   8/14/2024 3:19:44 PM CDT

## 2024-08-15 RX ORDER — TICAGRELOR 90 MG/1
TABLET ORAL
Qty: 180 TABLET | Refills: 0 | Status: SHIPPED | OUTPATIENT
Start: 2024-08-15

## 2024-08-15 RX ORDER — BACLOFEN 10 MG/1
TABLET ORAL
Qty: 90 TABLET | Refills: 0 | Status: SHIPPED | OUTPATIENT
Start: 2024-08-15

## 2024-08-15 RX ORDER — VENLAFAXINE HYDROCHLORIDE 150 MG/1
150 CAPSULE, EXTENDED RELEASE ORAL NIGHTLY
Qty: 90 CAPSULE | Refills: 0 | Status: SHIPPED | OUTPATIENT
Start: 2024-08-15

## 2024-08-15 RX ORDER — TRIAMCINOLONE ACETONIDE 40 MG/ML
40 INJECTION, SUSPENSION INTRA-ARTICULAR; INTRAMUSCULAR
Status: DISCONTINUED | OUTPATIENT
Start: 2024-08-08 | End: 2024-08-15 | Stop reason: HOSPADM

## 2024-08-15 RX ORDER — ATORVASTATIN CALCIUM 40 MG/1
40 TABLET, FILM COATED ORAL NIGHTLY
Qty: 90 TABLET | Refills: 0 | Status: SHIPPED | OUTPATIENT
Start: 2024-08-15

## 2024-08-15 NOTE — PROCEDURES
Large Joint Aspiration/Injection: R anserine bursa    Date/Time: 8/8/2024 3:30 PM    Performed by: Juan Wilson MD  Authorized by: Juan Wilson MD    Consent Done?:  Yes (Verbal)  Indications:  Pain  Timeout: prior to procedure the correct patient, procedure, and site was verified    Prep: patient was prepped and draped in usual sterile fashion    Local anesthetic:  Lidocaine 1% without epinephrine  Anesthetic total (ml):  1      Details:  Needle Size:  21 G  Approach:  Anterolateral  Location:  Knee  Site:  R anserine bursa  Medications:  40 mg triamcinolone acetonide 40 mg/mL  Patient tolerance:  Patient tolerated the procedure well with no immediate complications

## 2024-08-15 NOTE — PROGRESS NOTES
"Chief Complaint   Patient presents with    Right Knee - Pain         HPI:   This is a 59 y.o. who presents to clinic today complaining of right knee pain for 1 months after fall at work. Pain is progressively worsening. No numbness or tingling. No associated signs or symptoms.    Past Medical History:   Diagnosis Date    Allergy     Anticoagulant long-term use     ASA 81mg, Effient    Anxiety     Arthritis     Asthma     As child    CAD (coronary artery disease) 01/27/2012    s/p stents x4 , CABG, 3 vessel, (5/2013) newest stent prior to CABG    Cataract     Chronic constipation     Colon polyp     Coronary artery disease involving native coronary artery of native heart without angina pectoris 01/27/2012 12/19 Significant ostial RCA lesion as above treated with drug-eluting stenting as above. Occluded proximal LAD with patent lima lad Patent vein graft to 2nd obtuse marginal Known occluded vein graft to unknown vessel.  s/p stents x 3 (2010) s/p LAD stent (DSE) 3/2012    DDD (degenerative disc disease), cervical     DDD (degenerative disc disease), lumbar     DDD (degenerative disc disease), thoracic     Depression     Edema     Encounter for blood transfusion     General anesthetics causing adverse effect in therapeutic use     Headache(784.0)     History of nephrolithiasis     Hyperlipidemia     Hypertension     Hypothyroid 07/05/2012    Insomnia     Insulin resistance     patient stated not diebetic    Joint stiffness     Joint swelling     Kidney disease     ; Frequent UTIs     Kidney stones     Low back pain     Neck pain     Obstructive sleep apnea on CPAP 07/17/2012    PONV (postoperative nausea and vomiting)     S/P CABG (coronary artery bypass graft) 06/25/2013 6/23/2013     Sleep apnea 01/27/2012    Patient reports "severe" sleep apnea, uses C-pap    Stented coronary artery 12/20/2019 12/19  ostial RCA -Osirio2.5 x 18 post dilated 2.75     Thoracic back pain     Usual hyperplasia of " lactiferous duct 2017    left breast     Past Surgical History:   Procedure Laterality Date    ADENOIDECTOMY      BACK SURGERY      BREAST BIOPSY Left 2017    duct excision- Dr. Jimenez    BREAST CYST ASPIRATION Left 2020    @ 's office- old hematoma drained (per office)    CARDIAC SURGERY      CARPAL TUNNEL RELEASE      bilateral    CERVICAL LAMINECTOMY WITH SPINAL FUSION      2016     SECTION, CLASSIC      x 2    COLONOSCOPY      COLONOSCOPY N/A 2024    Procedure: COLONOSCOPY;  Surgeon: Mk Warren MD;  Location: [facility] ENDO;  Service: Endoscopy;  Laterality: N/A;    CORONARY ANGIOGRAPHY  2019    Procedure: ANGIOGRAM, CORONARY ARTERY;  Surgeon: Timi Millan MD;  Location: [facility] CATH;  Service: Cardiology;;    CORONARY ANGIOGRAPHY  10/13/2023    Procedure: Coronary angiogram study;  Surgeon: Azra Stoll MD;  Location: [facility] CATH;  Service: Cardiology;;    CORONARY ANGIOGRAPHY INCLUDING BYPASS GRAFTS WITH CATHETERIZATION OF LEFT HEART  10/13/2023    Procedure: Left heart cath w/Grafts RM 3640;  Surgeon: Azra Stoll MD;  Location: [facility] CATH;  Service: Cardiology;;    CORONARY ANGIOPLASTY WITH STENT PLACEMENT      x 4    CORONARY ARTERY BYPASS GRAFT  2013    3 vessel    CORONARY BYPASS GRAFT ANGIOGRAPHY  10/13/2023    Procedure: Bypass graft study;  Surgeon: Azra Stoll MD;  Location: [facility] CATH;  Service: Cardiology;;    ESOPHAGOGASTRODUODENOSCOPY N/A 2020    Procedure: EGD (ESOPHAGOGASTRODUODENOSCOPY);  Surgeon: Mk Warren MD;  Location: [facility] ENDO;  Service: Endoscopy;  Laterality: N/A;    HYSTERECTOMY      Complete    JOINT REPLACEMENT      KNEE ARTHROPLASTY Left 2019    Procedure: ARTHROPLASTY, KNEE;  Surgeon: Juan Wilson MD;  Location: [facility] OR;  Service: Orthopedics;  Laterality: Left;    KNEE ARTHROSCOPY W/ MENISCAL REPAIR Left     twice, last one 2014    LAMINECTOMY      L4/L5    LAPAROSCOPIC CHOLECYSTECTOMY N/A 7/3/2023    Procedure:  CHOLECYSTECTOMY, LAPAROSCOPIC;  Surgeon: Obinna Whipple MD;  Location: [facility] OR;  Service: General;  Laterality: N/A;  with cholangiogram    LEFT HEART CATHETERIZATION  12/13/2019    Procedure: Left heart cath-  # [id number];  Surgeon: Timi Millan MD;  Location: [facility] CATH;  Service: Cardiology;;    OOPHORECTOMY Bilateral 2011    ROBOTIC ARTHROPLASTY, KNEE Right 6/14/2021    Procedure: ROBOTIC ARTHROPLASTY, KNEE, TOTAL;  Surgeon: Juan Wilson MD;  Location: [facility] OR;  Service: Orthopedics;  Laterality: Right;    SINUS SURGERY      x3    TENDON REPAIR Left     ankle surgery    THYROIDECTOMY      2 separate surgeries    TONSILLECTOMY      TOTAL KNEE ARTHROPLASTY Left 06/17/2019    Surgeon: Juan Wilson MD    XI ROBOTIC REVISION, GASTROENTEROSTOMY, MITCHELL-EN-Y N/A 2/20/2023    Procedure: XI ROBOTIC REVISION,GASTROENTEROSTOMY,MITCHELL-EN-Y;  Surgeon: Obinna Whipple MD;  Location: [facility] OR;  Service: General;  Laterality: N/A;  creation of Mitchell-N-Y anastomsis     Current Outpatient Medications on File Prior to Visit   Medication Sig Dispense Refill    acetaminophen (TYLENOL) 650 MG TbSR Take 1,300 mg by mouth every 8 (eight) hours as needed (pain).       albuterol (PROVENTIL/VENTOLIN HFA) 90 mcg/actuation inhaler Inhale 2 puffs into the lungs every 6 (six) hours as needed for Shortness of Breath.       allopurinoL (ZYLOPRIM) 100 MG tablet Take 1 tablet (100 mg total) by mouth every morning. 90 tablet 0    amLODIPine (NORVASC) 5 MG tablet Take 1 tablet (5 mg total) by mouth once daily. 30 tablet 11    blood-glucose meter kit To check BG TID PRN, to use with insurance preferred meter 1 each 0    calcium carb and citrate-vitD3 600 mg-12.5 mcg (500 unit) TbSR Take 2 tablets by mouth once.      cetirizine (ZYRTEC) 10 MG tablet Take 10 mg by mouth daily as needed for Allergies.      fluticasone propionate (FLONASE) 50 mcg/actuation nasal spray fluticasone propionate 50 mcg/actuation nasal spray,suspension      lancets Misc  To check BG TID PRN, to use with insurance preferred meter 100 each 1    levothyroxine (SYNTHROID) 100 MCG tablet Take 1 tablet (100 mcg total) by mouth before breakfast. 90 tablet 0    methocarbamoL (ROBAXIN) 750 MG Tab Take 1 tablet (750 mg total) by mouth 4 (four) times daily as needed. 44 tablet 3    metoprolol succinate (TOPROL-XL) 25 MG 24 hr tablet Take 1 tablet (25 mg total) by mouth every evening. 30 tablet 11    multivitamin (THERAGRAN) per tablet Take 1 tablet by mouth once daily. Bariatric vitamin      pantoprazole (PROTONIX) 40 MG tablet TAKE ONE TABLET BY MOUTH EVERY MORNING and TAKE ONE TABLET BY MOUTH EVERY NIGHT AT BEDTIME 180 tablet 1    prazosin (MINIPRESS) 1 MG Cap Take 1 capsule (1 mg total) by mouth every evening. 30 capsule 1    pregabalin (LYRICA) 100 MG capsule Take 1 capsule by mouth every morning and take 2 capsules by mouth every night at bedtime 120 capsule 1    senna-docusate 8.6-50 mg (PERICOLACE) 8.6-50 mg per tablet Take 1 tablet by mouth 2 (two) times a day.      TRUE METRIX GLUCOSE TEST STRIP Strp TO CHECK BLOOD GLUCOSE THREE TIMES DAILY AS NEEDED 300 each 3    venlafaxine (EFFEXOR-XR) 37.5 MG 24 hr capsule Take 1 capsule (37.5 mg total) by mouth once daily. 30 capsule 11    [DISCONTINUED] atorvastatin (LIPITOR) 40 MG tablet Take 1 tablet (40 mg total) by mouth once daily. 90 tablet 3    [DISCONTINUED] baclofen (LIORESAL) 10 MG tablet Take 1 tablet (10 mg total) by mouth 3 (three) times daily. 90 tablet 1    [DISCONTINUED] ticagrelor (BRILINTA) 90 mg tablet TAKE 1 TABLET BY MOUTH TWICE DAILY 180 tablet 3    [DISCONTINUED] venlafaxine (EFFEXOR-XR) 150 MG Cp24 Take 1 capsule (150 mg total) by mouth once daily. 90 capsule 3    acarbose (PRECOSE) 25 MG Tab Take 1 tablet (25 mg total) by mouth 3 (three) times daily with meals. (Patient not taking: Reported on 7/19/2024) 270 tablet 3    aspirin (ECOTRIN) 81 MG EC tablet Take 1 tablet (81 mg total) by mouth once daily. (Patient not  "taking: Reported on 3/27/2024) 30 tablet 11     No current facility-administered medications on file prior to visit.     Review of patient's allergies indicates:   Allergen Reactions    Celexa [citalopram] Other (See Comments)     GI upset  Stated "knocked me out"    Cephalexin Anaphylaxis    Zoloft [sertraline] Other (See Comments)     Stated "knocked me out"    Codeine Other (See Comments)     hallucinations  hallucinations    Isosorbide Nausea And Vomiting    Ciprofloxacin Itching    Levaquin [levofloxacin] Other (See Comments)     tendinitis    Metformin      Nausea     Penicillamine     Penicillins Other (See Comments)     Was told per mother that as child was allergic to penicillin.  Childhood allergy/ unknown reaction    Sulfa (sulfonamide antibiotics)      Family History   Problem Relation Name Age of Onset    Heart failure Mother      Asthma Mother      Macular degeneration Mother      Cancer Mother          Breast    Cataracts Mother      Heart disease Mother      COPD Mother      Breast cancer Mother      Heart disease Father      Diabetes Father      Hypertension Father      Stroke Father      Cataracts Father      Obesity Father      Obesity Sister      Glaucoma Sister      Thyroid disease Sister      Glaucoma Sister      Other Brother          stiff man syndrome    Cancer Maternal Grandmother          Breast with Mets    Breast cancer Maternal Grandmother      Cancer Paternal Grandmother          Colon    Strabismus Other niece     Amblyopia Neg Hx      Blindness Neg Hx      Retinal detachment Neg Hx       Social History     Socioeconomic History    Marital status:    Occupational History    Occupation: teacher   Tobacco Use    Smoking status: Former     Current packs/day: 0.00     Average packs/day: 0.5 packs/day for 14.9 years (7.5 ttl pk-yrs)     Types: Cigarettes     Start date: 1984     Quit date: 1998     Years since quittin.6    Smokeless tobacco: Never   Substance and " Sexual Activity    Alcohol use: No    Drug use: Not Currently     Types: Benzodiazepines    Sexual activity: Yes     Partners: Male     Social Determinants of Health     Financial Resource Strain: Patient Declined (2/21/2023)    Overall Financial Resource Strain (CARDIA)     Difficulty of Paying Living Expenses: Patient declined   Food Insecurity: Patient Declined (2/21/2023)    Hunger Vital Sign     Worried About Running Out of Food in the Last Year: Patient declined     Ran Out of Food in the Last Year: Patient declined   Transportation Needs: Patient Declined (2/21/2023)    PRAPARE - Transportation     Lack of Transportation (Medical): Patient declined     Lack of Transportation (Non-Medical): Patient declined   Physical Activity: Patient Declined (2/21/2023)    Exercise Vital Sign     Days of Exercise per Week: Patient declined     Minutes of Exercise per Session: Patient declined   Stress: Patient Declined (2/21/2023)    [employer] [place] [employer] - Occupational Stress Questionnaire     Feeling of Stress : Patient declined   Housing Stability: Unknown (2/21/2023)    Housing Stability Vital Sign     Unable to Pay for Housing in the Last Year: Patient refused     Unstable Housing in the Last Year: Patient refused       Review of Systems:  Constitutional:  Denies fever or chills   Eyes:  Denies change in visual acuity   HENT:  Denies nasal congestion or sore throat   Respiratory:  Denies cough or shortness of breath   Cardiovascular:  Denies chest pain or edema   GI:  Denies abdominal pain, nausea, vomiting, bloody stools or diarrhea   :  Denies dysuria   Integument:  Denies rash   Neurologic:  Denies headache, focal weakness or sensory changes   Endocrine:  Denies polyuria or polydipsia   Lymphatic:  Denies swollen glands   Psychiatric:  Denies depression or anxiety     Physical Exam:   Constitutional:  Well developed, well nourished, no acute distress, non-toxic appearance   Integument:  Well  hydrated, no rash   Lymphatic:  No lymphadenopathy noted   Neurologic:  Alert & oriented x 3, CN 2-12 normal, normal motor function, normal sensory function, no focal deficits noted   Psychiatric:  Speech and behavior appropriate   Eyes: EOMI  Gi: abdomen soft    Bilateral Knee Exam    right Knee Exam     Tenderness   The patient is experiencing tenderness in the pes bursa.    Range of Motion   Extension: abnormal   Flexion: abnormal     Muscle Strength     The patient has normal knee strength.    Tests   Livia:  Medial - negative   Lachman:  Anterior - negative      Varus: negative  Valgus: negative  Patellar Apprehension: negative    Other   Erythema: absent  Sensation: normal  Pulse: present  Swelling: mild      left Knee Exam   left knee exam performed same as contralateral side and is normal.      Status post total right knee replacement  -     X-ray Knee Ortho Right; Future; Expected date: 08/08/2024    Pes anserinus bursitis of right knee            Using an aseptic technique, I injected 1 cc of lidocaine 1% without and 1 cc of kenalog 40mg into the right Knee pes. The patient tolerated this well. I will have them return to clinic 6 weeks.

## 2024-08-15 NOTE — TELEPHONE ENCOUNTER
Refill Routing Note   Medication(s) are not appropriate for processing by [facility] for the following reason(s):        Outside of protocol  Drug-disease interaction: : venlafaxine and Primary hypertension; Renovascular hypertension; Hypertensive retinopathy of both eyes     [facility] action(s):  Route  Defer  Approve   Requires labs : Yes      Medication Therapy Plan: ED-Hosp admit (7/12/23) for chest pain. No change to current atorvastatin or venlafaxine therapies during encounter      Appointments  past 12m or future 3m with PCP    Date Provider   Last Visit   8/25/2023 Ian Young MD   Next Visit   Visit date not found Ian Young MD   ED visits in past 90 days: 0        Note composed:6:28 AM 08/15/2024

## 2024-09-13 DIAGNOSIS — M51.36 DDD (DEGENERATIVE DISC DISEASE), LUMBAR: ICD-10-CM

## 2024-09-13 DIAGNOSIS — E03.9 ACQUIRED HYPOTHYROIDISM: Primary | Chronic | ICD-10-CM

## 2024-09-13 NOTE — TELEPHONE ENCOUNTER
Refill Routing Note   Medication(s) are not appropriate for processing by [facility] for the following reason(s):        Outside of protocol  ED/Hospital Visit since last OV with provider:     [facility] action(s):  Defer  Route      Medication Therapy Plan:  Last PCP Visit: 8/25/2023 Last Admission: 2/20/2023 Last ED Visit: 10/12/2023      Appointments  past 12m or future 3m with PCP    Date Provider   Last Visit   08/25/23  Ian Young MD   Next Visit   Visit date not found Ian Young MD   ED visits in past 90 days: 0        Note composed:6:05 PM 09/13/2024

## 2024-09-13 NOTE — TELEPHONE ENCOUNTER
No care due was identified.  [facility] Embedded Care Due Messages. Reference number: [id number].   9/13/2024 9:48:47 AM CDT

## 2024-09-16 RX ORDER — LEVOTHYROXINE SODIUM 100 UG/1
100 TABLET ORAL EVERY MORNING
Qty: 30 TABLET | Refills: 0 | Status: SHIPPED | OUTPATIENT
Start: 2024-09-16

## 2024-09-16 RX ORDER — BACLOFEN 10 MG/1
TABLET ORAL
Qty: 60 TABLET | Refills: 0 | Status: SHIPPED | OUTPATIENT
Start: 2024-09-16

## 2024-09-17 DIAGNOSIS — M51.36 DDD (DEGENERATIVE DISC DISEASE), LUMBAR: ICD-10-CM

## 2024-09-17 NOTE — TELEPHONE ENCOUNTER
Care Due:                  Date            Visit Type   Department     Provider  --------------------------------------------------------------------------------                                EP -                              PRIMARY      [facility] FAMILY  Last Visit: 08-      CARE ([facility])   MEDICINE       Ian Young                              EP -                              PRIMARY      [facility] FAMILY  Next Visit: 09-      CARE ([facility])   MEDICINE       LU FAUSTIN                                                            Last  Test          Frequency    Reason                     Performed    Due Date  --------------------------------------------------------------------------------    CBC.........  12 months..  allopurinoL..............  10-   10-    CMP.........  12 months..  allopurinoL,               10-   10-                             atorvastatin, venlafaxine    TSH.........  12 months..  levothyroxine............  10-   10-    [facility] Embedded Care Due Messages. Reference number: [id number].   9/17/2024 4:43:10 PM CDT

## 2024-09-18 RX ORDER — PREGABALIN 100 MG/1
CAPSULE ORAL
Qty: 90 CAPSULE | Refills: 0 | Status: SHIPPED | OUTPATIENT
Start: 2024-09-18

## 2024-09-18 NOTE — TELEPHONE ENCOUNTER
No care due was identified.  [facility] Embedded Care Due Messages. Reference number: [id number].   9/18/2024 1:29:36 PM CDT

## 2024-09-18 NOTE — TELEPHONE ENCOUNTER
Refill Routing Note   Medication(s) are not appropriate for processing by [facility] for the following reason(s):        Outside of protocol    [facility] action(s):  Route     Requires labs : Yes             Appointments  past 12m or future 3m with PCP    Date Provider   Last Visit   Visit date not found Ian Young MD   Next Visit   Visit date not found Ian Young MD   ED visits in past 90 days: 0        Note composed:9:09 AM 09/18/2024

## 2024-09-19 ENCOUNTER — OFFICE VISIT (OUTPATIENT)
Dept: ORTHOPEDICS | Facility: CLINIC | Age: 59
End: 2024-09-19
Payer: OTHER MISCELLANEOUS

## 2024-09-19 ENCOUNTER — OFFICE VISIT (OUTPATIENT)
Dept: FAMILY MEDICINE | Facility: CLINIC | Age: 59
End: 2024-09-19
Payer: COMMERCIAL

## 2024-09-19 ENCOUNTER — HOSPITAL ENCOUNTER (OUTPATIENT)
Dept: RADIOLOGY | Facility: HOSPITAL | Age: 59
Discharge: HOME | End: 2024-09-19
Attending: PHYSICIAN ASSISTANT
Payer: COMMERCIAL

## 2024-09-19 VITALS
HEART RATE: 87 BPM | WEIGHT: 138.44 LBS | TEMPERATURE: 100 F | DIASTOLIC BLOOD PRESSURE: 78 MMHG | BODY MASS INDEX: 25.74 KG/M2 | OXYGEN SATURATION: 98 % | SYSTOLIC BLOOD PRESSURE: 124 MMHG

## 2024-09-19 VITALS — BODY MASS INDEX: 25.48 KG/M2 | HEIGHT: 62 IN | WEIGHT: 138.44 LBS

## 2024-09-19 DIAGNOSIS — R06.2 WHEEZING ON BOTH SIDES OF CHEST: ICD-10-CM

## 2024-09-19 DIAGNOSIS — Z95.1 S/P CABG (CORONARY ARTERY BYPASS GRAFT): ICD-10-CM

## 2024-09-19 DIAGNOSIS — I10 PRIMARY HYPERTENSION: ICD-10-CM

## 2024-09-19 DIAGNOSIS — I25.10 CORONARY ARTERY DISEASE INVOLVING NATIVE CORONARY ARTERY OF NATIVE HEART WITHOUT ANGINA PECTORIS: ICD-10-CM

## 2024-09-19 DIAGNOSIS — G47.33 OSA ON CPAP: ICD-10-CM

## 2024-09-19 DIAGNOSIS — R05.1 ACUTE COUGH: ICD-10-CM

## 2024-09-19 DIAGNOSIS — J22 LOWER RESPIRATORY INFECTION (E.G., BRONCHITIS, PNEUMONIA, PNEUMONITIS, PULMONITIS): Primary | ICD-10-CM

## 2024-09-19 DIAGNOSIS — M70.51 PES ANSERINUS BURSITIS OF RIGHT KNEE: Primary | ICD-10-CM

## 2024-09-19 LAB
CTP QC/QA: YES
CTP QC/QA: YES
POC MOLECULAR INFLUENZA A AGN: NEGATIVE
POC MOLECULAR INFLUENZA B AGN: NEGATIVE
SARS-COV-2 RDRP RESP QL NAA+PROBE: NEGATIVE

## 2024-09-19 PROCEDURE — 99999 PR PBB SHADOW E&M-EST. PATIENT-LVL V: CPT | Mod: PBBFAC,,, | Performed by: PHYSICIAN ASSISTANT

## 2024-09-19 PROCEDURE — 71046 X-RAY EXAM CHEST 2 VIEWS: CPT | Mod: 26,,, | Performed by: RADIOLOGY

## 2024-09-19 PROCEDURE — 71046 X-RAY EXAM CHEST 2 VIEWS: CPT | Mod: TC,FY,PO

## 2024-09-19 RX ORDER — AMLODIPINE BESYLATE 5 MG/1
5 TABLET ORAL EVERY MORNING
Qty: 30 TABLET | Refills: 11 | OUTPATIENT
Start: 2024-09-19

## 2024-09-19 RX ORDER — METOPROLOL SUCCINATE 25 MG/1
25 TABLET, EXTENDED RELEASE ORAL NIGHTLY
Qty: 30 TABLET | Refills: 11 | OUTPATIENT
Start: 2024-09-19

## 2024-09-19 RX ORDER — AMLODIPINE BESYLATE 5 MG/1
5 TABLET ORAL DAILY
Qty: 30 TABLET | Refills: 11 | Status: SHIPPED | OUTPATIENT
Start: 2024-09-19 | End: 2025-09-19

## 2024-09-19 RX ORDER — METOPROLOL SUCCINATE 25 MG/1
25 TABLET, EXTENDED RELEASE ORAL NIGHTLY
Qty: 30 TABLET | Refills: 11 | Status: SHIPPED | OUTPATIENT
Start: 2024-09-19 | End: 2025-09-19

## 2024-09-19 RX ORDER — FLUTICASONE PROPIONATE 50 MCG
1 SPRAY, SUSPENSION (ML) NASAL DAILY
Qty: 18.2 ML | Refills: 0 | Status: SHIPPED | OUTPATIENT
Start: 2024-09-19 | End: 2024-10-03

## 2024-09-19 RX ORDER — ALBUTEROL SULFATE 90 UG/1
2 INHALANT RESPIRATORY (INHALATION) EVERY 6 HOURS PRN
Qty: 18 G | Refills: 1 | Status: SHIPPED | OUTPATIENT
Start: 2024-09-19

## 2024-09-19 RX ORDER — DOXYCYCLINE 100 MG/1
100 CAPSULE ORAL 2 TIMES DAILY
Qty: 14 CAPSULE | Refills: 0 | Status: SHIPPED | OUTPATIENT
Start: 2024-09-19 | End: 2024-09-26

## 2024-09-19 RX ADMIN — TRIAMCINOLONE ACETONIDE 40 MG: 40 INJECTION, SUSPENSION INTRA-ARTICULAR; INTRAMUSCULAR at 03:09

## 2024-09-19 NOTE — PROGRESS NOTES
Subjective     Patient ID: Josephine Bolton is a 59 y.o. female.    Chief Complaint: Fever, Cough, Headache, and Medication Refill      HPI      Pt is new to me, PCP Dr. Young.     Pt is a 59 year old female with diffuse DDD, hx of lumbar spinal fusion, VANDA, depression, PTSD, glaucoma, hypertensive retinopathy, CAD with hx of CABG and stent, CHF, HTN, HLD, hx of nephrolithiasis, hypothyroidism, obesity, hx of gastric bypass, gout, osteoarthritis, RAHUL on CPAP, former smoker. She presents today complaining of nasal congestion, cough, headache x 1 week. States cough is productive of thick brown sputum. Although she did not have fever at start of symptoms, but she feels she may have had it yesterday because she was achy and had chills. Last dose of tylenol 7:30am today. Reports mild dyspnea with exertion. States she had some intermittent left sided chest pain last week, not currently, and it seemed to be related to movement of her left shoulder. No GI upset.     Review of Systems   All other systems reviewed and are negative.         Objective     Physical Exam  Vitals and nursing note reviewed.   Constitutional:       General: She is not in acute distress.     Appearance: Normal appearance. She is not ill-appearing, toxic-appearing or diaphoretic.   HENT:      Head: Normocephalic and atraumatic.      Right Ear: Tympanic membrane, ear canal and external ear normal. There is no impacted cerumen.      Left Ear: Tympanic membrane, ear canal and external ear normal. There is no impacted cerumen.      Nose: No congestion or rhinorrhea.      Mouth/Throat:      Pharynx: No oropharyngeal exudate or posterior oropharyngeal erythema.   Eyes:      General: No scleral icterus.        Right eye: No discharge.         Left eye: No discharge.      Conjunctiva/sclera: Conjunctivae normal.      Pupils: Pupils are equal, round, and reactive to light.   Cardiovascular:      Rate and Rhythm: Normal rate and regular rhythm.       Pulses: Normal pulses.      Heart sounds: Normal heart sounds. No murmur heard.     No friction rub. No gallop.   Pulmonary:      Effort: Pulmonary effort is normal. No respiratory distress.      Breath sounds: No stridor. Wheezing (mild, bilateral expiratory wheezes, low lobes) present. No rhonchi or rales.   Abdominal:      General: Abdomen is flat. Bowel sounds are normal.      Palpations: Abdomen is soft.   Musculoskeletal:         General: No deformity or signs of injury.      Cervical back: Normal range of motion and neck supple.      Right lower leg: Edema (1+) present.      Left lower leg: Edema (1+) present.      Comments: States edema at her baseline   Lymphadenopathy:      Cervical: No cervical adenopathy.   Skin:     General: Skin is warm.      Coloration: Skin is not jaundiced or pale.      Findings: No lesion or rash.   Neurological:      General: No focal deficit present.      Mental Status: She is alert and oriented to person, place, and time. Mental status is at baseline.   Psychiatric:         Mood and Affect: Mood normal.         Behavior: Behavior normal.         Thought Content: Thought content normal.         Judgment: Judgment normal.       1. Lower respiratory infection (e.g., bronchitis, pneumonia, pneumonitis, pulmonitis)  -discussed symptomatic treatment measures  - albuterol (PROVENTIL/VENTOLIN HFA) 90 mcg/actuation inhaler; Inhale 2 puffs into the lungs every 6 (six) hours as needed for Shortness of Breath or Wheezing.  Dispense: 18 g; Refill: 1  - doxycycline (VIBRAMYCIN) 100 MG Cap; Take 1 capsule (100 mg total) by mouth 2 (two) times daily. for 7 days  Dispense: 14 capsule; Refill: 0    2. Acute cough  -negative POCT  - POCT Influenza A/B Molecular  - POCT COVID-19 Rapid Screening    - CBC Auto Differential; Future  - Comprehensive Metabolic Panel; Future  - fluticasone propionate (FLONASE) 50 mcg/actuation nasal spray; 1 spray (50 mcg total) by Each Nostril route once daily. for 14  days  Dispense: 18.2 mL; Refill: 0    3. Wheezing on both sides of chest  - X-Ray Chest PA And Lateral; Future    4. Primary hypertension  - amLODIPine (NORVASC) 5 MG tablet; Take 1 tablet (5 mg total) by mouth once daily.  Dispense: 30 tablet; Refill: 11  -refill given, continue current med regimen    5. Coronary artery disease involving native coronary artery of native heart without angina pectoris  -refill given, followed by cardiology, continue current med regimen  - metoprolol succinate (TOPROL-XL) 25 MG 24 hr tablet; Take 1 tablet (25 mg total) by mouth every evening.  Dispense: 30 tablet; Refill: 11    6. S/P CABG (coronary artery bypass graft)  -see above    7. RAHUL on CPAP  -continue use    RTC/ER precautions given. F/U if symptoms persist or worsen    Genet Hare PA-C

## 2024-09-19 NOTE — PATIENT INSTRUCTIONS
A few reminders from today:    Cxr today  Labs today  doxycyline as prescribed  Continue zyrtec  Start flonase daily  Albuterol every 6 hours for the first few days, then can progress to as needed for wheezing or shortness of breath.   Cough drops as needed  Honey lemon tea  Hot steamy showers/warm compresses over sinuses  Rest, hydrate, eat healthy  Tylenol for fever or discomfort, as needed.   Take daily multivitamin, eat plenty of protein to help with strength and healing  Warm salt water gargles as needed for throat irritation      Follow up with me if needed.   Please go to ER/urgent care if after hours or symptoms persist/worsen.     Do not hesitate to get in touch with me should you have any further questions.     Thank you for trusting me with your care!  I wish you health and happiness.    -Genet Hare PA-C

## 2024-09-20 ENCOUNTER — PATIENT MESSAGE (OUTPATIENT)
Dept: FAMILY MEDICINE | Facility: CLINIC | Age: 59
End: 2024-09-20
Payer: COMMERCIAL

## 2024-09-20 DIAGNOSIS — D72.829 LEUKOCYTOSIS, UNSPECIFIED TYPE: Primary | ICD-10-CM

## 2024-09-24 ENCOUNTER — OFFICE VISIT (OUTPATIENT)
Dept: ORTHOPEDICS | Facility: CLINIC | Age: 59
End: 2024-09-24
Payer: COMMERCIAL

## 2024-09-24 ENCOUNTER — OFFICE VISIT (OUTPATIENT)
Dept: FAMILY MEDICINE | Facility: CLINIC | Age: 59
End: 2024-09-24
Payer: COMMERCIAL

## 2024-09-24 ENCOUNTER — TELEPHONE (OUTPATIENT)
Dept: ORTHOPEDICS | Facility: CLINIC | Age: 59
End: 2024-09-24

## 2024-09-24 VITALS
TEMPERATURE: 99 F | WEIGHT: 140 LBS | BODY MASS INDEX: 25.76 KG/M2 | DIASTOLIC BLOOD PRESSURE: 70 MMHG | HEART RATE: 89 BPM | OXYGEN SATURATION: 95 % | HEIGHT: 62 IN | SYSTOLIC BLOOD PRESSURE: 132 MMHG

## 2024-09-24 DIAGNOSIS — G95.9 CERVICAL MYELOPATHY: ICD-10-CM

## 2024-09-24 DIAGNOSIS — M75.21 BICEPS TENDINITIS OF RIGHT UPPER EXTREMITY: ICD-10-CM

## 2024-09-24 DIAGNOSIS — M75.41 IMPINGEMENT SYNDROME, SHOULDER, RIGHT: Primary | ICD-10-CM

## 2024-09-24 DIAGNOSIS — G31.89 OTHER SPECIFIED DEGENERATIVE DISEASES OF NERVOUS SYSTEM: ICD-10-CM

## 2024-09-24 DIAGNOSIS — G60.9 IDIOPATHIC PERIPHERAL NEUROPATHY: ICD-10-CM

## 2024-09-24 DIAGNOSIS — I25.10 CORONARY ARTERY DISEASE INVOLVING NATIVE CORONARY ARTERY OF NATIVE HEART WITHOUT ANGINA PECTORIS: ICD-10-CM

## 2024-09-24 DIAGNOSIS — I10 ESSENTIAL HYPERTENSION: ICD-10-CM

## 2024-09-24 DIAGNOSIS — M46.1 SACROILIITIS, NOT ELSEWHERE CLASSIFIED: ICD-10-CM

## 2024-09-24 DIAGNOSIS — J20.9 ACUTE BRONCHITIS, UNSPECIFIED ORGANISM: ICD-10-CM

## 2024-09-24 DIAGNOSIS — E03.4 HYPOTHYROIDISM DUE TO ACQUIRED ATROPHY OF THYROID: Primary | ICD-10-CM

## 2024-09-24 DIAGNOSIS — G63 NEUROPATHY DUE TO MEDICAL CONDITION: ICD-10-CM

## 2024-09-24 DIAGNOSIS — M75.22 BICEPS TENDINITIS OF LEFT UPPER EXTREMITY: ICD-10-CM

## 2024-09-24 DIAGNOSIS — M75.42 IMPINGEMENT SYNDROME OF LEFT SHOULDER: ICD-10-CM

## 2024-09-24 DIAGNOSIS — I50.9 HEART FAILURE, UNSPECIFIED HF CHRONICITY, UNSPECIFIED HEART FAILURE TYPE: ICD-10-CM

## 2024-09-24 DIAGNOSIS — E78.2 MIXED HYPERLIPIDEMIA: ICD-10-CM

## 2024-09-24 PROBLEM — E11.9 TYPE 2 DIABETES MELLITUS WITHOUT COMPLICATION, WITHOUT LONG-TERM CURRENT USE OF INSULIN: Status: RESOLVED | Noted: 2024-09-24 | Resolved: 2024-09-24

## 2024-09-24 PROBLEM — E11.9 TYPE 2 DIABETES MELLITUS WITHOUT COMPLICATION, WITHOUT LONG-TERM CURRENT USE OF INSULIN: Status: ACTIVE | Noted: 2024-09-24

## 2024-09-24 PROBLEM — E87.6 HYPOKALEMIA: Status: RESOLVED | Noted: 2023-10-12 | Resolved: 2024-09-24

## 2024-09-24 PROCEDURE — 1160F RVW MEDS BY RX/DR IN RCRD: CPT | Mod: CPTII,S$GLB,, | Performed by: NURSE PRACTITIONER

## 2024-09-24 PROCEDURE — 3075F SYST BP GE 130 - 139MM HG: CPT | Mod: CPTII,S$GLB,, | Performed by: INTERNAL MEDICINE

## 2024-09-24 PROCEDURE — 99214 OFFICE O/P EST MOD 30 MIN: CPT | Mod: S$GLB,,, | Performed by: INTERNAL MEDICINE

## 2024-09-24 PROCEDURE — 1159F MED LIST DOCD IN RCRD: CPT | Mod: CPTII,S$GLB,, | Performed by: INTERNAL MEDICINE

## 2024-09-24 PROCEDURE — 99212 OFFICE O/P EST SF 10 MIN: CPT | Mod: 25,S$GLB,, | Performed by: NURSE PRACTITIONER

## 2024-09-24 PROCEDURE — 3044F HG A1C LEVEL LT 7.0%: CPT | Mod: CPTII,S$GLB,, | Performed by: INTERNAL MEDICINE

## 2024-09-24 PROCEDURE — 99999 PR PBB SHADOW E&M-EST. PATIENT-LVL V: CPT | Mod: PBBFAC,,, | Performed by: INTERNAL MEDICINE

## 2024-09-24 PROCEDURE — 99999 PR PBB SHADOW E&M-EST. PATIENT-LVL II: CPT | Mod: PBBFAC,,, | Performed by: NURSE PRACTITIONER

## 2024-09-24 PROCEDURE — 20610 DRAIN/INJ JOINT/BURSA W/O US: CPT | Mod: 50,S$GLB,, | Performed by: NURSE PRACTITIONER

## 2024-09-24 PROCEDURE — 3078F DIAST BP <80 MM HG: CPT | Mod: CPTII,S$GLB,, | Performed by: INTERNAL MEDICINE

## 2024-09-24 PROCEDURE — 3008F BODY MASS INDEX DOCD: CPT | Mod: CPTII,S$GLB,, | Performed by: INTERNAL MEDICINE

## 2024-09-24 PROCEDURE — 3044F HG A1C LEVEL LT 7.0%: CPT | Mod: CPTII,S$GLB,, | Performed by: NURSE PRACTITIONER

## 2024-09-24 PROCEDURE — 1159F MED LIST DOCD IN RCRD: CPT | Mod: CPTII,S$GLB,, | Performed by: NURSE PRACTITIONER

## 2024-09-24 RX ORDER — TRIAMCINOLONE ACETONIDE 40 MG/ML
40 INJECTION, SUSPENSION INTRA-ARTICULAR; INTRAMUSCULAR
Status: DISCONTINUED | OUTPATIENT
Start: 2024-09-24 | End: 2024-09-24 | Stop reason: HOSPADM

## 2024-09-24 RX ORDER — BENZONATATE 100 MG/1
CAPSULE ORAL
Qty: 45 CAPSULE | Refills: 0 | Status: SHIPPED | OUTPATIENT
Start: 2024-09-24

## 2024-09-24 RX ORDER — AZITHROMYCIN 250 MG/1
TABLET, FILM COATED ORAL
Qty: 6 TABLET | Refills: 0 | Status: SHIPPED | OUTPATIENT
Start: 2024-09-24

## 2024-09-24 RX ADMIN — TRIAMCINOLONE ACETONIDE 40 MG: 40 INJECTION, SUSPENSION INTRA-ARTICULAR; INTRAMUSCULAR at 01:09

## 2024-09-24 NOTE — PROGRESS NOTES
"Chief Complaint   Patient presents with    Right Shoulder - Pain     Wants injection.     Left Shoulder - Pain     Wants injection.        HPI:   This is a 59 y.o. who returns to clinic today in follow-up for bilateral shoulder for the past 2 weeks after no known injury. Pain is aching. No numbness or tingling. No associated signs or symptoms.    Past Medical History:   Diagnosis Date    Allergy     Anticoagulant long-term use     ASA 81mg, Effient    Anxiety     Arthritis     Asthma     As child    CAD (coronary artery disease) 01/27/2012    s/p stents x4 , CABG, 3 vessel, (5/2013) newest stent prior to CABG    Cataract     Chronic constipation     Colon polyp     Coronary artery disease involving native coronary artery of native heart without angina pectoris 01/27/2012 12/19 Significant ostial RCA lesion as above treated with drug-eluting stenting as above. Occluded proximal LAD with patent lima lad Patent vein graft to 2nd obtuse marginal Known occluded vein graft to unknown vessel.  s/p stents x 3 (2010) s/p LAD stent (DSE) 3/2012    DDD (degenerative disc disease), cervical     DDD (degenerative disc disease), lumbar     DDD (degenerative disc disease), thoracic     Depression     Edema     Encounter for blood transfusion     General anesthetics causing adverse effect in therapeutic use     Headache(784.0)     History of nephrolithiasis     Hyperlipidemia     Hypertension     Hypothyroid 07/05/2012    Insomnia     Insulin resistance     patient stated not diebetic    Joint stiffness     Joint swelling     Kidney disease     ; Frequent UTIs     Kidney stones     Low back pain     Neck pain     Obstructive sleep apnea on CPAP 07/17/2012    PONV (postoperative nausea and vomiting)     S/P CABG (coronary artery bypass graft) 06/25/2013 6/23/2013     Sleep apnea 01/27/2012    Patient reports "severe" sleep apnea, uses C-pap    Stented coronary artery 12/20/2019 12/19  ostial RCA -Osirio2.5 x 18 " post dilated 2.75     Thoracic back pain     Type 2 diabetes mellitus without complication, without long-term current use of insulin 2024    Usual hyperplasia of lactiferous duct 2017    left breast     Past Surgical History:   Procedure Laterality Date    ADENOIDECTOMY      BACK SURGERY      BREAST BIOPSY Left 2017    duct excision- Dr. Jimenez    BREAST CYST ASPIRATION Left 2020    @ 's office- old hematoma drained (per office)    CARDIAC SURGERY      CARPAL TUNNEL RELEASE      bilateral    CERVICAL LAMINECTOMY WITH SPINAL FUSION      2016     SECTION, CLASSIC      x 2    COLONOSCOPY      COLONOSCOPY N/A 2024    Procedure: COLONOSCOPY;  Surgeon: Mk Warren MD;  Location: [facility] ENDO;  Service: Endoscopy;  Laterality: N/A;    CORONARY ANGIOGRAPHY  2019    Procedure: ANGIOGRAM, CORONARY ARTERY;  Surgeon: Timi Millan MD;  Location: [facility] CATH;  Service: Cardiology;;    CORONARY ANGIOGRAPHY  10/13/2023    Procedure: Coronary angiogram study;  Surgeon: Azra Stoll MD;  Location: [facility] CATH;  Service: Cardiology;;    CORONARY ANGIOGRAPHY INCLUDING BYPASS GRAFTS WITH CATHETERIZATION OF LEFT HEART  10/13/2023    Procedure: Left heart cath w/Grafts RM 3640;  Surgeon: zAra Stoll MD;  Location: [facility] CATH;  Service: Cardiology;;    CORONARY ANGIOPLASTY WITH STENT PLACEMENT      x 4    CORONARY ARTERY BYPASS GRAFT  2013    3 vessel    CORONARY BYPASS GRAFT ANGIOGRAPHY  10/13/2023    Procedure: Bypass graft study;  Surgeon: Azra Stoll MD;  Location: [facility] CATH;  Service: Cardiology;;    ESOPHAGOGASTRODUODENOSCOPY N/A 2020    Procedure: EGD (ESOPHAGOGASTRODUODENOSCOPY);  Surgeon: Mk Warren MD;  Location: [facility] ENDO;  Service: Endoscopy;  Laterality: N/A;    HYSTERECTOMY      Complete    JOINT REPLACEMENT      KNEE ARTHROPLASTY Left 2019    Procedure: ARTHROPLASTY, KNEE;  Surgeon: Juan Wilson MD;  Location: [facility] OR;  Service: Orthopedics;   Laterality: Left;    KNEE ARTHROSCOPY W/ MENISCAL REPAIR Left     twice, last one 5/2014    LAMINECTOMY      L4/L5    LAPAROSCOPIC CHOLECYSTECTOMY N/A 7/3/2023    Procedure: CHOLECYSTECTOMY, LAPAROSCOPIC;  Surgeon: Obinna Whipple MD;  Location: [facility] OR;  Service: General;  Laterality: N/A;  with cholangiogram    LEFT HEART CATHETERIZATION  12/13/2019    Procedure: Left heart cath- RM # [id number];  Surgeon: Timi Millan MD;  Location: [facility] CATH;  Service: Cardiology;;    OOPHORECTOMY Bilateral 2011    ROBOTIC ARTHROPLASTY, KNEE Right 6/14/2021    Procedure: ROBOTIC ARTHROPLASTY, KNEE, TOTAL;  Surgeon: Juan Wilson MD;  Location: [facility] OR;  Service: Orthopedics;  Laterality: Right;    SINUS SURGERY      x3    TENDON REPAIR Left     ankle surgery    THYROIDECTOMY      2 separate surgeries    TONSILLECTOMY      TOTAL KNEE ARTHROPLASTY Left 06/17/2019    Surgeon: Juan Wilson MD    XI ROBOTIC REVISION, GASTROENTEROSTOMY, MITCHELL-EN-Y N/A 2/20/2023    Procedure: XI ROBOTIC REVISION,GASTROENTEROSTOMY,MITCHELL-EN-Y;  Surgeon: Obinna Whipple MD;  Location: [facility] OR;  Service: General;  Laterality: N/A;  creation of Mitchell-N-Y anastomsis     Current Outpatient Medications on File Prior to Visit   Medication Sig Dispense Refill    acarbose (PRECOSE) 25 MG Tab Take 1 tablet (25 mg total) by mouth 3 (three) times daily with meals. 270 tablet 3    acetaminophen (TYLENOL) 650 MG TbSR Take 1,300 mg by mouth every 8 (eight) hours as needed (pain).       albuterol (PROVENTIL/VENTOLIN HFA) 90 mcg/actuation inhaler Inhale 2 puffs into the lungs every 6 (six) hours as needed for Shortness of Breath or Wheezing. 18 g 1    allopurinoL (ZYLOPRIM) 100 MG tablet Take 1 tablet (100 mg total) by mouth every morning. 90 tablet 0    amLODIPine (NORVASC) 5 MG tablet Take 1 tablet (5 mg total) by mouth once daily. 30 tablet 11    aspirin (ECOTRIN) 81 MG EC tablet Take 1 tablet (81 mg total) by mouth once daily. (Patient not taking: Reported on  9/24/2024) 30 tablet 11    atorvastatin (LIPITOR) 40 MG tablet TAKE ONE TABLET BY MOUTH EVERY NIGHT AT BEDTIME 90 tablet 0    azithromycin (ZITHROMAX Z-BRI) 250 MG tablet Take 2 po day 1, then 1 po day 2 - 5 6 tablet 0    baclofen (LIORESAL) 10 MG tablet TAKE ONE TABLET BY MOUTH EVERY MORNING and TAKE ONE TABLET BY MOUTH EVERY NIGHT AT BEDTIME 60 tablet 0    benzonatate (TESSALON) 100 MG capsule 1 - 2 po every 6 hours prn cough 45 capsule 0    blood-glucose meter kit To check BG TID PRN, to use with insurance preferred meter 1 each 0    calcium carb and citrate-vitD3 600 mg-12.5 mcg (500 unit) TbSR Take 2 tablets by mouth once.      cetirizine (ZYRTEC) 10 MG tablet Take 10 mg by mouth daily as needed for Allergies.      doxycycline (VIBRAMYCIN) 100 MG Cap Take 1 capsule (100 mg total) by mouth 2 (two) times daily. for 7 days 14 capsule 0    fluticasone propionate (FLONASE) 50 mcg/actuation nasal spray fluticasone propionate 50 mcg/actuation nasal spray,suspension      fluticasone propionate (FLONASE) 50 mcg/actuation nasal spray 1 spray (50 mcg total) by Each Nostril route once daily. for 14 days 18.2 mL 0    lancets Misc To check BG TID PRN, to use with insurance preferred meter 100 each 1    levothyroxine (SYNTHROID) 100 MCG tablet TAKE ONE TABLET BY MOUTH EVERY MORNING 30 tablet 0    methocarbamoL (ROBAXIN) 750 MG Tab Take 1 tablet (750 mg total) by mouth 4 (four) times daily as needed. (Patient not taking: Reported on 9/24/2024) 44 tablet 3    metoprolol succinate (TOPROL-XL) 25 MG 24 hr tablet Take 1 tablet (25 mg total) by mouth every evening. 30 tablet 11    multivitamin (THERAGRAN) per tablet Take 1 tablet by mouth once daily. Bariatric vitamin      pantoprazole (PROTONIX) 40 MG tablet TAKE ONE TABLET BY MOUTH EVERY MORNING and TAKE ONE TABLET BY MOUTH EVERY NIGHT AT BEDTIME 180 tablet 1    prazosin (MINIPRESS) 1 MG Cap Take 1 capsule (1 mg total) by mouth every evening. 30 capsule 1    pregabalin (LYRICA)  "100 MG capsule Take 1 capsule by mouth every morning and take 2 capsules by mouth every night at bedtime 90 capsule 0    senna-docusate 8.6-50 mg (PERICOLACE) 8.6-50 mg per tablet Take 1 tablet by mouth 2 (two) times a day.      ticagrelor (BRILINTA) 90 mg tablet TAKE ONE TABLET BY MOUTH EVERY MORNING and TAKE ONE TABLET BY MOUTH EVERY NIGHT AT BEDTIME 180 tablet 0    TRUE METRIX GLUCOSE TEST STRIP Strp TO CHECK BLOOD GLUCOSE THREE TIMES DAILY AS NEEDED 300 each 3    venlafaxine (EFFEXOR-XR) 150 MG Cp24 TAKE ONE CAPSULE BY MOUTH EVERY NIGHT AT BEDTIME 90 capsule 0    venlafaxine (EFFEXOR-XR) 37.5 MG 24 hr capsule Take 1 capsule (37.5 mg total) by mouth once daily. (Patient not taking: Reported on 9/24/2024) 30 capsule 11     No current facility-administered medications on file prior to visit.     Review of patient's allergies indicates:   Allergen Reactions    Celexa [citalopram] Other (See Comments)     GI upset  Stated "knocked me out"    Cephalexin Anaphylaxis    Zoloft [sertraline] Other (See Comments)     Stated "knocked me out"    Codeine Other (See Comments)     hallucinations  hallucinations    Isosorbide Nausea And Vomiting    Ciprofloxacin Itching    Levaquin [levofloxacin] Other (See Comments)     tendinitis    Metformin      Nausea     Penicillamine     Penicillins Other (See Comments)     Was told per mother that as child was allergic to penicillin.  Childhood allergy/ unknown reaction    Sulfa (sulfonamide antibiotics)      Family History   Problem Relation Name Age of Onset    Heart failure Mother      Asthma Mother      Macular degeneration Mother      Cancer Mother          Breast    Cataracts Mother      Heart disease Mother      COPD Mother      Breast cancer Mother      Heart disease Father      Diabetes Father      Hypertension Father      Stroke Father      Cataracts Father      Obesity Father      Obesity Sister      Glaucoma Sister      Thyroid disease Sister      Glaucoma Sister      Other " Brother          stiff man syndrome    Cancer Maternal Grandmother          Breast with Mets    Breast cancer Maternal Grandmother      Cancer Paternal Grandmother          Colon    Strabismus Other niece     Amblyopia Neg Hx      Blindness Neg Hx      Retinal detachment Neg Hx       Social History     Socioeconomic History    Marital status:    Occupational History    Occupation: teacher   Tobacco Use    Smoking status: Former     Current packs/day: 0.00     Average packs/day: 0.5 packs/day for 14.9 years (7.5 ttl pk-yrs)     Types: Cigarettes     Start date: 1984     Quit date: 1998     Years since quittin.7    Smokeless tobacco: Never   Substance and Sexual Activity    Alcohol use: No    Drug use: Not Currently     Types: Benzodiazepines    Sexual activity: Yes     Partners: Male     Social Determinants of Health     Financial Resource Strain: Patient Declined (2023)    Overall Financial Resource Strain (CARDIA)     Difficulty of Paying Living Expenses: Patient declined   Food Insecurity: Patient Declined (2023)    Hunger Vital Sign     Worried About Running Out of Food in the Last Year: Patient declined     Ran Out of Food in the Last Year: Patient declined   Transportation Needs: Patient Declined (2023)    PRAPARE - Transportation     Lack of Transportation (Medical): Patient declined     Lack of Transportation (Non-Medical): Patient declined   Physical Activity: Patient Declined (2023)    Exercise Vital Sign     Days of Exercise per Week: Patient declined     Minutes of Exercise per Session: Patient declined   Stress: Patient Declined (2023)    [employer] - Occupational Stress Questionnaire     Feeling of Stress : Patient declined   Housing Stability: Unknown (2023)    Housing Stability Vital Sign     Unable to Pay for Housing in the Last Year: Patient refused     Unstable Housing in the Last Year: Patient refused       Review  of Systems:  Constitutional:  Denies fever or chills   Eyes:  Denies change in visual acuity   HENT:  Denies nasal congestion or sore throat   Respiratory:  Denies cough or shortness of breath   Cardiovascular:  Denies chest pain or edema   GI:  Denies abdominal pain, nausea, vomiting, bloody stools or diarrhea   :  Denies dysuria   Integument:  Denies rash   Neurologic:  Denies headache, focal weakness or sensory changes   Endocrine:  Denies polyuria or polydipsia   Lymphatic:  Denies swollen glands   Psychiatric:  Denies depression or anxiety     Physical Exam:   Constitutional:  Well developed, well nourished, no acute distress, non-toxic appearance   Integument:  Well hydrated  Neurologic:  Alert & oriented x 3  Psychiatric:  Speech and behavior appropriate     Bilateral Shoulder Exam  Bilateral Shoulder Exam   Tenderness   Shoulder tenderness location: diffusely about shoulder. +TTP to bilateral biceps tendon.    Range of Motion   Forward Flexion: abnormal   External Rotation: abnormal     Muscle Strength   Supraspinatus: 4/5     Tests   Hawkin's test: positive  Impingement: positive    Other   Erythema: absent  Sensation: normal  Pulse: present       Assessment & plan    Impingement syndrome, shoulder, right  -     Large Joint Aspiration/Injection: bilateral subacromial bursa  -     triamcinolone acetonide injection 40 mg  -     triamcinolone acetonide injection 40 mg    Impingement syndrome of left shoulder  -     Large Joint Aspiration/Injection: bilateral subacromial bursa  -     triamcinolone acetonide injection 40 mg  -     triamcinolone acetonide injection 40 mg    Biceps tendinitis of left upper extremity  -     Tendon Sheath  -     Tendon Sheath  -     triamcinolone acetonide injection 40 mg  -     triamcinolone acetonide injection 40 mg    Biceps tendinitis of right upper extremity  -     Tendon Sheath  -     Tendon Sheath  -     triamcinolone acetonide injection 40 mg  -     triamcinolone acetonide  injection 40 mg      Using an aseptic technique, I injected 1 cc of lidocaine 1% without and 1 cc of kenalog 40mg into the bilateral biceps tendon. The patient tolerated this well.     Using an aseptic technique, I injected 5 cc of lidocaine 1% without and 1 cc of kenalog 40mg into the bilateral Shoulder. The patient tolerated this well. I will have them return to clinic as scheduled in 3 months.

## 2024-09-24 NOTE — PROCEDURES
Large Joint Aspiration/Injection: bilateral subacromial bursa    Date/Time: 9/24/2024 1:20 PM    Performed by: Leandra Wilkins FNP  Authorized by: Leandra Wilkins FNP    Consent Done?:  Yes (Verbal)  Indications:  Pain  Timeout: prior to procedure the correct patient, procedure, and site was verified    Prep: patient was prepped and draped in usual sterile fashion      Local anesthesia used?: Yes    Local anesthetic:  Lidocaine 1% without epinephrine  Anesthetic total (ml):  5      Details:  Needle Size:  22 G  Ultrasonic Guidance for needle placement?: No    Approach:  Posterior  Location:  Shoulder  Laterality:  Bilateral  Site:  Bilateral subacromial bursa  Medications (Right):  40 mg triamcinolone acetonide 40 mg/mL  Medications (Left):  40 mg triamcinolone acetonide 40 mg/mL  Patient tolerance:  Patient tolerated the procedure well with no immediate complications  Tendon Sheath    Date/Time: 9/24/2024 1:20 PM    Performed by: Leandra Wilkins FNP  Authorized by: Leandra Wilkins FNP    Consent Done?:  Yes (Verbal)  Indications:  Pain  Timeout: prior to procedure the correct patient, procedure, and site was verified    Local anesthesia used?: No    Local anesthetic:  Lidocaine 1% without epinephrine  Anesthetic total (ml):  1    Location:  Shoulder  Site:  L bicep tendon  Ultrasonic guidance for needle placement?: No    Needle size:  25 G  Medications:  40 mg triamcinolone acetonide 40 mg/mL  Patient tolerance:  Patient tolerated the procedure well with no immediate complications  Tendon Sheath    Date/Time: 9/24/2024 1:20 PM    Performed by: Leandra Wilkins FNP  Authorized by: Leandra Wilkins, LISETHP    Consent Done?:  Yes (Verbal)  Indications:  Pain  Timeout: prior to procedure the correct patient, procedure, and site was verified    Local anesthesia used?: No    Location:  Shoulder  Site:  R bicep tendon  Ultrasonic guidance for needle placement?: No    Needle size:  25 G  Medications:  40 mg triamcinolone acetonide 40  mg/mL  Patient tolerance:  Patient tolerated the procedure well with no immediate complications

## 2024-09-24 NOTE — PROGRESS NOTES
Subjective:       Patient ID: Josephine Bolton is a 59 y.o. female.  Chief Complaint: Establish Care, Cough, Nasal Congestion, Generalized Body Aches, and Fever     HPI    Patient here to establish care.      Cough not improving.  Doxy 6 days ago.  + 102 fever 2 days ago.  Multiple covid neg.  Flu neg.  Allergic to a lot of medicine.    Morbid obesity s/p gastric bypast 2/20/23 --BMI now 25     HTN:  controlled  HLD:  controlled on Lipitor.    CAD s/p CABG 48 yo s/p 6 stents (last 2019) - no chest pain.  on Statin and Brilenta; last angiogram 11/2023 + blockages.   Seeing Cardiology  PVD - renal artery stenosis.      Hypothyroid:  controlled On synthroid.   GERD:  controlled On Protonix  MDD/VANDA:  stable. On Effexor 150 mg daily. Seeing psych  Neuropathy:  stable On lyrica.      Never diagnosed with diabetes.  Had PreDM at one point.   Had COVID 2020.  Developed multiple PEs then.  Noticed decline in memory after infection.  Felt memory worse after general anesthesia last two surgeries.          Assessment:       1. Hypothyroidism due to acquired atrophy of thyroid    2. Essential hypertension    3. Mixed hyperlipidemia    4. Cervical myelopathy    5. Sacroiliitis, not elsewhere classified    6. Other specified degenerative diseases of nervous system    7. Neuropathy due to medical condition    8. Coronary artery disease involving native coronary artery of native heart without angina pectoris    9. Heart failure, unspecified HF chronicity, unspecified heart failure type    10. Acute bronchitis, unspecified organism    11. Idiopathic peripheral neuropathy        Plan:       Hypothyroidism due to acquired atrophy of thyroid  -     TSH; Future; Expected date: 03/23/2025    Essential hypertension  -     Comprehensive Metabolic Panel; Future; Expected date: 03/23/2025    Mixed hyperlipidemia  -     Lipid Panel; Future; Expected date: 03/23/2025    Cervical myelopathy    Sacroiliitis, not elsewhere classified    Other  specified degenerative diseases of nervous system    Neuropathy due to medical condition    Coronary artery disease involving native coronary artery of native heart without angina pectoris    Heart failure, unspecified HF chronicity, unspecified heart failure type    Acute bronchitis, unspecified organism  -     azithromycin (ZITHROMAX Z-BRI) 250 MG tablet; Take 2 po day 1, then 1 po day 2 - 5  Dispense: 6 tablet; Refill: 0  -     benzonatate (TESSALON) 100 MG capsule; 1 - 2 po every 6 hours prn cough  Dispense: 45 capsule; Refill: 0  -     CBC Auto Differential; Future; Expected date: 03/23/2025    Idiopathic peripheral neuropathy            Continue current management and monitor.  Other diagnoses were reviewed and found stable and will continue to monitor.  Counseled on regular exercise, maintenance of a healthy weight, balanced diet rich in fruits/vegetables and lean protein, and avoidance of unhealthy habits like smoking and excessive alcohol intake.   Also, counseled on importance of being compliant with medication, health appointments, diet and exercise.     Follow up in about 6 months (around 3/24/2025).  [place] refill Lyrica.     6 month follow up with Smitha and f/u in 1 year with me.  Labs right before appointment w [facility] in 6 months.        Medication List with Changes/Refills   New Medications    AZITHROMYCIN (ZITHROMAX Z-BRI) 250 MG TABLET    Take 2 po day 1, then 1 po day 2 - 5    BENZONATATE (TESSALON) 100 MG CAPSULE    1 - 2 po every 6 hours prn cough   Current Medications    ACARBOSE (PRECOSE) 25 MG TAB    Take 1 tablet (25 mg total) by mouth 3 (three) times daily with meals.    ACETAMINOPHEN (TYLENOL) 650 MG TBSR    Take 1,300 mg by mouth every 8 (eight) hours as needed (pain).     ALBUTEROL (PROVENTIL/VENTOLIN HFA) 90 MCG/ACTUATION INHALER    Inhale 2 puffs into the lungs every 6 (six) hours as needed for Shortness of Breath or Wheezing.    ALLOPURINOL (ZYLOPRIM) 100 MG TABLET    Take 1 tablet  (100 mg total) by mouth every morning.    AMLODIPINE (NORVASC) 5 MG TABLET    Take 1 tablet (5 mg total) by mouth once daily.    ASPIRIN (ECOTRIN) 81 MG EC TABLET    Take 1 tablet (81 mg total) by mouth once daily.    ATORVASTATIN (LIPITOR) 40 MG TABLET    TAKE ONE TABLET BY MOUTH EVERY NIGHT AT BEDTIME    BACLOFEN (LIORESAL) 10 MG TABLET    TAKE ONE TABLET BY MOUTH EVERY MORNING and TAKE ONE TABLET BY MOUTH EVERY NIGHT AT BEDTIME    BLOOD-GLUCOSE METER KIT    To check BG TID PRN, to use with insurance preferred meter    CALCIUM CARB AND CITRATE-VITD3 600 MG-12.5 MCG (500 UNIT) TBSR    Take 2 tablets by mouth once.    CETIRIZINE (ZYRTEC) 10 MG TABLET    Take 10 mg by mouth daily as needed for Allergies.    DOXYCYCLINE (VIBRAMYCIN) 100 MG CAP    Take 1 capsule (100 mg total) by mouth 2 (two) times daily. for 7 days    FLUTICASONE PROPIONATE (FLONASE) 50 MCG/ACTUATION NASAL SPRAY    fluticasone propionate 50 mcg/actuation nasal spray,suspension    FLUTICASONE PROPIONATE (FLONASE) 50 MCG/ACTUATION NASAL SPRAY    1 spray (50 mcg total) by Each Nostril route once daily. for 14 days    LANCETS MISC    To check BG TID PRN, to use with insurance preferred meter    LEVOTHYROXINE (SYNTHROID) 100 MCG TABLET    TAKE ONE TABLET BY MOUTH EVERY MORNING    METHOCARBAMOL (ROBAXIN) 750 MG TAB    Take 1 tablet (750 mg total) by mouth 4 (four) times daily as needed.    METOPROLOL SUCCINATE (TOPROL-XL) 25 MG 24 HR TABLET    Take 1 tablet (25 mg total) by mouth every evening.    MULTIVITAMIN (THERAGRAN) PER TABLET    Take 1 tablet by mouth once daily. Bariatric vitamin    PANTOPRAZOLE (PROTONIX) 40 MG TABLET    TAKE ONE TABLET BY MOUTH EVERY MORNING and TAKE ONE TABLET BY MOUTH EVERY NIGHT AT BEDTIME    PRAZOSIN (MINIPRESS) 1 MG CAP    Take 1 capsule (1 mg total) by mouth every evening.    PREGABALIN (LYRICA) 100 MG CAPSULE    Take 1 capsule by mouth every morning and take 2 capsules by mouth every night at bedtime    SENNA-DOCUSATE  8.6-50 MG (PERICOLACE) 8.6-50 MG PER TABLET    Take 1 tablet by mouth 2 (two) times a day.    TICAGRELOR (BRILINTA) 90 MG TABLET    TAKE ONE TABLET BY MOUTH EVERY MORNING and TAKE ONE TABLET BY MOUTH EVERY NIGHT AT BEDTIME    TRUE METRIX GLUCOSE TEST STRIP STRP    TO CHECK BLOOD GLUCOSE THREE TIMES DAILY AS NEEDED    VENLAFAXINE (EFFEXOR-XR) 150 MG CP24    TAKE ONE CAPSULE BY MOUTH EVERY NIGHT AT BEDTIME    VENLAFAXINE (EFFEXOR-XR) 37.5 MG 24 HR CAPSULE    Take 1 capsule (37.5 mg total) by mouth once daily.       BP Readings from Last 3 Encounters:   09/24/24 132/70   09/19/24 124/78   07/22/24 128/62     Hemoglobin A1C   Date Value Ref Range Status   01/23/2024 4.7 4.0 - 5.6 % Final     Comment:     ADA Screening Guidelines:  5.7-6.4%  Consistent with prediabetes  >or=6.5%  Consistent with diabetes    High levels of fetal hemoglobin interfere with the HbA1C  assay. Heterozygous hemoglobin variants (HbS, HgC, etc)do  not significantly interfere with this assay.   However, presence of multiple variants may affect accuracy.     04/09/2022 5.4 4.6 - 6.2 % Final   06/01/2021 5.2 0.0 - 5.6 % Final     Comment:     Reference Interval:  5.0 - 5.6 Normal   5.7 - 6.4 High Risk   > 6.5 Diabetic      Hgb A1c results are standardized based on the (NGSP) National   Glycohemoglobin Standardization Program.      Hemoglobin A1C levels are related to mean serum/plasma glucose   during the preceding 2-3 months.        01/25/2021 5.2 0.0 - 5.6 % Final     Comment:     Reference Interval:  5.0 - 5.6 Normal   5.7 - 6.4 High Risk   > 6.5 Diabetic      Hgb A1c results are standardized based on the (NGSP) National   Glycohemoglobin Standardization Program.      Hemoglobin A1C levels are related to mean serum/plasma glucose   during the preceding 2-3 months.          Lab Results   Component Value Date    TSH 1.272 09/19/2024     Lab Results   Component Value Date    LDLCALC 59 03/05/2024    LDLCALC 45 06/21/2023    LDLCALC 67.2 06/01/2023      Lab Results   Component Value Date    TRIG 60 03/05/2024    TRIG 133 06/21/2023    TRIG 94 06/01/2023     Wt Readings from Last 3 Encounters:   09/24/24 63.5 kg (139 lb 15.9 oz)   09/19/24 62.8 kg (138 lb 7.2 oz)   09/19/24 62.8 kg (138 lb 7.2 oz)     Lab Results   Component Value Date    HGB 12.3 09/19/2024    HGB 12.3 09/19/2024    HCT 40.7 09/19/2024    HCT 40.7 09/19/2024    WBC 14.89 (H) 09/19/2024    WBC 14.89 (H) 09/19/2024    ALT 15 09/19/2024    ALT 15 09/19/2024    AST 25 09/19/2024    AST 25 09/19/2024     09/19/2024     09/19/2024    K 3.7 09/19/2024    K 3.7 09/19/2024    CREATININE 0.7 09/19/2024    CREATININE 0.7 09/19/2024           Review of Systems        Objective:      Vitals:    09/24/24 1138   BP: 132/70   Pulse: 89   Temp: 98.5 °F (36.9 °C)     Physical Exam  Vitals reviewed.   Constitutional:       Appearance: Normal appearance.   Eyes:      Conjunctiva/sclera: Conjunctivae normal.   Cardiovascular:      Rate and Rhythm: Normal rate.   Pulmonary:      Effort: Pulmonary effort is normal.      Breath sounds: Normal breath sounds.   Musculoskeletal:      Cervical back: Normal range of motion.      Comments: Normal ROM bilateral    Skin:     General: Skin is warm and dry.   Neurological:      Mental Status: She is alert.      Cranial Nerves: Cranial nerve deficit: grossly intact.   Psychiatric:      Comments: Alert and orientated

## 2024-09-26 RX ORDER — TRIAMCINOLONE ACETONIDE 40 MG/ML
40 INJECTION, SUSPENSION INTRA-ARTICULAR; INTRAMUSCULAR
Status: DISCONTINUED | OUTPATIENT
Start: 2024-09-19 | End: 2024-09-26 | Stop reason: HOSPADM

## 2024-09-26 NOTE — PROGRESS NOTES
"Chief Complaint   Patient presents with    Right Knee - Pain         HPI:   This is a 59 y.o. who presents to clinic today complaining of right knee pain for 1 weeks after fall at work. Pain is progressively worsening. No numbness or tingling. No associated signs or symptoms.    Past Medical History:   Diagnosis Date    Allergy     Anticoagulant long-term use     ASA 81mg, Effient    Anxiety     Arthritis     Asthma     As child    CAD (coronary artery disease) 01/27/2012    s/p stents x4 , CABG, 3 vessel, (5/2013) newest stent prior to CABG    Cataract     Chronic constipation     Colon polyp     Coronary artery disease involving native coronary artery of native heart without angina pectoris 01/27/2012 12/19 Significant ostial RCA lesion as above treated with drug-eluting stenting as above. Occluded proximal LAD with patent lima lad Patent vein graft to 2nd obtuse marginal Known occluded vein graft to unknown vessel.  s/p stents x 3 (2010) s/p LAD stent (DSE) 3/2012    DDD (degenerative disc disease), cervical     DDD (degenerative disc disease), lumbar     DDD (degenerative disc disease), thoracic     Depression     Edema     Encounter for blood transfusion     General anesthetics causing adverse effect in therapeutic use     Headache(784.0)     History of nephrolithiasis     Hyperlipidemia     Hypertension     Hypothyroid 07/05/2012    Insomnia     Insulin resistance     patient stated not diebetic    Joint stiffness     Joint swelling     Kidney disease     ; Frequent UTIs     Kidney stones     Low back pain     Neck pain     Obstructive sleep apnea on CPAP 07/17/2012    PONV (postoperative nausea and vomiting)     S/P CABG (coronary artery bypass graft) 06/25/2013 6/23/2013     Sleep apnea 01/27/2012    Patient reports "severe" sleep apnea, uses C-pap    Stented coronary artery 12/20/2019 12/19  ostial RCA -Osirio2.5 x 18 post dilated 2.75     Thoracic back pain     Type 2 diabetes mellitus " without complication, without long-term current use of insulin 2024    Usual hyperplasia of lactiferous duct 2017    left breast     Past Surgical History:   Procedure Laterality Date    ADENOIDECTOMY      BACK SURGERY      BREAST BIOPSY Left 2017    duct excision- Dr. Jimenez    BREAST CYST ASPIRATION Left 2020    @ 's office- old hematoma drained (per office)    CARDIAC SURGERY      CARPAL TUNNEL RELEASE      bilateral    CERVICAL LAMINECTOMY WITH SPINAL FUSION      2016     SECTION, CLASSIC      x 2    COLONOSCOPY      COLONOSCOPY N/A 2024    Procedure: COLONOSCOPY;  Surgeon: Mk Warren MD;  Location: [facility];  Service: Endoscopy;  Laterality: N/A;    CORONARY ANGIOGRAPHY  2019    Procedure: ANGIOGRAM, CORONARY ARTERY;  Surgeon: Timi Millan MD;  Location: [facility] CATH;  Service: Cardiology;;    CORONARY ANGIOGRAPHY  10/13/2023    Procedure: Coronary angiogram study;  Surgeon: Azra Stoll MD;  Location: [facility] CATH;  Service: Cardiology;;    CORONARY ANGIOGRAPHY INCLUDING BYPASS GRAFTS WITH CATHETERIZATION OF LEFT HEART  10/13/2023    Procedure: Left heart cath w/Grafts RM 3640;  Surgeon: Azra Stoll MD;  Location: [facility] CATH;  Service: Cardiology;;    CORONARY ANGIOPLASTY WITH STENT PLACEMENT      x 4    CORONARY ARTERY BYPASS GRAFT  2013    3 vessel    CORONARY BYPASS GRAFT ANGIOGRAPHY  10/13/2023    Procedure: Bypass graft study;  Surgeon: Azra Stoll MD;  Location: [facility] CATH;  Service: Cardiology;;    ESOPHAGOGASTRODUODENOSCOPY N/A 2020    Procedure: EGD (ESOPHAGOGASTRODUODENOSCOPY);  Surgeon: Mk Warren MD;  Location: [facility] ENDO;  Service: Endoscopy;  Laterality: N/A;    HYSTERECTOMY      Complete    JOINT REPLACEMENT      KNEE ARTHROPLASTY Left 2019    Procedure: ARTHROPLASTY, KNEE;  Surgeon: Juan Wilson MD;  Location: [facility] OR;  Service: Orthopedics;  Laterality: Left;    KNEE ARTHROSCOPY W/ MENISCAL REPAIR Left     twice,  last one 5/2014    LAMINECTOMY      L4/L5    LAPAROSCOPIC CHOLECYSTECTOMY N/A 7/3/2023    Procedure: CHOLECYSTECTOMY, LAPAROSCOPIC;  Surgeon: Obinna Whipple MD;  Location: [facility] OR;  Service: General;  Laterality: N/A;  with cholangiogram    LEFT HEART CATHETERIZATION  12/13/2019    Procedure: Left heart cath- RM # [id number];  Surgeon: Timi Millan MD;  Location: [facility] CATH;  Service: Cardiology;;    OOPHORECTOMY Bilateral 2011    ROBOTIC ARTHROPLASTY, KNEE Right 6/14/2021    Procedure: ROBOTIC ARTHROPLASTY, KNEE, TOTAL;  Surgeon: Juan Wilson MD;  Location: [facility] OR;  Service: Orthopedics;  Laterality: Right;    SINUS SURGERY      x3    TENDON REPAIR Left     ankle surgery    THYROIDECTOMY      2 separate surgeries    TONSILLECTOMY      TOTAL KNEE ARTHROPLASTY Left 06/17/2019    Surgeon: Juan Wilson MD    XI ROBOTIC REVISION, GASTROENTEROSTOMY, MITCHELL-EN-Y N/A 2/20/2023    Procedure: XI ROBOTIC REVISION,GASTROENTEROSTOMY,MITCHELL-EN-Y;  Surgeon: Obinna Whipple MD;  Location: [facility] OR;  Service: General;  Laterality: N/A;  creation of Mitchell-N-Y anastomsis     Current Outpatient Medications on File Prior to Visit   Medication Sig Dispense Refill    acarbose (PRECOSE) 25 MG Tab Take 1 tablet (25 mg total) by mouth 3 (three) times daily with meals. 270 tablet 3    acetaminophen (TYLENOL) 650 MG TbSR Take 1,300 mg by mouth every 8 (eight) hours as needed (pain).       allopurinoL (ZYLOPRIM) 100 MG tablet Take 1 tablet (100 mg total) by mouth every morning. 90 tablet 0    aspirin (ECOTRIN) 81 MG EC tablet Take 1 tablet (81 mg total) by mouth once daily. (Patient not taking: Reported on 9/24/2024) 30 tablet 11    atorvastatin (LIPITOR) 40 MG tablet TAKE ONE TABLET BY MOUTH EVERY NIGHT AT BEDTIME 90 tablet 0    baclofen (LIORESAL) 10 MG tablet TAKE ONE TABLET BY MOUTH EVERY MORNING and TAKE ONE TABLET BY MOUTH EVERY NIGHT AT BEDTIME 60 tablet 0    blood-glucose meter kit To check BG TID PRN, to use with insurance preferred  meter 1 each 0    calcium carb and citrate-vitD3 600 mg-12.5 mcg (500 unit) TbSR Take 2 tablets by mouth once.      cetirizine (ZYRTEC) 10 MG tablet Take 10 mg by mouth daily as needed for Allergies.      fluticasone propionate (FLONASE) 50 mcg/actuation nasal spray fluticasone propionate 50 mcg/actuation nasal spray,suspension      lancets Misc To check BG TID PRN, to use with insurance preferred meter 100 each 1    levothyroxine (SYNTHROID) 100 MCG tablet TAKE ONE TABLET BY MOUTH EVERY MORNING 30 tablet 0    methocarbamoL (ROBAXIN) 750 MG Tab Take 1 tablet (750 mg total) by mouth 4 (four) times daily as needed. (Patient not taking: Reported on 9/24/2024) 44 tablet 3    multivitamin (THERAGRAN) per tablet Take 1 tablet by mouth once daily. Bariatric vitamin      pantoprazole (PROTONIX) 40 MG tablet TAKE ONE TABLET BY MOUTH EVERY MORNING and TAKE ONE TABLET BY MOUTH EVERY NIGHT AT BEDTIME 180 tablet 1    prazosin (MINIPRESS) 1 MG Cap Take 1 capsule (1 mg total) by mouth every evening. 30 capsule 1    pregabalin (LYRICA) 100 MG capsule Take 1 capsule by mouth every morning and take 2 capsules by mouth every night at bedtime 90 capsule 0    senna-docusate 8.6-50 mg (PERICOLACE) 8.6-50 mg per tablet Take 1 tablet by mouth 2 (two) times a day.      ticagrelor (BRILINTA) 90 mg tablet TAKE ONE TABLET BY MOUTH EVERY MORNING and TAKE ONE TABLET BY MOUTH EVERY NIGHT AT BEDTIME 180 tablet 0    TRUE METRIX GLUCOSE TEST STRIP Strp TO CHECK BLOOD GLUCOSE THREE TIMES DAILY AS NEEDED 300 each 3    venlafaxine (EFFEXOR-XR) 150 MG Cp24 TAKE ONE CAPSULE BY MOUTH EVERY NIGHT AT BEDTIME 90 capsule 0    venlafaxine (EFFEXOR-XR) 37.5 MG 24 hr capsule Take 1 capsule (37.5 mg total) by mouth once daily. (Patient not taking: Reported on 9/24/2024) 30 capsule 11     No current facility-administered medications on file prior to visit.     Review of patient's allergies indicates:   Allergen Reactions    Celexa [citalopram] Other (See Comments)  "    GI upset  Stated "knocked me out"    Cephalexin Anaphylaxis    Zoloft [sertraline] Other (See Comments)     Stated "knocked me out"    Codeine Other (See Comments)     hallucinations  hallucinations    Isosorbide Nausea And Vomiting    Ciprofloxacin Itching    Levaquin [levofloxacin] Other (See Comments)     tendinitis    Metformin      Nausea     Penicillamine     Penicillins Other (See Comments)     Was told per mother that as child was allergic to penicillin.  Childhood allergy/ unknown reaction    Sulfa (sulfonamide antibiotics)      Family History   Problem Relation Name Age of Onset    Heart failure Mother      Asthma Mother      Macular degeneration Mother      Cancer Mother          Breast    Cataracts Mother      Heart disease Mother      COPD Mother      Breast cancer Mother      Heart disease Father      Diabetes Father      Hypertension Father      Stroke Father      Cataracts Father      Obesity Father      Obesity Sister      Glaucoma Sister      Thyroid disease Sister      Glaucoma Sister      Other Brother          stiff man syndrome    Cancer Maternal Grandmother          Breast with Mets    Breast cancer Maternal Grandmother      Cancer Paternal Grandmother          Colon    Strabismus Other niece     Amblyopia Neg Hx      Blindness Neg Hx      Retinal detachment Neg Hx       Social History     Socioeconomic History    Marital status:    Occupational History    Occupation: teacher   Tobacco Use    Smoking status: Former     Current packs/day: 0.00     Average packs/day: 0.5 packs/day for 14.9 years (7.5 ttl pk-yrs)     Types: Cigarettes     Start date: 1984     Quit date: 1998     Years since quittin.7    Smokeless tobacco: Never   Substance and Sexual Activity    Alcohol use: No    Drug use: Not Currently     Types: Benzodiazepines    Sexual activity: Yes     Partners: Male     Social Determinants of Health     Financial Resource Strain: Patient Declined (2023)    " Overall Financial Resource Strain (CARDIA)     Difficulty of Paying Living Expenses: Patient declined   Food Insecurity: Patient Declined (2/21/2023)    Hunger Vital Sign     Worried About Running Out of Food in the Last Year: Patient declined     Ran Out of Food in the Last Year: Patient declined   Transportation Needs: Patient Declined (2/21/2023)    PRAPARE - Transportation     Lack of Transportation (Medical): Patient declined     Lack of Transportation (Non-Medical): Patient declined   Physical Activity: Patient Declined (2/21/2023)    Exercise Vital Sign     Days of Exercise per Week: Patient declined     Minutes of Exercise per Session: Patient declined   Stress: Patient Declined (2/21/2023)    [employer] [place] [employer] - Occupational Stress Questionnaire     Feeling of Stress : Patient declined   Housing Stability: Unknown (2/21/2023)    Housing Stability Vital Sign     Unable to Pay for Housing in the Last Year: Patient refused     Unstable Housing in the Last Year: Patient refused       Review of Systems:  Constitutional:  Denies fever or chills   Eyes:  Denies change in visual acuity   HENT:  Denies nasal congestion or sore throat   Respiratory:  Denies cough or shortness of breath   Cardiovascular:  Denies chest pain or edema   GI:  Denies abdominal pain, nausea, vomiting, bloody stools or diarrhea   :  Denies dysuria   Integument:  Denies rash   Neurologic:  Denies headache, focal weakness or sensory changes   Endocrine:  Denies polyuria or polydipsia   Lymphatic:  Denies swollen glands   Psychiatric:  Denies depression or anxiety     Physical Exam:   Constitutional:  Well developed, well nourished, no acute distress, non-toxic appearance   Integument:  Well hydrated, no rash   Lymphatic:  No lymphadenopathy noted   Neurologic:  Alert & oriented x 3, CN 2-12 normal, normal motor function, normal sensory function, no focal deficits noted   Psychiatric:  Speech and behavior appropriate    Eyes: EOMI  Gi: abdomen soft    Bilateral Knee Exam    right Knee Exam     Tenderness   The patient is experiencing tenderness in the medial pes bursa.    Range of Motion   Extension: abnormal   Flexion: abnormal     Muscle Strength     The patient has normal knee strength.    Tests   Livia:  Medial - negative   Lachman:  Anterior - negative      Varus: negative  Valgus: negative  Patellar Apprehension: negative    Other   Erythema: absent  Sensation: normal  Pulse: present  Swelling: mild      left Knee Exam   left knee exam performed same as contralateral side and is normal.    Pes anserinus bursitis of right knee            Using an aseptic technique, I injected 1 cc of lidocaine 1% without and 1 cc of kenalog 40mg into the right Knee pes. The patient tolerated this well. I will have them return to clinic as scheduled.

## 2024-09-26 NOTE — PROCEDURES
Large Joint Aspiration/Injection: R anserine bursa    Date/Time: 9/19/2024 3:30 PM    Performed by: Juan Wilson MD  Authorized by: Juan Wilson MD    Consent Done?:  Yes (Verbal)  Indications:  Pain  Timeout: prior to procedure the correct patient, procedure, and site was verified    Prep: patient was prepped and draped in usual sterile fashion    Local anesthetic:  Lidocaine 1% without epinephrine  Anesthetic total (ml):  1      Details:  Needle Size:  21 G  Approach:  Anterolateral  Location:  Knee  Site:  R anserine bursa  Medications:  40 mg triamcinolone acetonide 40 mg/mL  Patient tolerance:  Patient tolerated the procedure well with no immediate complications

## 2024-10-14 DIAGNOSIS — E03.9 ACQUIRED HYPOTHYROIDISM: Chronic | ICD-10-CM

## 2024-10-14 DIAGNOSIS — M51.369 DDD (DEGENERATIVE DISC DISEASE), LUMBAR: ICD-10-CM

## 2024-10-14 NOTE — TELEPHONE ENCOUNTER
Care Due:                  Date            Visit Type   Department     Provider  --------------------------------------------------------------------------------                                EP -                              [facility]  Last Visit: 09-      CARE ([facility])   CARMEL FAUSTIN                               -                              [facility]  Next Visit: 09-      CARE ([facility])   CARMEL FAUSTIN                                                            Last  Test          Frequency    Reason                     Performed    Due Date  --------------------------------------------------------------------------------    Uric Acid...  12 months..  allopurinoL..............  Not Found    Overdue    [employer] Embedded Care Due Messages. Reference number: [id number].   10/14/2024 12:05:57 PM CDT

## 2024-10-14 NOTE — TELEPHONE ENCOUNTER
Refill Routing Note   Medication(s) are not appropriate for processing by [facility] for the following reason(s):        No active prescription written by provider  Outside of protocol: Baclofen & Lyrica    [facility] action(s):  Defer  Route     Requires labs : Yes - Uric acid outdated            Appointments  past 12m or future 3m with PCP    Date Provider   Last Visit   9/24/2024 Natan Bolton MD   Next Visit   Visit date not found Natan Bolton MD   ED visits in past 90 days: 0        Note composed:3:34 PM 10/14/2024

## 2024-10-16 RX ORDER — LEVOTHYROXINE SODIUM 100 UG/1
100 TABLET ORAL EVERY MORNING
Qty: 90 TABLET | Refills: 3 | Status: SHIPPED | OUTPATIENT
Start: 2024-10-16

## 2024-10-16 RX ORDER — PREGABALIN 100 MG/1
CAPSULE ORAL
Qty: 90 CAPSULE | Refills: 1 | Status: SHIPPED | OUTPATIENT
Start: 2024-10-16

## 2024-10-16 RX ORDER — BACLOFEN 10 MG/1
TABLET ORAL
Qty: 60 TABLET | Refills: 5 | Status: SHIPPED | OUTPATIENT
Start: 2024-10-16

## 2024-10-31 ENCOUNTER — OFFICE VISIT (OUTPATIENT)
Dept: ORTHOPEDICS | Facility: CLINIC | Age: 59
End: 2024-10-31
Payer: COMMERCIAL

## 2024-10-31 VITALS — BODY MASS INDEX: 25.76 KG/M2 | HEIGHT: 62 IN | WEIGHT: 140 LBS

## 2024-10-31 DIAGNOSIS — M70.51 PES ANSERINUS BURSITIS OF RIGHT KNEE: Primary | ICD-10-CM

## 2024-10-31 PROCEDURE — 20610 DRAIN/INJ JOINT/BURSA W/O US: CPT | Mod: RT,S$GLB,, | Performed by: ORTHOPAEDIC SURGERY

## 2024-10-31 PROCEDURE — 99999 PR PBB SHADOW E&M-EST. PATIENT-LVL IV: CPT | Mod: PBBFAC,,, | Performed by: ORTHOPAEDIC SURGERY

## 2024-10-31 PROCEDURE — 99499 UNLISTED E&M SERVICE: CPT | Mod: S$GLB,,, | Performed by: ORTHOPAEDIC SURGERY

## 2024-10-31 RX ADMIN — TRIAMCINOLONE ACETONIDE 40 MG: 40 INJECTION, SUSPENSION INTRA-ARTICULAR; INTRAMUSCULAR at 03:10

## 2024-11-05 DIAGNOSIS — N20.0 KIDNEY STONES: ICD-10-CM

## 2024-11-05 DIAGNOSIS — F41.9 ANXIETY: ICD-10-CM

## 2024-11-05 DIAGNOSIS — I25.10 CORONARY ARTERY DISEASE INVOLVING NATIVE CORONARY ARTERY OF NATIVE HEART WITHOUT ANGINA PECTORIS: Chronic | ICD-10-CM

## 2024-11-05 DIAGNOSIS — E78.2 MIXED HYPERLIPIDEMIA: ICD-10-CM

## 2024-11-05 NOTE — TELEPHONE ENCOUNTER
No care due was identified.  [facility] Embedded Care Due Messages. Reference number: [id number].   11/05/2024 1:53:28 PM CST

## 2024-11-06 RX ORDER — VENLAFAXINE HYDROCHLORIDE 150 MG/1
150 CAPSULE, EXTENDED RELEASE ORAL NIGHTLY
Qty: 90 CAPSULE | Refills: 3 | Status: SHIPPED | OUTPATIENT
Start: 2024-11-06

## 2024-11-06 RX ORDER — ALLOPURINOL 100 MG/1
100 TABLET ORAL EVERY MORNING
Qty: 90 TABLET | Refills: 4 | Status: SHIPPED | OUTPATIENT
Start: 2024-11-06

## 2024-11-06 RX ORDER — ATORVASTATIN CALCIUM 40 MG/1
40 TABLET, FILM COATED ORAL NIGHTLY
Qty: 90 TABLET | Refills: 4 | Status: SHIPPED | OUTPATIENT
Start: 2024-11-06

## 2024-11-06 RX ORDER — TICAGRELOR 90 MG/1
TABLET ORAL
Qty: 180 TABLET | Refills: 3 | Status: SHIPPED | OUTPATIENT
Start: 2024-11-06

## 2024-11-06 NOTE — TELEPHONE ENCOUNTER
Refill Routing Note   Medication(s) are not appropriate for processing by [facility] for the following reason(s):        Required labs outdated  No active prescription written by provider    [facility] action(s):  Defer             Appointments  past 12m or future 3m with PCP    Date Provider   Last Visit   9/24/2024 Natan Bolton MD   Next Visit   Visit date not found Natan Bolton MD   ED visits in past 90 days: 0        Note composed:10:42 PM 11/05/2024

## 2024-11-07 RX ORDER — TRIAMCINOLONE ACETONIDE 40 MG/ML
40 INJECTION, SUSPENSION INTRA-ARTICULAR; INTRAMUSCULAR
Status: DISCONTINUED | OUTPATIENT
Start: 2024-10-31 | End: 2024-11-07 | Stop reason: HOSPADM

## 2024-11-07 NOTE — PROCEDURES
Large Joint Aspiration/Injection: R anserine bursa    Date/Time: 10/31/2024 3:30 PM    Performed by: Juan Wilson MD  Authorized by: Juan Wilson MD    Consent Done?:  Yes (Verbal)  Indications:  Pain  Timeout: prior to procedure the correct patient, procedure, and site was verified    Prep: patient was prepped and draped in usual sterile fashion    Local anesthetic:  Lidocaine 1% without epinephrine  Anesthetic total (ml):  1      Details:  Needle Size:  21 G  Approach:  Anterolateral  Location:  Knee  Site:  R anserine bursa  Medications:  40 mg triamcinolone acetonide 40 mg/mL  Patient tolerance:  Patient tolerated the procedure well with no immediate complications

## 2024-11-07 NOTE — PROGRESS NOTES
"Chief Complaint   Patient presents with    Right Knee - Pain         HPI:   This is a 59 y.o. who presents to clinic today complaining of right knee pain for 1 weeks after fall at work several months ago. Pain is progressively worsening but she did have complete relief from last injection for 5 weeks. No numbness or tingling. No associated signs or symptoms.    Past Medical History:   Diagnosis Date    Allergy     Anticoagulant long-term use     ASA 81mg, Effient    Anxiety     Arthritis     Asthma     As child    CAD (coronary artery disease) 01/27/2012    s/p stents x4 , CABG, 3 vessel, (5/2013) newest stent prior to CABG    Cataract     Chronic constipation     Colon polyp     Coronary artery disease involving native coronary artery of native heart without angina pectoris 01/27/2012 12/19 Significant ostial RCA lesion as above treated with drug-eluting stenting as above. Occluded proximal LAD with patent lima lad Patent vein graft to 2nd obtuse marginal Known occluded vein graft to unknown vessel.  s/p stents x 3 (2010) s/p LAD stent (DSE) 3/2012    DDD (degenerative disc disease), cervical     DDD (degenerative disc disease), lumbar     DDD (degenerative disc disease), thoracic     Depression     Edema     Encounter for blood transfusion     General anesthetics causing adverse effect in therapeutic use     Headache(784.0)     History of nephrolithiasis     Hyperlipidemia     Hypertension     Hypothyroid 07/05/2012    Insomnia     Insulin resistance     patient stated not diebetic    Joint stiffness     Joint swelling     Kidney disease     ; Frequent UTIs     Kidney stones     Low back pain     Neck pain     Obstructive sleep apnea on CPAP 07/17/2012    PONV (postoperative nausea and vomiting)     S/P CABG (coronary artery bypass graft) 06/25/2013 6/23/2013     Sleep apnea 01/27/2012    Patient reports "severe" sleep apnea, uses C-pap    Stented coronary artery 12/20/2019 12/19  ostial RCA " -Osirio2.5 x 18 post dilated 2.75     Thoracic back pain     Type 2 diabetes mellitus without complication, without long-term current use of insulin 2024    Usual hyperplasia of lactiferous duct 2017    left breast     Past Surgical History:   Procedure Laterality Date    ADENOIDECTOMY      BACK SURGERY      BREAST BIOPSY Left 2017    duct excision- Dr. Jimenez    BREAST CYST ASPIRATION Left 2020    @ 's office- old hematoma drained (per office)    CARDIAC SURGERY      CARPAL TUNNEL RELEASE      bilateral    CERVICAL LAMINECTOMY WITH SPINAL FUSION      2016     SECTION, CLASSIC      x 2    COLONOSCOPY      COLONOSCOPY N/A 2024    Procedure: COLONOSCOPY;  Surgeon: Mk Warren MD;  Location: [facility] ENDO;  Service: Endoscopy;  Laterality: N/A;    CORONARY ANGIOGRAPHY  2019    Procedure: ANGIOGRAM, CORONARY ARTERY;  Surgeon: Timi Millan MD;  Location: [facility] CATH;  Service: Cardiology;;    CORONARY ANGIOGRAPHY  10/13/2023    Procedure: Coronary angiogram study;  Surgeon: Azra Stoll MD;  Location: [facility] CATH;  Service: Cardiology;;    CORONARY ANGIOGRAPHY INCLUDING BYPASS GRAFTS WITH CATHETERIZATION OF LEFT HEART  10/13/2023    Procedure: Left heart cath w/Grafts RM 3640;  Surgeon: Azra Stoll MD;  Location: [facility] CATH;  Service: Cardiology;;    CORONARY ANGIOPLASTY WITH STENT PLACEMENT      x 4    CORONARY ARTERY BYPASS GRAFT  2013    3 vessel    CORONARY BYPASS GRAFT ANGIOGRAPHY  10/13/2023    Procedure: Bypass graft study;  Surgeon: Azra Stoll MD;  Location: [facility] CATH;  Service: Cardiology;;    ESOPHAGOGASTRODUODENOSCOPY N/A 2020    Procedure: EGD (ESOPHAGOGASTRODUODENOSCOPY);  Surgeon: Mk Warren MD;  Location: [facility] ENDO;  Service: Endoscopy;  Laterality: N/A;    HYSTERECTOMY      Complete    JOINT REPLACEMENT      KNEE ARTHROPLASTY Left 2019    Procedure: ARTHROPLASTY, KNEE;  Surgeon: Juan Wilson MD;  Location: [facility] OR;  Service:  Orthopedics;  Laterality: Left;    KNEE ARTHROSCOPY W/ MENISCAL REPAIR Left     twice, last one 5/2014    LAMINECTOMY      L4/L5    LAPAROSCOPIC CHOLECYSTECTOMY N/A 7/3/2023    Procedure: CHOLECYSTECTOMY, LAPAROSCOPIC;  Surgeon: Obinna Whipple MD;  Location: [facility] OR;  Service: General;  Laterality: N/A;  with cholangiogram    LEFT HEART CATHETERIZATION  12/13/2019    Procedure: Left heart cath- RM # [id number];  Surgeon: Timi Millan MD;  Location: [facility] CATH;  Service: Cardiology;;    OOPHORECTOMY Bilateral 2011    ROBOTIC ARTHROPLASTY, KNEE Right 6/14/2021    Procedure: ROBOTIC ARTHROPLASTY, KNEE, TOTAL;  Surgeon: Juan Wilson MD;  Location: [facility] OR;  Service: Orthopedics;  Laterality: Right;    SINUS SURGERY      x3    TENDON REPAIR Left     ankle surgery    THYROIDECTOMY      2 separate surgeries    TONSILLECTOMY      TOTAL KNEE ARTHROPLASTY Left 06/17/2019    Surgeon: Juan Wilson MD    XI ROBOTIC REVISION, GASTROENTEROSTOMY, MITCHELL-EN-Y N/A 2/20/2023    Procedure: XI ROBOTIC REVISION,GASTROENTEROSTOMY,MITCHELL-EN-Y;  Surgeon: Obinna Whipple MD;  Location: [facility] OR;  Service: General;  Laterality: N/A;  creation of Mitchell-N-Y anastomsis     Current Outpatient Medications on File Prior to Visit   Medication Sig Dispense Refill    acarbose (PRECOSE) 25 MG Tab Take 1 tablet (25 mg total) by mouth 3 (three) times daily with meals. 270 tablet 3    acetaminophen (TYLENOL) 650 MG TbSR Take 1,300 mg by mouth every 8 (eight) hours as needed (pain).       albuterol (PROVENTIL/VENTOLIN HFA) 90 mcg/actuation inhaler Inhale 2 puffs into the lungs every 6 (six) hours as needed for Shortness of Breath or Wheezing. 18 g 1    amLODIPine (NORVASC) 5 MG tablet Take 1 tablet (5 mg total) by mouth once daily. 30 tablet 11    azithromycin (ZITHROMAX Z-BRI) 250 MG tablet Take 2 po day 1, then 1 po day 2 - 5 6 tablet 0    baclofen (LIORESAL) 10 MG tablet TAKE ONE TABLET BY MOUTH EVERY MORNING and TAKE ONE TABLET BY MOUTH EVERY NIGHT AT  BEDTIME 60 tablet 5    benzonatate (TESSALON) 100 MG capsule 1 - 2 po every 6 hours prn cough 45 capsule 0    blood-glucose meter kit To check BG TID PRN, to use with insurance preferred meter 1 each 0    calcium carb and citrate-vitD3 600 mg-12.5 mcg (500 unit) TbSR Take 2 tablets by mouth once.      cetirizine (ZYRTEC) 10 MG tablet Take 10 mg by mouth daily as needed for Allergies.      fluticasone propionate (FLONASE) 50 mcg/actuation nasal spray fluticasone propionate 50 mcg/actuation nasal spray,suspension      lancets Misc To check BG TID PRN, to use with insurance preferred meter 100 each 1    levothyroxine (SYNTHROID) 100 MCG tablet TAKE ONE TABLET BY MOUTH EVERY MORNING 90 tablet 3    methocarbamoL (ROBAXIN) 750 MG Tab Take 1 tablet (750 mg total) by mouth 4 (four) times daily as needed. 44 tablet 3    metoprolol succinate (TOPROL-XL) 25 MG 24 hr tablet Take 1 tablet (25 mg total) by mouth every evening. 30 tablet 11    multivitamin (THERAGRAN) per tablet Take 1 tablet by mouth once daily. Bariatric vitamin      pantoprazole (PROTONIX) 40 MG tablet TAKE ONE TABLET BY MOUTH EVERY MORNING and TAKE ONE TABLET BY MOUTH EVERY NIGHT AT BEDTIME 180 tablet 1    prazosin (MINIPRESS) 1 MG Cap Take 1 capsule (1 mg total) by mouth every evening. 30 capsule 1    pregabalin (LYRICA) 100 MG capsule TAKE ONE CAPSULE BY MOUTH EVERY MORNING AND TWO CAPSULES BY MOUTH EVERY NIGHT AT BEDTIME 90 capsule 1    senna-docusate 8.6-50 mg (PERICOLACE) 8.6-50 mg per tablet Take 1 tablet by mouth 2 (two) times a day.      TRUE METRIX GLUCOSE TEST STRIP Strp TO CHECK BLOOD GLUCOSE THREE TIMES DAILY AS NEEDED 300 each 3    venlafaxine (EFFEXOR-XR) 37.5 MG 24 hr capsule Take 1 capsule (37.5 mg total) by mouth once daily. 30 capsule 11    aspirin (ECOTRIN) 81 MG EC tablet Take 1 tablet (81 mg total) by mouth once daily. (Patient not taking: Reported on 9/24/2024) 30 tablet 11     No current facility-administered medications on file prior to  "visit.     Review of patient's allergies indicates:   Allergen Reactions    Celexa [citalopram] Other (See Comments)     GI upset  Stated "knocked me out"    Cephalexin Anaphylaxis    Zoloft [sertraline] Other (See Comments)     Stated "knocked me out"    Codeine Other (See Comments)     hallucinations  hallucinations    Isosorbide Nausea And Vomiting    Ciprofloxacin Itching    Levaquin [levofloxacin] Other (See Comments)     tendinitis    Metformin      Nausea     Penicillamine     Penicillins Other (See Comments)     Was told per mother that as child was allergic to penicillin.  Childhood allergy/ unknown reaction    Sulfa (sulfonamide antibiotics)      Family History   Problem Relation Name Age of Onset    Heart failure Mother      Asthma Mother      Macular degeneration Mother      Cancer Mother          Breast    Cataracts Mother      Heart disease Mother      COPD Mother      Breast cancer Mother      Heart disease Father      Diabetes Father      Hypertension Father      Stroke Father      Cataracts Father      Obesity Father      Obesity Sister      Glaucoma Sister      Thyroid disease Sister      Glaucoma Sister      Other Brother          stiff man syndrome    Cancer Maternal Grandmother          Breast with Mets    Breast cancer Maternal Grandmother      Cancer Paternal Grandmother          Colon    Strabismus Other niece     Amblyopia Neg Hx      Blindness Neg Hx      Retinal detachment Neg Hx       Social History     Socioeconomic History    Marital status:    Occupational History    Occupation: teacher   Tobacco Use    Smoking status: Former     Current packs/day: 0.00     Average packs/day: 0.5 packs/day for 14.9 years (7.5 ttl pk-yrs)     Types: Cigarettes     Start date: 1984     Quit date: 1998     Years since quittin.9    Smokeless tobacco: Never   Substance and Sexual Activity    Alcohol use: No    Drug use: Not Currently     Types: Benzodiazepines    Sexual activity: Yes "     Partners: Male     Social Drivers of Health     Financial Resource Strain: Patient Declined (2/21/2023)    Overall Financial Resource Strain (CARDIA)     Difficulty of Paying Living Expenses: Patient declined   Food Insecurity: Patient Declined (2/21/2023)    Hunger Vital Sign     Worried About Running Out of Food in the Last Year: Patient declined     Ran Out of Food in the Last Year: Patient declined   Transportation Needs: Patient Declined (2/21/2023)    PRAPARE - Transportation     Lack of Transportation (Medical): Patient declined     Lack of Transportation (Non-Medical): Patient declined   Physical Activity: Patient Declined (2/21/2023)    Exercise Vital Sign     Days of Exercise per Week: Patient declined     Minutes of Exercise per Session: Patient declined   Stress: Patient Declined (2/21/2023)    [employer] [place] [employer] - Occupational Stress Questionnaire     Feeling of Stress : Patient declined   Housing Stability: Unknown (2/21/2023)    Housing Stability Vital Sign     Unable to Pay for Housing in the Last Year: Patient refused     Unstable Housing in the Last Year: Patient refused       Review of Systems:  Constitutional:  Denies fever or chills   Eyes:  Denies change in visual acuity   HENT:  Denies nasal congestion or sore throat   Respiratory:  Denies cough or shortness of breath   Cardiovascular:  Denies chest pain or edema   GI:  Denies abdominal pain, nausea, vomiting, bloody stools or diarrhea   :  Denies dysuria   Integument:  Denies rash   Neurologic:  Denies headache, focal weakness or sensory changes   Endocrine:  Denies polyuria or polydipsia   Lymphatic:  Denies swollen glands   Psychiatric:  Denies depression or anxiety     Physical Exam:   Constitutional:  Well developed, well nourished, no acute distress, non-toxic appearance   Integument:  Well hydrated, no rash   Lymphatic:  No lymphadenopathy noted   Neurologic:  Alert & oriented x 3, CN 2-12 normal, normal  motor function, normal sensory function, no focal deficits noted   Psychiatric:  Speech and behavior appropriate   Eyes: EOMI  Gi: abdomen soft    Bilateral Knee Exam    right Knee Exam     Tenderness   The patient is experiencing tenderness in the medial pes bursa.    Range of Motion   Extension: abnormal   Flexion: abnormal     Muscle Strength     The patient has normal knee strength.    Tests   Livia:  Medial - negative   Lachman:  Anterior - negative      Varus: negative  Valgus: negative  Patellar Apprehension: negative    Other   Erythema: absent  Sensation: normal  Pulse: present  Swelling: mild      left Knee Exam   left knee exam performed same as contralateral side and is normal.    Pes anserinus bursitis of right knee            Using an aseptic technique, I injected 5 cc of lidocaine 1% without and 1 cc of kenalog 40mg into the right Knee pes. The patient tolerated this well. I will have them return to clinic in 6 weeks.

## 2024-12-06 DIAGNOSIS — M51.369 DDD (DEGENERATIVE DISC DISEASE), LUMBAR: ICD-10-CM

## 2024-12-06 NOTE — TELEPHONE ENCOUNTER
Care Due:                  Date            Visit Type   Department     Provider  --------------------------------------------------------------------------------                                EP -                              [facility]  Last Visit: 09-      CARE ([facility])   CARMEL FAUSTIN                               -                              [facility]  Next Visit: 09-      CARE ([facility])   CARMEL FAUSTIN                                                            Last  Test          Frequency    Reason                     Performed    Due Date  --------------------------------------------------------------------------------    Lipid Panel.  12 months..  atorvastatin.............  03- 02-    [facility] Embedded Care Due Messages. Reference number: [id number].   12/06/2024 1:59:58 PM CST

## 2024-12-09 RX ORDER — PREGABALIN 100 MG/1
CAPSULE ORAL
Qty: 90 CAPSULE | Refills: 1 | Status: SHIPPED | OUTPATIENT
Start: 2024-12-09

## 2024-12-12 ENCOUNTER — OFFICE VISIT (OUTPATIENT)
Dept: ORTHOPEDICS | Facility: CLINIC | Age: 59
End: 2024-12-12
Payer: COMMERCIAL

## 2024-12-12 VITALS — WEIGHT: 135 LBS | BODY MASS INDEX: 24.84 KG/M2 | HEIGHT: 62 IN

## 2024-12-12 DIAGNOSIS — M70.51 PES ANSERINUS BURSITIS OF RIGHT KNEE: Primary | ICD-10-CM

## 2024-12-12 PROCEDURE — 99499 UNLISTED E&M SERVICE: CPT | Mod: S$GLB,,, | Performed by: ORTHOPAEDIC SURGERY

## 2024-12-12 PROCEDURE — 99999 PR PBB SHADOW E&M-EST. PATIENT-LVL IV: CPT | Mod: PBBFAC,,, | Performed by: ORTHOPAEDIC SURGERY

## 2024-12-12 PROCEDURE — 20610 DRAIN/INJ JOINT/BURSA W/O US: CPT | Mod: RT,S$GLB,, | Performed by: ORTHOPAEDIC SURGERY

## 2024-12-12 RX ADMIN — TRIAMCINOLONE ACETONIDE 40 MG: 40 INJECTION, SUSPENSION INTRA-ARTICULAR; INTRAMUSCULAR at 03:12

## 2024-12-19 ENCOUNTER — OFFICE VISIT (OUTPATIENT)
Dept: ORTHOPEDICS | Facility: CLINIC | Age: 59
End: 2024-12-19
Payer: COMMERCIAL

## 2024-12-19 VITALS
HEART RATE: 89 BPM | DIASTOLIC BLOOD PRESSURE: 70 MMHG | HEIGHT: 61 IN | SYSTOLIC BLOOD PRESSURE: 132 MMHG | WEIGHT: 134.94 LBS | BODY MASS INDEX: 25.48 KG/M2

## 2024-12-19 DIAGNOSIS — M75.21 BICEPS TENDINITIS OF RIGHT UPPER EXTREMITY: ICD-10-CM

## 2024-12-19 DIAGNOSIS — M75.22 BICEPS TENDINITIS OF LEFT UPPER EXTREMITY: ICD-10-CM

## 2024-12-19 DIAGNOSIS — M75.41 IMPINGEMENT SYNDROME, SHOULDER, RIGHT: Primary | ICD-10-CM

## 2024-12-19 DIAGNOSIS — M75.42 IMPINGEMENT SYNDROME OF LEFT SHOULDER: ICD-10-CM

## 2024-12-19 DIAGNOSIS — M70.52 PES ANSERINUS BURSITIS OF LEFT KNEE: ICD-10-CM

## 2024-12-19 PROCEDURE — 99999 PR PBB SHADOW E&M-EST. PATIENT-LVL V: CPT | Mod: PBBFAC,,, | Performed by: ORTHOPAEDIC SURGERY

## 2024-12-19 RX ORDER — TRIAMCINOLONE ACETONIDE 40 MG/ML
40 INJECTION, SUSPENSION INTRA-ARTICULAR; INTRAMUSCULAR
Status: DISCONTINUED | OUTPATIENT
Start: 2024-12-12 | End: 2024-12-19 | Stop reason: HOSPADM

## 2024-12-19 RX ADMIN — TRIAMCINOLONE ACETONIDE 40 MG: 40 INJECTION, SUSPENSION INTRA-ARTICULAR; INTRAMUSCULAR at 03:12

## 2024-12-19 NOTE — PROGRESS NOTES
"Chief Complaint   Patient presents with    Right Knee - Pain         HPI:   This is a 59 y.o. who presents to clinic today complaining of right knee pain for 1 weeks after work injury several months ago. Pain is progressively worsening. No numbness or tingling. No associated signs or symptoms.    Past Medical History:   Diagnosis Date    Allergy     Anticoagulant long-term use     ASA 81mg, Effient    Anxiety     Arthritis     Asthma     As child    CAD (coronary artery disease) 01/27/2012    s/p stents x4 , CABG, 3 vessel, (5/2013) newest stent prior to CABG    Cataract     Chronic constipation     Colon polyp     Coronary artery disease involving native coronary artery of native heart without angina pectoris 01/27/2012 12/19 Significant ostial RCA lesion as above treated with drug-eluting stenting as above. Occluded proximal LAD with patent lima lad Patent vein graft to 2nd obtuse marginal Known occluded vein graft to unknown vessel.  s/p stents x 3 (2010) s/p LAD stent (DSE) 3/2012    DDD (degenerative disc disease), cervical     DDD (degenerative disc disease), lumbar     DDD (degenerative disc disease), thoracic     Depression     Edema     Encounter for blood transfusion     General anesthetics causing adverse effect in therapeutic use     Headache(784.0)     History of nephrolithiasis     Hyperlipidemia     Hypertension     Hypothyroid 07/05/2012    Insomnia     Insulin resistance     patient stated not diebetic    Joint stiffness     Joint swelling     Kidney disease     ; Frequent UTIs     Kidney stones     Low back pain     Neck pain     Obstructive sleep apnea on CPAP 07/17/2012    PONV (postoperative nausea and vomiting)     S/P CABG (coronary artery bypass graft) 06/25/2013 6/23/2013     Sleep apnea 01/27/2012    Patient reports "severe" sleep apnea, uses C-pap    Stented coronary artery 12/20/2019 12/19  ostial RCA -Osirio2.5 x 18 post dilated 2.75     Thoracic back pain     Type 2 " diabetes mellitus without complication, without long-term current use of insulin 2024    Usual hyperplasia of lactiferous duct 2017    left breast     Past Surgical History:   Procedure Laterality Date    ADENOIDECTOMY      BACK SURGERY      BREAST BIOPSY Left 2017    duct excision- Dr. Jimenez    BREAST CYST ASPIRATION Left 2020    @ 's office- old hematoma drained (per office)    CARDIAC SURGERY      CARPAL TUNNEL RELEASE      bilateral    CERVICAL LAMINECTOMY WITH SPINAL FUSION      2016     SECTION, CLASSIC      x 2    COLONOSCOPY      COLONOSCOPY N/A 2024    Procedure: COLONOSCOPY;  Surgeon: Mk Warren MD;  Location: [facility] ENDO;  Service: Endoscopy;  Laterality: N/A;    CORONARY ANGIOGRAPHY  2019    Procedure: ANGIOGRAM, CORONARY ARTERY;  Surgeon: Timi Millan MD;  Location: [facility] CATH;  Service: Cardiology;;    CORONARY ANGIOGRAPHY  10/13/2023    Procedure: Coronary angiogram study;  Surgeon: Azra Stoll MD;  Location: [facility] CATH;  Service: Cardiology;;    CORONARY ANGIOGRAPHY INCLUDING BYPASS GRAFTS WITH CATHETERIZATION OF LEFT HEART  10/13/2023    Procedure: Left heart cath w/Grafts RM 3640;  Surgeon: Azra Stoll MD;  Location: [facility] CATH;  Service: Cardiology;;    CORONARY ANGIOPLASTY WITH STENT PLACEMENT      x 4    CORONARY ARTERY BYPASS GRAFT  2013    3 vessel    CORONARY BYPASS GRAFT ANGIOGRAPHY  10/13/2023    Procedure: Bypass graft study;  Surgeon: Azra Stoll MD;  Location: [facility] CATH;  Service: Cardiology;;    ESOPHAGOGASTRODUODENOSCOPY N/A 2020    Procedure: EGD (ESOPHAGOGASTRODUODENOSCOPY);  Surgeon: Mk Warren MD;  Location: [facility] ENDO;  Service: Endoscopy;  Laterality: N/A;    HYSTERECTOMY      Complete    JOINT REPLACEMENT      KNEE ARTHROPLASTY Left 2019    Procedure: ARTHROPLASTY, KNEE;  Surgeon: Juan Wilson MD;  Location: [facility] OR;  Service: Orthopedics;  Laterality: Left;    KNEE ARTHROSCOPY W/ MENISCAL REPAIR  Left     twice, last one 5/2014    LAMINECTOMY      L4/L5    LAPAROSCOPIC CHOLECYSTECTOMY N/A 7/3/2023    Procedure: CHOLECYSTECTOMY, LAPAROSCOPIC;  Surgeon: Obinna Whipple MD;  Location: [facility] OR;  Service: General;  Laterality: N/A;  with cholangiogram    LEFT HEART CATHETERIZATION  12/13/2019    Procedure: Left heart cath- RM # [id number];  Surgeon: Timi Millan MD;  Location: [facility] CATH;  Service: Cardiology;;    OOPHORECTOMY Bilateral 2011    ROBOTIC ARTHROPLASTY, KNEE Right 6/14/2021    Procedure: ROBOTIC ARTHROPLASTY, KNEE, TOTAL;  Surgeon: Juan Wilson MD;  Location: [facility] OR;  Service: Orthopedics;  Laterality: Right;    SINUS SURGERY      x3    TENDON REPAIR Left     ankle surgery    THYROIDECTOMY      2 separate surgeries    TONSILLECTOMY      TOTAL KNEE ARTHROPLASTY Left 06/17/2019    Surgeon: Juan Wilson MD    XI ROBOTIC REVISION, GASTROENTEROSTOMY, MITCHELL-EN-Y N/A 2/20/2023    Procedure: XI ROBOTIC REVISION,GASTROENTEROSTOMY,MITCHELL-EN-Y;  Surgeon: Obinna Whipple MD;  Location: [facility] OR;  Service: General;  Laterality: N/A;  creation of Mitchell-N-Y anastomsis     Current Outpatient Medications on File Prior to Visit   Medication Sig Dispense Refill    acarbose (PRECOSE) 25 MG Tab Take 1 tablet (25 mg total) by mouth 3 (three) times daily with meals. 270 tablet 3    acetaminophen (TYLENOL) 650 MG TbSR Take 1,300 mg by mouth every 8 (eight) hours as needed (pain).       albuterol (PROVENTIL/VENTOLIN HFA) 90 mcg/actuation inhaler Inhale 2 puffs into the lungs every 6 (six) hours as needed for Shortness of Breath or Wheezing. 18 g 1    allopurinoL (ZYLOPRIM) 100 MG tablet TAKE ONE TABLET BY MOUTH EVERY MORNING 90 tablet 4    amLODIPine (NORVASC) 5 MG tablet Take 1 tablet (5 mg total) by mouth once daily. 30 tablet 11    atorvastatin (LIPITOR) 40 MG tablet TAKE ONE TABLET BY MOUTH EVERY NIGHT AT BEDTIME 90 tablet 4    azithromycin (ZITHROMAX Z-BRI) 250 MG tablet Take 2 po day 1, then 1 po day 2 - 5 6 tablet 0     baclofen (LIORESAL) 10 MG tablet TAKE ONE TABLET BY MOUTH EVERY MORNING and TAKE ONE TABLET BY MOUTH EVERY NIGHT AT BEDTIME 60 tablet 5    benzonatate (TESSALON) 100 MG capsule 1 - 2 po every 6 hours prn cough 45 capsule 0    BRILINTA 90 mg tablet TAKE ONE TABLET BY MOUTH EVERY MORNING and TAKE ONE TABLET BY MOUTH EVERY NIGHT AT BEDTIME 180 tablet 3    calcium carb and citrate-vitD3 600 mg-12.5 mcg (500 unit) TbSR Take 2 tablets by mouth once.      cetirizine (ZYRTEC) 10 MG tablet Take 10 mg by mouth daily as needed for Allergies.      fluticasone propionate (FLONASE) 50 mcg/actuation nasal spray fluticasone propionate 50 mcg/actuation nasal spray,suspension      lancets Misc To check BG TID PRN, to use with insurance preferred meter 100 each 1    levothyroxine (SYNTHROID) 100 MCG tablet TAKE ONE TABLET BY MOUTH EVERY MORNING 90 tablet 3    methocarbamoL (ROBAXIN) 750 MG Tab Take 1 tablet (750 mg total) by mouth 4 (four) times daily as needed. 44 tablet 3    metoprolol succinate (TOPROL-XL) 25 MG 24 hr tablet Take 1 tablet (25 mg total) by mouth every evening. 30 tablet 11    multivitamin (THERAGRAN) per tablet Take 1 tablet by mouth once daily. Bariatric vitamin      pantoprazole (PROTONIX) 40 MG tablet TAKE ONE TABLET BY MOUTH EVERY MORNING and TAKE ONE TABLET BY MOUTH EVERY NIGHT AT BEDTIME 180 tablet 1    prazosin (MINIPRESS) 1 MG Cap Take 1 capsule (1 mg total) by mouth every evening. 30 capsule 1    pregabalin (LYRICA) 100 MG capsule TAKE ONE CAPSULE BY MOUTH EVERY MORNING AND TWO CAPSULES BY MOUTH EVERY NIGHT AT BEDTIME 90 capsule 1    senna-docusate 8.6-50 mg (PERICOLACE) 8.6-50 mg per tablet Take 1 tablet by mouth 2 (two) times a day.      TRUE METRIX GLUCOSE TEST STRIP Strp TO CHECK BLOOD GLUCOSE THREE TIMES DAILY AS NEEDED 300 each 3    venlafaxine (EFFEXOR-XR) 150 MG Cp24 TAKE ONE CAPSULE BY MOUTH EVERY NIGHT AT BEDTIME 90 capsule 3    venlafaxine (EFFEXOR-XR) 37.5 MG 24 hr capsule Take 1 capsule  "(37.5 mg total) by mouth once daily. 30 capsule 11    aspirin (ECOTRIN) 81 MG EC tablet Take 1 tablet (81 mg total) by mouth once daily. (Patient not taking: Reported on 9/24/2024) 30 tablet 11    blood-glucose meter kit To check BG TID PRN, to use with insurance preferred meter 1 each 0     No current facility-administered medications on file prior to visit.     Review of patient's allergies indicates:   Allergen Reactions    Celexa [citalopram] Other (See Comments)     GI upset  Stated "knocked me out"    Cephalexin Anaphylaxis    Zoloft [sertraline] Other (See Comments)     Stated "knocked me out"    Codeine Other (See Comments)     hallucinations  hallucinations    Isosorbide Nausea And Vomiting    Ciprofloxacin Itching    Levaquin [levofloxacin] Other (See Comments)     tendinitis    Metformin      Nausea     Penicillamine     Penicillins Other (See Comments)     Was told per mother that as child was allergic to penicillin.  Childhood allergy/ unknown reaction    Sulfa (sulfonamide antibiotics)      Family History   Problem Relation Name Age of Onset    Heart failure Mother      Asthma Mother      Macular degeneration Mother      Cancer Mother          Breast    Cataracts Mother      Heart disease Mother      COPD Mother      Breast cancer Mother      Heart disease Father      Diabetes Father      Hypertension Father      Stroke Father      Cataracts Father      Obesity Father      Obesity Sister      Glaucoma Sister      Thyroid disease Sister      Glaucoma Sister      Other Brother          stiff man syndrome    Cancer Maternal Grandmother          Breast with Mets    Breast cancer Maternal Grandmother      Cancer Paternal Grandmother          Colon    Strabismus Other niece     Amblyopia Neg Hx      Blindness Neg Hx      Retinal detachment Neg Hx       Social History     Socioeconomic History    Marital status:    Occupational History    Occupation: teacher   Tobacco Use    Smoking status: Former     " Current packs/day: 0.00     Average packs/day: 0.5 packs/day for 14.9 years (7.5 ttl pk-yrs)     Types: Cigarettes     Start date: 1984     Quit date: 1998     Years since quittin.0    Smokeless tobacco: Never   Substance and Sexual Activity    Alcohol use: No    Drug use: Not Currently     Types: Benzodiazepines    Sexual activity: Yes     Partners: Male     Social Drivers of Health     Financial Resource Strain: Patient Declined (2023)    Overall Financial Resource Strain (CARDIA)     Difficulty of Paying Living Expenses: Patient declined   Food Insecurity: Patient Declined (2023)    Hunger Vital Sign     Worried About Running Out of Food in the Last Year: Patient declined     Ran Out of Food in the Last Year: Patient declined   Transportation Needs: Patient Declined (2023)    PRAPARE - Transportation     Lack of Transportation (Medical): Patient declined     Lack of Transportation (Non-Medical): Patient declined   Physical Activity: Patient Declined (2023)    Exercise Vital Sign     Days of Exercise per Week: Patient declined     Minutes of Exercise per Session: Patient declined   Stress: Patient Declined (2023)    [employer] - Occupational Stress Questionnaire     Feeling of Stress : Patient declined   Housing Stability: Unknown (2023)    Housing Stability Vital Sign     Unable to Pay for Housing in the Last Year: Patient refused     Unstable Housing in the Last Year: Patient refused       Review of Systems:  Constitutional:  Denies fever or chills   Eyes:  Denies change in visual acuity   HENT:  Denies nasal congestion or sore throat   Respiratory:  Denies cough or shortness of breath   Cardiovascular:  Denies chest pain or edema   GI:  Denies abdominal pain, nausea, vomiting, bloody stools or diarrhea   :  Denies dysuria   Integument:  Denies rash   Neurologic:  Denies headache, focal weakness or sensory changes   Endocrine:  Denies  polyuria or polydipsia   Lymphatic:  Denies swollen glands   Psychiatric:  Denies depression or anxiety     Physical Exam:   Constitutional:  Well developed, well nourished, no acute distress, non-toxic appearance   Integument:  Well hydrated, no rash   Lymphatic:  No lymphadenopathy noted   Neurologic:  Alert & oriented x 3, CN 2-12 normal, normal motor function, normal sensory function, no focal deficits noted   Psychiatric:  Speech and behavior appropriate   Eyes: EOMI  Gi: abdomen soft    Bilateral Knee Exam    right Knee Exam     Tenderness   The patient is experiencing tenderness in the medial pes bursa and hamstring.    Range of Motion   Extension: abnormal   Flexion: abnormal     Muscle Strength     The patient has normal knee strength.    Tests   Livia:  Medial - negative   Lachman:  Anterior - negative      Varus: negative  Valgus: negative  Patellar Apprehension: negative    Other   Erythema: absent  Sensation: normal  Pulse: present  Swelling: mild      left Knee Exam   left knee exam performed same as contralateral side and is normal.    There are no diagnoses linked to this encounter.        Using an aseptic technique, I injected 1 cc of lidocaine 1% without and 1 cc of kenalog 40mg into the right Knee pes. The patient tolerated this well. I will have them return to clinic in 6 weeks.

## 2024-12-19 NOTE — PROCEDURES
Large Joint Aspiration/Injection: R anserine bursa    Date/Time: 12/12/2024 3:30 PM    Performed by: Juan Wilson MD  Authorized by: Juan Wilson MD    Consent Done?:  Yes (Verbal)  Indications:  Pain  Timeout: prior to procedure the correct patient, procedure, and site was verified    Prep: patient was prepped and draped in usual sterile fashion    Local anesthetic:  Lidocaine 1% without epinephrine  Anesthetic total (ml):  1      Details:  Needle Size:  21 G  Approach:  Anterolateral  Location:  Knee  Site:  R anserine bursa  Medications:  40 mg triamcinolone acetonide 40 mg/mL  Patient tolerance:  Patient tolerated the procedure well with no immediate complications

## 2025-01-03 RX ORDER — TRIAMCINOLONE ACETONIDE 40 MG/ML
40 INJECTION, SUSPENSION INTRA-ARTICULAR; INTRAMUSCULAR
Status: DISCONTINUED | OUTPATIENT
Start: 2024-12-19 | End: 2025-01-03 | Stop reason: HOSPADM

## 2025-01-03 NOTE — TELEPHONE ENCOUNTER
Care Due:                  Date            Visit Type   Department     Provider  --------------------------------------------------------------------------------                                EP -                              [facility]  Last Visit: 09-      CARE ([facility])   CARMEL FAUSTIN                               -                              [facility]  Next Visit: 09-      CARE ([facility])   CARMEL FAUSTIN                                                            Last  Test          Frequency    Reason                     Performed    Due Date  --------------------------------------------------------------------------------    Uric Acid...  12 months..  allopurinoL..............  Not Found    Overdue    [employer] Embedded Care Due Messages. Reference number: [id number].   1/03/2025 4:12:35 PM CST

## 2025-01-03 NOTE — PROGRESS NOTES
"Chief Complaint   Patient presents with    Left Knee - Pain, Injury    Right Shoulder - Pain    Left Shoulder - Pain       HPI:    This is a 59 y.o. who presents today complaining of bilateral shoulder and left knee pain for 1 weeks after no interval trauma. Pain is dull. No numbness or tingling. No associated signs or symptoms.      Past Medical History:   Diagnosis Date    Allergy     Anticoagulant long-term use     ASA 81mg, Effient    Anxiety     Arthritis     Asthma     As child    CAD (coronary artery disease) 01/27/2012    s/p stents x4 , CABG, 3 vessel, (5/2013) newest stent prior to CABG    Cataract     Chronic constipation     Colon polyp     Coronary artery disease involving native coronary artery of native heart without angina pectoris 01/27/2012 12/19 Significant ostial RCA lesion as above treated with drug-eluting stenting as above. Occluded proximal LAD with patent lima lad Patent vein graft to 2nd obtuse marginal Known occluded vein graft to unknown vessel.  s/p stents x 3 (2010) s/p LAD stent (DSE) 3/2012    DDD (degenerative disc disease), cervical     DDD (degenerative disc disease), lumbar     DDD (degenerative disc disease), thoracic     Depression     Edema     Encounter for blood transfusion     General anesthetics causing adverse effect in therapeutic use     Headache(784.0)     History of nephrolithiasis     Hyperlipidemia     Hypertension     Hypothyroid 07/05/2012    Insomnia     Insulin resistance     patient stated not diebetic    Joint stiffness     Joint swelling     Kidney disease     ; Frequent UTIs     Kidney stones     Low back pain     Neck pain     Obstructive sleep apnea on CPAP 07/17/2012    PONV (postoperative nausea and vomiting)     S/P CABG (coronary artery bypass graft) 06/25/2013 6/23/2013     Sleep apnea 01/27/2012    Patient reports "severe" sleep apnea, uses C-pap    Stented coronary artery 12/20/2019 12/19  ostial RCA -Osirio2.5 x 18 post dilated " 2.75     Thoracic back pain     Type 2 diabetes mellitus without complication, without long-term current use of insulin 2024    Usual hyperplasia of lactiferous duct 2017    left breast      Past Surgical History:   Procedure Laterality Date    ADENOIDECTOMY      BACK SURGERY      BREAST BIOPSY Left 2017    duct excision- Dr. Jimenez    BREAST CYST ASPIRATION Left 2020    @ 's office- old hematoma drained (per office)    CARDIAC SURGERY      CARPAL TUNNEL RELEASE      bilateral    CERVICAL LAMINECTOMY WITH SPINAL FUSION      2016     SECTION, CLASSIC      x 2    COLONOSCOPY      COLONOSCOPY N/A 2024    Procedure: COLONOSCOPY;  Surgeon: Mk Warren MD;  Location: [facility] ENDO;  Service: Endoscopy;  Laterality: N/A;    CORONARY ANGIOGRAPHY  2019    Procedure: ANGIOGRAM, CORONARY ARTERY;  Surgeon: Timi Millan MD;  Location: [facility] CATH;  Service: Cardiology;;    CORONARY ANGIOGRAPHY  10/13/2023    Procedure: Coronary angiogram study;  Surgeon: Azra Stoll MD;  Location: [facility] CATH;  Service: Cardiology;;    CORONARY ANGIOGRAPHY INCLUDING BYPASS GRAFTS WITH CATHETERIZATION OF LEFT HEART  10/13/2023    Procedure: Left heart cath w/Grafts RM 3640;  Surgeon: Azra Stoll MD;  Location: [facility] CATH;  Service: Cardiology;;    CORONARY ANGIOPLASTY WITH STENT PLACEMENT      x 4    CORONARY ARTERY BYPASS GRAFT  2013    3 vessel    CORONARY BYPASS GRAFT ANGIOGRAPHY  10/13/2023    Procedure: Bypass graft study;  Surgeon: Azra Stoll MD;  Location: [facility] CATH;  Service: Cardiology;;    ESOPHAGOGASTRODUODENOSCOPY N/A 2020    Procedure: EGD (ESOPHAGOGASTRODUODENOSCOPY);  Surgeon: Mk Warren MD;  Location: [facility] ENDO;  Service: Endoscopy;  Laterality: N/A;    HYSTERECTOMY      Complete    JOINT REPLACEMENT      KNEE ARTHROPLASTY Left 2019    Procedure: ARTHROPLASTY, KNEE;  Surgeon: Juan Wilson MD;  Location: [facility] OR;  Service: Orthopedics;  Laterality: Left;     KNEE ARTHROSCOPY W/ MENISCAL REPAIR Left     twice, last one 5/2014    LAMINECTOMY      L4/L5    LAPAROSCOPIC CHOLECYSTECTOMY N/A 7/3/2023    Procedure: CHOLECYSTECTOMY, LAPAROSCOPIC;  Surgeon: Obinna Whipple MD;  Location: [facility] OR;  Service: General;  Laterality: N/A;  with cholangiogram    LEFT HEART CATHETERIZATION  12/13/2019    Procedure: Left heart cath-  # [id number];  Surgeon: Timi Millan MD;  Location: [facility] CATH;  Service: Cardiology;;    OOPHORECTOMY Bilateral 2011    ROBOTIC ARTHROPLASTY, KNEE Right 6/14/2021    Procedure: ROBOTIC ARTHROPLASTY, KNEE, TOTAL;  Surgeon: Juan Wilson MD;  [facility] OR;  Service: Orthopedics;  Laterality: Right;    SINUS SURGERY      x3    TENDON REPAIR Left     ankle surgery    THYROIDECTOMY      2 separate surgeries    TONSILLECTOMY      TOTAL KNEE ARTHROPLASTY Left 06/17/2019    Surgeon: Juan Wilson MD    XI ROBOTIC REVISION, GASTROENTEROSTOMY, MITCHELL-EN-Y N/A 2/20/2023    Procedure: XI ROBOTIC REVISION,GASTROENTEROSTOMY,MITCHELL-EN-Y;  Surgeon: Obinna Whipple MD;  Location: [facility] OR;  Service: General;  Laterality: N/A;  creation of Mitchell-N-Y anastomsis      Current Outpatient Medications on File Prior to Visit   Medication Sig Dispense Refill    acarbose (PRECOSE) 25 MG Tab Take 1 tablet (25 mg total) by mouth 3 (three) times daily with meals. 270 tablet 3    acetaminophen (TYLENOL) 650 MG TbSR Take 1,300 mg by mouth every 8 (eight) hours as needed (pain).       albuterol (PROVENTIL/VENTOLIN HFA) 90 mcg/actuation inhaler Inhale 2 puffs into the lungs every 6 (six) hours as needed for Shortness of Breath or Wheezing. 18 g 1    allopurinoL (ZYLOPRIM) 100 MG tablet TAKE ONE TABLET BY MOUTH EVERY MORNING 90 tablet 4    amLODIPine (NORVASC) 5 MG tablet Take 1 tablet (5 mg total) by mouth once daily. 30 tablet 11    atorvastatin (LIPITOR) 40 MG tablet TAKE ONE TABLET BY MOUTH EVERY NIGHT AT BEDTIME 90 tablet 4    azithromycin (ZITHROMAX Z-BRI) 250 MG tablet Take 2  po day 1, then 1 po day 2 - 5 6 tablet 0    baclofen (LIORESAL) 10 MG tablet TAKE ONE TABLET BY MOUTH EVERY MORNING and TAKE ONE TABLET BY MOUTH EVERY NIGHT AT BEDTIME 60 tablet 5    benzonatate (TESSALON) 100 MG capsule 1 - 2 po every 6 hours prn cough 45 capsule 0    BRILINTA 90 mg tablet TAKE ONE TABLET BY MOUTH EVERY MORNING and TAKE ONE TABLET BY MOUTH EVERY NIGHT AT BEDTIME 180 tablet 3    calcium carb and citrate-vitD3 600 mg-12.5 mcg (500 unit) TbSR Take 2 tablets by mouth once.      cetirizine (ZYRTEC) 10 MG tablet Take 10 mg by mouth daily as needed for Allergies.      fluticasone propionate (FLONASE) 50 mcg/actuation nasal spray fluticasone propionate 50 mcg/actuation nasal spray,suspension      lancets Misc To check BG TID PRN, to use with insurance preferred meter 100 each 1    levothyroxine (SYNTHROID) 100 MCG tablet TAKE ONE TABLET BY MOUTH EVERY MORNING 90 tablet 3    methocarbamoL (ROBAXIN) 750 MG Tab Take 1 tablet (750 mg total) by mouth 4 (four) times daily as needed. 44 tablet 3    metoprolol succinate (TOPROL-XL) 25 MG 24 hr tablet Take 1 tablet (25 mg total) by mouth every evening. 30 tablet 11    multivitamin (THERAGRAN) per tablet Take 1 tablet by mouth once daily. Bariatric vitamin      pantoprazole (PROTONIX) 40 MG tablet TAKE ONE TABLET BY MOUTH EVERY MORNING and TAKE ONE TABLET BY MOUTH EVERY NIGHT AT BEDTIME 180 tablet 1    prazosin (MINIPRESS) 1 MG Cap Take 1 capsule (1 mg total) by mouth every evening. 30 capsule 1    pregabalin (LYRICA) 100 MG capsule TAKE ONE CAPSULE BY MOUTH EVERY MORNING AND TWO CAPSULES BY MOUTH EVERY NIGHT AT BEDTIME 90 capsule 1    senna-docusate 8.6-50 mg (PERICOLACE) 8.6-50 mg per tablet Take 1 tablet by mouth 2 (two) times a day.      TRUE METRIX GLUCOSE TEST STRIP Strp TO CHECK BLOOD GLUCOSE THREE TIMES DAILY AS NEEDED 300 each 3    venlafaxine (EFFEXOR-XR) 150 MG Cp24 TAKE ONE CAPSULE BY MOUTH EVERY NIGHT AT BEDTIME 90 capsule 3    venlafaxine (EFFEXOR-XR)  "37.5 MG 24 hr capsule Take 1 capsule (37.5 mg total) by mouth once daily. 30 capsule 11    aspirin (ECOTRIN) 81 MG EC tablet Take 1 tablet (81 mg total) by mouth once daily. (Patient not taking: Reported on 2024) 30 tablet 11    blood-glucose meter kit To check BG TID PRN, to use with insurance preferred meter 1 each 0     No current facility-administered medications on file prior to visit.      Review of patient's allergies indicates:   Allergen Reactions    Celexa [citalopram] Other (See Comments)     GI upset  Stated "knocked me out"    Cephalexin Anaphylaxis    Zoloft [sertraline] Other (See Comments)     Stated "knocked me out"    Codeine Other (See Comments)     hallucinations  hallucinations    Isosorbide Nausea And Vomiting    Ciprofloxacin Itching    Levaquin [levofloxacin] Other (See Comments)     tendinitis    Metformin      Nausea     Penicillamine     Penicillins Other (See Comments)     Was told per mother that as child was allergic to penicillin.  Childhood allergy/ unknown reaction    Sulfa (sulfonamide antibiotics)       Family History not pertinent   Social History     Socioeconomic History    Marital status:    Occupational History    Occupation: teacher   Tobacco Use    Smoking status: Former     Current packs/day: 0.00     Average packs/day: 0.5 packs/day for 14.9 years (7.5 ttl pk-yrs)     Types: Cigarettes     Start date: 1984     Quit date: 1998     Years since quittin.0    Smokeless tobacco: Never   Substance and Sexual Activity    Alcohol use: No    Drug use: Not Currently     Types: Benzodiazepines    Sexual activity: Yes     Partners: Male     Social Drivers of Health     Financial Resource Strain: Patient Declined (2023)    Overall Financial Resource Strain (CARDIA)     Difficulty of Paying Living Expenses: Patient declined   Food Insecurity: Patient Declined (2023)    Hunger Vital Sign     Worried About Running Out of Food in the Last Year: Patient " declined     Ran Out of Food in the Last Year: Patient declined   Transportation Needs: Patient Declined (2/21/2023)    PRAPARE - Transportation     Lack of Transportation (Medical): Patient declined     Lack of Transportation (Non-Medical): Patient declined   Physical Activity: Patient Declined (2/21/2023)    Exercise Vital Sign     Days of Exercise per Week: Patient declined     Minutes of Exercise per Session: Patient declined   Stress: Patient Declined (2/21/2023)    [employer] - Occupational Stress Questionnaire     Feeling of Stress : Patient declined   Housing Stability: Unknown (2/21/2023)    Housing Stability Vital Sign     Unable to Pay for Housing in the Last Year: Patient refused     Unstable Housing in the Last Year: Patient refused         Review of Systems:   Constitutional:  Denies fever or chills    Eyes:  Denies change in visual acuity    HENT:  Denies nasal congestion or sore throat    Respiratory:  Denies cough or shortness of breath    Cardiovascular:  Denies chest pain or edema    GI:  Denies abdominal pain, nausea, vomiting, bloody stools or diarrhea    :  Denies dysuria    Integument:  Denies rash    Neurologic:  Denies headache, focal weakness or sensory changes    Endocrine:  Denies polyuria or polydipsia    Lymphatic:  Denies swollen glands    Psychiatric:  Denies depression or anxiety       Physical Exam:    Constitutional:  Well developed, well nourished, no acute distress, non-toxic appearance    Integument:  Well hydrated, no rash    Lymphatic:  No lymphadenopathy noted    Neurologic:  Alert & oriented x 3,     Psychiatric:  Speech and behavior appropriate    Gi: abdomen soft  Eyes: EOMI     Bilateral Shoulder Exam  Tenderness   Shoulder tenderness location: diffusely about shoulder.    Range of Motion   Forward Flexion: abnormal   External Rotation: abnormal     Muscle Strength   Supraspinatus: 4/5     Tests   Hawkin's test: positive  Impingement:  positive    Other   Erythema: absent  Sensation: normal  Pulse: present     L knee  Point TTP about the pes bursa. Pain with hamstring stretch. NVI distally.       Impingement syndrome, shoulder, right    Impingement syndrome of left shoulder    Biceps tendinitis of left upper extremity    Biceps tendinitis of right upper extremity    Pes anserinus bursitis of left knee          Using an aseptic technique, I injected 5 cc of lidocaine 1% without and 1 cc of kenalog 40mg into the bilateral Shoulder and 1:1 in the bilateral biceps and left pes. The patient tolerated this well. I will have them return to clinic in 6 weeks.

## 2025-01-03 NOTE — PROCEDURES
Large Joint Aspiration/Injection: bilateral subacromial bursa    Date/Time: 12/19/2024 3:30 PM    Performed by: Juan Wilson MD  Authorized by: Juan Wilson MD    Consent Done?:  Yes (Verbal)  Indications:  Pain  Timeout: prior to procedure the correct patient, procedure, and site was verified    Prep: patient was prepped and draped in usual sterile fashion      Local anesthesia used?: Yes    Local anesthetic:  Lidocaine 1% without epinephrine  Anesthetic total (ml):  5      Details:  Needle Size:  22 G  Ultrasonic Guidance for needle placement?: No    Approach:  Posterior  Location:  Shoulder  Laterality:  Bilateral  Site:  Bilateral subacromial bursa  Medications (Right):  40 mg triamcinolone acetonide 40 mg/mL  Medications (Left):  40 mg triamcinolone acetonide 40 mg/mL  Patient tolerance:  Patient tolerated the procedure well with no immediate complications  Tendon Sheath    Date/Time: 12/19/2024 3:30 PM    Performed by: Juan Wilson MD  Authorized by: Juan Wilson MD    Consent Done?:  Yes (Verbal)  Indications:  Pain  Timeout: prior to procedure the correct patient, procedure, and site was verified    Local anesthesia used?: No    Location:  Shoulder  Site:  R bicep tendon and L bicep tendon  Ultrasonic guidance for needle placement?: No    Needle size:  25 G  Medications:  40 mg triamcinolone acetonide 40 mg/mL; 40 mg triamcinolone acetonide 40 mg/mL  Patient tolerance:  Patient tolerated the procedure well with no immediate complications  Large Joint Aspiration/Injection: L anserine bursa    Date/Time: 12/19/2024 3:30 PM    Performed by: Juan Wilson MD  Authorized by: Juan Wilson MD    Consent Done?:  Yes (Verbal)  Indications:  Pain  Timeout: prior to procedure the correct patient, procedure, and site was verified    Prep: patient was prepped and draped in usual sterile fashion    Local anesthetic:  Lidocaine 1% without epinephrine  Anesthetic total (ml):  1      Details:  Needle  Size:  21 G  Approach:  Anterolateral  Location:  Knee  Site:  L anserine bursa  Medications:  40 mg triamcinolone acetonide 40 mg/mL  Patient tolerance:  Patient tolerated the procedure well with no immediate complications

## 2025-01-06 DIAGNOSIS — J22 LOWER RESPIRATORY INFECTION (E.G., BRONCHITIS, PNEUMONIA, PNEUMONITIS, PULMONITIS): ICD-10-CM

## 2025-01-06 RX ORDER — PANTOPRAZOLE SODIUM 40 MG/1
TABLET, DELAYED RELEASE ORAL
Qty: 180 TABLET | Refills: 2 | Status: SHIPPED | OUTPATIENT
Start: 2025-01-06

## 2025-01-06 NOTE — TELEPHONE ENCOUNTER
No care due was identified.  [facility] Embedded Care Due Messages. Reference number: [id number].   1/06/2025 3:13:05 PM CST

## 2025-01-06 NOTE — TELEPHONE ENCOUNTER
Refill Routing Note   Medication(s) are not appropriate for processing by [facility] for the following reason(s):        Outside of protocol: 40mg PPI BID oop  No active prescription written by provider    [facility] action(s):  Route   Requires labs : Yes             Appointments  past 12m or future 3m with PCP    Date Provider   Last Visit   9/24/2024 Natan Bolton MD   Next Visit   Visit date not found Ntaan Bolton MD   ED visits in past 90 days: 0        Note composed:11:05 PM 01/05/2025

## 2025-01-07 RX ORDER — ALBUTEROL SULFATE 90 UG/1
2 INHALANT RESPIRATORY (INHALATION) EVERY 6 HOURS PRN
Qty: 18 G | Refills: 3 | Status: SHIPPED | OUTPATIENT
Start: 2025-01-07

## 2025-01-07 NOTE — TELEPHONE ENCOUNTER
Refill Routing Note   Medication(s) are not appropriate for processing by [facility] for the following reason(s):      No active prescription written by provider    [facility] action(s):  Defer Care Due:  None identified            Appointments  past 12m or future 3m with PCP    Date Provider   Last Visit   9/24/2024 Natan Bolton MD   Next Visit   Visit date not found Natan Bolton MD   ED visits in past 90 days: 0        Note composed:8:41 AM 01/07/2025

## 2025-01-08 DIAGNOSIS — J22 LOWER RESPIRATORY INFECTION (E.G., BRONCHITIS, PNEUMONIA, PNEUMONITIS, PULMONITIS): ICD-10-CM

## 2025-01-08 DIAGNOSIS — E16.2 HYPOGLYCEMIA: ICD-10-CM

## 2025-01-08 RX ORDER — ALBUTEROL SULFATE 90 UG/1
2 INHALANT RESPIRATORY (INHALATION) EVERY 6 HOURS PRN
Qty: 18 G | Refills: 3 | OUTPATIENT
Start: 2025-01-08

## 2025-01-08 NOTE — TELEPHONE ENCOUNTER
No care due was identified.  [facility] Embedded Care Due Messages. Reference number: [id number].   1/08/2025 11:54:39 AM CST

## 2025-01-08 NOTE — TELEPHONE ENCOUNTER
Refill Routing Note   Medication(s) are not appropriate for processing by [facility] for the following reason(s):        No active prescription written by provider    [facility] action(s):  Defer  Quick Discontinue        Medication Therapy Plan: Last ordered: 1 year ago (1/5/2024) by Smitha Kate NP; Proair E-Prescribing Status: Receipt confirmed by pharmacy (1/7/2025  9:21 AM CST)      Appointments  past 12m or future 3m with PCP    Date Provider   Last Visit   9/24/2024 Natan Bolton MD   Next Visit   Visit date not found Natan Bolton MD   ED visits in past 90 days: 0        Note composed:2:34 PM 01/08/2025

## 2025-01-23 ENCOUNTER — OFFICE VISIT (OUTPATIENT)
Dept: ORTHOPEDICS | Facility: CLINIC | Age: 60
End: 2025-01-23
Payer: COMMERCIAL

## 2025-01-23 VITALS — HEIGHT: 61 IN | BODY MASS INDEX: 25.48 KG/M2 | WEIGHT: 134.94 LBS

## 2025-01-23 DIAGNOSIS — M70.51 PES ANSERINUS BURSITIS OF RIGHT KNEE: Primary | ICD-10-CM

## 2025-01-23 PROCEDURE — 1160F RVW MEDS BY RX/DR IN RCRD: CPT | Mod: CPTII,S$GLB,, | Performed by: ORTHOPAEDIC SURGERY

## 2025-01-23 PROCEDURE — 99999 PR PBB SHADOW E&M-EST. PATIENT-LVL IV: CPT | Mod: PBBFAC,,, | Performed by: ORTHOPAEDIC SURGERY

## 2025-01-23 PROCEDURE — 99213 OFFICE O/P EST LOW 20 MIN: CPT | Mod: 25,S$GLB,, | Performed by: ORTHOPAEDIC SURGERY

## 2025-01-23 PROCEDURE — 3008F BODY MASS INDEX DOCD: CPT | Mod: CPTII,S$GLB,, | Performed by: ORTHOPAEDIC SURGERY

## 2025-01-23 PROCEDURE — 20610 DRAIN/INJ JOINT/BURSA W/O US: CPT | Mod: RT,S$GLB,, | Performed by: ORTHOPAEDIC SURGERY

## 2025-01-23 PROCEDURE — 1159F MED LIST DOCD IN RCRD: CPT | Mod: CPTII,S$GLB,, | Performed by: ORTHOPAEDIC SURGERY

## 2025-01-23 RX ADMIN — TRIAMCINOLONE ACETONIDE 40 MG: 40 INJECTION, SUSPENSION INTRA-ARTICULAR; INTRAMUSCULAR at 03:01

## 2025-01-28 RX ORDER — TRIAMCINOLONE ACETONIDE 40 MG/ML
40 INJECTION, SUSPENSION INTRA-ARTICULAR; INTRAMUSCULAR
Status: DISCONTINUED | OUTPATIENT
Start: 2025-01-23 | End: 2025-01-28 | Stop reason: HOSPADM

## 2025-01-28 NOTE — PROCEDURES
Large Joint Aspiration/Injection: R anserine bursa    Date/Time: 1/23/2025 3:30 PM    Performed by: Juan Wilson MD  Authorized by: Juan Wilson MD    Consent Done?:  Yes (Verbal)  Indications:  Pain  Timeout: prior to procedure the correct patient, procedure, and site was verified    Prep: patient was prepped and draped in usual sterile fashion    Local anesthetic:  Lidocaine 1% without epinephrine  Anesthetic total (ml):  1      Details:  Needle Size:  21 G  Approach:  Anterolateral  Location:  Knee  Site:  R anserine bursa  Medications:  40 mg triamcinolone acetonide 40 mg/mL  Patient tolerance:  Patient tolerated the procedure well with no immediate complications

## 2025-01-28 NOTE — PROGRESS NOTES
"Chief Complaint   Patient presents with    Right Knee - Injury, Post-op Evaluation, Pain         HPI:   This is a 59 y.o. who presents to clinic today complaining of right knee pain for 1 weeks after fall at work last year. Pain is moderate. No numbness or tingling. No associated signs or symptoms.    Past Medical History:   Diagnosis Date    Allergy     Anticoagulant long-term use     ASA 81mg, Effient    Anxiety     Arthritis     Asthma     As child    CAD (coronary artery disease) 01/27/2012    s/p stents x4 , CABG, 3 vessel, (5/2013) newest stent prior to CABG    Cataract     Chronic constipation     Colon polyp     Coronary artery disease involving native coronary artery of native heart without angina pectoris 01/27/2012 12/19 Significant ostial RCA lesion as above treated with drug-eluting stenting as above. Occluded proximal LAD with patent lima lad Patent vein graft to 2nd obtuse marginal Known occluded vein graft to unknown vessel.  s/p stents x 3 (2010) s/p LAD stent (DSE) 3/2012    DDD (degenerative disc disease), cervical     DDD (degenerative disc disease), lumbar     DDD (degenerative disc disease), thoracic     Depression     Edema     Encounter for blood transfusion     General anesthetics causing adverse effect in therapeutic use     Headache(784.0)     History of nephrolithiasis     Hyperlipidemia     Hypertension     Hypothyroid 07/05/2012    Insomnia     Insulin resistance     patient stated not diebetic    Joint stiffness     Joint swelling     Kidney disease     ; Frequent UTIs     Kidney stones     Low back pain     Neck pain     Obstructive sleep apnea on CPAP 07/17/2012    PONV (postoperative nausea and vomiting)     S/P CABG (coronary artery bypass graft) 06/25/2013 6/23/2013     Sleep apnea 01/27/2012    Patient reports "severe" sleep apnea, uses C-pap    Stented coronary artery 12/20/2019 12/19  ostial RCA -Osirio2.5 x 18 post dilated 2.75     Thoracic back pain     " Type 2 diabetes mellitus without complication, without long-term current use of insulin 2024    Usual hyperplasia of lactiferous duct 2017    left breast     Past Surgical History:   Procedure Laterality Date    ADENOIDECTOMY      BACK SURGERY      BREAST BIOPSY Left 2017    duct excision- Dr. Jimenez    BREAST CYST ASPIRATION Left 2020    @ 's office- old hematoma drained (per office)    CARDIAC SURGERY      CARPAL TUNNEL RELEASE      bilateral    CERVICAL LAMINECTOMY WITH SPINAL FUSION      2016     SECTION, CLASSIC      x 2    COLONOSCOPY      COLONOSCOPY N/A 2024    Procedure: COLONOSCOPY;  Surgeon: Mk Warren MD;  Location: [facility] ENDO;  Service: Endoscopy;  Laterality: N/A;    CORONARY ANGIOGRAPHY  2019    Procedure: ANGIOGRAM, CORONARY ARTERY;  Surgeon: Timi Millan MD;  Location: [facility] CATH;  Service: Cardiology;;    CORONARY ANGIOGRAPHY  10/13/2023    Procedure: Coronary angiogram study;  Surgeon: Azra Stoll MD;  Location: [facility] CATH;  Service: Cardiology;;    CORONARY ANGIOGRAPHY INCLUDING BYPASS GRAFTS WITH CATHETERIZATION OF LEFT HEART  10/13/2023    Procedure: Left heart cath w/Grafts RM 3640;  Surgeon: Azra Stoll MD;  Location: [facility] CATH;  Service: Cardiology;;    CORONARY ANGIOPLASTY WITH STENT PLACEMENT      x 4    CORONARY ARTERY BYPASS GRAFT  2013    3 vessel    CORONARY BYPASS GRAFT ANGIOGRAPHY  10/13/2023    Procedure: Bypass graft study;  Surgeon: Azra Stoll MD;  Location: [facility] CATH;  Service: Cardiology;;    ESOPHAGOGASTRODUODENOSCOPY N/A 2020    Procedure: EGD (ESOPHAGOGASTRODUODENOSCOPY);  Surgeon: Mk Warren MD;  Location: [facility] ENDO;  Service: Endoscopy;  Laterality: N/A;    HYSTERECTOMY      Complete    JOINT REPLACEMENT      KNEE ARTHROPLASTY Left 2019    Procedure: ARTHROPLASTY, KNEE;  Surgeon: Juan Wilson MD;  Location: [facility] OR;  Service: Orthopedics;  Laterality: Left;    KNEE ARTHROSCOPY W/ MENISCAL  REPAIR Left     twice, last one 5/2014    LAMINECTOMY      L4/L5    LAPAROSCOPIC CHOLECYSTECTOMY N/A 7/3/2023    Procedure: CHOLECYSTECTOMY, LAPAROSCOPIC;  Surgeon: Obinna Whipple MD;  Location: [facility] OR;  Service: General;  Laterality: N/A;  with cholangiogram    LEFT HEART CATHETERIZATION  12/13/2019    Procedure: Left heart cath- RM # [id number];  Surgeon: Timi Millan MD;  Location: [facility] CATH;  Service: Cardiology;;    OOPHORECTOMY Bilateral 2011    ROBOTIC ARTHROPLASTY, KNEE Right 6/14/2021    Procedure: ROBOTIC ARTHROPLASTY, KNEE, TOTAL;  Surgeon: Juan Wilson MD;  Location: [facility] OR;  Service: Orthopedics;  Laterality: Right;    SINUS SURGERY      x3    TENDON REPAIR Left     ankle surgery    THYROIDECTOMY      2 separate surgeries    TONSILLECTOMY      TOTAL KNEE ARTHROPLASTY Left 06/17/2019    Surgeon: Juan Wilson MD    XI ROBOTIC REVISION, GASTROENTEROSTOMY, MITCHELL-EN-Y N/A 2/20/2023    Procedure: XI ROBOTIC REVISION,GASTROENTEROSTOMY,MITCHELL-EN-Y;  Surgeon: Obinna Whipple MD;  Location: [facility] OR;  Service: General;  Laterality: N/A;  creation of Mitchell-N-Y anastomsis     Current Outpatient Medications on File Prior to Visit   Medication Sig Dispense Refill    acarbose (PRECOSE) 25 MG Tab Take 1 tablet (25 mg total) by mouth 3 (three) times daily with meals. 270 tablet 3    acetaminophen (TYLENOL) 650 MG TbSR Take 1,300 mg by mouth every 8 (eight) hours as needed (pain).       albuterol (PROVENTIL/VENTOLIN HFA) 90 mcg/actuation inhaler Inhale 2 puffs into the lungs every 6 (six) hours as needed for Shortness of Breath or Wheezing. 18 g 3    allopurinoL (ZYLOPRIM) 100 MG tablet TAKE ONE TABLET BY MOUTH EVERY MORNING 90 tablet 4    amLODIPine (NORVASC) 5 MG tablet Take 1 tablet (5 mg total) by mouth once daily. 30 tablet 11    aspirin (ECOTRIN) 81 MG EC tablet Take 1 tablet (81 mg total) by mouth once daily. (Patient not taking: Reported on 9/24/2024) 30 tablet 11    atorvastatin (LIPITOR) 40 MG tablet TAKE  ONE TABLET BY MOUTH EVERY NIGHT AT BEDTIME 90 tablet 4    azithromycin (ZITHROMAX Z-BRI) 250 MG tablet Take 2 po day 1, then 1 po day 2 - 5 6 tablet 0    baclofen (LIORESAL) 10 MG tablet TAKE ONE TABLET BY MOUTH EVERY MORNING and TAKE ONE TABLET BY MOUTH EVERY NIGHT AT BEDTIME 60 tablet 5    benzonatate (TESSALON) 100 MG capsule 1 - 2 po every 6 hours prn cough 45 capsule 0    blood sugar diagnostic (TRUE METRIX GLUCOSE TEST STRIP) Strp 1 strip by In Vitro route 3 (three) times daily. 300 each 3    blood-glucose meter kit To check BG TID PRN, to use with insurance preferred meter 1 each 0    BRILINTA 90 mg tablet TAKE ONE TABLET BY MOUTH EVERY MORNING and TAKE ONE TABLET BY MOUTH EVERY NIGHT AT BEDTIME 180 tablet 3    calcium carb and citrate-vitD3 600 mg-12.5 mcg (500 unit) TbSR Take 2 tablets by mouth once.      cetirizine (ZYRTEC) 10 MG tablet Take 10 mg by mouth daily as needed for Allergies.      fluticasone propionate (FLONASE) 50 mcg/actuation nasal spray fluticasone propionate 50 mcg/actuation nasal spray,suspension      lancets Misc To check BG TID PRN, to use with insurance preferred meter 100 each 1    levothyroxine (SYNTHROID) 100 MCG tablet TAKE ONE TABLET BY MOUTH EVERY MORNING 90 tablet 3    methocarbamoL (ROBAXIN) 750 MG Tab Take 1 tablet (750 mg total) by mouth 4 (four) times daily as needed. 44 tablet 3    metoprolol succinate (TOPROL-XL) 25 MG 24 hr tablet Take 1 tablet (25 mg total) by mouth every evening. 30 tablet 11    multivitamin (THERAGRAN) per tablet Take 1 tablet by mouth once daily. Bariatric vitamin      pantoprazole (PROTONIX) 40 MG tablet TAKE ONE TABLET BY MOUTH EVERY MORNING and TAKE ONE TABLET BY MOUTH EVERY NIGHT AT BEDTIME 180 tablet 2    prazosin (MINIPRESS) 1 MG Cap Take 1 capsule (1 mg total) by mouth every evening. 30 capsule 1    pregabalin (LYRICA) 100 MG capsule TAKE ONE CAPSULE BY MOUTH EVERY MORNING AND TWO CAPSULES BY MOUTH EVERY NIGHT AT BEDTIME 90 capsule 1     "senna-docusate 8.6-50 mg (PERICOLACE) 8.6-50 mg per tablet Take 1 tablet by mouth 2 (two) times a day.      venlafaxine (EFFEXOR-XR) 150 MG Cp24 TAKE ONE CAPSULE BY MOUTH EVERY NIGHT AT BEDTIME 90 capsule 3    venlafaxine (EFFEXOR-XR) 37.5 MG 24 hr capsule Take 1 capsule (37.5 mg total) by mouth once daily. 30 capsule 11     No current facility-administered medications on file prior to visit.     Review of patient's allergies indicates:   Allergen Reactions    Celexa [citalopram] Other (See Comments)     GI upset  Stated "knocked me out"    Cephalexin Anaphylaxis    Zoloft [sertraline] Other (See Comments)     Stated "knocked me out"    Codeine Other (See Comments)     hallucinations  hallucinations    Isosorbide Nausea And Vomiting    Ciprofloxacin Itching    Levaquin [levofloxacin] Other (See Comments)     tendinitis    Metformin      Nausea     Penicillamine     Penicillins Other (See Comments)     Was told per mother that as child was allergic to penicillin.  Childhood allergy/ unknown reaction    Sulfa (sulfonamide antibiotics)      Family History   Problem Relation Name Age of Onset    Heart failure Mother      Asthma Mother      Macular degeneration Mother      Cancer Mother          Breast    Cataracts Mother      Heart disease Mother      COPD Mother      Breast cancer Mother      Heart disease Father      Diabetes Father      Hypertension Father      Stroke Father      Cataracts Father      Obesity Father      Obesity Sister      Glaucoma Sister      Thyroid disease Sister      Glaucoma Sister      Other Brother          stiff man syndrome    Cancer Maternal Grandmother          Breast with Mets    Breast cancer Maternal Grandmother      Cancer Paternal Grandmother          Colon    Strabismus Other niece     Amblyopia Neg Hx      Blindness Neg Hx      Retinal detachment Neg Hx       Social History     Socioeconomic History    Marital status:    Occupational History    Occupation: teacher   Tobacco " Use    Smoking status: Former     Current packs/day: 0.00     Average packs/day: 0.5 packs/day for 14.9 years (7.5 ttl pk-yrs)     Types: Cigarettes     Start date: 1984     Quit date: 1998     Years since quittin.1    Smokeless tobacco: Never   Substance and Sexual Activity    Alcohol use: No    Drug use: Not Currently     Types: Benzodiazepines    Sexual activity: Yes     Partners: Male     Social Drivers of Health     Financial Resource Strain: Patient Declined (2023)    Overall Financial Resource Strain (CARDIA)     Difficulty of Paying Living Expenses: Patient declined   Food Insecurity: Patient Declined (2023)    Hunger Vital Sign     Worried About Running Out of Food in the Last Year: Patient declined     Ran Out of Food in the Last Year: Patient declined   Transportation Needs: Patient Declined (2023)    PRAPARE - Transportation     Lack of Transportation (Medical): Patient declined     Lack of Transportation (Non-Medical): Patient declined   Physical Activity: Patient Declined (2023)    Exercise Vital Sign     Days of Exercise per Week: Patient declined     Minutes of Exercise per Session: Patient declined   Stress: Patient Declined (2023)    [employer] [place] [employer] - Occupational Stress Questionnaire     Feeling of Stress : Patient declined   Housing Stability: Unknown (2023)    Housing Stability Vital Sign     Unable to Pay for Housing in the Last Year: Patient refused     Unstable Housing in the Last Year: Patient refused       Review of Systems:  Constitutional:  Denies fever or chills   Eyes:  Denies change in visual acuity   HENT:  Denies nasal congestion or sore throat   Respiratory:  Denies cough or shortness of breath   Cardiovascular:  Denies chest pain or edema   GI:  Denies abdominal pain, nausea, vomiting, bloody stools or diarrhea   :  Denies dysuria   Integument:  Denies rash   Neurologic:  Denies headache, focal weakness or  sensory changes   Endocrine:  Denies polyuria or polydipsia   Lymphatic:  Denies swollen glands   Psychiatric:  Denies depression or anxiety     Physical Exam:   Constitutional:  Well developed, well nourished, no acute distress, non-toxic appearance   Integument:  Well hydrated, no rash   Lymphatic:  No lymphadenopathy noted   Neurologic:  Alert & oriented x 3, CN 2-12 normal, normal motor function, normal sensory function, no focal deficits noted   Psychiatric:  Speech and behavior appropriate   Eyes: EOMI  Gi: abdomen soft    Bilateral Knee Exam    right Knee Exam     Tenderness   The patient is experiencing tenderness in the medial pes bursa    Range of Motion   Extension: abnormal   Flexion: abnormal     Muscle Strength     The patient has normal knee strength.    Tests   Livia:  Medial - negative   Lachman:  Anterior - negative      Varus: negative  Valgus: negative  Patellar Apprehension: negative    Other   Erythema: absent  Sensation: normal  Pulse: present  Swelling: mild      left Knee Exam   left knee exam performed same as contralateral side and is normal.    Pes anserinus bursitis of right knee            Using an aseptic technique, I injected 1 cc of lidocaine 1% without and 1 cc of kenalog 40mg into the right Knee pes. The patient tolerated this well. I will have them return to clinic in 6 weeks.

## 2025-01-30 ENCOUNTER — OFFICE VISIT (OUTPATIENT)
Dept: ORTHOPEDICS | Facility: CLINIC | Age: 60
End: 2025-01-30
Payer: COMMERCIAL

## 2025-01-30 VITALS — HEIGHT: 61 IN | BODY MASS INDEX: 25.48 KG/M2 | WEIGHT: 134.94 LBS

## 2025-01-30 DIAGNOSIS — M70.52 PES ANSERINUS BURSITIS OF LEFT KNEE: ICD-10-CM

## 2025-01-30 DIAGNOSIS — M75.22 BICEPS TENDINITIS OF LEFT UPPER EXTREMITY: ICD-10-CM

## 2025-01-30 DIAGNOSIS — M75.42 IMPINGEMENT SYNDROME OF LEFT SHOULDER: ICD-10-CM

## 2025-01-30 DIAGNOSIS — M75.21 BICEPS TENDINITIS OF RIGHT UPPER EXTREMITY: ICD-10-CM

## 2025-01-30 DIAGNOSIS — M75.41 IMPINGEMENT SYNDROME, SHOULDER, RIGHT: Primary | ICD-10-CM

## 2025-01-30 PROCEDURE — 99999 PR PBB SHADOW E&M-EST. PATIENT-LVL IV: CPT | Mod: PBBFAC,,, | Performed by: ORTHOPAEDIC SURGERY

## 2025-01-30 PROCEDURE — 20610 DRAIN/INJ JOINT/BURSA W/O US: CPT | Mod: 50,S$GLB,, | Performed by: ORTHOPAEDIC SURGERY

## 2025-01-30 PROCEDURE — 20610 DRAIN/INJ JOINT/BURSA W/O US: CPT | Mod: 51,XS,LT,S$GLB | Performed by: ORTHOPAEDIC SURGERY

## 2025-01-30 PROCEDURE — 99499 UNLISTED E&M SERVICE: CPT | Mod: S$GLB,,, | Performed by: ORTHOPAEDIC SURGERY

## 2025-01-30 RX ADMIN — TRIAMCINOLONE ACETONIDE 40 MG: 40 INJECTION, SUSPENSION INTRA-ARTICULAR; INTRAMUSCULAR at 03:01

## 2025-02-06 DIAGNOSIS — M51.369 DDD (DEGENERATIVE DISC DISEASE), LUMBAR: ICD-10-CM

## 2025-02-06 RX ORDER — PREGABALIN 100 MG/1
CAPSULE ORAL
Qty: 90 CAPSULE | Refills: 1 | Status: SHIPPED | OUTPATIENT
Start: 2025-02-06

## 2025-02-06 NOTE — TELEPHONE ENCOUNTER
----- Message from Monica sent at 2/5/2025  4:19 PM CST -----  Contact: Self  Type:  RX Refill Request    Who Called:  Patient  Refill or New Rx:  New Rx  RX Name and Strength:  pregabalin (LYRICA) 100 MG capsule  How is the patient currently taking it? (ex. 1XDay):  As Directed  Is this a 30 day or 90 day RX:  30  Preferred Pharmacy with phone number:    [facility] [address] [address], [facility]  [address] [address], [facility] [address]  Phone: [phone] Fax: [phone]  Local or Mail Order:  Local  Ordering Provider:  Dr Hoang Bartlett Call Back Number:  [phone]    Additional Information:  Pt stated the pharmacy had sent several requests for this one medication and they have not heard back. She is leaving to go out of town tomorrow afternoon and is really needing this to be done and approved so she can get it before she leaves. Can we please check on this and call pt back ASAP to confirm. Thank You

## 2025-02-06 NOTE — TELEPHONE ENCOUNTER
Care Due:                  Date            Visit Type   Department     Provider  --------------------------------------------------------------------------------                                EP -                              [facility]  Last Visit: 09-      CARE ([facility])   CARMEL FAUSTIN                               -                              [facility]  Next Visit: 09-      CARE ([facility])   CARMEL FAUSTIN                                                            Last  Test          Frequency    Reason                     Performed    Due Date  --------------------------------------------------------------------------------    Lipid Panel.  12 months..  atorvastatin.............  03- 02-    [facility] Embedded Care Due Messages. Reference number: [id number].   2/06/2025 7:25:04 AM CST

## 2025-02-11 RX ORDER — TRIAMCINOLONE ACETONIDE 40 MG/ML
40 INJECTION, SUSPENSION INTRA-ARTICULAR; INTRAMUSCULAR
Status: DISCONTINUED | OUTPATIENT
Start: 2025-01-30 | End: 2025-02-11 | Stop reason: HOSPADM

## 2025-02-11 NOTE — PROCEDURES
Large Joint Aspiration/Injection: bilateral subacromial bursa    Date/Time: 1/30/2025 3:30 PM    Performed by: Juan Wilson MD  Authorized by: Juan Wilson MD    Consent Done?:  Yes (Verbal)  Indications:  Pain  Timeout: prior to procedure the correct patient, procedure, and site was verified    Prep: patient was prepped and draped in usual sterile fashion      Local anesthesia used?: Yes    Local anesthetic:  Lidocaine 1% without epinephrine  Anesthetic total (ml):  5      Details:  Needle Size:  22 G  Ultrasonic Guidance for needle placement?: No    Approach:  Posterior  Location:  Shoulder  Laterality:  Bilateral  Site:  Bilateral subacromial bursa  Medications (Right):  40 mg triamcinolone acetonide 40 mg/mL  Medications (Left):  40 mg triamcinolone acetonide 40 mg/mL  Patient tolerance:  Patient tolerated the procedure well with no immediate complications  Tendon Sheath    Date/Time: 1/30/2025 3:30 PM    Performed by: Juan Wilson MD  Authorized by: Juan Wilson MD    Consent Done?:  Yes (Verbal)  Indications:  Pain  Timeout: prior to procedure the correct patient, procedure, and site was verified    Local anesthesia used?: No    Location:  Shoulder  Site:  R bicep tendon and L bicep tendon  Ultrasonic guidance for needle placement?: No    Needle size:  25 G  Medications:  40 mg triamcinolone acetonide 40 mg/mL; 40 mg triamcinolone acetonide 40 mg/mL  Patient tolerance:  Patient tolerated the procedure well with no immediate complications  Large Joint Aspiration/Injection: L anserine bursa    Date/Time: 1/30/2025 3:30 PM    Performed by: Juan Wilson MD  Authorized by: Juan Wilson MD    Consent Done?:  Yes (Verbal)  Indications:  Pain  Timeout: prior to procedure the correct patient, procedure, and site was verified    Prep: patient was prepped and draped in usual sterile fashion    Local anesthetic:  Lidocaine 1% without epinephrine  Anesthetic total (ml):  1      Details:  Needle  Size:  21 G  Approach:  Anterolateral  Location:  Knee  Site:  L anserine bursa  Medications:  40 mg triamcinolone acetonide 40 mg/mL  Patient tolerance:  Patient tolerated the procedure well with no immediate complications

## 2025-02-11 NOTE — PROGRESS NOTES
"Chief Complaint   Patient presents with    Right Shoulder - Pain    Left Shoulder - Pain    Left Knee - Pain       HPI:    This is a 59 y.o. who presents today complaining of bilateral shoulder and left knee pain for 1 weeks after no interval trauma. Pain is dull. No numbness or tingling. No associated signs or symptoms.      Past Medical History:   Diagnosis Date    Allergy     Anticoagulant long-term use     ASA 81mg, Effient    Anxiety     Arthritis     Asthma     As child    CAD (coronary artery disease) 01/27/2012    s/p stents x4 , CABG, 3 vessel, (5/2013) newest stent prior to CABG    Cataract     Chronic constipation     Colon polyp     Coronary artery disease involving native coronary artery of native heart without angina pectoris 01/27/2012 12/19 Significant ostial RCA lesion as above treated with drug-eluting stenting as above. Occluded proximal LAD with patent lima lad Patent vein graft to 2nd obtuse marginal Known occluded vein graft to unknown vessel.  s/p stents x 3 (2010) s/p LAD stent (DSE) 3/2012    DDD (degenerative disc disease), cervical     DDD (degenerative disc disease), lumbar     DDD (degenerative disc disease), thoracic     Depression     Edema     Encounter for blood transfusion     General anesthetics causing adverse effect in therapeutic use     Headache(784.0)     History of nephrolithiasis     Hyperlipidemia     Hypertension     Hypothyroid 07/05/2012    Insomnia     Insulin resistance     patient stated not diebetic    Joint stiffness     Joint swelling     Kidney disease     ; Frequent UTIs     Kidney stones     Low back pain     Neck pain     Obstructive sleep apnea on CPAP 07/17/2012    PONV (postoperative nausea and vomiting)     S/P CABG (coronary artery bypass graft) 06/25/2013 6/23/2013     Sleep apnea 01/27/2012    Patient reports "severe" sleep apnea, uses C-pap    Stented coronary artery 12/20/2019 12/19  ostial RCA -Osirio2.5 x 18 post dilated 2.75     " Thoracic back pain     Type 2 diabetes mellitus without complication, without long-term current use of insulin 2024    Usual hyperplasia of lactiferous duct 2017    left breast      Past Surgical History:   Procedure Laterality Date    ADENOIDECTOMY      BACK SURGERY      BREAST BIOPSY Left 2017    duct excision- Dr. Jimenez    BREAST CYST ASPIRATION Left 2020    @ 's office- old hematoma drained (per office)    CARDIAC SURGERY      CARPAL TUNNEL RELEASE      bilateral    CERVICAL LAMINECTOMY WITH SPINAL FUSION      2016     SECTION, CLASSIC      x 2    COLONOSCOPY      COLONOSCOPY N/A 2024    Procedure: COLONOSCOPY;  Surgeon: Mk Warren MD;  Location: [facility] ENDO;  Service: Endoscopy;  Laterality: N/A;    CORONARY ANGIOGRAPHY  2019    Procedure: ANGIOGRAM, CORONARY ARTERY;  Surgeon: Timi Millan MD;  Location: [facility] CATH;  Service: Cardiology;;    CORONARY ANGIOGRAPHY  10/13/2023    Procedure: Coronary angiogram study;  Surgeon: Azra Stoll MD;  Location: [facility] CATH;  Service: Cardiology;;    CORONARY ANGIOGRAPHY INCLUDING BYPASS GRAFTS WITH CATHETERIZATION OF LEFT HEART  10/13/2023    Procedure: Left heart cath w/Grafts RM 3640;  Surgeon: Azra Stoll MD;  Location: [facility] CATH;  Service: Cardiology;;    CORONARY ANGIOPLASTY WITH STENT PLACEMENT      x 4    CORONARY ARTERY BYPASS GRAFT  2013    3 vessel    CORONARY BYPASS GRAFT ANGIOGRAPHY  10/13/2023    Procedure: Bypass graft study;  Surgeon: Azra Stoll MD;  Location: [facility] CATH;  Service: Cardiology;;    ESOPHAGOGASTRODUODENOSCOPY N/A 2020    Procedure: EGD (ESOPHAGOGASTRODUODENOSCOPY);  Surgeon: Mk Warren MD;  Location: [facility] ENDO;  Service: Endoscopy;  Laterality: N/A;    HYSTERECTOMY      Complete    JOINT REPLACEMENT      KNEE ARTHROPLASTY Left 2019    Procedure: ARTHROPLASTY, KNEE;  Surgeon: Juan Wilson MD;  Location: [facility] OR;  Service: Orthopedics;  Laterality: Left;    KNEE  ARTHROSCOPY W/ MENISCAL REPAIR Left     twice, last one 5/2014    LAMINECTOMY      L4/L5    LAPAROSCOPIC CHOLECYSTECTOMY N/A 7/3/2023    Procedure: CHOLECYSTECTOMY, LAPAROSCOPIC;  Surgeon: Obinna Whipple MD;  Location: [facility] OR;  Service: General;  Laterality: N/A;  with cholangiogram    LEFT HEART CATHETERIZATION  12/13/2019    Procedure: Left heart cath-  # [id number];  Surgeon: Timi Millan MD;  Location: [facility] CATH;  Service: Cardiology;;    OOPHORECTOMY Bilateral 2011    ROBOTIC ARTHROPLASTY, KNEE Right 6/14/2021    Procedure: ROBOTIC ARTHROPLASTY, KNEE, TOTAL;  Surgeon: Juan Wilson MD;  Location: [facility] OR;  Service: Orthopedics;  Laterality: Right;    SINUS SURGERY      x3    TENDON REPAIR Left     ankle surgery    THYROIDECTOMY      2 separate surgeries    TONSILLECTOMY      TOTAL KNEE ARTHROPLASTY Left 06/17/2019    Surgeon: Juan Wilson MD    XI ROBOTIC REVISION, GASTROENTEROSTOMY, MITCHELL-EN-Y N/A 2/20/2023    Procedure: XI ROBOTIC REVISION,GASTROENTEROSTOMY,MITCHELL-EN-Y;  Surgeon: Obinna Whipple MD;  Location: [facility] OR;  Service: General;  Laterality: N/A;  creation of Mitchell-N-Y anastomsis      Current Outpatient Medications on File Prior to Visit   Medication Sig Dispense Refill    acarbose (PRECOSE) 25 MG Tab Take 1 tablet (25 mg total) by mouth 3 (three) times daily with meals. 270 tablet 3    acetaminophen (TYLENOL) 650 MG TbSR Take 1,300 mg by mouth every 8 (eight) hours as needed (pain).       albuterol (PROVENTIL/VENTOLIN HFA) 90 mcg/actuation inhaler Inhale 2 puffs into the lungs every 6 (six) hours as needed for Shortness of Breath or Wheezing. 18 g 3    allopurinoL (ZYLOPRIM) 100 MG tablet TAKE ONE TABLET BY MOUTH EVERY MORNING 90 tablet 4    amLODIPine (NORVASC) 5 MG tablet Take 1 tablet (5 mg total) by mouth once daily. 30 tablet 11    atorvastatin (LIPITOR) 40 MG tablet TAKE ONE TABLET BY MOUTH EVERY NIGHT AT BEDTIME 90 tablet 4    azithromycin (ZITHROMAX Z-BRI) 250 MG tablet Take 2 po day  1, then 1 po day 2 - 5 6 tablet 0    baclofen (LIORESAL) 10 MG tablet TAKE ONE TABLET BY MOUTH EVERY MORNING and TAKE ONE TABLET BY MOUTH EVERY NIGHT AT BEDTIME 60 tablet 5    benzonatate (TESSALON) 100 MG capsule 1 - 2 po every 6 hours prn cough 45 capsule 0    blood sugar diagnostic (TRUE METRIX GLUCOSE TEST STRIP) Strp 1 strip by In Vitro route 3 (three) times daily. 300 each 3    BRILINTA 90 mg tablet TAKE ONE TABLET BY MOUTH EVERY MORNING and TAKE ONE TABLET BY MOUTH EVERY NIGHT AT BEDTIME 180 tablet 3    calcium carb and citrate-vitD3 600 mg-12.5 mcg (500 unit) TbSR Take 2 tablets by mouth once.      cetirizine (ZYRTEC) 10 MG tablet Take 10 mg by mouth daily as needed for Allergies.      fluticasone propionate (FLONASE) 50 mcg/actuation nasal spray fluticasone propionate 50 mcg/actuation nasal spray,suspension      lancets Misc To check BG TID PRN, to use with insurance preferred meter 100 each 1    levothyroxine (SYNTHROID) 100 MCG tablet TAKE ONE TABLET BY MOUTH EVERY MORNING 90 tablet 3    methocarbamoL (ROBAXIN) 750 MG Tab Take 1 tablet (750 mg total) by mouth 4 (four) times daily as needed. 44 tablet 3    metoprolol succinate (TOPROL-XL) 25 MG 24 hr tablet Take 1 tablet (25 mg total) by mouth every evening. 30 tablet 11    multivitamin (THERAGRAN) per tablet Take 1 tablet by mouth once daily. Bariatric vitamin      pantoprazole (PROTONIX) 40 MG tablet TAKE ONE TABLET BY MOUTH EVERY MORNING and TAKE ONE TABLET BY MOUTH EVERY NIGHT AT BEDTIME 180 tablet 2    prazosin (MINIPRESS) 1 MG Cap Take 1 capsule (1 mg total) by mouth every evening. 30 capsule 1    senna-docusate 8.6-50 mg (PERICOLACE) 8.6-50 mg per tablet Take 1 tablet by mouth 2 (two) times a day.      venlafaxine (EFFEXOR-XR) 150 MG Cp24 TAKE ONE CAPSULE BY MOUTH EVERY NIGHT AT BEDTIME 90 capsule 3    venlafaxine (EFFEXOR-XR) 37.5 MG 24 hr capsule Take 1 capsule (37.5 mg total) by mouth once daily. 30 capsule 11    aspirin (ECOTRIN) 81 MG EC tablet  "Take 1 tablet (81 mg total) by mouth once daily. (Patient not taking: Reported on 2024) 30 tablet 11    blood-glucose meter kit To check BG TID PRN, to use with insurance preferred meter 1 each 0     No current facility-administered medications on file prior to visit.      Review of patient's allergies indicates:   Allergen Reactions    Celexa [citalopram] Other (See Comments)     GI upset  Stated "knocked me out"    Cephalexin Anaphylaxis    Zoloft [sertraline] Other (See Comments)     Stated "knocked me out"    Codeine Other (See Comments)     hallucinations  hallucinations    Isosorbide Nausea And Vomiting    Ciprofloxacin Itching    Levaquin [levofloxacin] Other (See Comments)     tendinitis    Metformin      Nausea     Penicillamine     Penicillins Other (See Comments)     Was told per mother that as child was allergic to penicillin.  Childhood allergy/ unknown reaction    Sulfa (sulfonamide antibiotics)       Family History not pertinent   Social History     Socioeconomic History    Marital status:    Occupational History    Occupation: teacher   Tobacco Use    Smoking status: Former     Current packs/day: 0.00     Average packs/day: 0.5 packs/day for 14.9 years (7.5 ttl pk-yrs)     Types: Cigarettes     Start date: 1984     Quit date: 1998     Years since quittin.1    Smokeless tobacco: Never   Substance and Sexual Activity    Alcohol use: No    Drug use: Not Currently     Types: Benzodiazepines    Sexual activity: Yes     Partners: Male     Social Drivers of Health     Financial Resource Strain: Patient Declined (2023)    Overall Financial Resource Strain (CARDIA)     Difficulty of Paying Living Expenses: Patient declined   Food Insecurity: Patient Declined (2023)    Hunger Vital Sign     Worried About Running Out of Food in the Last Year: Patient declined     Ran Out of Food in the Last Year: Patient declined   Transportation Needs: Patient Declined (2023)    " PRAPARE - Transportation     Lack of Transportation (Medical): Patient declined     Lack of Transportation (Non-Medical): Patient declined   Physical Activity: Patient Declined (2/21/2023)    Exercise Vital Sign     Days of Exercise per Week: Patient declined     Minutes of Exercise per Session: Patient declined   Stress: Patient Declined (2/21/2023)    [employer] - Occupational Stress Questionnaire     Feeling of Stress : Patient declined   Housing Stability: Unknown (2/21/2023)    Housing Stability Vital Sign     Unable to Pay for Housing in the Last Year: Patient refused     Unstable Housing in the Last Year: Patient refused         Review of Systems:   Constitutional:  Denies fever or chills    Eyes:  Denies change in visual acuity    HENT:  Denies nasal congestion or sore throat    Respiratory:  Denies cough or shortness of breath    Cardiovascular:  Denies chest pain or edema    GI:  Denies abdominal pain, nausea, vomiting, bloody stools or diarrhea    :  Denies dysuria    Integument:  Denies rash    Neurologic:  Denies headache, focal weakness or sensory changes    Endocrine:  Denies polyuria or polydipsia    Lymphatic:  Denies swollen glands    Psychiatric:  Denies depression or anxiety       Physical Exam:    Constitutional:  Well developed, well nourished, no acute distress, non-toxic appearance    Integument:  Well hydrated, no rash    Lymphatic:  No lymphadenopathy noted    Neurologic:  Alert & oriented x 3,     Psychiatric:  Speech and behavior appropriate    Gi: abdomen soft  Eyes: EOMI     Bilateral Shoulder Exam    Tenderness   Shoulder tenderness location: diffusely about shoulder.    Range of Motion   Forward Flexion: abnormal   External Rotation: abnormal     Muscle Strength   Supraspinatus: 4/5     Tests   Hawkin's test: positive  Impingement: positive    Other   Erythema: absent  Sensation: normal  Pulse: present     L knee  Stable to stress. Point TTP about the  pes. Pain with SLR about the medial hamstring. NVI distally.       Impingement syndrome, shoulder, right    Impingement syndrome of left shoulder    Biceps tendinitis of left upper extremity    Biceps tendinitis of right upper extremity    Pes anserinus bursitis of left knee          Using an aseptic technique, I injected 5 cc of lidocaine 1% without and 1 cc of kenalog 40mg into the bilateral Shoulder and 1:1 in the bilateral biceps and left pes. The patient tolerated this well. I will have them return to clinic in 6 weeks.

## 2025-02-27 ENCOUNTER — OFFICE VISIT (OUTPATIENT)
Dept: ORTHOPEDICS | Facility: CLINIC | Age: 60
End: 2025-02-27
Payer: COMMERCIAL

## 2025-02-27 VITALS — WEIGHT: 134.94 LBS | HEIGHT: 61 IN | BODY MASS INDEX: 25.48 KG/M2

## 2025-02-27 DIAGNOSIS — M25.511 RIGHT SHOULDER PAIN, UNSPECIFIED CHRONICITY: Primary | ICD-10-CM

## 2025-02-27 PROCEDURE — 3008F BODY MASS INDEX DOCD: CPT | Mod: CPTII,S$GLB,, | Performed by: ORTHOPAEDIC SURGERY

## 2025-02-27 PROCEDURE — 99999 PR PBB SHADOW E&M-EST. PATIENT-LVL IV: CPT | Mod: PBBFAC,,, | Performed by: ORTHOPAEDIC SURGERY

## 2025-02-27 PROCEDURE — 1159F MED LIST DOCD IN RCRD: CPT | Mod: CPTII,S$GLB,, | Performed by: ORTHOPAEDIC SURGERY

## 2025-02-27 PROCEDURE — 99214 OFFICE O/P EST MOD 30 MIN: CPT | Mod: S$GLB,,, | Performed by: ORTHOPAEDIC SURGERY

## 2025-02-27 PROCEDURE — 1160F RVW MEDS BY RX/DR IN RCRD: CPT | Mod: CPTII,S$GLB,, | Performed by: ORTHOPAEDIC SURGERY

## 2025-03-06 ENCOUNTER — OFFICE VISIT (OUTPATIENT)
Dept: FAMILY MEDICINE | Facility: CLINIC | Age: 60
End: 2025-03-06
Payer: COMMERCIAL

## 2025-03-06 ENCOUNTER — TELEPHONE (OUTPATIENT)
Dept: FAMILY MEDICINE | Facility: CLINIC | Age: 60
End: 2025-03-06
Payer: COMMERCIAL

## 2025-03-06 VITALS
BODY MASS INDEX: 24.31 KG/M2 | TEMPERATURE: 99 F | DIASTOLIC BLOOD PRESSURE: 74 MMHG | OXYGEN SATURATION: 98 % | HEART RATE: 94 BPM | HEIGHT: 61 IN | WEIGHT: 128.75 LBS | SYSTOLIC BLOOD PRESSURE: 128 MMHG

## 2025-03-06 DIAGNOSIS — Z95.1 S/P CABG (CORONARY ARTERY BYPASS GRAFT): ICD-10-CM

## 2025-03-06 DIAGNOSIS — L76.82 INCISIONAL PAIN: ICD-10-CM

## 2025-03-06 DIAGNOSIS — J32.9 SINUSITIS, UNSPECIFIED CHRONICITY, UNSPECIFIED LOCATION: Primary | ICD-10-CM

## 2025-03-06 DIAGNOSIS — J34.89 NASAL SORE: ICD-10-CM

## 2025-03-06 PROCEDURE — 3008F BODY MASS INDEX DOCD: CPT | Mod: CPTII,S$GLB,, | Performed by: NURSE PRACTITIONER

## 2025-03-06 PROCEDURE — 3078F DIAST BP <80 MM HG: CPT | Mod: CPTII,S$GLB,, | Performed by: NURSE PRACTITIONER

## 2025-03-06 PROCEDURE — 99214 OFFICE O/P EST MOD 30 MIN: CPT | Mod: S$GLB,,, | Performed by: NURSE PRACTITIONER

## 2025-03-06 PROCEDURE — 3074F SYST BP LT 130 MM HG: CPT | Mod: CPTII,S$GLB,, | Performed by: NURSE PRACTITIONER

## 2025-03-06 PROCEDURE — 1159F MED LIST DOCD IN RCRD: CPT | Mod: CPTII,S$GLB,, | Performed by: NURSE PRACTITIONER

## 2025-03-06 PROCEDURE — 99999 PR PBB SHADOW E&M-EST. PATIENT-LVL V: CPT | Mod: PBBFAC,,, | Performed by: NURSE PRACTITIONER

## 2025-03-06 RX ORDER — MUPIROCIN 20 MG/G
OINTMENT TOPICAL 3 TIMES DAILY
Qty: 30 G | Refills: 1 | Status: SHIPPED | OUTPATIENT
Start: 2025-03-06

## 2025-03-06 RX ORDER — AZITHROMYCIN 250 MG/1
TABLET, FILM COATED ORAL
Qty: 6 TABLET | Refills: 0 | Status: SHIPPED | OUTPATIENT
Start: 2025-03-06 | End: 2025-03-11

## 2025-03-06 NOTE — PROGRESS NOTES
Subjective:       Patient ID: Josephine Bolton is a 59 y.o. female.    Chief Complaint: Cough, Sore Throat, and Nasal Congestion    HPI  Patient reports cough, sore throat and nasal congestion x 5 weeks. She was evaluated at urgent care 2/16/25 (2.5 weeks ago). Prescribed doxycycline and prednisone with no improvement. She reports typically her sinus infections respond better to Azithromycin (PCN allergy).     S/P CABG 2013 with Dr. Castillo. She reports 3 lumps to sternal incision that are gradually increasing in size x 1 year and the top 2 causing discomfort.     Vitals:    03/06/25 1339   BP: 128/74   Pulse: 94   Temp: 99.2 °F (37.3 °C)     Review of Systems   Constitutional:  Negative for fever.   HENT:  Positive for congestion, sinus pressure and sinus pain.    Respiratory:  Positive for cough. Negative for shortness of breath and wheezing.        Past Medical History:   Diagnosis Date    Allergy     Anticoagulant long-term use     ASA 81mg, Effient    Anxiety     Arthritis     Asthma     As child    CAD (coronary artery disease) 01/27/2012    s/p stents x4 , CABG, 3 vessel, (5/2013) newest stent prior to CABG    Cataract     Chronic constipation     Colon polyp     Coronary artery disease involving native coronary artery of native heart without angina pectoris 01/27/2012 12/19 Significant ostial RCA lesion as above treated with drug-eluting stenting as above. Occluded proximal LAD with patent lima lad Patent vein graft to 2nd obtuse marginal Known occluded vein graft to unknown vessel.  s/p stents x 3 (2010) s/p LAD stent (DSE) 3/2012    DDD (degenerative disc disease), cervical     DDD (degenerative disc disease), lumbar     DDD (degenerative disc disease), thoracic     Depression     Edema     Encounter for blood transfusion     General anesthetics causing adverse effect in therapeutic use     Headache(784.0)     History of nephrolithiasis     Hyperlipidemia     Hypertension     Hypothyroid 07/05/2012     "Insomnia     Insulin resistance     patient stated not diebetic    Joint stiffness     Joint swelling     Kidney disease     ; Frequent UTIs     Kidney stones     Low back pain     Neck pain     Obstructive sleep apnea on CPAP 07/17/2012    PONV (postoperative nausea and vomiting)     S/P CABG (coronary artery bypass graft) 06/25/2013 6/23/2013     Sleep apnea 01/27/2012    Patient reports "severe" sleep apnea, uses C-pap    Stented coronary artery 12/20/2019 12/19  ostial RCA -Osirio2.5 x 18 post dilated 2.75     Thoracic back pain     Type 2 diabetes mellitus without complication, without long-term current use of insulin 9/24/2024    Usual hyperplasia of lactiferous duct 02/2017    left breast     Objective:      Physical Exam  Constitutional:       General: She is not in acute distress.     Appearance: She is well-developed. She is not ill-appearing, toxic-appearing or diaphoretic.   HENT:      Right Ear: Hearing normal. A middle ear effusion is present.      Left Ear: Hearing normal. A middle ear effusion is present.      Nose:      Comments: Sore to inner left nare   Pulmonary:      Effort: No tachypnea or respiratory distress.   Skin:     Coloration: Skin is not pale.   Neurological:      Mental Status: She is alert and oriented to person, place, and time.   Psychiatric:         Speech: Speech normal.         Behavior: Behavior normal.         Thought Content: Thought content normal.         Judgment: Judgment normal.         Assessment:       1. Sinusitis, unspecified chronicity, unspecified location    2. Nasal sore    3. S/P CABG (coronary artery bypass graft)    4. Incisional pain        Plan:       Sinusitis, unspecified chronicity, unspecified location  -     azithromycin (Z-BRI) 250 MG tablet; Take 2 tablets by mouth on day 1; Take 1 tablet by mouth on days 2-5  Dispense: 6 tablet; Refill: 0    Nasal sore  -     mupirocin (BACTROBAN) 2 % ointment; Apply topically 3 (three) times daily.  " Dispense: 30 g; Refill: 1    S/P CABG (coronary artery bypass graft)  -     Ambulatory referral/consult to Cardiovascular Surgery; Future; Expected date: 03/13/2025    Incisional pain  -     Ambulatory referral/consult to Cardiovascular Surgery; Future; Expected date: 03/13/2025          education provided on supportive care, symptom monitoring and return precautions       Medication List with Changes/Refills   New Medications    AZITHROMYCIN (Z-BRI) 250 MG TABLET    Take 2 tablets by mouth on day 1; Take 1 tablet by mouth on days 2-5    MUPIROCIN (BACTROBAN) 2 % OINTMENT    Apply topically 3 (three) times daily.   Current Medications    ACARBOSE (PRECOSE) 25 MG TAB    Take 1 tablet (25 mg total) by mouth 3 (three) times daily with meals.    ACETAMINOPHEN (TYLENOL) 650 MG TBSR    Take 1,300 mg by mouth every 8 (eight) hours as needed (pain).     ALBUTEROL (PROVENTIL/VENTOLIN HFA) 90 MCG/ACTUATION INHALER    Inhale 2 puffs into the lungs every 6 (six) hours as needed for Shortness of Breath or Wheezing.    ALLOPURINOL (ZYLOPRIM) 100 MG TABLET    TAKE ONE TABLET BY MOUTH EVERY MORNING    AMLODIPINE (NORVASC) 5 MG TABLET    Take 1 tablet (5 mg total) by mouth once daily.    ASPIRIN (ECOTRIN) 81 MG EC TABLET    Take 1 tablet (81 mg total) by mouth once daily.    ATORVASTATIN (LIPITOR) 40 MG TABLET    TAKE ONE TABLET BY MOUTH EVERY NIGHT AT BEDTIME    BACLOFEN (LIORESAL) 10 MG TABLET    TAKE ONE TABLET BY MOUTH EVERY MORNING and TAKE ONE TABLET BY MOUTH EVERY NIGHT AT BEDTIME    BENZONATATE (TESSALON) 100 MG CAPSULE    1 - 2 po every 6 hours prn cough    BLOOD SUGAR DIAGNOSTIC (TRUE METRIX GLUCOSE TEST STRIP) STRP    1 strip by In Vitro route 3 (three) times daily.    BLOOD-GLUCOSE METER KIT    To check BG TID PRN, to use with insurance preferred meter    BRILINTA 90 MG TABLET    TAKE ONE TABLET BY MOUTH EVERY MORNING and TAKE ONE TABLET BY MOUTH EVERY NIGHT AT BEDTIME    CALCIUM CARB AND CITRATE-VITD3 600 MG-12.5 MCG  (500 UNIT) TBSR    Take 2 tablets by mouth once.    CETIRIZINE (ZYRTEC) 10 MG TABLET    Take 10 mg by mouth daily as needed for Allergies.    FLUTICASONE PROPIONATE (FLONASE) 50 MCG/ACTUATION NASAL SPRAY    fluticasone propionate 50 mcg/actuation nasal spray,suspension    LANCETS MISC    To check BG TID PRN, to use with insurance preferred meter    LEVOTHYROXINE (SYNTHROID) 100 MCG TABLET    TAKE ONE TABLET BY MOUTH EVERY MORNING    METHOCARBAMOL (ROBAXIN) 750 MG TAB    Take 1 tablet (750 mg total) by mouth 4 (four) times daily as needed.    METOPROLOL SUCCINATE (TOPROL-XL) 25 MG 24 HR TABLET    Take 1 tablet (25 mg total) by mouth every evening.    MULTIVITAMIN (THERAGRAN) PER TABLET    Take 1 tablet by mouth once daily. Bariatric vitamin    PANTOPRAZOLE (PROTONIX) 40 MG TABLET    TAKE ONE TABLET BY MOUTH EVERY MORNING and TAKE ONE TABLET BY MOUTH EVERY NIGHT AT BEDTIME    PRAZOSIN (MINIPRESS) 1 MG CAP    Take 1 capsule (1 mg total) by mouth every evening.    PREGABALIN (LYRICA) 100 MG CAPSULE    TAKE ONE CAPSULE BY MOUTH EVERY MORNING AND TWO CAPSULES BY MOUTH EVERY NIGHT AT BEDTIME    SENNA-DOCUSATE 8.6-50 MG (PERICOLACE) 8.6-50 MG PER TABLET    Take 1 tablet by mouth 2 (two) times a day.    VENLAFAXINE (EFFEXOR-XR) 150 MG CP24    TAKE ONE CAPSULE BY MOUTH EVERY NIGHT AT BEDTIME    VENLAFAXINE (EFFEXOR-XR) 37.5 MG 24 HR CAPSULE    Take 1 capsule (37.5 mg total) by mouth once daily.   Discontinued Medications    AZITHROMYCIN (ZITHROMAX Z-BRI) 250 MG TABLET    Take 2 po day 1, then 1 po day 2 - 5

## 2025-03-06 NOTE — TELEPHONE ENCOUNTER
Patient scheduled for today    Been sick for 5 weeks got better and getting worse    [facility]

## 2025-03-06 NOTE — TELEPHONE ENCOUNTER
----- Message from Candie sent at 3/6/2025  9:20 AM CST -----  Contact: Patient  Type:  Same Day Appointment RequestCaller is requesting a same day appointment.  Caller declined first available appointment listed below.  Name of Caller:  PatientWhen is the first available appointment?  N/ASymptoms:  chest congestion / coughing / low-grade fever / been going on for 5 weeks and not getting better / has had a 10-day round of antibiotics through [facility] Call Back Number:  [phone] Information:   States she would like to speak with someone about being seen today - please call - thank you

## 2025-03-10 ENCOUNTER — HOSPITAL ENCOUNTER (OUTPATIENT)
Dept: RADIOLOGY | Facility: HOSPITAL | Age: 60
Discharge: HOME | End: 2025-03-10
Attending: ORTHOPAEDIC SURGERY
Payer: COMMERCIAL

## 2025-03-10 DIAGNOSIS — M25.511 RIGHT SHOULDER PAIN, UNSPECIFIED CHRONICITY: ICD-10-CM

## 2025-03-10 PROCEDURE — 73221 MRI JOINT UPR EXTREM W/O DYE: CPT | Mod: TC,PO,RT

## 2025-03-10 PROCEDURE — 73221 MRI JOINT UPR EXTREM W/O DYE: CPT | Mod: 26,RT,, | Performed by: RADIOLOGY

## 2025-03-11 ENCOUNTER — OFFICE VISIT (OUTPATIENT)
Dept: ORTHOPEDICS | Facility: CLINIC | Age: 60
End: 2025-03-11
Attending: ORTHOPAEDIC SURGERY
Payer: COMMERCIAL

## 2025-03-11 VITALS — BODY MASS INDEX: 24.31 KG/M2 | HEIGHT: 61 IN | WEIGHT: 128.75 LBS

## 2025-03-11 DIAGNOSIS — M12.811 ROTATOR CUFF TEAR ARTHROPATHY OF RIGHT SHOULDER: Primary | ICD-10-CM

## 2025-03-11 DIAGNOSIS — M70.51 PES ANSERINUS BURSITIS OF RIGHT KNEE: Primary | ICD-10-CM

## 2025-03-11 DIAGNOSIS — M75.101 ROTATOR CUFF TEAR ARTHROPATHY OF RIGHT SHOULDER: Primary | ICD-10-CM

## 2025-03-11 PROCEDURE — 99999 PR PBB SHADOW E&M-EST. PATIENT-LVL III: CPT | Mod: PBBFAC,,, | Performed by: ORTHOPAEDIC SURGERY

## 2025-03-11 PROCEDURE — 20610 DRAIN/INJ JOINT/BURSA W/O US: CPT | Mod: RT,S$GLB,, | Performed by: ORTHOPAEDIC SURGERY

## 2025-03-11 PROCEDURE — 99499 UNLISTED E&M SERVICE: CPT | Mod: S$GLB,,, | Performed by: ORTHOPAEDIC SURGERY

## 2025-03-11 RX ADMIN — TRIAMCINOLONE ACETONIDE 40 MG: 40 INJECTION, SUSPENSION INTRA-ARTICULAR; INTRAMUSCULAR at 03:03

## 2025-03-13 ENCOUNTER — TELEPHONE (OUTPATIENT)
Dept: FAMILY MEDICINE | Facility: CLINIC | Age: 60
End: 2025-03-13
Payer: COMMERCIAL

## 2025-03-13 NOTE — PROGRESS NOTES
"Chief Complaint   Patient presents with    Right Shoulder - Pain    Left Shoulder - Pain       HPI:    This is a 59 y.o. who presents today complaining of right shoulder pain for 1 years after no interval trauma. Pain is throbbing. No numbness or tingling. No associated signs or symptoms.      Past Medical History:   Diagnosis Date    Allergy     Anticoagulant long-term use     ASA 81mg, Effient    Anxiety     Arthritis     Asthma     As child    CAD (coronary artery disease) 01/27/2012    s/p stents x4 , CABG, 3 vessel, (5/2013) newest stent prior to CABG    Cataract     Chronic constipation     Colon polyp     Coronary artery disease involving native coronary artery of native heart without angina pectoris 01/27/2012 12/19 Significant ostial RCA lesion as above treated with drug-eluting stenting as above. Occluded proximal LAD with patent lima lad Patent vein graft to 2nd obtuse marginal Known occluded vein graft to unknown vessel.  s/p stents x 3 (2010) s/p LAD stent (DSE) 3/2012    DDD (degenerative disc disease), cervical     DDD (degenerative disc disease), lumbar     DDD (degenerative disc disease), thoracic     Depression     Edema     Encounter for blood transfusion     General anesthetics causing adverse effect in therapeutic use     Headache(784.0)     History of nephrolithiasis     Hyperlipidemia     Hypertension     Hypothyroid 07/05/2012    Insomnia     Insulin resistance     patient stated not diebetic    Joint stiffness     Joint swelling     Kidney disease     ; Frequent UTIs     Kidney stones     Low back pain     Neck pain     Obstructive sleep apnea on CPAP 07/17/2012    PONV (postoperative nausea and vomiting)     S/P CABG (coronary artery bypass graft) 06/25/2013 6/23/2013     Sleep apnea 01/27/2012    Patient reports "severe" sleep apnea, uses C-pap    Stented coronary artery 12/20/2019 12/19  ostial RCA -Osirio2.5 x 18 post dilated 2.75     Thoracic back pain     Type 2 " diabetes mellitus without complication, without long-term current use of insulin 2024    Usual hyperplasia of lactiferous duct 2017    left breast      Past Surgical History:   Procedure Laterality Date    ADENOIDECTOMY      BACK SURGERY      BREAST BIOPSY Left 2017    duct excision- Dr. Jimenez    BREAST CYST ASPIRATION Left 2020    @ 's office- old hematoma drained (per office)    CARDIAC SURGERY      CARPAL TUNNEL RELEASE      bilateral    CERVICAL LAMINECTOMY WITH SPINAL FUSION      2016     SECTION, CLASSIC      x 2    COLONOSCOPY      COLONOSCOPY N/A 2024    Procedure: COLONOSCOPY;  Surgeon: Mk Warren MD;  Location: [facility] ENDO;  Service: Endoscopy;  Laterality: N/A;    CORONARY ANGIOGRAPHY  2019    Procedure: ANGIOGRAM, CORONARY ARTERY;  Surgeon: Timi Millan MD;  Location: [facility] CATH;  Service: Cardiology;;    CORONARY ANGIOGRAPHY  10/13/2023    Procedure: Coronary angiogram study;  Surgeon: Azra Stoll MD;  Location: [facility] CATH;  Service: Cardiology;;    CORONARY ANGIOGRAPHY INCLUDING BYPASS GRAFTS WITH CATHETERIZATION OF LEFT HEART  10/13/2023    Procedure: Left heart cath w/Grafts RM 3640;  Surgeon: Azra Stoll MD;  Location: [facility] CATH;  Service: Cardiology;;    CORONARY ANGIOPLASTY WITH STENT PLACEMENT      x 4    CORONARY ARTERY BYPASS GRAFT  2013    3 vessel    CORONARY BYPASS GRAFT ANGIOGRAPHY  10/13/2023    Procedure: Bypass graft study;  Surgeon: Azra Stoll MD;  Location: [facility] CATH;  Service: Cardiology;;    ESOPHAGOGASTRODUODENOSCOPY N/A 2020    Procedure: EGD (ESOPHAGOGASTRODUODENOSCOPY);  Surgeon: Mk Warren MD;  Location: [facility] ENDO;  Service: Endoscopy;  Laterality: N/A;    HYSTERECTOMY      Complete    JOINT REPLACEMENT      KNEE ARTHROPLASTY Left 2019    Procedure: ARTHROPLASTY, KNEE;  Surgeon: Juan Wilson MD;  Location: [facility] OR;  Service: Orthopedics;  Laterality: Left;    KNEE ARTHROSCOPY W/ MENISCAL REPAIR  "Left     twice, last one 5/2014    LAMINECTOMY      L4/L5    LAPAROSCOPIC CHOLECYSTECTOMY N/A 7/3/2023    Procedure: CHOLECYSTECTOMY, LAPAROSCOPIC;  Surgeon: Obinna Whipple MD;  Location: [facility] OR;  Service: General;  Laterality: N/A;  with cholangiogram    LEFT HEART CATHETERIZATION  12/13/2019    Procedure: Left heart cath-  # [id number] A;  Surgeon: Timi Millan MD;  Location: [facility] CATH;  Service: Cardiology;;    OOPHORECTOMY Bilateral 2011    ROBOTIC ARTHROPLASTY, KNEE Right 6/14/2021    Procedure: ROBOTIC ARTHROPLASTY, KNEE, TOTAL;  Surgeon: Juan Wilson MD;  Location: [facility] OR;  Service: Orthopedics;  Laterality: Right;    SINUS SURGERY      x3    TENDON REPAIR Left     ankle surgery    THYROIDECTOMY      2 separate surgeries    TONSILLECTOMY      TOTAL KNEE ARTHROPLASTY Left 06/17/2019    Surgeon: Juan Wilson MD    XI ROBOTIC REVISION, GASTROENTEROSTOMY, MITCHELL-EN-Y N/A 2/20/2023    Procedure: XI ROBOTIC REVISION,GASTROENTEROSTOMY,MITCHELL-EN-Y;  Surgeon: Obinna Whipple MD;  Location: [facility] OR;  Service: General;  Laterality: N/A;  creation of Mitcehll-N-Y anastomsis      Medications Ordered Prior to Encounter[1]   Review of patient's allergies indicates:   Allergen Reactions    Celexa [citalopram] Other (See Comments)     GI upset  Stated "knocked me out"    Cephalexin Anaphylaxis    Zoloft [sertraline] Other (See Comments)     Stated "knocked me out"    Codeine Other (See Comments)     hallucinations  hallucinations    Isosorbide Nausea And Vomiting    Ciprofloxacin Itching    Levaquin [levofloxacin] Other (See Comments)     tendinitis    Metformin      Nausea     Penicillamine     Penicillins Other (See Comments)     Was told per mother that as child was allergic to penicillin.  Childhood allergy/ unknown reaction    Sulfa (sulfonamide antibiotics)       Family History not pertinent   Social History[2]      Review of Systems:   Constitutional:  Denies fever or chills    Eyes:  Denies change in visual acuity  "   HENT:  Denies nasal congestion or sore throat    Respiratory:  Denies cough or shortness of breath    Cardiovascular:  Denies chest pain or edema    GI:  Denies abdominal pain, nausea, vomiting, bloody stools or diarrhea    :  Denies dysuria    Integument:  Denies rash    Neurologic:  Denies headache, focal weakness or sensory changes    Endocrine:  Denies polyuria or polydipsia    Lymphatic:  Denies swollen glands    Psychiatric:  Denies depression or anxiety       Physical Exam:    Constitutional:  Well developed, well nourished, no acute distress, non-toxic appearance    Integument:  Well hydrated, no rash    Lymphatic:  No lymphadenopathy noted    Neurologic:  Alert & oriented x 3,     Psychiatric:  Speech and behavior appropriate    Gi: abdomen soft  Eyes: EOMI     Bilateral Shoulder Exam    left Shoulder Exam   Shoulder exam performed same as contralateral side and is normal.    right Shoulder Exam   Tenderness   Shoulder tenderness location: diffusely about shoulder.    Range of Motion   Forward Flexion: abnormal   External Rotation: abnormal     Muscle Strength   Supraspinatus: 4/5     Tests   Hawkin's test: positive  Impingement: positive    Other   Erythema: absent  Sensation: normal  Pulse: present      X-rays were performed today, personally reviewed by me and findings discussed with the patient.   3 views of the right shoulder show mild diffuse degenerative changes      Right shoulder pain, unspecified chronicity  -     MRI Shoulder Without Contrast Right; Future; Expected date: 02/27/2025        Will order MRI as last injection did not last. RTC after MRI            [1]   Current Outpatient Medications on File Prior to Visit   Medication Sig Dispense Refill    acetaminophen (TYLENOL) 650 MG TbSR Take 1,300 mg by mouth every 8 (eight) hours as needed (pain).       albuterol (PROVENTIL/VENTOLIN HFA) 90 mcg/actuation inhaler Inhale 2 puffs into the lungs every 6 (six) hours as needed for Shortness of  Breath or Wheezing. 18 g 3    allopurinoL (ZYLOPRIM) 100 MG tablet TAKE ONE TABLET BY MOUTH EVERY MORNING 90 tablet 4    amLODIPine (NORVASC) 5 MG tablet Take 1 tablet (5 mg total) by mouth once daily. 30 tablet 11    atorvastatin (LIPITOR) 40 MG tablet TAKE ONE TABLET BY MOUTH EVERY NIGHT AT BEDTIME 90 tablet 4    baclofen (LIORESAL) 10 MG tablet TAKE ONE TABLET BY MOUTH EVERY MORNING and TAKE ONE TABLET BY MOUTH EVERY NIGHT AT BEDTIME 60 tablet 5    benzonatate (TESSALON) 100 MG capsule 1 - 2 po every 6 hours prn cough 45 capsule 0    blood sugar diagnostic (TRUE METRIX GLUCOSE TEST STRIP) Strp 1 strip by In Vitro route 3 (three) times daily. 300 each 3    BRILINTA 90 mg tablet TAKE ONE TABLET BY MOUTH EVERY MORNING and TAKE ONE TABLET BY MOUTH EVERY NIGHT AT BEDTIME 180 tablet 3    calcium carb and citrate-vitD3 600 mg-12.5 mcg (500 unit) TbSR Take 2 tablets by mouth once.      cetirizine (ZYRTEC) 10 MG tablet Take 10 mg by mouth daily as needed for Allergies.      fluticasone propionate (FLONASE) 50 mcg/actuation nasal spray fluticasone propionate 50 mcg/actuation nasal spray,suspension      lancets Misc To check BG TID PRN, to use with insurance preferred meter 100 each 1    levothyroxine (SYNTHROID) 100 MCG tablet TAKE ONE TABLET BY MOUTH EVERY MORNING 90 tablet 3    methocarbamoL (ROBAXIN) 750 MG Tab Take 1 tablet (750 mg total) by mouth 4 (four) times daily as needed. 44 tablet 3    metoprolol succinate (TOPROL-XL) 25 MG 24 hr tablet Take 1 tablet (25 mg total) by mouth every evening. 30 tablet 11    multivitamin (THERAGRAN) per tablet Take 1 tablet by mouth once daily. Bariatric vitamin      pantoprazole (PROTONIX) 40 MG tablet TAKE ONE TABLET BY MOUTH EVERY MORNING and TAKE ONE TABLET BY MOUTH EVERY NIGHT AT BEDTIME 180 tablet 2    prazosin (MINIPRESS) 1 MG Cap Take 1 capsule (1 mg total) by mouth every evening. 30 capsule 1    pregabalin (LYRICA) 100 MG capsule TAKE ONE CAPSULE BY MOUTH EVERY MORNING AND  TWO CAPSULES BY MOUTH EVERY NIGHT AT BEDTIME 90 capsule 1    senna-docusate 8.6-50 mg (PERICOLACE) 8.6-50 mg per tablet Take 1 tablet by mouth 2 (two) times a day.      venlafaxine (EFFEXOR-XR) 150 MG Cp24 TAKE ONE CAPSULE BY MOUTH EVERY NIGHT AT BEDTIME 90 capsule 3    venlafaxine (EFFEXOR-XR) 37.5 MG 24 hr capsule Take 1 capsule (37.5 mg total) by mouth once daily. 30 capsule 11    acarbose (PRECOSE) 25 MG Tab Take 1 tablet (25 mg total) by mouth 3 (three) times daily with meals. 270 tablet 3    aspirin (ECOTRIN) 81 MG EC tablet Take 1 tablet (81 mg total) by mouth once daily. 30 tablet 11    blood-glucose meter kit To check BG TID PRN, to use with insurance preferred meter 1 each 0     No current facility-administered medications on file prior to visit.   [2]   Social History  Socioeconomic History    Marital status:    Occupational History    Occupation: teacher   Tobacco Use    Smoking status: Former     Current packs/day: 0.00     Average packs/day: 0.5 packs/day for 14.9 years (7.5 ttl pk-yrs)     Types: Cigarettes     Start date: 1984     Quit date: 1998     Years since quittin.2    Smokeless tobacco: Never   Substance and Sexual Activity    Alcohol use: No    Drug use: Not Currently     Types: Benzodiazepines    Sexual activity: Yes     Partners: Male     Social Drivers of Health     Financial Resource Strain: Patient Declined (2023)    Overall Financial Resource Strain (CARDIA)     Difficulty of Paying Living Expenses: Patient declined   Food Insecurity: Patient Declined (2023)    Hunger Vital Sign     Worried About Running Out of Food in the Last Year: Patient declined     Ran Out of Food in the Last Year: Patient declined   Transportation Needs: Patient Declined (2023)    PRAPARE - Transportation     Lack of Transportation (Medical): Patient declined     Lack of Transportation (Non-Medical): Patient declined   Physical Activity: Patient Declined (2023)     Exercise Vital Sign     Days of Exercise per Week: Patient declined     Minutes of Exercise per Session: Patient declined   Stress: Patient Declined (2/21/2023)    [employer] [place] [employer] - Occupational Stress Questionnaire     Feeling of Stress : Patient declined   Housing Stability: Unknown (2/21/2023)    Housing Stability Vital Sign     Unable to Pay for Housing in the Last Year: Patient refused     Unstable Housing in the Last Year: Patient refused

## 2025-03-13 NOTE — TELEPHONE ENCOUNTER
Please let patient know our cardiovascular team recommends she follow up with Dr. Castillo regarding the incision issues. She will need to call his office to schedule [phone]

## 2025-03-17 ENCOUNTER — TELEPHONE (OUTPATIENT)
Dept: FAMILY MEDICINE | Facility: CLINIC | Age: 60
End: 2025-03-17
Payer: COMMERCIAL

## 2025-03-17 ENCOUNTER — TELEPHONE (OUTPATIENT)
Dept: CARDIOLOGY | Facility: CLINIC | Age: 60
End: 2025-03-17
Payer: COMMERCIAL

## 2025-03-17 ENCOUNTER — LAB VISIT (OUTPATIENT)
Dept: LAB | Facility: HOSPITAL | Age: 60
End: 2025-03-17
Attending: INTERNAL MEDICINE
Payer: COMMERCIAL

## 2025-03-17 DIAGNOSIS — J20.9 ACUTE BRONCHITIS, UNSPECIFIED ORGANISM: ICD-10-CM

## 2025-03-17 DIAGNOSIS — I10 ESSENTIAL HYPERTENSION: ICD-10-CM

## 2025-03-17 DIAGNOSIS — E78.2 MIXED HYPERLIPIDEMIA: ICD-10-CM

## 2025-03-17 DIAGNOSIS — E03.4 HYPOTHYROIDISM DUE TO ACQUIRED ATROPHY OF THYROID: ICD-10-CM

## 2025-03-17 LAB
ALBUMIN SERPL BCP-MCNC: 3.3 G/DL (ref 3.5–5.2)
ALP SERPL-CCNC: 81 U/L (ref 40–150)
ALT SERPL W/O P-5'-P-CCNC: 33 U/L (ref 10–44)
ANION GAP SERPL CALC-SCNC: 7 MMOL/L (ref 8–16)
AST SERPL-CCNC: 35 U/L (ref 10–40)
BASOPHILS # BLD AUTO: 0.08 K/UL (ref 0–0.2)
BASOPHILS NFR BLD: 1 % (ref 0–1.9)
BILIRUB SERPL-MCNC: 0.4 MG/DL (ref 0.1–1)
BUN SERPL-MCNC: 13 MG/DL (ref 6–20)
CALCIUM SERPL-MCNC: 8.8 MG/DL (ref 8.7–10.5)
CHLORIDE SERPL-SCNC: 106 MMOL/L (ref 95–110)
CHOLEST SERPL-MCNC: 136 MG/DL (ref 120–199)
CHOLEST/HDLC SERPL: 3 {RATIO} (ref 2–5)
CO2 SERPL-SCNC: 29 MMOL/L (ref 23–29)
CREAT SERPL-MCNC: 0.6 MG/DL (ref 0.5–1.4)
DIFFERENTIAL METHOD BLD: ABNORMAL
EOSINOPHIL # BLD AUTO: 0 K/UL (ref 0–0.5)
EOSINOPHIL NFR BLD: 0.5 % (ref 0–8)
ERYTHROCYTE [DISTWIDTH] IN BLOOD BY AUTOMATED COUNT: 16.8 % (ref 11.5–14.5)
EST. GFR  (NO RACE VARIABLE): >60 ML/MIN/1.73 M^2
GLUCOSE SERPL-MCNC: 83 MG/DL (ref 70–110)
HCT VFR BLD AUTO: 37.2 % (ref 37–48.5)
HDLC SERPL-MCNC: 45 MG/DL (ref 40–75)
HDLC SERPL: 33.1 % (ref 20–50)
HGB BLD-MCNC: 11.3 G/DL (ref 12–16)
IMM GRANULOCYTES # BLD AUTO: 0.03 K/UL (ref 0–0.04)
IMM GRANULOCYTES NFR BLD AUTO: 0.4 % (ref 0–0.5)
LDLC SERPL CALC-MCNC: 79.2 MG/DL (ref 63–159)
LYMPHOCYTES # BLD AUTO: 1.7 K/UL (ref 1–4.8)
LYMPHOCYTES NFR BLD: 21.3 % (ref 18–48)
MCH RBC QN AUTO: 25.5 PG (ref 27–31)
MCHC RBC AUTO-ENTMCNC: 30.4 G/DL (ref 32–36)
MCV RBC AUTO: 84 FL (ref 82–98)
MONOCYTES # BLD AUTO: 0.7 K/UL (ref 0.3–1)
MONOCYTES NFR BLD: 8.8 % (ref 4–15)
NEUTROPHILS # BLD AUTO: 5.6 K/UL (ref 1.8–7.7)
NEUTROPHILS NFR BLD: 68 % (ref 38–73)
NONHDLC SERPL-MCNC: 91 MG/DL
NRBC BLD-RTO: 0 /100 WBC
PLATELET # BLD AUTO: 322 K/UL (ref 150–450)
PMV BLD AUTO: 9.3 FL (ref 9.2–12.9)
POTASSIUM SERPL-SCNC: 4 MMOL/L (ref 3.5–5.1)
PROT SERPL-MCNC: 6.8 G/DL (ref 6–8.4)
RBC # BLD AUTO: 4.44 M/UL (ref 4–5.4)
SODIUM SERPL-SCNC: 142 MMOL/L (ref 136–145)
TRIGL SERPL-MCNC: 59 MG/DL (ref 30–150)
TSH SERPL DL<=0.005 MIU/L-ACNC: 0.88 UIU/ML (ref 0.4–4)
WBC # BLD AUTO: 8.17 K/UL (ref 3.9–12.7)

## 2025-03-17 PROCEDURE — 85025 COMPLETE CBC W/AUTO DIFF WBC: CPT | Performed by: INTERNAL MEDICINE

## 2025-03-17 PROCEDURE — 36415 COLL VENOUS BLD VENIPUNCTURE: CPT | Mod: PO | Performed by: INTERNAL MEDICINE

## 2025-03-17 PROCEDURE — 80061 LIPID PANEL: CPT | Performed by: INTERNAL MEDICINE

## 2025-03-17 PROCEDURE — 84443 ASSAY THYROID STIM HORMONE: CPT | Performed by: INTERNAL MEDICINE

## 2025-03-17 PROCEDURE — 80053 COMPREHEN METABOLIC PANEL: CPT | Performed by: INTERNAL MEDICINE

## 2025-03-17 NOTE — TELEPHONE ENCOUNTER
the cardiovascular team recommends you follow up with Dr Spence regarding the incision issues. The number to call his office is [phone]. If you have any other questions please let us know. Thank you     Advised patient per Smitha from 3/13/25 encounter. PVU

## 2025-03-17 NOTE — TELEPHONE ENCOUNTER
Spoke with pt and she states she has being having some issues and her PCP wants her to see a cardiovascular surgeon.  She said when she walks she has pain in her groin and in her lower stomach and back.  She said the cardiovascular surgeons here at [facility] refused  her so she wants to know from dr. Guevara who he thinks she should see.

## 2025-03-17 NOTE — TELEPHONE ENCOUNTER
----- Message from Lubna sent at 3/17/2025 12:35 PM CDT -----  Who called: PtWhat is the request in detail: Pt says she's been having trouble getting a vascular surgeon to see her. She's still have pain in the groin, lower back, and abdomen areas. Please call pt to advise.Can the clinic reply by [facility]? No Would the patient rather a call back or a response via [facility]? Call back Best call back number: Telephone Information:Mobile          [phone] Information: Thank you.

## 2025-03-17 NOTE — TELEPHONE ENCOUNTER
----- Message from Cornelia sent at 3/17/2025  6:48 AM CDT -----  Regarding: Referal  Good Morning Pt came in this morning question  about a referral that Smitha gave her.She said that she haven't heard from noone to scheduled the appt.She try to do on the portal and she said it wasn't no success.Can someone please give her a call,All her information is correct in her chart.Thank you so much.Have a wonderful Day!

## 2025-03-17 NOTE — TELEPHONE ENCOUNTER
Pt states she does not have any confidence in [facility] and she goes to [facility]    She will also reach out to her cardiologist to see if he can recommend what she should do also.     Did advise per Smitha message that was recommended by the cardiovascular team here she should follow up with Dr. Castillo      Patient understands and will call her cardiologist for recommendation.

## 2025-03-17 NOTE — TELEPHONE ENCOUNTER
----- Message from [email or web address] sent at 3/17/2025 11:11 AM CDT -----  Contact: VLADIMIR BOLTON [[id number]]  ..Type:  Patient Requesting CallWho Called:VLADIMIR BOLTON [[id number]]Does the patient know what this is regarding?:Dr.Troy Bolton told her she need to see Ted Montgomery( Vascular Surgery)- however he no longer works for [facility].Would the patient rather a call back or a response via [facility]? Call Best Call Back Number:[phone] (home) Additional Information:

## 2025-03-18 ENCOUNTER — PATIENT MESSAGE (OUTPATIENT)
Dept: FAMILY MEDICINE | Facility: CLINIC | Age: 60
End: 2025-03-18

## 2025-03-18 RX ORDER — TRIAMCINOLONE ACETONIDE 40 MG/ML
40 INJECTION, SUSPENSION INTRA-ARTICULAR; INTRAMUSCULAR
Status: DISCONTINUED | OUTPATIENT
Start: 2025-03-11 | End: 2025-03-18 | Stop reason: HOSPADM

## 2025-03-18 NOTE — PROCEDURES
Large Joint Aspiration/Injection: R anserine bursa    Date/Time: 3/11/2025 3:30 PM    Performed by: Juan Wilson MD  Authorized by: Juan Wilson MD    Consent Done?:  Yes (Verbal)  Indications:  Pain  Timeout: prior to procedure the correct patient, procedure, and site was verified    Prep: patient was prepped and draped in usual sterile fashion    Local anesthetic:  Lidocaine 1% without epinephrine  Anesthetic total (ml):  1      Details:  Needle Size:  21 G  Approach:  Anterolateral  Location:  Knee  Site:  R anserine bursa  Medications:  40 mg triamcinolone acetonide 40 mg/mL  Patient tolerance:  Patient tolerated the procedure well with no immediate complications

## 2025-03-18 NOTE — PROGRESS NOTES
"  Chief Complaint   Patient presents with    Right Knee - Pain       HPI:   This is a 59 y.o. who returns today status post right knee pes bursitis after work injury last year. Patient has been FWB.  Pain is dull. No numbness or tingling. No associated signs or symptoms.    Past Medical History:   Diagnosis Date    Allergy     Anticoagulant long-term use     ASA 81mg, Effient    Anxiety     Arthritis     Asthma     As child    CAD (coronary artery disease) 01/27/2012    s/p stents x4 , CABG, 3 vessel, (5/2013) newest stent prior to CABG    Cataract     Chronic constipation     Colon polyp     Coronary artery disease involving native coronary artery of native heart without angina pectoris 01/27/2012 12/19 Significant ostial RCA lesion as above treated with drug-eluting stenting as above. Occluded proximal LAD with patent lima lad Patent vein graft to 2nd obtuse marginal Known occluded vein graft to unknown vessel.  s/p stents x 3 (2010) s/p LAD stent (DSE) 3/2012    DDD (degenerative disc disease), cervical     DDD (degenerative disc disease), lumbar     DDD (degenerative disc disease), thoracic     Depression     Edema     Encounter for blood transfusion     General anesthetics causing adverse effect in therapeutic use     Headache(784.0)     History of nephrolithiasis     Hyperlipidemia     Hypertension     Hypothyroid 07/05/2012    Insomnia     Insulin resistance     patient stated not diebetic    Joint stiffness     Joint swelling     Kidney disease     ; Frequent UTIs     Kidney stones     Low back pain     Neck pain     Obstructive sleep apnea on CPAP 07/17/2012    PONV (postoperative nausea and vomiting)     S/P CABG (coronary artery bypass graft) 06/25/2013 6/23/2013     Sleep apnea 01/27/2012    Patient reports "severe" sleep apnea, uses C-pap    Stented coronary artery 12/20/2019 12/19  ostial RCA -Osirio2.5 x 18 post dilated 2.75     Thoracic back pain     Type 2 diabetes mellitus " without complication, without long-term current use of insulin 2024    Usual hyperplasia of lactiferous duct 2017    left breast     Past Surgical History:   Procedure Laterality Date    ADENOIDECTOMY      BACK SURGERY      BREAST BIOPSY Left 2017    duct excision- Dr. Jimenez    BREAST CYST ASPIRATION Left 2020    @ 's office- old hematoma drained (per office)    CARDIAC SURGERY      CARPAL TUNNEL RELEASE      bilateral    CERVICAL LAMINECTOMY WITH SPINAL FUSION      2016     SECTION, CLASSIC      x 2    COLONOSCOPY      COLONOSCOPY N/A 2024    Procedure: COLONOSCOPY;  Surgeon: Mk Warren MD;  Location: [facility];  Service: Endoscopy;  Laterality: N/A;    CORONARY ANGIOGRAPHY  2019    Procedure: ANGIOGRAM, CORONARY ARTERY;  Surgeon: Timi Millan MD;  Location: [facility] CATH;  Service: Cardiology;;    CORONARY ANGIOGRAPHY  10/13/2023    Procedure: Coronary angiogram study;  Surgeon: Azra Stoll MD;  Location: [facility] CATH;  Service: Cardiology;;    CORONARY ANGIOGRAPHY INCLUDING BYPASS GRAFTS WITH CATHETERIZATION OF LEFT HEART  10/13/2023    Procedure: Left heart cath w/Grafts RM 3640;  Surgeon: Azra Stoll MD;  Location: [facility] CATH;  Service: Cardiology;;    CORONARY ANGIOPLASTY WITH STENT PLACEMENT      x 4    CORONARY ARTERY BYPASS GRAFT  2013    3 vessel    CORONARY BYPASS GRAFT ANGIOGRAPHY  10/13/2023    Procedure: Bypass graft study;  Surgeon: Azra Stoll MD;  Location: [facility] CATH;  Service: Cardiology;;    ESOPHAGOGASTRODUODENOSCOPY N/A 2020    Procedure: EGD (ESOPHAGOGASTRODUODENOSCOPY);  Surgeon: Mk Warren MD;  Location: [facility] ENDO;  Service: Endoscopy;  Laterality: N/A;    HYSTERECTOMY      Complete    JOINT REPLACEMENT      KNEE ARTHROPLASTY Left 2019    Procedure: ARTHROPLASTY, KNEE;  Surgeon: Juan Wilson MD;  Location: [facility] OR;  Service: Orthopedics;  Laterality: Left;    KNEE ARTHROSCOPY W/ MENISCAL REPAIR Left     twice,  "last one 5/2014    LAMINECTOMY      L4/L5    LAPAROSCOPIC CHOLECYSTECTOMY N/A 7/3/2023    Procedure: CHOLECYSTECTOMY, LAPAROSCOPIC;  Surgeon: Obinna Whipple MD;  Location: [facility] OR;  Service: General;  Laterality: N/A;  with cholangiogram    LEFT HEART CATHETERIZATION  12/13/2019    Procedure: Left heart cath-  # [id number] A;  Surgeon: Timi Millan MD;  Location: [facility] CATH;  Service: Cardiology;;    OOPHORECTOMY Bilateral 2011    ROBOTIC ARTHROPLASTY, KNEE Right 6/14/2021    Procedure: ROBOTIC ARTHROPLASTY, KNEE, TOTAL;  Surgeon: Juan Wilson MD;  Location: [facility] OR;  Service: Orthopedics;  Laterality: Right;    SINUS SURGERY      x3    TENDON REPAIR Left     ankle surgery    THYROIDECTOMY      2 separate surgeries    TONSILLECTOMY      TOTAL KNEE ARTHROPLASTY Left 06/17/2019    Surgeon: Juan Wilson MD    XI ROBOTIC REVISION, GASTROENTEROSTOMY, MITCHELL-EN-Y N/A 2/20/2023    Procedure: XI ROBOTIC REVISION,GASTROENTEROSTOMY,MITCHELL-EN-Y;  Surgeon: Obinna Whipple MD;  Location: [facility] OR;  Service: General;  Laterality: N/A;  creation of Mitchell-N-Y anastomsis     Medications Ordered Prior to Encounter[1]  Review of patient's allergies indicates:   Allergen Reactions    Celexa [citalopram] Other (See Comments)     GI upset  Stated "knocked me out"    Cephalexin Anaphylaxis    Zoloft [sertraline] Other (See Comments)     Stated "knocked me out"    Codeine Other (See Comments)     hallucinations  hallucinations    Isosorbide Nausea And Vomiting    Ciprofloxacin Itching    Levaquin [levofloxacin] Other (See Comments)     tendinitis    Metformin      Nausea     Penicillamine     Penicillins Other (See Comments)     Was told per mother that as child was allergic to penicillin.  Childhood allergy/ unknown reaction    Sulfa (sulfonamide antibiotics)      Family History   Problem Relation Name Age of Onset    Heart failure Mother      Asthma Mother      Macular degeneration Mother      Cancer Mother          Breast    Cataracts " Mother      Heart disease Mother      COPD Mother      Breast cancer Mother      Heart disease Father      Diabetes Father      Hypertension Father      Stroke Father      Cataracts Father      Obesity Father      Obesity Sister      Glaucoma Sister      Thyroid disease Sister      Glaucoma Sister      Other Brother          stiff man syndrome    Cancer Maternal Grandmother          Breast with Mets    Breast cancer Maternal Grandmother      Cancer Paternal Grandmother          Colon    Strabismus Other niece     Amblyopia Neg Hx      Blindness Neg Hx      Retinal detachment Neg Hx       Social History[2]    Review of Systems:  Constitutional:  Denies fever or chills   Eyes:  Denies change in visual acuity   HENT:  Denies nasal congestion or sore throat   Respiratory:  Denies cough or shortness of breath   Cardiovascular:  Denies chest pain or edema   GI:  Denies abdominal pain, nausea, vomiting, bloody stools or diarrhea   :  Denies dysuria   Integument:  Denies rash   Neurologic:  Denies headache, focal weakness or sensory changes   Endocrine:  Denies polyuria or polydipsia   Lymphatic:  Denies swollen glands   Psychiatric:  Denies depression or anxiety     Physical Exam:   Constitutional:  Well developed, well nourished, no acute distress, non-toxic appearance   Integument:  Well hydrated, no rash   Lymphatic:  No lymphadenopathy noted   Neurologic:  Alert & oriented x 3, CN 2-12 normal, normal motor function, normal sensory function, no focal deficits noted   Psychiatric:  Speech and behavior appropriate   Gi: abdomen soft  Eyes: EOMI    L knee  Exam performed same as contralateral side and is normal  R knee  Point TTP about the pes. Overall normal alignment. Skin intact. Compartments soft. NVI distally. Knee stable.       Pes anserinus bursitis of right knee          Using an aseptic technique, I injected 1 cc of lidocaine 1% without and 1 cc of kenalog 40mg into the right Knee pes. The patient tolerated this  well. I will have them return to clinic in 6 weeks.             [1]   Current Outpatient Medications on File Prior to Visit   Medication Sig Dispense Refill    acetaminophen (TYLENOL) 650 MG TbSR Take 1,300 mg by mouth every 8 (eight) hours as needed (pain).       albuterol (PROVENTIL/VENTOLIN HFA) 90 mcg/actuation inhaler Inhale 2 puffs into the lungs every 6 (six) hours as needed for Shortness of Breath or Wheezing. 18 g 3    allopurinoL (ZYLOPRIM) 100 MG tablet TAKE ONE TABLET BY MOUTH EVERY MORNING 90 tablet 4    amLODIPine (NORVASC) 5 MG tablet Take 1 tablet (5 mg total) by mouth once daily. 30 tablet 11    atorvastatin (LIPITOR) 40 MG tablet TAKE ONE TABLET BY MOUTH EVERY NIGHT AT BEDTIME 90 tablet 4    baclofen (LIORESAL) 10 MG tablet TAKE ONE TABLET BY MOUTH EVERY MORNING and TAKE ONE TABLET BY MOUTH EVERY NIGHT AT BEDTIME 60 tablet 5    benzonatate (TESSALON) 100 MG capsule 1 - 2 po every 6 hours prn cough 45 capsule 0    blood sugar diagnostic (TRUE METRIX GLUCOSE TEST STRIP) Strp 1 strip by In Vitro route 3 (three) times daily. 300 each 3    BRILINTA 90 mg tablet TAKE ONE TABLET BY MOUTH EVERY MORNING and TAKE ONE TABLET BY MOUTH EVERY NIGHT AT BEDTIME 180 tablet 3    calcium carb and citrate-vitD3 600 mg-12.5 mcg (500 unit) TbSR Take 2 tablets by mouth once.      cetirizine (ZYRTEC) 10 MG tablet Take 10 mg by mouth daily as needed for Allergies.      fluticasone propionate (FLONASE) 50 mcg/actuation nasal spray fluticasone propionate 50 mcg/actuation nasal spray,suspension      lancets Misc To check BG TID PRN, to use with insurance preferred meter 100 each 1    levothyroxine (SYNTHROID) 100 MCG tablet TAKE ONE TABLET BY MOUTH EVERY MORNING 90 tablet 3    methocarbamoL (ROBAXIN) 750 MG Tab Take 1 tablet (750 mg total) by mouth 4 (four) times daily as needed. 44 tablet 3    metoprolol succinate (TOPROL-XL) 25 MG 24 hr tablet Take 1 tablet (25 mg total) by mouth every evening. 30 tablet 11    multivitamin  (THERAGRAN) per tablet Take 1 tablet by mouth once daily. Bariatric vitamin      mupirocin (BACTROBAN) 2 % ointment Apply topically 3 (three) times daily. 30 g 1    pantoprazole (PROTONIX) 40 MG tablet TAKE ONE TABLET BY MOUTH EVERY MORNING and TAKE ONE TABLET BY MOUTH EVERY NIGHT AT BEDTIME 180 tablet 2    prazosin (MINIPRESS) 1 MG Cap Take 1 capsule (1 mg total) by mouth every evening. 30 capsule 1    pregabalin (LYRICA) 100 MG capsule TAKE ONE CAPSULE BY MOUTH EVERY MORNING AND TWO CAPSULES BY MOUTH EVERY NIGHT AT BEDTIME 90 capsule 1    senna-docusate 8.6-50 mg (PERICOLACE) 8.6-50 mg per tablet Take 1 tablet by mouth 2 (two) times a day.      venlafaxine (EFFEXOR-XR) 150 MG Cp24 TAKE ONE CAPSULE BY MOUTH EVERY NIGHT AT BEDTIME 90 capsule 3    venlafaxine (EFFEXOR-XR) 37.5 MG 24 hr capsule Take 1 capsule (37.5 mg total) by mouth once daily. 30 capsule 11    acarbose (PRECOSE) 25 MG Tab Take 1 tablet (25 mg total) by mouth 3 (three) times daily with meals. 270 tablet 3    aspirin (ECOTRIN) 81 MG EC tablet Take 1 tablet (81 mg total) by mouth once daily. 30 tablet 11    blood-glucose meter kit To check BG TID PRN, to use with insurance preferred meter 1 each 0     No current facility-administered medications on file prior to visit.   [2]   Social History  Socioeconomic History    Marital status:    Occupational History    Occupation: teacher   Tobacco Use    Smoking status: Former     Current packs/day: 0.00     Average packs/day: 0.5 packs/day for 14.9 years (7.5 ttl pk-yrs)     Types: Cigarettes     Start date: 1984     Quit date: 1998     Years since quittin.2    Smokeless tobacco: Never   Substance and Sexual Activity    Alcohol use: No    Drug use: Not Currently     Types: Benzodiazepines    Sexual activity: Yes     Partners: Male     Social Drivers of Health     Financial Resource Strain: Patient Declined (2023)    Overall Financial Resource Strain (CARDIA)     Difficulty of Paying  Living Expenses: Patient declined   Food Insecurity: Patient Declined (2/21/2023)    Hunger Vital Sign     Worried About Running Out of Food in the Last Year: Patient declined     Ran Out of Food in the Last Year: Patient declined   Transportation Needs: Patient Declined (2/21/2023)    PRAPARE - Transportation     Lack of Transportation (Medical): Patient declined     Lack of Transportation (Non-Medical): Patient declined   Physical Activity: Patient Declined (2/21/2023)    Exercise Vital Sign     Days of Exercise per Week: Patient declined     Minutes of Exercise per Session: Patient declined   Stress: Patient Declined (2/21/2023)    [employer] [place] [employer] - Occupational Stress Questionnaire     Feeling of Stress : Patient declined   Housing Stability: Unknown (2/21/2023)    Housing Stability Vital Sign     Unable to Pay for Housing in the Last Year: Patient refused     Unstable Housing in the Last Year: Patient refused

## 2025-03-19 ENCOUNTER — PATIENT MESSAGE (OUTPATIENT)
Dept: CARDIOLOGY | Facility: CLINIC | Age: 60
End: 2025-03-19
Payer: COMMERCIAL

## 2025-03-19 DIAGNOSIS — Z98.890 STATUS POST CORONARY ANGIOGRAM: ICD-10-CM

## 2025-03-19 DIAGNOSIS — R10.30 INGUINAL PAIN, UNSPECIFIED LATERALITY: ICD-10-CM

## 2025-03-19 DIAGNOSIS — Z95.5 STENTED CORONARY ARTERY: Primary | ICD-10-CM

## 2025-03-24 ENCOUNTER — HOSPITAL ENCOUNTER (OUTPATIENT)
Dept: CARDIOLOGY | Facility: HOSPITAL | Age: 60
Discharge: HOME | End: 2025-03-24
Attending: INTERNAL MEDICINE
Payer: COMMERCIAL

## 2025-03-24 ENCOUNTER — TELEPHONE (OUTPATIENT)
Dept: CARDIOLOGY | Facility: CLINIC | Age: 60
End: 2025-03-24

## 2025-03-24 DIAGNOSIS — R10.30 INGUINAL PAIN, UNSPECIFIED LATERALITY: ICD-10-CM

## 2025-03-24 PROCEDURE — 93925 LOWER EXTREMITY STUDY: CPT | Mod: 26,,, | Performed by: INTERNAL MEDICINE

## 2025-03-24 PROCEDURE — 93925 LOWER EXTREMITY STUDY: CPT | Mod: PO

## 2025-03-24 PROCEDURE — 93970 EXTREMITY STUDY: CPT | Mod: 26,,, | Performed by: INTERNAL MEDICINE

## 2025-03-24 NOTE — TELEPHONE ENCOUNTER
"Results given to pt. Pt was not as concerned about Anuerysm as much as she was afraid there is herniation of vessels because at times she has to apply pressure to "push it back down", she was concerned that it was pushing past muscle. Please advise.  "

## 2025-04-02 DIAGNOSIS — M51.369 DDD (DEGENERATIVE DISC DISEASE), LUMBAR: ICD-10-CM

## 2025-04-02 RX ORDER — PREGABALIN 100 MG/1
CAPSULE ORAL
Qty: 90 CAPSULE | Refills: 1 | Status: SHIPPED | OUTPATIENT
Start: 2025-04-02

## 2025-04-02 RX ORDER — BACLOFEN 10 MG/1
TABLET ORAL
Qty: 60 TABLET | Refills: 11 | Status: SHIPPED | OUTPATIENT
Start: 2025-04-02

## 2025-04-02 NOTE — TELEPHONE ENCOUNTER
Unable to retrieve patient chart and identify care due.  [facility] Embedded Care Due Messages. Reference number: [id number].   4/02/2025 1:23:04 PM CDT

## 2025-04-10 ENCOUNTER — OFFICE VISIT (OUTPATIENT)
Dept: ORTHOPEDICS | Facility: CLINIC | Age: 60
End: 2025-04-10
Payer: COMMERCIAL

## 2025-04-10 DIAGNOSIS — M75.101 ROTATOR CUFF TEAR ARTHROPATHY OF RIGHT SHOULDER: Primary | ICD-10-CM

## 2025-04-10 DIAGNOSIS — M75.101 ROTATOR CUFF TEAR ARTHROPATHY OF RIGHT SHOULDER: ICD-10-CM

## 2025-04-10 DIAGNOSIS — M12.811 ROTATOR CUFF TEAR ARTHROPATHY OF RIGHT SHOULDER: ICD-10-CM

## 2025-04-10 DIAGNOSIS — M12.811 ROTATOR CUFF TEAR ARTHROPATHY OF RIGHT SHOULDER: Primary | ICD-10-CM

## 2025-04-10 PROCEDURE — 99204 OFFICE O/P NEW MOD 45 MIN: CPT | Mod: S$GLB,,, | Performed by: ORTHOPAEDIC SURGERY

## 2025-04-10 PROCEDURE — 99999 PR PBB SHADOW E&M-EST. PATIENT-LVL IV: CPT | Mod: PBBFAC,,, | Performed by: ORTHOPAEDIC SURGERY

## 2025-04-10 PROCEDURE — 1159F MED LIST DOCD IN RCRD: CPT | Mod: CPTII,S$GLB,, | Performed by: ORTHOPAEDIC SURGERY

## 2025-04-10 NOTE — PROGRESS NOTES
59 years old right greater than left shoulder pain for years off and on in the past 6 months getting progressively worse.  Patient states that she has been seeing Dr. Wilson receiving Kenalog injections every 6 weeks into her shoulders which do provide temporary relief.    Exam shows positive Neer Altamirano impingement sign, weak and painful cuff strength, no signs infection     Imaging reveals degenerative disease in the shoulder involving the rotator cuff and AC joint     Assessment: Degenerative disease of the shoulders right being more symptomatic than left     Plan:  We had a lengthy discussion with the patient about different treatment options including surgical intervention with mixed results.  We discussed with the challenge of reversing degenerative disease.  We are going to get her set up with physical therapy follow up in a couple months time

## 2025-04-16 ENCOUNTER — CLINICAL SUPPORT (OUTPATIENT)
Dept: REHABILITATION | Facility: HOSPITAL | Age: 60
End: 2025-04-16
Payer: COMMERCIAL

## 2025-04-16 DIAGNOSIS — M25.612 IMPAIRED RANGE OF MOTION OF BOTH SHOULDERS: Primary | ICD-10-CM

## 2025-04-16 DIAGNOSIS — M75.101 ROTATOR CUFF TEAR ARTHROPATHY OF RIGHT SHOULDER: ICD-10-CM

## 2025-04-16 DIAGNOSIS — R29.898 WEAKNESS OF BOTH SHOULDERS: ICD-10-CM

## 2025-04-16 DIAGNOSIS — M12.811 ROTATOR CUFF TEAR ARTHROPATHY OF RIGHT SHOULDER: ICD-10-CM

## 2025-04-16 DIAGNOSIS — M25.611 IMPAIRED RANGE OF MOTION OF BOTH SHOULDERS: Primary | ICD-10-CM

## 2025-04-16 PROBLEM — M25.60 DECREASED RANGE OF MOTION: Status: RESOLVED | Noted: 2024-04-01 | Resolved: 2025-04-16

## 2025-04-16 PROBLEM — R26.89 DECREASED FUNCTIONAL MOBILITY: Status: RESOLVED | Noted: 2024-04-01 | Resolved: 2025-04-16

## 2025-04-16 PROBLEM — R53.1 DECREASED STRENGTH: Status: RESOLVED | Noted: 2021-06-30 | Resolved: 2025-04-16

## 2025-04-16 PROCEDURE — 97162 PT EVAL MOD COMPLEX 30 MIN: CPT | Mod: PN | Performed by: PHYSICAL THERAPIST

## 2025-04-16 PROCEDURE — 97110 THERAPEUTIC EXERCISES: CPT | Mod: PN | Performed by: PHYSICAL THERAPIST

## 2025-04-16 NOTE — PROGRESS NOTES
Outpatient Rehab    Physical Therapy Evaluation    Patient Name: Josephine Bolton  MRN: [id number]  YOB: 1965  Encounter Date: 4/16/2025    Therapy Diagnosis:   Encounter Diagnoses   Name Primary?    Rotator cuff tear arthropathy of right shoulder     Impaired range of motion of both shoulders Yes    Weakness of both shoulders      Physician: Rian Mcpherson MD    Physician Orders: Eval and Treat  Medical Diagnosis: Rotator cuff tear arthropathy of right shoulder    Visit # / Visits Authorized:  1 / 1  Insurance Authorization Period: 4/10/2025 to 4/10/2026  Date of Evaluation: 4/16/2025  Plan of Care Certification: 4/16/2025 to 6/11/2025     Time In: 1630   Time Out: 1715  Total Time: 45   Total Billable Time: 45    Intake Outcome Measure for FOTO Survey    Therapist reviewed FOTO scores for Josephine Bolton on 4/16/2025.   FOTO report - see Media section or FOTO account episode details.     Intake Score: 42%         Subjective   History of Present Illness  Josephine is a 59 y.o. female      The patient reports a medical diagnosis of Rotator cuff tear arthropathy of right shoulder (M75.101, M12.811).            History of Present Condition/Illness: History of previous 2 neck surgeries, 3 lower back surgeries, both knees replaced. Chronic shoulder pain for many years now, right side is worse than left currently. She is unsure if some of her discomfort is coming from the neck issues. Says she has difficulty carrying items such as a gallon of milk due to pain and weakness. She notes poor  strength. Doesn't have much help from  at home due to medical issues. She works at [employer] and has difficulty using arm at times such as opening doors.    Pain     Patient reports a current pain level of 5/10. Pain at best is reported as 3/10. Pain at worst is reported as 10/10.   Location: right shoulder  Clinical Progression (since onset): Worsening  Pain Qualities: Aching, Discomfort  Pain-Relieving  Factors: Medications - over-the-counter  Pain-Aggravating Factors: Lifting, Holding objects         Treatment History  Treatments  Previously Received Treatments: Yes  Additional Treatment Details: injections    Living Arrangements  Living Situation  Living Arrangements: Spouse/significant other        Employment  Employment Status: Employed full-time   [employer]      Past Medical History/Physical Systems Review:   Josephine Bolton  has a past medical history of Allergy, Anticoagulant long-term use, Anxiety, Arthritis, Asthma, CAD (coronary artery disease), Cataract, Chronic constipation, Colon polyp, Coronary artery disease involving native coronary artery of native heart without angina pectoris, DDD (degenerative disc disease), cervical, DDD (degenerative disc disease), lumbar, DDD (degenerative disc disease), thoracic, Depression, Edema, Encounter for blood transfusion, General anesthetics causing adverse effect in therapeutic use, Headache(784.0), History of nephrolithiasis, Hyperlipidemia, Hypertension, Hypothyroid, Insomnia, Insulin resistance, Joint stiffness, Joint swelling, Kidney disease, Kidney stones, Low back pain, Neck pain, Obstructive sleep apnea on CPAP, PONV (postoperative nausea and vomiting), S/P CABG (coronary artery bypass graft), Sleep apnea, Stented coronary artery, Thoracic back pain, Type 2 diabetes mellitus without complication, without long-term current use of insulin, and Usual hyperplasia of lactiferous duct.    Josephine Bolton  has a past surgical history that includes Thyroidectomy; Carpal tunnel release; Laminectomy; Knee arthroscopy w/ meniscal repair (Left); Sinus surgery; Colonoscopy; Tonsillectomy;  section, classic; Adenoidectomy; Coronary artery bypass graft (2013); Coronary angioplasty with stent; Tendon repair (Left); Back surgery; Breast biopsy (Left, 2017); Knee Arthroplasty (Left, 2019); Total knee arthroplasty (Left, 2019); Cervical  "laminectomy with spinal fusion; Left heart catheterization (12/13/2019); Coronary angiography (12/13/2019); Hysterectomy (2011); Oophorectomy (Bilateral, 2011); Breast cyst aspiration (Left, 01/2020); Esophagogastroduodenoscopy (N/A, 8/25/2020); Cardiac surgery; Joint replacement; robotic arthroplasty, knee (Right, 6/14/2021); xi robotic revision, gastroenterostomy, nelly-en-y (N/A, 2/20/2023); Laparoscopic cholecystectomy (N/A, 7/3/2023); Coronary angiography including bypass grafts with catheterization of left heart (10/13/2023); Coronary angiography (10/13/2023); Coronary bypass graft angiography (10/13/2023); and Colonoscopy (N/A, 7/22/2024).    Josephine has a current medication list which includes the following prescription(s): acarbose, acetaminophen, albuterol, allopurinol, amlodipine, aspirin, atorvastatin, baclofen, benzonatate, blood sugar diagnostic, blood-glucose meter, brilinta, calcium carb, citrate-vit d3, cetirizine, fluticasone propionate, lancets, levothyroxine, methocarbamol, metoprolol succinate, multivitamin, mupirocin, pantoprazole, prazosin, pregabalin, senna-docusate, and venlafaxine.    Review of patient's allergies indicates:   Allergen Reactions    Celexa [citalopram] Other (See Comments)     GI upset  Stated "knocked me out"    Cephalexin Anaphylaxis    Zoloft [sertraline] Other (See Comments)     Stated "knocked me out"    Codeine Other (See Comments)     hallucinations  hallucinations    Isosorbide Nausea And Vomiting    Ciprofloxacin Itching    Levaquin [levofloxacin] Other (See Comments)     tendinitis    Metformin      Nausea     Penicillamine     Penicillins Other (See Comments)     Was told per mother that as child was allergic to penicillin.  Childhood allergy/ unknown reaction    Sulfa (sulfonamide antibiotics)         Objective      Shoulder Range of Motion  Right Shoulder   Active (deg) Passive (deg) Pain   Flexion 65 85 Yes   Extension         Scaption         ABduction 80 90 " Yes   ADduction         Horizontal ABduction         Horizontal ADduction         External Rotation (Shoulder ABducted 0 degrees) 50 55 Yes   External Rotation (Shoulder ABducted 45 degrees)   45     External Rotation (Shoulder ABducted 90 degrees)         Internal Rotation (Shoulder ABducted 0 degrees)         Internal Rotation (Shoulder ABducted 45 degrees)   65     Internal Rotation (Shoulder ABducted 90 degrees)           Left Shoulder   Active (deg) Passive (deg) Pain   Flexion 130 150     Extension         Scaption         ABduction 110 120     ADduction         Horizontal ABduction         Horizontal ADduction         External Rotation (Shoulder ABducted 0 degrees) 65       External Rotation (Shoulder ABducted 45 degrees)   75     External Rotation (Shoulder ABducted 90 degrees)   85     Internal Rotation (Shoulder ABducted 0 degrees)         Internal Rotation (Shoulder ABducted 45 degrees)   80     Internal Rotation (Shoulder ABducted 90 degrees)   65                   Shoulder Strength - Planes of Motion   Right Strength Right Pain Left Strength Left  Pain   Flexion 3   4+     Extension           ABduction 3-   4+     ADduction           Horizontal ABduction           Horizontal ADduction           Internal Rotation 0°           Internal Rotation 90°           External Rotation 0° 3+   4+     External Rotation 90°               Elbow Strength   Right Strength Right Pain Left Strength Left  Pain   Flexion (C6) 5   5     Extension (C7) 3+   4            Right  Strength  Right Hand Dynamometer Position: 2  Elbow Position Forearm Position Trial 1 (lbs) Trial 2  (lbs) Trial 3  (lbs) Average  (lbs) Pain   Flexed Neutral 5               Left  Strength  Left Hand Dynamometer Position: 2  Elbow Position Forearm Position Trial 1 (lbs) Trial 2 (lbs) Trial 3 (lbs) Average (lbs) Pain   Flexed Neutral 30                      Shoulder Special Tests  Rotator Cuff Tests  Positive: Right Belly Press and Right Empty  Can  Impingement Tests  Positive: Right Alt[place]irano-Fermin, Right Neer's, and Right Painful Arc           Shoulder Joint Mobility  guarded                     Treatment:  Therapeutic Exercise  TE 1: HEP review and patient education      Time Entry(in minutes):  PT Evaluation (Moderate) Time Entry: 35  Therapeutic Exercise Time Entry: 10    Assessment & Plan   Assessment  Josephine    Presentation of Symptoms: Stable       Functional Limitations: Activity tolerance, Carrying objects, Participating in leisure activities, Pain with ADLs/IADLs, Pain when reaching, Manipulating objects, Range of motion  Impairments: Abnormal or restricted range of motion, Impaired physical strength, Activity intolerance    Patient Goal for Therapy (PT): reduce pain, avoid additional surgery  Prognosis: Fair  Assessment Details: Upon assessment, Josephine demonstrates limited bilateral shoulder ROM, weakness in both shoulder, postural limitations, pain with lifting/carrying, decreased functional mobility and decreased activity tolerance. Based on these limitations and functional limitations, she would benefit from continued skilled PT.    Plan  From a physical therapy perspective, the patient would benefit from: Skilled Rehab Services    Planned therapy interventions include: Therapeutic exercise, Therapeutic activities, Neuromuscular re-education, and Manual therapy.    Planned modalities to include: Cryotherapy (cold pack), Electrical stimulation - passive/unattended, and Thermotherapy (hot pack).        Visit Frequency: 2 times Per Week for 8 Weeks.       This plan was discussed with Patient.   Discussion participants: Agreed Upon Plan of Care             Patient's spiritual, cultural, and educational needs considered and patient agreeable to plan of care and goals.           Goals:   Active       LTG       Patient to improve score on the FOTO predicted score or better, to improve QOL.        Start:  04/16/25    Expected End:  06/11/25             Patient to improve shoulder ROM to 75% of norm with min pain to improve functional mobility.        Start:  04/16/25    Expected End:  06/11/25            Pt will increase shoulder MMT to 4/5 while having no significant shoulder compensations.        Start:  04/16/25    Expected End:  06/11/25               STG       Patient to be educated on HEP.        Start:  04/16/25    Expected End:  05/14/25            Patient to increase shoulder range of motion by 5 degrees, in order to improve available range of motion for ADL's.         Start:  04/16/25    Expected End:  05/14/25            Patient to increase BUE strength by 1/2 grade, in order to improve endurance and increase ability to perform all functional activities for increased time.         Start:  04/16/25    Expected End:  05/14/25                Judd Corley, PT, DPT

## 2025-04-22 ENCOUNTER — PATIENT MESSAGE (OUTPATIENT)
Dept: ORTHOPEDICS | Facility: CLINIC | Age: 60
End: 2025-04-22
Payer: COMMERCIAL

## 2025-04-29 ENCOUNTER — CLINICAL SUPPORT (OUTPATIENT)
Dept: REHABILITATION | Facility: HOSPITAL | Age: 60
End: 2025-04-29
Payer: COMMERCIAL

## 2025-04-29 DIAGNOSIS — M25.612 IMPAIRED RANGE OF MOTION OF BOTH SHOULDERS: Primary | ICD-10-CM

## 2025-04-29 DIAGNOSIS — R29.898 WEAKNESS OF BOTH SHOULDERS: ICD-10-CM

## 2025-04-29 DIAGNOSIS — M25.611 IMPAIRED RANGE OF MOTION OF BOTH SHOULDERS: Primary | ICD-10-CM

## 2025-04-29 PROCEDURE — 97112 NEUROMUSCULAR REEDUCATION: CPT | Mod: PN | Performed by: PHYSICAL THERAPIST

## 2025-04-29 PROCEDURE — 97110 THERAPEUTIC EXERCISES: CPT | Mod: PN | Performed by: PHYSICAL THERAPIST

## 2025-04-29 PROCEDURE — 97530 THERAPEUTIC ACTIVITIES: CPT | Mod: PN | Performed by: PHYSICAL THERAPIST

## 2025-04-29 NOTE — PROGRESS NOTES
Outpatient Rehab    Physical Therapy Visit    Patient Name: Josephine Bolton  MRN: [id number]  YOB: 1965  Encounter Date: 4/29/2025    Therapy Diagnosis:   Encounter Diagnoses   Name Primary?    Impaired range of motion of both shoulders Yes    Weakness of both shoulders      Physician: Rian Mcpherson MD    Physician Orders: Eval and Treat  Medical Diagnosis: Rotator cuff tear arthropathy of right shoulder    Visit # / Visits Authorized:  1 / 20  Insurance Authorization Period: 4/16/2025 to 12/31/2025  Date of Evaluation: 4/16/2025  Plan of Care Certification: 4/16/2025 to 6/11/2025      PT/PTA:     Number of PTA visits since last PT visit:   Time In: 1605   Time Out: 1655  Total Time: 50   Total Billable Time: 40    FOTO:  Intake Score:  %  Survey Score 1:  %  Survey Score 2:  %         Subjective   she notes continued neck and shoulder pain, right shoulder worse than left. She is thinking of contacting her spine doctor soon for a follow up.  Pain reported as 6/10. neck and shoulders    Objective            Treatment:     Josephine received therapeutic exercises to develop strength, endurance, ROM, flexibility, posture, and core stabilization for (17) minutes including:  UBE forward and back 2/2 4 min lvl 1  Biceps curl with dowel 3# x30 reps seated  Dowel ER stretch 3 min  Patient education    Josephine participated in neuromuscular re-education activities to improve: Balance, Coordination, Kinesthetic, Sense, Proprioception, and Posture for (15) minutes. The following activities were included:  Ball flexion stretch promoting improved posture 3 min  Scapular retraction x30 reps  AROM bilateral ER x30 reps  Chin tucks x30 reps  Cervical rotations with towel roll x20 reps    Josephine participated in dynamic functional therapeutic activities to improve functional performance for (8) minutes, including:  Gripping x30 reps B 15#  Pulleys 3 min    Josephine received hot pack for (10) minutes to shoulders  and neck in supine.      Time Entry(in minutes):  Hot/Cold Pack Time Entry: 10  Neuromuscular Re-Education Time Entry: 15  Therapeutic Activity Time Entry: 8  Therapeutic Exercise Time Entry: 17      Assessment & Plan   Assessment: Emphasis of today's first follow up visit is to perform gentle postural correction, light stregthening and mobility exercise. Despite low intensity she does report some pain with chin tucks due to positioning. She does note relief with use of modalities at end of session.  Evaluation/Treatment Tolerance: Patient tolerated treatment well    Patient will continue to benefit from skilled outpatient physical therapy to address the deficits listed in the problem list box on initial evaluation, provide pt/family education and to maximize pt's level of independence in the home and community environment.     Patient's spiritual, cultural, and educational needs considered and patient agreeable to plan of care and goals.           Plan: Conitnue with PT POC    Goals:   Active       LTG       Patient to improve score on the FOTO predicted score or better, to improve QOL.        Start:  04/16/25    Expected End:  06/11/25            Patient to improve shoulder ROM to 75% of norm with min pain to improve functional mobility.        Start:  04/16/25    Expected End:  06/11/25            Pt will increase shoulder MMT to 4/5 while having no significant shoulder compensations.        Start:  04/16/25    Expected End:  06/11/25               STG       Patient to be educated on HEP.        Start:  04/16/25    Expected End:  05/14/25            Patient to increase shoulder range of motion by 5 degrees, in order to improve available range of motion for ADL's.         Start:  04/16/25    Expected End:  05/14/25            Patient to increase BUE strength by 1/2 grade, in order to improve endurance and increase ability to perform all functional activities for increased time.         Start:  04/16/25    Expected  End:  05/14/25                Judd Corley, PT, DPT

## 2025-05-06 ENCOUNTER — CLINICAL SUPPORT (OUTPATIENT)
Dept: REHABILITATION | Facility: HOSPITAL | Age: 60
End: 2025-05-06
Payer: COMMERCIAL

## 2025-05-06 DIAGNOSIS — M25.612 IMPAIRED RANGE OF MOTION OF BOTH SHOULDERS: Primary | ICD-10-CM

## 2025-05-06 DIAGNOSIS — M25.611 IMPAIRED RANGE OF MOTION OF BOTH SHOULDERS: Primary | ICD-10-CM

## 2025-05-06 DIAGNOSIS — R29.898 WEAKNESS OF BOTH SHOULDERS: ICD-10-CM

## 2025-05-06 PROCEDURE — 97530 THERAPEUTIC ACTIVITIES: CPT | Mod: PN | Performed by: PHYSICAL THERAPIST

## 2025-05-06 PROCEDURE — 97112 NEUROMUSCULAR REEDUCATION: CPT | Mod: PN | Performed by: PHYSICAL THERAPIST

## 2025-05-06 PROCEDURE — 97110 THERAPEUTIC EXERCISES: CPT | Mod: PN | Performed by: PHYSICAL THERAPIST

## 2025-05-06 NOTE — PROGRESS NOTES
Outpatient Rehab    Physical Therapy Visit    Patient Name: Josephine Bolton  MRN: [id number]  YOB: 1965  Encounter Date: 5/6/2025    Therapy Diagnosis:   Encounter Diagnoses   Name Primary?    Impaired range of motion of both shoulders Yes    Weakness of both shoulders      Physician: Rian Mcpherson MD    Physician Orders: Eval and Treat  Medical Diagnosis: Rotator cuff tear arthropathy of right shoulder    Visit # / Visits Authorized:  2 / 20  Insurance Authorization Period: 4/16/2025 to 12/31/2025  Date of Evaluation: 4/16/2025  Plan of Care Certification: 4/16/2025 to 6/11/2025      PT/PTA:     Number of PTA visits since last PT visit:   Time In: 1545   Time Out: 1645  Total Time (in minutes): 60   Total Billable Time (in minutes): 48    FOTO:  Intake Score:  %  Survey Score 2:  %  Survey Score 3:  %         Subjective   having to consistently take arthritis strength tylenol and one ibuprofen every 4 hours throuhgout the day just to manage the pain. She is still wondering how much could be coming from the neck..  Pain reported as 6/10. neck and shoulders    Objective            Treatment:     Josephine received therapeutic exercises to develop strength, endurance, ROM, flexibility, posture, and core stabilization for (17) minutes including:  UBE forward and back 2/2 4 min lvl 1  Biceps curl with dowel 3# x30 reps seated  Dowel ER stretch 3 min  Patient education     Josephine participated in neuromuscular re-education activities to improve: Balance, Coordination, Kinesthetic, Sense, Proprioception, and Posture for (20) minutes. The following activities were included:  Ball flexion stretch promoting improved posture 3 min  Scapular retraction x30 reps  AROM bilateral ER 2x30 reps  Chin tucks x30 reps  Cervical rotations with towel roll x20 reps     Josephine participated in dynamic functional therapeutic activities to improve functional performance for (8) minutes, including:  Gripping x30 reps B  15#  Pulleys 3 min     Josephine received hot pack for (10) minutes to shoulders and neck in supine.    Time Entry(in minutes):  Hot/Cold Pack Time Entry: 10  Neuromuscular Re-Education Time Entry: 20  Therapeutic Activity Time Entry: 8  Therapeutic Exercise Time Entry: 20    Assessment & Plan   Assessment: Pt notes exacerbation of right shouler and neck discomfort following exercises while laying with moist heat. We will likely defer exercises at next session and focus on pain modulation.  Evaluation/Treatment Tolerance: Patient limited by pain    Patient will continue to benefit from skilled outpatient physical therapy to address the deficits listed in the problem list box on initial evaluation, provide pt/family education and to maximize pt's level of independence in the home and community environment.     Patient's spiritual, cultural, and educational needs considered and patient agreeable to plan of care and goals.           Plan: Continue with PT POC    Goals:   Active       LTG       Patient to improve score on the FOTO predicted score or better, to improve QOL.        Start:  04/16/25    Expected End:  06/11/25            Patient to improve shoulder ROM to 75% of norm with min pain to improve functional mobility.        Start:  04/16/25    Expected End:  06/11/25            Pt will increase shoulder MMT to 4/5 while having no significant shoulder compensations.        Start:  04/16/25    Expected End:  06/11/25               STG       Patient to be educated on HEP.        Start:  04/16/25    Expected End:  05/14/25            Patient to increase shoulder range of motion by 5 degrees, in order to improve available range of motion for ADL's.         Start:  04/16/25    Expected End:  05/14/25            Patient to increase BUE strength by 1/2 grade, in order to improve endurance and increase ability to perform all functional activities for increased time.         Start:  04/16/25    Expected End:  05/14/25                 Judd Corley, PT, DPT

## 2025-05-07 ENCOUNTER — CLINICAL SUPPORT (OUTPATIENT)
Dept: REHABILITATION | Facility: HOSPITAL | Age: 60
End: 2025-05-07
Payer: COMMERCIAL

## 2025-05-07 DIAGNOSIS — M25.611 IMPAIRED RANGE OF MOTION OF BOTH SHOULDERS: Primary | ICD-10-CM

## 2025-05-07 DIAGNOSIS — M25.612 IMPAIRED RANGE OF MOTION OF BOTH SHOULDERS: Primary | ICD-10-CM

## 2025-05-07 DIAGNOSIS — R29.898 WEAKNESS OF BOTH SHOULDERS: ICD-10-CM

## 2025-05-07 PROCEDURE — 97112 NEUROMUSCULAR REEDUCATION: CPT | Mod: PN | Performed by: PHYSICAL THERAPIST

## 2025-05-07 PROCEDURE — 97014 ELECTRIC STIMULATION THERAPY: CPT | Mod: PN | Performed by: PHYSICAL THERAPIST

## 2025-05-07 PROCEDURE — 97110 THERAPEUTIC EXERCISES: CPT | Mod: PN | Performed by: PHYSICAL THERAPIST

## 2025-05-07 NOTE — PROGRESS NOTES
Outpatient Rehab    Physical Therapy Visit    Patient Name: Josephine Bolton  MRN: [id number]  YOB: 1965  Encounter Date: 5/7/2025    Therapy Diagnosis:   Encounter Diagnoses   Name Primary?    Impaired range of motion of both shoulders Yes    Weakness of both shoulders      Physician: Rian Mcpherson MD    Physician Orders: Eval and Treat  Medical Diagnosis: Rotator cuff tear arthropathy of right shoulder    Visit # / Visits Authorized:  3 / 20  Insurance Authorization Period: 4/16/2025 to 12/31/2025  Date of Evaluation: 4/16/2025  Plan of Care Certification: 4/16/2025 to 6/11/2025     PT/PTA:     Number of PTA visits since last PT visit:   Time In: 1545   Time Out: 1630  Total Time (in minutes): 45   Total Billable Time (in minutes): 45    FOTO:  Intake Score:  %  Survey Score 2:  %  Survey Score 3:  %         Subjective   she did not sleep much last night and has been hurting throughout the day. She used her TENS unit last night to assist with high pain levels..  Pain reported as 10/10. neck and shoulders    Objective            Treatment:     Josephine received therapeutic exercises to develop strength, endurance, ROM, flexibility, posture, and core stabilization for (20) minutes including:  UBE forward 2.5 min  Pulleys 3/3 min flexion and abduction  Patient education     Josephine participated in neuromuscular re-education activities to improve: Balance, Coordination, Kinesthetic, Sense, Proprioception, and Posture for (10) minutes. The following activities were included:  Shoulder isometrics x20 reps with ball; flexion, abduction, extension, ER x20 reps each      Josephine participated in dynamic functional therapeutic activities to improve functional performance for (00) minutes, including:       Josephine received the following supervised modalities after being cleared for contradictions: IFC Electrical Stimulation:  Josephine received IFC Electrical Stimulation for pain control applied to the  right shoulder. Pt received stimulation set to pt tolerance for (15) minutes. Josephine tolderated treatment well without any adverse effects.      Josephine received hot pack for (15) minutes to shoulders and neck in supine      Time Entry(in minutes):  E-Stim (Unattended) Time Entry: 15  Hot/Cold Pack Time Entry: 15  Neuromuscular Re-Education Time Entry: 10  Therapeutic Exercise Time Entry: 20    Assessment & Plan   Assessment: Josephine is able to tolerate light therex for shoulder mobility and strengtheing without significant increase in pain. Heat and stim performed after exercises to assist with post treatment soreness and discomfort. Plan to continue with PT and progression if able.  Evaluation/Treatment Tolerance: Patient tolerated treatment well    Patient will continue to benefit from skilled outpatient physical therapy to address the deficits listed in the problem list box on initial evaluation, provide pt/family education and to maximize pt's level of independence in the home and community environment.     Patient's spiritual, cultural, and educational needs considered and patient agreeable to plan of care and goals.           Plan: Continue with PT POC. Emphasis on lower pain. May benefit from MDV if severe pain persists    Goals:   Active       LTG       Patient to improve score on the FOTO predicted score or better, to improve QOL.        Start:  04/16/25    Expected End:  06/11/25            Patient to improve shoulder ROM to 75% of norm with min pain to improve functional mobility.        Start:  04/16/25    Expected End:  06/11/25            Pt will increase shoulder MMT to 4/5 while having no significant shoulder compensations.        Start:  04/16/25    Expected End:  06/11/25               STG       Patient to be educated on HEP.        Start:  04/16/25    Expected End:  05/14/25            Patient to increase shoulder range of motion by 5 degrees, in order to improve available range of motion for  ADL's.         Start:  04/16/25    Expected End:  05/14/25            Patient to increase BUE strength by 1/2 grade, in order to improve endurance and increase ability to perform all functional activities for increased time.         Start:  04/16/25    Expected End:  05/14/25                Judd Corley, PT, DPT

## 2025-05-07 NOTE — PATIENT INSTRUCTIONS
"DRY EYES -- BURNING OR JACKSON SYMPTOMS:  Use Over The Counter artificial tears as needed for dry eye symptoms.   Some common brands include:  Systane, Optive, Refresh, and Thera-Tears.  These drops can be used as frequently as desired, but may be most helpful use during long periods of concentrated work.  For example, reading / working at the computer. Start with 3-4x per day.     Nighttime Ophthalmic gel or ointments are available: Refresh PM, Genteal, and Lacrilube.    Avoid drops that "get redness out" (Visine, Murine, Clear Eyes), as these may contain medication that could further irritate the eyes, especially with chronic use.    ALLERGY EYES -- ITCHING SYMPTOMS:  Over the counter medications include--Pataday, Zaditor, and Alaway.  Use as directed 1-2 drops daily for symptoms of itching / watering eyes.  These drops will not help for dry eye or exposure symptoms.    REDNESS RELIEF:  Lumify---is a good redness reliever that will not cause irritation if used chronically.        FLASHES / FLOATERS / POSTERIOR VITREOUS DETACHMENT    Call the clinic if you have any further changes in symptoms.  Including:  Increased numbers of floaters or flashing lights, dimness or darkness that moves through or stays constant in your vision, or any pain in the eye (s).    You may sometimes see small specks or clouds moving in your field of vision.  They are called FLOATERS.  You can often see them when looking at a plain background, like a blank wall or blue kai.  Floaters are actually tiny clumps of gel or cells inside the VITREOUS, the clear jelly-like fluid that fills the inside of your eye.    While these objects look like they are in front of your eye, they are actually floating inside.  What you see are the shadows they cast on the RETINA, the nerve layer at the back of the eye that senses light and allows you to see.      POSTERIOR VITREOUS DETACHMENT    The appearance of new floaters may be alarming.  If you suddenly " Ok to send evisit.   Cbc wnl.  Creatinine remains low, unchanged. Continue neprhology/pcp recs.  Alk phos elevated. Have ordered 2 labs (alk phos isoenxymes and ggt) to determine why this is elevated/ where it's coming from (most commonly from liver, intestine or bone).  Rest of cmp wnl.  Lipase wnl.   Vit d is low normal. Would recommend 800-1000 IU per day.  Need to get her in for procedures sooner than later as outlined in previous messages.    develop new floaters, you should contact your eye care professional  right away.    The retina can tear if the shrinking vitreous pulls away from the wall of the eye.  This sometimes causes a small amount of bleeding in the eye that may appear as new floaters.    A torn retina is always a serious problem, since it can lead to a retinal detachment.  You should see your eye care professional as soon as possible if:    even one new floater appears suddenly;  you see sudden flashes of light;  you notice other symptoms, like the loss of side vision, or a curtain closes down in your vision        POSTERIOR VITREOUS DETACHMENT is more common for people who:    are nearsighted;  have had cataract surgery;  have had YAG laser surgery of the eye;  have had inflammation inside the eye;  are over age 60.      While some floaters may remain visible, many of them will fade over time and become less noticeable.  Even if you've had some floaters for years, you should have your eyes checked as soon as possible if you notice new ones.    FLASHING LIGHTS    When the vitreous gel rubs or pulls on the retina, you may see what look like flashing lights or lightning streaks.  These flashes can appear off and on for several weeks or months.      Some people experience flashes of light that appear as jagged lines or heat waves in both eyes, lasting 10-20 minutes.  These flashes are caused by a spasm of blood vessels in the brain, which is called a migraine.    If a headache follows these flashes, it's called a migraine headache.  If   no headache occurs, these flashes are called Ophthalmic or Ocular Migraine.

## 2025-05-13 ENCOUNTER — CLINICAL SUPPORT (OUTPATIENT)
Dept: REHABILITATION | Facility: HOSPITAL | Age: 60
End: 2025-05-13
Payer: COMMERCIAL

## 2025-05-13 DIAGNOSIS — R29.898 WEAKNESS OF BOTH SHOULDERS: ICD-10-CM

## 2025-05-13 DIAGNOSIS — M25.611 IMPAIRED RANGE OF MOTION OF BOTH SHOULDERS: Primary | ICD-10-CM

## 2025-05-13 DIAGNOSIS — M25.612 IMPAIRED RANGE OF MOTION OF BOTH SHOULDERS: Primary | ICD-10-CM

## 2025-05-13 PROCEDURE — 97112 NEUROMUSCULAR REEDUCATION: CPT | Mod: PN | Performed by: PHYSICAL THERAPIST

## 2025-05-13 PROCEDURE — 97014 ELECTRIC STIMULATION THERAPY: CPT | Mod: PN | Performed by: PHYSICAL THERAPIST

## 2025-05-13 PROCEDURE — 97110 THERAPEUTIC EXERCISES: CPT | Mod: PN | Performed by: PHYSICAL THERAPIST

## 2025-05-14 NOTE — PROGRESS NOTES
Outpatient Rehab    Physical Therapy Visit    Patient Name: Josephine Bolton  MRN: [id number]  YOB: 1965  Encounter Date: 5/13/2025    Therapy Diagnosis:   Encounter Diagnoses   Name Primary?    Impaired range of motion of both shoulders Yes    Weakness of both shoulders      Physician: Rian Mcpherson MD    Physician Orders: Eval and Treat  Medical Diagnosis: Rotator cuff tear arthropathy of right shoulder    Visit # / Visits Authorized:  4 / 20  Insurance Authorization Period: 4/16/2025 to 12/31/2025  Date of Evaluation: 4/10/2025  4/16/2025  Plan of Care Certification:       PT/PTA:     Number of PTA visits since last PT visit:   Time In: 1400   Time Out: 1445  Total Time (in minutes): 45   Total Billable Time (in minutes): 45    FOTO:  Intake Score:  %  Survey Score 2:  %  Survey Score 3:  %    Precautions:       Subjective   PT has not helped much with shoulder pain levels, she would like to ask her doctors about possibly receiving injections.    neck and shoulders    Objective            Treatment:     Josephine received therapeutic exercises to develop strength, endurance, ROM, flexibility, posture, and core stabilization for (15) minutes including:  UBE forward 2.5 min  Pulleys 3/3 min flexion and abduction      Patient education     Josephine participated in neuromuscular re-education activities to improve: Balance, Coordination, Kinesthetic, Sense, Proprioception, and Posture for (15) minutes. The following activities were included:  Ball flexion stretch promoting improved posture 3 min      Chin tucks x30 reps seated  Cervical SNAG with towel x10 reps B     Josephine participated in dynamic functional therapeutic activities to improve functional performance for (00) minutes, including:        Josephine received the following supervised modalities after being cleared for contradictions: IFC Electrical Stimulation:  Josephine received IFC Electrical Stimulation for pain control applied to the  right shoulder. Pt received stimulation set to pt tolerance for (15) minutes. Josephine tolderated treatment well without any adverse effects.       Josephine received hot pack for (15) minutes to shoulders and neck in supine      Time Entry(in minutes):  E-Stim (Unattended) Time Entry: 15  Hot/Cold Pack Time Entry: 15  Neuromuscular Re-Education Time Entry: 15  Therapeutic Exercise Time Entry: 15    Assessment & Plan   Assessment: No significant increase in pain today with exercises, she responds well to use of modalities although this is temporary relief.  Evaluation/Treatment Tolerance: Patient tolerated treatment well    Patient will continue to benefit from skilled outpatient physical therapy to address the deficits listed in the problem list box on initial evaluation, provide pt/family education and to maximize pt's level of independence in the home and community environment.     Patient's spiritual, cultural, and educational needs considered and patient agreeable to plan of care and goals.           Plan: She may benefit from an additional MDV, aquilino PT at this time as tolerated. Spoke with pt about possible DC if there is no progress    Goals:   Active       LTG       Patient to improve score on the FOTO predicted score or better, to improve QOL.        Start:  04/16/25    Expected End:  06/11/25            Patient to improve shoulder ROM to 75% of norm with min pain to improve functional mobility.        Start:  04/16/25    Expected End:  06/11/25            Pt will increase shoulder MMT to 4/5 while having no significant shoulder compensations.        Start:  04/16/25    Expected End:  06/11/25               STG       Patient to be educated on HEP.        Start:  04/16/25    Expected End:  05/14/25            Patient to increase shoulder range of motion by 5 degrees, in order to improve available range of motion for ADL's.         Start:  04/16/25    Expected End:  05/14/25            Patient to increase  BUE strength by 1/2 grade, in order to improve endurance and increase ability to perform all functional activities for increased time.         Start:  04/16/25    Expected End:  05/14/25                Judd Corley PT, DPT

## 2025-05-16 ENCOUNTER — PATIENT MESSAGE (OUTPATIENT)
Dept: ORTHOPEDICS | Facility: CLINIC | Age: 60
End: 2025-05-16
Payer: COMMERCIAL

## 2025-05-27 DIAGNOSIS — M51.369 DDD (DEGENERATIVE DISC DISEASE), LUMBAR: ICD-10-CM

## 2025-05-27 RX ORDER — PREGABALIN 100 MG/1
CAPSULE ORAL
Qty: 90 CAPSULE | Refills: 1 | Status: SHIPPED | OUTPATIENT
Start: 2025-05-27

## 2025-05-27 NOTE — TELEPHONE ENCOUNTER
Care Due:                  Date            Visit Type   Department     Provider  --------------------------------------------------------------------------------                                EP -                              [facility] FAMILY  Last Visit: 09-      CARE ([facility])   CARMEL Bolton                               -                              [facility]  Next Visit: 09-      CARE ([facility])   CARMEL Bolton                                                            Last  Test          Frequency    Reason                     Performed    Due Date  --------------------------------------------------------------------------------    Uric Acid...  12 months..  allopurinoL..............  Not Found    Overdue    [facility] Embedded Care Due Messages. Reference number: [id number].   5/27/2025 2:18:16 PM CDT

## 2025-05-29 ENCOUNTER — OFFICE VISIT (OUTPATIENT)
Dept: FAMILY MEDICINE | Facility: CLINIC | Age: 60
End: 2025-05-29
Payer: COMMERCIAL

## 2025-05-29 ENCOUNTER — OFFICE VISIT (OUTPATIENT)
Dept: ORTHOPEDICS | Facility: CLINIC | Age: 60
End: 2025-05-29
Payer: COMMERCIAL

## 2025-05-29 ENCOUNTER — HOSPITAL ENCOUNTER (OUTPATIENT)
Dept: RADIOLOGY | Facility: HOSPITAL | Age: 60
Discharge: HOME | End: 2025-05-29
Attending: STUDENT IN AN ORGANIZED HEALTH CARE EDUCATION/TRAINING PROGRAM
Payer: COMMERCIAL

## 2025-05-29 ENCOUNTER — RESULTS FOLLOW-UP (OUTPATIENT)
Dept: FAMILY MEDICINE | Facility: CLINIC | Age: 60
End: 2025-05-29

## 2025-05-29 VITALS
OXYGEN SATURATION: 99 % | DIASTOLIC BLOOD PRESSURE: 79 MMHG | RESPIRATION RATE: 17 BRPM | WEIGHT: 131.63 LBS | SYSTOLIC BLOOD PRESSURE: 144 MMHG | HEART RATE: 76 BPM | BODY MASS INDEX: 24.85 KG/M2 | HEIGHT: 61 IN

## 2025-05-29 DIAGNOSIS — M75.22 BICEPS TENDINITIS OF LEFT UPPER EXTREMITY: ICD-10-CM

## 2025-05-29 DIAGNOSIS — R53.1 FEELING WEAK: Primary | ICD-10-CM

## 2025-05-29 DIAGNOSIS — R22.1 SWOLLEN NECK: ICD-10-CM

## 2025-05-29 DIAGNOSIS — E03.9 ACQUIRED HYPOTHYROIDISM: Chronic | ICD-10-CM

## 2025-05-29 DIAGNOSIS — R23.3 EASY BRUISING: ICD-10-CM

## 2025-05-29 DIAGNOSIS — M75.41 IMPINGEMENT SYNDROME, SHOULDER, RIGHT: Primary | ICD-10-CM

## 2025-05-29 DIAGNOSIS — M75.21 BICEPS TENDINITIS OF RIGHT UPPER EXTREMITY: ICD-10-CM

## 2025-05-29 DIAGNOSIS — M75.42 IMPINGEMENT SYNDROME OF LEFT SHOULDER: ICD-10-CM

## 2025-05-29 PROCEDURE — 1159F MED LIST DOCD IN RCRD: CPT | Mod: CPTII,S$GLB,, | Performed by: ORTHOPAEDIC SURGERY

## 2025-05-29 PROCEDURE — 99213 OFFICE O/P EST LOW 20 MIN: CPT | Mod: 25,S$GLB,, | Performed by: ORTHOPAEDIC SURGERY

## 2025-05-29 PROCEDURE — 99999 PR PBB SHADOW E&M-EST. PATIENT-LVL III: CPT | Mod: PBBFAC,,, | Performed by: ORTHOPAEDIC SURGERY

## 2025-05-29 PROCEDURE — 1159F MED LIST DOCD IN RCRD: CPT | Mod: CPTII,S$GLB,, | Performed by: STUDENT IN AN ORGANIZED HEALTH CARE EDUCATION/TRAINING PROGRAM

## 2025-05-29 PROCEDURE — 1160F RVW MEDS BY RX/DR IN RCRD: CPT | Mod: CPTII,S$GLB,, | Performed by: ORTHOPAEDIC SURGERY

## 2025-05-29 PROCEDURE — 99214 OFFICE O/P EST MOD 30 MIN: CPT | Mod: S$GLB,,, | Performed by: STUDENT IN AN ORGANIZED HEALTH CARE EDUCATION/TRAINING PROGRAM

## 2025-05-29 PROCEDURE — 3008F BODY MASS INDEX DOCD: CPT | Mod: CPTII,S$GLB,, | Performed by: STUDENT IN AN ORGANIZED HEALTH CARE EDUCATION/TRAINING PROGRAM

## 2025-05-29 PROCEDURE — 76536 US EXAM OF HEAD AND NECK: CPT | Mod: TC,PO

## 2025-05-29 PROCEDURE — 99999 PR PBB SHADOW E&M-EST. PATIENT-LVL V: CPT | Mod: PBBFAC,,, | Performed by: STUDENT IN AN ORGANIZED HEALTH CARE EDUCATION/TRAINING PROGRAM

## 2025-05-29 PROCEDURE — 3078F DIAST BP <80 MM HG: CPT | Mod: CPTII,S$GLB,, | Performed by: STUDENT IN AN ORGANIZED HEALTH CARE EDUCATION/TRAINING PROGRAM

## 2025-05-29 PROCEDURE — 3077F SYST BP >= 140 MM HG: CPT | Mod: CPTII,S$GLB,, | Performed by: STUDENT IN AN ORGANIZED HEALTH CARE EDUCATION/TRAINING PROGRAM

## 2025-05-29 PROCEDURE — 76536 US EXAM OF HEAD AND NECK: CPT | Mod: 26,,, | Performed by: RADIOLOGY

## 2025-05-29 PROCEDURE — 20610 DRAIN/INJ JOINT/BURSA W/O US: CPT | Mod: 50,S$GLB,, | Performed by: ORTHOPAEDIC SURGERY

## 2025-05-29 RX ADMIN — TRIAMCINOLONE ACETONIDE 40 MG: 40 INJECTION, SUSPENSION INTRA-ARTICULAR; INTRAMUSCULAR at 03:05

## 2025-05-29 NOTE — PROGRESS NOTES
"Problem visit/  Chief Complaint   Patient presents with    OTHER      brusing and bleeding under the skin, not feeling well         Subjective   Patient ID: Josephine Bolton is a 59 y.o. female.    HPI  Josephine Bolton is a 59 y.o. who presents with bruising.   Patient is being seen today for same day sick visit. Patient is new to provider. PCP is Dr Bolton.    She reports that she has recently noticed that she has been bruising more than usual. Previously it was just on her arms but now she has noticed it on her legs.    She has also been feeling more weak than usual. She state she can be walking up and fine, then suddenly she feels like a "turtle."     She also reports that she has noticed times when her neck glands are swollen. She was been seen at  and [facility] but it continues to happen     Review of Systems  Objective      Vitals:    05/29/25 0900   BP: (!) 144/79   Patient Position: Sitting   Pulse: 76   Resp: 17   SpO2: 99%   Weight: 59.7 kg (131 lb 9.8 oz)   Height: 5' 1" (1.549 m)       Physical Exam  Vitals reviewed.   Constitutional:       General: She is not in acute distress.     Appearance: Normal appearance. She is not ill-appearing, toxic-appearing or diaphoretic.   HENT:      Head: Normocephalic and atraumatic.   Pulmonary:      Effort: No respiratory distress.   Skin:     Comments: Bilateral arms with multiple area of bruises with different size and stages   Neurological:      Mental Status: She is alert.         Procedures         Assessment & Plan   Feeling weak  - She reports feeling more weak than usual. Her Hgb was 11.3 in 3/2025. Will reassess with iron studies. In addition, will order vitamin D to assess for deficiency   -     Iron; Future; Expected date: 05/29/2025  -     Ferritin; Future; Expected date: 05/29/2025  -     CBC Without Differential; Future; Expected date: 05/29/2025  -     Vitamin D; Future; Expected date: 05/29/2025    Easy bruising  - Her plt was [facility] in " 3/2025 however she reports that she is now bruising on her legs and not just arms. She is on brilinta and ASA due to her cardiac hx. She has FU with her cardiologist next month  -     CBC Without Differential; Future; Expected date: 05/29/2025    Acquired hypothyroidism  - She reports feeling weak/thyroid. She is on synthroid 100mcg and followed by endocrinology. Her TSH was [facility] in 9/2024. Will reassess    -     TSH; Future; Expected date: 05/29/2025    Swollen neck  - The patient reports intermittent swollen neck/glands for a few months. Will order US   -     US Soft Tissue Head Neck; Future; Expected date: 05/29/2025          Patient given return precautions and instructed to FU with PCP regarding symptoms.

## 2025-05-30 ENCOUNTER — PATIENT MESSAGE (OUTPATIENT)
Dept: FAMILY MEDICINE | Facility: CLINIC | Age: 60
End: 2025-05-30
Payer: COMMERCIAL

## 2025-05-30 ENCOUNTER — TELEPHONE (OUTPATIENT)
Dept: FAMILY MEDICINE | Facility: CLINIC | Age: 60
End: 2025-05-30
Payer: COMMERCIAL

## 2025-05-30 DIAGNOSIS — E61.1 IRON DEFICIENCY: Primary | ICD-10-CM

## 2025-05-30 RX ORDER — FERROUS SULFATE 325(65) MG
325 TABLET ORAL EVERY OTHER DAY
Qty: 90 TABLET | Refills: 0 | Status: SHIPPED | OUTPATIENT
Start: 2025-05-30

## 2025-05-30 NOTE — TELEPHONE ENCOUNTER
Provider called patient to review blood work which showed ferritin, iron and Hgb low. Discussed will send iron supplement. Also recommended FU with PCP. All questions answered and reported understanding

## 2025-06-02 ENCOUNTER — PATIENT MESSAGE (OUTPATIENT)
Dept: FAMILY MEDICINE | Facility: CLINIC | Age: 60
End: 2025-06-02
Payer: COMMERCIAL

## 2025-06-05 RX ORDER — TRIAMCINOLONE ACETONIDE 40 MG/ML
40 INJECTION, SUSPENSION INTRA-ARTICULAR; INTRAMUSCULAR
Status: DISCONTINUED | OUTPATIENT
Start: 2025-05-29 | End: 2025-06-05 | Stop reason: HOSPADM

## 2025-06-10 ENCOUNTER — OFFICE VISIT (OUTPATIENT)
Dept: FAMILY MEDICINE | Facility: CLINIC | Age: 60
End: 2025-06-10
Payer: COMMERCIAL

## 2025-06-10 VITALS
WEIGHT: 133.38 LBS | BODY MASS INDEX: 25.18 KG/M2 | HEART RATE: 75 BPM | DIASTOLIC BLOOD PRESSURE: 64 MMHG | HEIGHT: 61 IN | SYSTOLIC BLOOD PRESSURE: 142 MMHG | TEMPERATURE: 98 F | OXYGEN SATURATION: 99 %

## 2025-06-10 DIAGNOSIS — Z01.818 PRE-OPERATIVE EXAMINATION: Primary | ICD-10-CM

## 2025-06-10 DIAGNOSIS — H02.403 PTOSIS OF BOTH EYELIDS: ICD-10-CM

## 2025-06-10 PROCEDURE — 1159F MED LIST DOCD IN RCRD: CPT | Mod: CPTII,S$GLB,, | Performed by: STUDENT IN AN ORGANIZED HEALTH CARE EDUCATION/TRAINING PROGRAM

## 2025-06-10 PROCEDURE — 99214 OFFICE O/P EST MOD 30 MIN: CPT | Mod: S$GLB,,, | Performed by: STUDENT IN AN ORGANIZED HEALTH CARE EDUCATION/TRAINING PROGRAM

## 2025-06-10 PROCEDURE — 3077F SYST BP >= 140 MM HG: CPT | Mod: CPTII,S$GLB,, | Performed by: STUDENT IN AN ORGANIZED HEALTH CARE EDUCATION/TRAINING PROGRAM

## 2025-06-10 PROCEDURE — 3008F BODY MASS INDEX DOCD: CPT | Mod: CPTII,S$GLB,, | Performed by: STUDENT IN AN ORGANIZED HEALTH CARE EDUCATION/TRAINING PROGRAM

## 2025-06-10 PROCEDURE — 3078F DIAST BP <80 MM HG: CPT | Mod: CPTII,S$GLB,, | Performed by: STUDENT IN AN ORGANIZED HEALTH CARE EDUCATION/TRAINING PROGRAM

## 2025-06-10 PROCEDURE — 99999 PR PBB SHADOW E&M-EST. PATIENT-LVL V: CPT | Mod: PBBFAC,,, | Performed by: STUDENT IN AN ORGANIZED HEALTH CARE EDUCATION/TRAINING PROGRAM

## 2025-06-10 NOTE — PROGRESS NOTES
"Chief Complaint   Patient presents with    Pre-op Exam        Subjective   Provider is not patient PCP. PCP is Dr Bolton  Patient ID: Josephine Bolton is a 59 y.o. female here for pre-operation examination at the request of Dr Basilio prior to occuloplastic    Surgery planned: occuloplasitc surgery, Surgeon: Dr LEENA Basilio (fax [phone])  Date planned: 6/12/25, Location: [facility]   Labs/studies requested: none requested    Complaints today: reports none    Pre-operative screening:    Risks:  hypertension, CAD with stent placement, hypothyroidism    Denies: current use of cigarettes, diabetes   Current cardiac symptoms: denies current symptoms  [place] Perioperative Risk: unable to assess    Prior anesthesia:  5 stents, thyroid x2, open heart surgery, gastric bypass, neck surgery x3, back surgery x2, bilateral knee surgery  Anesthesia complications: denies  Prior transfusion(s): reports yes x2 (after neck and heart surgery)    Objective   Review of Systems    BP (!) 142/64   Pulse 75   Temp 98.3 °F (36.8 °C) (Oral)   Ht 5' 1" (1.549 m)   Wt 60.5 kg (133 lb 6.1 oz)   LMP  (LMP Unknown) Comment: total  SpO2 99%   BMI 25.20 kg/m²   Physical Exam  Vitals reviewed.   Constitutional:       General: She is not in acute distress.     Appearance: Normal appearance. She is not toxic-appearing.   HENT:      Head: Normocephalic and atraumatic.      Right Ear: Tympanic membrane, ear canal and external ear normal. There is no impacted cerumen.      Left Ear: Tympanic membrane, ear canal and external ear normal. There is no impacted cerumen.      Mouth/Throat:      Mouth: Mucous membranes are moist.      Pharynx: No oropharyngeal exudate.   Cardiovascular:      Rate and Rhythm: Normal rate and regular rhythm.      Pulses: Normal pulses.   Pulmonary:      Effort: Pulmonary effort is normal. No respiratory distress.      Breath sounds: Normal breath sounds. No stridor. No wheezing, rhonchi or rales. "   Abdominal:      General: Bowel sounds are normal. There is no distension.      Palpations: Abdomen is soft. There is no mass.   Neurological:      Mental Status: She is alert.            Procedures         EKG: not requested     Assessment & Plan   Pre-operative examination  Patient is hemodynamically stable with no acute findings on physical exam.  -pt reports getting local anesthesia only   -form completed and returned to patient    Ptosis of both eyelids  She reports that she is getting bilateral eyelid lift          DISCUSSION    Per 2014 ACC/AHA guidelines, patient has low cardiac risk due to the nature of her surgery. She states local anesthesia only and therefore does not require further cardiac evaluation/testing. EKG not requested. There are no other apparent contraindications to planned surgery from the Family Medicine perspective.  Additional pre-/post-operative recommendations, if necessary, noted below.

## 2025-06-10 NOTE — PATIENT INSTRUCTIONS
"Review of patient's allergies indicates:   Allergen Reactions    Celexa [citalopram] Other (See Comments)     GI upset  Stated "knocked me out"    Cephalexin Anaphylaxis    Zoloft [sertraline] Other (See Comments)     Stated "knocked me out"    Codeine Other (See Comments)     hallucinations  hallucinations    Isosorbide Nausea And Vomiting    Ciprofloxacin Itching    Levaquin [levofloxacin] Other (See Comments)     tendinitis    Metformin      Nausea     Penicillamine     Penicillins Other (See Comments)     Was told per mother that as child was allergic to penicillin.  Childhood allergy/ unknown reaction    Sulfa (sulfonamide antibiotics)       Outpatient Medications as of 6/10/2025   Medication Sig Dispense Refill    acetaminophen (TYLENOL) 650 MG TbSR Take 1,300 mg by mouth every 8 (eight) hours as needed (pain).       albuterol (PROVENTIL/VENTOLIN HFA) 90 mcg/actuation inhaler Inhale 2 puffs into the lungs every 6 (six) hours as needed for Shortness of Breath or Wheezing. 18 g 3    allopurinoL (ZYLOPRIM) 100 MG tablet TAKE ONE TABLET BY MOUTH EVERY MORNING 90 tablet 4    amLODIPine (NORVASC) 5 MG tablet Take 1 tablet (5 mg total) by mouth once daily. 30 tablet 11    atorvastatin (LIPITOR) 40 MG tablet TAKE ONE TABLET BY MOUTH EVERY NIGHT AT BEDTIME 90 tablet 4    baclofen (LIORESAL) 10 MG tablet TAKE ONE TABLET BY MOUTH EVERY MORNING and TAKE ONE TABLET BY MOUTH EVERY NIGHT AT BEDTIME 60 tablet 11    blood sugar diagnostic (TRUE METRIX GLUCOSE TEST STRIP) Strp 1 strip by In Vitro route 3 (three) times daily. 300 each 3    BRILINTA 90 mg tablet TAKE ONE TABLET BY MOUTH EVERY MORNING and TAKE ONE TABLET BY MOUTH EVERY NIGHT AT BEDTIME 180 tablet 3    cetirizine (ZYRTEC) 10 MG tablet Take 10 mg by mouth daily as needed for Allergies.      ferrous sulfate (IRON) 325 mg (65 mg iron) Tab tablet Take 1 tablet (325 mg total) by mouth every other day. 90 tablet 0    fluticasone propionate (FLONASE) 50 mcg/actuation " nasal spray fluticasone propionate 50 mcg/actuation nasal spray,suspension      lancets Misc To check BG TID PRN, to use with insurance preferred meter 100 each 1    levothyroxine (SYNTHROID) 100 MCG tablet TAKE ONE TABLET BY MOUTH EVERY MORNING 90 tablet 3    methocarbamoL (ROBAXIN) 750 MG Tab Take 1 tablet (750 mg total) by mouth 4 (four) times daily as needed. 44 tablet 3    metoprolol succinate (TOPROL-XL) 25 MG 24 hr tablet Take 1 tablet (25 mg total) by mouth every evening. 30 tablet 11    multivitamin (THERAGRAN) per tablet Take 1 tablet by mouth once daily. Bariatric vitamin      pantoprazole (PROTONIX) 40 MG tablet TAKE ONE TABLET BY MOUTH EVERY MORNING and TAKE ONE TABLET BY MOUTH EVERY NIGHT AT BEDTIME 180 tablet 2    prazosin (MINIPRESS) 1 MG Cap Take 1 capsule (1 mg total) by mouth every evening. 30 capsule 1    pregabalin (LYRICA) 100 MG capsule TAKE ONE CAPSULE BY MOUTH EVERY MORNING AND TWO CAPSULES BY MOUTH EVERY NIGHT AT BEDTIME 90 capsule 1    venlafaxine (EFFEXOR-XR) 150 MG Cp24 TAKE ONE CAPSULE BY MOUTH EVERY NIGHT AT BEDTIME 90 capsule 3    acarbose (PRECOSE) 25 MG Tab Take 1 tablet (25 mg total) by mouth 3 (three) times daily with meals. 270 tablet 3    aspirin (ECOTRIN) 81 MG EC tablet Take 1 tablet (81 mg total) by mouth once daily. 30 tablet 11    benzonatate (TESSALON) 100 MG capsule 1 - 2 po every 6 hours prn cough (Patient not taking: Reported on 6/10/2025) 45 capsule 0    blood-glucose meter kit To check BG TID PRN, to use with insurance preferred meter 1 each 0    calcium carb and citrate-vitD3 600 mg-12.5 mcg (500 unit) TbSR Take 2 tablets by mouth once.      mupirocin (BACTROBAN) 2 % ointment Apply topically 3 (three) times daily. (Patient not taking: Reported on 6/10/2025) 30 g 1    senna-docusate 8.6-50 mg (PERICOLACE) 8.6-50 mg per tablet Take 1 tablet by mouth 2 (two) times a day.       No current facility-administered medications on file as of 6/10/2025.

## 2025-06-11 ENCOUNTER — OFFICE VISIT (OUTPATIENT)
Dept: ORTHOPEDICS | Facility: CLINIC | Age: 60
End: 2025-06-11
Payer: COMMERCIAL

## 2025-06-11 VITALS
DIASTOLIC BLOOD PRESSURE: 64 MMHG | HEIGHT: 61 IN | SYSTOLIC BLOOD PRESSURE: 142 MMHG | HEART RATE: 75 BPM | WEIGHT: 133.38 LBS | BODY MASS INDEX: 25.18 KG/M2

## 2025-06-11 DIAGNOSIS — M70.51 PES ANSERINUS BURSITIS OF RIGHT KNEE: Primary | ICD-10-CM

## 2025-06-11 PROCEDURE — 3078F DIAST BP <80 MM HG: CPT | Mod: CPTII,S$GLB,, | Performed by: NURSE PRACTITIONER

## 2025-06-11 PROCEDURE — 3077F SYST BP >= 140 MM HG: CPT | Mod: CPTII,S$GLB,, | Performed by: NURSE PRACTITIONER

## 2025-06-11 PROCEDURE — 20610 DRAIN/INJ JOINT/BURSA W/O US: CPT | Mod: RT,S$GLB,, | Performed by: NURSE PRACTITIONER

## 2025-06-11 PROCEDURE — 99213 OFFICE O/P EST LOW 20 MIN: CPT | Mod: 25,S$GLB,, | Performed by: NURSE PRACTITIONER

## 2025-06-11 PROCEDURE — 3008F BODY MASS INDEX DOCD: CPT | Mod: CPTII,S$GLB,, | Performed by: NURSE PRACTITIONER

## 2025-06-11 PROCEDURE — 99999 PR PBB SHADOW E&M-EST. PATIENT-LVL V: CPT | Mod: PBBFAC,,, | Performed by: NURSE PRACTITIONER

## 2025-06-11 RX ADMIN — TRIAMCINOLONE ACETONIDE 40 MG: 40 INJECTION, SUSPENSION INTRA-ARTICULAR; INTRAMUSCULAR at 09:06

## 2025-06-16 ENCOUNTER — PATIENT MESSAGE (OUTPATIENT)
Dept: FAMILY MEDICINE | Facility: CLINIC | Age: 60
End: 2025-06-16

## 2025-06-16 ENCOUNTER — OFFICE VISIT (OUTPATIENT)
Dept: FAMILY MEDICINE | Facility: CLINIC | Age: 60
End: 2025-06-16
Payer: COMMERCIAL

## 2025-06-16 VITALS
OXYGEN SATURATION: 99 % | HEART RATE: 79 BPM | WEIGHT: 135.13 LBS | BODY MASS INDEX: 25.51 KG/M2 | TEMPERATURE: 97 F | HEIGHT: 61 IN | DIASTOLIC BLOOD PRESSURE: 80 MMHG | SYSTOLIC BLOOD PRESSURE: 150 MMHG

## 2025-06-16 DIAGNOSIS — Z98.84 S/P GASTRIC BYPASS: ICD-10-CM

## 2025-06-16 DIAGNOSIS — M25.511 ACUTE PAIN OF RIGHT SHOULDER: Primary | ICD-10-CM

## 2025-06-16 DIAGNOSIS — M48.02 CERVICAL STENOSIS OF SPINAL CANAL: ICD-10-CM

## 2025-06-16 DIAGNOSIS — S51.812D SKIN TEAR OF LEFT FOREARM WITHOUT COMPLICATION, SUBSEQUENT ENCOUNTER: ICD-10-CM

## 2025-06-16 PROCEDURE — 99214 OFFICE O/P EST MOD 30 MIN: CPT | Mod: S$GLB,,, | Performed by: NURSE PRACTITIONER

## 2025-06-16 PROCEDURE — 3008F BODY MASS INDEX DOCD: CPT | Mod: CPTII,S$GLB,, | Performed by: NURSE PRACTITIONER

## 2025-06-16 PROCEDURE — 3077F SYST BP >= 140 MM HG: CPT | Mod: CPTII,S$GLB,, | Performed by: NURSE PRACTITIONER

## 2025-06-16 PROCEDURE — 3079F DIAST BP 80-89 MM HG: CPT | Mod: CPTII,S$GLB,, | Performed by: NURSE PRACTITIONER

## 2025-06-16 PROCEDURE — 99999 PR PBB SHADOW E&M-EST. PATIENT-LVL V: CPT | Mod: PBBFAC,,, | Performed by: NURSE PRACTITIONER

## 2025-06-16 PROCEDURE — 1160F RVW MEDS BY RX/DR IN RCRD: CPT | Mod: CPTII,S$GLB,, | Performed by: NURSE PRACTITIONER

## 2025-06-16 PROCEDURE — 1159F MED LIST DOCD IN RCRD: CPT | Mod: CPTII,S$GLB,, | Performed by: NURSE PRACTITIONER

## 2025-06-16 RX ORDER — NITROGLYCERIN 0.4 MG/1
0.4 TABLET SUBLINGUAL EVERY 5 MIN PRN
Qty: 25 TABLET | Refills: 2 | Status: SHIPPED | OUTPATIENT
Start: 2025-06-16

## 2025-06-16 RX ORDER — TRAMADOL HYDROCHLORIDE 50 MG/1
50 TABLET, FILM COATED ORAL EVERY 6 HOURS PRN
COMMUNITY
Start: 2025-06-12 | End: 2025-06-16 | Stop reason: SDUPTHER

## 2025-06-16 RX ORDER — TRAMADOL HYDROCHLORIDE 50 MG/1
50 TABLET, FILM COATED ORAL EVERY 6 HOURS PRN
Qty: 28 TABLET | Refills: 0 | Status: SHIPPED | OUTPATIENT
Start: 2025-06-16

## 2025-06-16 RX ORDER — ERYTHROMYCIN 5 MG/G
OINTMENT OPHTHALMIC 2 TIMES DAILY
COMMUNITY
Start: 2025-06-12

## 2025-06-16 NOTE — PROGRESS NOTES
Subjective:       Patient ID: Josephine Bolton is a 59 y.o. female.    Chief Complaint: Fall    HPI  Fell yesterday--sitting outside yesterday morning. Stood up and didn't realize left leg was numb-- went to step with left foot and it gave out, rolled ankle,   Evaluated at ED-- all imaging negative for fracture   Skin tear to left forearm extremely painful, feels likes its burning, as well and right shoulder pain keeping her up at night. Taking tylenol + ibuprofen without relief.     Just underwent eyelid surgery for ptosis --took tramadol PRN which worked well for her, tolerated without adverse effect    Cervical stenosis: has upcoming eval with Dr. Dennison     Vitals:    06/16/25 1033   BP: (!) 150/80   Pulse: 79   Temp: 97.4 °F (36.3 °C)     Review of Systems   Constitutional:  Negative for fever.   Musculoskeletal:  Positive for arthralgias.   Skin:  Positive for wound.       Past Medical History:   Diagnosis Date    Allergy     Anticoagulant long-term use     ASA 81mg, Effient    Anxiety     Arthritis     Asthma     As child    CAD (coronary artery disease) 01/27/2012    s/p stents x4 , CABG, 3 vessel, (5/2013) newest stent prior to CABG    Cataract     Chronic constipation     Colon polyp     Coronary artery disease involving native coronary artery of native heart without angina pectoris 01/27/2012 12/19 Significant ostial RCA lesion as above treated with drug-eluting stenting as above. Occluded proximal LAD with patent lima lad Patent vein graft to 2nd obtuse marginal Known occluded vein graft to unknown vessel.  s/p stents x 3 (2010) s/p LAD stent (DSE) 3/2012    DDD (degenerative disc disease), cervical     DDD (degenerative disc disease), lumbar     DDD (degenerative disc disease), thoracic     Depression     Edema     Encounter for blood transfusion     General anesthetics causing adverse effect in therapeutic use     Headache(784.0)     History of nephrolithiasis     Hyperlipidemia      "Hypertension     Hypothyroid 07/05/2012    Insomnia     Insulin resistance     patient stated not diebetic    Joint stiffness     Joint swelling     Kidney disease     ; Frequent UTIs     Kidney stones     Low back pain     Neck pain     Obstructive sleep apnea on CPAP 07/17/2012    PONV (postoperative nausea and vomiting)     S/P CABG (coronary artery bypass graft) 06/25/2013 6/23/2013     Sleep apnea 01/27/2012    Patient reports "severe" sleep apnea, uses C-pap    Stented coronary artery 12/20/2019 12/19  ostial RCA -Osirio2.5 x 18 post dilated 2.75     Thoracic back pain     Type 2 diabetes mellitus without complication, without long-term current use of insulin 9/24/2024    Usual hyperplasia of lactiferous duct 02/2017    left breast     Objective:      Physical Exam  Constitutional:       General: She is not in acute distress.     Appearance: She is well-developed. She is not ill-appearing, toxic-appearing or diaphoretic.   HENT:      Right Ear: Hearing normal.      Left Ear: Hearing normal.   Pulmonary:      Effort: No tachypnea or respiratory distress.   Skin:     Coloration: Skin is not pale.      Findings: Ecchymosis and wound present.          Neurological:      Mental Status: She is alert and oriented to person, place, and time.   Psychiatric:         Speech: Speech normal.         Behavior: Behavior normal.         Thought Content: Thought content normal.         Judgment: Judgment normal.         Assessment:       1. Acute pain of right shoulder    2. Skin tear of left forearm without complication, subsequent encounter    3. Cervical stenosis of spinal canal    4. S/P gastric bypass        Plan:       Acute pain of right shoulder  -     traMADoL (ULTRAM) 50 mg tablet; Take 1 tablet (50 mg total) by mouth every 6 (six) hours as needed for Pain.  Dispense: 28 tablet; Refill: 0    Skin tear of left forearm without complication, subsequent encounter  -     traMADoL (ULTRAM) 50 mg tablet; " Take 1 tablet (50 mg total) by mouth every 6 (six) hours as needed for Pain.  Dispense: 28 tablet; Refill: 0    Cervical stenosis of spinal canal   FU with Dr. Dennison as scheduled    S/p gastric bypass   Educated to avoid NSAIDs    Other orders  refill-     nitroGLYCERIN (NITROSTAT) 0.4 MG SL tablet; Place 1 tablet (0.4 mg total) under the tongue every 5 (five) minutes as needed for Chest pain.  Dispense: 25 tablet; Refill: 2      Educated on wound care, dressings, symptom monitoring  education provided on supportive care, symptom monitoring and return precautions     FU with me 2 weeks as scheduled    Medication List with Changes/Refills   New Medications    NITROGLYCERIN (NITROSTAT) 0.4 MG SL TABLET    Place 1 tablet (0.4 mg total) under the tongue every 5 (five) minutes as needed for Chest pain.   Current Medications    ACARBOSE (PRECOSE) 25 MG TAB    Take 1 tablet (25 mg total) by mouth 3 (three) times daily with meals.    ACETAMINOPHEN (TYLENOL) 650 MG TBSR    Take 1,300 mg by mouth every 8 (eight) hours as needed (pain).     ALBUTEROL (PROVENTIL/VENTOLIN HFA) 90 MCG/ACTUATION INHALER    Inhale 2 puffs into the lungs every 6 (six) hours as needed for Shortness of Breath or Wheezing.    ALLOPURINOL (ZYLOPRIM) 100 MG TABLET    TAKE ONE TABLET BY MOUTH EVERY MORNING    AMLODIPINE (NORVASC) 5 MG TABLET    Take 1 tablet (5 mg total) by mouth once daily.    ASPIRIN (ECOTRIN) 81 MG EC TABLET    Take 1 tablet (81 mg total) by mouth once daily.    ATORVASTATIN (LIPITOR) 40 MG TABLET    TAKE ONE TABLET BY MOUTH EVERY NIGHT AT BEDTIME    BACLOFEN (LIORESAL) 10 MG TABLET    TAKE ONE TABLET BY MOUTH EVERY MORNING and TAKE ONE TABLET BY MOUTH EVERY NIGHT AT BEDTIME    BENZONATATE (TESSALON) 100 MG CAPSULE    1 - 2 po every 6 hours prn cough    BLOOD SUGAR DIAGNOSTIC (TRUE METRIX GLUCOSE TEST STRIP) STRP    1 strip by In Vitro route 3 (three) times daily.    BLOOD-GLUCOSE METER KIT    To check BG TID PRN, to use with  insurance preferred meter    BRILINTA 90 MG TABLET    TAKE ONE TABLET BY MOUTH EVERY MORNING and TAKE ONE TABLET BY MOUTH EVERY NIGHT AT BEDTIME    CALCIUM CARB AND CITRATE-VITD3 600 MG-12.5 MCG (500 UNIT) TBSR    Take 2 tablets by mouth once.    CETIRIZINE (ZYRTEC) 10 MG TABLET    Take 10 mg by mouth daily as needed for Allergies.    ERYTHROMYCIN (ROMYCIN) OPHTHALMIC OINTMENT    Place into both eyes 2 (two) times daily.    FERROUS SULFATE (IRON) 325 MG (65 MG IRON) TAB TABLET    Take 1 tablet (325 mg total) by mouth every other day.    FLUTICASONE PROPIONATE (FLONASE) 50 MCG/ACTUATION NASAL SPRAY    fluticasone propionate 50 mcg/actuation nasal spray,suspension    LANCETS MISC    To check BG TID PRN, to use with insurance preferred meter    LEVOTHYROXINE (SYNTHROID) 100 MCG TABLET    TAKE ONE TABLET BY MOUTH EVERY MORNING    LIDOCAINE (LIDODERM) 5 %    Place 1 patch onto the skin once daily. Remove & Discard patch within 12 hours or as directed by MD for 15 days    METHOCARBAMOL (ROBAXIN) 750 MG TAB    Take 1 tablet (750 mg total) by mouth 4 (four) times daily as needed.    METOPROLOL SUCCINATE (TOPROL-XL) 25 MG 24 HR TABLET    Take 1 tablet (25 mg total) by mouth every evening.    MULTIVITAMIN (THERAGRAN) PER TABLET    Take 1 tablet by mouth once daily. Bariatric vitamin    MUPIROCIN (BACTROBAN) 2 % OINTMENT    Apply topically 3 (three) times daily.    MUPIROCIN (BACTROBAN) 2 % OINTMENT    Apply topically 2 (two) times daily. for 7 days    PANTOPRAZOLE (PROTONIX) 40 MG TABLET    TAKE ONE TABLET BY MOUTH EVERY MORNING and TAKE ONE TABLET BY MOUTH EVERY NIGHT AT BEDTIME    PRAZOSIN (MINIPRESS) 1 MG CAP    Take 1 capsule (1 mg total) by mouth every evening.    PREGABALIN (LYRICA) 100 MG CAPSULE    TAKE ONE CAPSULE BY MOUTH EVERY MORNING AND TWO CAPSULES BY MOUTH EVERY NIGHT AT BEDTIME    SENNA-DOCUSATE 8.6-50 MG (PERICOLACE) 8.6-50 MG PER TABLET    Take 1 tablet by mouth 2 (two) times a day.    VENLAFAXINE  (EFFEXOR-XR) 150 MG CP24    TAKE ONE CAPSULE BY MOUTH EVERY NIGHT AT BEDTIME   Changed and/or Refilled Medications    Modified Medication Previous Medication    TRAMADOL (ULTRAM) 50 MG TABLET traMADoL (ULTRAM) 50 mg tablet       Take 1 tablet (50 mg total) by mouth every 6 (six) hours as needed for Pain.    Take 50 mg by mouth every 6 (six) hours as needed.   Discontinued Medications    NITROGLYCERIN 0.1 MG/HR TD PT24 (NITRODUR) 0.1 MG/HR PT24    Place 1 patch onto the skin once daily.

## 2025-06-18 RX ORDER — TRIAMCINOLONE ACETONIDE 40 MG/ML
40 INJECTION, SUSPENSION INTRA-ARTICULAR; INTRAMUSCULAR
Status: DISCONTINUED | OUTPATIENT
Start: 2025-06-11 | End: 2025-06-18 | Stop reason: HOSPADM

## 2025-06-18 NOTE — PROCEDURES
Large Joint Aspiration/Injection: R anserine bursa    Date/Time: 6/11/2025 9:20 AM    Performed by: Leandra Wilkins FNP  Authorized by: Leandra Wilkins FNP    Consent Done?:  Yes (Verbal)  Indications:  Pain  Timeout: prior to procedure the correct patient, procedure, and site was verified    Prep: patient was prepped and draped in usual sterile fashion    Local anesthetic:  Lidocaine 1% without epinephrine  Anesthetic total (ml):  1      Details:  Needle Size:  21 G  Approach:  Anterolateral  Location:  Knee  Site:  R anserine bursa  Medications:  40 mg triamcinolone acetonide 40 mg/mL  Patient tolerance:  Patient tolerated the procedure well with no immediate complications

## 2025-06-18 NOTE — PROGRESS NOTES
"Chief Complaint   Patient presents with    Right Knee - Pain         HPI:   This is a 59 y.o. who presents to clinic today complaining of right knee pain for 3 weeks after no known trauma. Pain is progressively worsening. No numbness or tingling. No associated signs or symptoms.    Past Medical History:   Diagnosis Date    Allergy     Anticoagulant long-term use     ASA 81mg, Effient    Anxiety     Arthritis     Asthma     As child    CAD (coronary artery disease) 01/27/2012    s/p stents x4 , CABG, 3 vessel, (5/2013) newest stent prior to CABG    Cataract     Chronic constipation     Colon polyp     Coronary artery disease involving native coronary artery of native heart without angina pectoris 01/27/2012 12/19 Significant ostial RCA lesion as above treated with drug-eluting stenting as above. Occluded proximal LAD with patent lima lad Patent vein graft to 2nd obtuse marginal Known occluded vein graft to unknown vessel.  s/p stents x 3 (2010) s/p LAD stent (DSE) 3/2012    DDD (degenerative disc disease), cervical     DDD (degenerative disc disease), lumbar     DDD (degenerative disc disease), thoracic     Depression     Edema     Encounter for blood transfusion     General anesthetics causing adverse effect in therapeutic use     Headache(784.0)     History of nephrolithiasis     Hyperlipidemia     Hypertension     Hypothyroid 07/05/2012    Insomnia     Insulin resistance     patient stated not diebetic    Joint stiffness     Joint swelling     Kidney disease     ; Frequent UTIs     Kidney stones     Low back pain     Neck pain     Obstructive sleep apnea on CPAP 07/17/2012    PONV (postoperative nausea and vomiting)     S/P CABG (coronary artery bypass graft) 06/25/2013 6/23/2013     Sleep apnea 01/27/2012    Patient reports "severe" sleep apnea, uses C-pap    Stented coronary artery 12/20/2019 12/19  ostial RCA -Osirio2.5 x 18 post dilated 2.75     Thoracic back pain     Type 2 diabetes " mellitus without complication, without long-term current use of insulin 2024    Usual hyperplasia of lactiferous duct 2017    left breast     Past Surgical History:   Procedure Laterality Date    ADENOIDECTOMY      BACK SURGERY      BREAST BIOPSY Left 2017    duct excision- Dr. Jimenez    BREAST CYST ASPIRATION Left 2020    @ 's office- old hematoma drained (per office)    CARDIAC SURGERY      CARPAL TUNNEL RELEASE      bilateral    CERVICAL LAMINECTOMY WITH SPINAL FUSION      2016     SECTION, CLASSIC      x 2    COLONOSCOPY      COLONOSCOPY N/A 2024    Procedure: COLONOSCOPY;  Surgeon: Mk Warren MD;  Location: [facility] ENDO;  Service: Endoscopy;  Laterality: N/A;    CORONARY ANGIOGRAPHY  2019    Procedure: ANGIOGRAM, CORONARY ARTERY;  Surgeon: Timi Millan MD;  Location: [facility] CATH;  Service: Cardiology;;    CORONARY ANGIOGRAPHY  10/13/2023    Procedure: Coronary angiogram study;  Surgeon: Azra Stoll MD;  Location: [facility] CATH;  Service: Cardiology;;    CORONARY ANGIOGRAPHY INCLUDING BYPASS GRAFTS WITH CATHETERIZATION OF LEFT HEART  10/13/2023    Procedure: Left heart cath w/Grafts RM 3640;  Surgeon: Azra Stoll MD;  Location: [facility] CATH;  Service: Cardiology;;    CORONARY ANGIOPLASTY WITH STENT PLACEMENT      x 4    CORONARY ARTERY BYPASS GRAFT  2013    3 vessel    CORONARY BYPASS GRAFT ANGIOGRAPHY  10/13/2023    Procedure: Bypass graft study;  Surgeon: Azra Stoll MD;  Location: [facility] CATH;  Service: Cardiology;;    ESOPHAGOGASTRODUODENOSCOPY N/A 2020    Procedure: EGD (ESOPHAGOGASTRODUODENOSCOPY);  Surgeon: Mk Warren MD;  Location: [facility] ENDO;  Service: Endoscopy;  Laterality: N/A;    HYSTERECTOMY      Complete    JOINT REPLACEMENT      KNEE ARTHROPLASTY Left 2019    Procedure: ARTHROPLASTY, KNEE;  Surgeon: Juan Wilson MD;  Location: [facility] OR;  Service: Orthopedics;  Laterality: Left;    KNEE ARTHROSCOPY W/ MENISCAL REPAIR Left      "twice, last one 5/2014    LAMINECTOMY      L4/L5    LAPAROSCOPIC CHOLECYSTECTOMY N/A 7/3/2023    Procedure: CHOLECYSTECTOMY, LAPAROSCOPIC;  Surgeon: Obinna Whipple MD;  Location: [facility] OR;  Service: General;  Laterality: N/A;  with cholangiogram    LEFT HEART CATHETERIZATION  12/13/2019    Procedure: Left heart cath-  # 219 A;  Surgeon: Timi Millan MD;  Location: [facility] [place] CATH;  Service: Cardiology;;    OOPHORECTOMY Bilateral 2011    ROBOTIC ARTHROPLASTY, KNEE Right 6/14/2021    Procedure: ROBOTIC ARTHROPLASTY, KNEE, TOTAL;  Surgeon: Juan Wilson MD;  Location: [facility] [place] OR;  Service: Orthopedics;  Laterality: Right;    SINUS SURGERY      x3    TENDON REPAIR Left     ankle surgery    THYROIDECTOMY      2 separate surgeries    TONSILLECTOMY      TOTAL KNEE ARTHROPLASTY Left 06/17/2019    Surgeon: Juan Wilson MD    XI ROBOTIC REVISION, GASTROENTEROSTOMY, MITCHELL-EN-Y N/A 2/20/2023    Procedure: XI ROBOTIC REVISION,GASTROENTEROSTOMY,MITCHELL-EN-Y;  Surgeon: Obinna Whipple MD;  Location: [facility] OR;  Service: General;  Laterality: N/A;  creation of Mitchell-N-Y anastomsis     Medications Ordered Prior to Encounter[1]  Review of patient's allergies indicates:   Allergen Reactions    Celexa [citalopram] Other (See Comments)     GI upset  Stated "knocked me out"    Cephalexin Anaphylaxis    Zoloft [sertraline] Other (See Comments)     Stated "knocked me out"    Codeine Other (See Comments)     hallucinations  hallucinations    Isosorbide Nausea And Vomiting    Ciprofloxacin Itching    Levaquin [levofloxacin] Other (See Comments)     tendinitis    Metformin      Nausea     Penicillamine     Penicillins Other (See Comments)     Was told per mother that as child was allergic to penicillin.  Childhood allergy/ unknown reaction    Sulfa (sulfonamide antibiotics)      Family History   Problem Relation Name Age of Onset    Heart failure Mother      Asthma Mother      Macular degeneration Mother      Cancer Mother          Breast    " Cataracts Mother      Heart disease Mother      COPD Mother      Breast cancer Mother      Heart disease Father      Diabetes Father      Hypertension Father      Stroke Father      Cataracts Father      Obesity Father      Obesity Sister      Glaucoma Sister      Thyroid disease Sister      Glaucoma Sister      Other Brother          stiff man syndrome    Cancer Maternal Grandmother          Breast with Mets    Breast cancer Maternal Grandmother      Cancer Paternal Grandmother          Colon    Strabismus Other niece     Amblyopia Neg Hx      Blindness Neg Hx      Retinal detachment Neg Hx       Social History[2]    Review of Systems:  Constitutional:  Denies fever or chills   Eyes:  Denies change in visual acuity   HENT:  Denies nasal congestion or sore throat   Respiratory:  Denies cough or shortness of breath   Cardiovascular:  Denies chest pain or edema   GI:  Denies abdominal pain, nausea, vomiting, bloody stools or diarrhea   :  Denies dysuria   Integument:  Denies rash   Neurologic:  Denies headache, focal weakness or sensory changes   Endocrine:  Denies polyuria or polydipsia   Lymphatic:  Denies swollen glands   Psychiatric:  Denies depression or anxiety     Physical Exam:   Constitutional:  Well developed, well nourished, no acute distress, non-toxic appearance   Integument:  Well hydrated  Neurologic:  Alert & oriented x 3  Psychiatric:  Speech and behavior appropriate     Bilateral Knee Exam    Right Knee Exam     Tenderness   The patient is experiencing tenderness in the right knee anserinus pes bursa    Range of Motion   Extension: normal   Flexion: normal     Muscle Strength     The patient has normal knee strength.    Tests   Varus: negative  Valgus: negative  Patellar Apprehension: negative    Other   Erythema: absent  Sensation: normal  Pulse: present  Swelling: mild      Left Knee Exam   Left knee exam performed same as contralateral side and is normal.            Pes anserinus bursitis of  right knee  -     Large Joint Aspiration/Injection: R anserine bursa  -     triamcinolone acetonide injection 40 mg      Using an aseptic technique, I injected 1 cc of lidocaine 1% without and 1 cc of kenalog 40mg into the right knee anserinus pes bursa. The patient tolerated this well.  Return to clinic as needed.             [1]   Current Outpatient Medications on File Prior to Visit   Medication Sig Dispense Refill    acarbose (PRECOSE) 25 MG Tab Take 1 tablet (25 mg total) by mouth 3 (three) times daily with meals. (Patient not taking: Reported on 6/16/2025) 270 tablet 3    acetaminophen (TYLENOL) 650 MG TbSR Take 1,300 mg by mouth every 8 (eight) hours as needed (pain).       albuterol (PROVENTIL/VENTOLIN HFA) 90 mcg/actuation inhaler Inhale 2 puffs into the lungs every 6 (six) hours as needed for Shortness of Breath or Wheezing. 18 g 3    allopurinoL (ZYLOPRIM) 100 MG tablet TAKE ONE TABLET BY MOUTH EVERY MORNING 90 tablet 4    amLODIPine (NORVASC) 5 MG tablet Take 1 tablet (5 mg total) by mouth once daily. 30 tablet 11    aspirin (ECOTRIN) 81 MG EC tablet Take 1 tablet (81 mg total) by mouth once daily. 30 tablet 11    atorvastatin (LIPITOR) 40 MG tablet TAKE ONE TABLET BY MOUTH EVERY NIGHT AT BEDTIME 90 tablet 4    baclofen (LIORESAL) 10 MG tablet TAKE ONE TABLET BY MOUTH EVERY MORNING and TAKE ONE TABLET BY MOUTH EVERY NIGHT AT BEDTIME 60 tablet 11    benzonatate (TESSALON) 100 MG capsule 1 - 2 po every 6 hours prn cough (Patient not taking: Reported on 6/16/2025) 45 capsule 0    blood sugar diagnostic (TRUE METRIX GLUCOSE TEST STRIP) Strp 1 strip by In Vitro route 3 (three) times daily. 300 each 3    blood-glucose meter kit To check BG TID PRN, to use with insurance preferred meter 1 each 0    BRILINTA 90 mg tablet TAKE ONE TABLET BY MOUTH EVERY MORNING and TAKE ONE TABLET BY MOUTH EVERY NIGHT AT BEDTIME 180 tablet 3    calcium carb and citrate-vitD3 600 mg-12.5 mcg (500 unit) TbSR Take 2 tablets by mouth  once.      cetirizine (ZYRTEC) 10 MG tablet Take 10 mg by mouth daily as needed for Allergies.      ferrous sulfate (IRON) 325 mg (65 mg iron) Tab tablet Take 1 tablet (325 mg total) by mouth every other day. 90 tablet 0    fluticasone propionate (FLONASE) 50 mcg/actuation nasal spray fluticasone propionate 50 mcg/actuation nasal spray,suspension      lancets Misc To check BG TID PRN, to use with insurance preferred meter 100 each 1    levothyroxine (SYNTHROID) 100 MCG tablet TAKE ONE TABLET BY MOUTH EVERY MORNING 90 tablet 3    methocarbamoL (ROBAXIN) 750 MG Tab Take 1 tablet (750 mg total) by mouth 4 (four) times daily as needed. (Patient not taking: Reported on 6/16/2025) 44 tablet 3    metoprolol succinate (TOPROL-XL) 25 MG 24 hr tablet Take 1 tablet (25 mg total) by mouth every evening. 30 tablet 11    multivitamin (THERAGRAN) per tablet Take 1 tablet by mouth once daily. Bariatric vitamin      mupirocin (BACTROBAN) 2 % ointment Apply topically 3 (three) times daily. 30 g 1    pantoprazole (PROTONIX) 40 MG tablet TAKE ONE TABLET BY MOUTH EVERY MORNING and TAKE ONE TABLET BY MOUTH EVERY NIGHT AT BEDTIME 180 tablet 2    prazosin (MINIPRESS) 1 MG Cap Take 1 capsule (1 mg total) by mouth every evening. 30 capsule 1    pregabalin (LYRICA) 100 MG capsule TAKE ONE CAPSULE BY MOUTH EVERY MORNING AND TWO CAPSULES BY MOUTH EVERY NIGHT AT BEDTIME 90 capsule 1    senna-docusate 8.6-50 mg (PERICOLACE) 8.6-50 mg per tablet Take 1 tablet by mouth 2 (two) times a day.      venlafaxine (EFFEXOR-XR) 150 MG Cp24 TAKE ONE CAPSULE BY MOUTH EVERY NIGHT AT BEDTIME 90 capsule 3     No current facility-administered medications on file prior to visit.   [2]   Social History  Socioeconomic History    Marital status:    Occupational History    Occupation: teacher   Tobacco Use    Smoking status: Former     Current packs/day: 0.00     Average packs/day: 0.5 packs/day for 14.9 years (7.5 ttl pk-yrs)     Types: Cigarettes     Start  date: 1984     Quit date: 1998     Years since quittin.5    Smokeless tobacco: Never   Substance and Sexual Activity    Alcohol use: No    Drug use: Not Currently     Types: Benzodiazepines    Sexual activity: Yes     Partners: Male     Social Drivers of Health     Financial Resource Strain: Patient Declined (2023)    Overall Financial Resource Strain (CARDIA)     Difficulty of Paying Living Expenses: Patient declined   Food Insecurity: Patient Declined (2023)    Hunger Vital Sign     Worried About Running Out of Food in the Last Year: Patient declined     Ran Out of Food in the Last Year: Patient declined   Transportation Needs: Patient Declined (2023)    PRAPARE - Transportation     Lack of Transportation (Medical): Patient declined     Lack of Transportation (Non-Medical): Patient declined   Physical Activity: Patient Declined (2023)    Exercise Vital Sign     Days of Exercise per Week: Patient declined     Minutes of Exercise per Session: Patient declined   Stress: Patient Declined (2023)    [place] [employer] - Occupational Stress Questionnaire     Feeling of Stress : Patient declined   Housing Stability: Unknown (2023)    Housing Stability Vital Sign     Unable to Pay for Housing in the Last Year: Patient refused     Unstable Housing in the Last Year: Patient refused

## 2025-06-24 ENCOUNTER — PATIENT MESSAGE (OUTPATIENT)
Dept: FAMILY MEDICINE | Facility: CLINIC | Age: 60
End: 2025-06-24
Payer: COMMERCIAL
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
## 2025-06-24 DIAGNOSIS — M25.532 LEFT WRIST PAIN: Primary | ICD-10-CM

## 2025-06-25 ENCOUNTER — HOSPITAL ENCOUNTER (OUTPATIENT)
Dept: RADIOLOGY | Facility: HOSPITAL | Age: 60
Discharge: HOME | End: 2025-06-25
Attending: NURSE PRACTITIONER
Payer: COMMERCIAL

## 2025-06-25 ENCOUNTER — RESULTS FOLLOW-UP (OUTPATIENT)
Dept: FAMILY MEDICINE | Facility: CLINIC | Age: 60
End: 2025-06-25
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
## 2025-06-25 DIAGNOSIS — M25.532 LEFT WRIST PAIN: ICD-10-CM

## 2025-06-25 PROCEDURE — 73110 X-RAY EXAM OF WRIST: CPT | Mod: 26,LT,, | Performed by: RADIOLOGY

## 2025-06-25 PROCEDURE — 73110 X-RAY EXAM OF WRIST: CPT | Mod: TC,FY,PO,LT

## 2025-07-02 ENCOUNTER — OFFICE VISIT (OUTPATIENT)
Dept: FAMILY MEDICINE | Facility: CLINIC | Age: 60
End: 2025-07-02
Payer: COMMERCIAL

## 2025-07-02 VITALS
TEMPERATURE: 98 F | SYSTOLIC BLOOD PRESSURE: 140 MMHG | OXYGEN SATURATION: 99 % | WEIGHT: 130.75 LBS | DIASTOLIC BLOOD PRESSURE: 80 MMHG | HEART RATE: 79 BPM | BODY MASS INDEX: 24.69 KG/M2 | HEIGHT: 61 IN

## 2025-07-02 DIAGNOSIS — M25.532 LEFT WRIST PAIN: ICD-10-CM

## 2025-07-02 DIAGNOSIS — D50.9 MICROCYTIC ANEMIA: ICD-10-CM

## 2025-07-02 DIAGNOSIS — Z98.84 S/P GASTRIC BYPASS: ICD-10-CM

## 2025-07-02 DIAGNOSIS — E53.8 B12 DEFICIENCY: ICD-10-CM

## 2025-07-02 DIAGNOSIS — E55.9 VITAMIN D DEFICIENCY: ICD-10-CM

## 2025-07-02 DIAGNOSIS — D50.8 IRON DEFICIENCY ANEMIA SECONDARY TO INADEQUATE DIETARY IRON INTAKE: ICD-10-CM

## 2025-07-02 DIAGNOSIS — D50.9 IRON DEFICIENCY ANEMIA, UNSPECIFIED IRON DEFICIENCY ANEMIA TYPE: Primary | ICD-10-CM

## 2025-07-02 PROCEDURE — 3008F BODY MASS INDEX DOCD: CPT | Mod: CPTII,S$GLB,, | Performed by: NURSE PRACTITIONER

## 2025-07-02 PROCEDURE — 3077F SYST BP >= 140 MM HG: CPT | Mod: CPTII,S$GLB,, | Performed by: NURSE PRACTITIONER

## 2025-07-02 PROCEDURE — 99214 OFFICE O/P EST MOD 30 MIN: CPT | Mod: S$GLB,,, | Performed by: NURSE PRACTITIONER

## 2025-07-02 PROCEDURE — 1159F MED LIST DOCD IN RCRD: CPT | Mod: CPTII,S$GLB,, | Performed by: NURSE PRACTITIONER

## 2025-07-02 PROCEDURE — 99999 PR PBB SHADOW E&M-EST. PATIENT-LVL V: CPT | Mod: PBBFAC,,, | Performed by: NURSE PRACTITIONER

## 2025-07-02 PROCEDURE — 3079F DIAST BP 80-89 MM HG: CPT | Mod: CPTII,S$GLB,, | Performed by: NURSE PRACTITIONER

## 2025-07-02 RX ORDER — EPINEPHRINE 0.3 MG/.3ML
0.3 INJECTION SUBCUTANEOUS ONCE AS NEEDED
OUTPATIENT
Start: 2025-07-07

## 2025-07-02 RX ORDER — SODIUM CHLORIDE 0.9 % (FLUSH) 0.9 %
10 SYRINGE (ML) INJECTION
OUTPATIENT
Start: 2025-07-07

## 2025-07-02 RX ORDER — HEPARIN 100 UNIT/ML
500 SYRINGE INTRAVENOUS
OUTPATIENT
Start: 2025-07-07

## 2025-07-02 NOTE — PROGRESS NOTES
Subjective:       Patient ID: Josephine Bolton is a 59 y.o. female.    Chief Complaint: Results    HPI  S/p gastric bypass 2023 Dr. Whipple   Reports only taking MVI since then--not additional iron supplement  Labs 3/17/25 H/H 11.3; labs 5/29/25 9.3/21.9 (microcytic)--iron checked with ferritin 8. Started ferrous sulfate 325mg QOD--ferritin now 13, H/H 9.5/32.4    Denies dark or bloody stools  On PPI  +epigastric pain     Chronic neck and shoulder pain--taking tylenol multiple times per day, sometimes takes Advil --knows this is contraindicated with bypass.     Persistent left wrist pain and swelling following fall. Xray negative.     Vitals:    07/02/25 0821   BP: (!) 140/80   Pulse: 79   Temp: 98.2 °F (36.8 °C)     Review of Systems   Constitutional:  Positive for fatigue. Negative for fever.   Gastrointestinal:  Positive for abdominal pain. Negative for anal bleeding and blood in stool.   Musculoskeletal:  Positive for arthralgias and neck pain.       Past Medical History:   Diagnosis Date    Allergy     Anticoagulant long-term use     ASA 81mg, Effient    Anxiety     Arthritis     Asthma     As child    CAD (coronary artery disease) 01/27/2012    s/p stents x4 , CABG, 3 vessel, (5/2013) newest stent prior to CABG    Cataract     Chronic constipation     Colon polyp     Coronary artery disease involving native coronary artery of native heart without angina pectoris 01/27/2012 12/19 Significant ostial RCA lesion as above treated with drug-eluting stenting as above. Occluded proximal LAD with patent lima lad Patent vein graft to 2nd obtuse marginal Known occluded vein graft to unknown vessel.  s/p stents x 3 (2010) s/p LAD stent (DSE) 3/2012    DDD (degenerative disc disease), cervical     DDD (degenerative disc disease), lumbar     DDD (degenerative disc disease), thoracic     Depression     Edema     Encounter for blood transfusion     General anesthetics causing adverse effect in therapeutic use      "Headache(784.0)     History of nephrolithiasis     Hyperlipidemia     Hypertension     Hypothyroid 07/05/2012    Insomnia     Insulin resistance     patient stated not diebetic    Joint stiffness     Joint swelling     Kidney disease     ; Frequent UTIs     Kidney stones     Low back pain     Neck pain     Obstructive sleep apnea on CPAP 07/17/2012    PONV (postoperative nausea and vomiting)     S/P CABG (coronary artery bypass graft) 06/25/2013 6/23/2013     Sleep apnea 01/27/2012    Patient reports "severe" sleep apnea, uses C-pap    Stented coronary artery 12/20/2019 12/19  ostial RCA -Osirio2.5 x 18 post dilated 2.75     Thoracic back pain     Type 2 diabetes mellitus without complication, without long-term current use of insulin 9/24/2024    Usual hyperplasia of lactiferous duct 02/2017    left breast     Objective:      Physical Exam  Constitutional:       General: She is not in acute distress.     Appearance: She is well-developed. She is not ill-appearing, toxic-appearing or diaphoretic.   HENT:      Right Ear: Hearing normal.      Left Ear: Hearing normal.   Cardiovascular:      Rate and Rhythm: Normal rate.   Pulmonary:      Effort: No tachypnea or respiratory distress.   Skin:     Coloration: Skin is not pale.   Neurological:      Mental Status: She is alert and oriented to person, place, and time.   Psychiatric:         Speech: Speech normal.         Behavior: Behavior normal.         Thought Content: Thought content normal.         Judgment: Judgment normal.         Assessment:       1. Iron deficiency anemia, unspecified iron deficiency anemia type    2. S/P gastric bypass    3. Microcytic anemia    4. Vitamin D deficiency    5. B12 deficiency    6. Left wrist pain    7. Iron deficiency anemia secondary to inadequate dietary iron intake        Plan:       Iron deficiency anemia, unspecified iron deficiency anemia type  -     Ferritin; Future; Expected date: 07/02/2025  -     Iron and " TIBC; Future; Expected date: 07/02/2025  -     CBC Auto Differential; Future; Expected date: 07/02/2025    S/P gastric bypass  -     Ferritin; Future; Expected date: 07/02/2025  -     Iron and TIBC; Future; Expected date: 07/02/2025  -     CBC Auto Differential; Future; Expected date: 07/02/2025  -     Comprehensive Metabolic Panel; Future; Expected date: 07/02/2025  -     Vitamin D; Future; Expected date: 07/02/2025  -     Vitamin B12; Future; Expected date: 07/02/2025  -     Vitamin B1; Future; Expected date: 07/02/2025    Microcytic anemia  -     Occult blood x 1, stool; Future; Expected date: 07/02/2025  -     Occult blood x 1, stool; Future; Expected date: 07/02/2025  -     Occult blood x 1, stool; Future; Expected date: 07/02/2025    Vitamin D deficiency  -     Vitamin D; Future; Expected date: 07/02/2025    B12 deficiency  -     Vitamin B12; Future; Expected date: 07/02/2025    Left wrist pain  -     Ambulatory referral/consult to Orthopedics; Future; Expected date: 07/09/2025    Iron deficiency anemia secondary to inadequate dietary iron intake    Other orders  -     ferumoxytol (Feraheme) 510 mg in D5W 117 mL IVPB  -     EPINEPHrine (EPIPEN) 0.3 mg/0.3 mL pen injection 0.3 mg  -     hydrocortisone sodium succinate injection 100 mg  -     0.9% NaCl 250 mL flush bag  -     sodium chloride 0.9% flush 10 mL  -     heparin, porcine (PF) 100 unit/mL injection flush 500 Units  -     alteplase injection 2 mg        Check stool cards--if positive schedule EGD  FU 2 months PCP labs prior   \    Medication List with Changes/Refills   Current Medications    ACARBOSE (PRECOSE) 25 MG TAB    Take 1 tablet (25 mg total) by mouth 3 (three) times daily with meals.    ACETAMINOPHEN (TYLENOL) 650 MG TBSR    Take 1,300 mg by mouth every 8 (eight) hours as needed (pain).     ALBUTEROL (PROVENTIL/VENTOLIN HFA) 90 MCG/ACTUATION INHALER    Inhale 2 puffs into the lungs every 6 (six) hours as needed for Shortness of Breath or  Wheezing.    ALLOPURINOL (ZYLOPRIM) 100 MG TABLET    TAKE ONE TABLET BY MOUTH EVERY MORNING    AMLODIPINE (NORVASC) 5 MG TABLET    Take 1 tablet (5 mg total) by mouth once daily.    ASPIRIN (ECOTRIN) 81 MG EC TABLET    Take 1 tablet (81 mg total) by mouth once daily.    ATORVASTATIN (LIPITOR) 40 MG TABLET    TAKE ONE TABLET BY MOUTH EVERY NIGHT AT BEDTIME    BACLOFEN (LIORESAL) 10 MG TABLET    TAKE ONE TABLET BY MOUTH EVERY MORNING and TAKE ONE TABLET BY MOUTH EVERY NIGHT AT BEDTIME    BENZONATATE (TESSALON) 100 MG CAPSULE    1 - 2 po every 6 hours prn cough    BLOOD SUGAR DIAGNOSTIC (TRUE METRIX GLUCOSE TEST STRIP) STRP    1 strip by In Vitro route 3 (three) times daily.    BLOOD-GLUCOSE METER KIT    To check BG TID PRN, to use with insurance preferred meter    BRILINTA 90 MG TABLET    TAKE ONE TABLET BY MOUTH EVERY MORNING and TAKE ONE TABLET BY MOUTH EVERY NIGHT AT BEDTIME    CALCIUM CARB AND CITRATE-VITD3 600 MG-12.5 MCG (500 UNIT) TBSR    Take 2 tablets by mouth once.    CETIRIZINE (ZYRTEC) 10 MG TABLET    Take 10 mg by mouth daily as needed for Allergies.    ERYTHROMYCIN (ROMYCIN) OPHTHALMIC OINTMENT    Place into both eyes 2 (two) times daily.    FERROUS SULFATE (IRON) 325 MG (65 MG IRON) TAB TABLET    Take 1 tablet (325 mg total) by mouth every other day.    FLUTICASONE PROPIONATE (FLONASE) 50 MCG/ACTUATION NASAL SPRAY    fluticasone propionate 50 mcg/actuation nasal spray,suspension    LANCETS MISC    To check BG TID PRN, to use with insurance preferred meter    LEVOTHYROXINE (SYNTHROID) 100 MCG TABLET    TAKE ONE TABLET BY MOUTH EVERY MORNING    METHOCARBAMOL (ROBAXIN) 750 MG TAB    Take 1 tablet (750 mg total) by mouth 4 (four) times daily as needed.    METOPROLOL SUCCINATE (TOPROL-XL) 25 MG 24 HR TABLET    Take 1 tablet (25 mg total) by mouth every evening.    MULTIVITAMIN (THERAGRAN) PER TABLET    Take 1 tablet by mouth once daily. Bariatric vitamin    MUPIROCIN (BACTROBAN) 2 % OINTMENT    Apply  topically 3 (three) times daily.    NITROGLYCERIN (NITROSTAT) 0.4 MG SL TABLET    Place 1 tablet (0.4 mg total) under the tongue every 5 (five) minutes as needed for Chest pain.    PANTOPRAZOLE (PROTONIX) 40 MG TABLET    TAKE ONE TABLET BY MOUTH EVERY MORNING and TAKE ONE TABLET BY MOUTH EVERY NIGHT AT BEDTIME    PRAZOSIN (MINIPRESS) 1 MG CAP    Take 1 capsule (1 mg total) by mouth every evening.    PREGABALIN (LYRICA) 100 MG CAPSULE    TAKE ONE CAPSULE BY MOUTH EVERY MORNING AND TWO CAPSULES BY MOUTH EVERY NIGHT AT BEDTIME    SENNA-DOCUSATE 8.6-50 MG (PERICOLACE) 8.6-50 MG PER TABLET    Take 1 tablet by mouth 2 (two) times a day.    TRAMADOL (ULTRAM) 50 MG TABLET    Take 1 tablet (50 mg total) by mouth every 6 (six) hours as needed for Pain.    VENLAFAXINE (EFFEXOR-XR) 150 MG CP24    TAKE ONE CAPSULE BY MOUTH EVERY NIGHT AT BEDTIME

## 2025-07-07 ENCOUNTER — OFFICE VISIT (OUTPATIENT)
Dept: ORTHOPEDICS | Facility: CLINIC | Age: 60
End: 2025-07-07
Payer: COMMERCIAL

## 2025-07-07 ENCOUNTER — HOSPITAL ENCOUNTER (OUTPATIENT)
Dept: RADIOLOGY | Facility: HOSPITAL | Age: 60
Discharge: HOME | End: 2025-07-07
Attending: PHYSICIAN ASSISTANT
Payer: COMMERCIAL

## 2025-07-07 VITALS
DIASTOLIC BLOOD PRESSURE: 80 MMHG | HEART RATE: 79 BPM | HEIGHT: 61 IN | BODY MASS INDEX: 24.69 KG/M2 | SYSTOLIC BLOOD PRESSURE: 140 MMHG | WEIGHT: 130.75 LBS

## 2025-07-07 DIAGNOSIS — M25.532 LEFT WRIST PAIN: ICD-10-CM

## 2025-07-07 DIAGNOSIS — S52.515A CLOSED NONDISPLACED FRACTURE OF STYLOID PROCESS OF LEFT RADIUS, INITIAL ENCOUNTER: Primary | ICD-10-CM

## 2025-07-07 PROCEDURE — 73110 X-RAY EXAM OF WRIST: CPT | Mod: 26,LT,, | Performed by: RADIOLOGY

## 2025-07-07 PROCEDURE — 99999 PR PBB SHADOW E&M-EST. PATIENT-LVL V: CPT | Mod: PBBFAC,,, | Performed by: PHYSICIAN ASSISTANT

## 2025-07-07 PROCEDURE — 73110 X-RAY EXAM OF WRIST: CPT | Mod: TC,PO,LT

## 2025-07-07 NOTE — PROGRESS NOTES
7/7/2025    HPI:  Josephine Bolton is a 59 y.o. female, who presents to clinic today for evaluation of her left wrist injury.  States about 3 weeks ago she fell on an outstretched left hand/wrist.  States immediate pain, swelling, difficulty left wrist.  States he has had continued pain and swelling since that time.  States that has an x-ray performed 12 days ago which showed no fracture.  States he continues to have pain, which prompted her to follow up with our clinic.  Denies any paresthesias.  Denies any other complaints at this time.    PMHX:  Past Medical History:   Diagnosis Date    Allergy     Anticoagulant long-term use     ASA 81mg, Effient    Anxiety     Arthritis     Asthma     As child    CAD (coronary artery disease) 01/27/2012    s/p stents x4 , CABG, 3 vessel, (5/2013) newest stent prior to CABG    Cataract     Chronic constipation     Colon polyp     Coronary artery disease involving native coronary artery of native heart without angina pectoris 01/27/2012 12/19 Significant ostial RCA lesion as above treated with drug-eluting stenting as above. Occluded proximal LAD with patent lima lad Patent vein graft to 2nd obtuse marginal Known occluded vein graft to unknown vessel.  s/p stents x 3 (2010) s/p LAD stent (DSE) 3/2012    DDD (degenerative disc disease), cervical     DDD (degenerative disc disease), lumbar     DDD (degenerative disc disease), thoracic     Depression     Edema     Encounter for blood transfusion     General anesthetics causing adverse effect in therapeutic use     Headache(784.0)     History of nephrolithiasis     Hyperlipidemia     Hypertension     Hypothyroid 07/05/2012    Insomnia     Insulin resistance     patient stated not diebetic    Joint stiffness     Joint swelling     Kidney disease     ; Frequent UTIs     Kidney stones     Low back pain     Neck pain     Obstructive sleep apnea on CPAP 07/17/2012    PONV (postoperative nausea and vomiting)     S/P CABG  "(coronary artery bypass graft) 2013     Sleep apnea 2012    Patient reports "severe" sleep apnea, uses C-pap    Stented coronary artery 2019  ostial RCA -Osirio2.5 x 18 post dilated 2.75     Thoracic back pain     Type 2 diabetes mellitus without complication, without long-term current use of insulin 2024    Usual hyperplasia of lactiferous duct 2017    left breast       PSHX:  Past Surgical History:   Procedure Laterality Date    ADENOIDECTOMY      BACK SURGERY      BREAST BIOPSY Left 2017    duct excision- Dr. Jimenez    BREAST CYST ASPIRATION Left 2020    @ 's office- old hematoma drained (per office)    CARDIAC SURGERY      CARPAL TUNNEL RELEASE      bilateral    CERVICAL LAMINECTOMY WITH SPINAL FUSION      2016     SECTION, CLASSIC      x 2    COLONOSCOPY      COLONOSCOPY N/A 2024    Procedure: COLONOSCOPY;  Surgeon: Mk Warren MD;  Location: [facility];  Service: Endoscopy;  Laterality: N/A;    CORONARY ANGIOGRAPHY  2019    Procedure: ANGIOGRAM, CORONARY ARTERY;  Surgeon: Timi Millan MD;  Location: [facility] CATH;  Service: Cardiology;;    CORONARY ANGIOGRAPHY  10/13/2023    Procedure: Coronary angiogram study;  Surgeon: Azra Stoll MD;  Location: [facility] CATH;  Service: Cardiology;;    CORONARY ANGIOGRAPHY INCLUDING BYPASS GRAFTS WITH CATHETERIZATION OF LEFT HEART  10/13/2023    Procedure: Left heart cath w/Grafts RM 3640;  Surgeon: Azra Stoll MD;  [facility];  Service: Cardiology;;    CORONARY ANGIOPLASTY WITH STENT PLACEMENT      x 4    CORONARY ARTERY BYPASS GRAFT  2013    3 vessel    CORONARY BYPASS GRAFT ANGIOGRAPHY  10/13/2023    Procedure: Bypass graft study;  Surgeon: Azra Stoll MD;  Location: [facility] CATH;  Service: Cardiology;;    ESOPHAGOGASTRODUODENOSCOPY N/A 2020    Procedure: EGD (ESOPHAGOGASTRODUODENOSCOPY);  Surgeon: Mk Warren MD;  Location: [facility];  Service: Endoscopy;  " Laterality: N/A;    HYSTERECTOMY  2011    Complete    JOINT REPLACEMENT      KNEE ARTHROPLASTY Left 6/17/2019    Procedure: ARTHROPLASTY, KNEE;  Surgeon: Juan Wilson MD;  Location: [facility] OR;  Service: Orthopedics;  Laterality: Left;    KNEE ARTHROSCOPY W/ MENISCAL REPAIR Left     twice, last one 5/2014    LAMINECTOMY      L4/L5    LAPAROSCOPIC CHOLECYSTECTOMY N/A 7/3/2023    Procedure: CHOLECYSTECTOMY, LAPAROSCOPIC;  Surgeon: Obinna Whipple MD;  Location: [facility] OR;  Service: General;  Laterality: N/A;  with cholangiogram    LEFT HEART CATHETERIZATION  12/13/2019    Procedure: Left heart cath- RM # [id number];  Surgeon: Timi Millan MD;  Location: [facility] CATH;  Service: Cardiology;;    OOPHORECTOMY Bilateral 2011    ROBOTIC ARTHROPLASTY, KNEE Right 6/14/2021    Procedure: ROBOTIC ARTHROPLASTY, KNEE, TOTAL;  Surgeon: Juan Wilson MD;  Location: [facility] OR;  Service: Orthopedics;  Laterality: Right;    SINUS SURGERY      x3    TENDON REPAIR Left     ankle surgery    THYROIDECTOMY      2 separate surgeries    TONSILLECTOMY      TOTAL KNEE ARTHROPLASTY Left 06/17/2019    Surgeon: Juan Wilson MD    XI ROBOTIC REVISION, GASTROENTEROSTOMY, MITCHELL-EN-Y N/A 2/20/2023    Procedure: XI ROBOTIC REVISION,GASTROENTEROSTOMY,MITCHELL-EN-Y;  Surgeon: Obinna Whipple MD;  Location: [facility] OR;  Service: General;  Laterality: N/A;  creation of Mitchell-N-Y anastomsis       FMHX:  Family History   Problem Relation Name Age of Onset    Heart failure Mother      Asthma Mother      Macular degeneration Mother      Cancer Mother          Breast    Cataracts Mother      Heart disease Mother      COPD Mother      Breast cancer Mother      Heart disease Father      Diabetes Father      Hypertension Father      Stroke Father      Cataracts Father      Obesity Father      Obesity Sister      Glaucoma Sister      Thyroid disease Sister      Glaucoma Sister      Other Brother          stiff man syndrome    Cancer Maternal Grandmother          Breast with  "Mets    Breast cancer Maternal Grandmother      Cancer Paternal Grandmother          Colon    Strabismus Other niece     Amblyopia Neg Hx      Blindness Neg Hx      Retinal detachment Neg Hx         SOCHX:  Social History     Tobacco Use    Smoking status: Former     Current packs/day: 0.00     Average packs/day: 0.5 packs/day for 14.9 years (7.5 ttl pk-yrs)     Types: Cigarettes     Start date: 1984     Quit date: 1998     Years since quittin.5    Smokeless tobacco: Never   Substance Use Topics    Alcohol use: No       ALLERGIES:  Celexa [citalopram], Cephalexin, Zoloft [sertraline], Codeine, Isosorbide, Ciprofloxacin, Levaquin [levofloxacin], Metformin, Penicillamine, Penicillins, and Sulfa (sulfonamide antibiotics)    CURRENT MEDICATIONS:  Medications Ordered Prior to Encounter[1]    REVIEW OF SYSTEMS:  Review of Systems Complete; Negative, unless noted above.    GENERAL PHYSICAL EXAM:   BP (!) 140/80   Pulse 79   Ht 5' 1" (1.549 m)   Wt 59.3 kg (130 lb 11.7 oz)   LMP  (LMP Unknown) Comment: total  BMI 24.70 kg/m²    GEN: well developed, well nourished, no acute distress   PULM: No wheezing, no respiratory distress   CV: RRR    ORTHO EXAM:   Examination of the left hand/wrist reveals mild edema of the left wrist.  No erythema, ecchymosis, or skin breaks.  Able to flex extend the fingers appropriately.  No significant tenderness palpation of the fingers of the left hand.  Presence of tenderness palpation of the distal radius.  No significant tenderness palpation of the remainder of the left wrist.  Strength not tested secondary to fracture.  Normal sensation in the radial, ulnar, median nerve distributions.  Capillary refill less than 2 seconds.    RADIOLOGY:   X-rays of the left wrist were taken today in clinic.  X-rays read by myself.  X-rays compared to previous imaging.  Imaging showed the presence of a nondisplaced radial styloid fracture of the left wrist with routine healing.  No other " significant abnormalities noted.    ASSESSMENT:   Nondisplaced radial styloid fracture of the left wrist with routine healing    PLAN:  1. I discussed with Josephine Mckinley Hoang the nondisplaced radius fracture pathology and treatment options in detail during today's visit.  After treatment options were discussed, we decided the best course of action this time is to proceed with immobilization via a removable Velcro thumb spica splint of the left thumb/wrist.  We discussed importance of wearing the splint at all times only to remove for bathing.  We discussed importance of being limited to nonweightbearing of the left hand/arm until seen again in clinic.  She verbally agreed with the treatment plan.      2.  She is placed in a removable Velcro thumb spica splint of the left thumb/wrist in clinic today     3. I would like to have her follow up in clinic in 2 weeks at which time perform repeat x-rays of the left wrist.  She was instructed to contact the clinic for any problems or concerns in the interim.           [1]   Current Outpatient Medications on File Prior to Visit   Medication Sig Dispense Refill    acarbose (PRECOSE) 25 MG Tab Take 1 tablet (25 mg total) by mouth 3 (three) times daily with meals. 270 tablet 3    acetaminophen (TYLENOL) 650 MG TbSR Take 1,300 mg by mouth every 8 (eight) hours as needed (pain).       albuterol (PROVENTIL/VENTOLIN HFA) 90 mcg/actuation inhaler Inhale 2 puffs into the lungs every 6 (six) hours as needed for Shortness of Breath or Wheezing. 18 g 3    allopurinoL (ZYLOPRIM) 100 MG tablet TAKE ONE TABLET BY MOUTH EVERY MORNING 90 tablet 4    amLODIPine (NORVASC) 5 MG tablet Take 1 tablet (5 mg total) by mouth once daily. 30 tablet 11    aspirin (ECOTRIN) 81 MG EC tablet Take 1 tablet (81 mg total) by mouth once daily. 30 tablet 11    atorvastatin (LIPITOR) 40 MG tablet TAKE ONE TABLET BY MOUTH EVERY NIGHT AT BEDTIME 90 tablet 4    baclofen (LIORESAL) 10 MG tablet TAKE ONE TABLET BY  MOUTH EVERY MORNING and TAKE ONE TABLET BY MOUTH EVERY NIGHT AT BEDTIME 60 tablet 11    benzonatate (TESSALON) 100 MG capsule 1 - 2 po every 6 hours prn cough 45 capsule 0    blood sugar diagnostic (TRUE METRIX GLUCOSE TEST STRIP) Strp 1 strip by In Vitro route 3 (three) times daily. 300 each 3    blood-glucose meter kit To check BG TID PRN, to use with insurance preferred meter 1 each 0    BRILINTA 90 mg tablet TAKE ONE TABLET BY MOUTH EVERY MORNING and TAKE ONE TABLET BY MOUTH EVERY NIGHT AT BEDTIME 180 tablet 3    calcium carb and citrate-vitD3 600 mg-12.5 mcg (500 unit) TbSR Take 2 tablets by mouth once.      cetirizine (ZYRTEC) 10 MG tablet Take 10 mg by mouth daily as needed for Allergies.      erythromycin (ROMYCIN) ophthalmic ointment Place into both eyes 2 (two) times daily.      ferrous sulfate (IRON) 325 mg (65 mg iron) Tab tablet Take 1 tablet (325 mg total) by mouth every other day. 90 tablet 0    fluticasone propionate (FLONASE) 50 mcg/actuation nasal spray fluticasone propionate 50 mcg/actuation nasal spray,suspension      lancets Misc To check BG TID PRN, to use with insurance preferred meter 100 each 1    levothyroxine (SYNTHROID) 100 MCG tablet TAKE ONE TABLET BY MOUTH EVERY MORNING 90 tablet 3    methocarbamoL (ROBAXIN) 750 MG Tab Take 1 tablet (750 mg total) by mouth 4 (four) times daily as needed. (Patient not taking: Reported on 7/2/2025) 44 tablet 3    metoprolol succinate (TOPROL-XL) 25 MG 24 hr tablet Take 1 tablet (25 mg total) by mouth every evening. 30 tablet 11    multivitamin (THERAGRAN) per tablet Take 1 tablet by mouth once daily. Bariatric vitamin      mupirocin (BACTROBAN) 2 % ointment Apply topically 3 (three) times daily. 30 g 1    nitroGLYCERIN (NITROSTAT) 0.4 MG SL tablet Place 1 tablet (0.4 mg total) under the tongue every 5 (five) minutes as needed for Chest pain. 25 tablet 2    pantoprazole (PROTONIX) 40 MG tablet TAKE ONE TABLET BY MOUTH EVERY MORNING and TAKE ONE TABLET BY  MOUTH EVERY NIGHT AT BEDTIME 180 tablet 2    prazosin (MINIPRESS) 1 MG Cap Take 1 capsule (1 mg total) by mouth every evening. 30 capsule 1    pregabalin (LYRICA) 100 MG capsule TAKE ONE CAPSULE BY MOUTH EVERY MORNING AND TWO CAPSULES BY MOUTH EVERY NIGHT AT BEDTIME 90 capsule 1    senna-docusate 8.6-50 mg (PERICOLACE) 8.6-50 mg per tablet Take 1 tablet by mouth 2 (two) times a day.      traMADoL (ULTRAM) 50 mg tablet Take 1 tablet (50 mg total) by mouth every 6 (six) hours as needed for Pain. (Patient not taking: Reported on 7/2/2025) 28 tablet 0    venlafaxine (EFFEXOR-XR) 150 MG Cp24 TAKE ONE CAPSULE BY MOUTH EVERY NIGHT AT BEDTIME 90 capsule 3     No current facility-administered medications on file prior to visit.

## 2025-07-09 ENCOUNTER — TELEPHONE (OUTPATIENT)
Dept: INFUSION THERAPY | Facility: HOSPITAL | Age: 60
End: 2025-07-09
Payer: COMMERCIAL

## 2025-07-09 ENCOUNTER — PATIENT MESSAGE (OUTPATIENT)
Dept: INFUSION THERAPY | Facility: HOSPITAL | Age: 60
End: 2025-07-09
Payer: COMMERCIAL

## 2025-07-09 NOTE — TELEPHONE ENCOUNTER
Copied from CRM #[id number]. Topic: General Inquiry - Status Check  >> Jul 8, 2025  3:48 PM Kelsi Fox wrote:  Type: Needs Medical Advice  Who Called:  radha   Best Call Back Number: [phone]    Additional Information: ardha is checking on status of referral from provider and would like to schedule this week if possible , please call to further assist thank you .

## 2025-07-15 DIAGNOSIS — M25.532 LEFT WRIST PAIN: Primary | ICD-10-CM

## 2025-07-21 ENCOUNTER — OFFICE VISIT (OUTPATIENT)
Dept: ORTHOPEDICS | Facility: CLINIC | Age: 60
End: 2025-07-21
Payer: COMMERCIAL

## 2025-07-21 ENCOUNTER — HOSPITAL ENCOUNTER (OUTPATIENT)
Dept: RADIOLOGY | Facility: HOSPITAL | Age: 60
Discharge: HOME | End: 2025-07-21
Attending: PHYSICIAN ASSISTANT
Payer: COMMERCIAL

## 2025-07-21 VITALS — WEIGHT: 130.75 LBS | BODY MASS INDEX: 24.69 KG/M2 | HEIGHT: 61 IN

## 2025-07-21 DIAGNOSIS — M25.532 LEFT WRIST PAIN: ICD-10-CM

## 2025-07-21 DIAGNOSIS — S52.515D CLOSED NONDISPLACED FRACTURE OF STYLOID PROCESS OF LEFT RADIUS WITH ROUTINE HEALING, SUBSEQUENT ENCOUNTER: Primary | ICD-10-CM

## 2025-07-21 PROCEDURE — 1159F MED LIST DOCD IN RCRD: CPT | Mod: CPTII,S$GLB,, | Performed by: PHYSICIAN ASSISTANT

## 2025-07-21 PROCEDURE — 99213 OFFICE O/P EST LOW 20 MIN: CPT | Mod: S$GLB,,, | Performed by: PHYSICIAN ASSISTANT

## 2025-07-21 PROCEDURE — 73110 X-RAY EXAM OF WRIST: CPT | Mod: TC,PO,LT

## 2025-07-21 PROCEDURE — 99999 PR PBB SHADOW E&M-EST. PATIENT-LVL IV: CPT | Mod: PBBFAC,,, | Performed by: PHYSICIAN ASSISTANT

## 2025-07-21 PROCEDURE — 1160F RVW MEDS BY RX/DR IN RCRD: CPT | Mod: CPTII,S$GLB,, | Performed by: PHYSICIAN ASSISTANT

## 2025-07-21 PROCEDURE — 73110 X-RAY EXAM OF WRIST: CPT | Mod: 26,LT,, | Performed by: RADIOLOGY

## 2025-07-21 PROCEDURE — 3008F BODY MASS INDEX DOCD: CPT | Mod: CPTII,S$GLB,, | Performed by: PHYSICIAN ASSISTANT

## 2025-07-21 NOTE — PROGRESS NOTES
"7/21/2025    HPI:  Josephine Bolton is a 60 y.o. female, who presents to clinic today for evaluation of her left distal radius fracture.  She is about 5 weeks status post injury.  States he has worn the Velcro thumb spica splint as instructed.  States overall she is doing well.  States pain that has minimal.  Denies any acute injuries since last visit.  Denies any other complaints this time.    PMHX:  Past Medical History:   Diagnosis Date    Allergy     Anticoagulant long-term use     ASA 81mg, Effient    Anxiety     Arthritis     Asthma     As child    CAD (coronary artery disease) 01/27/2012    s/p stents x4 , CABG, 3 vessel, (5/2013) newest stent prior to CABG    Cataract     Chronic constipation     Colon polyp     Coronary artery disease involving native coronary artery of native heart without angina pectoris 01/27/2012 12/19 Significant ostial RCA lesion as above treated with drug-eluting stenting as above. Occluded proximal LAD with patent lima lad Patent vein graft to 2nd obtuse marginal Known occluded vein graft to unknown vessel.  s/p stents x 3 (2010) s/p LAD stent (DSE) 3/2012    DDD (degenerative disc disease), cervical     DDD (degenerative disc disease), lumbar     DDD (degenerative disc disease), thoracic     Depression     Edema     Encounter for blood transfusion     General anesthetics causing adverse effect in therapeutic use     Headache(784.0)     History of nephrolithiasis     Hyperlipidemia     Hypertension     Hypothyroid 07/05/2012    Insomnia     Insulin resistance     patient stated not diebetic    Joint stiffness     Joint swelling     Kidney disease     ; Frequent UTIs     Kidney stones     Low back pain     Neck pain     Obstructive sleep apnea on CPAP 07/17/2012    PONV (postoperative nausea and vomiting)     S/P CABG (coronary artery bypass graft) 06/25/2013 6/23/2013     Sleep apnea 01/27/2012    Patient reports "severe" sleep apnea, uses C-pap    Stented " coronary artery 2019  ostial RCA -Osirio2.5 x 18 post dilated 2.75     Thoracic back pain     Type 2 diabetes mellitus without complication, without long-term current use of insulin 2024    Usual hyperplasia of lactiferous duct 2017    left breast       PSHX:  Past Surgical History:   Procedure Laterality Date    ADENOIDECTOMY      BACK SURGERY      BREAST BIOPSY Left 2017    duct excision- Dr. Jimenez    BREAST CYST ASPIRATION Left 2020    @ 's office- old hematoma drained (per office)    CARDIAC SURGERY      CARPAL TUNNEL RELEASE      bilateral    CERVICAL LAMINECTOMY WITH SPINAL FUSION      2016     SECTION, CLASSIC      x 2    COLONOSCOPY      COLONOSCOPY N/A 2024    Procedure: COLONOSCOPY;  Surgeon: Mk Warren MD;  Location: [facility];  Service: Endoscopy;  Laterality: N/A;    CORONARY ANGIOGRAPHY  2019    Procedure: ANGIOGRAM, CORONARY ARTERY;  Surgeon: Timi Millan MD;  Location: [facility] CATH;  Service: Cardiology;;    CORONARY ANGIOGRAPHY  10/13/2023    Procedure: Coronary angiogram study;  Surgeon: Azra Stoll MD;  Location: [facility] CATH;  Service: Cardiology;;    CORONARY ANGIOGRAPHY INCLUDING BYPASS GRAFTS WITH CATHETERIZATION OF LEFT HEART  10/13/2023    Procedure: Left heart cath w/Grafts RM 3640;  Surgeon: Azra Stoll MD;  Location: [facility] CATH;  Service: Cardiology;;    CORONARY ANGIOPLASTY WITH STENT PLACEMENT      x 4    CORONARY ARTERY BYPASS GRAFT  2013    3 vessel    CORONARY BYPASS GRAFT ANGIOGRAPHY  10/13/2023    Procedure: Bypass graft study;  Surgeon: Azra tSoll MD;  Location: [facility] CATH;  Service: Cardiology;;    ESOPHAGOGASTRODUODENOSCOPY N/A 2020    Procedure: EGD (ESOPHAGOGASTRODUODENOSCOPY);  Surgeon: Mk Warren MD;  Location: [facility] ENDO;  Service: Endoscopy;  Laterality: N/A;    HYSTERECTOMY      Complete    JOINT REPLACEMENT      KNEE ARTHROPLASTY Left 2019    Procedure: ARTHROPLASTY, KNEE;   Surgeon: Juan Wilson MD;  Location: [facility] OR;  Service: Orthopedics;  Laterality: Left;    KNEE ARTHROSCOPY W/ MENISCAL REPAIR Left     twice, last one 5/2014    LAMINECTOMY      L4/L5    LAPAROSCOPIC CHOLECYSTECTOMY N/A 7/3/2023    Procedure: CHOLECYSTECTOMY, LAPAROSCOPIC;  Surgeon: Obinna Whipple MD;  Location: [facility] OR;  Service: General;  Laterality: N/A;  with cholangiogram    LEFT HEART CATHETERIZATION  12/13/2019    Procedure: Left heart cath-  # 219 A;  Surgeon: Timi Millan MD;  Location: [facility] CATH;  Service: Cardiology;;    OOPHORECTOMY Bilateral 2011    ROBOTIC ARTHROPLASTY, KNEE Right 6/14/2021    Procedure: ROBOTIC ARTHROPLASTY, KNEE, TOTAL;  Surgeon: Juan Wilson MD;  Location: [facility] OR;  Service: Orthopedics;  Laterality: Right;    SINUS SURGERY      x3    TENDON REPAIR Left     ankle surgery    THYROIDECTOMY      2 separate surgeries    TONSILLECTOMY      TOTAL KNEE ARTHROPLASTY Left 06/17/2019    Surgeon: Juan Wilson MD    XI ROBOTIC REVISION, GASTROENTEROSTOMY, MITCHELL-EN-Y N/A 2/20/2023    Procedure: XI ROBOTIC REVISION,GASTROENTEROSTOMY,MITCHELL-EN-Y;  Surgeon: Obinna Whipple MD;  Location: [facility] OR;  Service: General;  Laterality: N/A;  creation of Mitchell-N-Y anastomsis       FMHX:  Family History   Problem Relation Name Age of Onset    Heart failure Mother      Asthma Mother      Macular degeneration Mother      Cancer Mother          Breast    Cataracts Mother      Heart disease Mother      COPD Mother      Breast cancer Mother      Heart disease Father      Diabetes Father      Hypertension Father      Stroke Father      Cataracts Father      Obesity Father      Obesity Sister      Glaucoma Sister      Thyroid disease Sister      Glaucoma Sister      Other Brother          stiff man syndrome    Cancer Maternal Grandmother          Breast with Mets    Breast cancer Maternal Grandmother      Cancer Paternal Grandmother          Colon    Strabismus Other niece     Amblyopia Neg Hx       "Blindness Neg Hx      Retinal detachment Neg Hx         SOCHX:  Social History     Tobacco Use    Smoking status: Former     Current packs/day: 0.00     Average packs/day: 0.5 packs/day for 14.9 years (7.5 ttl pk-yrs)     Types: Cigarettes     Start date: 1984     Quit date: 1998     Years since quittin.6    Smokeless tobacco: Never   Substance Use Topics    Alcohol use: No       ALLERGIES:  Celexa [citalopram], Cephalexin, Zoloft [sertraline], Codeine, Isosorbide, Ciprofloxacin, Levaquin [levofloxacin], Metformin, Penicillamine, Penicillins, and Sulfa (sulfonamide antibiotics)    CURRENT MEDICATIONS:  Medications Ordered Prior to Encounter[1]    REVIEW OF SYSTEMS:  Review of Systems Complete; Negative, unless noted above.    GENERAL PHYSICAL EXAM:   Ht 5' 1" (1.549 m)   Wt 59.3 kg (130 lb 11.7 oz)   LMP  (LMP Unknown) Comment: total  BMI 24.70 kg/m²    GEN: well developed, well nourished, no acute distress   PULM: No wheezing, no respiratory distress   CV: RRR    ORTHO EXAM:   Examination of the left hand/wrist reveals no edema, erythema, ecchymosis, or skin breakdown.  Full intact range of motion of the left wrist.  Strength not tested secondary to fracture.  Normal sensation in the radial, ulnar, median nerve distributions.  Capillary refill less than 2 seconds.    RADIOLOGY:   X-rays of the left wrist were taken today in clinic.  X-rays reviewed by myself.  X-rays compared to previous imaging.  Imaging showed the presence of a radial styloid fracture of the distal radius of the left wrist with routine healing.  Presence of increased sclerosis of the fracture line, which is indicative of progressive interval healing.  No other significant abnormalities noted.    ASSESSMENT:   Nondisplaced radial styloid fracture of the left wrist with routine healing    PLAN:  1. I discussed with Josephine Bolton that he is progressing appropriately in the treatment course.  We discussed the best course of " action this time is to continue immobilization via her removable Velcro thumb spica splint of the left thumb/wrist.  We discussed importance of wearing at all times only to be for bathing and gentle range-of-motion exercises.  She verbally agreed with the treatment plan     2.  I would like to have her follow up in clinic in 3 weeks at which time we will perform repeat x-rays of the left wrist.  She was instructed to contact the clinic for any problems or concerns in the interim.           [1]   Current Outpatient Medications on File Prior to Visit   Medication Sig Dispense Refill    acetaminophen (TYLENOL) 650 MG TbSR Take 1,300 mg by mouth every 8 (eight) hours as needed (pain).       albuterol (PROVENTIL/VENTOLIN HFA) 90 mcg/actuation inhaler Inhale 2 puffs into the lungs every 6 (six) hours as needed for Shortness of Breath or Wheezing. 18 g 3    allopurinoL (ZYLOPRIM) 100 MG tablet TAKE ONE TABLET BY MOUTH EVERY MORNING 90 tablet 4    amLODIPine (NORVASC) 5 MG tablet Take 1 tablet (5 mg total) by mouth once daily. 30 tablet 11    atorvastatin (LIPITOR) 40 MG tablet TAKE ONE TABLET BY MOUTH EVERY NIGHT AT BEDTIME 90 tablet 4    baclofen (LIORESAL) 10 MG tablet TAKE ONE TABLET BY MOUTH EVERY MORNING and TAKE ONE TABLET BY MOUTH EVERY NIGHT AT BEDTIME 60 tablet 11    benzonatate (TESSALON) 100 MG capsule 1 - 2 po every 6 hours prn cough 45 capsule 0    blood sugar diagnostic (TRUE METRIX GLUCOSE TEST STRIP) Strp 1 strip by In Vitro route 3 (three) times daily. 300 each 3    BRILINTA 90 mg tablet TAKE ONE TABLET BY MOUTH EVERY MORNING and TAKE ONE TABLET BY MOUTH EVERY NIGHT AT BEDTIME 180 tablet 3    calcium carb and citrate-vitD3 600 mg-12.5 mcg (500 unit) TbSR Take 2 tablets by mouth once.      cetirizine (ZYRTEC) 10 MG tablet Take 10 mg by mouth daily as needed for Allergies.      erythromycin (ROMYCIN) ophthalmic ointment Place into both eyes 2 (two) times daily.      ferrous sulfate (IRON) 325 mg (65 mg iron)  Tab tablet Take 1 tablet (325 mg total) by mouth every other day. 90 tablet 0    fluticasone propionate (FLONASE) 50 mcg/actuation nasal spray fluticasone propionate 50 mcg/actuation nasal spray,suspension      lancets Misc To check BG TID PRN, to use with insurance preferred meter 100 each 1    levothyroxine (SYNTHROID) 100 MCG tablet TAKE ONE TABLET BY MOUTH EVERY MORNING 90 tablet 3    methocarbamoL (ROBAXIN) 750 MG Tab Take 1 tablet (750 mg total) by mouth 4 (four) times daily as needed. 44 tablet 3    metoprolol succinate (TOPROL-XL) 25 MG 24 hr tablet Take 1 tablet (25 mg total) by mouth every evening. 30 tablet 11    multivitamin (THERAGRAN) per tablet Take 1 tablet by mouth once daily. Bariatric vitamin      mupirocin (BACTROBAN) 2 % ointment Apply topically 3 (three) times daily. 30 g 1    nitroGLYCERIN (NITROSTAT) 0.4 MG SL tablet Place 1 tablet (0.4 mg total) under the tongue every 5 (five) minutes as needed for Chest pain. 25 tablet 2    pantoprazole (PROTONIX) 40 MG tablet TAKE ONE TABLET BY MOUTH EVERY MORNING and TAKE ONE TABLET BY MOUTH EVERY NIGHT AT BEDTIME 180 tablet 2    prazosin (MINIPRESS) 1 MG Cap Take 1 capsule (1 mg total) by mouth every evening. 30 capsule 1    pregabalin (LYRICA) 100 MG capsule TAKE ONE CAPSULE BY MOUTH EVERY MORNING AND TWO CAPSULES BY MOUTH EVERY NIGHT AT BEDTIME 90 capsule 1    senna-docusate 8.6-50 mg (PERICOLACE) 8.6-50 mg per tablet Take 1 tablet by mouth 2 (two) times a day.      traMADoL (ULTRAM) 50 mg tablet Take 1 tablet (50 mg total) by mouth every 6 (six) hours as needed for Pain. 28 tablet 0    venlafaxine (EFFEXOR-XR) 150 MG Cp24 TAKE ONE CAPSULE BY MOUTH EVERY NIGHT AT BEDTIME 90 capsule 3    acarbose (PRECOSE) 25 MG Tab Take 1 tablet (25 mg total) by mouth 3 (three) times daily with meals. 270 tablet 3    aspirin (ECOTRIN) 81 MG EC tablet Take 1 tablet (81 mg total) by mouth once daily. 30 tablet 11    blood-glucose meter kit To check BG TID PRN, to use  with insurance preferred meter 1 each 0     No current facility-administered medications on file prior to visit.

## 2025-07-22 ENCOUNTER — INFUSION (OUTPATIENT)
Dept: INFUSION THERAPY | Facility: HOSPITAL | Age: 60
End: 2025-07-22
Attending: NURSE PRACTITIONER
Payer: COMMERCIAL

## 2025-07-22 ENCOUNTER — OFFICE VISIT (OUTPATIENT)
Dept: ORTHOPEDICS | Facility: CLINIC | Age: 60
End: 2025-07-22
Payer: COMMERCIAL

## 2025-07-22 VITALS
BODY MASS INDEX: 24.69 KG/M2 | HEART RATE: 69 BPM | SYSTOLIC BLOOD PRESSURE: 140 MMHG | DIASTOLIC BLOOD PRESSURE: 78 MMHG | WEIGHT: 130.75 LBS | RESPIRATION RATE: 18 BRPM | HEIGHT: 61 IN | TEMPERATURE: 98 F | OXYGEN SATURATION: 99 %

## 2025-07-22 DIAGNOSIS — M75.41 IMPINGEMENT SYNDROME, SHOULDER, RIGHT: Primary | ICD-10-CM

## 2025-07-22 DIAGNOSIS — M75.42 IMPINGEMENT SYNDROME OF LEFT SHOULDER: ICD-10-CM

## 2025-07-22 DIAGNOSIS — D50.8 IRON DEFICIENCY ANEMIA SECONDARY TO INADEQUATE DIETARY IRON INTAKE: Primary | ICD-10-CM

## 2025-07-22 DIAGNOSIS — M70.52 PES ANSERINUS BURSITIS OF LEFT KNEE: ICD-10-CM

## 2025-07-22 DIAGNOSIS — M75.21 BICEPS TENDINITIS OF RIGHT UPPER EXTREMITY: ICD-10-CM

## 2025-07-22 DIAGNOSIS — M75.22 BICEPS TENDINITIS OF LEFT UPPER EXTREMITY: ICD-10-CM

## 2025-07-22 PROCEDURE — 25000003 PHARM REV CODE 250: Mod: PN | Performed by: NURSE PRACTITIONER

## 2025-07-22 PROCEDURE — 63600175 PHARM REV CODE 636 W HCPCS: Mod: JZ,TB,PN | Performed by: NURSE PRACTITIONER

## 2025-07-22 PROCEDURE — 99999 PR PBB SHADOW E&M-EST. PATIENT-LVL IV: CPT | Mod: PBBFAC,,, | Performed by: ORTHOPAEDIC SURGERY

## 2025-07-22 PROCEDURE — 20610 DRAIN/INJ JOINT/BURSA W/O US: CPT | Mod: 50,S$GLB,, | Performed by: ORTHOPAEDIC SURGERY

## 2025-07-22 PROCEDURE — 99499 UNLISTED E&M SERVICE: CPT | Mod: S$GLB,,, | Performed by: ORTHOPAEDIC SURGERY

## 2025-07-22 PROCEDURE — 96374 THER/PROPH/DIAG INJ IV PUSH: CPT | Mod: PN

## 2025-07-22 PROCEDURE — 20610 DRAIN/INJ JOINT/BURSA W/O US: CPT | Mod: 51,XS,LT,S$GLB | Performed by: ORTHOPAEDIC SURGERY

## 2025-07-22 RX ORDER — HEPARIN 100 UNIT/ML
500 SYRINGE INTRAVENOUS
OUTPATIENT
Start: 2025-07-29

## 2025-07-22 RX ORDER — EPINEPHRINE 0.3 MG/.3ML
0.3 INJECTION SUBCUTANEOUS ONCE AS NEEDED
OUTPATIENT
Start: 2025-07-29

## 2025-07-22 RX ORDER — HEPARIN 100 UNIT/ML
500 SYRINGE INTRAVENOUS
Status: DISCONTINUED | OUTPATIENT
Start: 2025-07-22 | End: 2025-07-22 | Stop reason: HOSPADM

## 2025-07-22 RX ORDER — SODIUM CHLORIDE 0.9 % (FLUSH) 0.9 %
10 SYRINGE (ML) INJECTION
OUTPATIENT
Start: 2025-07-29

## 2025-07-22 RX ORDER — SODIUM CHLORIDE 0.9 % (FLUSH) 0.9 %
10 SYRINGE (ML) INJECTION
Status: DISCONTINUED | OUTPATIENT
Start: 2025-07-22 | End: 2025-07-22 | Stop reason: HOSPADM

## 2025-07-22 RX ADMIN — TRIAMCINOLONE ACETONIDE 40 MG: 40 INJECTION, SUSPENSION INTRA-ARTICULAR; INTRAMUSCULAR at 09:07

## 2025-07-22 RX ADMIN — FERUMOXYTOL 510 MG: 510 INJECTION INTRAVENOUS at 11:07

## 2025-07-22 RX ADMIN — SODIUM CHLORIDE: 9 INJECTION, SOLUTION INTRAVENOUS at 11:07

## 2025-07-23 DIAGNOSIS — M51.369 DDD (DEGENERATIVE DISC DISEASE), LUMBAR: ICD-10-CM

## 2025-07-23 RX ORDER — PREGABALIN 100 MG/1
CAPSULE ORAL
Qty: 90 CAPSULE | Refills: 5 | Status: SHIPPED | OUTPATIENT
Start: 2025-07-23

## 2025-07-23 NOTE — TELEPHONE ENCOUNTER
No care due was identified.  [facility] Embedded Care Due Messages. Reference number: [id number].   7/23/2025 2:43:42 PM CDT

## 2025-07-29 ENCOUNTER — INFUSION (OUTPATIENT)
Dept: INFUSION THERAPY | Facility: HOSPITAL | Age: 60
End: 2025-07-29
Attending: NURSE PRACTITIONER
Payer: COMMERCIAL

## 2025-07-29 VITALS
TEMPERATURE: 99 F | WEIGHT: 132.25 LBS | DIASTOLIC BLOOD PRESSURE: 78 MMHG | SYSTOLIC BLOOD PRESSURE: 180 MMHG | OXYGEN SATURATION: 99 % | BODY MASS INDEX: 24.97 KG/M2 | HEIGHT: 61 IN | HEART RATE: 62 BPM | RESPIRATION RATE: 16 BRPM

## 2025-07-29 DIAGNOSIS — D50.8 IRON DEFICIENCY ANEMIA SECONDARY TO INADEQUATE DIETARY IRON INTAKE: Primary | ICD-10-CM

## 2025-07-29 PROCEDURE — 96365 THER/PROPH/DIAG IV INF INIT: CPT | Mod: PN

## 2025-07-29 PROCEDURE — 63600175 PHARM REV CODE 636 W HCPCS: Mod: JZ,TB,PN | Performed by: NURSE PRACTITIONER

## 2025-07-29 PROCEDURE — 25000003 PHARM REV CODE 250: Mod: PN | Performed by: NURSE PRACTITIONER

## 2025-07-29 RX ORDER — SODIUM CHLORIDE 0.9 % (FLUSH) 0.9 %
10 SYRINGE (ML) INJECTION
OUTPATIENT
Start: 2025-07-29

## 2025-07-29 RX ORDER — EPINEPHRINE 0.3 MG/.3ML
0.3 INJECTION SUBCUTANEOUS ONCE AS NEEDED
OUTPATIENT
Start: 2025-07-29

## 2025-07-29 RX ORDER — HEPARIN 100 UNIT/ML
500 SYRINGE INTRAVENOUS
OUTPATIENT
Start: 2025-07-29

## 2025-07-29 RX ADMIN — FERUMOXYTOL 510 MG: 510 INJECTION INTRAVENOUS at 02:07

## 2025-07-29 RX ADMIN — SODIUM CHLORIDE: 9 INJECTION, SOLUTION INTRAVENOUS at 02:07

## 2025-07-29 NOTE — PLAN OF CARE
Problem: Adult Inpatient Plan of Care  Goal: Plan of Care Review  Outcome: Progressing  Flowsheets (Taken 7/29/2025 1625)  Plan of Care Reviewed With: patient  Goal: Patient-Specific Goal (Individualized)  Outcome: Progressing  Flowsheets (Taken 7/29/2025 1625)  Individualized Care Needs: recliner, blanket  Anxieties, Fears or Concerns: none  Patient/Family-Specific Goals (Include Timeframe): no reaction to tx  Goal: Absence of Hospital-Acquired Illness or Injury  Outcome: Progressing  Goal: Optimal Comfort and Wellbeing  Outcome: Progressing     Problem: Fatigue  Goal: Improved Activity Tolerance  Outcome: Progressing  Intervention: Promote Improved Energy  Flowsheets (Taken 7/29/2025 1625)  Fatigue Management:   frequent rest breaks encouraged   paced activity encouraged  Sleep/Rest Enhancement: regular sleep/rest pattern promoted  Activity Management: Ambulated in puga -   Environmental Support:   calm environment promoted   distractions minimized   Pt tolerated Feraheme #2 well.  Pt stayed for observation for 30 minutes post infusion.  No s/s of reaction noted.  Instructed to call MD with any problems

## 2025-07-31 RX ORDER — TRIAMCINOLONE ACETONIDE 40 MG/ML
40 INJECTION, SUSPENSION INTRA-ARTICULAR; INTRAMUSCULAR
Status: DISCONTINUED | OUTPATIENT
Start: 2025-07-22 | End: 2025-07-31 | Stop reason: HOSPADM

## 2025-07-31 NOTE — PROCEDURES
Large Joint Aspiration/Injection: bilateral subacromial bursa    Date/Time: 7/22/2025 9:30 AM    Performed by: Juan Wilson MD  Authorized by: Juan Wilson MD    Timeout: prior to procedure the correct patient, procedure, and site was verified    Prep: patient was prepped and draped in usual sterile fashion      Local anesthesia used?: Yes    Local anesthetic:  Lidocaine 1% without epinephrine  Anesthetic total (ml):  5      Details:  Needle Size:  22 G  Ultrasonic Guidance for needle placement?: No    Approach:  Posterior  Location:  Shoulder  Laterality:  Bilateral  Site:  Bilateral subacromial bursa  Medications (Right):  40 mg triamcinolone acetonide 40 mg/mL  Medications (Left):  40 mg triamcinolone acetonide 40 mg/mL  Patient tolerance:  Patient tolerated the procedure well with no immediate complications  Tendon Sheath    Date/Time: 7/22/2025 9:30 AM    Performed by: Juan Wilson MD  Authorized by: Juan Wilson MD    Consent Done?:  Yes (Verbal)  Indications:  Pain  Timeout: prior to procedure the correct patient, procedure, and site was verified    Local anesthesia used?: No    Location:  Shoulder  Site:  R bicep tendon and L bicep tendon  Ultrasonic guidance for needle placement?: No    Needle size:  25 G  Medications:  40 mg triamcinolone acetonide 40 mg/mL; 40 mg triamcinolone acetonide 40 mg/mL  Patient tolerance:  Patient tolerated the procedure well with no immediate complications  Large Joint Aspiration/Injection: L anserine bursa    Date/Time: 7/22/2025 9:30 AM    Performed by: Juan Wilson MD  Authorized by: Juan Wilson MD    Timeout: prior to procedure the correct patient, procedure, and site was verified    Prep: patient was prepped and draped in usual sterile fashion    Local anesthetic:  Lidocaine 1% without epinephrine  Anesthetic total (ml):  1      Details:  Needle Size:  21 G  Approach:  Anterolateral  Location:  Knee  Site:  L anserine bursa  Medications:  40 mg  triamcinolone acetonide 40 mg/mL  Patient tolerance:  Patient tolerated the procedure well with no immediate complications

## 2025-07-31 NOTE — PROGRESS NOTES
"Chief Complaint   Patient presents with    Left Shoulder - Pain    Right Shoulder - Pain    Left Knee - Pain       HPI:    This is a 60 y.o. who presents today complaining of bilateral shoulder and left knee pain for 2 weeks after no interval trauma. Pain is dull. No numbness or tingling. No associated signs or symptoms.      Past Medical History:   Diagnosis Date    Allergy     Anticoagulant long-term use     ASA 81mg, Effient    Anxiety     Arthritis     Asthma     As child    CAD (coronary artery disease) 01/27/2012    s/p stents x4 , CABG, 3 vessel, (5/2013) newest stent prior to CABG    Cataract     Chronic constipation     Colon polyp     Coronary artery disease involving native coronary artery of native heart without angina pectoris 01/27/2012 12/19 Significant ostial RCA lesion as above treated with drug-eluting stenting as above. Occluded proximal LAD with patent lima lad Patent vein graft to 2nd obtuse marginal Known occluded vein graft to unknown vessel.  s/p stents x 3 (2010) s/p LAD stent (DSE) 3/2012    DDD (degenerative disc disease), cervical     DDD (degenerative disc disease), lumbar     DDD (degenerative disc disease), thoracic     Depression     Edema     Encounter for blood transfusion     General anesthetics causing adverse effect in therapeutic use     Headache(784.0)     History of nephrolithiasis     Hyperlipidemia     Hypertension     Hypothyroid 07/05/2012    Insomnia     Insulin resistance     patient stated not diebetic    Joint stiffness     Joint swelling     Kidney disease     ; Frequent UTIs     Kidney stones     Low back pain     Neck pain     Obstructive sleep apnea on CPAP 07/17/2012    PONV (postoperative nausea and vomiting)     S/P CABG (coronary artery bypass graft) 06/25/2013 6/23/2013     Sleep apnea 01/27/2012    Patient reports "severe" sleep apnea, uses C-pap    Stented coronary artery 12/20/2019 12/19  ostial RCA -Osirio2.5 x 18 post dilated 2.75     " Thoracic back pain     Type 2 diabetes mellitus without complication, without long-term current use of insulin 2024    Usual hyperplasia of lactiferous duct 2017    left breast      Past Surgical History:   Procedure Laterality Date    ADENOIDECTOMY      BACK SURGERY      BREAST BIOPSY Left 2017    duct excision- Dr. Jimenez    BREAST CYST ASPIRATION Left 2020    @ 's office- old hematoma drained (per office)    CARDIAC SURGERY      CARPAL TUNNEL RELEASE      bilateral    CERVICAL LAMINECTOMY WITH SPINAL FUSION      2016     SECTION, CLASSIC      x 2    COLONOSCOPY      COLONOSCOPY N/A 2024    Procedure: COLONOSCOPY;  Surgeon: Mk Warren MD;  Location: [facility];  Service: Endoscopy;  Laterality: N/A;    CORONARY ANGIOGRAPHY  2019    Procedure: ANGIOGRAM, CORONARY ARTERY;  Surgeon: Timi Millan MD;  Location: [facility];  Service: Cardiology;;    CORONARY ANGIOGRAPHY  10/13/2023    Procedure: Coronary angiogram study;  Surgeon: Azra Stoll MD;  Location: [facility];  Service: Cardiology;;    CORONARY ANGIOGRAPHY INCLUDING BYPASS GRAFTS WITH CATHETERIZATION OF LEFT HEART  10/13/2023    Procedure: Left heart cath w/Grafts RM 3640;  Surgeon: Azra Stoll MD;  Location: [facility];  Service: Cardiology;;    CORONARY ANGIOPLASTY WITH STENT PLACEMENT      x 4    CORONARY ARTERY BYPASS GRAFT  2013    3 vessel    CORONARY BYPASS GRAFT ANGIOGRAPHY  10/13/2023    Procedure: Bypass graft study;  Surgeon: Azra Stoll MD;  Location: [facility];  Service: Cardiology;;    ESOPHAGOGASTRODUODENOSCOPY N/A 2020    Procedure: EGD (ESOPHAGOGASTRODUODENOSCOPY);  Surgeon: Mk Warren MD;  Location: [facility];  Service: Endoscopy;  Laterality: N/A;    HYSTERECTOMY      Complete    JOINT REPLACEMENT      KNEE ARTHROPLASTY Left 2019    Procedure: ARTHROPLASTY, KNEE;  Surgeon: Juan Wilson MD;  Location: [facility];  Service: Orthopedics;  Laterality: Left;    KNEE  "ARTHROSCOPY W/ MENISCAL REPAIR Left     twice, last one 5/2014    LAMINECTOMY      L4/L5    LAPAROSCOPIC CHOLECYSTECTOMY N/A 7/3/2023    Procedure: CHOLECYSTECTOMY, LAPAROSCOPIC;  Surgeon: Obinna Whipple MD;  Location: [facility] OR;  Service: General;  Laterality: N/A;  with cholangiogram    LEFT HEART CATHETERIZATION  12/13/2019    Procedure: Left heart cath-  # [id number] A;  Surgeon: Timi Millan MD;  Location: [facility];  Service: Cardiology;;    OOPHORECTOMY Bilateral 2011    ROBOTIC ARTHROPLASTY, KNEE Right 6/14/2021    Procedure: ROBOTIC ARTHROPLASTY, KNEE, TOTAL;  Surgeon: Juan Wilson MD;  Location: [facility] OR;  Service: Orthopedics;  Laterality: Right;    SINUS SURGERY      x3    TENDON REPAIR Left     ankle surgery    THYROIDECTOMY      2 separate surgeries    TONSILLECTOMY      TOTAL KNEE ARTHROPLASTY Left 06/17/2019    Surgeon: Juan Wilson MD    XI ROBOTIC REVISION, GASTROENTEROSTOMY, MITCHELL-EN-Y N/A 2/20/2023    Procedure: XI ROBOTIC REVISION,GASTROENTEROSTOMY,MITCHELL-EN-Y;  Surgeon: Obinna Whipple MD;  Location: [facility] OR;  Service: General;  Laterality: N/A;  creation of Mitchell-N-Y anastomsis      Medications Ordered Prior to Encounter[1]   Review of patient's allergies indicates:   Allergen Reactions    Celexa [citalopram] Other (See Comments)     GI upset  Stated "knocked me out"    Cephalexin Anaphylaxis    Zoloft [sertraline] Other (See Comments)     Stated "knocked me out"    Codeine Other (See Comments)     hallucinations  hallucinations    Isosorbide Nausea And Vomiting    Ciprofloxacin Itching    Levaquin [levofloxacin] Other (See Comments)     tendinitis    Metformin      Nausea     Penicillamine     Penicillins Other (See Comments)     Was told per mother that as child was allergic to penicillin.  Childhood allergy/ unknown reaction    Sulfa (sulfonamide antibiotics)       Family History not pertinent   Social History[2]      Review of Systems:   Constitutional:  Denies fever or chills    Eyes:  " Denies change in visual acuity    HENT:  Denies nasal congestion or sore throat    Respiratory:  Denies cough or shortness of breath    Cardiovascular:  Denies chest pain or edema    GI:  Denies abdominal pain, nausea, vomiting, bloody stools or diarrhea    :  Denies dysuria    Integument:  Denies rash    Neurologic:  Denies headache, focal weakness or sensory changes    Endocrine:  Denies polyuria or polydipsia    Lymphatic:  Denies swollen glands    Psychiatric:  Denies depression or anxiety       Physical Exam:    Constitutional:  Well developed, well nourished, no acute distress, non-toxic appearance    Integument:  Well hydrated, no rash    Lymphatic:  No lymphadenopathy noted    Neurologic:  Alert & oriented x 3,     Psychiatric:  Speech and behavior appropriate    Gi: abdomen soft  Eyes: EOMI     Bilateral Shoulder Exam  Tenderness   Shoulder tenderness location: diffusely about shoulder.    Range of Motion   Forward Flexion: abnormal   External Rotation: abnormal     Muscle Strength   Supraspinatus: 4/5     Tests   Hawkin's test: positive  Impingement: positive    Other   Erythema: absent  Sensation: normal  Pulse: present      L knee  Point TTP about the pes. Pain with SLR. NVI distally.      Impingement syndrome, shoulder, right    Impingement syndrome of left shoulder    Biceps tendinitis of left upper extremity    Biceps tendinitis of right upper extremity    Pes anserinus bursitis of left knee          Using an aseptic technique, I injected 5 cc of lidocaine 1% without and 1 cc of kenalog 40mg into the bilateral Shoulder and 1:1 in the bilateral biceps and left pes. The patient tolerated this well. I will have them return to clinic in 8 weeks.              [1]   Current Outpatient Medications on File Prior to Visit   Medication Sig Dispense Refill    acetaminophen (TYLENOL) 650 MG TbSR Take 1,300 mg by mouth every 8 (eight) hours as needed (pain).       albuterol (PROVENTIL/VENTOLIN HFA) 90  mcg/actuation inhaler Inhale 2 puffs into the lungs every 6 (six) hours as needed for Shortness of Breath or Wheezing. 18 g 3    allopurinoL (ZYLOPRIM) 100 MG tablet TAKE ONE TABLET BY MOUTH EVERY MORNING 90 tablet 4    amLODIPine (NORVASC) 5 MG tablet Take 1 tablet (5 mg total) by mouth once daily. 30 tablet 11    atorvastatin (LIPITOR) 40 MG tablet TAKE ONE TABLET BY MOUTH EVERY NIGHT AT BEDTIME 90 tablet 4    baclofen (LIORESAL) 10 MG tablet TAKE ONE TABLET BY MOUTH EVERY MORNING and TAKE ONE TABLET BY MOUTH EVERY NIGHT AT BEDTIME 60 tablet 11    benzonatate (TESSALON) 100 MG capsule 1 - 2 po every 6 hours prn cough 45 capsule 0    blood sugar diagnostic (TRUE METRIX GLUCOSE TEST STRIP) Strp 1 strip by In Vitro route 3 (three) times daily. 300 each 3    BRILINTA 90 mg tablet TAKE ONE TABLET BY MOUTH EVERY MORNING and TAKE ONE TABLET BY MOUTH EVERY NIGHT AT BEDTIME 180 tablet 3    calcium carb and citrate-vitD3 600 mg-12.5 mcg (500 unit) TbSR Take 2 tablets by mouth once.      cetirizine (ZYRTEC) 10 MG tablet Take 10 mg by mouth daily as needed for Allergies.      erythromycin (ROMYCIN) ophthalmic ointment Place into both eyes 2 (two) times daily.      ferrous sulfate (IRON) 325 mg (65 mg iron) Tab tablet Take 1 tablet (325 mg total) by mouth every other day. 90 tablet 0    fluticasone propionate (FLONASE) 50 mcg/actuation nasal spray fluticasone propionate 50 mcg/actuation nasal spray,suspension      lancets Misc To check BG TID PRN, to use with insurance preferred meter 100 each 1    levothyroxine (SYNTHROID) 100 MCG tablet TAKE ONE TABLET BY MOUTH EVERY MORNING 90 tablet 3    methocarbamoL (ROBAXIN) 750 MG Tab Take 1 tablet (750 mg total) by mouth 4 (four) times daily as needed. 44 tablet 3    metoprolol succinate (TOPROL-XL) 25 MG 24 hr tablet Take 1 tablet (25 mg total) by mouth every evening. 30 tablet 11    multivitamin (THERAGRAN) per tablet Take 1 tablet by mouth once daily. Bariatric vitamin       mupirocin (BACTROBAN) 2 % ointment Apply topically 3 (three) times daily. 30 g 1    nitroGLYCERIN (NITROSTAT) 0.4 MG SL tablet Place 1 tablet (0.4 mg total) under the tongue every 5 (five) minutes as needed for Chest pain. 25 tablet 2    pantoprazole (PROTONIX) 40 MG tablet TAKE ONE TABLET BY MOUTH EVERY MORNING and TAKE ONE TABLET BY MOUTH EVERY NIGHT AT BEDTIME 180 tablet 2    prazosin (MINIPRESS) 1 MG Cap Take 1 capsule (1 mg total) by mouth every evening. 30 capsule 1    senna-docusate 8.6-50 mg (PERICOLACE) 8.6-50 mg per tablet Take 1 tablet by mouth 2 (two) times a day.      traMADoL (ULTRAM) 50 mg tablet Take 1 tablet (50 mg total) by mouth every 6 (six) hours as needed for Pain. 28 tablet 0    venlafaxine (EFFEXOR-XR) 150 MG Cp24 TAKE ONE CAPSULE BY MOUTH EVERY NIGHT AT BEDTIME 90 capsule 3    acarbose (PRECOSE) 25 MG Tab Take 1 tablet (25 mg total) by mouth 3 (three) times daily with meals. 270 tablet 3    aspirin (ECOTRIN) 81 MG EC tablet Take 1 tablet (81 mg total) by mouth once daily. 30 tablet 11    blood-glucose meter kit To check BG TID PRN, to use with insurance preferred meter 1 each 0     No current facility-administered medications on file prior to visit.   [2]   Social History  Socioeconomic History    Marital status:    Occupational History    Occupation: teacher   Tobacco Use    Smoking status: Former     Current packs/day: 0.00     Average packs/day: 0.5 packs/day for 14.9 years (7.5 ttl pk-yrs)     Types: Cigarettes     Start date: 1984     Quit date: 1998     Years since quittin.6    Smokeless tobacco: Never   Substance and Sexual Activity    Alcohol use: No    Drug use: Not Currently     Types: Benzodiazepines    Sexual activity: Yes     Partners: Male     Social Drivers of Health     Financial Resource Strain: Patient Declined (2023)    Overall Financial Resource Strain (CARDIA)     Difficulty of Paying Living Expenses: Patient declined   Food Insecurity:  Patient Declined (2/21/2023)    Hunger Vital Sign     Worried About Running Out of Food in the Last Year: Patient declined     Ran Out of Food in the Last Year: Patient declined   Transportation Needs: Patient Declined (2/21/2023)    PRAPARE - Transportation     Lack of Transportation (Medical): Patient declined     Lack of Transportation (Non-Medical): Patient declined   Physical Activity: Patient Declined (2/21/2023)    Exercise Vital Sign     Days of Exercise per Week: Patient declined     Minutes of Exercise per Session: Patient declined   Stress: Patient Declined (2/21/2023)    [employer] [place] [employer] - Occupational Stress Questionnaire     Feeling of Stress : Patient declined   Housing Stability: Unknown (2/21/2023)    Housing Stability Vital Sign     Unable to Pay for Housing in the Last Year: Patient refused     Unstable Housing in the Last Year: Patient refused

## 2025-08-05 ENCOUNTER — OFFICE VISIT (OUTPATIENT)
Dept: ORTHOPEDICS | Facility: CLINIC | Age: 60
End: 2025-08-05
Payer: OTHER MISCELLANEOUS

## 2025-08-05 DIAGNOSIS — M70.51 PES ANSERINUS BURSITIS OF RIGHT KNEE: Primary | ICD-10-CM

## 2025-08-05 PROCEDURE — 99999 PR PBB SHADOW E&M-EST. PATIENT-LVL III: CPT | Mod: PBBFAC,,, | Performed by: ORTHOPAEDIC SURGERY

## 2025-08-05 PROCEDURE — 20610 DRAIN/INJ JOINT/BURSA W/O US: CPT | Mod: RT,S$GLB,, | Performed by: ORTHOPAEDIC SURGERY

## 2025-08-05 PROCEDURE — 99213 OFFICE O/P EST LOW 20 MIN: CPT | Mod: 25,S$GLB,, | Performed by: ORTHOPAEDIC SURGERY

## 2025-08-05 RX ADMIN — TRIAMCINOLONE ACETONIDE 40 MG: 40 INJECTION, SUSPENSION INTRA-ARTICULAR; INTRAMUSCULAR at 03:08

## 2025-08-06 DIAGNOSIS — M51.369 DDD (DEGENERATIVE DISC DISEASE), LUMBAR: ICD-10-CM

## 2025-08-07 DIAGNOSIS — M25.532 LEFT WRIST PAIN: Primary | ICD-10-CM

## 2025-08-07 NOTE — TELEPHONE ENCOUNTER
Refill Routing Note   Medication(s) are not appropriate for processing by [facility] for the following reason(s):        Outside of protocol    [facility] action(s):  Route     Requires labs : Yes             Appointments  past 12m or future 3m with PCP    Date Provider   Last Visit   9/24/2024 Natan Bolton MD   Next Visit   9/25/2025 Natan Bolton MD   ED visits in past 90 days: 1        Note composed:8:20 AM 08/07/2025

## 2025-08-07 NOTE — TELEPHONE ENCOUNTER
Care Due:                  Date            Visit Type   Department     Provider  --------------------------------------------------------------------------------                                             [facility]  Last Visit: 06-      PRE-OP       MEDICINE       Bambi Bennett                               -                              PRIMARY      [facility]  Next Visit: 09-      CARE ([facility])   MEDICINE       Natan Bolton                                                            Last  Test          Frequency    Reason                     Performed    Due Date  --------------------------------------------------------------------------------    Uric Acid...  12 months..  allopurinoL..............  Not Found    Overdue    [employer] Embedded Care Due Messages. Reference number: [id number].   8/06/2025 8:51:51 PM CDT

## 2025-08-08 ENCOUNTER — TELEPHONE (OUTPATIENT)
Dept: CARDIOLOGY | Facility: CLINIC | Age: 60
End: 2025-08-08
Payer: COMMERCIAL

## 2025-08-08 RX ORDER — PREGABALIN 100 MG/1
CAPSULE ORAL
Qty: 90 CAPSULE | Refills: 5 | Status: SHIPPED | OUTPATIENT
Start: 2025-08-08

## 2025-08-08 NOTE — TELEPHONE ENCOUNTER
Copied from CRM #[id number]. Topic: General Inquiry - Patient Advice  >> Aug 8, 2025 11:12 AM Abiola wrote:  Type:  Needs Medical Advice    Who Called: Patient    Would the patient rather a call back or a response via [facility]? Call    Best Call Back Number: [phone]     Additional Information: Pt states she would like a surgery clearance for upcoming surgery for fusion of the cervical spine, but pt doesn't have the date yet but maybe end of October. Please call pt to advise. Thanks!

## 2025-08-11 ENCOUNTER — OFFICE VISIT (OUTPATIENT)
Dept: ORTHOPEDICS | Facility: CLINIC | Age: 60
End: 2025-08-11
Payer: COMMERCIAL

## 2025-08-11 ENCOUNTER — HOSPITAL ENCOUNTER (OUTPATIENT)
Dept: RADIOLOGY | Facility: HOSPITAL | Age: 60
Discharge: HOME | End: 2025-08-11
Attending: PHYSICIAN ASSISTANT
Payer: COMMERCIAL

## 2025-08-11 DIAGNOSIS — M25.532 LEFT WRIST PAIN: ICD-10-CM

## 2025-08-11 DIAGNOSIS — S52.515D CLOSED NONDISPLACED FRACTURE OF STYLOID PROCESS OF LEFT RADIUS WITH ROUTINE HEALING, SUBSEQUENT ENCOUNTER: Primary | ICD-10-CM

## 2025-08-11 PROCEDURE — 73110 X-RAY EXAM OF WRIST: CPT | Mod: TC,PO,LT

## 2025-08-11 PROCEDURE — 73110 X-RAY EXAM OF WRIST: CPT | Mod: 26,LT,, | Performed by: RADIOLOGY

## 2025-08-11 PROCEDURE — 99213 OFFICE O/P EST LOW 20 MIN: CPT | Mod: S$GLB,,, | Performed by: PHYSICIAN ASSISTANT

## 2025-08-11 PROCEDURE — 1159F MED LIST DOCD IN RCRD: CPT | Mod: CPTII,S$GLB,, | Performed by: PHYSICIAN ASSISTANT

## 2025-08-11 PROCEDURE — 99999 PR PBB SHADOW E&M-EST. PATIENT-LVL IV: CPT | Mod: PBBFAC,,, | Performed by: PHYSICIAN ASSISTANT

## 2025-08-11 PROCEDURE — 1160F RVW MEDS BY RX/DR IN RCRD: CPT | Mod: CPTII,S$GLB,, | Performed by: PHYSICIAN ASSISTANT

## 2025-08-14 RX ORDER — TRIAMCINOLONE ACETONIDE 40 MG/ML
40 INJECTION, SUSPENSION INTRA-ARTICULAR; INTRAMUSCULAR
Status: DISCONTINUED | OUTPATIENT
Start: 2025-08-05 | End: 2025-08-14 | Stop reason: HOSPADM

## 2025-08-18 DIAGNOSIS — I10 PRIMARY HYPERTENSION: ICD-10-CM

## 2025-08-18 DIAGNOSIS — I25.10 CORONARY ARTERY DISEASE INVOLVING NATIVE CORONARY ARTERY OF NATIVE HEART WITHOUT ANGINA PECTORIS: ICD-10-CM

## 2025-08-19 RX ORDER — METOPROLOL SUCCINATE 25 MG/1
25 TABLET, EXTENDED RELEASE ORAL NIGHTLY
Qty: 90 TABLET | Refills: 1 | Status: SHIPPED | OUTPATIENT
Start: 2025-08-19

## 2025-08-19 RX ORDER — AMLODIPINE BESYLATE 5 MG/1
5 TABLET ORAL DAILY
Qty: 90 TABLET | Refills: 1 | Status: SHIPPED | OUTPATIENT
Start: 2025-08-19

## 2025-08-29 ENCOUNTER — OFFICE VISIT (OUTPATIENT)
Dept: FAMILY MEDICINE | Facility: CLINIC | Age: 60
End: 2025-08-29
Payer: COMMERCIAL

## 2025-08-29 ENCOUNTER — HOSPITAL ENCOUNTER (OUTPATIENT)
Dept: RADIOLOGY | Facility: HOSPITAL | Age: 60
Discharge: HOME | End: 2025-08-29
Attending: NURSE PRACTITIONER
Payer: COMMERCIAL

## 2025-08-29 VITALS
TEMPERATURE: 99 F | OXYGEN SATURATION: 99 % | SYSTOLIC BLOOD PRESSURE: 140 MMHG | HEIGHT: 61 IN | BODY MASS INDEX: 25.1 KG/M2 | WEIGHT: 132.94 LBS | DIASTOLIC BLOOD PRESSURE: 70 MMHG | HEART RATE: 76 BPM

## 2025-08-29 DIAGNOSIS — I25.10 CORONARY ARTERY DISEASE INVOLVING NATIVE CORONARY ARTERY OF NATIVE HEART WITHOUT ANGINA PECTORIS: ICD-10-CM

## 2025-08-29 DIAGNOSIS — E78.2 MIXED HYPERLIPIDEMIA: ICD-10-CM

## 2025-08-29 DIAGNOSIS — Z95.1 S/P CABG (CORONARY ARTERY BYPASS GRAFT): ICD-10-CM

## 2025-08-29 DIAGNOSIS — I10 ESSENTIAL HYPERTENSION: ICD-10-CM

## 2025-08-29 DIAGNOSIS — M48.02 CERVICAL STENOSIS OF SPINAL CANAL: ICD-10-CM

## 2025-08-29 DIAGNOSIS — E03.9 ACQUIRED HYPOTHYROIDISM: ICD-10-CM

## 2025-08-29 DIAGNOSIS — Z01.818 PREOP EXAMINATION: Primary | ICD-10-CM

## 2025-08-29 DIAGNOSIS — M25.532 LEFT WRIST PAIN: ICD-10-CM

## 2025-08-29 PROCEDURE — 99999 PR PBB SHADOW E&M-EST. PATIENT-LVL V: CPT | Mod: PBBFAC,,, | Performed by: NURSE PRACTITIONER

## 2025-08-29 RX ORDER — LIDOCAINE 50 MG/G
1 PATCH TOPICAL
COMMUNITY
Start: 2025-08-27 | End: 2025-09-10

## 2025-08-29 RX ORDER — VIBEGRON 75 MG/1
1 TABLET, FILM COATED ORAL
COMMUNITY
Start: 2025-08-04

## 2025-08-29 RX ORDER — ONDANSETRON 4 MG/1
4 TABLET, ORALLY DISINTEGRATING ORAL EVERY 8 HOURS PRN
COMMUNITY
Start: 2025-08-27

## 2025-08-29 RX ORDER — HYDROCODONE BITARTRATE AND ACETAMINOPHEN 7.5; 325 MG/1; MG/1
1 TABLET ORAL
COMMUNITY
Start: 2025-08-27 | End: 2025-09-03

## (undated) DEVICE — ADHESIVE DERMABOND SKIN DNX12

## (undated) DEVICE — TROCAR KII FIOS 12MM X 100MM

## (undated) DEVICE — SUTURE VICRYL #0 27 UR-6 VCP603H

## (undated) DEVICE — STRAP OR TABLE 5IN X 72IN

## (undated) DEVICE — ELECTRODE REM PLYHSV RETURN 9

## (undated) DEVICE — SLEEVE SCD EXPRESS KNEE MEDIUM

## (undated) DEVICE — BLADE SURG CARBON STEEL SZ11

## (undated) DEVICE — APPLICATOR CHLORAPREP ORN 26ML

## (undated) DEVICE — WARMER SCOPE SEE SHARP

## (undated) DEVICE — SOLUTION NACL 0.9% 3000ML

## (undated) DEVICE — GLOVE BIOGELPI GOLD SIZE 7.5

## (undated) DEVICE — TROCAR ADVANCED FIXATION  CFF03

## (undated) DEVICE — Device

## (undated) DEVICE — SCISSOR 5MMX35CM DIRECT DRIVE

## (undated) DEVICE — SYR 50CC LL

## (undated) DEVICE — SUT VLOC 90 3-0 V-20 NDL 9

## (undated) DEVICE — SOLUTION IRRI NS BOTTLE 1000ML R5200-01

## (undated) DEVICE — GLOVE BIOGEL PIMICRO INDIC 7.5

## (undated) DEVICE — DRAPE ARM DAVINCI XI

## (undated) DEVICE — UNDERGLOVE BIOGEL PI SZ 6.5 LF

## (undated) DEVICE — IRRIGATOR SUCTION W/TIP

## (undated) DEVICE — RELOAD SUREFORM 60 3.5 BLU 6R

## (undated) DEVICE — CATHETER CHOLANGIAGRAM 18IN 4.5FR 20018-

## (undated) DEVICE — SEAL UNIVERSAL 5MM-8MM XI

## (undated) DEVICE — ELECTRODE OLSEN HOOK L-SHAPED 13INX330MM

## (undated) DEVICE — TROCAR OPTICAL ZTHREAD 12MMX100MM CTF73

## (undated) DEVICE — RETRIEVER ENDOPOUCH SPEC BAG

## (undated) DEVICE — CLOSURE SKIN STERI STRIP 1/2X4

## (undated) DEVICE — CANNULA REDUCER 12-8MM

## (undated) DEVICE — SEALANT TISSEEL FIBRIN 10ML

## (undated) DEVICE — PACK CUSTOM UNIV BASIN SLI

## (undated) DEVICE — NDL SPINAL 18GX3.5 SPINOCAN

## (undated) DEVICE — SUCTION/IRRIGATOR W/TIP

## (undated) DEVICE — PACK SIRUS BASIC V SURG STRL

## (undated) DEVICE — GLOVE SURG ULTRA TOUCH 7

## (undated) DEVICE — NDL PNEUMO INSUFFLATI 120MM

## (undated) DEVICE — SOL CLEARIFY VISUALIZATION LAP

## (undated) DEVICE — LINER SUCTION 3000CC

## (undated) DEVICE — SET TUBE PNEUMOCLEAR SE HI FLO

## (undated) DEVICE — SOL IRR NACL .9% 3000ML

## (undated) DEVICE — RELOAD SUREFORM 60 2.5 WHT 6R

## (undated) DEVICE — CABLE MONOPOLAR 10FT DISPOSABLE

## (undated) DEVICE — GLOVE SURG ULTRA TOUCH 7.5

## (undated) DEVICE — KIT PROCEDURE STER INLET CLOSU

## (undated) DEVICE — SUTURE MONOCRYL 4-0 27 SH MCP415H

## (undated) DEVICE — TRAY GENERAL LAPAROSCOPY

## (undated) DEVICE — COVER TIP CURVED SCISSORS XI

## (undated) DEVICE — OBTURATOR BLADELESS 8MM XI CLR

## (undated) DEVICE — APPLIER CLIP  5MM

## (undated) DEVICE — GOWN POLY REINF BRTH SLV XL

## (undated) DEVICE — APPLICATOR ENDOSCOPIC SURGICEL

## (undated) DEVICE — APPLICATOR GSLS SPRAY TIP 40CM

## (undated) DEVICE — SCISSORS 5MM APPLIED MEDICAL   CB030

## (undated) DEVICE — PAD PINK TRENDELENBURG POS XL

## (undated) DEVICE — DRAPE ABDOMINAL TIBURON 14X11

## (undated) DEVICE — SUT VLOC 2-0 6IN 1/2 CIRCLE TP

## (undated) DEVICE — GLOVE SURG ULTRA TOUCH 6

## (undated) DEVICE — SLEEVE GSTRCTMY VISIGI 3D 36FR

## (undated) DEVICE — CANNULA SEAL 12MM

## (undated) DEVICE — SOL ELECTROLUBE ANTI-STIC

## (undated) DEVICE — TOWEL OR DISP STRL BLUE 4/PK

## (undated) DEVICE — DRAPE COLUMN DAVINCI XI

## (undated) DEVICE — GOWN POLY REINF BRTH SLV LG

## (undated) DEVICE — GLOVE SURGEONS ULTRA TOUCH 6.5

## (undated) DEVICE — STAPLER SUREFORM 60 SPU

## (undated) DEVICE — SEALER VESSEL DAVINCI XI

## (undated) DEVICE — UNDERGLOVES BIOGEL PI SZ 7 LF

## (undated) DEVICE — GLOVE BIOGEL PIMICRO INDIC 6.5

## (undated) DEVICE — DISSECTOR KITTNER 13300

## (undated) DEVICE — PAD BOVIE ADULT